# Patient Record
Sex: FEMALE | Race: WHITE | Employment: PART TIME | URBAN - NONMETROPOLITAN AREA
[De-identification: names, ages, dates, MRNs, and addresses within clinical notes are randomized per-mention and may not be internally consistent; named-entity substitution may affect disease eponyms.]

---

## 2017-02-10 RX ORDER — NADOLOL 40 MG/1
TABLET ORAL
Qty: 45 TABLET | Refills: 5 | Status: SHIPPED | OUTPATIENT
Start: 2017-02-10 | End: 2017-08-03 | Stop reason: SDUPTHER

## 2017-02-10 RX ORDER — DICLOFENAC SODIUM 75 MG/1
TABLET, DELAYED RELEASE ORAL
Qty: 60 TABLET | Refills: 5 | Status: SHIPPED | OUTPATIENT
Start: 2017-02-10 | End: 2017-08-03 | Stop reason: SDUPTHER

## 2017-02-10 RX ORDER — ROSUVASTATIN CALCIUM 5 MG
TABLET ORAL
Qty: 30 TABLET | Refills: 2 | Status: SHIPPED | OUTPATIENT
Start: 2017-02-10 | End: 2017-05-15 | Stop reason: SDUPTHER

## 2017-03-13 RX ORDER — ARIPIPRAZOLE 20 MG/1
TABLET ORAL
Qty: 30 TABLET | Refills: 5 | Status: SHIPPED | OUTPATIENT
Start: 2017-03-13 | End: 2017-08-03 | Stop reason: SDUPTHER

## 2017-03-23 ENCOUNTER — OFFICE VISIT (OUTPATIENT)
Dept: FAMILY MEDICINE CLINIC | Age: 57
End: 2017-03-23

## 2017-03-23 VITALS
HEART RATE: 74 BPM | BODY MASS INDEX: 53.92 KG/M2 | OXYGEN SATURATION: 98 % | DIASTOLIC BLOOD PRESSURE: 84 MMHG | SYSTOLIC BLOOD PRESSURE: 120 MMHG | WEIGHT: 293 LBS | HEIGHT: 62 IN

## 2017-03-23 DIAGNOSIS — M54.50 ACUTE MIDLINE LOW BACK PAIN WITHOUT SCIATICA: ICD-10-CM

## 2017-03-23 DIAGNOSIS — M53.3 SACROILIAC PAIN: Primary | ICD-10-CM

## 2017-03-23 PROCEDURE — 96372 THER/PROPH/DIAG INJ SC/IM: CPT | Performed by: NURSE PRACTITIONER

## 2017-03-23 PROCEDURE — 99213 OFFICE O/P EST LOW 20 MIN: CPT | Performed by: NURSE PRACTITIONER

## 2017-03-23 RX ORDER — LANOLIN ALCOHOL/MO/W.PET/CERES
3 CREAM (GRAM) TOPICAL NIGHTLY
COMMUNITY
End: 2017-12-15

## 2017-03-23 RX ORDER — METHYLPREDNISOLONE SODIUM SUCCINATE 125 MG/2ML
125 INJECTION, POWDER, LYOPHILIZED, FOR SOLUTION INTRAMUSCULAR; INTRAVENOUS ONCE
Status: COMPLETED | OUTPATIENT
Start: 2017-03-23 | End: 2017-03-23

## 2017-03-23 RX ORDER — CYCLOBENZAPRINE HCL 10 MG
10 TABLET ORAL EVERY 8 HOURS PRN
Qty: 45 TABLET | Refills: 0 | Status: SHIPPED | OUTPATIENT
Start: 2017-03-23 | End: 2017-12-15 | Stop reason: ALTCHOICE

## 2017-03-23 RX ORDER — PREDNISONE 10 MG/1
10 TABLET ORAL DAILY
Qty: 30 TABLET | Refills: 0 | Status: SHIPPED | OUTPATIENT
Start: 2017-03-23 | End: 2017-12-15 | Stop reason: ALTCHOICE

## 2017-03-23 RX ADMIN — METHYLPREDNISOLONE SODIUM SUCCINATE 125 MG: 125 INJECTION, POWDER, LYOPHILIZED, FOR SOLUTION INTRAMUSCULAR; INTRAVENOUS at 16:28

## 2017-03-30 ASSESSMENT — ENCOUNTER SYMPTOMS
RESPIRATORY NEGATIVE: 1
BACK PAIN: 1
EYES NEGATIVE: 1
GASTROINTESTINAL NEGATIVE: 1

## 2017-05-15 DIAGNOSIS — F33.9 RECURRENT MAJOR DEPRESSIVE DISORDER, REMISSION STATUS UNSPECIFIED (HCC): ICD-10-CM

## 2017-05-15 RX ORDER — ROSUVASTATIN CALCIUM 5 MG
TABLET ORAL
Qty: 30 TABLET | Refills: 2 | Status: SHIPPED | OUTPATIENT
Start: 2017-05-15 | End: 2017-08-03 | Stop reason: SDUPTHER

## 2017-05-15 RX ORDER — VORTIOXETINE 20 MG/1
TABLET, FILM COATED ORAL
Qty: 30 TABLET | Refills: 5 | Status: SHIPPED | OUTPATIENT
Start: 2017-05-15 | End: 2017-10-13 | Stop reason: SDUPTHER

## 2017-06-12 ENCOUNTER — TELEPHONE (OUTPATIENT)
Dept: FAMILY MEDICINE CLINIC | Age: 57
End: 2017-06-12

## 2017-06-16 ENCOUNTER — OFFICE VISIT (OUTPATIENT)
Dept: FAMILY MEDICINE CLINIC | Age: 57
End: 2017-06-16

## 2017-06-16 VITALS
SYSTOLIC BLOOD PRESSURE: 126 MMHG | OXYGEN SATURATION: 94 % | HEIGHT: 62 IN | DIASTOLIC BLOOD PRESSURE: 84 MMHG | HEART RATE: 68 BPM | WEIGHT: 293 LBS | BODY MASS INDEX: 53.92 KG/M2

## 2017-06-16 DIAGNOSIS — Z12.31 ENCOUNTER FOR SCREENING MAMMOGRAM FOR BREAST CANCER: ICD-10-CM

## 2017-06-16 DIAGNOSIS — Z23 NEED FOR PROPHYLACTIC VACCINATION AGAINST DIPHTHERIA-TETANUS-PERTUSSIS (DTP): ICD-10-CM

## 2017-06-16 DIAGNOSIS — Z11.4 SCREENING FOR HIV WITHOUT PRESENCE OF RISK FACTORS: ICD-10-CM

## 2017-06-16 DIAGNOSIS — M54.50 ACUTE MIDLINE LOW BACK PAIN WITHOUT SCIATICA: Primary | ICD-10-CM

## 2017-06-16 DIAGNOSIS — Z11.59 NEED FOR HEPATITIS C SCREENING TEST: ICD-10-CM

## 2017-06-16 DIAGNOSIS — F32.A DEPRESSIVE DISORDER: ICD-10-CM

## 2017-06-16 PROCEDURE — 96372 THER/PROPH/DIAG INJ SC/IM: CPT | Performed by: NURSE PRACTITIONER

## 2017-06-16 PROCEDURE — 99213 OFFICE O/P EST LOW 20 MIN: CPT | Performed by: NURSE PRACTITIONER

## 2017-06-16 RX ORDER — METHYLPREDNISOLONE SODIUM SUCCINATE 125 MG/2ML
125 INJECTION, POWDER, LYOPHILIZED, FOR SOLUTION INTRAMUSCULAR; INTRAVENOUS ONCE
Status: COMPLETED | OUTPATIENT
Start: 2017-06-16 | End: 2017-06-16

## 2017-06-16 RX ORDER — METHYLPREDNISOLONE 4 MG/1
TABLET ORAL
Qty: 1 KIT | Refills: 0 | Status: SHIPPED | OUTPATIENT
Start: 2017-06-16 | End: 2017-06-22

## 2017-06-16 RX ORDER — LAMOTRIGINE 150 MG/1
150 TABLET ORAL 2 TIMES DAILY
Qty: 60 TABLET | Refills: 1 | Status: CANCELLED | OUTPATIENT
Start: 2017-06-16

## 2017-06-16 RX ADMIN — METHYLPREDNISOLONE SODIUM SUCCINATE 125 MG: 125 INJECTION, POWDER, LYOPHILIZED, FOR SOLUTION INTRAMUSCULAR; INTRAVENOUS at 09:04

## 2017-06-16 ASSESSMENT — PATIENT HEALTH QUESTIONNAIRE - PHQ9
1. LITTLE INTEREST OR PLEASURE IN DOING THINGS: 1
SUM OF ALL RESPONSES TO PHQ9 QUESTIONS 1 & 2: 2
2. FEELING DOWN, DEPRESSED OR HOPELESS: 1
SUM OF ALL RESPONSES TO PHQ QUESTIONS 1-9: 2

## 2017-06-16 ASSESSMENT — ENCOUNTER SYMPTOMS
RESPIRATORY NEGATIVE: 1
BACK PAIN: 1
GASTROINTESTINAL NEGATIVE: 1
EYES NEGATIVE: 1

## 2017-07-10 DIAGNOSIS — F32.A DEPRESSIVE DISORDER: ICD-10-CM

## 2017-07-10 RX ORDER — LAMOTRIGINE 100 MG/1
TABLET ORAL
Qty: 60 TABLET | Refills: 5 | Status: SHIPPED | OUTPATIENT
Start: 2017-07-10 | End: 2018-01-10 | Stop reason: SDUPTHER

## 2017-08-03 RX ORDER — ROSUVASTATIN CALCIUM 5 MG
TABLET ORAL
Qty: 30 TABLET | Refills: 2 | Status: SHIPPED | OUTPATIENT
Start: 2017-08-03 | End: 2017-10-13 | Stop reason: SDUPTHER

## 2017-08-03 RX ORDER — NADOLOL 40 MG/1
TABLET ORAL
Qty: 45 TABLET | Refills: 5 | Status: SHIPPED | OUTPATIENT
Start: 2017-08-03 | End: 2018-01-10 | Stop reason: SDUPTHER

## 2017-08-03 RX ORDER — ARIPIPRAZOLE 20 MG/1
TABLET ORAL
Qty: 30 TABLET | Refills: 5 | Status: SHIPPED | OUTPATIENT
Start: 2017-08-03 | End: 2018-02-16 | Stop reason: SDUPTHER

## 2017-08-03 RX ORDER — DICLOFENAC SODIUM 75 MG/1
TABLET, DELAYED RELEASE ORAL
Qty: 60 TABLET | Refills: 5 | Status: SHIPPED | OUTPATIENT
Start: 2017-08-03 | End: 2018-01-10 | Stop reason: SDUPTHER

## 2017-12-05 ENCOUNTER — TELEPHONE (OUTPATIENT)
Dept: ADMINISTRATIVE | Age: 57
End: 2017-12-05

## 2017-12-12 DIAGNOSIS — F33.9 RECURRENT MAJOR DEPRESSIVE DISORDER, REMISSION STATUS UNSPECIFIED (HCC): ICD-10-CM

## 2017-12-12 RX ORDER — ROSUVASTATIN CALCIUM 5 MG
TABLET ORAL
Qty: 30 TABLET | Refills: 0 | Status: SHIPPED | OUTPATIENT
Start: 2017-12-12 | End: 2018-01-10 | Stop reason: SDUPTHER

## 2017-12-12 RX ORDER — VORTIOXETINE 20 MG/1
TABLET, FILM COATED ORAL
Qty: 30 TABLET | Refills: 0 | Status: SHIPPED | OUTPATIENT
Start: 2017-12-12 | End: 2018-01-10 | Stop reason: SDUPTHER

## 2017-12-15 ENCOUNTER — OFFICE VISIT (OUTPATIENT)
Dept: FAMILY MEDICINE CLINIC | Age: 57
End: 2017-12-15

## 2017-12-15 VITALS
RESPIRATION RATE: 16 BRPM | BODY MASS INDEX: 53.92 KG/M2 | OXYGEN SATURATION: 95 % | DIASTOLIC BLOOD PRESSURE: 86 MMHG | TEMPERATURE: 98 F | HEART RATE: 67 BPM | SYSTOLIC BLOOD PRESSURE: 132 MMHG | HEIGHT: 62 IN | WEIGHT: 293 LBS

## 2017-12-15 DIAGNOSIS — J01.90 ACUTE BACTERIAL SINUSITIS: Primary | ICD-10-CM

## 2017-12-15 DIAGNOSIS — B96.89 ACUTE BACTERIAL SINUSITIS: Primary | ICD-10-CM

## 2017-12-15 PROCEDURE — 99213 OFFICE O/P EST LOW 20 MIN: CPT | Performed by: NURSE PRACTITIONER

## 2017-12-15 RX ORDER — AZITHROMYCIN 250 MG/1
TABLET, FILM COATED ORAL
Qty: 1 PACKET | Refills: 0 | Status: SHIPPED | OUTPATIENT
Start: 2017-12-15 | End: 2017-12-25

## 2017-12-15 ASSESSMENT — ENCOUNTER SYMPTOMS
SWOLLEN GLANDS: 0
EYES NEGATIVE: 1
DIARRHEA: 1
VOMITING: 0
NAUSEA: 0
SINUS PAIN: 1
RHINORRHEA: 1
SORE THROAT: 1
COUGH: 1
ABDOMINAL PAIN: 0
WHEEZING: 1

## 2017-12-15 NOTE — PROGRESS NOTES
UNITS CAPS capsule Take 1,000 Units by mouth daily.  multivitamin (THERAGRAN) per tablet Take 1 tablet by mouth daily.  Omega-3 Fatty Acids (FISH OIL) 1200 MG CAPS Take  by mouth daily. No current facility-administered medications for this visit. Objective:       Physical Exam   Constitutional: She is oriented to person, place, and time. Vital signs are normal. She appears well-developed and well-nourished. She is cooperative. Non-toxic appearance. She does not have a sickly appearance. No distress. HENT:   Head: Normocephalic and atraumatic. Right Ear: Hearing, tympanic membrane and ear canal normal.   Left Ear: Hearing, tympanic membrane and ear canal normal.   Nose: Right sinus exhibits maxillary sinus tenderness. Left sinus exhibits maxillary sinus tenderness. Mouth/Throat: Uvula is midline, oropharynx is clear and moist and mucous membranes are normal.   Eyes: Conjunctivae and lids are normal.   Neck: Neck supple. Cardiovascular: Normal rate, regular rhythm, normal heart sounds and normal pulses. Pulmonary/Chest: Effort normal and breath sounds normal. No accessory muscle usage. No respiratory distress. Abdominal: Soft. Normal appearance. There is no tenderness. Lymphadenopathy:        Head (right side): No submental and no submandibular adenopathy present. Head (left side): No submental and no submandibular adenopathy present. She has no cervical adenopathy. Neurological: She is alert and oriented to person, place, and time. Skin: Skin is warm and dry. No lesion and no rash noted. Psychiatric: She has a normal mood and affect.  Her speech is normal and behavior is normal. Cognition and memory are normal.       /86 (Site: Left Arm, Position: Sitting, Cuff Size: Large Adult)   Pulse 67   Temp 98 °F (36.7 °C) (Temporal)   Resp 16   Ht 5' 2.01\" (1.575 m)   Wt (!) 309 lb 9.6 oz (140.4 kg)   LMP  (LMP Unknown)   SpO2 95%   BMI 56.61 kg/m²   Body mass index is 56.61 kg/m². BP Readings from Last 2 Encounters:   12/15/17 132/86   06/16/17 126/84       Wt Readings from Last 3 Encounters:   12/15/17 (!) 309 lb 9.6 oz (140.4 kg)   06/16/17 (!) 314 lb (142.4 kg)   03/23/17 (!) 313 lb 9.6 oz (142.2 kg)       Lab Review   No visits with results within 2 Month(s) from this visit. Latest known visit with results is:   Admission on 09/02/2016, Discharged on 09/02/2016   Component Date Value    Color, UA 09/02/2016 Yellow     Clarity, UA 09/02/2016 Clear     Glucose, Ur 09/02/2016 Negative     Bilirubin Urine 09/02/2016 Negative     Ketones, Urine 09/02/2016 Negative     Specific Gravity, UA 09/02/2016 1.020     Blood, Urine 09/02/2016 Negative     pH, UA 09/02/2016 7.0     Protein, UA 09/02/2016 Negative     Urobilinogen, Urine 09/02/2016 1.0     Nitrite, Urine 09/02/2016 Negative     Leukocyte Esterase, Urine 09/02/2016 TRACE*    Microscopic Examination 09/02/2016 YES     Urine Type 09/02/2016 Clean catch     Mucus, UA 09/02/2016 1+*    WBC, UA 09/02/2016 6-10*    RBC, UA 09/02/2016 0-2     Epi Cells 09/02/2016 3-5     Bacteria, UA 09/02/2016 1+*       No results found for this visit on 12/15/17. Assessment:       1. Acute bacterial sinusitis               Plan:       Dianna Lau was seen today for sinus problem, congestion and cough. Diagnoses and all orders for this visit:    Acute bacterial sinusitis  -     azithromycin (ZITHROMAX Z-ASHLEE) 250 MG tablet; Take 2 tablets on day 1, then 1 tablet daily for the next 4 days. Symptomatic therapy suggested: push fluids, rest, gargle warm salt water, use vaporizer or mist prn, use acetaminophen, ibuprofen prn, apply heat to sinuses prn and return office visit prn if symptoms persist or worsen. Call or return to clinic prn if these symptoms worsen or fail to improve as anticipated.         Patient has been instructed call the office immediately with new symptoms, change in symptoms or worsening of symptoms. If this is not feasible, patient is instructed to report to the emergency room. Medication profile reviewed. Medication side effects and possible impairments from medications were discussed as applicable. Allergies were reviewed. Health maintenance was reviewed and updated as appropriate.

## 2017-12-26 ENCOUNTER — OFFICE VISIT (OUTPATIENT)
Dept: FAMILY MEDICINE CLINIC | Age: 57
End: 2017-12-26

## 2017-12-26 VITALS
HEART RATE: 55 BPM | OXYGEN SATURATION: 97 % | WEIGHT: 293 LBS | SYSTOLIC BLOOD PRESSURE: 130 MMHG | BODY MASS INDEX: 56.41 KG/M2 | DIASTOLIC BLOOD PRESSURE: 80 MMHG

## 2017-12-26 DIAGNOSIS — Z23 NEED FOR INFLUENZA VACCINATION: ICD-10-CM

## 2017-12-26 DIAGNOSIS — R53.81 PHYSICAL DECONDITIONING: ICD-10-CM

## 2017-12-26 DIAGNOSIS — M54.50 ACUTE MIDLINE LOW BACK PAIN WITHOUT SCIATICA: Primary | ICD-10-CM

## 2017-12-26 DIAGNOSIS — M53.3 SACROILIAC PAIN: ICD-10-CM

## 2017-12-26 PROCEDURE — 99214 OFFICE O/P EST MOD 30 MIN: CPT | Performed by: NURSE PRACTITIONER

## 2017-12-26 PROCEDURE — G0008 ADMIN INFLUENZA VIRUS VAC: HCPCS | Performed by: NURSE PRACTITIONER

## 2017-12-26 PROCEDURE — 96372 THER/PROPH/DIAG INJ SC/IM: CPT | Performed by: NURSE PRACTITIONER

## 2017-12-26 PROCEDURE — 90688 IIV4 VACCINE SPLT 0.5 ML IM: CPT | Performed by: NURSE PRACTITIONER

## 2017-12-26 RX ORDER — PREDNISONE 10 MG/1
TABLET ORAL
Qty: 30 TABLET | Refills: 0 | Status: SHIPPED | OUTPATIENT
Start: 2017-12-26 | End: 2018-05-16

## 2017-12-26 RX ORDER — CYCLOBENZAPRINE HCL 10 MG
10 TABLET ORAL EVERY 8 HOURS PRN
Qty: 45 TABLET | Refills: 0 | Status: SHIPPED | OUTPATIENT
Start: 2017-12-26 | End: 2019-10-17

## 2017-12-26 RX ORDER — METHYLPREDNISOLONE SODIUM SUCCINATE 125 MG/2ML
125 INJECTION, POWDER, LYOPHILIZED, FOR SOLUTION INTRAMUSCULAR; INTRAVENOUS ONCE
Status: COMPLETED | OUTPATIENT
Start: 2017-12-26 | End: 2017-12-26

## 2017-12-26 RX ADMIN — METHYLPREDNISOLONE SODIUM SUCCINATE 125 MG: 125 INJECTION, POWDER, LYOPHILIZED, FOR SOLUTION INTRAMUSCULAR; INTRAVENOUS at 14:51

## 2017-12-26 ASSESSMENT — ENCOUNTER SYMPTOMS
RESPIRATORY NEGATIVE: 1
BACK PAIN: 1
GASTROINTESTINAL NEGATIVE: 1

## 2017-12-26 NOTE — PROGRESS NOTES
offered pain medication or muscle relaxers; however, she declined because she wanted to follow up with the office to make sure she was given the same medication regimen that was given to her previously. She is interested in physical therapy. Past Medical History:   Diagnosis Date    ADHD (attention deficit hyperactivity disorder)     Allergic rhinitis     Bipolar disorder     Borderline osteopenia     Depression     Dysmetabolic syndrome     GERD (gastroesophageal reflux disease)     Headache(784.0)     Hyperlipidemia 2014    Hypertension     Intention tremor     due to lithium    Obesity     Osteoarthritis     PCOS (polycystic ovarian syndrome)     RAD (reactive airway disease)     Restless legs syndrome (RLS) 2014      Past Surgical History:   Procedure Laterality Date    ABDOMINAL EXPLORATION SURGERY       SECTION      GASTRIC BYPASS SURGERY      HAND SURGERY      bilat CTS    KNEE SURGERY      KNEE SURGERY Right 10/28/2015    Right knee video arthroscopy with partial medial and lateral menisectomy with loose body removal    TONSILLECTOMY         Family History   Problem Relation Age of Onset    Depression Sister     Mental Illness Sister     Diabetes Mother     Heart Disease Mother     Diabetes Father     Heart Disease Father        Social History   Substance Use Topics    Smoking status: Former Smoker    Smokeless tobacco: Never Used    Alcohol use No      Comment: had jessica last week, before that was probably 10 years ago per patient.       Current Outpatient Prescriptions   Medication Sig Dispense Refill    cyclobenzaprine (FLEXERIL) 10 MG tablet Take 1 tablet by mouth every 8 hours as needed for Muscle spasms 45 tablet 0    predniSONE (DELTASONE) 10 MG tablet Take 40 mg for 3 days then 30 mg for 3 days then 20 mg for 3 days then 10 mg for 3 days 30 tablet 0    TRINTELLIX 20 MG TABS tablet TAKE 1 TABLET BY MOUTH DAILY 30 tablet 0    CRESTOR 5 MG tablet TAKE ONE TABLET BY MOUTH ONCE DAILY 30 tablet 0    nadolol (CORGARD) 40 MG tablet TAKE 1& 1/2 TABLETS BY MOUTH DAILY. 45 tablet 5    diclofenac (VOLTAREN) 75 MG EC tablet TAKE 1 TABLET BY MOUTH 2 TIMES DAILY 60 tablet 5    ARIPiprazole (ABILIFY) 20 MG tablet TAKE 1 TABLET BY MOUTH DAILY. 30 tablet 5    lamoTRIgine (LAMICTAL) 100 MG tablet TAKE 1 TABLET BY MOUTH 2 TIMES DAILY 60 tablet 5    fluticasone (FLONASE) 50 MCG/ACT nasal spray 2 sprays by Nasal route daily 1 Bottle 11    busPIRone (BUSPAR) 10 MG tablet Take 1 tablet by mouth 3 times daily as needed 30 tablet 1    Vitamin D (CHOLECALCIFEROL) 1000 UNITS CAPS capsule Take 1,000 Units by mouth daily.  multivitamin (THERAGRAN) per tablet Take 1 tablet by mouth daily.  Omega-3 Fatty Acids (FISH OIL) 1200 MG CAPS Take  by mouth daily. No current facility-administered medications for this visit. Allergies   Allergen Reactions    Amoxicillin-Pot Clavulanate Hives    Daypro [Oxaprozin] Hives    Duravent-Da [Chlorphen-Phenyleph-Methscop] Hives    Lithium      Caused shakes    Norvasc [Amlodipine]      LE edema      Nsaids      Avoid d/t gastric bypass    Seldane [Terfenadine] Hives    Suprax [Cefixime] Hives    Levofloxacin Rash       Subjective:      Review of Systems   Constitutional: Positive for activity change (R/t pain) and fatigue. Respiratory: Negative. Cardiovascular: Negative. Gastrointestinal: Negative. Genitourinary: Negative. Musculoskeletal: Positive for arthralgias (Bilateral knees), back pain, gait problem (Uses cane) and myalgias (Bilateral shoulder blades). Negative for joint swelling, neck pain and neck stiffness. Skin: Negative. Neurological: Positive for weakness (BLE) and numbness (Right hand at times). Psychiatric/Behavioral: Positive for sleep disturbance (R/t pain).          Objective:     Vitals:    12/26/17 1417   BP: 130/80   Site: Left Arm   Position: Sitting   Cuff Size: Large Adult   Pulse: 55   SpO2: 97%   Weight: (!) 308 lb 6.4 oz (139.9 kg)     Wt Readings from Last 3 Encounters:   12/26/17 (!) 308 lb 6.4 oz (139.9 kg)   12/25/17 (!) 309 lb (140.2 kg)   12/15/17 (!) 309 lb 9.6 oz (140.4 kg)     Temp Readings from Last 3 Encounters:   12/25/17 97.9 °F (36.6 °C) (Oral)   12/15/17 98 °F (36.7 °C) (Temporal)   12/22/16 97.9 °F (36.6 °C) (Oral)     BP Readings from Last 3 Encounters:   12/26/17 130/80   12/25/17 135/86   12/15/17 132/86     Pulse Readings from Last 3 Encounters:   12/26/17 55   12/25/17 57   12/15/17 67     Physical Exam   Constitutional: She is oriented to person, place, and time. She appears well-developed and well-nourished. No distress. HENT:   Head: Normocephalic and atraumatic. Right Ear: External ear normal.   Left Ear: External ear normal.   Nose: Nose normal.   Mouth/Throat: Oropharynx is clear and moist.   Eyes: Conjunctivae and EOM are normal. Pupils are equal, round, and reactive to light. Right eye exhibits no discharge. Left eye exhibits no discharge. Neck: Normal range of motion. Neck supple. No tracheal deviation present. Cardiovascular: Normal rate, regular rhythm, normal heart sounds and intact distal pulses. Exam reveals no gallop and no friction rub. No murmur heard. Pulmonary/Chest: Effort normal and breath sounds normal. No respiratory distress. She has no wheezes. She has no rales. She exhibits no tenderness. Abdominal: Soft. Bowel sounds are normal. She exhibits no distension and no mass. There is no tenderness. There is no rebound and no guarding. Musculoskeletal: She exhibits no edema or deformity. Thoracic back: She exhibits decreased range of motion, tenderness, pain and spasm. Lumbar back: She exhibits decreased range of motion, tenderness, pain and spasm. Lymphadenopathy:     She has no cervical adenopathy. Neurological: She is alert and oriented to person, place, and time. Skin: Skin is warm and dry.  No rash noted. She is not diaphoretic. Psychiatric: She has a normal mood and affect. Her behavior is normal. Judgment and thought content normal.   Nursing note and vitals reviewed. Admission on 09/02/2016, Discharged on 09/02/2016   Component Date Value Ref Range Status    Color, UA 09/02/2016 Yellow  Straw/Yellow Final    Clarity, UA 09/02/2016 Clear  Clear Final    Glucose, Ur 09/02/2016 Negative  Negative mg/dL Final    Bilirubin Urine 09/02/2016 Negative  Negative Final    Ketones, Urine 09/02/2016 Negative  Negative mg/dL Final    Specific Gravity, UA 09/02/2016 1.020  1.005 - 1.030 Final    Blood, Urine 09/02/2016 Negative  Negative Final    pH, UA 09/02/2016 7.0  5.0 - 8.0 Final    Protein, UA 09/02/2016 Negative  Negative mg/dL Final    Urobilinogen, Urine 09/02/2016 1.0  <2.0 E.U./dL Final    Nitrite, Urine 09/02/2016 Negative  Negative Final    Leukocyte Esterase, Urine 09/02/2016 TRACE* Negative Final    Microscopic Examination 09/02/2016 YES   Final    Urine Type 09/02/2016 Clean catch   Final    Mucus, UA 09/02/2016 1+* /LPF Final    WBC, UA 09/02/2016 6-10* 0 - 5 /HPF Final    RBC, UA 09/02/2016 0-2  0 - 2 /HPF Final    Epi Cells 09/02/2016 3-5  /HPF Final    Bacteria, UA 09/02/2016 1+* /HPF Final           Assessment & Plan: The following diagnoses and conditions are stable with no further orders unless indicated:  1. Acute midline low back pain without sciatica    2. Sacroiliac pain    3. Physical deconditioning    4. Need for influenza vaccination        Neal Corcoran was seen today for back pain. Steroid injection and steroid taper dose given. Flexeril may be used for back spasms. Informed Ms. Zamora of the side effects of Flexeril and steroids and she verbalizes understanding and would like to proceed with these medications. Recommend weight loss. Physical therapy order placed.     Diagnoses and all orders for this visit:    Acute midline low back pain without sciatica  -     cyclobenzaprine (FLEXERIL) 10 MG tablet; Take 1 tablet by mouth every 8 hours as needed for Muscle spasms  -     methylPREDNISolone sodium (SOLU-MEDROL) injection 125 mg; Inject 2 mLs into the muscle once  -     predniSONE (DELTASONE) 10 MG tablet; Take 40 mg for 3 days then 30 mg for 3 days then 20 mg for 3 days then 10 mg for 3 days  -     External Referral To Physical Therapy    Sacroiliac pain  -     cyclobenzaprine (FLEXERIL) 10 MG tablet; Take 1 tablet by mouth every 8 hours as needed for Muscle spasms  -     methylPREDNISolone sodium (SOLU-MEDROL) injection 125 mg; Inject 2 mLs into the muscle once  -     predniSONE (DELTASONE) 10 MG tablet; Take 40 mg for 3 days then 30 mg for 3 days then 20 mg for 3 days then 10 mg for 3 days    Physical deconditioning  -     External Referral To Physical Therapy    Need for influenza vaccination  -     INFLUENZA, QUADV, 3 YRS AND OLDER, IM, MDV, 0.5ML (Giovanni Heydi)        Return if symptoms worsen or fail to improve. Patient should call the office immediately with new or ongoing signs or symptoms or worsening, or proceed to the emergency room. If you are on medications which could impair your senses, you are at risk of weakness, falls, dizziness, or drowsiness. You should be careful during activities which could place you at risk of harm, such as climbing, using stairs, operating machinery, or driving vehicles. If you feel you cannot safely do these activities, you should request others to help you, or avoid the activities altogether. If you are drowsy for any other reason, you should use the same precautions as listed above. Call if pattern of symptoms change or persists for an extended time. Information given to patient- What are the possible side effects of muscle relaxers? Get emergency medical help if you have signs of an allergic reaction: hives; difficult breathing; swelling of your face, lips, tongue, or throat.   Call your doctor at once if you have:  · weak or shallow breathing;  · confusion, hallucinations; or  · a seizure (convulsions). Common side effects may include:  · drowsiness, dizziness, weakness, tired feeling;  · headache;  · sleep problems (insomnia);  · nausea, constipation; or  · urinating more often than usual.  This is not a complete list of side effects and others may occur. What other drugs will affect Muscle Relaxers? Taking muscle relaxers with other drugs that make you sleepy or slow your breathing can cause dangerous side effects or death. DO NOT DRIVE OR OPERATE MACHINERY WHILE TAKING MUSCLE RELAXERS.

## 2018-01-08 ENCOUNTER — HOSPITAL ENCOUNTER (OUTPATIENT)
Dept: OTHER | Age: 58
Discharge: HOME | End: 2018-01-08
Attending: NURSE PRACTITIONER | Admitting: NURSE PRACTITIONER

## 2018-01-08 ENCOUNTER — OFFICE VISIT (OUTPATIENT)
Dept: FAMILY MEDICINE CLINIC | Age: 58
End: 2018-01-08

## 2018-01-08 VITALS
WEIGHT: 293 LBS | DIASTOLIC BLOOD PRESSURE: 78 MMHG | OXYGEN SATURATION: 97 % | BODY MASS INDEX: 53.92 KG/M2 | HEART RATE: 60 BPM | SYSTOLIC BLOOD PRESSURE: 122 MMHG | HEIGHT: 62 IN

## 2018-01-08 DIAGNOSIS — G25.81 RESTLESS LEGS SYNDROME (RLS): ICD-10-CM

## 2018-01-08 DIAGNOSIS — K91.2 POSTOPERATIVE MALABSORPTION: ICD-10-CM

## 2018-01-08 DIAGNOSIS — Z11.4 SCREENING FOR HIV (HUMAN IMMUNODEFICIENCY VIRUS): ICD-10-CM

## 2018-01-08 DIAGNOSIS — E78.5 HYPERLIPIDEMIA, UNSPECIFIED HYPERLIPIDEMIA TYPE: ICD-10-CM

## 2018-01-08 DIAGNOSIS — M54.50 ACUTE LEFT-SIDED LOW BACK PAIN WITHOUT SCIATICA: ICD-10-CM

## 2018-01-08 DIAGNOSIS — R82.998 URINE LEUKOCYTES: ICD-10-CM

## 2018-01-08 DIAGNOSIS — Z12.39 BREAST CANCER SCREENING: ICD-10-CM

## 2018-01-08 DIAGNOSIS — Z98.84 HISTORY OF BARIATRIC SURGERY: ICD-10-CM

## 2018-01-08 DIAGNOSIS — Z78.0 MENOPAUSE: ICD-10-CM

## 2018-01-08 DIAGNOSIS — E66.01 MORBID OBESITY WITH BMI OF 50.0-59.9, ADULT (HCC): Primary | ICD-10-CM

## 2018-01-08 DIAGNOSIS — E55.9 VITAMIN D DEFICIENCY: ICD-10-CM

## 2018-01-08 DIAGNOSIS — R25.2 MUSCLE CRAMPS: ICD-10-CM

## 2018-01-08 DIAGNOSIS — Z11.59 NEED FOR HEPATITIS C SCREENING TEST: ICD-10-CM

## 2018-01-08 LAB
A/G RATIO: 1.4 (ref 1.1–2.2)
ALBUMIN SERPL-MCNC: 4.2 G/DL (ref 3.4–5)
ALP BLD-CCNC: 91 U/L (ref 40–129)
ALT SERPL-CCNC: 23 U/L (ref 10–40)
ANION GAP SERPL CALCULATED.3IONS-SCNC: 11 MMOL/L (ref 3–16)
AST SERPL-CCNC: 19 U/L (ref 15–37)
BASOPHILS ABSOLUTE: 0.1 K/UL (ref 0–0.2)
BASOPHILS RELATIVE PERCENT: 0.8 %
BILIRUB SERPL-MCNC: 0.3 MG/DL (ref 0–1)
BILIRUBIN, POC: NEGATIVE
BLOOD URINE, POC: NORMAL
BUN BLDV-MCNC: 25 MG/DL (ref 7–20)
CALCIUM SERPL-MCNC: 9.6 MG/DL (ref 8.3–10.6)
CHLORIDE BLD-SCNC: 102 MMOL/L (ref 99–110)
CHOLESTEROL, TOTAL: 163 MG/DL (ref 0–199)
CLARITY, POC: NORMAL
CO2: 30 MMOL/L (ref 21–32)
COLOR, POC: YELLOW
CREAT SERPL-MCNC: 0.7 MG/DL (ref 0.6–1.1)
EOSINOPHILS ABSOLUTE: 0.2 K/UL (ref 0–0.6)
EOSINOPHILS RELATIVE PERCENT: 2.4 %
FERRITIN: 52.2 NG/ML (ref 15–150)
FOLATE: >20 NG/ML (ref 4.78–24.2)
GFR AFRICAN AMERICAN: >60
GFR NON-AFRICAN AMERICAN: >60
GLOBULIN: 2.9 G/DL
GLUCOSE BLD-MCNC: 104 MG/DL (ref 70–99)
GLUCOSE URINE, POC: NEGATIVE
HCT VFR BLD CALC: 43 % (ref 36–48)
HDLC SERPL-MCNC: 59 MG/DL (ref 40–60)
HEMOGLOBIN: 13.8 G/DL (ref 12–16)
HEPATITIS C ANTIBODY INTERPRETATION: NORMAL
IRON: 68 UG/DL (ref 37–145)
KETONES, POC: NEGATIVE
LDL CHOLESTEROL CALCULATED: 82 MG/DL
LEUKOCYTE EST, POC: NORMAL
LYMPHOCYTES ABSOLUTE: 2.2 K/UL (ref 1–5.1)
LYMPHOCYTES RELATIVE PERCENT: 25.5 %
MAGNESIUM: 2.2 MG/DL (ref 1.8–2.4)
MCH RBC QN AUTO: 27.4 PG (ref 26–34)
MCHC RBC AUTO-ENTMCNC: 32 G/DL (ref 31–36)
MCV RBC AUTO: 85.5 FL (ref 80–100)
MONOCYTES ABSOLUTE: 0.7 K/UL (ref 0–1.3)
MONOCYTES RELATIVE PERCENT: 8.3 %
NEUTROPHILS ABSOLUTE: 5.3 K/UL (ref 1.7–7.7)
NEUTROPHILS RELATIVE PERCENT: 63 %
NITRITE, POC: NEGATIVE
PARATHYROID HORMONE INTACT: 63.6 PG/ML (ref 14–72)
PDW BLD-RTO: 16 % (ref 12.4–15.4)
PH, POC: 6
PLATELET # BLD: 302 K/UL (ref 135–450)
PMV BLD AUTO: 7.3 FL (ref 5–10.5)
POTASSIUM SERPL-SCNC: 4.3 MMOL/L (ref 3.5–5.1)
PROTEIN, POC: NEGATIVE
RBC # BLD: 5.02 M/UL (ref 4–5.2)
SODIUM BLD-SCNC: 143 MMOL/L (ref 136–145)
SPECIFIC GRAVITY, POC: 1.02
TOTAL IRON BINDING CAPACITY: 387 UG/DL (ref 260–445)
TOTAL PROTEIN: 7.1 G/DL (ref 6.4–8.2)
TRIGL SERPL-MCNC: 109 MG/DL (ref 0–150)
TSH SERPL DL<=0.05 MIU/L-ACNC: 1.93 UIU/ML (ref 0.27–4.2)
UROBILINOGEN, POC: 0.2
VITAMIN B-12: 872 PG/ML (ref 211–911)
VITAMIN D 25-HYDROXY: 44 NG/ML
VLDLC SERPL CALC-MCNC: 22 MG/DL
WBC # BLD: 8.4 K/UL (ref 4–11)

## 2018-01-08 PROCEDURE — 81002 URINALYSIS NONAUTO W/O SCOPE: CPT | Performed by: NURSE PRACTITIONER

## 2018-01-08 PROCEDURE — 99214 OFFICE O/P EST MOD 30 MIN: CPT | Performed by: NURSE PRACTITIONER

## 2018-01-08 RX ORDER — NITROFURANTOIN 25; 75 MG/1; MG/1
100 CAPSULE ORAL 2 TIMES DAILY
Qty: 14 CAPSULE | Refills: 0 | Status: SHIPPED | OUTPATIENT
Start: 2018-01-08 | End: 2018-01-15

## 2018-01-08 ASSESSMENT — ENCOUNTER SYMPTOMS
EYES NEGATIVE: 1
BOWEL INCONTINENCE: 0
BACK PAIN: 1
ABDOMINAL PAIN: 0
GASTROINTESTINAL NEGATIVE: 1
RESPIRATORY NEGATIVE: 1

## 2018-01-08 NOTE — PROGRESS NOTES
Social History     Social History    Marital status: Legally      Spouse name: N/A    Number of children: N/A    Years of education: N/A     Occupational History    Not on file. Social History Main Topics    Smoking status: Former Smoker    Smokeless tobacco: Never Used    Alcohol use No      Comment: had jessica last week, before that was probably 10 years ago per patient.  Drug use: No    Sexual activity: Not Currently     Other Topics Concern    Not on file     Social History Narrative    No narrative on file     Current Outpatient Prescriptions   Medication Sig Dispense Refill    cyclobenzaprine (FLEXERIL) 10 MG tablet Take 1 tablet by mouth every 8 hours as needed for Muscle spasms 45 tablet 0    predniSONE (DELTASONE) 10 MG tablet Take 40 mg for 3 days then 30 mg for 3 days then 20 mg for 3 days then 10 mg for 3 days 30 tablet 0    TRINTELLIX 20 MG TABS tablet TAKE 1 TABLET BY MOUTH DAILY 30 tablet 0    CRESTOR 5 MG tablet TAKE ONE TABLET BY MOUTH ONCE DAILY 30 tablet 0    nadolol (CORGARD) 40 MG tablet TAKE 1& 1/2 TABLETS BY MOUTH DAILY. 45 tablet 5    diclofenac (VOLTAREN) 75 MG EC tablet TAKE 1 TABLET BY MOUTH 2 TIMES DAILY 60 tablet 5    ARIPiprazole (ABILIFY) 20 MG tablet TAKE 1 TABLET BY MOUTH DAILY. 30 tablet 5    lamoTRIgine (LAMICTAL) 100 MG tablet TAKE 1 TABLET BY MOUTH 2 TIMES DAILY 60 tablet 5    fluticasone (FLONASE) 50 MCG/ACT nasal spray 2 sprays by Nasal route daily 1 Bottle 11    busPIRone (BUSPAR) 10 MG tablet Take 1 tablet by mouth 3 times daily as needed 30 tablet 1    Vitamin D (CHOLECALCIFEROL) 1000 UNITS CAPS capsule Take 1,000 Units by mouth daily.  multivitamin (THERAGRAN) per tablet Take 1 tablet by mouth daily.  Omega-3 Fatty Acids (FISH OIL) 1200 MG CAPS Take  by mouth daily. No current facility-administered medications for this visit.            Objective:       Physical Exam   Constitutional: She is oriented to person, and behavior is normal. Judgment and thought content normal. Cognition and memory are normal.   Nursing note and vitals reviewed. /78 (Site: Left Arm, Position: Sitting, Cuff Size: Large Adult)   Pulse 60   Ht 5' 2\" (1.575 m)   Wt (!) 302 lb (137 kg)   LMP  (LMP Unknown)   SpO2 97%   BMI 55.24 kg/m²   Body mass index is 55.24 kg/m². BP Readings from Last 2 Encounters:   01/08/18 122/78   12/26/17 130/80       Wt Readings from Last 3 Encounters:   01/08/18 (!) 302 lb (137 kg)   12/26/17 (!) 308 lb 6.4 oz (139.9 kg)   12/25/17 (!) 309 lb (140.2 kg)       Lab Review   No visits with results within 2 Month(s) from this visit. Latest known visit with results is:   Admission on 09/02/2016, Discharged on 09/02/2016   Component Date Value    Color, UA 09/02/2016 Yellow     Clarity, UA 09/02/2016 Clear     Glucose, Ur 09/02/2016 Negative     Bilirubin Urine 09/02/2016 Negative     Ketones, Urine 09/02/2016 Negative     Specific Gravity, UA 09/02/2016 1.020     Blood, Urine 09/02/2016 Negative     pH, UA 09/02/2016 7.0     Protein, UA 09/02/2016 Negative     Urobilinogen, Urine 09/02/2016 1.0     Nitrite, Urine 09/02/2016 Negative     Leukocyte Esterase, Urine 09/02/2016 TRACE*    Microscopic Examination 09/02/2016 YES     Urine Type 09/02/2016 Clean catch     Mucus, UA 09/02/2016 1+*    WBC, UA 09/02/2016 6-10*    RBC, UA 09/02/2016 0-2     Epi Cells 09/02/2016 3-5     Bacteria, UA 09/02/2016 1+*       No results found for this visit on 01/08/18. Assessment:       1. Morbid obesity with BMI of 50.0-59.9, adult ([address]    2. Urine leukocytes    3. Postoperative malabsorption    4. Acute left-sided low back pain without sciatica    5. Hyperlipidemia, unspecified hyperlipidemia type    6. Vitamin D deficiency    7. Restless legs syndrome (RLS)    8. History of bariatric surgery    9. Muscle cramps    10. Menopause    11. Need for hepatitis C screening test    12.  Screening for HIV (human immunodeficiency virus)    13. Breast cancer screening      Results for POC orders placed in visit on 01/08/18   POCT Urinalysis no Micro   Result Value Ref Range    Color, UA YELLOW     Clarity, UA CLOUDY     Glucose, UA POC NEGATIVE     Bilirubin, UA NEGATIVE     Ketones, UA NEGATIVE     Spec Grav, UA 1.020     Blood, UA POC TRACE     pH, UA 6.0     Protein, UA POC NEGATIVE     Urobilinogen, UA 0.2     Leukocytes, UA MODERATE     Nitrite, UA NEGATIVE               Plan:       Shelby Carrizales was seen today for discuss medications, depression and back pain. Diagnoses and all orders for this visit:    Morbid obesity with BMI of 50.0-59.9, adult ([facility] [address]  -     VITAMIN A  -     VITAMIN B12 & FOLATE  -     VITAMIN D 25 HYDROXY  -     FERRITIN  -     IRON AND TIBC  -     Comprehensive Metabolic Panel  -     CBC Auto Differential  -     Lipid Panel  -     TSH without Reflex  -     VITAMIN B1  -     PTH, INTACT  -     COPPER, SERUM  -     ZINC  -     MAGNESIUM    Urine leukocytes  Maybe cause of left sided back pain. -     URINE CULTURE  -     nitrofurantoin, macrocrystal-monohydrate, (MACROBID) 100 MG capsule;  Take 1 capsule by mouth 2 times daily for 7 days  Consider CT abd/pelvis r/o renal calculi if no improvement and urine culture negative    Postoperative malabsorption  -     VITAMIN A  -     VITAMIN B12 & FOLATE  -     VITAMIN D 25 HYDROXY  -     FERRITIN  -     IRON AND TIBC  -     Comprehensive Metabolic Panel  -     CBC Auto Differential  -     Lipid Panel  -     TSH without Reflex  -     VITAMIN B1  -     PTH, INTACT  -     COPPER, SERUM  -     ZINC  -     MAGNESIUM    Acute left-sided low back pain without sciatica  -     Handicap Placard MISC; by Does not apply route  -     POCT Urinalysis no Micro    Hyperlipidemia, unspecified hyperlipidemia type  -     Comprehensive Metabolic Panel  -     TSH without Reflex  -     Lipid Panel    Vitamin D deficiency  -     VITAMIN D 25 HYDROXY    Restless legs syndrome (RLS)  -     VITAMIN A  -     VITAMIN B12 & FOLATE  -     VITAMIN D 25 HYDROXY  -     FERRITIN  -     IRON AND TIBC  -     Comprehensive Metabolic Panel  -     CBC Auto Differential  -     Lipid Panel  -     TSH without Reflex  -     VITAMIN B1  -     PTH, INTACT  -     COPPER, SERUM  -     ZINC  -     MAGNESIUM    History of bariatric surgery  -     VITAMIN A  -     VITAMIN B12 & FOLATE  -     VITAMIN D 25 HYDROXY  -     FERRITIN  -     IRON AND TIBC  -     HEPATITIS C ANTIBODY  -     HIV-1 AND HIV-2 ANTIBODIES  -     Comprehensive Metabolic Panel  -     CBC Auto Differential  -     Lipid Panel  -     TSH without Reflex  -     VITAMIN B1  -     PTH, INTACT  -     COPPER, SERUM  -     ZINC  -     MAGNESIUM    Muscle cramps  -     MAGNESIUM    Menopause  -     DEXA Bone Density Axial Skeleton; Future    Need for hepatitis C screening test  -     HEPATITIS C ANTIBODY    Screening for HIV (human immunodeficiency virus)  -     HIV-1 AND HIV-2 ANTIBODIES    Breast cancer screening  -     JULIAN Screening Bilateral; Future    Patient has been instructed call the office immediately with new symptoms, change in symptoms or worsening of symptoms. If this is not feasible, patient is instructed to report to the emergency room. Medication profile reviewed. Medication side effects and possible impairments from medications were discussed as applicable. Allergies were reviewed. Health maintenance was reviewed and updated as appropriate.

## 2018-01-09 LAB — HIV-1 AND HIV-2 ANTIBODIES: NORMAL

## 2018-01-10 DIAGNOSIS — F32.A DEPRESSIVE DISORDER: ICD-10-CM

## 2018-01-10 LAB
COPPER: 156 UG/DL (ref 80–155)
RETINYL PALMITATE: <0.02 MG/L (ref 0–0.1)
URINE CULTURE, ROUTINE: NORMAL
VITAMIN A LEVEL: 0.64 MG/L (ref 0.3–1.2)
VITAMIN A, INTERP: NORMAL
ZINC: 87 UG/DL (ref 60–120)

## 2018-01-10 RX ORDER — DICLOFENAC SODIUM 75 MG/1
TABLET, DELAYED RELEASE ORAL
Qty: 60 TABLET | Refills: 5 | Status: SHIPPED | OUTPATIENT
Start: 2018-01-10 | End: 2018-07-12 | Stop reason: SDUPTHER

## 2018-01-10 RX ORDER — LAMOTRIGINE 100 MG/1
TABLET ORAL
Qty: 60 TABLET | Refills: 5 | Status: SHIPPED | OUTPATIENT
Start: 2018-01-10 | End: 2018-05-16 | Stop reason: DRUGHIGH

## 2018-01-10 RX ORDER — NADOLOL 40 MG/1
TABLET ORAL
Qty: 45 TABLET | Refills: 5 | Status: SHIPPED | OUTPATIENT
Start: 2018-01-10 | End: 2018-07-12 | Stop reason: SDUPTHER

## 2018-01-10 NOTE — TELEPHONE ENCOUNTER
Nadya Tinoco is requesting refill(s)   Last OV 1-8-18 (pertaining to medication)  LR 8-3-17 (per medication requested)  Next office visit scheduled or attempted No   If no, reason:

## 2018-01-11 LAB — VITAMIN B1, PLASMA: 9 NMOL/L (ref 4–15)

## 2018-01-22 ENCOUNTER — TELEPHONE (OUTPATIENT)
Dept: FAMILY MEDICINE CLINIC | Age: 58
End: 2018-01-22

## 2018-01-22 RX ORDER — CLINDAMYCIN HYDROCHLORIDE 300 MG/1
300 CAPSULE ORAL 3 TIMES DAILY
Qty: 30 CAPSULE | Refills: 0 | Status: SHIPPED | OUTPATIENT
Start: 2018-01-22 | End: 2018-02-01

## 2018-03-15 RX ORDER — ARIPIPRAZOLE 20 MG/1
TABLET ORAL
Qty: 30 TABLET | Refills: 5 | Status: SHIPPED | OUTPATIENT
Start: 2018-03-15 | End: 2018-09-26

## 2018-05-16 ENCOUNTER — HOSPITAL ENCOUNTER (OUTPATIENT)
Dept: OTHER | Age: 58
Discharge: HOME | End: 2018-05-16
Attending: NURSE PRACTITIONER | Admitting: NURSE PRACTITIONER

## 2018-05-16 ENCOUNTER — OFFICE VISIT (OUTPATIENT)
Dept: FAMILY MEDICINE CLINIC | Age: 58
End: 2018-05-16

## 2018-05-16 VITALS
SYSTOLIC BLOOD PRESSURE: 120 MMHG | HEART RATE: 61 BPM | HEIGHT: 62 IN | WEIGHT: 293 LBS | BODY MASS INDEX: 53.92 KG/M2 | DIASTOLIC BLOOD PRESSURE: 83 MMHG

## 2018-05-16 DIAGNOSIS — Z79.899 HIGH RISK MEDICATION USE: ICD-10-CM

## 2018-05-16 DIAGNOSIS — Z13.31 POSITIVE DEPRESSION SCREENING: ICD-10-CM

## 2018-05-16 DIAGNOSIS — F32.A DEPRESSIVE DISORDER: Primary | ICD-10-CM

## 2018-05-16 PROCEDURE — G8431 POS CLIN DEPRES SCRN F/U DOC: HCPCS | Performed by: NURSE PRACTITIONER

## 2018-05-16 PROCEDURE — G0444 DEPRESSION SCREEN ANNUAL: HCPCS | Performed by: NURSE PRACTITIONER

## 2018-05-16 PROCEDURE — 99213 OFFICE O/P EST LOW 20 MIN: CPT | Performed by: NURSE PRACTITIONER

## 2018-05-16 RX ORDER — LAMOTRIGINE 150 MG/1
150 TABLET ORAL 2 TIMES DAILY
Qty: 60 TABLET | Refills: 0 | Status: SHIPPED | OUTPATIENT
Start: 2018-05-16 | End: 2018-06-13 | Stop reason: SDUPTHER

## 2018-05-16 ASSESSMENT — PATIENT HEALTH QUESTIONNAIRE - PHQ9
5. POOR APPETITE OR OVEREATING: 2
10. IF YOU CHECKED OFF ANY PROBLEMS, HOW DIFFICULT HAVE THESE PROBLEMS MADE IT FOR YOU TO DO YOUR WORK, TAKE CARE OF THINGS AT HOME, OR GET ALONG WITH OTHER PEOPLE: 2
6. FEELING BAD ABOUT YOURSELF - OR THAT YOU ARE A FAILURE OR HAVE LET YOURSELF OR YOUR FAMILY DOWN: 3
SUM OF ALL RESPONSES TO PHQ QUESTIONS 1-9: 20
9. THOUGHTS THAT YOU WOULD BE BETTER OFF DEAD, OR OF HURTING YOURSELF: 1
1. LITTLE INTEREST OR PLEASURE IN DOING THINGS: 3
4. FEELING TIRED OR HAVING LITTLE ENERGY: 3
8. MOVING OR SPEAKING SO SLOWLY THAT OTHER PEOPLE COULD HAVE NOTICED. OR THE OPPOSITE, BEING SO FIGETY OR RESTLESS THAT YOU HAVE BEEN MOVING AROUND A LOT MORE THAN USUAL: 0
SUM OF ALL RESPONSES TO PHQ9 QUESTIONS 1 & 2: 6
3. TROUBLE FALLING OR STAYING ASLEEP: 3
7. TROUBLE CONCENTRATING ON THINGS, SUCH AS READING THE NEWSPAPER OR WATCHING TELEVISION: 2
2. FEELING DOWN, DEPRESSED OR HOPELESS: 3

## 2018-05-19 LAB — LAMOTRIGINE LEVEL: 1.2 UG/ML (ref 2.5–15)

## 2018-05-30 ENCOUNTER — HOSPITAL ENCOUNTER (OUTPATIENT)
Dept: OTHER | Age: 58
Discharge: HOME | End: 2018-05-30
Attending: NURSE PRACTITIONER | Admitting: NURSE PRACTITIONER

## 2018-06-01 LAB — LAMOTRIGINE LEVEL: 2.4 UG/ML (ref 2.5–15)

## 2018-06-13 ENCOUNTER — OFFICE VISIT (OUTPATIENT)
Dept: FAMILY MEDICINE CLINIC | Age: 58
End: 2018-06-13

## 2018-06-13 VITALS
SYSTOLIC BLOOD PRESSURE: 103 MMHG | DIASTOLIC BLOOD PRESSURE: 59 MMHG | HEART RATE: 65 BPM | OXYGEN SATURATION: 95 % | WEIGHT: 293 LBS | HEIGHT: 62 IN | BODY MASS INDEX: 53.92 KG/M2

## 2018-06-13 DIAGNOSIS — F32.A DEPRESSIVE DISORDER: Primary | ICD-10-CM

## 2018-06-13 PROCEDURE — G0444 DEPRESSION SCREEN ANNUAL: HCPCS | Performed by: NURSE PRACTITIONER

## 2018-06-13 PROCEDURE — 99213 OFFICE O/P EST LOW 20 MIN: CPT | Performed by: NURSE PRACTITIONER

## 2018-06-13 RX ORDER — MIRTAZAPINE 15 MG/1
15 TABLET, FILM COATED ORAL NIGHTLY
Qty: 30 TABLET | Refills: 2 | Status: SHIPPED | OUTPATIENT
Start: 2018-06-13 | End: 2018-08-14 | Stop reason: SDUPTHER

## 2018-06-13 RX ORDER — LAMOTRIGINE 150 MG/1
150 TABLET ORAL 2 TIMES DAILY
Qty: 60 TABLET | Refills: 1 | Status: SHIPPED | OUTPATIENT
Start: 2018-06-13 | End: 2018-07-12 | Stop reason: SDUPTHER

## 2018-06-13 ASSESSMENT — PATIENT HEALTH QUESTIONNAIRE - PHQ9
8. MOVING OR SPEAKING SO SLOWLY THAT OTHER PEOPLE COULD HAVE NOTICED. OR THE OPPOSITE, BEING SO FIGETY OR RESTLESS THAT YOU HAVE BEEN MOVING AROUND A LOT MORE THAN USUAL: 0
1. LITTLE INTEREST OR PLEASURE IN DOING THINGS: 3
SUM OF ALL RESPONSES TO PHQ QUESTIONS 1-9: 16
7. TROUBLE CONCENTRATING ON THINGS, SUCH AS READING THE NEWSPAPER OR WATCHING TELEVISION: 1
2. FEELING DOWN, DEPRESSED OR HOPELESS: 2
SUM OF ALL RESPONSES TO PHQ9 QUESTIONS 1 & 2: 5
3. TROUBLE FALLING OR STAYING ASLEEP: 1
6. FEELING BAD ABOUT YOURSELF - OR THAT YOU ARE A FAILURE OR HAVE LET YOURSELF OR YOUR FAMILY DOWN: 2
4. FEELING TIRED OR HAVING LITTLE ENERGY: 3
10. IF YOU CHECKED OFF ANY PROBLEMS, HOW DIFFICULT HAVE THESE PROBLEMS MADE IT FOR YOU TO DO YOUR WORK, TAKE CARE OF THINGS AT HOME, OR GET ALONG WITH OTHER PEOPLE: 2
9. THOUGHTS THAT YOU WOULD BE BETTER OFF DEAD, OR OF HURTING YOURSELF: 1
5. POOR APPETITE OR OVEREATING: 3

## 2018-06-14 ASSESSMENT — ENCOUNTER SYMPTOMS
RESPIRATORY NEGATIVE: 1
EYES NEGATIVE: 1
GASTROINTESTINAL NEGATIVE: 1

## 2018-07-12 DIAGNOSIS — F33.9 RECURRENT MAJOR DEPRESSIVE DISORDER, REMISSION STATUS UNSPECIFIED (HCC): ICD-10-CM

## 2018-07-12 DIAGNOSIS — F32.A DEPRESSIVE DISORDER: ICD-10-CM

## 2018-07-12 RX ORDER — DICLOFENAC SODIUM 75 MG/1
TABLET, DELAYED RELEASE ORAL
Qty: 60 TABLET | Refills: 5 | Status: SHIPPED | OUTPATIENT
Start: 2018-07-12 | End: 2019-02-11 | Stop reason: SDUPTHER

## 2018-07-12 RX ORDER — NADOLOL 40 MG/1
TABLET ORAL
Qty: 45 TABLET | Refills: 5 | Status: SHIPPED | OUTPATIENT
Start: 2018-07-12 | End: 2018-09-26 | Stop reason: SDUPTHER

## 2018-07-12 RX ORDER — VORTIOXETINE 20 MG/1
TABLET, FILM COATED ORAL
Qty: 30 TABLET | Refills: 5 | Status: SHIPPED | OUTPATIENT
Start: 2018-07-12 | End: 2019-10-17 | Stop reason: SDUPTHER

## 2018-07-12 RX ORDER — ROSUVASTATIN CALCIUM 5 MG
TABLET ORAL
Qty: 30 TABLET | Refills: 5 | Status: SHIPPED | OUTPATIENT
Start: 2018-07-12 | End: 2019-01-08 | Stop reason: SDUPTHER

## 2018-07-12 RX ORDER — LAMOTRIGINE 150 MG/1
150 TABLET ORAL 2 TIMES DAILY
Qty: 60 TABLET | Refills: 1 | Status: SHIPPED | OUTPATIENT
Start: 2018-07-12 | End: 2018-09-26

## 2018-07-30 ENCOUNTER — HOSPITAL ENCOUNTER (OUTPATIENT)
Dept: MAMMOGRAPHY | Age: 58
Discharge: HOME | End: 2018-08-04
Payer: MEDICARE

## 2018-07-30 DIAGNOSIS — Z12.31 VISIT FOR SCREENING MAMMOGRAM: ICD-10-CM

## 2018-07-30 PROCEDURE — 77067 SCR MAMMO BI INCL CAD: CPT

## 2018-08-02 DIAGNOSIS — Z12.39 SCREENING FOR BREAST CANCER: Primary | ICD-10-CM

## 2018-08-14 RX ORDER — MIRTAZAPINE 15 MG/1
15 TABLET, FILM COATED ORAL NIGHTLY
Qty: 30 TABLET | Refills: 1 | Status: SHIPPED | OUTPATIENT
Start: 2018-08-14 | End: 2018-09-26

## 2018-09-05 RX ORDER — MIRTAZAPINE 15 MG/1
15 TABLET, FILM COATED ORAL NIGHTLY
Qty: 30 TABLET | Refills: 2 | OUTPATIENT
Start: 2018-09-05

## 2018-09-26 ENCOUNTER — OFFICE VISIT (OUTPATIENT)
Dept: FAMILY MEDICINE CLINIC | Age: 58
End: 2018-09-26
Payer: MEDICARE

## 2018-09-26 VITALS
HEIGHT: 62 IN | SYSTOLIC BLOOD PRESSURE: 135 MMHG | DIASTOLIC BLOOD PRESSURE: 79 MMHG | BODY MASS INDEX: 53.92 KG/M2 | OXYGEN SATURATION: 95 % | WEIGHT: 293 LBS | HEART RATE: 56 BPM

## 2018-09-26 DIAGNOSIS — R73.9 HYPERGLYCEMIA: ICD-10-CM

## 2018-09-26 DIAGNOSIS — G25.81 RLS (RESTLESS LEGS SYNDROME): Primary | ICD-10-CM

## 2018-09-26 DIAGNOSIS — Z23 FLU VACCINE NEED: ICD-10-CM

## 2018-09-26 DIAGNOSIS — Z12.11 COLON CANCER SCREENING: ICD-10-CM

## 2018-09-26 DIAGNOSIS — F31.9 BIPOLAR 1 DISORDER (HCC): ICD-10-CM

## 2018-09-26 LAB — HBA1C MFR BLD: 5.6 %

## 2018-09-26 PROCEDURE — 99213 OFFICE O/P EST LOW 20 MIN: CPT | Performed by: NURSE PRACTITIONER

## 2018-09-26 PROCEDURE — G0008 ADMIN INFLUENZA VIRUS VAC: HCPCS | Performed by: NURSE PRACTITIONER

## 2018-09-26 PROCEDURE — 83036 HEMOGLOBIN GLYCOSYLATED A1C: CPT | Performed by: NURSE PRACTITIONER

## 2018-09-26 PROCEDURE — 90688 IIV4 VACCINE SPLT 0.5 ML IM: CPT | Performed by: NURSE PRACTITIONER

## 2018-09-26 RX ORDER — NADOLOL 40 MG/1
TABLET ORAL
Qty: 45 TABLET | Refills: 5 | Status: SHIPPED | OUTPATIENT
Start: 2018-09-26 | End: 2018-10-25 | Stop reason: ALTCHOICE

## 2018-09-26 RX ORDER — CALCIUM CARBONATE 500(1250)
500 TABLET ORAL DAILY
COMMUNITY
End: 2019-06-06

## 2018-09-26 RX ORDER — BENZTROPINE MESYLATE 1 MG/1
1 TABLET ORAL 2 TIMES DAILY
Status: ON HOLD | COMMUNITY
End: 2019-04-10 | Stop reason: HOSPADM

## 2018-09-26 RX ORDER — LAMOTRIGINE 200 MG/1
100 TABLET ORAL 2 TIMES DAILY
COMMUNITY
End: 2019-10-17 | Stop reason: SDUPTHER

## 2018-09-26 RX ORDER — CETIRIZINE HYDROCHLORIDE 10 MG/1
10 TABLET ORAL DAILY
COMMUNITY
End: 2020-02-05 | Stop reason: ALTCHOICE

## 2018-09-26 RX ORDER — PRAZOSIN HYDROCHLORIDE 1 MG/1
1 CAPSULE ORAL NIGHTLY
Status: ON HOLD | COMMUNITY
End: 2019-04-10 | Stop reason: HOSPADM

## 2018-09-26 ASSESSMENT — ENCOUNTER SYMPTOMS
GASTROINTESTINAL NEGATIVE: 1
EYES NEGATIVE: 1
RESPIRATORY NEGATIVE: 1

## 2018-09-26 NOTE — PROGRESS NOTES
Objective:       Physical Exam   Constitutional: She is oriented to person, place, and time. Vital signs are normal. She appears well-developed and well-nourished. She is cooperative. Non-toxic appearance. She does not have a sickly appearance. No distress. HENT:   Head: Normocephalic and atraumatic. Right Ear: Hearing, tympanic membrane and ear canal normal.   Left Ear: Hearing, tympanic membrane and ear canal normal.   Nose: Nose normal.   Mouth/Throat: Uvula is midline, oropharynx is clear and moist and mucous membranes are normal.   Eyes: Conjunctivae and lids are normal.   Neck: Neck supple. Cardiovascular: Normal rate, regular rhythm, normal heart sounds and normal pulses. Pulmonary/Chest: Effort normal and breath sounds normal. No accessory muscle usage. No respiratory distress. Abdominal: Soft. Normal appearance. There is no tenderness. Lymphadenopathy:        Head (right side): No submental and no submandibular adenopathy present. Head (left side): No submental and no submandibular adenopathy present. She has no cervical adenopathy. Neurological: She is alert and oriented to person, place, and time. Skin: Skin is warm and dry. No lesion and no rash noted. Psychiatric: She has a normal mood and affect. Her speech is normal and behavior is normal. Cognition and memory are normal.       /79 (Site: Left Lower Arm, Position: Sitting, Cuff Size: Medium Adult)   Pulse 56   Ht 5' 2\" (1.575 m)   Wt (!) 305 lb (138.3 kg)   LMP  (LMP Unknown)   SpO2 95%   BMI 55.79 kg/m²   Body mass index is 55.79 kg/m². BP Readings from Last 2 Encounters:   09/26/18 135/79   06/13/18 (!) 103/59       Wt Readings from Last 3 Encounters:   09/26/18 (!) 305 lb (138.3 kg)   06/13/18 (!) 307 lb (139.3 kg)   05/16/18 (!) 310 lb (140.6 kg)       Lab Review   No visits with results within 2 Month(s) from this visit.    Latest known visit with results is:   Hospital Outpatient Visit on 05/30/2018 Component Date Value    Lamotrigine Lvl 05/30/2018 2.4*       No results found for this visit on 09/26/18. Assessment:       1. RLS (restless legs syndrome)    2. Bipolar 1 disorder ([facility]    3. Hyperglycemia    4. Flu vaccine need    5. Colon cancer screening        Results for POC orders placed in visit on 09/26/18   POCT glycosylated hemoglobin (Hb A1C)   Result Value Ref Range    Hemoglobin A1C 5.6 %            Plan:       Fidelia Mccurdy was seen today for depression and other. Diagnoses and all orders for this visit:    RLS (restless legs syndrome)  -     nadolol (CORGARD) 40 MG tablet; TAKE 1 & 1/2 TABLETS BY MOUTH DAILY. May use an extra half a tablet when needed for her restless leg. However I did recommend that she monitor her blood pressure and her pulse and if her pulse continued to be below 60 or she had any issues with her blood pressure she was to call the office immediately    Bipolar 1 disorder ([facility]  Continue with psych    Hyperglycemia  -     POCT glycosylated hemoglobin (Hb A1C)--- normal.    Flu vaccine need  -     INFLUENZA, QUADV, 3 YRS AND OLDER, IM, MDV, 0.5ML (FLUZONE QUADV)    Colon cancer screening  -     POCT Fecal Immunochemical Test (FIT); Future          Patient has been instructed call the office immediately with new symptoms, change in symptoms or worsening of symptoms. If this is not feasible, patient is instructed to report to the emergency room. Medication profile reviewed. Medication side effects and possible impairments from medications were discussed as applicable. Allergies were reviewed. Health maintenance was reviewed and updated as appropriate.

## 2018-10-09 ENCOUNTER — OFFICE VISIT (OUTPATIENT)
Dept: ORTHOPEDIC SURGERY | Age: 58
End: 2018-10-09
Payer: MEDICARE

## 2018-10-09 VITALS
WEIGHT: 293 LBS | BODY MASS INDEX: 53.92 KG/M2 | SYSTOLIC BLOOD PRESSURE: 134 MMHG | HEIGHT: 62 IN | HEART RATE: 53 BPM | DIASTOLIC BLOOD PRESSURE: 60 MMHG

## 2018-10-09 DIAGNOSIS — M17.11 PRIMARY OSTEOARTHRITIS OF RIGHT KNEE: ICD-10-CM

## 2018-10-09 DIAGNOSIS — R52 PAIN: Primary | ICD-10-CM

## 2018-10-09 DIAGNOSIS — M17.0 PRIMARY OSTEOARTHRITIS OF BOTH KNEES: ICD-10-CM

## 2018-10-09 DIAGNOSIS — M25.562 LEFT KNEE PAIN, UNSPECIFIED CHRONICITY: ICD-10-CM

## 2018-10-09 PROCEDURE — 99213 OFFICE O/P EST LOW 20 MIN: CPT | Performed by: ORTHOPAEDIC SURGERY

## 2018-10-09 PROCEDURE — 20610 DRAIN/INJ JOINT/BURSA W/O US: CPT | Performed by: ORTHOPAEDIC SURGERY

## 2018-10-19 ENCOUNTER — TELEPHONE (OUTPATIENT)
Dept: ORTHOPEDIC SURGERY | Age: 58
End: 2018-10-19

## 2018-10-19 NOTE — TELEPHONE ENCOUNTER
Left a message letting the patient know she has been approved for euflexxa bilateral knee inj and to call and schedule the ashley

## 2018-10-24 ENCOUNTER — TELEPHONE (OUTPATIENT)
Dept: FAMILY MEDICINE CLINIC | Age: 58
End: 2018-10-24

## 2018-10-25 RX ORDER — NADOLOL 80 MG/1
80 TABLET ORAL DAILY
Qty: 30 TABLET | Refills: 3 | Status: SHIPPED | OUTPATIENT
Start: 2018-10-25 | End: 2019-02-27 | Stop reason: SDUPTHER

## 2018-12-21 ENCOUNTER — TELEPHONE (OUTPATIENT)
Dept: FAMILY MEDICINE CLINIC | Age: 58
End: 2018-12-21

## 2018-12-21 NOTE — TELEPHONE ENCOUNTER
Pt states that she has pink eye in her right eye and was wanting to see if she could get something called in to [address] [place] [address]

## 2019-01-09 ENCOUNTER — OFFICE VISIT (OUTPATIENT)
Dept: FAMILY MEDICINE CLINIC | Age: 59
End: 2019-01-09
Payer: COMMERCIAL

## 2019-01-09 ENCOUNTER — HOSPITAL ENCOUNTER (OUTPATIENT)
Dept: GENERAL RADIOLOGY | Age: 59
Discharge: HOME | End: 2019-01-09
Payer: COMMERCIAL

## 2019-01-09 ENCOUNTER — HOSPITAL ENCOUNTER (OUTPATIENT)
Age: 59
Discharge: HOME | End: 2019-01-09
Payer: COMMERCIAL

## 2019-01-09 VITALS
HEART RATE: 55 BPM | OXYGEN SATURATION: 98 % | HEIGHT: 61 IN | BODY MASS INDEX: 55.32 KG/M2 | DIASTOLIC BLOOD PRESSURE: 82 MMHG | WEIGHT: 293 LBS | SYSTOLIC BLOOD PRESSURE: 142 MMHG

## 2019-01-09 DIAGNOSIS — R53.83 FATIGUE, UNSPECIFIED TYPE: ICD-10-CM

## 2019-01-09 DIAGNOSIS — R00.1 BRADYCARDIA: ICD-10-CM

## 2019-01-09 DIAGNOSIS — R06.02 SHORTNESS OF BREATH: ICD-10-CM

## 2019-01-09 DIAGNOSIS — R82.998 LEUKOCYTES IN URINE: ICD-10-CM

## 2019-01-09 DIAGNOSIS — R53.83 FATIGUE, UNSPECIFIED TYPE: Primary | ICD-10-CM

## 2019-01-09 DIAGNOSIS — Z12.11 COLON CANCER SCREENING: ICD-10-CM

## 2019-01-09 LAB
A/G RATIO: 2 (ref 1.1–2.2)
ALBUMIN SERPL-MCNC: 4.5 G/DL (ref 3.4–5)
ALP BLD-CCNC: 120 U/L (ref 40–129)
ALT SERPL-CCNC: 24 U/L (ref 10–40)
ANION GAP SERPL CALCULATED.3IONS-SCNC: 14 MMOL/L (ref 3–16)
AST SERPL-CCNC: 17 U/L (ref 15–37)
BASOPHILS ABSOLUTE: 0.1 K/UL (ref 0–0.2)
BASOPHILS RELATIVE PERCENT: 1.2 %
BILIRUB SERPL-MCNC: <0.2 MG/DL (ref 0–1)
BILIRUBIN, POC: NORMAL
BLOOD URINE, POC: NORMAL
BUN BLDV-MCNC: 20 MG/DL (ref 7–20)
C-REACTIVE PROTEIN: 9.9 MG/L (ref 0–5.1)
CALCIUM SERPL-MCNC: 9.3 MG/DL (ref 8.3–10.6)
CHLORIDE BLD-SCNC: 105 MMOL/L (ref 99–110)
CLARITY, POC: NORMAL
CO2: 25 MMOL/L (ref 21–32)
COLOR, POC: NORMAL
CREAT SERPL-MCNC: 0.7 MG/DL (ref 0.6–1.1)
EOSINOPHILS ABSOLUTE: 0.3 K/UL (ref 0–0.6)
EOSINOPHILS RELATIVE PERCENT: 3.2 %
GFR AFRICAN AMERICAN: >60
GFR NON-AFRICAN AMERICAN: >60
GLOBULIN: 2.2 G/DL
GLUCOSE BLD-MCNC: 101 MG/DL (ref 70–99)
GLUCOSE URINE, POC: NORMAL
HCT VFR BLD CALC: 42.2 % (ref 36–48)
HEMOGLOBIN: 13.5 G/DL (ref 12–16)
KETONES, POC: NORMAL
LEUKOCYTE EST, POC: NORMAL
LYMPHOCYTES ABSOLUTE: 2.2 K/UL (ref 1–5.1)
LYMPHOCYTES RELATIVE PERCENT: 25.5 %
MCH RBC QN AUTO: 28 PG (ref 26–34)
MCHC RBC AUTO-ENTMCNC: 32.1 G/DL (ref 31–36)
MCV RBC AUTO: 87.2 FL (ref 80–100)
MONOCYTES ABSOLUTE: 0.7 K/UL (ref 0–1.3)
MONOCYTES RELATIVE PERCENT: 8 %
NEUTROPHILS ABSOLUTE: 5.3 K/UL (ref 1.7–7.7)
NEUTROPHILS RELATIVE PERCENT: 62.1 %
NITRITE, POC: NORMAL
PDW BLD-RTO: 15.3 % (ref 12.4–15.4)
PH, POC: 7
PHOSPHORUS: 2.9 MG/DL (ref 2.5–4.9)
PLATELET # BLD: 323 K/UL (ref 135–450)
PMV BLD AUTO: 7.6 FL (ref 5–10.5)
POTASSIUM SERPL-SCNC: 4.4 MMOL/L (ref 3.5–5.1)
PROTEIN, POC: NORMAL
RBC # BLD: 4.84 M/UL (ref 4–5.2)
SEDIMENTATION RATE, ERYTHROCYTE: 31 MM/HR (ref 0–30)
SODIUM BLD-SCNC: 144 MMOL/L (ref 136–145)
SPECIFIC GRAVITY, POC: 1.02
TOTAL PROTEIN: 6.7 G/DL (ref 6.4–8.2)
TSH SERPL DL<=0.05 MIU/L-ACNC: 2.94 UIU/ML (ref 0.27–4.2)
UROBILINOGEN, POC: 0.2
WBC # BLD: 8.5 K/UL (ref 4–11)

## 2019-01-09 PROCEDURE — 36415 COLL VENOUS BLD VENIPUNCTURE: CPT | Performed by: NURSE PRACTITIONER

## 2019-01-09 PROCEDURE — 81002 URINALYSIS NONAUTO W/O SCOPE: CPT | Performed by: NURSE PRACTITIONER

## 2019-01-09 PROCEDURE — 99213 OFFICE O/P EST LOW 20 MIN: CPT | Performed by: NURSE PRACTITIONER

## 2019-01-09 PROCEDURE — 71046 X-RAY EXAM CHEST 2 VIEWS: CPT

## 2019-01-09 ASSESSMENT — ENCOUNTER SYMPTOMS
SWOLLEN GLANDS: 0
NAUSEA: 1
VOMITING: 0
ABDOMINAL PAIN: 0
CHANGE IN BOWEL HABIT: 0
SHORTNESS OF BREATH: 1
COUGH: 1
VISUAL CHANGE: 0
SORE THROAT: 0

## 2019-01-11 ENCOUNTER — OFFICE VISIT (OUTPATIENT)
Dept: FAMILY MEDICINE CLINIC | Age: 59
End: 2019-01-11
Payer: COMMERCIAL

## 2019-01-11 VITALS
WEIGHT: 293 LBS | HEIGHT: 61 IN | DIASTOLIC BLOOD PRESSURE: 100 MMHG | OXYGEN SATURATION: 99 % | BODY MASS INDEX: 55.32 KG/M2 | HEART RATE: 60 BPM | SYSTOLIC BLOOD PRESSURE: 157 MMHG

## 2019-01-11 DIAGNOSIS — M54.50 ACUTE BILATERAL LOW BACK PAIN WITHOUT SCIATICA: Primary | ICD-10-CM

## 2019-01-11 DIAGNOSIS — R79.82 ELEVATED C-REACTIVE PROTEIN (CRP): Primary | ICD-10-CM

## 2019-01-11 DIAGNOSIS — R79.82 ELEVATED C-REACTIVE PROTEIN (CRP): ICD-10-CM

## 2019-01-11 LAB — URINE CULTURE, ROUTINE: NORMAL

## 2019-01-11 PROCEDURE — 96372 THER/PROPH/DIAG INJ SC/IM: CPT | Performed by: NURSE PRACTITIONER

## 2019-01-11 PROCEDURE — 99213 OFFICE O/P EST LOW 20 MIN: CPT | Performed by: NURSE PRACTITIONER

## 2019-01-11 RX ORDER — METHYLPREDNISOLONE SODIUM SUCCINATE 125 MG/2ML
125 INJECTION, POWDER, LYOPHILIZED, FOR SOLUTION INTRAMUSCULAR; INTRAVENOUS ONCE
Status: COMPLETED | OUTPATIENT
Start: 2019-01-11 | End: 2019-01-11

## 2019-01-11 RX ORDER — PREDNISONE 10 MG/1
TABLET ORAL
Qty: 30 TABLET | Refills: 0 | Status: SHIPPED | OUTPATIENT
Start: 2019-01-11 | End: 2019-04-30 | Stop reason: SDUPTHER

## 2019-01-11 RX ADMIN — METHYLPREDNISOLONE SODIUM SUCCINATE 125 MG: 125 INJECTION, POWDER, LYOPHILIZED, FOR SOLUTION INTRAMUSCULAR; INTRAVENOUS at 13:41

## 2019-01-11 ASSESSMENT — ENCOUNTER SYMPTOMS
ABDOMINAL PAIN: 0
BACK PAIN: 1
GASTROINTESTINAL NEGATIVE: 1
RESPIRATORY NEGATIVE: 1
EYES NEGATIVE: 1
BOWEL INCONTINENCE: 0

## 2019-01-14 LAB
ANA INTERPRETATION: NORMAL
ANTI-NUCLEAR ANTIBODY (ANA): NEGATIVE

## 2019-01-17 RX ORDER — ROSUVASTATIN CALCIUM 5 MG/1
5 TABLET, COATED ORAL DAILY
Qty: 30 TABLET | Refills: 5 | Status: SHIPPED | OUTPATIENT
Start: 2019-01-17 | End: 2019-07-25 | Stop reason: SDUPTHER

## 2019-02-11 RX ORDER — DICLOFENAC SODIUM 75 MG/1
TABLET, DELAYED RELEASE ORAL
Qty: 60 TABLET | Refills: 5 | Status: SHIPPED | OUTPATIENT
Start: 2019-02-11 | End: 2019-06-05

## 2019-02-27 ENCOUNTER — HOSPITAL ENCOUNTER (OUTPATIENT)
Dept: NON INVASIVE DIAGNOSTICS | Age: 59
Discharge: HOME | End: 2019-02-27
Payer: MEDICARE

## 2019-02-27 DIAGNOSIS — R00.1 BRADYCARDIA: ICD-10-CM

## 2019-02-27 DIAGNOSIS — R06.02 SHORTNESS OF BREATH: ICD-10-CM

## 2019-02-27 DIAGNOSIS — R53.83 FATIGUE, UNSPECIFIED TYPE: ICD-10-CM

## 2019-02-27 LAB
LV EF: 53 %
LVEF MODALITY: NORMAL

## 2019-02-27 PROCEDURE — 93306 TTE W/DOPPLER COMPLETE: CPT

## 2019-02-27 RX ORDER — NADOLOL 80 MG/1
80 TABLET ORAL DAILY
Qty: 30 TABLET | Refills: 3 | Status: SHIPPED | OUTPATIENT
Start: 2019-02-27 | End: 2019-03-06 | Stop reason: SDUPTHER

## 2019-03-06 RX ORDER — NADOLOL 80 MG/1
80 TABLET ORAL DAILY
Qty: 30 TABLET | Refills: 3 | Status: SHIPPED | OUTPATIENT
Start: 2019-03-06 | End: 2019-06-06

## 2019-03-26 ENCOUNTER — TELEPHONE (OUTPATIENT)
Dept: FAMILY MEDICINE CLINIC | Age: 59
End: 2019-03-26

## 2019-04-04 ENCOUNTER — HOSPITAL ENCOUNTER (INPATIENT)
Age: 59
LOS: 6 days | Discharge: HOME | DRG: 885 | End: 2019-04-10
Attending: EMERGENCY MEDICINE | Admitting: PSYCHIATRY & NEUROLOGY
Payer: MEDICARE

## 2019-04-04 DIAGNOSIS — F39 MOOD DISORDER (HCC): Primary | ICD-10-CM

## 2019-04-04 LAB
A/G RATIO: 1.4 (ref 1.1–2.2)
ALBUMIN SERPL-MCNC: 4.3 G/DL (ref 3.4–5)
ALP BLD-CCNC: 115 U/L (ref 40–129)
ALT SERPL-CCNC: 27 U/L (ref 10–40)
AMPHETAMINE SCREEN, URINE: ABNORMAL
ANION GAP SERPL CALCULATED.3IONS-SCNC: 13 MMOL/L (ref 3–16)
AST SERPL-CCNC: 24 U/L (ref 15–37)
BACTERIA: ABNORMAL /HPF
BARBITURATE SCREEN URINE: ABNORMAL
BASOPHILS ABSOLUTE: 0.1 K/UL (ref 0–0.2)
BASOPHILS RELATIVE PERCENT: 1.3 %
BENZODIAZEPINE SCREEN, URINE: ABNORMAL
BILIRUB SERPL-MCNC: 0.3 MG/DL (ref 0–1)
BILIRUBIN URINE: NEGATIVE
BLOOD, URINE: ABNORMAL
BUN BLDV-MCNC: 20 MG/DL (ref 7–20)
CALCIUM SERPL-MCNC: 9.3 MG/DL (ref 8.3–10.6)
CANNABINOID SCREEN URINE: ABNORMAL
CHLORIDE BLD-SCNC: 104 MMOL/L (ref 99–110)
CLARITY: ABNORMAL
CO2: 27 MMOL/L (ref 21–32)
COCAINE METABOLITE SCREEN URINE: ABNORMAL
COLOR: YELLOW
CREAT SERPL-MCNC: 0.9 MG/DL (ref 0.6–1.1)
EOSINOPHILS ABSOLUTE: 0.2 K/UL (ref 0–0.6)
EOSINOPHILS RELATIVE PERCENT: 2.4 %
EPITHELIAL CELLS, UA: ABNORMAL /HPF
GFR AFRICAN AMERICAN: >60
GFR NON-AFRICAN AMERICAN: >60
GLOBULIN: 3.1 G/DL
GLUCOSE BLD-MCNC: 106 MG/DL (ref 70–99)
GLUCOSE URINE: NEGATIVE MG/DL
HCT VFR BLD CALC: 43.5 % (ref 36–48)
HEMOGLOBIN: 14.1 G/DL (ref 12–16)
KETONES, URINE: NEGATIVE MG/DL
LEUKOCYTE ESTERASE, URINE: ABNORMAL
LYMPHOCYTES ABSOLUTE: 2.3 K/UL (ref 1–5.1)
LYMPHOCYTES RELATIVE PERCENT: 24.8 %
Lab: ABNORMAL
MCH RBC QN AUTO: 28.5 PG (ref 26–34)
MCHC RBC AUTO-ENTMCNC: 32.4 G/DL (ref 31–36)
MCV RBC AUTO: 87.9 FL (ref 80–100)
METHADONE SCREEN, URINE: POSITIVE
MICROSCOPIC EXAMINATION: YES
MONOCYTES ABSOLUTE: 0.7 K/UL (ref 0–1.3)
MONOCYTES RELATIVE PERCENT: 7.7 %
NEUTROPHILS ABSOLUTE: 5.9 K/UL (ref 1.7–7.7)
NEUTROPHILS RELATIVE PERCENT: 63.8 %
NITRITE, URINE: NEGATIVE
OPIATE SCREEN URINE: ABNORMAL
OXYCODONE URINE: ABNORMAL
PDW BLD-RTO: 15.6 % (ref 12.4–15.4)
PH UA: 7.5
PH UA: 7.5 (ref 5–8)
PHENCYCLIDINE SCREEN URINE: ABNORMAL
PLATELET # BLD: 303 K/UL (ref 135–450)
PMV BLD AUTO: 7.2 FL (ref 5–10.5)
POTASSIUM SERPL-SCNC: 4.3 MMOL/L (ref 3.5–5.1)
PROPOXYPHENE SCREEN: ABNORMAL
PROTEIN UA: NEGATIVE MG/DL
RBC # BLD: 4.95 M/UL (ref 4–5.2)
RBC UA: ABNORMAL /HPF (ref 0–2)
SODIUM BLD-SCNC: 144 MMOL/L (ref 136–145)
SPECIFIC GRAVITY UA: 1.01 (ref 1–1.03)
TOTAL PROTEIN: 7.4 G/DL (ref 6.4–8.2)
URINE REFLEX TO CULTURE: YES
URINE TYPE: ABNORMAL
UROBILINOGEN, URINE: 0.2 E.U./DL
WBC # BLD: 9.2 K/UL (ref 4–11)
WBC UA: ABNORMAL /HPF (ref 0–5)

## 2019-04-04 PROCEDURE — 6370000000 HC RX 637 (ALT 250 FOR IP): Performed by: PSYCHIATRY & NEUROLOGY

## 2019-04-04 PROCEDURE — 80053 COMPREHEN METABOLIC PANEL: CPT

## 2019-04-04 PROCEDURE — 87086 URINE CULTURE/COLONY COUNT: CPT

## 2019-04-04 PROCEDURE — 81001 URINALYSIS AUTO W/SCOPE: CPT

## 2019-04-04 PROCEDURE — 84443 ASSAY THYROID STIM HORMONE: CPT

## 2019-04-04 PROCEDURE — 36415 COLL VENOUS BLD VENIPUNCTURE: CPT

## 2019-04-04 PROCEDURE — 99285 EMERGENCY DEPT VISIT HI MDM: CPT

## 2019-04-04 PROCEDURE — 85025 COMPLETE CBC W/AUTO DIFF WBC: CPT

## 2019-04-04 PROCEDURE — 1240000000 HC EMOTIONAL WELLNESS R&B

## 2019-04-04 PROCEDURE — 80307 DRUG TEST PRSMV CHEM ANLYZR: CPT

## 2019-04-04 RX ORDER — LAMOTRIGINE 100 MG/1
100 TABLET ORAL 2 TIMES DAILY
Status: DISCONTINUED | OUTPATIENT
Start: 2019-04-04 | End: 2019-04-10 | Stop reason: HOSPADM

## 2019-04-04 RX ORDER — BENZTROPINE MESYLATE 1 MG/1
1 TABLET ORAL NIGHTLY
Status: DISCONTINUED | OUTPATIENT
Start: 2019-04-04 | End: 2019-04-05

## 2019-04-04 RX ORDER — TRAZODONE HYDROCHLORIDE 50 MG/1
50 TABLET ORAL NIGHTLY PRN
Status: DISCONTINUED | OUTPATIENT
Start: 2019-04-04 | End: 2019-04-10 | Stop reason: HOSPADM

## 2019-04-04 RX ORDER — PRAVASTATIN SODIUM 10 MG
10 TABLET ORAL NIGHTLY
COMMUNITY
End: 2019-06-06

## 2019-04-04 RX ORDER — HALOPERIDOL 5 MG/ML
5 INJECTION INTRAMUSCULAR EVERY 6 HOURS PRN
Status: DISCONTINUED | OUTPATIENT
Start: 2019-04-04 | End: 2019-04-10 | Stop reason: HOSPADM

## 2019-04-04 RX ORDER — DIMETHICONE, OXYBENZONE, AND PADIMATE O 2; 2.5; 6.6 G/100G; G/100G; G/100G
STICK TOPICAL PRN
Status: DISCONTINUED | OUTPATIENT
Start: 2019-04-04 | End: 2019-04-10 | Stop reason: HOSPADM

## 2019-04-04 RX ORDER — ACETAMINOPHEN 325 MG/1
650 TABLET ORAL EVERY 4 HOURS PRN
Status: DISCONTINUED | OUTPATIENT
Start: 2019-04-04 | End: 2019-04-10 | Stop reason: HOSPADM

## 2019-04-04 RX ORDER — CALCIUM CARBONATE 200(500)MG
500 TABLET,CHEWABLE ORAL DAILY
Status: DISCONTINUED | OUTPATIENT
Start: 2019-04-05 | End: 2019-04-10 | Stop reason: HOSPADM

## 2019-04-04 RX ORDER — M-VIT,TX,IRON,MINS/CALC/FOLIC 27MG-0.4MG
1 TABLET ORAL DAILY
Status: DISCONTINUED | OUTPATIENT
Start: 2019-04-05 | End: 2019-04-10 | Stop reason: HOSPADM

## 2019-04-04 RX ORDER — DICLOFENAC SODIUM 75 MG/1
75 TABLET, DELAYED RELEASE ORAL 2 TIMES DAILY
Status: DISCONTINUED | OUTPATIENT
Start: 2019-04-04 | End: 2019-04-10 | Stop reason: HOSPADM

## 2019-04-04 RX ORDER — HYDROXYZINE PAMOATE 50 MG/1
50 CAPSULE ORAL 3 TIMES DAILY PRN
Status: DISCONTINUED | OUTPATIENT
Start: 2019-04-04 | End: 2019-04-10 | Stop reason: HOSPADM

## 2019-04-04 RX ORDER — PRAVASTATIN SODIUM 10 MG
10 TABLET ORAL NIGHTLY
Status: DISCONTINUED | OUTPATIENT
Start: 2019-04-04 | End: 2019-04-10 | Stop reason: HOSPADM

## 2019-04-04 RX ORDER — MAGNESIUM HYDROXIDE/ALUMINUM HYDROXICE/SIMETHICONE 120; 1200; 1200 MG/30ML; MG/30ML; MG/30ML
30 SUSPENSION ORAL EVERY 6 HOURS PRN
Status: DISCONTINUED | OUTPATIENT
Start: 2019-04-04 | End: 2019-04-10 | Stop reason: HOSPADM

## 2019-04-04 RX ORDER — BENZTROPINE MESYLATE 1 MG/1
1 TABLET ORAL NIGHTLY
Status: ON HOLD | COMMUNITY
End: 2019-04-10 | Stop reason: HOSPADM

## 2019-04-04 RX ADMIN — LURASIDONE HYDROCHLORIDE 40 MG: 40 TABLET, FILM COATED ORAL at 22:02

## 2019-04-04 RX ADMIN — BENZTROPINE MESYLATE 1 MG: 1 TABLET ORAL at 22:02

## 2019-04-04 RX ADMIN — PRAVASTATIN SODIUM 10 MG: 10 TABLET ORAL at 22:02

## 2019-04-04 RX ADMIN — LAMOTRIGINE 100 MG: 100 TABLET ORAL at 22:02

## 2019-04-04 ASSESSMENT — PAIN SCALES - GENERAL: PAINLEVEL_OUTOF10: 0

## 2019-04-04 ASSESSMENT — SLEEP AND FATIGUE QUESTIONNAIRES
DO YOU HAVE DIFFICULTY SLEEPING: NO
AVERAGE NUMBER OF SLEEP HOURS: 5
DO YOU USE A SLEEP AID: NO

## 2019-04-04 ASSESSMENT — PATIENT HEALTH QUESTIONNAIRE - PHQ9: SUM OF ALL RESPONSES TO PHQ QUESTIONS 1-9: 27

## 2019-04-04 ASSESSMENT — LIFESTYLE VARIABLES: HISTORY_ALCOHOL_USE: NO

## 2019-04-04 NOTE — ED NOTES
Unable to verify medications through [address] due to being after hours. Pt verbally told writer medications.       Nellie Morales RN  04/04/19 7138

## 2019-04-04 NOTE — ED NOTES
Presenting Problem: Suicidal Ideation. Patient does not \"want to live any longer. I'm ready to give up. I just need the cycle to stop. \"       Appearance/Hygiene:  hospital attire, fair grooming and poor hygiene   Motor Behavior: Decreased  Attitude: cooperative  Affect: anxiety   Speech: soft  Mood: anxious, dysthymic and sad   Thought Processes: [place]  Perceptions: Absent   Thought content: Clear and linear. Suicidal ideation:  Current SI, with plans to take all her meds. Homicidal ideation:  none  Orientation: A&Ox4   Memory: intact  Concentration: Poor    Insight/ judgement: impaired judgment and insight. Psychosocial and contextual factors: Patient and her 25year old twin daughter live in the house. Daughters do not drive. C-SSRS Summary (including current and past suicidal ideation, plan, intent, and attempts) : Current SI, one prior SA. Psychiatric History: Yes    Patient reported diagnosis: Bipolar depression. Outpatient services/ Provider: Patient sees a telemed doctor monthly. [facility]. Therapist Lucrecia bi-weekly. Both providers are with [facility]. Previous Inpatient Admissions( including location and dates if known): [facility] 11/10.      Self-injurious/ Self-harm behavior: None    History of violence: None    Current Substance use: None    Trauma identified: None    Access to Firearms: None    ASSESSMENT FOR IMMINENT FUTURE DANGER:      RISK FACTORS:    [x]  Age <25 or >49   []  Male gender   [x]  Depressed mood   [x]  Active suicidal ideation   [x]  Suicide plan   []  Suicide attempt   [x]  Access to lethal means   [x]  Prior suicide attempt   []  Active substance abuse   [x]  Highly impulsive behaviors   [x]  Not attending to self-care/ADLs    []  Recent significant loss   [x]  Chronic pain or medical illness   [x]  Social isolation   []  History of violence   []  Active psychosis   []  Cognitive impairment    []  No outpatient services in place   []  Medication noncompliance   []  No collateral information to support safety       PROTECTIVE FACTORS:  [] Age >25 and <55  [x] Female gender   [] Denies depression  [] Denies suicidal ideation  [] Does not have lethal plan   [] Does not have access to guns or weapons  [] Patient is verbally theresa for safety  [] No prior suicide attempts  [] No active substance abuse  [] Patient has social or family support  [] No active psychosis or cognitive dysfunction  [] Physically healthy  [x] Has outpatient services in place  [x] Compliant with recommended medications      Clinical Summary:    Patient presents to the ED on a SOB after having SI. Patient was clinically sober at the time of the evaluation. Patient was evaluated and offered supportive counseling. Patient presented to her therapist at [facility] and disclosed that she was planning to commit suicide by taking an overdose of her meds. Pt was put on a hold and sent to the ER. Patient states she \"needs to end the cycle. I don't want to do it any longer. \"    Pt states recently she was taken off [address] and put on Latuda, and this isn't working for her. 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 Patient states she has not showered in over a year. She will stand at the sink and use a baby wipe. She states she has not cleaned her home in 2-3 years. She states she is too depressed and \"just does not have the energy. \"  Patient states she felt the same way in 2010, prior to being admitted here. Patient reports broken sleep, from having to get up at 3:30 am to take her daughter to work daily. No change in appetite. Pt very tearful during interview with poor eye contact.             Clifford Farmer RN  04/04/19 1954

## 2019-04-04 NOTE — ED PROVIDER NOTES
Triage Chief Complaint:    Suicidal (pt states she is ready to give up . pt states she doesnt want to live anymore and does not know why. Pt was seen at [facility] today and sent with an application for emergency admission. )    Sun'aq:  Damaso Rebolledo is a 62 y.o. female that presents to emergency Department with complaints of feeling suicidal.  The patient does not have any specific plan. The patient states that she is trying not to kill herself, which is why she feels though she is \"kind of\" suicidal.  Patient has made various vague complaints of 1 and herself all over at [facility], and was sent here for further evaluation. Patient denies fevers or chills. Patient denies nausea or vomiting. Patient denies any abdominal pain. ROS:  At least 10 systems reviewed and otherwise acutely negative except as in the [address]     Past Medical History:   Diagnosis Date    ADHD (attention deficit hyperactivity disorder)     Allergic rhinitis     Bipolar disorder     Borderline osteopenia     Depression     Dysmetabolic syndrome     GERD (gastroesophageal reflux disease)     Headache(784.0)     Hyperlipidemia 2014    Hypertension     Intention tremor     due to lithium    Obesity     Osteoarthritis     PCOS (polycystic ovarian syndrome)     RAD (reactive airway disease)     Restless legs syndrome (RLS) 2014     Past Surgical History:   Procedure Laterality Date    ABDOMINAL EXPLORATION SURGERY       SECTION      GASTRIC BYPASS SURGERY      HAND SURGERY      bilat CTS    KNEE SURGERY      KNEE SURGERY Right 10/28/2015    Right knee video arthroscopy with partial medial and lateral menisectomy with loose body removal    TONSILLECTOMY       Family History   Problem Relation Age of Onset    Depression Sister     Mental Illness Sister     Diabetes Mother     Heart Disease Mother     Diabetes Father     Heart Disease Father      Social History     Socioeconomic History  Marital status: Legally      Spouse name: Not on file    Number of children: Not on file    Years of education: Not on file    Highest education level: Not on file   Occupational History    Not on file   Social Needs    Financial resource strain: Not on file    Food insecurity:     Worry: Not on file     Inability: Not on file    Transportation needs:     Medical: Not on file     Non-medical: Not on file   Tobacco Use    Smoking status: Former Smoker     Packs/day: 1.50     Years: 21.00     Pack years: 31.50     Types: Cigarettes     Last attempt to quit: 1997     Years since quittin.1    Smokeless tobacco: Never Used   Substance and Sexual Activity    Alcohol use: No    Drug use: No    Sexual activity: Not Currently   Lifestyle    Physical activity:     Days per week: Not on file     Minutes per session: Not on file    Stress: Not on file   Relationships    Social connections:     Talks on phone: Not on file     Gets together: Not on file     Attends Nondenominational service: Not on file     Active member of club or organization: Not on file     Attends meetings of clubs or organizations: Not on file     Relationship status: Not on file    Intimate partner violence:     Fear of current or ex partner: Not on file     Emotionally abused: Not on file     Physically abused: Not on file     Forced sexual activity: Not on file   Other Topics Concern    Not on file   Social History Narrative    Not on file     Current Facility-Administered Medications   Medication Dose Route Frequency Provider Last Rate Last Dose    benztropine (COGENTIN) tablet 1 mg  1 mg Oral Nightly Benita Hart MD   1 mg at 19    calcium carbonate (TUMS) chewable tablet 500 mg  500 mg Oral Daily Benita Hart MD        diclofenac (VOLTAREN) EC tablet 75 mg  75 mg Oral BID Benita Hart MD        lamoTRIgine (LAMICTAL) tablet 100 mg  100 mg Oral BID Benita Hart MD   100 mg at membranes are moist. Tolerates saliva. No trismus. NECK: Supple. No meningismus. Trachea midline. HEART: RRR. LUNGS: Respirations unlabored. CTAB  ABDOMEN: Soft. Non-tender. No guarding or rebound. EXTREMITIES: No acute deformities. SKIN: Warm and dry. NEUROLOGICAL: No gross facial drooping. Moves all 4 extremities spontaneously.     Physical Exam    I have reviewed and interpreted all of the currently availablelab results from this visit (if applicable):  Results for orders placed or performed during the hospital encounter of 04/04/19   CBC Auto Differential   Result Value Ref Range    WBC 9.2 4.0 - 11.0 K/uL    RBC 4.95 4.00 - 5.20 M/uL    Hemoglobin 14.1 12.0 - 16.0 g/dL    Hematocrit 43.5 36.0 - 48.0 %    MCV 87.9 80.0 - 100.0 fL    MCH 28.5 26.0 - 34.0 pg    MCHC 32.4 31.0 - 36.0 g/dL    RDW 15.6 (H) 12.4 - 15.4 %    Platelets 754 353 - 108 K/uL    MPV 7.2 5.0 - 10.5 fL    Neutrophils % 63.8 %    Lymphocytes % 24.8 %    Monocytes % 7.7 %    Eosinophils % 2.4 %    Basophils % 1.3 %    Neutrophils # 5.9 1.7 - 7.7 K/uL    Lymphocytes # 2.3 1.0 - 5.1 K/uL    Monocytes # 0.7 0.0 - 1.3 K/uL    Eosinophils # 0.2 0.0 - 0.6 K/uL    Basophils # 0.1 0.0 - 0.2 K/uL   Comprehensive Metabolic Panel   Result Value Ref Range    Sodium 144 136 - 145 mmol/L    Potassium 4.3 3.5 - 5.1 mmol/L    Chloride 104 99 - 110 mmol/L    CO2 27 21 - 32 mmol/L    Anion Gap 13 3 - 16    Glucose 106 (H) 70 - 99 mg/dL    BUN 20 7 - 20 mg/dL    CREATININE 0.9 0.6 - 1.1 mg/dL    GFR Non-African American >60 >60    GFR African American >60 >60    Calcium 9.3 8.3 - 10.6 mg/dL    Total Protein 7.4 6.4 - 8.2 g/dL    Alb 4.3 3.4 - 5.0 g/dL    Albumin/Globulin Ratio 1.4 1.1 - 2.2    Total Bilirubin 0.3 0.0 - 1.0 mg/dL    Alkaline Phosphatase 115 40 - 129 U/L    ALT 27 10 - 40 U/L    AST 24 15 - 37 U/L    Globulin 3.1 g/dL   Urinalysis Reflex to Culture   Result Value Ref Range    Color, UA Yellow Straw/Yellow    Clarity, UA SL CLOUDY (A) Clear Glucose, Ur Negative Negative mg/dL    Bilirubin Urine Negative Negative    Ketones, Urine Negative Negative mg/dL    Specific Gravity, UA 1.010 1.005 - 1.030    Blood, Urine SMALL (A) Negative    pH, UA 7.5 5.0 - 8.0    Protein, UA Negative Negative mg/dL    Urobilinogen, Urine 0.2 <2.0 E.U./dL    Nitrite, Urine Negative Negative    Leukocyte Esterase, Urine LARGE (A) Negative    Microscopic Examination YES     Urine Reflex to Culture Yes     Urine Type Not Specified    Drug screen multi urine   Result Value Ref Range    Amphetamine Screen, Urine Neg Negative <1000ng/mL    Barbiturate Screen, Ur Neg Negative <200 ng/mL    Benzodiazepine Screen, Urine Neg Negative <200 ng/mL    Cannabinoid Scrn, Ur Neg Negative <50 ng/mL    Cocaine Metabolite Screen, Urine Neg Negative <300 ng/mL    Opiate Scrn, Ur Neg Negative <300 ng/mL    PCP Screen, Urine Neg Negative <25 ng/mL    Methadone Screen, Urine POSITIVE (A) Negative <300 ng/mL    Propoxyphene Scrn, Ur Neg Negative <300 ng/mL    pH, UA 7.5     Drug Screen Comment: see below     Oxycodone Urine Neg Negative <100 ng/ml   Microscopic Urinalysis   Result Value Ref Range    WBC, UA 3-5 0 - 5 /HPF    RBC, UA 3-5 (A) 0 - 2 /HPF    Epi Cells 3-5 /HPF    Bacteria, UA 2+ (A) /HPF        Radiographs (if obtained):  [] The following radiograph was interpreted by myself in the absence of a radiologist:  [x] Radiologist's Report Reviewed:  No orders to display       Procedures    MDM:  After my initial evaluation, the patient will have blood work drawn and sent. The patient, in my opinion, is medically clear for psychiatric evaluation. Patient has specific plan, but has multiple vague complaints, which can be worrisome in certain context. I have performed a medical clearance examination on this patient. It is my opinion that no medical conditions were discovered that would preclude admission to a behavioral health unit or discharge home.   I feel that the patient is medically stable for disposition by the behavioral health team at this time. Clinical Impression:  1.  Mood disorder ([facility]      (Please note that portions of this note Joanne Manciaans been completed with a voice recognition program. Efforts were made to edit the dictations but occasionally words are mis-transcribed.)    MD Ibrahima Perez MD  04/05/19 9853

## 2019-04-04 NOTE — ED NOTES
Clinical reviewed with DR Van Clark who will admit to the I as least restrictive means to maintain safety and manage symptoms.       Amee Loya, RN  04/04/19 1950

## 2019-04-05 PROBLEM — F33.2 SEVERE EPISODE OF RECURRENT MAJOR DEPRESSIVE DISORDER, WITHOUT PSYCHOTIC FEATURES (HCC): Status: ACTIVE | Noted: 2019-04-05

## 2019-04-05 LAB
TSH SERPL DL<=0.05 MIU/L-ACNC: 1.88 UIU/ML (ref 0.27–4.2)
URINE CULTURE, ROUTINE: NORMAL

## 2019-04-05 PROCEDURE — 99223 1ST HOSP IP/OBS HIGH 75: CPT | Performed by: PSYCHIATRY & NEUROLOGY

## 2019-04-05 PROCEDURE — 6370000000 HC RX 637 (ALT 250 FOR IP): Performed by: PSYCHIATRY & NEUROLOGY

## 2019-04-05 PROCEDURE — 99222 1ST HOSP IP/OBS MODERATE 55: CPT | Performed by: PHYSICIAN ASSISTANT

## 2019-04-05 PROCEDURE — 1240000000 HC EMOTIONAL WELLNESS R&B

## 2019-04-05 RX ORDER — BUPROPION HYDROCHLORIDE 150 MG/1
150 TABLET ORAL DAILY
Status: DISCONTINUED | OUTPATIENT
Start: 2019-04-05 | End: 2019-04-10 | Stop reason: HOSPADM

## 2019-04-05 RX ADMIN — ACETAMINOPHEN 650 MG: 325 TABLET ORAL at 10:06

## 2019-04-05 RX ADMIN — LAMOTRIGINE 100 MG: 100 TABLET ORAL at 20:33

## 2019-04-05 RX ADMIN — MULTIPLE VITAMINS W/ MINERALS TAB 1 TABLET: TAB at 09:13

## 2019-04-05 RX ADMIN — LAMOTRIGINE 100 MG: 100 TABLET ORAL at 09:13

## 2019-04-05 RX ADMIN — VORTIOXETINE 20 MG: 10 TABLET, FILM COATED ORAL at 09:12

## 2019-04-05 RX ADMIN — PRAVASTATIN SODIUM 10 MG: 10 TABLET ORAL at 20:33

## 2019-04-05 RX ADMIN — ACETAMINOPHEN 650 MG: 325 TABLET ORAL at 15:43

## 2019-04-05 RX ADMIN — BUPROPION HYDROCHLORIDE 150 MG: 150 TABLET, FILM COATED, EXTENDED RELEASE ORAL at 15:43

## 2019-04-05 RX ADMIN — CALCIUM CARBONATE 500 MG: 500 TABLET, CHEWABLE ORAL at 09:13

## 2019-04-05 RX ADMIN — VITAMIN D, TAB 1000IU (100/BT) 1000 UNITS: 25 TAB at 09:12

## 2019-04-05 ASSESSMENT — SLEEP AND FATIGUE QUESTIONNAIRES
DIFFICULTY STAYING ASLEEP: YES
DO YOU USE A SLEEP AID: NO
DIFFICULTY FALLING ASLEEP: YES
RESTFUL SLEEP: NO
AVERAGE NUMBER OF SLEEP HOURS: 4
DIFFICULTY ARISING: YES
DO YOU HAVE DIFFICULTY SLEEPING: YES

## 2019-04-05 ASSESSMENT — PAIN SCALES - GENERAL
PAINLEVEL_OUTOF10: 5
PAINLEVEL_OUTOF10: 5
PAINLEVEL_OUTOF10: 2
PAINLEVEL_OUTOF10: 5
PAINLEVEL_OUTOF10: 5

## 2019-04-05 NOTE — PLAN OF CARE
Problem: Depressive Behavior With or Without Suicide Precautions:  Goal: Absence of self-harm  Description  Absence of self-harm  Outcome: Ongoing  Patient displaying safe behaviors on the unit. Denies SI/HI. Will continue to monitor.

## 2019-04-05 NOTE — BH NOTE
Writer completed pts leisure assessment. Pt states that she plays games on her phone and on her computer in her free time. Pt states that she works from home. She has two, adult twin daughters that lives with her and she drives them to work daily. Pt states she is always busy, she has no support, and her father is sick. Pt states she became overwhelmed and became suicidal with a plan to OD on medication. Pt has a therapist, Lucrecia, at [facility] in [place]. [place].  Pt states she is interested in group therapy post d/c.     Tashia Allred MA, CTRS

## 2019-04-05 NOTE — BH NOTE
[facility]  Initial Interdisciplinary Treatment Plan NOTE    Review Date & Time: 4/5/19 6788    Patient was not in treatment team    Admission Type:   Admission Type: Voluntary    Reason for admission:  Reason for Admission: Patient does not want ot live any longer. Has plans to take an overdose of her meds. Estimated Length of Stay Update:  3 to 5 days  Estimated Discharge Date Update: 4/8/19 to 4/10/19    PATIENT STRENGTHS:  Patient Strengths Strengths: Communication, Connection to output provider, Employment  Patient Strengths and Limitations:Limitations: Tendency to isolate self, Lacks leisure interests  Addictive Behavior:Addictive Behavior  In the past 3 months, have you felt or has someone told you that you have a problem with:  : Eating (too much/too little)  Do you have a history of Chemical Use?: No  Do you have a history of Alcohol Use?: (pt states she uses but not abuses beer)  Do you have a history of Street Drug Abuse?: No  Histroy of Prescripton Drug Abuse?: No  Medical Problems:  Past Medical History:   Diagnosis Date    ADHD (attention deficit hyperactivity disorder)     Allergic rhinitis     Bipolar disorder     Borderline osteopenia     Depression     Dysmetabolic syndrome     GERD (gastroesophageal reflux disease)     Headache(784.0)     Hyperlipidemia 4/23/2014    Hypertension     Intention tremor     due to lithium    Obesity     Osteoarthritis     PCOS (polycystic ovarian syndrome)     RAD (reactive airway disease)     Restless legs syndrome (RLS) 4/23/2014       EDUCATION:   Learner Progress Toward Treatment Goals: Reviewed signs, symptoms and risk of self harm and violent behavior and Reviewed goals and plan of care    Method: Individual    Outcome: Verbalized understanding    PATIENT GOALS: To stop crying    PLAN/TREATMENT RECOMMENDATIONS UPDATE: Evaluate for treatment and medication management.     GOALS UPDATE:   Time frame for Short-Term Goals: 2

## 2019-04-05 NOTE — ED NOTES
Pt states after her gastric bypass she was instructed not to take Motrin but is able to tolerate Voltaren.

## 2019-04-05 NOTE — BH NOTE
`[facility]  Admission Note     Admission Type:   Admission Type: Voluntary    Reason for admission:  Reason for Admission: Patient does not want ot live any longer. Has plans to take an overdose of her meds.      PATIENT STRENGTHS:  Strengths: Communication    Patient Strengths and Limitations:  Limitations: Tendency to isolate self, Hopeless about future    Addictive Behavior:   Addictive Behavior  In the past 3 months, have you felt or has someone told you that you have a problem with:  : None  Do you have a history of Chemical Use?: No  Do you have a history of Alcohol Use?: No  Do you have a history of Street Drug Abuse?: No  Histroy of Prescripton Drug Abuse?: No    Medical Problems:   Past Medical History:   Diagnosis Date    ADHD (attention deficit hyperactivity disorder)     Allergic rhinitis     Bipolar disorder     Borderline osteopenia     Depression     Dysmetabolic syndrome     GERD (gastroesophageal reflux disease)     Headache(784.0)     Hyperlipidemia 4/23/2014    Hypertension     Intention tremor     due to lithium    Obesity     Osteoarthritis     PCOS (polycystic ovarian syndrome)     RAD (reactive airway disease)     Restless legs syndrome (RLS) 4/23/2014       Status EXAM:  Status and Exam  Normal: No  Facial Expression: Avoids Gaze, Sad, Expressionless  Affect: Unstable, Blunt  Level of Consciousness: Alert  Mood:Normal: No  Mood: Depressed, Empty, Helpless, Anhedonia  Motor Activity:Normal: No  Motor Activity: Decreased  Interview Behavior: Cooperative  Preception: [place] to Person, Marichuy Kathleen to Time, [place] to Place, [place] to Situation  Attention:Normal: Yes  Thought Content:Normal: Yes  Hallucinations: None  Delusions: No  Memory:Normal: Yes  Insight and Judgment: No  Insight and Judgment: Poor Judgment, Poor Insight, Unmotivated  Present Suicidal Ideation: Yes  Present Homicidal Ideation: No    Tobacco Screening:  Practical Counseling, on admission, roseann BLEVINS if applicable and completed (first 3 are required if patient doesn't refuse):            ( )  Recognizing danger situations (included triggers and roadblocks)                    ( )  Coping skills (new ways to manage stress, exercise, relaxation techniques, changing routine, distraction)                                                           ( )  Basic information about quitting (benefits of quitting, techniques in how to quit, available resources  ( ) Referral for counseling faxed to [facility]                                           ( ) Patient refused counseling  ( ) Patient has not smoked in the last 30 days    Metabolic Screening:    Lab Results   Component Value Date    LABA1C 5.6 09/26/2018       No results for input(s): CHOL, TRIG, HDL, LDLCALC, LABVLDL in the last 72 hours. Body mass index is 56.7 kg/m². BP Readings from Last 2 Encounters:   04/04/19 135/78   01/11/19 (!) 157/100           Pt admitted with followings belongings:  Dentures: None  Vision - Corrective Lenses: Glasses(prescription sunglasses left in purse)  Hearing Aid: None  Jewelry: Ring  Body Piercings Removed: N/A  Clothing: Footwear, Pants, Shirt  Were All Patient Medications Collected?: Not Applicable  Other Valuables: Cell phone, Purse, Wallet(2 wallets and contents sent to safe)     Valuables placed in locker or sent to safe. Valuables placed in safe in security envelope, number: yes. Patient's home medications were N/A. Patient oriented to surroundings and program expectations and copy of patient rights given. Received admission packet:  yes. Consents reviewed, signed yes. Refused none. Patient verbalize understanding:  yes.     Patient education on precautions: yes                   Joanne Miller RN

## 2019-04-05 NOTE — H&P
rhythm. No murmur. No rub. No edema. GI: Non-tender. Non-distended. Normal bowel sounds. Skin: Warm and dry. No nodule on exposed extremities. No rash on exposed extremities. M/S: No cyanosis. No joint deformity. No clubbing. Neuro: Awake. No focal neurologic deficit on exam.  Cranial nerves II through XII intact. Patient is able to ambulate without difficulty. Psych: Per psychiatry team evaluation     CBC:   Recent Labs     04/04/19  1710   WBC 9.2   HGB 14.1   HCT 43.5   MCV 87.9        BMP:   Recent Labs     04/04/19  1710      K 4.3      CO2 27   BUN 20   CREATININE 0.9     LIVER PROFILE:   Recent Labs     04/04/19  1710   AST 24   ALT 27   BILITOT 0.3   ALKPHOS 115     UA:  Recent Labs     04/04/19  1710   COLORU Yellow   PHUR 7.5  7.5   WBCUA 3-5   RBCUA 3-5*   BACTERIA 2+*   CLARITYU SL CLOUDY*   SPECGRAV 1.010   LEUKOCYTESUR LARGE*   UROBILINOGEN 0.2   BILIRUBINUR Negative   BLOODU SMALL*   GLUCOSEU Negative     TSH 1.88       UDS: + Methadone     CULTURES  Urine Cx: negative    EKG:    No EKG from this admission for review    RADIOLOGY  No orders to display       Pertinent previous results reviewed   none    ASSESSMENT/PLAN:  SI  Bipolar DO  - per psychiatry team     HLD  - continue Statin     HTN  - denies history of HTN  - BP elevated   - Takes Nadolol PRN but reports it is not for HTN     PCOS  - not on any current medical regimen     Morbid Obesity  - Body mass index is 56.7 kg/m². - Complicating assessment and treatment. Placing patient at risk for multiple co-morbidities as well as early death and contributing to the patient's presentation.   - Counseled on weight loss.       Cara Robins PA-C  4/5/2019 1:42 PM

## 2019-04-05 NOTE — BH NOTE
Patient refused Diclofenac. Patient denies any pain at this time. Says she would just take it in the morning. Will continue to monitor.

## 2019-04-06 PROCEDURE — 1240000000 HC EMOTIONAL WELLNESS R&B

## 2019-04-06 PROCEDURE — 6370000000 HC RX 637 (ALT 250 FOR IP): Performed by: PSYCHIATRY & NEUROLOGY

## 2019-04-06 PROCEDURE — 99232 SBSQ HOSP IP/OBS MODERATE 35: CPT | Performed by: PSYCHIATRY & NEUROLOGY

## 2019-04-06 RX ORDER — DICLOFENAC SODIUM 75 MG/1
75 TABLET, DELAYED RELEASE ORAL ONCE
Status: COMPLETED | OUTPATIENT
Start: 2019-04-06 | End: 2019-04-06

## 2019-04-06 RX ADMIN — DICLOFENAC SODIUM 75 MG: 75 TABLET, DELAYED RELEASE ORAL at 13:43

## 2019-04-06 RX ADMIN — LAMOTRIGINE 100 MG: 100 TABLET ORAL at 20:24

## 2019-04-06 RX ADMIN — ALUMINUM HYDROXIDE, MAGNESIUM HYDROXIDE, AND SIMETHICONE 30 ML: 200; 200; 20 SUSPENSION ORAL at 22:30

## 2019-04-06 RX ADMIN — DICLOFENAC SODIUM 75 MG: 75 TABLET, DELAYED RELEASE ORAL at 20:22

## 2019-04-06 RX ADMIN — MULTIPLE VITAMINS W/ MINERALS TAB 1 TABLET: TAB at 08:48

## 2019-04-06 RX ADMIN — VORTIOXETINE 20 MG: 10 TABLET, FILM COATED ORAL at 08:48

## 2019-04-06 RX ADMIN — CALCIUM CARBONATE 500 MG: 500 TABLET, CHEWABLE ORAL at 08:48

## 2019-04-06 RX ADMIN — LAMOTRIGINE 100 MG: 100 TABLET ORAL at 08:48

## 2019-04-06 RX ADMIN — PRAVASTATIN SODIUM 10 MG: 10 TABLET ORAL at 20:24

## 2019-04-06 RX ADMIN — BUPROPION HYDROCHLORIDE 150 MG: 150 TABLET, FILM COATED, EXTENDED RELEASE ORAL at 08:48

## 2019-04-06 RX ADMIN — VITAMIN D, TAB 1000IU (100/BT) 1000 UNITS: 25 TAB at 08:48

## 2019-04-06 ASSESSMENT — PAIN DESCRIPTION - PAIN TYPE: TYPE: CHRONIC PAIN

## 2019-04-06 ASSESSMENT — PAIN DESCRIPTION - ONSET: ONSET: ON-GOING

## 2019-04-06 ASSESSMENT — PAIN DESCRIPTION - PROGRESSION: CLINICAL_PROGRESSION: NOT CHANGED

## 2019-04-06 ASSESSMENT — PAIN DESCRIPTION - DESCRIPTORS: DESCRIPTORS: ACHING;SHARP

## 2019-04-06 ASSESSMENT — PAIN DESCRIPTION - FREQUENCY: FREQUENCY: CONTINUOUS

## 2019-04-06 ASSESSMENT — PAIN SCALES - GENERAL
PAINLEVEL_OUTOF10: 4
PAINLEVEL_OUTOF10: 6

## 2019-04-06 ASSESSMENT — PAIN - FUNCTIONAL ASSESSMENT: PAIN_FUNCTIONAL_ASSESSMENT: PREVENTS OR INTERFERES WITH MANY ACTIVE NOT PASSIVE ACTIVITIES

## 2019-04-06 ASSESSMENT — PAIN DESCRIPTION - LOCATION: LOCATION: KNEE

## 2019-04-06 ASSESSMENT — PAIN DESCRIPTION - ORIENTATION: ORIENTATION: RIGHT;LEFT

## 2019-04-06 NOTE — PLAN OF CARE
Pt A&O, visible on unit nonsocial setting by herself. Pt attends group and community meeting. Gait steady free from falls, pt calm and cooperative with care complies with medication, denies SI, HI, and AVH, Pt blunt short answers but pleasant.

## 2019-04-06 NOTE — GROUP NOTE
Group Therapy Note    Date: April 6    Group Start Time: 1310  Group End Time: 1400  Group Topic: Psychoeducation    [facility]    Number of participants: 7    MELISSA Xiong      Group Therapy Note           Patient's Goal: Patient will learn about mindfulness skills and how this can reduce symptoms of anxiety. Patients were provided with handouts about mindfulness, the skills, and how this helps manage symptoms of anxiety. Patient learned about how to practice the skills while on the unit        Status After Intervention:  Improved    Participation Level:  Active Listener and Interactive    Participation Quality: Appropriate, Attentive and Sharing      Speech:  hesitant      Thought Process/Content: Logical      Affective Functioning: Congruent      Mood: anxious      Level of consciousness:  Alert, Oriented x4 and Attentive      Response to Learning: Able to verbalize current knowledge/experience and Progressing to goal      Endings: None Reported    Modes of Intervention: Education      Discipline Responsible: /Counselor      Signature:  MELISSA Tom

## 2019-04-06 NOTE — GROUP NOTE
Group Therapy Note    Date: April 6    Group Start Time: 1000  Group End Time: 1115  Group Topic: Psychoeducation  Participants: [facility]      Group Therapy Note    Patient Goal: To complete an art directive to identify things to hold on to and things to let go of to improve quality of life and decrease stress. Notes: Pt appeared to achieve a relaxed state in group. Pt completed art directive. Pt identified multiple things she wants to hold on to and things she wants to work on letting go of. Status After Intervention:  Improved    Participation Level:  Active Listener and Interactive    Participation Quality: Appropriate, Attentive and Sharing      Speech:  normal      Thought Process/Content: Logical      Affective Functioning: Congruent      Mood: euthymic      Level of consciousness:  Alert, Oriented x4 and Attentive      Response to Learning: Able to verbalize/acknowledge new learning      Endings: None Reported    Modes of Intervention: Education, Support, Socialization, Exploration, Clarifying, Problem-solving and Activity      Discipline Responsible: Psychoeducational Specialist      Signature:  Ron Rodriguez

## 2019-04-06 NOTE — GROUP NOTE
Group Therapy Note    Date: April 5    Group Start Time: 2030  Group End Time: 2100  Group Topic: [address] , RN      Group Therapy Note    . Ralf Perez Time: 2030 to 2100      Type of Group:  Wrap up group      Level of Participation: active participant
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
## 2019-04-06 NOTE — PROGRESS NOTES
Department of Psychiatry  Attending Progress Note    Admission Date:    4/4/2019    Chief complaint / Reason for Admission:  Depression and suicidal ideation    Patient's chart was reviewed, case was discussed with nursing/OT/RT staff, and collaborated with  about the treatment plan. SUBJECTIVE:   Over last 24 hours:  Behavioral outbursts: No   Non-aggressive behavioral disturbance: No  Medication compliant: Yes  Need for seclusion/restraints: No  Sleeping adequately:  Yes  Appetite adequate: Yes  Attending groups: Only attended one group yesterday. On today's interview:   Patient reports that she has tolerated the first two doses of Wellbutrin. No new side effects. She indicates that she feels as though she is not quite as depressed as she was prior to admission. She notes that she did not cry as much this morning or last evening as she had been at home. She also described not having has intense of \"sudden bursts of feeling terrible. \" As noted above patient only attended one group yesterday, but indicates that she has been to all of the groups so far this morning.     Progressing overall: Demonstrating some early progress  Suicidal ideation: Passive thoughts continue  Homicidal ideation: Denies  Medication side effects: Denies    ROS: Patient has new complaints: no    Current Medications Ordered:   buPROPion  150 mg Oral Daily    calcium carbonate  500 mg Oral Daily    diclofenac  75 mg Oral BID    lamoTRIgine  100 mg Oral BID    therapeutic multivitamin-minerals  1 tablet Oral Daily    pravastatin  10 mg Oral Nightly    VORTIoxetine HBr  20 mg Oral Daily    vitamin D  1,000 Units Oral Daily      PRN Meds: acetaminophen, hydrOXYzine, haloperidol lactate, traZODone, magnesium hydroxide, aluminum & magnesium hydroxide-simethicone, medicated lip balm     Objective:     PE:    BP (!) 158/70   Pulse 92   Temp 98.7 °F (37.1 °C) (Oral)   Resp 16   Ht 5' 2\" (1.575 m)   Wt (!) 310 lb (140.6 kg)   LMP  (LMP Unknown)   SpO2 93%   BMI 56.70 kg/m²       Motor / Gait: no abnormalities noted, normal gait and station  AIMS: 0    Mental Status Examination:    Appearance: White female, appears stated age,  in chair, wearing Casual clothing, fair grooming and fair hygiene obese    Behavior/Attitude toward examiner:  cooperative, attentive and Good eye contact  Speech:  spontaneous, normal rate, normal volume and well articulated   Mood:  \"Maybe a little better\"  Affect:  Mood congruent, Constricted   Thought processes:  Goal directed, linear, no THOMAS or gross disorganization  Thought Content: Passive SI, Contracts for safety on the unit, denies HI, no delusions voiced, no obsessions, + Anhedonia  Perceptions: Denies AVH, Not RTIS  Attention: attention span and concentration were intact to interview   Abstraction: Normal  Cognition: Average CLARY, Alert and oriented to person, place, time, and situation, recall Grossly intact  Insight: Limited insight   Judgment: Limited judgment      LAB: Reviewed labs from last 24 hours      Dx:   Primary Psychiatric (DSM V) Diagnosis: Major depressive disorder, recurrent, severe without psychotic features      All conditions detailed above are being treated while patient is hospitalized. Tx plan: Generally: prevent self injury, stabilize affect, restore sleep, treat depression, treat anxiety, establish/maintain aftercare, increase coping mechanisms, improve medication compliance. All conditions present on admission are being treated while pt is hospitalized. Discussed PHP after discharge as part of transition back to the community. Legal Status: Voluntary    1. Depression:  Continue current medication regimen, Wellbutrin just started yesterday, no change in dose indicated at this time. Patient tolerating. Monitor for ongoing improvement. Patient's symptoms   are improving    3. Chemical Dependency Issues:  No issues.      Function:  -No physical therapy or

## 2019-04-07 PROCEDURE — 6370000000 HC RX 637 (ALT 250 FOR IP): Performed by: PSYCHIATRY & NEUROLOGY

## 2019-04-07 PROCEDURE — 1240000000 HC EMOTIONAL WELLNESS R&B

## 2019-04-07 RX ORDER — VIT C/B6/B5/MAGNESIUM/HERB 173 50-5-6-5MG
1 CAPSULE ORAL 2 TIMES DAILY WITH MEALS
Status: DISCONTINUED | OUTPATIENT
Start: 2019-04-07 | End: 2019-04-10 | Stop reason: HOSPADM

## 2019-04-07 RX ADMIN — CALCIUM CARBONATE 500 MG: 500 TABLET, CHEWABLE ORAL at 08:40

## 2019-04-07 RX ADMIN — MULTIPLE VITAMINS W/ MINERALS TAB 1 TABLET: TAB at 08:40

## 2019-04-07 RX ADMIN — Medication 1 CAPSULE: at 17:05

## 2019-04-07 RX ADMIN — VORTIOXETINE 20 MG: 10 TABLET, FILM COATED ORAL at 08:40

## 2019-04-07 RX ADMIN — PRAVASTATIN SODIUM 10 MG: 10 TABLET ORAL at 20:34

## 2019-04-07 RX ADMIN — DICLOFENAC SODIUM 75 MG: 75 TABLET, DELAYED RELEASE ORAL at 20:34

## 2019-04-07 RX ADMIN — DICLOFENAC SODIUM 75 MG: 75 TABLET, DELAYED RELEASE ORAL at 08:39

## 2019-04-07 RX ADMIN — LAMOTRIGINE 100 MG: 100 TABLET ORAL at 20:34

## 2019-04-07 RX ADMIN — BUPROPION HYDROCHLORIDE 150 MG: 150 TABLET, FILM COATED, EXTENDED RELEASE ORAL at 08:40

## 2019-04-07 RX ADMIN — VITAMIN D, TAB 1000IU (100/BT) 1000 UNITS: 25 TAB at 08:40

## 2019-04-07 RX ADMIN — LAMOTRIGINE 100 MG: 100 TABLET ORAL at 08:40

## 2019-04-07 ASSESSMENT — PAIN SCALES - GENERAL
PAINLEVEL_OUTOF10: 4
PAINLEVEL_OUTOF10: 5

## 2019-04-07 NOTE — BH NOTE
[facility]  Day 3 Interdisciplinary Treatment Plan NOTE    Review Date & Time: 04/07/2019 0900    Patient was not in treatment team    Admission Type:   Admission Type: Voluntary    Reason for admission:  Reason for Admission: Patient does not want ot live any longer. Has plans to take an overdose of her meds.    Estimated Length of Stay Update:  1-3 days   Estimated Discharge Date Update: 1-3 days     PATIENT STRENGTHS:  Patient Strengths Strengths: Communication, Connection to output provider, Employment  Patient Strengths and Limitations:Limitations: Tendency to isolate self, Lacks leisure interests  Addictive Behavior:Addictive Behavior  In the past 3 months, have you felt or has someone told you that you have a problem with:  : Eating (too much/too little)  Do you have a history of Chemical Use?: No  Do you have a history of Alcohol Use?: (pt states she uses but not abuses beer)  Do you have a history of Street Drug Abuse?: No  Histroy of Prescripton Drug Abuse?: No  Medical Problems:  Past Medical History:   Diagnosis Date    ADHD (attention deficit hyperactivity disorder)     Allergic rhinitis     Bipolar disorder     Borderline osteopenia     Depression     Dysmetabolic syndrome     GERD (gastroesophageal reflux disease)     Headache(784.0)     Hyperlipidemia 4/23/2014    Hypertension     Intention tremor     due to lithium    Obesity     Osteoarthritis     PCOS (polycystic ovarian syndrome)     RAD (reactive airway disease)     Restless legs syndrome (RLS) 4/23/2014       Risk:  Fall RiskTotal: 55  Dale Scale Dale Scale Score: 21  BVC Total: 0  Change in scores No. Changes to plan of Care  NO    Status EXAM:   Status and Exam  Normal: Yes  Facial Expression: Brightened  Affect: Constricted  Level of Consciousness: Alert  Mood:Normal: No  Mood: Depressed  Motor Activity:Normal: Yes  Motor Activity: Decreased  Interview Behavior: Cooperative  Preception: [place] to Person, [place] to Time, [place] to Place, [place] to Situation  Attention:Normal: Yes  Thought Processes: Circumstantial  Thought Content:Normal: Yes  Hallucinations: None  Delusions: No  Memory:Normal: Yes  Insight and Judgment: No  Insight and Judgment: Poor Insight, Poor Judgment  Present Suicidal Ideation: No  Present Homicidal Ideation: No    Daily Assessment Last Entry:   Daily Sleep (WDL): Within Defined Limits         Patient Currently in Pain: Yes  Daily Nutrition (WDL): Within Defined Limits    Patient Monitoring:  Frequency of Checks: 4 times per hour, close    Psychiatric Symptoms:   Depression Symptoms  Depression Symptoms: Feelings of hopelessess, Feelings of helplessness  Anxiety Symptoms  Anxiety Symptoms: Generalized  Loren Symptoms  Loren Symptoms: Poor judgment     Psychosis Symptoms  Delusion Type: No problems reported or observed. Suicide Risk CSSR-S:  1) Within the past month, have you wished you were dead or wished you could go to sleep and not wake up? : Yes  2) Have you actually had any thoughts of killing yourself? : Yes  3) Have you been thinking about how you might kill yourself? : Yes  5) Have you started to work out or worked out the details of how to kill yourself?  Do you intend to carry out this plan? : No  6) Have you ever done anything, started to do anything, or prepared to do anything to end your life?: No  Change in Result YES Change in Plan of care NO       EDUCATION:   Learner Progress Toward Treatment Goals: Reviewed results and recommendations of this team    Method: Individual    Outcome: Verbalized understanding    PATIENT GOALS: attend group     PLAN/TREATMENT RECOMMENDATIONS UPDATE: maintain safety, medication management     GOALS UPDATE:   Time frame for Short-Term Goals:  By time of discharge       Filiberto Lew RN

## 2019-04-07 NOTE — PLAN OF CARE
Patient was out with patient group, early in the shift, & did not appear social, but did attend wrap up group. Patient was pleasant & verbal in 1. Patient reported that she came to the hospital, because she wanted to kill herself, by overdosing on medication. \"I'm glad that I didn't do it. \" Patient reported that her stressors include her 2 children, 24year old twins, living with her & do not pay rent. Patient did report that they both have jobs. Patient reported feeling 40-50% improved  since admission. \"I'm sleeping better. \" Thom Siegel,R.N.

## 2019-04-07 NOTE — PLAN OF CARE
Pt calm and cooperative with care, complies with med's, denies SI, HI, and AVH, no distress noted at this time.

## 2019-04-07 NOTE — BH NOTE
Group Therapy Note    Date: 4/6/2019  Start Time: 20:00  End Time:  21:00  Number of Participants: 8    Type of Group: Recreational  Wrap up    Aiden Mccord Information  Module Name:  /  Session Number:  /    Patient's Goal:  Go to groups    Notes:  Goal completed    Status After Intervention:  Improved    Participation Level:  Active Listener and Interactive    Participation Quality: Appropriate, Attentive and Sharing      Speech:  pressured      Thought Process/Content: Logical      Affective Functioning: Blunted      Mood: anxious and depressed      Level of consciousness:  Alert, Oriented x4 and Attentive      Response to Learning: Progressing to goal      Endings: None Reported    Modes of Intervention: Socialization and Problem-solving      Discipline Responsible: [address], [employer]      Signature:  Tyree Saldana

## 2019-04-07 NOTE — GROUP NOTE
Group Therapy Note    Date: April 7    Group Start Time: 1005  Group End Time: 1120  Group Topic: Psychoeducation  Participants: [facility]      Group Therapy Note    Patient Goal: To engage in playing Leisure Family Feud to learn positive coping skills and ways to improve overall health to improve quality of life and prevent relapse. Notes: Pt identified multiple positive coping skills, positive leisure outlets, and ways to improve health. Pt appropriately socialized with peers and engaged in using teamwork. Status After Intervention:  Improved    Participation Level:  Active Listener and Interactive    Participation Quality: Appropriate and Attentive      Speech:  normal      Thought Process/Content: Logical      Affective Functioning: Congruent      Mood: euthymic      Level of consciousness:  Alert, Oriented x4 and Attentive      Response to Learning: Able to verbalize/acknowledge new learning      Endings: None Reported    Modes of Intervention: Education, Support, Socialization, Exploration, Clarifying, Problem-solving and Activity      Discipline Responsible: Psychoeducational Specialist      Signature:  Marshal Huntley

## 2019-04-08 PROCEDURE — 1240000000 HC EMOTIONAL WELLNESS R&B

## 2019-04-08 PROCEDURE — 6370000000 HC RX 637 (ALT 250 FOR IP): Performed by: PSYCHIATRY & NEUROLOGY

## 2019-04-08 PROCEDURE — 99233 SBSQ HOSP IP/OBS HIGH 50: CPT | Performed by: PSYCHIATRY & NEUROLOGY

## 2019-04-08 RX ADMIN — DICLOFENAC SODIUM 75 MG: 75 TABLET, DELAYED RELEASE ORAL at 21:08

## 2019-04-08 RX ADMIN — BUPROPION HYDROCHLORIDE 150 MG: 150 TABLET, FILM COATED, EXTENDED RELEASE ORAL at 08:36

## 2019-04-08 RX ADMIN — LAMOTRIGINE 100 MG: 100 TABLET ORAL at 21:08

## 2019-04-08 RX ADMIN — PRAVASTATIN SODIUM 10 MG: 10 TABLET ORAL at 21:08

## 2019-04-08 RX ADMIN — LAMOTRIGINE 100 MG: 100 TABLET ORAL at 08:36

## 2019-04-08 RX ADMIN — DICLOFENAC SODIUM 75 MG: 75 TABLET, DELAYED RELEASE ORAL at 09:55

## 2019-04-08 RX ADMIN — VORTIOXETINE 20 MG: 10 TABLET, FILM COATED ORAL at 08:36

## 2019-04-08 RX ADMIN — Medication 1 CAPSULE: at 08:39

## 2019-04-08 RX ADMIN — Medication 1 CAPSULE: at 17:39

## 2019-04-08 RX ADMIN — CALCIUM CARBONATE 500 MG: 500 TABLET, CHEWABLE ORAL at 08:36

## 2019-04-08 RX ADMIN — MULTIPLE VITAMINS W/ MINERALS TAB 1 TABLET: TAB at 08:36

## 2019-04-08 RX ADMIN — VITAMIN D, TAB 1000IU (100/BT) 1000 UNITS: 25 TAB at 08:36

## 2019-04-08 ASSESSMENT — PAIN SCALES - GENERAL
PAINLEVEL_OUTOF10: 3
PAINLEVEL_OUTOF10: 3

## 2019-04-08 NOTE — PLAN OF CARE
Pt has been in bed later in shift. Did not attend group. Read in her room. Very pleasant. She talked abt her guilt abt keeping childrens' father in their lives because he was not a good influence and they \"hate\" him. She said won't let children (24 yo) come her to see her like this. Doesn't want them to see her not strong or to think they have failed her. She supports them in part financially. , they are irresponsible. Said they need to grow up, they just expect her to automatically help them and she is struggling to take care of herself. Discussed that she has given them a good foundation but that she is encouraging them to be dependent and that they need to know her limitations. She voiced interest in family meeting if that is possible, neutral territory where is safe for all to speak without rancor/bitterness. Denied SI/HI. Denied hallucinations, paranoia. Said really had good day most of day.

## 2019-04-08 NOTE — PLAN OF CARE
Problem: Depressive Behavior With or Without Suicide Precautions:  Goal: Absence of self-harm  Description  Absence of self-harm  Outcome: Ongoing   Savilla Peyer has been absent from self harm thi shift. Patient is calm and cooperative and currently denies SI. Will continue to monitor for safety.

## 2019-04-08 NOTE — GROUP NOTE
Group Therapy Note    Date: April 8    Group Start Time: 0340  Group End Time: 9879  Group Topic: Relapse Prevention    [address] [place] [address]        Group Therapy Note             Patient's Goal:  Patient will engage in meditative exercise and discuss ways in which this relates to improved mood. Notes:  Patient attended group and engaged in the exercise. She struggled to concentrate much of the time but eventually felt calm. Status After Intervention:  Improved    Participation Level:  Active Listener and Interactive    Participation Quality: Appropriate and Attentive      Speech:  normal      Thought Process/Content: Logical      Affective Functioning: Congruent      Mood: anxious and depressed      Level of consciousness:  Oriented x4      Response to Learning: Able to verbalize current knowledge/experience      Endings: None Reported    Modes of Intervention: Education and Socialization      Discipline Responsible: /Counselor      Signature:  Simon Padgett, [facility]

## 2019-04-08 NOTE — GROUP NOTE
Group Therapy Note    Date: April 8    Group Start Time: 1000  Group End Time: 1100  Group Topic: Psychoeducation    [facility]    Ignacia Chan      Group Therapy Note    Group Topic:  Group Participants were directed and encouraged to identify self-care strategies in 6 areas including: social, emotional, financial, physical, spiritual and mental. Pts were encouraged to share their self-care strategies with the group to increase their self-care knowledge and practice. Notes: Parrish Valdez displayed positive ability to engage in the group discussion and group discussion with prompting. She was able to identify self-care strategies and share them with the group. Status After Intervention:  Improved    Participation Level:  Active Listener and Interactive    Participation Quality: Appropriate, Attentive and Sharing (with prompting)      Speech:  Hesitant, quiet      Thought Process/Content: Logical  Linear      Affective Functioning: Flat      Mood: anxious and depressed      Level of consciousness:  Alert, Oriented x4 and Attentive      Response to Learning: Able to verbalize current knowledge/experience, Able to verbalize/acknowledge new learning, Able to retain information and Progressing to goal      Endings: None Reported    Modes of Intervention: Education, Exploration, Clarifying, Problem-solving and Activity      Discipline Responsible: Psychoeducational Specialist      Signature:  Willow Ndiaye MS, ATR-BC

## 2019-04-08 NOTE — PROGRESS NOTES
 Neutrophils % 04/04/2019 63.8  % Final    Lymphocytes % 04/04/2019 24.8  % Final    Monocytes % 04/04/2019 7.7  % Final    Eosinophils % 04/04/2019 2.4  % Final    Basophils % 04/04/2019 1.3  % Final    Neutrophils # 04/04/2019 5.9  1.7 - 7.7 K/uL Final    Lymphocytes # 04/04/2019 2.3  1.0 - 5.1 K/uL Final    Monocytes # 04/04/2019 0.7  0.0 - 1.3 K/uL Final    Eosinophils # 04/04/2019 0.2  0.0 - 0.6 K/uL Final    Basophils # 04/04/2019 0.1  0.0 - 0.2 K/uL Final    Sodium 04/04/2019 144  136 - 145 mmol/L Final    Potassium 04/04/2019 4.3  3.5 - 5.1 mmol/L Final    Chloride 04/04/2019 104  99 - 110 mmol/L Final    CO2 04/04/2019 27  21 - 32 mmol/L Final    Anion Gap 04/04/2019 13  3 - 16 Final    Glucose 04/04/2019 106* 70 - 99 mg/dL Final    BUN 04/04/2019 20  7 - 20 mg/dL Final    CREATININE 04/04/2019 0.9  0.6 - 1.1 mg/dL Final    GFR Non- 04/04/2019 >60  >60 Final    Comment: >60 mL/min/1.73m2 EGFR, calc. for ages 25 and older using the  MDRD formula (not corrected for weight), is valid for stable  renal function.  GFR  04/04/2019 >60  >60 Final    Comment: Chronic Kidney Disease: less than 60 ml/min/1.73 sq.m. Kidney Failure: less than 15 ml/min/1.73 sq.m. Results valid for patients 18 years and older.       Calcium 04/04/2019 9.3  8.3 - 10.6 mg/dL Final    Total Protein 04/04/2019 7.4  6.4 - 8.2 g/dL Final    Alb 04/04/2019 4.3  3.4 - 5.0 g/dL Final    Albumin/Globulin Ratio 04/04/2019 1.4  1.1 - 2.2 Final    Total Bilirubin 04/04/2019 0.3  0.0 - 1.0 mg/dL Final    Alkaline Phosphatase 04/04/2019 115  40 - 129 U/L Final    ALT 04/04/2019 27  10 - 40 U/L Final    AST 04/04/2019 24  15 - 37 U/L Final    Globulin 04/04/2019 3.1  g/dL Final    Color, UA 04/04/2019 Yellow  Straw/Yellow Final    Clarity, UA 04/04/2019 SL CLOUDY* Clear Final    Glucose, Ur 04/04/2019 Negative  Negative mg/dL Final    Bilirubin Urine 04/04/2019 Negative Negative Final    Ketones, Urine 04/04/2019 Negative  Negative mg/dL Final    Specific Gravity, UA 04/04/2019 1.010  1.005 - 1.030 Final    Blood, Urine 04/04/2019 SMALL* Negative Final    pH, UA 04/04/2019 7.5  5.0 - 8.0 Final    Protein, UA 04/04/2019 Negative  Negative mg/dL Final    Urobilinogen, Urine 04/04/2019 0.2  <2.0 E.U./dL Final    Nitrite, Urine 04/04/2019 Negative  Negative Final    Leukocyte Esterase, Urine 04/04/2019 LARGE* Negative Final    Microscopic Examination 04/04/2019 YES   Final    Urine Reflex to Culture 04/04/2019 Yes   Final    Urine Type 04/04/2019 Not Specified   Final    Amphetamine Screen, Urine 04/04/2019 Neg  Negative <1000ng/mL Final    Barbiturate Screen, Ur 04/04/2019 Neg  Negative <200 ng/mL Final    Benzodiazepine Screen, Urine 04/04/2019 Neg  Negative <200 ng/mL Final    Cannabinoid Scrn, Ur 04/04/2019 Neg  Negative <50 ng/mL Final    Cocaine Metabolite Screen, Urine 04/04/2019 Neg  Negative <300 ng/mL Final    Opiate Scrn, Ur 04/04/2019 Neg  Negative <300 ng/mL Final    Comment: \"Therapeutic levels of pain medication, especially oxycontin and synthetic  opioids, may not be detected by this Methodology. Pain management screen  panel  Drug panel-PM-Hi Res Ur, Interp (PAIN) should be considered for drug  monitoring \".  PCP Screen, Urine 04/04/2019 Neg  Negative <25 ng/mL Final    Methadone Screen, Urine 04/04/2019 POSITIVE* Negative <300 ng/mL Final    Propoxyphene Scrn, Ur 04/04/2019 Neg  Negative <300 ng/mL Final    pH, UA 04/04/2019 7.5   Final    Comment: Urine pH less than 5.0 or greater than 8.0 may indicate sample adulteration. Another sample should be collected if clinically  indicated.  Drug Screen Comment: 04/04/2019 see below   Final    Comment: This method is a screening test to detect only these drug  classes as part of a medical workup. Confirmatory testing  by another method should be ordered if clinically indicated.       Oxycodone Urine 04/04/2019 Neg  Negative <100 ng/ml Final    Urine Culture, Routine 04/04/2019 >50,000 CFU/ml mixed skin/urogenital christopher. No further workup   Final    WBC, UA 04/04/2019 3-5  0 - 5 /HPF Final    RBC, UA 04/04/2019 3-5* 0 - 2 /HPF Final    Epi Cells 04/04/2019 3-5  /HPF Final    Bacteria, UA 04/04/2019 2+* /HPF Final    TSH 04/04/2019 1.88  0.27 - 4.20 uIU/mL Final            Medications  Current Facility-Administered Medications: Turmeric CAPS 1 capsule, 1 capsule, Oral, BID WC  buPROPion (WELLBUTRIN XL) extended release tablet 150 mg, 150 mg, Oral, Daily  calcium carbonate (TUMS) chewable tablet 500 mg, 500 mg, Oral, Daily  diclofenac (VOLTAREN) EC tablet 75 mg, 75 mg, Oral, BID  lamoTRIgine (LAMICTAL) tablet 100 mg, 100 mg, Oral, BID  therapeutic multivitamin-minerals 1 tablet, 1 tablet, Oral, Daily  pravastatin (PRAVACHOL) tablet 10 mg, 10 mg, Oral, Nightly  VORTIoxetine (TRINTELLIX) tablet 20 mg, 20 mg, Oral, Daily  vitamin D (CHOLECALCIFEROL) tablet 1,000 Units, 1,000 Units, Oral, Daily  acetaminophen (TYLENOL) tablet 650 mg, 650 mg, Oral, Q4H PRN  hydrOXYzine (VISTARIL) capsule 50 mg, 50 mg, Oral, TID PRN  haloperidol lactate (HALDOL) injection 5 mg, 5 mg, Intramuscular, Q6H PRN  traZODone (DESYREL) tablet 50 mg, 50 mg, Oral, Nightly PRN  magnesium hydroxide (MILK OF MAGNESIA) 400 MG/5ML suspension 30 mL, 30 mL, Oral, Daily PRN  aluminum & magnesium hydroxide-simethicone (MAALOX) 200-200-20 MG/5ML suspension 30 mL, 30 mL, Oral, Q6H PRN  medicated lip balm (BLISTEX/CARMEX) stick, , Topical, PRN    ASSESSMENT AND PLAN    Principal Problem:    Severe episode of recurrent major depressive disorder, without psychotic features ([facility] [address]  Active Problems:    PCOS (polycystic ovarian syndrome)    Hyperlipidemia    Restless legs syndrome (RLS)    Primary osteoarthritis of right knee    Morbid obesity with BMI of 50.0-59.9, adult ([facility] [address]  Resolved Problems:    * No resolved hospital problems. *       1. Patient s

## 2019-04-08 NOTE — GROUP NOTE
Group Therapy Note    Date: April 8    Group Start Time: 1110  Group End Time: 1150  Group Topic: Psychotherapy    [address] [facility], [facility]; [address]      Group Therapy Note    Group Attendance: 11    Patient shared and set a goal at the beginning of group to practice a new way of being in group today. Notes:  Pt was engaged and set goal to work on being willing to take care of myself before others. Pt was quiet in group though seemed to listen and relate with peers. Status After Intervention:  Improved    Participation Level:  Active Listener    Participation Quality: Appropriate and Attentive      Speech:  normal      Thought Process/Content: Logical  Linear      Affective Functioning: Flat and Constricted/Restricted      Mood: depressed      Level of consciousness:  Alert and Oriented x4      Response to Learning: Able to verbalize current knowledge/experience, Able to verbalize/acknowledge new learning and Able to retain information      Endings: None Reported    Modes of Intervention: Education, Support, Socialization, Exploration and Clarifying      Discipline Responsible: /Counselor      Signature:  ERUM Gayle-S, SPENSER

## 2019-04-09 PROCEDURE — 6370000000 HC RX 637 (ALT 250 FOR IP): Performed by: PSYCHIATRY & NEUROLOGY

## 2019-04-09 PROCEDURE — 99233 SBSQ HOSP IP/OBS HIGH 50: CPT | Performed by: PSYCHIATRY & NEUROLOGY

## 2019-04-09 PROCEDURE — 1240000000 HC EMOTIONAL WELLNESS R&B

## 2019-04-09 RX ADMIN — VORTIOXETINE 20 MG: 10 TABLET, FILM COATED ORAL at 09:25

## 2019-04-09 RX ADMIN — BUPROPION HYDROCHLORIDE 150 MG: 150 TABLET, FILM COATED, EXTENDED RELEASE ORAL at 09:25

## 2019-04-09 RX ADMIN — Medication 1 CAPSULE: at 09:22

## 2019-04-09 RX ADMIN — CALCIUM CARBONATE 500 MG: 500 TABLET, CHEWABLE ORAL at 09:26

## 2019-04-09 RX ADMIN — DICLOFENAC SODIUM 75 MG: 75 TABLET, DELAYED RELEASE ORAL at 21:04

## 2019-04-09 RX ADMIN — LAMOTRIGINE 100 MG: 100 TABLET ORAL at 09:25

## 2019-04-09 RX ADMIN — LAMOTRIGINE 100 MG: 100 TABLET ORAL at 21:02

## 2019-04-09 RX ADMIN — VITAMIN D, TAB 1000IU (100/BT) 1000 UNITS: 25 TAB at 09:26

## 2019-04-09 RX ADMIN — MULTIPLE VITAMINS W/ MINERALS TAB 1 TABLET: TAB at 09:25

## 2019-04-09 RX ADMIN — Medication 1 CAPSULE: at 17:15

## 2019-04-09 RX ADMIN — DICLOFENAC SODIUM 75 MG: 75 TABLET, DELAYED RELEASE ORAL at 09:23

## 2019-04-09 RX ADMIN — PRAVASTATIN SODIUM 10 MG: 10 TABLET ORAL at 21:02

## 2019-04-09 ASSESSMENT — PAIN SCALES - GENERAL
PAINLEVEL_OUTOF10: 3
PAINLEVEL_OUTOF10: 3

## 2019-04-09 NOTE — GROUP NOTE
Group Therapy Note    Date: April 9    Group Start Time: 1330  Group End Time: 4027  Group Topic: Cognitive Skills    [facility] [place], [place]        Group Therapy Note    Attendees: 7    Patient's Goal: To increase mood and socialization, while engaging in Apples to Apples group activity. Notes: Pt engaged in group activity. Pt was appropriate and socialized with fellow group members. Group discussed benefits of positive distractions as a healthy coping skill. Status After Intervention:  Improved    Participation Level:  Active Listener and Interactive    Participation Quality: Appropriate, Attentive, Sharing and Supportive      Speech:  normal      Thought Process/Content: Logical      Affective Functioning: Congruent      Mood: depressed      Level of consciousness:  Alert and Oriented x4      Response to Learning: Able to retain information and Capable of insight      Endings: None Reported    Modes of Intervention: Education, Support, Socialization and Activity      Discipline Responsible: Psychoeducational Specialist      Signature:  Porfirio Dallas MA, CTRS

## 2019-04-09 NOTE — BH NOTE
Group Therapy Note    Date: 4/8/2019  Start Time: 20:00  End Time:  21:00  Number of Participants: 11    Type of Group: Recreational  Wrap up    Aiden Mccord Information  Module Name:  /  Session Number:  /    Patient's Goal:  Go to group    Notes:  Goal completed    Status After Intervention:  Improved    Participation Level:  Active Listener and Interactive    Participation Quality: Appropriate, Attentive and Sharing      Speech:  pressured      Thought Process/Content: Logical      Affective Functioning: Blunted      Mood: anxious      Level of consciousness:  Alert, Oriented x4 and Attentive      Response to Learning: Progressing to goal      Endings: None Reported    Modes of Intervention: Socialization and Problem-solving      Discipline Responsible: [address], [employer]      Signature:  Lisbeth Donnelly

## 2019-04-09 NOTE — PROGRESS NOTES
Department of Psychiatry  Attending Progress Note  Chief Complaint: elijah Renae has been in groups and cooperative. She has noticed that her mood is improved. Her biggest challenge is setting appropriate ground rules with daughters. She is getting up at 3:30 am and then at times, returning home and driving the other to work at 4:30. The daughters both work at [employer]. Patient's chart was reviewed and collaborated with  about the treatment plan. SUBJECTIVE:    Patient is feeling better. Suicidal ideation:  denies suicidal ideation. Patient does not have medication side effects. ROS: Patient has new complaints: no  Sleeping adequately:  Yes   Appetite adequate: Yes  Attending groups: Yes  Visitors:Yes    OBJECTIVE    Physical  VITALS:  /80   Pulse 101   Temp 98.9 °F (37.2 °C) (Oral) Comment: Just drank coffee  Resp 12   Ht 5' 2\" (1.575 m)   Wt (!) 310 lb (140.6 kg)   LMP  (LMP Unknown)   SpO2 93%   BMI 56.70 kg/m²     Mental Status Examination:  Patients appearance was ill-appearing. Thoughts are Goal directed. Homicidal ideations none. No abnormal movements, tics or mannerisms. Memory intact Aims 0. Concentration Good. Alert and oriented X 4. Insight and Judgement impaired insight. Patient was cooperative.  Patient gait normal. Mood constricted, decreased range and depressed, affect depressed affect Hallucinations Absent, suicidal ideations no specific plan to harm self Speech normal volume  Data  Labs:   Admission on 04/04/2019   Component Date Value Ref Range Status    WBC 04/04/2019 9.2  4.0 - 11.0 K/uL Final    RBC 04/04/2019 4.95  4.00 - 5.20 M/uL Final    Hemoglobin 04/04/2019 14.1  12.0 - 16.0 g/dL Final    Hematocrit 04/04/2019 43.5  36.0 - 48.0 % Final    MCV 04/04/2019 87.9  80.0 - 100.0 fL Final    MCH 04/04/2019 28.5  26.0 - 34.0 pg Final    MCHC 04/04/2019 32.4  31.0 - 36.0 g/dL Final    RDW 04/04/2019 15.6* 12.4 - 15.4 % Final    Platelets 45/71/3624 Negative  Negative mg/dL Final    Bilirubin Urine 04/04/2019 Negative  Negative Final    Ketones, Urine 04/04/2019 Negative  Negative mg/dL Final    Specific Gravity, UA 04/04/2019 1.010  1.005 - 1.030 Final    Blood, Urine 04/04/2019 SMALL* Negative Final    pH, UA 04/04/2019 7.5  5.0 - 8.0 Final    Protein, UA 04/04/2019 Negative  Negative mg/dL Final    Urobilinogen, Urine 04/04/2019 0.2  <2.0 E.U./dL Final    Nitrite, Urine 04/04/2019 Negative  Negative Final    Leukocyte Esterase, Urine 04/04/2019 LARGE* Negative Final    Microscopic Examination 04/04/2019 YES   Final    Urine Reflex to Culture 04/04/2019 Yes   Final    Urine Type 04/04/2019 Not Specified   Final    Amphetamine Screen, Urine 04/04/2019 Neg  Negative <1000ng/mL Final    Barbiturate Screen, Ur 04/04/2019 Neg  Negative <200 ng/mL Final    Benzodiazepine Screen, Urine 04/04/2019 Neg  Negative <200 ng/mL Final    Cannabinoid Scrn, Ur 04/04/2019 Neg  Negative <50 ng/mL Final    Cocaine Metabolite Screen, Urine 04/04/2019 Neg  Negative <300 ng/mL Final    Opiate Scrn, Ur 04/04/2019 Neg  Negative <300 ng/mL Final    Comment: \"Therapeutic levels of pain medication, especially oxycontin and synthetic  opioids, may not be detected by this Methodology. Pain management screen  panel  Drug panel-PM-Hi Res Ur, Interp (PAIN) should be considered for drug  monitoring \".  PCP Screen, Urine 04/04/2019 Neg  Negative <25 ng/mL Final    Methadone Screen, Urine 04/04/2019 POSITIVE* Negative <300 ng/mL Final    Propoxyphene Scrn, Ur 04/04/2019 Neg  Negative <300 ng/mL Final    pH, UA 04/04/2019 7.5   Final    Comment: Urine pH less than 5.0 or greater than 8.0 may indicate sample adulteration. Another sample should be collected if clinically  indicated.  Drug Screen Comment: 04/04/2019 see below   Final    Comment: This method is a screening test to detect only these drug  classes as part of a medical workup.   Confirmatory testing  by another method should be ordered if clinically indicated.  Oxycodone Urine 04/04/2019 Neg  Negative <100 ng/ml Final    Urine Culture, Routine 04/04/2019 >50,000 CFU/ml mixed skin/urogenital christopher.  No further workup   Final    WBC, UA 04/04/2019 3-5  0 - 5 /HPF Final    RBC, UA 04/04/2019 3-5* 0 - 2 /HPF Final    Epi Cells 04/04/2019 3-5  /HPF Final    Bacteria, UA 04/04/2019 2+* /HPF Final    TSH 04/04/2019 1.88  0.27 - 4.20 uIU/mL Final            Medications  Current Facility-Administered Medications: Turmeric CAPS 1 capsule, 1 capsule, Oral, BID WC  buPROPion (WELLBUTRIN XL) extended release tablet 150 mg, 150 mg, Oral, Daily  calcium carbonate (TUMS) chewable tablet 500 mg, 500 mg, Oral, Daily  diclofenac (VOLTAREN) EC tablet 75 mg, 75 mg, Oral, BID  lamoTRIgine (LAMICTAL) tablet 100 mg, 100 mg, Oral, BID  therapeutic multivitamin-minerals 1 tablet, 1 tablet, Oral, Daily  pravastatin (PRAVACHOL) tablet 10 mg, 10 mg, Oral, Nightly  VORTIoxetine (TRINTELLIX) tablet 20 mg, 20 mg, Oral, Daily  vitamin D (CHOLECALCIFEROL) tablet 1,000 Units, 1,000 Units, Oral, Daily  acetaminophen (TYLENOL) tablet 650 mg, 650 mg, Oral, Q4H PRN  hydrOXYzine (VISTARIL) capsule 50 mg, 50 mg, Oral, TID PRN  haloperidol lactate (HALDOL) injection 5 mg, 5 mg, Intramuscular, Q6H PRN  traZODone (DESYREL) tablet 50 mg, 50 mg, Oral, Nightly PRN  magnesium hydroxide (MILK OF MAGNESIA) 400 MG/5ML suspension 30 mL, 30 mL, Oral, Daily PRN  aluminum & magnesium hydroxide-simethicone (MAALOX) 200-200-20 MG/5ML suspension 30 mL, 30 mL, Oral, Q6H PRN  medicated lip balm (BLISTEX/CARMEX) stick, , Topical, PRN    ASSESSMENT AND PLAN    Principal Problem:    Severe episode of recurrent major depressive disorder, without psychotic features ([facility] [address]  Active Problems:    PCOS (polycystic ovarian syndrome)    Hyperlipidemia    Restless legs syndrome (RLS)    Primary osteoarthritis of right knee    Morbid obesity with BMI of 50.0-59.9, adult [facility])  Resolved Problems:    * No resolved hospital problems. *       1. Patient s symptoms   are improving  2. Probable discharge is tomorrow  3. Discharge planning is complete. [address]  4. Suicidal ideation is better  5. Total time with patient was 40 minutes and more than 50 % of that time was spent counseling the patient on their symptoms, treatment and expected goals.

## 2019-04-09 NOTE — PLAN OF CARE
Edwige Pace was visible and social in the milieu this evening. She attended group and was compliant with her medications. Edwige Pace denied suicidal and homicidal ideations.

## 2019-04-09 NOTE — PLAN OF CARE
Problem: Depressive Behavior With or Without Suicide Precautions:  Goal: Absence of self-harm  Description  Absence of self-harm  Outcome: Ongoing   Sam Huber reports feeling an improvement in her mood feeling more brighter and positive this morning. Patient has been medication compliant. And currently denies SI and HI. Will continue to monitor for safety.

## 2019-04-10 VITALS
TEMPERATURE: 99.1 F | HEART RATE: 99 BPM | DIASTOLIC BLOOD PRESSURE: 87 MMHG | SYSTOLIC BLOOD PRESSURE: 143 MMHG | BODY MASS INDEX: 53.92 KG/M2 | RESPIRATION RATE: 16 BRPM | WEIGHT: 293 LBS | HEIGHT: 62 IN | OXYGEN SATURATION: 94 %

## 2019-04-10 PROCEDURE — 99239 HOSP IP/OBS DSCHRG MGMT >30: CPT | Performed by: PSYCHIATRY & NEUROLOGY

## 2019-04-10 PROCEDURE — 99999 PR OFFICE/OUTPT VISIT,PROCEDURE ONLY: CPT | Performed by: PSYCHIATRY & NEUROLOGY

## 2019-04-10 PROCEDURE — 6370000000 HC RX 637 (ALT 250 FOR IP): Performed by: PSYCHIATRY & NEUROLOGY

## 2019-04-10 PROCEDURE — 5130000000 HC BRIDGE APPOINTMENT

## 2019-04-10 RX ORDER — BUPROPION HYDROCHLORIDE 150 MG/1
150 TABLET ORAL DAILY
Qty: 30 TABLET | Refills: 0 | Status: SHIPPED | OUTPATIENT
Start: 2019-04-11 | End: 2019-08-21 | Stop reason: SDUPTHER

## 2019-04-10 RX ORDER — TRAZODONE HYDROCHLORIDE 50 MG/1
50 TABLET ORAL NIGHTLY PRN
Qty: 30 TABLET | Refills: 0 | Status: SHIPPED | OUTPATIENT
Start: 2019-04-10 | End: 2020-01-15 | Stop reason: DRUGHIGH

## 2019-04-10 RX ADMIN — DICLOFENAC SODIUM 75 MG: 75 TABLET, DELAYED RELEASE ORAL at 08:29

## 2019-04-10 RX ADMIN — BUPROPION HYDROCHLORIDE 150 MG: 150 TABLET, FILM COATED, EXTENDED RELEASE ORAL at 08:30

## 2019-04-10 RX ADMIN — MULTIPLE VITAMINS W/ MINERALS TAB 1 TABLET: TAB at 08:30

## 2019-04-10 RX ADMIN — Medication 1 CAPSULE: at 08:29

## 2019-04-10 RX ADMIN — VITAMIN D, TAB 1000IU (100/BT) 1000 UNITS: 25 TAB at 08:30

## 2019-04-10 RX ADMIN — VORTIOXETINE 20 MG: 10 TABLET, FILM COATED ORAL at 08:30

## 2019-04-10 RX ADMIN — LAMOTRIGINE 100 MG: 100 TABLET ORAL at 08:30

## 2019-04-10 RX ADMIN — CALCIUM CARBONATE 500 MG: 500 TABLET, CHEWABLE ORAL at 08:30

## 2019-04-10 ASSESSMENT — PAIN SCALES - GENERAL: PAINLEVEL_OUTOF10: 3

## 2019-04-10 NOTE — PROGRESS NOTES
Chief Complaint:depression    Patients chart was reviewed and collaborated with staff as well around discharge planning. Reviewed with the patient. Dictated discharge summary. At this time patient is not probateable or holdable and is not suicidal/homicidal. Spent 35 minutes on discharge, and more then 50 % of that time was spent with counseling patient on discharge goals.

## 2019-04-10 NOTE — BH NOTE
[address] [facility]  Discharge Note    Pt discharged with followings belongings:   Dentures: None  Vision - Corrective Lenses: Glasses(prescription sunglasses left in purse)  Hearing Aid: None  Jewelry: Ring  Body Piercings Removed: N/A  Clothing: Footwear, Pants, Shirt  Were All Patient Medications Collected?: Not Applicable  Other Valuables: Cell phone, Purse, Wallet(2 wallets and contents sent to [employer])   Valuables sent home with patient. Valuables retrieved from safe, Security envelope number:  yes and returned to patient. Patient left department with Departure Mode: By self, Family member via Mobility at Departure: Ambulatory, discharged to Discharged to: Private Residence. Patient education on aftercare instructions: yes  Patient verbalize understanding of AVS:  yes. Status EXAM upon discharge:  Status and Exam  Normal: Yes  Facial Expression: Brightened  Affect: Appropriate  Level of Consciousness: Alert  Mood:Normal: Yes  Mood: Other (Comment)(\"fine\")  Motor Activity:Normal: Yes  Motor Activity: Other(See Comments)(steady gait)  Interview Behavior: Cooperative  Preception: [place] to Person, Michelene Dadds to Time, [place] to Place, [place] to Situation  Attention:Normal: Yes  Thought Processes: Circumstantial  Thought Content:Normal: Yes  Thought Content: Other(See Comment)(future oriented about discharge today)  Hallucinations: None  Delusions: No  Memory:Normal: Yes  Insight and Judgment: Yes  Insight and Judgment: Poor Judgment, Poor Insight (improving)  Present Suicidal Ideation: No  Present Homicidal Ideation: No     Bridge Appointment completed: Reviewed Discharge Instructions with patient. Patient verbalizes understanding and agreement with the discharge plan using the teachback method.      Referral for Outpatient Tobacco Cessation Counseling, upon discharge (roseann X if applicable and completed):    ( )  Hospital staff assisted patient to call Quit Line or faxed referral

## 2019-04-10 NOTE — PLAN OF CARE
Patient has been up since the start of shift and social with select peers. She is excited about being discharged today. She is going home and reports that her sister will be coming to pick her up. She went to some groups and the remaining of the time she sat in her room reading. She has her stuff packed and waiting patiently. She denies thoughts of harm to self or others. She is future oriented about going home.

## 2019-04-10 NOTE — PLAN OF CARE
Pt has been on milieu brighter this evening. She denies SI/HI/AVH. She is medication compliant and able to express her needs to staff. Will continue to monitor.   Christian JACOBSONN, RN-BC

## 2019-04-10 NOTE — BH NOTE
Therapist invited pt to attended the 1:30PM Cognitive Skills/ Dance/Movement Therapy group; pt declined invitation and did not attend.     Brii Peralta MA, R-DMT, LPCC-S

## 2019-04-10 NOTE — GROUP NOTE
Group Therapy Note    Date: April 10    Group Start Time: 1000  Group End Time: 1045  Group Topic: Psychoeducation    [facility]    KAI Mackey        Group Therapy Note    Attendees: 13         Discussed distress tolerance skills and how to use them in times of high stress. Notes:  Pt was engaged in group through observing and showing understanding of topic through non-verbal communication. Status After Intervention:  Improved    Participation Level: Active Listener    Participation Quality: Appropriate and Attentive      Speech:  Pt did not speak during group       Thought Process/Content: Logical      Affective Functioning: Flat      Mood: depressed      Level of consciousness:  Alert      Response to Learning: Able to retain information      Endings: None Reported    Modes of Intervention: Education, Support, Socialization, Exploration, Clarifying and Problem-solving      Discipline Responsible: /Counselor      Signature:   KAI Mackey

## 2019-04-10 NOTE — GROUP NOTE
Group Therapy Note    Date: April 9    Group Start Time: 2030  Group End Time: 2100  Group Topic: Wrap-Up    [facility]      Goals and the importance of goal setting discussed. Night time milieu activities discussed.        Patient's Goal:  Be more social    Notes:  Minimally successful    Status After Intervention:  Improved    Participation Level: Interactive    Participation Quality: Appropriate      Speech:  normal      Thought Process/Content: Logical      Affective Functioning: Congruent      Mood: good      Level of consciousness:  Alert      Response to Learning: Progressing to goal      Endings: None Reported    Modes of Intervention: Support      Discipline Responsible: [employer]      Signature:  Rodrigo Millan

## 2019-04-30 ENCOUNTER — OFFICE VISIT (OUTPATIENT)
Dept: FAMILY MEDICINE CLINIC | Age: 59
End: 2019-04-30
Payer: MEDICARE

## 2019-04-30 ENCOUNTER — HOSPITAL ENCOUNTER (OUTPATIENT)
Age: 59
Discharge: HOME | End: 2019-04-30
Payer: MEDICARE

## 2019-04-30 ENCOUNTER — HOSPITAL ENCOUNTER (OUTPATIENT)
Dept: GENERAL RADIOLOGY | Age: 59
Discharge: HOME | End: 2019-04-30
Payer: MEDICARE

## 2019-04-30 VITALS
DIASTOLIC BLOOD PRESSURE: 86 MMHG | BODY MASS INDEX: 53.92 KG/M2 | HEIGHT: 62 IN | SYSTOLIC BLOOD PRESSURE: 133 MMHG | WEIGHT: 293 LBS | OXYGEN SATURATION: 95 % | HEART RATE: 87 BPM

## 2019-04-30 DIAGNOSIS — G89.29 ACUTE EXACERBATION OF CHRONIC LOW BACK PAIN: Primary | ICD-10-CM

## 2019-04-30 DIAGNOSIS — M25.552 LEFT HIP PAIN: ICD-10-CM

## 2019-04-30 DIAGNOSIS — G89.29 CHRONIC MIDLINE LOW BACK PAIN WITH LEFT-SIDED SCIATICA: ICD-10-CM

## 2019-04-30 DIAGNOSIS — M54.42 CHRONIC MIDLINE LOW BACK PAIN WITH LEFT-SIDED SCIATICA: ICD-10-CM

## 2019-04-30 DIAGNOSIS — M54.50 ACUTE EXACERBATION OF CHRONIC LOW BACK PAIN: Primary | ICD-10-CM

## 2019-04-30 PROCEDURE — 72100 X-RAY EXAM L-S SPINE 2/3 VWS: CPT

## 2019-04-30 PROCEDURE — 3017F COLORECTAL CA SCREEN DOC REV: CPT | Performed by: NURSE PRACTITIONER

## 2019-04-30 PROCEDURE — 1111F DSCHRG MED/CURRENT MED MERGE: CPT | Performed by: NURSE PRACTITIONER

## 2019-04-30 PROCEDURE — G8417 CALC BMI ABV UP PARAM F/U: HCPCS | Performed by: NURSE PRACTITIONER

## 2019-04-30 PROCEDURE — 1036F TOBACCO NON-USER: CPT | Performed by: NURSE PRACTITIONER

## 2019-04-30 PROCEDURE — G8427 DOCREV CUR MEDS BY ELIG CLIN: HCPCS | Performed by: NURSE PRACTITIONER

## 2019-04-30 PROCEDURE — 99214 OFFICE O/P EST MOD 30 MIN: CPT | Performed by: NURSE PRACTITIONER

## 2019-04-30 PROCEDURE — 73502 X-RAY EXAM HIP UNI 2-3 VIEWS: CPT

## 2019-04-30 RX ORDER — PREDNISONE 10 MG/1
TABLET ORAL
Qty: 30 TABLET | Refills: 0 | Status: SHIPPED | OUTPATIENT
Start: 2019-04-30 | End: 2019-06-05

## 2019-04-30 ASSESSMENT — ENCOUNTER SYMPTOMS
RESPIRATORY NEGATIVE: 1
BACK PAIN: 1

## 2019-04-30 NOTE — PROGRESS NOTES
[address]  FAMILY MEDICINE  [address] [place] [address]  Dept: [phone]  Dept Fax: [phone]  Loc: [phone]    Abel Zamora is a 62 y.o. female who presents today for her medical conditions/complaints as noted below. Abel Zamora is c/o of Hip Pain (pt has been having left hip pain for a few days. pt states it gives out and is painful. pt states it doesnt hurt as much when she doesnt use it, when walking and standing is when it is at its worse. )        HPI:     Chief Complaint   Patient presents with    Hip Pain     pt has been having left hip pain for a few days. pt states it gives out and is painful. pt states it doesnt hurt as much when she doesnt use it, when walking and standing is when it is at its worse. Hip Pain    The incident occurred more than 1 week ago. There was no injury mechanism. The pain is present in the left thigh, left hip and left leg. The quality of the pain is described as aching and shooting. The pain is at a severity of 8/10. The pain is moderate. The pain has been fluctuating since onset. Associated symptoms include an inability to bear weight (At times), a loss of motion and muscle weakness. Pertinent negatives include no loss of sensation, numbness or tingling. She reports no foreign bodies present. The symptoms are aggravated by movement and weight bearing. She has tried NSAIDs for the symptoms. The treatment provided no relief.      Past Medical History:   Diagnosis Date    ADHD (attention deficit hyperactivity disorder)     Allergic rhinitis     Bipolar disorder     Borderline osteopenia     Depression     Dysmetabolic syndrome     GERD (gastroesophageal reflux disease)     Headache(784.0)     Hyperlipidemia 4/23/2014    Hypertension     Intention tremor     due to lithium    Obesity     Osteoarthritis     PCOS (polycystic ovarian syndrome)     RAD (reactive airway disease)     Restless legs syndrome (RLS) 2014      Past Surgical History:   Procedure Laterality Date    ABDOMINAL EXPLORATION SURGERY       SECTION      GASTRIC BYPASS SURGERY      HAND SURGERY      bilat CTS    KNEE SURGERY      KNEE SURGERY Right 10/28/2015    Right knee video arthroscopy with partial medial and lateral menisectomy with loose body removal    TONSILLECTOMY         Family History   Problem Relation Age of Onset    Depression Sister     Mental Illness Sister     Diabetes Mother     Heart Disease Mother     Diabetes Father     Heart Disease Father        Social History     Tobacco Use    Smoking status: Former Smoker     Packs/day: 1.50     Years: 21.00     Pack years: 31.50     Types: Cigarettes     Last attempt to quit: 1997     Years since quittin.2    Smokeless tobacco: Never Used   Substance Use Topics    Alcohol use: No         Current Outpatient Medications   Medication Sig Dispense Refill    predniSONE (DELTASONE) 10 MG tablet Take 40 mg for 3 days then 30 mg for 3 days then 20 mg for 3 days then 10 mg for 3 days 30 tablet 0    buPROPion (WELLBUTRIN XL) 150 MG extended release tablet Take 1 tablet by mouth daily 30 tablet 0    traZODone (DESYREL) 50 MG tablet Take 1 tablet by mouth nightly as needed for Sleep 30 tablet 0    pravastatin (PRAVACHOL) 10 MG tablet Take 10 mg by mouth nightly      nadolol (CORGARD) 80 MG tablet TAKE 1 TABLET BY MOUTH DAILY (Patient taking differently: Take 40 mg by mouth daily ) 30 tablet 3    diclofenac (VOLTAREN) 75 MG EC tablet TAKE 1 TABLET BY MOUTH 2 TIMES DAILY 60 tablet 5    rosuvastatin (CRESTOR) 5 MG tablet Take 1 tablet by mouth daily 30 tablet 5    lamoTRIgine (LAMICTAL) 200 MG tablet Take 100 mg by mouth 2 times daily       calcium carbonate (OSCAL) 500 MG TABS tablet Take 500 mg by mouth daily      cetirizine (ZYRTEC) 10 MG tablet Take 10 mg by mouth daily      Turmeric Curcumin 500 MG CAPS Take by mouth      TRINTELLIX 20 MG TABS tablet TAKE 1 TABLET BY MOUTH DAILY 30 tablet 5    cyclobenzaprine (FLEXERIL) 10 MG tablet Take 1 tablet by mouth every 8 hours as needed for Muscle spasms 45 tablet 0    Vitamin D (CHOLECALCIFEROL) 1000 UNITS CAPS capsule Take 1,000 Units by mouth daily.  multivitamin (THERAGRAN) per tablet Take 1 tablet by mouth daily.  Omega-3 Fatty Acids (FISH OIL) 1200 MG CAPS Take  by mouth daily. No current facility-administered medications for this visit. Allergies   Allergen Reactions    Amoxicillin-Pot Clavulanate Hives    Daypro [Oxaprozin] Hives    Duravent-Da [Chlorphen-Phenyleph-Methscop] Hives    Lithium      Caused shakes    Norvasc [Amlodipine]      LE edema      Nsaids      Avoid d/t gastric bypass    Seldane [Terfenadine] Hives    Suprax [Cefixime] Hives    Levofloxacin Rash       Subjective:      Review of Systems   Constitutional: Negative. Respiratory: Negative. Cardiovascular: Negative. Musculoskeletal: Positive for arthralgias, back pain, gait problem and myalgias. Negative for joint swelling, neck pain and neck stiffness. Skin: Negative. Neurological: Negative for tingling and numbness. Psychiatric/Behavioral: Negative.           Objective:     Vitals:    04/30/19 1459   BP: 133/86   Site: Left Lower Arm   Position: Sitting   Cuff Size: Medium Adult   Pulse: 87   SpO2: 95%   Weight: (!) 316 lb (143.3 kg)   Height: 5' 2\" (1.575 m)     Wt Readings from Last 3 Encounters:   04/30/19 (!) 316 lb (143.3 kg)   01/11/19 (!) 320 lb (145.2 kg)   01/09/19 (!) 320 lb (145.2 kg)     Temp Readings from Last 3 Encounters:   12/25/17 97.9 °F (36.6 °C) (Oral)   12/15/17 98 °F (36.7 °C) (Temporal)   12/22/16 97.9 °F (36.6 °C) (Oral)     BP Readings from Last 3 Encounters:   04/30/19 133/86   01/11/19 (!) 157/100   01/09/19 (!) 142/82     Pulse Readings from Last 3 Encounters:   04/30/19 87   01/11/19 60   01/09/19 55     Physical Exam   Constitutional: She is oriented to person, place, and time. She appears well-developed and well-nourished. No distress. HENT:   Head: Normocephalic and atraumatic. Right Ear: External ear normal.   Left Ear: External ear normal.   Nose: Nose normal.   Eyes: Conjunctivae are normal. Right eye exhibits no discharge. Left eye exhibits no discharge. Neck: Normal range of motion. Neck supple. Cardiovascular: Normal rate. Pulmonary/Chest: Effort normal.   Musculoskeletal: She exhibits no deformity. Left hip: She exhibits decreased range of motion and decreased strength. Lumbar back: She exhibits decreased range of motion and tenderness. Neurological: She is alert and oriented to person, place, and time. Skin: Skin is warm and dry. No rash noted. She is not diaphoretic. No erythema. No pallor. Psychiatric: She has a normal mood and affect. Judgment and thought content normal.   Nursing note and vitals reviewed. Orders Only on 01/11/2019   Component Date Value Ref Range Status    SOCRATES 01/09/2019 Negative  Negative Final    SOCRATES Interpretation 01/09/2019 see below   Final    Comment: SOCRATES specimens are screened using enzyme-linked immunosorbent  assay (GRANT) methodology. All GRANT results reported as  positive are further tested by indirect fluorescent assay  (IFA) using Hep-2 substrate with an IgG-specific conjugate. The SOCRATES GRANT screen is designed to detect antibodies against  dsDNA, histone, SS-A (Ro), SS-B (La), Epstein (Sm), SmRNP,  Scl-70, Ally-1, and centromere, along with sera positive for  IFA-Hep-2 ANAs. Assessment & Plan: The following diagnoses and conditions are stable with no further orders unless indicated:  1. Acute exacerbation of chronic low back pain    2. Chronic midline low back pain with left-sided sciatica    3. Left hip pain        Chintan Rosen was seen today for hip pain. Left hip pain seems to be more back then hip. She has no groin pain. She has a history of chronic back pain.   Will tablet by mouth every 8 hours as needed for Muscle spasms Yes MADINA Ribeiro CNP   Vitamin D (CHOLECALCIFEROL) 1000 UNITS CAPS capsule Take 1,000 Units by mouth daily. Yes Historical Provider, MD   multivitamin [facility]) per tablet Take 1 tablet by mouth daily. Yes Historical Provider, MD   Omega-3 Fatty Acids (FISH OIL) 1200 MG CAPS Take  by mouth daily. Yes Historical Provider, MD        Orders Placed This Encounter   Medications    predniSONE (DELTASONE) 10 MG tablet     Sig: Take 40 mg for 3 days then 30 mg for 3 days then 20 mg for 3 days then 10 mg for 3 days     Dispense:  30 tablet     Refill:  0         Return if symptoms worsen or fail to improve. Patient should call the office immediately with new or ongoing signs or symptoms or worsening, or proceedto the emergency room. No changes in past medical history, past surgical history, social history, or family history were noted during the patient encounter unless specifically listed above. All updates of past medicalhistory, past surgical history, social history, or family history were reviewed personally by me during the office visit. All problems listed in the assessment are stable unless noted otherwise. Medication profilereviewed personally by me during the office visit. Medication side effects and possible impairments from medications were discussed as applicable.     Call if pattern of symptoms change or persists for an extended time. This document was prepared by a combination of typing and transcription through a voice recognition software.

## 2019-04-30 NOTE — PATIENT INSTRUCTIONS
Patient Education        The Hip Joint: Anatomy Sketch    Current as of: September 20, 2018  Content Version: 11.9  © 5247-2971 [employer]. Care instructions adapted under license by [facility] ([facility]). If you have questions about a medical condition or this instruction, always ask your healthcare professional. [place] [address] any warranty or liability for your use of this information.

## 2019-05-10 NOTE — DISCHARGE SUMMARY
[address]                 [address] [facility]                               DISCHARGE SUMMARY    PATIENT NAME: Michelle Sherwood              :        1960  MED REC NO:   [id number]                          ROOM:       2317  ACCOUNT NO:   [de-identified]                           ADMIT DATE: 2019  PROVIDER:     Germán Alcocer. Aby Kidd MD                  DISCHARGE DATE:  04/10/2019    DISPOSITION:  The patient will be discharged home. Followup in  outpatient through [facility]. DISCHARGE MEDICATION:  1.  Bupropion  mg daily. 2.  Trazodone 50 mg at night. 3.  Corgard 80 mg daily. 4.  Zyrtec 10 mg daily. 5.  Flexeril 10 mg every 8 hours as needed. 6.  Voltaren 75 mg twice a day. 7.  Lamictal 200 mg daily. 8.  Multivitamin daily. 9.  Pravachol 10 mg nightly. 10.  Crestor 5 mg daily. 11.  Trintellix 20 mg daily. 12.  Vitamin D 1000 mg daily. CONDITION ON DISCHARGE:  Stable. MENTAL STATUS:  The patient is alert, oriented to person, place, and  time. No threats to harm self or others. No psychotic symptoms. Insight and judgment improved. Mood was good. Affect was relatively  bright. DISCHARGE DIAGNOSES:  AXIS I:  Major depressive episode, recurrent, nonpsychotic, severe. AXIS II:  Deferred. AXIS III:  1. Polycystic ovarian syndrome. 2.  Hyperlipidemia. 3.  Restless legs. 4.  Morbid obesity. AXIS IV:  Moderate. AXIS V:  55. CHIEF COMPLAINT:  Suicidal ideation. HISTORY OF PRESENT ILLNESS:  A 59-year-old female presenting to [facility] on 2019 with depression and suicidal ideation. She had  been feeling suicidal and did not want to live any longer.   She was seen  at [facility] by her therapist the day prior and was sent to the  hospital.  She states she has been feeling overwhelmed and stresses  included her twin 19-year-old daughters that apparently are not

## 2019-05-16 ENCOUNTER — OFFICE VISIT (OUTPATIENT)
Dept: ORTHOPEDIC SURGERY | Age: 59
End: 2019-05-16
Payer: MEDICARE

## 2019-05-16 VITALS
BODY MASS INDEX: 53.92 KG/M2 | HEIGHT: 62 IN | HEART RATE: 120 BPM | DIASTOLIC BLOOD PRESSURE: 95 MMHG | WEIGHT: 293 LBS | SYSTOLIC BLOOD PRESSURE: 168 MMHG

## 2019-05-16 DIAGNOSIS — M54.16 LUMBAR RADICULITIS: ICD-10-CM

## 2019-05-16 DIAGNOSIS — M51.36 DDD (DEGENERATIVE DISC DISEASE), LUMBAR: Primary | ICD-10-CM

## 2019-05-16 PROCEDURE — G8427 DOCREV CUR MEDS BY ELIG CLIN: HCPCS | Performed by: PHYSICIAN ASSISTANT

## 2019-05-16 PROCEDURE — 99203 OFFICE O/P NEW LOW 30 MIN: CPT | Performed by: PHYSICIAN ASSISTANT

## 2019-05-16 PROCEDURE — 1036F TOBACCO NON-USER: CPT | Performed by: PHYSICIAN ASSISTANT

## 2019-05-16 PROCEDURE — G8417 CALC BMI ABV UP PARAM F/U: HCPCS | Performed by: PHYSICIAN ASSISTANT

## 2019-05-16 PROCEDURE — 3017F COLORECTAL CA SCREEN DOC REV: CPT | Performed by: PHYSICIAN ASSISTANT

## 2019-05-16 RX ORDER — METHYLPREDNISOLONE 4 MG/1
TABLET ORAL
Qty: 1 KIT | Refills: 0 | Status: SHIPPED | OUTPATIENT
Start: 2019-05-16 | End: 2019-06-05

## 2019-05-16 NOTE — PROGRESS NOTES
New Patient: SPINE    CHIEF COMPLAINT:    Chief Complaint   Patient presents with    Back Pain     Pain goes down left hip and leg. HISTORY OF PRESENT ILLNESS:                The patient is a 62 y.o. female history of ADD, bipolar disorder, depression, prior gastric bypass surgery, referred by MADINA Meade CNP for chronic aching low back pain and a 2 week history of fairly constant/severe left buttock pain radiating to the left lateral hip/thigh to the calf. She reports tingling in the same distribution. Her back, left buttock and posterior lateral left hip pain are most bothersome. Symptoms are increased with walking, activity, transition and bending. Some relief with sitting and resting. Conservative care includes NSAIDs, prednisone, Flexeril. She denies any significant improvement. She also reports a 3 week history of intermittent urinary incontinence which she believes may be related to starting a new bipolar medication. She denies any bowel dysfunction or saddle anesthesia. Denies any progressive extremity weakness. Denies any groin pain. Pain Assessment  Location of Pain: Back  Severity of Pain: 7  Quality of Pain: Dull, Aching, Sharp  Duration of Pain: Persistent  Frequency of Pain: Constant  Aggravating Factors: Stairs, Walking, Standing, Squatting, Kneeling, Exercise, Straightening, Stretching, Bending  Limiting Behavior: Yes  Relieving Factors: Rest  Result of Injury: No  Work-Related Injury: No  Are there other pain locations you wish to document?: No    The pain assessment was noted & reviewed in the medical record today.      Current/Past Treatment:   · Physical Therapy: no  · Chiropractic:   no  · Injection:   no  Medications:            NSAIDS: Diclofenac, although has history of gastric bypass            Muscle relaxer:   Flexeril            Steriods:   prednisone            Neuropathic medications:              Opioids:            Other:   · Surgery/Consult: carbonate (OSCAL) 500 MG TABS tablet, Take 500 mg by mouth daily, Disp: , Rfl:     cetirizine (ZYRTEC) 10 MG tablet, Take 10 mg by mouth daily, Disp: , Rfl:     Turmeric Curcumin 500 MG CAPS, Take by mouth, Disp: , Rfl:     TRINTELLIX 20 MG TABS tablet, TAKE 1 TABLET BY MOUTH DAILY, Disp: 30 tablet, Rfl: 5    cyclobenzaprine (FLEXERIL) 10 MG tablet, Take 1 tablet by mouth every 8 hours as needed for Muscle spasms, Disp: 45 tablet, Rfl: 0    Vitamin D (CHOLECALCIFEROL) 1000 UNITS CAPS capsule, Take 1,000 Units by mouth daily. , Disp: , Rfl:     multivitamin (THERAGRAN) per tablet, Take 1 tablet by mouth daily. , Disp: , Rfl:     Omega-3 Fatty Acids (FISH OIL) 1200 MG CAPS, Take  by mouth daily. , Disp: , Rfl:     predniSONE (DELTASONE) 10 MG tablet, Take 40 mg for 3 days then 30 mg for 3 days then 20 mg for 3 days then 10 mg for 3 days, Disp: 30 tablet, Rfl: 0  Allergies:  Amoxicillin-pot clavulanate; Daypro [oxaprozin]; Duravent-da [chlorphen-phenyleph-methscop]; Lithium; Norvasc [amlodipine]; Nsaids; Seldane [terfenadine]; Suprax [cefixime]; and Levofloxacin  Social History:    reports that she quit smoking about 22 years ago. Her smoking use included cigarettes. She has a 31.50 pack-year smoking history. She has never used smokeless tobacco. She reports that she does not drink alcohol or use drugs.   Family History:   Family History   Problem Relation Age of Onset    Depression Sister     Mental Illness Sister     Diabetes Mother     Heart Disease Mother     Diabetes Father     Heart Disease Father        REVIEW OF SYSTEMS: Full ROS noted & scanned   CONSTITUTIONAL: Denies unexplained weight loss, fevers, chills or fatigue  NEUROLOGICAL: Denies unsteady gait or progressive weakness  MUSCULOSKELETAL: Denies joint swelling or redness  PSYCHOLOGICAL: Denies anxiety, admits depression   SKIN: Denies skin changes, delayed healing, rash, itching   HEMATOLOGIC: Denies easy bleeding or bruising  ENDOCRINE: Strength and tone are normal.  · LEFT LOWER EXTREMITY:  Inspection/examination of the left lower extremity does not show any tenderness, deformity or injury. Range of motion is full. There is no gross instability. There are no rashes, ulcerations or lesions. Strength and tone are normal.    Diagnostic Testing:    Lumbar and left hip x-rays films and report independently reviewed from 4/30/2019 showing mild to moderate DDD L2 to L5 with anterior spurring and facet arthropathy. No significant hip OA or definitive acute findings. Impression:  1) Chronic LBP, acute left sciatica, 3wks vague urinary changes   2) Multilevel lumbar DDD/spondylosis  3) H/o bipolar, dep, gastric bypass      Plan:   1) Lumbar MRI WO in open scanner  2) MDP  3) PT for above  4) F/u to review. We did discuss going to the ER if any change in bowel or bladder or progressive weakness.         [facility]

## 2019-05-24 ENCOUNTER — HOSPITAL ENCOUNTER (OUTPATIENT)
Dept: MRI IMAGING | Age: 59
Discharge: HOME | End: 2019-05-24
Payer: MEDICARE

## 2019-05-24 DIAGNOSIS — M54.16 LUMBAR RADICULITIS: ICD-10-CM

## 2019-05-24 DIAGNOSIS — M51.36 DDD (DEGENERATIVE DISC DISEASE), LUMBAR: ICD-10-CM

## 2019-05-24 PROCEDURE — 72148 MRI LUMBAR SPINE W/O DYE: CPT

## 2019-05-28 ENCOUNTER — TELEPHONE (OUTPATIENT)
Dept: ORTHOPEDIC SURGERY | Age: 59
End: 2019-05-28

## 2019-05-28 DIAGNOSIS — M54.16 LUMBAR RADICULITIS: Primary | ICD-10-CM

## 2019-05-28 DIAGNOSIS — M51.26 HNP (HERNIATED NUCLEUS PULPOSUS), LUMBAR: ICD-10-CM

## 2019-05-28 NOTE — TELEPHONE ENCOUNTER
Attempted to reach pt re: lumbar MRI showing a large left central disc extrusion L4 5 with moderate to severe central stenosis.          Wilber Rocha, [facility]

## 2019-05-28 NOTE — TELEPHONE ENCOUNTER
Discussed lumbar MRI results with the patient showing large left central disc extrusion L4 5 with severe left lateral recess stenosis and moderate to severe central stenosis. Given clinical symptoms including vague urinary changes (over the last several weeks) would recommend surgical consult with Dr. Chetan Sarabia. I discussed this case with his group and she will be seen Thursday at 1:00. She does tell me that she feels her symptoms are slightly improved from when last seen. We did discuss going to the ED if any worsening symptoms. She voiced understanding.        Marcial Pisano PA-C

## 2019-05-30 ENCOUNTER — OFFICE VISIT (OUTPATIENT)
Dept: ORTHOPEDIC SURGERY | Age: 59
End: 2019-05-30
Payer: MEDICARE

## 2019-05-30 VITALS — HEIGHT: 61 IN | WEIGHT: 293 LBS | BODY MASS INDEX: 55.32 KG/M2

## 2019-05-30 DIAGNOSIS — M51.26 HNP (HERNIATED NUCLEUS PULPOSUS), LUMBAR: Primary | ICD-10-CM

## 2019-05-30 DIAGNOSIS — M48.062 LUMBAR STENOSIS WITH NEUROGENIC CLAUDICATION: ICD-10-CM

## 2019-05-30 PROCEDURE — G8427 DOCREV CUR MEDS BY ELIG CLIN: HCPCS | Performed by: PHYSICIAN ASSISTANT

## 2019-05-30 PROCEDURE — 99213 OFFICE O/P EST LOW 20 MIN: CPT | Performed by: PHYSICIAN ASSISTANT

## 2019-05-30 PROCEDURE — 3017F COLORECTAL CA SCREEN DOC REV: CPT | Performed by: ORTHOPAEDIC SURGERY

## 2019-05-30 PROCEDURE — G8417 CALC BMI ABV UP PARAM F/U: HCPCS | Performed by: PHYSICIAN ASSISTANT

## 2019-05-30 PROCEDURE — 1036F TOBACCO NON-USER: CPT | Performed by: ORTHOPAEDIC SURGERY

## 2019-05-30 NOTE — PROGRESS NOTES
History of present illness:   Ms. Campbell Abreu  is a pleasant 62 y.o. female with a PMH of bilateral knee osteoarthritis, bipolar disorder, GERD, PCOS, restless leg syndrome, urinary incontinence, and depression kindly referred by Jaimee Roblero PA-C for consultation regarding her LBP and left leg pain. She states her pain began insidiously about 6 weeks ago. Her pain has steadily increased since then. She rates her back pain 2/10 and leg pain 7/10. She describes the pain as sharp and stabbing that is worse with standing, walking, and rising from sitting and better with sitting and lying down. The leg pain radiates down the latera aspect of her leg to her knee. She admits numbness and tingling in her left leg in the same distribution. She denies weakness of her leg and denies bowel dysfunction or saddle numbness. Admits stress related urinary urgency when she stands but can still feel when she needs to go. States this started about a week before her back pain. The pain moderately disrupts her sleep. She has tried oral steroids. She takes muscle relaxant and NSAIDs for pain. Past medical history:  Her past medical history has been reviewed and is significant for bilateral knee osteoarthritis, bipolar disorder, GERD, PCOS, restless leg syndrome, urinary incontinence, and depression. Past surgical history:  Her past surgical history has been reviewed and is non-contributory to her present illness. Her medications and allergies were reviewed. Social history:  Her social history has been reviewed and she is a former smoker quitting in 1997. Current Medication:  Current Outpatient Medications   Medication Sig Dispense Refill    methylPREDNISolone (MEDROL, ASHLEE,) 4 MG tablet Take by mouth.  1 kit 0    predniSONE (DELTASONE) 10 MG tablet Take 40 mg for 3 days then 30 mg for 3 days then 20 mg for 3 days then 10 mg for 3 days 30 tablet 0    buPROPion (WELLBUTRIN XL) 150 MG extended release tablet Take 1 tablet by mouth daily 30 tablet 0    traZODone (DESYREL) 50 MG tablet Take 1 tablet by mouth nightly as needed for Sleep 30 tablet 0    pravastatin (PRAVACHOL) 10 MG tablet Take 10 mg by mouth nightly      nadolol (CORGARD) 80 MG tablet TAKE 1 TABLET BY MOUTH DAILY (Patient taking differently: Take 40 mg by mouth daily ) 30 tablet 3    diclofenac (VOLTAREN) 75 MG EC tablet TAKE 1 TABLET BY MOUTH 2 TIMES DAILY 60 tablet 5    rosuvastatin (CRESTOR) 5 MG tablet Take 1 tablet by mouth daily 30 tablet 5    lamoTRIgine (LAMICTAL) 200 MG tablet Take 100 mg by mouth 2 times daily       calcium carbonate (OSCAL) 500 MG TABS tablet Take 500 mg by mouth daily      cetirizine (ZYRTEC) 10 MG tablet Take 10 mg by mouth daily      Turmeric Curcumin 500 MG CAPS Take by mouth      TRINTELLIX 20 MG TABS tablet TAKE 1 TABLET BY MOUTH DAILY 30 tablet 5    cyclobenzaprine (FLEXERIL) 10 MG tablet Take 1 tablet by mouth every 8 hours as needed for Muscle spasms 45 tablet 0    Vitamin D (CHOLECALCIFEROL) 1000 UNITS CAPS capsule Take 1,000 Units by mouth daily.  multivitamin (THERAGRAN) per tablet Take 1 tablet by mouth daily.  Omega-3 Fatty Acids (FISH OIL) 1200 MG CAPS Take  by mouth daily. No current facility-administered medications for this visit. Review of symptoms:  Patient's review of symptoms was reviewed and is significant for back pain and negative for recent weight loss, fatigue, chills, visual disturbances, blood in stool or urine, recent infection, chest pain, or shortness of breath. All other ROS were negative. Physical examination:  Ms. Carlton Goldstein most recent vitals:  Vitals  Height: 5' 1\" (154.9 cm)  Weight: (!) 316 lb (143.3 kg)  Body mass index is 59.71 kg/m². General exam:  She is well-developed and well-nourished, is in obvious pain and alert and oriented to person, place, and time. She demonstrates appropriate mood and affect. Her skin is warm and dry.  Her gait is normal and she walks heel to toe without limp or instability. Back:  She stands with slight lumbar flexion. Her lumbar flexion, extension and lateral bending are moderately reduced with pain. She has mild tenderness over her lumbar spine without obvious muscle spasm. The skin over her lumbar spine is normal without a surgical scar. Lower extremities:  She has 5/5 motor strength of bilateral lower extremities. She has a negative straight leg raise, bilaterally. Deep tendon reflexes at knees and achilles are 2+. Sensation is intact to light touch L3 to S1 bilaterally. She has no clonus. Hip range of motion painless. Imaging:  I reviewed MRI images of her lumbar spine from 5/24/19. They note large left central disc herniation L4-5 with complete effacement of left lateral recess and extension into left neural foramen. Severe central canal stenosis. Assessment:  L4-5 HNP with radiculopathy  Lumbar stenosis    Plan:  We discussed treatment options including observation, additional oral steroids, physical therapy, epidural injection and microlumbar discectomy. She wishes to proceed with epidural injection. If epidural injection does not improve pain, she would like to proceed with microdiscectomy L4-5. We discussed the risks, benefits, and alternatives to surgery including the risks of nerve or vessel injury, paralysis, spinal blood clot, spinal fluid leak, death, infection, need for additional surgery for recurrent herniation or low back pain, failure of surgery to alleviate her symptoms and worse symptoms following surgery. She understands these risks and wishes to proceed with surgery. Her questions were answered. She was instructed to call us emergently if she begins to experience bowel or bladder dysfunction, saddle anesthesia, increasing muscle weakness, or worsening leg symptoms. Dictated by Echo Webster PA-C. Patient also seen and examined by Dr. Justine Gutierrez.

## 2019-06-04 ENCOUNTER — TELEPHONE (OUTPATIENT)
Dept: ORTHOPEDIC SURGERY | Age: 59
End: 2019-06-04

## 2019-06-05 ENCOUNTER — TELEPHONE (OUTPATIENT)
Dept: ORTHOPEDIC SURGERY | Age: 59
End: 2019-06-05

## 2019-06-05 ENCOUNTER — HOSPITAL ENCOUNTER (EMERGENCY)
Age: 59
Discharge: HOME | End: 2019-06-05
Attending: EMERGENCY MEDICINE
Payer: MEDICARE

## 2019-06-05 VITALS
SYSTOLIC BLOOD PRESSURE: 162 MMHG | BODY MASS INDEX: 55.32 KG/M2 | RESPIRATION RATE: 22 BRPM | TEMPERATURE: 98.1 F | HEIGHT: 61 IN | DIASTOLIC BLOOD PRESSURE: 87 MMHG | OXYGEN SATURATION: 97 % | WEIGHT: 293 LBS | HEART RATE: 103 BPM

## 2019-06-05 DIAGNOSIS — M54.10 RADICULAR LEG PAIN: Primary | ICD-10-CM

## 2019-06-05 PROCEDURE — 96372 THER/PROPH/DIAG INJ SC/IM: CPT

## 2019-06-05 PROCEDURE — 6360000002 HC RX W HCPCS: Performed by: EMERGENCY MEDICINE

## 2019-06-05 PROCEDURE — 99282 EMERGENCY DEPT VISIT SF MDM: CPT

## 2019-06-05 RX ORDER — MELOXICAM 15 MG/1
15 TABLET ORAL DAILY PRN
Qty: 30 TABLET | Refills: 0 | Status: SHIPPED | OUTPATIENT
Start: 2019-06-05 | End: 2019-06-06

## 2019-06-05 RX ORDER — KETOROLAC TROMETHAMINE 30 MG/ML
30 INJECTION, SOLUTION INTRAMUSCULAR; INTRAVENOUS ONCE
Status: COMPLETED | OUTPATIENT
Start: 2019-06-05 | End: 2019-06-05

## 2019-06-05 RX ADMIN — KETOROLAC TROMETHAMINE 30 MG: 30 INJECTION, SOLUTION INTRAMUSCULAR; INTRAVENOUS at 06:53

## 2019-06-05 ASSESSMENT — PAIN DESCRIPTION - PROGRESSION: CLINICAL_PROGRESSION: NOT CHANGED

## 2019-06-05 ASSESSMENT — PAIN DESCRIPTION - ORIENTATION: ORIENTATION: LEFT

## 2019-06-05 ASSESSMENT — PAIN DESCRIPTION - LOCATION: LOCATION: BACK;LEG

## 2019-06-05 ASSESSMENT — PAIN SCALES - GENERAL
PAINLEVEL_OUTOF10: 10
PAINLEVEL_OUTOF10: 10

## 2019-06-05 ASSESSMENT — PAIN DESCRIPTION - PAIN TYPE: TYPE: CHRONIC PAIN

## 2019-06-05 NOTE — ED PROVIDER NOTES
Emergency Department Attending Note    Rima Mary MD    Date of ED VIsit: 2019    CHIEF COMPLAINT  Back Pain (Pt c/o back pain, chronic. Sts \"I'm trying to get surgery on my herniated disk, pain is getting to bad, can't sit, can't sleep\")      HISTORY OF PRESENT ILLNESS  Lucy Cartagena is a 62 y.o. female  With Vital signs of BP (!) 162/87   Pulse 103   Temp 98.1 °F (36.7 °C) (Oral)   Resp 22   Ht 5' 1\" (1.549 m)   Wt (!) 316 lb (143.3 kg)   LMP  (LMP Unknown)   SpO2 97%   BMI 59.71 kg/m²  who presents to the ED with a complaint of left hip and thigh pain. Patient seen and evaluated in room 6. Patient ambulated from her car into the emergency department complaining of left hip and thigh pain which is chronic in nature she's had it for 5 weeks and she is currently in the process of going to see a surgeon for a disc problem. She doesn't know what to do about it has not taken anything over-the-counter to assist her with her pain. She is only taking Voltaren. She was seen in the past and given a prednisone taper. She is able to ambulate albeit with pain. Primary care doctor either and I advised her that she should try to do that as well. No other complaints, modifying factors or associated symptoms. I have reviewed the following from the nursing documentation.     Past Medical History:   Diagnosis Date    ADHD (attention deficit hyperactivity disorder)     Allergic rhinitis     Bipolar disorder     Borderline osteopenia     Depression     Dysmetabolic syndrome     GERD (gastroesophageal reflux disease)     Headache(784.0)     Hyperlipidemia 2014    Hypertension     Intention tremor     due to lithium    Obesity     Osteoarthritis     PCOS (polycystic ovarian syndrome)     RAD (reactive airway disease)     Restless legs syndrome (RLS) 2014     Past Surgical History:   Procedure Laterality Date    ABDOMINAL EXPLORATION SURGERY       SECTION      GASTRIC BYPASS SURGERY      HAND SURGERY      bilat CTS    KNEE SURGERY      KNEE SURGERY Right 10/28/2015    Right knee video arthroscopy with partial medial and lateral menisectomy with loose body removal    TONSILLECTOMY       Family History   Problem Relation Age of Onset    Depression Sister     Mental Illness Sister     Diabetes Mother     Heart Disease Mother     Diabetes Father     Heart Disease Father      Social History     Socioeconomic History    Marital status: Legally      Spouse name: Not on file    Number of children: Not on file    Years of education: Not on file    Highest education level: Not on file   Occupational History    Not on file   Social Needs    Financial resource strain: Not on file    Food insecurity:     Worry: Not on file     Inability: Not on file    Transportation needs:     Medical: Not on file     Non-medical: Not on file   Tobacco Use    Smoking status: Former Smoker     Packs/day: 1.50     Years: 21.00     Pack years: 31.50     Types: Cigarettes     Last attempt to quit: 1997     Years since quittin.3    Smokeless tobacco: Never Used   Substance and Sexual Activity    Alcohol use: No    Drug use: No    Sexual activity: Not Currently   Lifestyle    Physical activity:     Days per week: Not on file     Minutes per session: Not on file    Stress: Not on file   Relationships    Social connections:     Talks on phone: Not on file     Gets together: Not on file     Attends Jain service: Not on file     Active member of club or organization: Not on file     Attends meetings of clubs or organizations: Not on file     Relationship status: Not on file    Intimate partner violence:     Fear of current or ex partner: Not on file     Emotionally abused: Not on file     Physically abused: Not on file     Forced sexual activity: Not on file   Other Topics Concern    Not on file   Social History Narrative    Not on file     Current Facility-Administered Medications   Medication Dose Route Frequency Provider Last Rate Last Dose    ketorolac (TORADOL) injection 30 mg  30 mg Intravenous Once Veronica Dominguez MD         Current Outpatient Medications   Medication Sig Dispense Refill    buPROPion (WELLBUTRIN XL) 150 MG extended release tablet Take 1 tablet by mouth daily 30 tablet 0    traZODone (DESYREL) 50 MG tablet Take 1 tablet by mouth nightly as needed for Sleep 30 tablet 0    pravastatin (PRAVACHOL) 10 MG tablet Take 10 mg by mouth nightly      nadolol (CORGARD) 80 MG tablet TAKE 1 TABLET BY MOUTH DAILY (Patient taking differently: Take 40 mg by mouth daily ) 30 tablet 3    diclofenac (VOLTAREN) 75 MG EC tablet TAKE 1 TABLET BY MOUTH 2 TIMES DAILY 60 tablet 5    rosuvastatin (CRESTOR) 5 MG tablet Take 1 tablet by mouth daily 30 tablet 5    lamoTRIgine (LAMICTAL) 200 MG tablet Take 100 mg by mouth 2 times daily       calcium carbonate (OSCAL) 500 MG TABS tablet Take 500 mg by mouth daily      cetirizine (ZYRTEC) 10 MG tablet Take 10 mg by mouth daily      Turmeric Curcumin 500 MG CAPS Take by mouth      TRINTELLIX 20 MG TABS tablet TAKE 1 TABLET BY MOUTH DAILY 30 tablet 5    cyclobenzaprine (FLEXERIL) 10 MG tablet Take 1 tablet by mouth every 8 hours as needed for Muscle spasms 45 tablet 0    Vitamin D (CHOLECALCIFEROL) 1000 UNITS CAPS capsule Take 1,000 Units by mouth daily.  multivitamin (THERAGRAN) per tablet Take 1 tablet by mouth daily.  Omega-3 Fatty Acids (FISH OIL) 1200 MG CAPS Take  by mouth daily.        Allergies   Allergen Reactions    Amoxicillin-Pot Clavulanate Hives    Daypro [Oxaprozin] Hives    Duravent-Da [Chlorphen-Phenyleph-Methscop] Hives    Lithium      Caused shakes    Norvasc [Amlodipine]      LE edema      Nsaids      Avoid d/t gastric bypass    Seldane [Terfenadine] Hives    Suprax [Cefixime] Hives    Levofloxacin Rash       REVIEW OF SYSTEMS  10 systems reviewed, pertinent positives per HPI otherwise noted to be negative     PHYSICAL EXAM  BP (!) 162/87   Pulse 103   Temp 98.1 °F (36.7 °C) (Oral)   Resp 22   Ht 5' 1\" (1.549 m)   Wt (!) 316 lb (143.3 kg)   LMP  (LMP Unknown)   SpO2 97%   BMI 59.71 kg/m²   GENERAL APPEARANCE: Awake and alert. Cooperative. In moderate distress. HEAD: Normocephalic. Atraumatic. EYES: PERRL. EOM's grossly intact. ENT: Mucous membranes are pink and moist.   NECK: Supple. HEART: RRR. No murmurs. LUNGS: Respirations unlabored. CTAB. Good air exchange. ABDOMEN: Soft. Non-distended. Non-tender. No masses. No organomegaly. No guarding or rebound. EXTREMITIES: No peripheral edema. Moves all extremities equally. All extremities neurovascularly intact. SKIN: Warm and dry. No acute rashes. NEUROLOGICAL: Alert and oriented. No focal weaknesses noted. Strength 5/5, sensation intact. Gait normal.  Patient is able to get on the stretcher and lift her legs onto the stretcher without assistance patient is a positive straight leg raise on the left. PSYCHIATRIC: Normal mood and affect. No HI or SI expressed to me. ED COURSE/MDM    Patient will be treated with a an IM shot of Toradol. For a radicular type pain from the left hip. She will then be switched off her Voltaren and placed on an Mobitz which is a once a day nonsteroidal anti-inflammatory and advised to follow-up with her primary care doctor for additional methods to take care of her pain and also to follow up with her orthopedic surgeon for repair       Old records were reviewed when applicable.  The ED course and plan were reviewed and results discussed with the patient    CLINICAL IMPRESSION and DISPOSITION  Campbell Abreu was stable and diagnosed with radicular leg pain    Patient was treated with  Toradol        Zackary Carrel, MD  06/05/19 0985       Zackary Carrel, MD  06/05/19 7302

## 2019-06-06 ENCOUNTER — OFFICE VISIT (OUTPATIENT)
Dept: FAMILY MEDICINE CLINIC | Age: 59
End: 2019-06-06
Payer: MEDICARE

## 2019-06-06 VITALS
WEIGHT: 293 LBS | OXYGEN SATURATION: 96 % | SYSTOLIC BLOOD PRESSURE: 128 MMHG | DIASTOLIC BLOOD PRESSURE: 64 MMHG | HEART RATE: 94 BPM | BODY MASS INDEX: 55.32 KG/M2 | HEIGHT: 61 IN

## 2019-06-06 DIAGNOSIS — F51.01 PRIMARY INSOMNIA: ICD-10-CM

## 2019-06-06 DIAGNOSIS — F33.2 SEVERE EPISODE OF RECURRENT MAJOR DEPRESSIVE DISORDER, WITHOUT PSYCHOTIC FEATURES (HCC): ICD-10-CM

## 2019-06-06 DIAGNOSIS — G25.2 INTENTION TREMOR: ICD-10-CM

## 2019-06-06 DIAGNOSIS — I10 ESSENTIAL HYPERTENSION: ICD-10-CM

## 2019-06-06 DIAGNOSIS — M48.062 LUMBAR STENOSIS WITH NEUROGENIC CLAUDICATION: ICD-10-CM

## 2019-06-06 DIAGNOSIS — M51.26 HNP (HERNIATED NUCLEUS PULPOSUS), LUMBAR: ICD-10-CM

## 2019-06-06 DIAGNOSIS — G25.81 RESTLESS LEGS SYNDROME (RLS): ICD-10-CM

## 2019-06-06 DIAGNOSIS — E28.2 PCOS (POLYCYSTIC OVARIAN SYNDROME): ICD-10-CM

## 2019-06-06 DIAGNOSIS — K21.9 GASTROESOPHAGEAL REFLUX DISEASE WITHOUT ESOPHAGITIS: ICD-10-CM

## 2019-06-06 DIAGNOSIS — E55.9 VITAMIN D DEFICIENCY: ICD-10-CM

## 2019-06-06 DIAGNOSIS — Z01.818 PRE-OP EXAMINATION: Primary | ICD-10-CM

## 2019-06-06 DIAGNOSIS — Z99.89 OSA ON CPAP: ICD-10-CM

## 2019-06-06 DIAGNOSIS — E78.49 OTHER HYPERLIPIDEMIA: ICD-10-CM

## 2019-06-06 DIAGNOSIS — E66.01 MORBID OBESITY WITH BMI OF 50.0-59.9, ADULT (HCC): ICD-10-CM

## 2019-06-06 DIAGNOSIS — M17.0 PRIMARY OSTEOARTHRITIS OF BOTH KNEES: ICD-10-CM

## 2019-06-06 DIAGNOSIS — G47.33 OSA ON CPAP: ICD-10-CM

## 2019-06-06 DIAGNOSIS — F39 MOOD DISORDER (HCC): ICD-10-CM

## 2019-06-06 PROCEDURE — 93000 ELECTROCARDIOGRAM COMPLETE: CPT | Performed by: NURSE PRACTITIONER

## 2019-06-06 PROCEDURE — 36415 COLL VENOUS BLD VENIPUNCTURE: CPT | Performed by: NURSE PRACTITIONER

## 2019-06-06 PROCEDURE — 1036F TOBACCO NON-USER: CPT | Performed by: NURSE PRACTITIONER

## 2019-06-06 PROCEDURE — 3017F COLORECTAL CA SCREEN DOC REV: CPT | Performed by: NURSE PRACTITIONER

## 2019-06-06 PROCEDURE — G8427 DOCREV CUR MEDS BY ELIG CLIN: HCPCS | Performed by: NURSE PRACTITIONER

## 2019-06-06 PROCEDURE — G8417 CALC BMI ABV UP PARAM F/U: HCPCS | Performed by: NURSE PRACTITIONER

## 2019-06-06 PROCEDURE — 99214 OFFICE O/P EST MOD 30 MIN: CPT | Performed by: NURSE PRACTITIONER

## 2019-06-06 NOTE — PROGRESS NOTES
Obstructive Sleep Apnea (ANTOINETTE) Screening     Patient:  Malcolm Walsh    YOB: 1960      Medical Record #:  [id number]                     Date:  6/6/2019     1. Are you a loud and/or regular snorer? [x]  Yes       [] No    2. Have you been observed to gasp or stop breathing during sleep? [x]  Yes       [] No    3. Do you feel tired or groggy upon awakening or do you awaken with a headache?           []  Yes       [x] No    4. Are you often tired or fatigued during the wake time hours? [x]  Yes       [] No    5. Do you fall asleep sitting, reading, watching TV or driving? []  Yes       [x] No    6. Do you often have problems with memory or concentration? [x]  Yes       [] No    **If patient's score is ? 3 they are considered high risk for ANTOINETTE. Notify the anesthesiologist of the high risk and document in focus note. Note:  If the patient's BMI is more than 35 kg m¯² , has neck circumference > 40 cm, and/or high blood pressure the risk is greater (© American Sleep Apnea Association, 2006).

## 2019-06-06 NOTE — PROGRESS NOTES
[address] Toshia Pinzon CNP                                                                     Preoperative Evaluation        Ami Evan  YOB: 1960    Date of Service:  6/6/2019    Vitals:    06/06/19 1013   BP: 128/64   Site: Right Upper Arm   Position: Sitting   Cuff Size: Large Adult   Pulse: 94   SpO2: 96%   Weight: (!) 314 lb (142.4 kg)   Height: 5' 1\" (1.549 m)      Wt Readings from Last 2 Encounters:   06/05/19 (!) 316 lb (143.3 kg)   05/30/19 (!) 316 lb (143.3 kg)     BP Readings from Last 3 Encounters:   06/05/19 (!) 162/87   05/16/19 (!) 168/95   04/30/19 133/86        Chief Complaint   Patient presents with    Pre-op Exam     6/12/19 [facility] Dr Azael Salter hernia Microlumbar Disectomy L4-L5     Allergies   Allergen Reactions    Amoxicillin-Pot Clavulanate Hives    Daypro [Oxaprozin] Hives    Duravent-Da [Chlorphen-Phenyleph-Methscop] Hives    Lithium      Caused shakes    Norvasc [Amlodipine]      LE edema      Nsaids      Avoid d/t gastric bypass    Seldane [Terfenadine] Hives    Suprax [Cefixime] Hives    Levofloxacin Rash     No outpatient medications have been marked as taking for the 6/6/19 encounter (Appointment) with MADINA Duncan CNP. This patient presents to the office today for a preoperative consultation at the request of surgeon, Dr. Azael Salter, who plans on performing microlumbar disectomy L4-L5 on June 12 at [facility].  The current problem began 5 weeks ago, and symptoms have been worsening with time. Conservative therapy: Yes: steroids, NSAIDS, toradol, which has been ineffective. .    Planned anesthesia: General   Known anesthesia problems: None   Bleeding risk: No recent or remote history of abnormal bleeding  Personal or FH of DVT/PE: Yes - brother but no personal history DVT    Patient objection to receiving blood products: No    Patient Active Problem List   Diagnosis    Depressive disorder    PCOS (polycystic ovarian syndrome)    Osteoarthritis    Intention tremor    Hyperlipidemia    Restless legs syndrome (RLS)    Edema of both legs    Contusion of left lower leg    Erythema of lower limb    Insomnia    Primary osteoarthritis of right knee    Vitamin D deficiency    Morbid obesity with BMI of 50.0-59.9, adult (HCC)    Urinary incontinence    Lateral meniscal tear    Medial meniscus tear    Loose body in knee    Localized osteoarthrosis, lower leg    Primary osteoarthritis of both knees    Left knee pain    Mood disorder ([facility])    Severe episode of recurrent major depressive disorder, without psychotic features ([facility] [address]       Past Medical History:   Diagnosis Date    ADHD (attention deficit hyperactivity disorder)     Allergic rhinitis     Bipolar disorder     Borderline osteopenia     Depression     Dysmetabolic syndrome     GERD (gastroesophageal reflux disease)     Headache(784.0)     Hyperlipidemia 2014    Hypertension     Intention tremor     due to lithium    Obesity     Osteoarthritis     PCOS (polycystic ovarian syndrome)     RAD (reactive airway disease)     Restless legs syndrome (RLS) 2014     Past Surgical History:   Procedure Laterality Date    ABDOMINAL EXPLORATION SURGERY       SECTION      GASTRIC BYPASS SURGERY      HAND SURGERY      bilat CTS    KNEE SURGERY      KNEE SURGERY Right 10/28/2015    Right knee video arthroscopy with partial medial and lateral menisectomy with loose body removal    TONSILLECTOMY       Family History   Problem Relation Age of Onset    Depression Sister     Mental Illness Sister     Diabetes Mother     Heart Disease Mother     Diabetes Father     Heart Disease Father      Social History     Socioeconomic History    Marital status: Legally  Spouse name: Not on file    Number of children: Not on file    Years of education: Not on file    Highest education level: Not on file   Occupational History    Not on file   Social Needs    Financial resource strain: Not on file    Food insecurity:     Worry: Not on file     Inability: Not on file    Transportation needs:     Medical: Not on file     Non-medical: Not on file   Tobacco Use    Smoking status: Former Smoker     Packs/day: 1.50     Years: 21.00     Pack years: 31.50     Types: Cigarettes     Last attempt to quit: 1997     Years since quittin.3    Smokeless tobacco: Never Used   Substance and Sexual Activity    Alcohol use: No    Drug use: No    Sexual activity: Not Currently   Lifestyle    Physical activity:     Days per week: Not on file     Minutes per session: Not on file    Stress: Not on file   Relationships    Social connections:     Talks on phone: Not on file     Gets together: Not on file     Attends [employer] service: Not on file     Active member of club or organization: Not on file     Attends meetings of clubs or organizations: Not on file     Relationship status: Not on file    Intimate partner violence:     Fear of current or ex partner: Not on file     Emotionally abused: Not on file     Physically abused: Not on file     Forced sexual activity: Not on file   Other Topics Concern    Not on file   Social History Narrative    Not on file       Review of Systems  A comprehensive review of systems was negative except for what was noted in the HPI. Physical Exam   Constitutional: She is oriented to person, place, and time. She appears well-developed and well-nourished. No distress. HENT:   Head: Normocephalic and atraumatic. Mouth/Throat: Uvula is midline, oropharynx is clear and moist and mucous membranes are normal.   Eyes: Conjunctivae and EOM are normal. Pupils are equal, round, and reactive to light. Neck: Trachea normal and normal range of motion. pulposus), lumbar         Plan:     1. Preoperative workup as follows: ECG, CBC and CMP  2. Change in medication regimen before surgery: Take lamictal on morning of surgery with sip of water, and hold all other medications until after surgery, Discontinue NSAIDs (mobic) 7 days before surgery, Discontinue fish oil, multivitamin, turmeric and vitamin d 5 days before surgery  3. Prophylaxis for cardiac events with perioperative beta-blockers: Currently taking  nadolol  ACC/AHA indications for pre-operative beta-blocker use:    · Vascular surgery with history of postitive stress test  · Intermediate or high risk surgery with history of CAD   · Intermediate or high risk surgery with multiple clinical predictors of CAD- 2 of the following: history of compensated or prior heart failure, history of cerebrovascular disease, DM, or renal insufficiency    Routine administration of higher-dose, long-acting metoprolol in beta-blocker-naïve patients on the day of surgery, and in the absence of dose titration is associated with an overall increase in mortality. Beta-blockers should be started days to weeks prior to surgery and titrated to pulse < 70.  4. Deep vein thrombosis prophylaxis: regimen to be chosen by surgical team  5. No contraindications to planned surgery      If you have questions, please do not hesitate to call me ([phone]). Sincerely,    John Corral.  Toshia Pinzon, CNP

## 2019-06-07 LAB
A/G RATIO: 1.8 (ref 1.1–2.2)
ALBUMIN SERPL-MCNC: 4.4 G/DL (ref 3.4–5)
ALP BLD-CCNC: 104 U/L (ref 40–129)
ALT SERPL-CCNC: 23 U/L (ref 10–40)
ANION GAP SERPL CALCULATED.3IONS-SCNC: 15 MMOL/L (ref 3–16)
AST SERPL-CCNC: 17 U/L (ref 15–37)
BASOPHILS ABSOLUTE: 0 K/UL (ref 0–0.2)
BASOPHILS RELATIVE PERCENT: 0.6 %
BILIRUB SERPL-MCNC: 0.4 MG/DL (ref 0–1)
BUN BLDV-MCNC: 19 MG/DL (ref 7–20)
CALCIUM SERPL-MCNC: 9.3 MG/DL (ref 8.3–10.6)
CHLORIDE BLD-SCNC: 105 MMOL/L (ref 99–110)
CO2: 25 MMOL/L (ref 21–32)
CREAT SERPL-MCNC: 0.7 MG/DL (ref 0.6–1.1)
EOSINOPHILS ABSOLUTE: 0.3 K/UL (ref 0–0.6)
EOSINOPHILS RELATIVE PERCENT: 3.4 %
GFR AFRICAN AMERICAN: >60
GFR NON-AFRICAN AMERICAN: >60
GLOBULIN: 2.5 G/DL
GLUCOSE BLD-MCNC: 114 MG/DL (ref 70–99)
HCT VFR BLD CALC: 44.6 % (ref 36–48)
HEMOGLOBIN: 13.9 G/DL (ref 12–16)
LYMPHOCYTES ABSOLUTE: 2 K/UL (ref 1–5.1)
LYMPHOCYTES RELATIVE PERCENT: 26.9 %
MCH RBC QN AUTO: 28.1 PG (ref 26–34)
MCHC RBC AUTO-ENTMCNC: 31.3 G/DL (ref 31–36)
MCV RBC AUTO: 89.8 FL (ref 80–100)
MONOCYTES ABSOLUTE: 0.7 K/UL (ref 0–1.3)
MONOCYTES RELATIVE PERCENT: 9.1 %
NEUTROPHILS ABSOLUTE: 4.5 K/UL (ref 1.7–7.7)
NEUTROPHILS RELATIVE PERCENT: 60 %
PDW BLD-RTO: 16.3 % (ref 12.4–15.4)
PLATELET # BLD: 312 K/UL (ref 135–450)
PMV BLD AUTO: 7.6 FL (ref 5–10.5)
POTASSIUM SERPL-SCNC: 4.5 MMOL/L (ref 3.5–5.1)
RBC # BLD: 4.97 M/UL (ref 4–5.2)
SODIUM BLD-SCNC: 145 MMOL/L (ref 136–145)
TOTAL PROTEIN: 6.9 G/DL (ref 6.4–8.2)
WBC # BLD: 7.5 K/UL (ref 4–11)

## 2019-06-11 ENCOUNTER — ANESTHESIA EVENT (OUTPATIENT)
Dept: OPERATING ROOM | Age: 59
DRG: 519 | End: 2019-06-11
Payer: MEDICARE

## 2019-06-12 ENCOUNTER — HOSPITAL ENCOUNTER (OUTPATIENT)
Dept: GENERAL RADIOLOGY | Age: 59
Setting detail: OUTPATIENT SURGERY
Discharge: HOME | DRG: 519 | End: 2019-06-12
Attending: ORTHOPAEDIC SURGERY
Payer: MEDICARE

## 2019-06-12 ENCOUNTER — HOSPITAL ENCOUNTER (INPATIENT)
Age: 59
LOS: 1 days | Discharge: HOME | DRG: 519 | End: 2019-06-13
Attending: ORTHOPAEDIC SURGERY | Admitting: ORTHOPAEDIC SURGERY
Payer: MEDICARE

## 2019-06-12 ENCOUNTER — ANESTHESIA (OUTPATIENT)
Dept: OPERATING ROOM | Age: 59
DRG: 519 | End: 2019-06-12
Payer: MEDICARE

## 2019-06-12 VITALS — OXYGEN SATURATION: 98 % | SYSTOLIC BLOOD PRESSURE: 156 MMHG | DIASTOLIC BLOOD PRESSURE: 99 MMHG | TEMPERATURE: 98.6 F

## 2019-06-12 DIAGNOSIS — R52 PAIN: ICD-10-CM

## 2019-06-12 DIAGNOSIS — M51.26 HNP (HERNIATED NUCLEUS PULPOSUS), LUMBAR: Primary | ICD-10-CM

## 2019-06-12 PROBLEM — T88.59XA PROLONGED EMERGENCE FROM GENERAL ANESTHESIA, INITIAL ENCOUNTER: Status: ACTIVE | Noted: 2019-06-12

## 2019-06-12 PROCEDURE — 2580000003 HC RX 258: Performed by: ORTHOPAEDIC SURGERY

## 2019-06-12 PROCEDURE — 3209999900 FLUORO FOR SURGICAL PROCEDURES

## 2019-06-12 PROCEDURE — 6370000000 HC RX 637 (ALT 250 FOR IP): Performed by: ORTHOPAEDIC SURGERY

## 2019-06-12 PROCEDURE — 2500000003 HC RX 250 WO HCPCS: Performed by: NURSE ANESTHETIST, CERTIFIED REGISTERED

## 2019-06-12 PROCEDURE — 2709999900 HC NON-CHARGEABLE SUPPLY: Performed by: ORTHOPAEDIC SURGERY

## 2019-06-12 PROCEDURE — 0SB20ZZ EXCISION OF LUMBAR VERTEBRAL DISC, OPEN APPROACH: ICD-10-PCS | Performed by: ORTHOPAEDIC SURGERY

## 2019-06-12 PROCEDURE — 7100000001 HC PACU RECOVERY - ADDTL 15 MIN: Performed by: ORTHOPAEDIC SURGERY

## 2019-06-12 PROCEDURE — 7100000000 HC PACU RECOVERY - FIRST 15 MIN: Performed by: ORTHOPAEDIC SURGERY

## 2019-06-12 PROCEDURE — 2720000010 HC SURG SUPPLY STERILE: Performed by: ORTHOPAEDIC SURGERY

## 2019-06-12 PROCEDURE — 6360000002 HC RX W HCPCS: Performed by: ANESTHESIOLOGY

## 2019-06-12 PROCEDURE — 6360000002 HC RX W HCPCS: Performed by: ORTHOPAEDIC SURGERY

## 2019-06-12 PROCEDURE — 3700000000 HC ANESTHESIA ATTENDED CARE: Performed by: ORTHOPAEDIC SURGERY

## 2019-06-12 PROCEDURE — 6360000002 HC RX W HCPCS: Performed by: NURSE ANESTHETIST, CERTIFIED REGISTERED

## 2019-06-12 PROCEDURE — 2580000003 HC RX 258: Performed by: NURSE ANESTHETIST, CERTIFIED REGISTERED

## 2019-06-12 PROCEDURE — 3600000004 HC SURGERY LEVEL 4 BASE: Performed by: ORTHOPAEDIC SURGERY

## 2019-06-12 PROCEDURE — 2500000003 HC RX 250 WO HCPCS: Performed by: ORTHOPAEDIC SURGERY

## 2019-06-12 PROCEDURE — 72100 X-RAY EXAM L-S SPINE 2/3 VWS: CPT

## 2019-06-12 PROCEDURE — 1200000000 HC SEMI PRIVATE

## 2019-06-12 PROCEDURE — 3600000014 HC SURGERY LEVEL 4 ADDTL 15MIN: Performed by: ORTHOPAEDIC SURGERY

## 2019-06-12 PROCEDURE — 3700000001 HC ADD 15 MINUTES (ANESTHESIA): Performed by: ORTHOPAEDIC SURGERY

## 2019-06-12 RX ORDER — ACETAMINOPHEN 10 MG/ML
1000 INJECTION, SOLUTION INTRAVENOUS
Status: COMPLETED | OUTPATIENT
Start: 2019-06-12 | End: 2019-06-12

## 2019-06-12 RX ORDER — OXYCODONE HYDROCHLORIDE AND ACETAMINOPHEN 5; 325 MG/1; MG/1
1 TABLET ORAL PRN
Status: DISCONTINUED | OUTPATIENT
Start: 2019-06-12 | End: 2019-06-12 | Stop reason: HOSPADM

## 2019-06-12 RX ORDER — OXYCODONE HYDROCHLORIDE AND ACETAMINOPHEN 5; 325 MG/1; MG/1
2 TABLET ORAL PRN
Status: DISCONTINUED | OUTPATIENT
Start: 2019-06-12 | End: 2019-06-12 | Stop reason: HOSPADM

## 2019-06-12 RX ORDER — SODIUM CHLORIDE, SODIUM LACTATE, POTASSIUM CHLORIDE, CALCIUM CHLORIDE 600; 310; 30; 20 MG/100ML; MG/100ML; MG/100ML; MG/100ML
INJECTION, SOLUTION INTRAVENOUS CONTINUOUS
Status: DISCONTINUED | OUTPATIENT
Start: 2019-06-12 | End: 2019-06-12

## 2019-06-12 RX ORDER — LIDOCAINE HYDROCHLORIDE 10 MG/ML
1 INJECTION, SOLUTION EPIDURAL; INFILTRATION; INTRACAUDAL; PERINEURAL
Status: DISCONTINUED | OUTPATIENT
Start: 2019-06-12 | End: 2019-06-12 | Stop reason: HOSPADM

## 2019-06-12 RX ORDER — PROPOFOL 10 MG/ML
INJECTION, EMULSION INTRAVENOUS PRN
Status: DISCONTINUED | OUTPATIENT
Start: 2019-06-12 | End: 2019-06-12 | Stop reason: SDUPTHER

## 2019-06-12 RX ORDER — ROSUVASTATIN CALCIUM 10 MG/1
5 TABLET, COATED ORAL DAILY
Status: DISCONTINUED | OUTPATIENT
Start: 2019-06-13 | End: 2019-06-13 | Stop reason: HOSPADM

## 2019-06-12 RX ORDER — SODIUM CHLORIDE, SODIUM LACTATE, POTASSIUM CHLORIDE, CALCIUM CHLORIDE 600; 310; 30; 20 MG/100ML; MG/100ML; MG/100ML; MG/100ML
INJECTION, SOLUTION INTRAVENOUS CONTINUOUS PRN
Status: DISCONTINUED | OUTPATIENT
Start: 2019-06-12 | End: 2019-06-12 | Stop reason: SDUPTHER

## 2019-06-12 RX ORDER — ROCURONIUM BROMIDE 10 MG/ML
INJECTION, SOLUTION INTRAVENOUS PRN
Status: DISCONTINUED | OUTPATIENT
Start: 2019-06-12 | End: 2019-06-12 | Stop reason: SDUPTHER

## 2019-06-12 RX ORDER — BUPIVACAINE HYDROCHLORIDE AND EPINEPHRINE 2.5; 5 MG/ML; UG/ML
INJECTION, SOLUTION EPIDURAL; INFILTRATION; INTRACAUDAL; PERINEURAL PRN
Status: DISCONTINUED | OUTPATIENT
Start: 2019-06-12 | End: 2019-06-12 | Stop reason: HOSPADM

## 2019-06-12 RX ORDER — BUPROPION HYDROCHLORIDE 150 MG/1
150 TABLET ORAL DAILY
Status: DISCONTINUED | OUTPATIENT
Start: 2019-06-13 | End: 2019-06-13 | Stop reason: HOSPADM

## 2019-06-12 RX ORDER — OXYCODONE HYDROCHLORIDE AND ACETAMINOPHEN 5; 325 MG/1; MG/1
1 TABLET ORAL EVERY 4 HOURS PRN
Status: DISCONTINUED | OUTPATIENT
Start: 2019-06-12 | End: 2019-06-13 | Stop reason: HOSPADM

## 2019-06-12 RX ORDER — DEXAMETHASONE SODIUM PHOSPHATE 10 MG/ML
INJECTION INTRAMUSCULAR; INTRAVENOUS PRN
Status: DISCONTINUED | OUTPATIENT
Start: 2019-06-12 | End: 2019-06-12 | Stop reason: SDUPTHER

## 2019-06-12 RX ORDER — CYCLOBENZAPRINE HCL 10 MG
10 TABLET ORAL EVERY 8 HOURS PRN
Status: DISCONTINUED | OUTPATIENT
Start: 2019-06-12 | End: 2019-06-13 | Stop reason: HOSPADM

## 2019-06-12 RX ORDER — MEPERIDINE HYDROCHLORIDE 50 MG/ML
12.5 INJECTION INTRAMUSCULAR; INTRAVENOUS; SUBCUTANEOUS EVERY 5 MIN PRN
Status: DISCONTINUED | OUTPATIENT
Start: 2019-06-12 | End: 2019-06-12 | Stop reason: HOSPADM

## 2019-06-12 RX ORDER — OXYCODONE HYDROCHLORIDE AND ACETAMINOPHEN 5; 325 MG/1; MG/1
2 TABLET ORAL EVERY 4 HOURS PRN
Status: DISCONTINUED | OUTPATIENT
Start: 2019-06-12 | End: 2019-06-13 | Stop reason: HOSPADM

## 2019-06-12 RX ORDER — LIDOCAINE HYDROCHLORIDE 20 MG/ML
INJECTION, SOLUTION INFILTRATION; PERINEURAL PRN
Status: DISCONTINUED | OUTPATIENT
Start: 2019-06-12 | End: 2019-06-12 | Stop reason: SDUPTHER

## 2019-06-12 RX ORDER — TRAZODONE HYDROCHLORIDE 50 MG/1
50 TABLET ORAL NIGHTLY PRN
Status: DISCONTINUED | OUTPATIENT
Start: 2019-06-12 | End: 2019-06-13 | Stop reason: HOSPADM

## 2019-06-12 RX ORDER — CETIRIZINE HYDROCHLORIDE 10 MG/1
10 TABLET ORAL DAILY
Status: DISCONTINUED | OUTPATIENT
Start: 2019-06-13 | End: 2019-06-13 | Stop reason: HOSPADM

## 2019-06-12 RX ORDER — DOCUSATE SODIUM 100 MG/1
100 CAPSULE, LIQUID FILLED ORAL 2 TIMES DAILY
Status: DISCONTINUED | OUTPATIENT
Start: 2019-06-12 | End: 2019-06-13 | Stop reason: HOSPADM

## 2019-06-12 RX ORDER — ONDANSETRON 2 MG/ML
4 INJECTION INTRAMUSCULAR; INTRAVENOUS
Status: DISCONTINUED | OUTPATIENT
Start: 2019-06-12 | End: 2019-06-12 | Stop reason: HOSPADM

## 2019-06-12 RX ORDER — ONDANSETRON 2 MG/ML
INJECTION INTRAMUSCULAR; INTRAVENOUS PRN
Status: DISCONTINUED | OUTPATIENT
Start: 2019-06-12 | End: 2019-06-12 | Stop reason: SDUPTHER

## 2019-06-12 RX ORDER — ONDANSETRON 2 MG/ML
4 INJECTION INTRAMUSCULAR; INTRAVENOUS EVERY 6 HOURS PRN
Status: DISCONTINUED | OUTPATIENT
Start: 2019-06-12 | End: 2019-06-13 | Stop reason: HOSPADM

## 2019-06-12 RX ORDER — OXYCODONE HYDROCHLORIDE AND ACETAMINOPHEN 5; 325 MG/1; MG/1
2 TABLET ORAL EVERY 4 HOURS PRN
Qty: 40 TABLET | Refills: 0 | Status: SHIPPED | OUTPATIENT
Start: 2019-06-12 | End: 2019-07-01

## 2019-06-12 RX ORDER — SODIUM CHLORIDE 0.9 % (FLUSH) 0.9 %
10 SYRINGE (ML) INJECTION EVERY 12 HOURS SCHEDULED
Status: DISCONTINUED | OUTPATIENT
Start: 2019-06-12 | End: 2019-06-12 | Stop reason: HOSPADM

## 2019-06-12 RX ORDER — SODIUM CHLORIDE 0.9 % (FLUSH) 0.9 %
10 SYRINGE (ML) INJECTION PRN
Status: DISCONTINUED | OUTPATIENT
Start: 2019-06-12 | End: 2019-06-12 | Stop reason: HOSPADM

## 2019-06-12 RX ORDER — HYDRALAZINE HYDROCHLORIDE 20 MG/ML
5 INJECTION INTRAMUSCULAR; INTRAVENOUS EVERY 10 MIN PRN
Status: DISCONTINUED | OUTPATIENT
Start: 2019-06-12 | End: 2019-06-12 | Stop reason: HOSPADM

## 2019-06-12 RX ORDER — PROPOFOL 10 MG/ML
INJECTION, EMULSION INTRAVENOUS CONTINUOUS PRN
Status: DISCONTINUED | OUTPATIENT
Start: 2019-06-12 | End: 2019-06-12

## 2019-06-12 RX ORDER — FENTANYL CITRATE 50 UG/ML
25 INJECTION, SOLUTION INTRAMUSCULAR; INTRAVENOUS EVERY 5 MIN PRN
Status: DISCONTINUED | OUTPATIENT
Start: 2019-06-12 | End: 2019-06-12 | Stop reason: HOSPADM

## 2019-06-12 RX ORDER — DOCUSATE SODIUM 100 MG/1
100 CAPSULE, LIQUID FILLED ORAL 2 TIMES DAILY PRN
Qty: 30 CAPSULE | Refills: 1 | Status: SHIPPED | OUTPATIENT
Start: 2019-06-12 | End: 2019-07-12

## 2019-06-12 RX ORDER — FENTANYL CITRATE 50 UG/ML
INJECTION, SOLUTION INTRAMUSCULAR; INTRAVENOUS PRN
Status: DISCONTINUED | OUTPATIENT
Start: 2019-06-12 | End: 2019-06-12 | Stop reason: SDUPTHER

## 2019-06-12 RX ORDER — VANCOMYCIN HYDROCHLORIDE 1 G/20ML
INJECTION, POWDER, LYOPHILIZED, FOR SOLUTION INTRAVENOUS PRN
Status: DISCONTINUED | OUTPATIENT
Start: 2019-06-12 | End: 2019-06-12 | Stop reason: SDUPTHER

## 2019-06-12 RX ORDER — MIDAZOLAM HYDROCHLORIDE 1 MG/ML
INJECTION INTRAMUSCULAR; INTRAVENOUS PRN
Status: DISCONTINUED | OUTPATIENT
Start: 2019-06-12 | End: 2019-06-12 | Stop reason: SDUPTHER

## 2019-06-12 RX ORDER — PROMETHAZINE HYDROCHLORIDE 25 MG/ML
6.25 INJECTION, SOLUTION INTRAMUSCULAR; INTRAVENOUS
Status: DISCONTINUED | OUTPATIENT
Start: 2019-06-12 | End: 2019-06-12 | Stop reason: HOSPADM

## 2019-06-12 RX ORDER — LAMOTRIGINE 100 MG/1
100 TABLET ORAL 2 TIMES DAILY
Status: DISCONTINUED | OUTPATIENT
Start: 2019-06-12 | End: 2019-06-13 | Stop reason: HOSPADM

## 2019-06-12 RX ADMIN — ACETAMINOPHEN 1000 MG: 10 INJECTION, SOLUTION INTRAVENOUS at 14:03

## 2019-06-12 RX ADMIN — SUGAMMADEX 283 MG: 100 INJECTION, SOLUTION INTRAVENOUS at 16:14

## 2019-06-12 RX ADMIN — VANCOMYCIN HYDROCHLORIDE 2000 MG: 1 INJECTION, POWDER, LYOPHILIZED, FOR SOLUTION INTRAVENOUS at 14:52

## 2019-06-12 RX ADMIN — LIDOCAINE HYDROCHLORIDE 20 MG: 20 INJECTION, SOLUTION INFILTRATION; PERINEURAL at 14:52

## 2019-06-12 RX ADMIN — LAMOTRIGINE 100 MG: 100 TABLET ORAL at 22:50

## 2019-06-12 RX ADMIN — MIDAZOLAM HYDROCHLORIDE 2 MG: 2 INJECTION, SOLUTION INTRAMUSCULAR; INTRAVENOUS at 14:50

## 2019-06-12 RX ADMIN — FENTANYL CITRATE 150 MCG: 50 INJECTION, SOLUTION INTRAMUSCULAR; INTRAVENOUS at 14:52

## 2019-06-12 RX ADMIN — ROCURONIUM BROMIDE 50 MG: 10 SOLUTION INTRAVENOUS at 14:52

## 2019-06-12 RX ADMIN — DOCUSATE SODIUM 100 MG: 100 CAPSULE, LIQUID FILLED ORAL at 22:51

## 2019-06-12 RX ADMIN — ONDANSETRON 4 MG: 2 INJECTION INTRAMUSCULAR; INTRAVENOUS at 14:52

## 2019-06-12 RX ADMIN — PROPOFOL 200 MG: 10 INJECTION, EMULSION INTRAVENOUS at 14:51

## 2019-06-12 RX ADMIN — FENTANYL CITRATE 50 MCG: 50 INJECTION, SOLUTION INTRAMUSCULAR; INTRAVENOUS at 15:36

## 2019-06-12 RX ADMIN — SODIUM CHLORIDE, POTASSIUM CHLORIDE, SODIUM LACTATE AND CALCIUM CHLORIDE: 600; 310; 30; 20 INJECTION, SOLUTION INTRAVENOUS at 14:50

## 2019-06-12 RX ADMIN — HYDROMORPHONE HYDROCHLORIDE 0.25 MG: 1 INJECTION, SOLUTION INTRAMUSCULAR; INTRAVENOUS; SUBCUTANEOUS at 16:50

## 2019-06-12 RX ADMIN — DEXAMETHASONE SODIUM PHOSPHATE 10 MG: 10 INJECTION INTRAMUSCULAR; INTRAVENOUS at 14:48

## 2019-06-12 ASSESSMENT — PULMONARY FUNCTION TESTS
PIF_VALUE: 37
PIF_VALUE: 24
PIF_VALUE: 38
PIF_VALUE: 38
PIF_VALUE: 37
PIF_VALUE: 33
PIF_VALUE: 32
PIF_VALUE: 37
PIF_VALUE: 5
PIF_VALUE: 32
PIF_VALUE: 38
PIF_VALUE: 37
PIF_VALUE: 25
PIF_VALUE: 37
PIF_VALUE: 33
PIF_VALUE: 43
PIF_VALUE: 38
PIF_VALUE: 38
PIF_VALUE: 28
PIF_VALUE: 38
PIF_VALUE: 37
PIF_VALUE: 34
PIF_VALUE: 37
PIF_VALUE: 5
PIF_VALUE: 37
PIF_VALUE: 40
PIF_VALUE: 37
PIF_VALUE: 32
PIF_VALUE: 37
PIF_VALUE: 37
PIF_VALUE: 38
PIF_VALUE: 37
PIF_VALUE: 38
PIF_VALUE: 38
PIF_VALUE: 37
PIF_VALUE: 7
PIF_VALUE: 42
PIF_VALUE: 34
PIF_VALUE: 32
PIF_VALUE: 37
PIF_VALUE: 14
PIF_VALUE: 37
PIF_VALUE: 23
PIF_VALUE: 25
PIF_VALUE: 14
PIF_VALUE: 37
PIF_VALUE: 38
PIF_VALUE: 32
PIF_VALUE: 25
PIF_VALUE: 38
PIF_VALUE: 23
PIF_VALUE: 25
PIF_VALUE: 15
PIF_VALUE: 37
PIF_VALUE: 33
PIF_VALUE: 38
PIF_VALUE: 17
PIF_VALUE: 38
PIF_VALUE: 37
PIF_VALUE: 27
PIF_VALUE: 37
PIF_VALUE: 38
PIF_VALUE: 32
PIF_VALUE: 4
PIF_VALUE: 32
PIF_VALUE: 2
PIF_VALUE: 38
PIF_VALUE: 37
PIF_VALUE: 15
PIF_VALUE: 37
PIF_VALUE: 37
PIF_VALUE: 38
PIF_VALUE: 38
PIF_VALUE: 32
PIF_VALUE: 37
PIF_VALUE: 37
PIF_VALUE: 25
PIF_VALUE: 38
PIF_VALUE: 38
PIF_VALUE: 33
PIF_VALUE: 27
PIF_VALUE: 14
PIF_VALUE: 38
PIF_VALUE: 37
PIF_VALUE: 37
PIF_VALUE: 33
PIF_VALUE: 38
PIF_VALUE: 18
PIF_VALUE: 9
PIF_VALUE: 10
PIF_VALUE: 37
PIF_VALUE: 17

## 2019-06-12 ASSESSMENT — PAIN SCALES - GENERAL
PAINLEVEL_OUTOF10: 2
PAINLEVEL_OUTOF10: 2
PAINLEVEL_OUTOF10: 6
PAINLEVEL_OUTOF10: 2

## 2019-06-12 ASSESSMENT — PAIN DESCRIPTION - ORIENTATION
ORIENTATION: MID;LOWER
ORIENTATION: LOWER
ORIENTATION: LOWER

## 2019-06-12 ASSESSMENT — PAIN DESCRIPTION - PAIN TYPE
TYPE: SURGICAL PAIN

## 2019-06-12 ASSESSMENT — PAIN DESCRIPTION - LOCATION
LOCATION: BACK

## 2019-06-12 ASSESSMENT — PAIN - FUNCTIONAL ASSESSMENT: PAIN_FUNCTIONAL_ASSESSMENT: 0-10

## 2019-06-12 ASSESSMENT — PAIN DESCRIPTION - DESCRIPTORS: DESCRIPTORS: ACHING

## 2019-06-12 ASSESSMENT — LIFESTYLE VARIABLES: SMOKING_STATUS: 0

## 2019-06-12 NOTE — OP NOTE
Damon  6/12/2019 3:50 PM    PATIENT NAME:          Primo Harper  YOB: 1960   MEDICAL RECORD#         [id number]  SURGERY DATE:         6/12/2019  SURGEON:                 JOYCE JACOB                                                      OPERATIVE REPORT     PREOPERATIVE DIAGNOSIS: herniated lumbar disc L4-5 on the left, morbid obesity BMI 58.95         POSTOPERATIVE DIAGNOSIS:    same    PROCEDURES PERFORMED:  1. Left L4-5 hemilaminotomy and diskectomy  2. Microdissection using the operating room microscope. ANESTHESIA:  General    ESTIMATED BLOOD LOSS:  150 cc    INDICATIONS FOR SURGERY:   Primo Harper is a 62 y.o. female with back pain, left leg pain and morbid obesity BMI 58.95. The symptoms failed to respond to conservative intervention. An MRI scan was performed and this showed evidence of a disk herniation at the L4-5 level on the left. Having failed conservative management and experiencing persistent symptoms, the patient elected to proceed ahead with the surgical option of microdiskectomy at L4-5. DETAILS OF PROCEDURE:  The patient was brought to the operating room and placed under general anesthesia. The patient was then placed prone on a Wayne  table. All bony prominences were inspected and padded prior to sterile draping. Positioning and padding were difficult and time consuming given her morbid obesity BMI 58.95. Using a #10 blade knife, the skin was incised in the midline and monopolar cautery was used to dissect through the subcutaneous tissue to open the fascia and reflect the paraspinal muscles laterally exposing the L4-5 interspace on the left side. Exposure was difficult and time consuming given her morbid obesity BMI 58.95. We had to use the longest instruments in the hospital .The microscope was then brought into the field and used to assist with performing a microsurgical diskectomy at this level.  Using the [employer] drill, I burred down the hemilamina of  L4 and a more significant portion of the inferior L5 lamina. I exposed to the pedicle of L4 where the disk herniation was located. The underlying ligamentum flavum was freed with the micronerve hook from the underlying dura and removed with the 3 mm punch laterally, exposing the lateral dural tube and takeoff of the L5 nerve root. The L5 nerve root was noted to be dorsally displaced. I gently retracted the nerve root medially and found a subligamentous disk herniation directly on the takeoff of the nerve roots. An incision was made in the ligamentous tissue and the fragment immediately presented itself for removal.  The pituitary forceps were used to further explore the interspace and additional loose fragments were removed. The wound was copiously irrigated with antibiotic solution. 0.5% Marcaine in subcutaneous tissues for analgesia. The fascia was then reapproximated using interrupted 0 Vicryl sutures and interrupted 3-0 Vicryl sutures followed by a 4-0 Vicryl subcuticular suture were used to reapproximate the subcuticular layer. A sterile dressing was then applied. The patient was extubated in the operating room and transferred to the recovery room in stable condition. There were no complications. Colten Milan M.D.

## 2019-06-12 NOTE — H&P
I have reviewed the history and physical and examined the patient and find no relevant changes. I have reviewed with the patient and/or family the risks, benefits, and alternatives to the procedure. Nilsa Whalen MD  6/12/2019 No intake/output data recorded.

## 2019-06-12 NOTE — PROGRESS NOTES
Pt pulse ox between 85-95 on 2 L per NC. Pt Geovany Lubin here to see pt. Pt using IS up to 1000. Continue to use is and monitor.  Thom Garcia

## 2019-06-12 NOTE — ANESTHESIA PRE PROCEDURE
Department of Anesthesiology  Preprocedure Note       Name:  Aline Sevilla   Age:  62 y.o.  :  1960                                          MRN:  [id number]         Date:  2019      Surgeon:  Finn Cano MD    Procedure: MICROLUMBAR DISCECTOMY L4-5 (N/A )    HPI: The patient is a 62 y.o. female history of ADD, bipolar disorder, depression, prior gastric bypass surgery with chronic aching low back pain and a 2 week history of fairly constant/severe left buttock pain radiating to the left lateral hip/thigh to the calf. She reports tingling in the same distribution. Her back, left buttock and posterior lateral left hip pain are most bothersome. Symptoms are increased with walking, activity, transition and bending. Some relief with sitting and resting. Conservative care includes NSAIDs, prednisone, Flexeril. She denies any significant improvement. She also reports a 3 week history of intermittent urinary incontinence which she believes may be related to starting a new bipolar medication. She denies any bowel dysfunction or saddle anesthesia. Denies any progressive extremity weakness. Denies any groin pain. Lumbar x-rays from 2019 showed: mild to moderate DDD L2 to L5 with anterior spurring and facet arthropathy.     Medications prior to admission:    CALCIUM-MAGNESIUM-VITAMIN D PO Take 2 capsules by mouth nightly   mupirocin (BACTROBAN) 2 % ointment Apply 2cm to each nostril BID for 5 days prior to surgery   buPROPion (WELLBUTRIN XL) 150 MG extended release tablet Take 1 tablet by mouth daily   traZODone (DESYREL) 50 MG tablet Take 1 tablet by mouth nightly as needed for Sleep   rosuvastatin (CRESTOR) 5 MG tablet Take 1 tablet by mouth daily   lamoTRIgine (LAMICTAL) 200 MG tablet Take 100 mg by mouth 2 times daily    cetirizine (ZYRTEC) 10 MG tablet Take 10 mg by mouth daily   Turmeric Curcumin 500 MG CAPS Take 2 capsules by mouth 2 times daily    TRINTELLIX 20 MG TABS tablet TAKE 1 TABLET BY MOUTH DAILY   cyclobenzaprine (FLEXERIL) 10 MG tablet Take 1 tablet by mouth every 8 hours as needed for Muscle spasms   multivitamin (THERAGRAN) per tablet Take 1 tablet by mouth daily. Omega-3 Fatty Acids (FISH OIL) 1200 MG CAPS Take  by mouth daily.      Allergies:     Amoxicillin-Pot Clavulanate Hives    Daypro [Oxaprozin] Hives    Duravent-Da [Chlorphen-Phenyleph-Methscop] Hives    Lithium      Caused shakes    Norvasc [Amlodipine]      LE edema    Nsaids      Avoid d/t gastric bypass    Seldane [Terfenadine] Hives    Suprax [Cefixime] Hives    Levofloxacin Rash     Problem List:     Depressive disorder    PCOS (polycystic ovarian syndrome)    Osteoarthritis    Intention tremor    Hyperlipidemia    Restless legs syndrome (RLS)    Edema of both legs    Contusion of left lower leg    Erythema of lower limb    Insomnia    Primary osteoarthritis of right knee    Vitamin D deficiency    Morbid obesity with BMI of 50.0-59.9, adult (HCC)    Urinary incontinence    Lateral meniscal tear    Medial meniscus tear    Loose body in knee    Localized osteoarthrosis, lower leg    Primary osteoarthritis of both knees    Left knee pain    Mood disorder (HCC)    Severe episode of recurrent major depressive disorder, without psychotic features     GERD (gastroesophageal reflux disease)    RAD (reactive airway disease)    Hypertension    ANTOINETTE on CPAP    Lumbar stenosis with neurogenic claudication    HNP (herniated nucleus pulposus), lumbar     Past Medical History:     ADHD (attention deficit hyperactivity disorder)     Allergic rhinitis     Bipolar disorder     Borderline osteopenia     Depression     Dysmetabolic syndrome     GERD (gastroesophageal reflux disease)     Headache(784.0)     HNP (herniated nucleus pulposus), lumbar 6/6/2019    Hyperlipidemia 4/23/2014    Hypertension     Intention tremor     due to lithium    Lumbar stenosis with neurogenic claudication 2019    Obesity     ANTOINETTE on CPAP     Osteoarthritis     PCOS (polycystic ovarian syndrome)     RAD (reactive airway disease)     Restless legs syndrome (RLS) 2014     Past Surgical History:     ABDOMINAL EXPLORATION SURGERY       SECTION      GASTRIC BYPASS SURGERY      HAND SURGERY      bilat CTS    KNEE SURGERY      KNEE SURGERY Right 10/28/2015    Right knee video arthroscopy with partial medial and lateral menisectomy with loose body removal    TONSILLECTOMY       Social History:     Smoking status: Former Smoker     Packs/day: 1.50     Years: 21.00     Pack years: 31.50     Types: Cigarettes     Last attempt to quit: 1997     Years since quittin.3    Smokeless tobacco: Never Used   Substance Use Topics    Alcohol use: No                                Counseling given: Not Answered    Vital Signs (Current):    BP: 147/97 Pulse: 95   Resp: 16 SpO2:    Temp: 97.9 °F (36.6 °C)   Height: 5' 1\" (1.549 m)  (19) Weight: 312 lb (141.5 kg)  (19)   BMI: 59.1           BP Readings from Last 3 Encounters:   19 128/64   19 (!) 162/87   19 (!) 168/95     NPO Status: > 8 hrs    CBC:    WBC 7.5 2019    HGB 13.9 2019    HCT 44.6 2019     2019     CMP:     2019    K 4.5 2019     2019    CO2 25 2019    BUN 19 2019    CREATININE 0.7 2019    GLUCOSE 114 2019    PROT 6.9 2019    CALCIUM 9.3 2019    BILITOT 0.4 2019    ALKPHOS 104 2019    AST 17 2019    ALT 23 2019     Anesthesia Evaluation  Patient summary reviewed and Nursing notes reviewed  Airway: Mallampati: II  TM distance: >3 FB   Neck ROM: full  Comment:  Thick neck girth  Mouth opening: > = 3 FB Dental:          Pulmonary:   (+) sleep apnea: on CPAP,  asthma:     (-) recent URI and not a current smoker                           Cardiovascular:  Exercise tolerance: good (>4 METS), (+) hypertension:,     (-)  angina and  CARBALLO    ECG reviewed                        Neuro/Psych:   (+) headaches:, depression/anxiety    (-) seizures, TIA and CVA            ROS comment: See HPI GI/Hepatic/Renal:   (+) GERD: well controlled, morbid obesity     (-) no renal disease       Endo/Other:        (-) diabetes mellitus, hypothyroidism               Abdominal:           Vascular:     - DVT and PE. Anesthesia Plan      general     ASA 3       Induction: intravenous and rapid sequence. MIPS: Prophylactic antiemetics administered. Anesthetic plan and risks discussed with patient and sibling. Plan discussed with CRNA.           Kevin Lynn MD

## 2019-06-12 NOTE — LETTER
Billing and Coding Fee Ticket    Name:JACKSON Mendez  : 1960  Diagnosis: HNP L4-5 with radiculopathy, morbid obesity BMI 58.95  Surgeon: Rosario Amato    DOS: 19    Procedure:  1. Microlumbar laminotomy partial discectomy L4-5 [id number]  2.  Microscope and microsurgical technique [id number]

## 2019-06-13 ENCOUNTER — APPOINTMENT (OUTPATIENT)
Dept: GENERAL RADIOLOGY | Age: 59
DRG: 519 | End: 2019-06-13
Attending: ORTHOPAEDIC SURGERY
Payer: MEDICARE

## 2019-06-13 VITALS
HEART RATE: 101 BPM | BODY MASS INDEX: 53.92 KG/M2 | WEIGHT: 293 LBS | OXYGEN SATURATION: 92 % | TEMPERATURE: 98.3 F | HEIGHT: 62 IN | SYSTOLIC BLOOD PRESSURE: 106 MMHG | DIASTOLIC BLOOD PRESSURE: 74 MMHG | RESPIRATION RATE: 18 BRPM

## 2019-06-13 PROCEDURE — APPNB15 APP NON BILLABLE TIME 0-15 MINS: Performed by: PHYSICIAN ASSISTANT

## 2019-06-13 PROCEDURE — 2700000000 HC OXYGEN THERAPY PER DAY

## 2019-06-13 PROCEDURE — 6370000000 HC RX 637 (ALT 250 FOR IP): Performed by: ORTHOPAEDIC SURGERY

## 2019-06-13 PROCEDURE — 94761 N-INVAS EAR/PLS OXIMETRY MLT: CPT

## 2019-06-13 PROCEDURE — 71045 X-RAY EXAM CHEST 1 VIEW: CPT

## 2019-06-13 RX ADMIN — CETIRIZINE HYDROCHLORIDE 10 MG: 10 TABLET, FILM COATED ORAL at 09:11

## 2019-06-13 RX ADMIN — ROSUVASTATIN CALCIUM 5 MG: 10 TABLET, FILM COATED ORAL at 09:11

## 2019-06-13 RX ADMIN — LAMOTRIGINE 100 MG: 100 TABLET ORAL at 09:11

## 2019-06-13 RX ADMIN — DOCUSATE SODIUM 100 MG: 100 CAPSULE, LIQUID FILLED ORAL at 09:12

## 2019-06-13 RX ADMIN — BUPROPION HYDROCHLORIDE 150 MG: 150 TABLET, FILM COATED, EXTENDED RELEASE ORAL at 09:11

## 2019-06-13 ASSESSMENT — PAIN DESCRIPTION - ORIENTATION
ORIENTATION: LEFT
ORIENTATION: LOWER

## 2019-06-13 ASSESSMENT — PAIN DESCRIPTION - PAIN TYPE
TYPE: SURGICAL PAIN
TYPE: SURGICAL PAIN

## 2019-06-13 ASSESSMENT — PAIN SCALES - GENERAL
PAINLEVEL_OUTOF10: 2
PAINLEVEL_OUTOF10: 3

## 2019-06-13 ASSESSMENT — PAIN DESCRIPTION - LOCATION
LOCATION: BACK
LOCATION: BACK;HIP

## 2019-06-13 NOTE — PROGRESS NOTES
Hospitalist consult perfect served to Dr. Evita Upton at this time.  Added to treatment team as well.     -Shawn Calderon, NETTIE

## 2019-06-13 NOTE — PROGRESS NOTES
I agree with Lety Caldera's 4 eye skin assessment. She has a red rash between her breasts and some abrasions in her ankles. No pressure areas on elbows, heels, sacrum or coccyx.

## 2019-06-13 NOTE — PROGRESS NOTES
POD#1    Ms. Sherie Romero reports substantial improvement in her leg pain. She denies saddle anesthesia and new lower extremity, bowel or bladder symptoms. She denies SOB and chest pain. AF, VSS    her dressing is dry and her incision shows no signs of infection. She has 5/5 strength of her  EHLs, ankle DFs/PFs bilaterally. Her sensation is intact L3 to S1. Will ask hospitalist to see her if her O2 sats remain low. Hope to D/C to home today.

## 2019-06-13 NOTE — PROGRESS NOTES
[facility] MELONY BURDICK

## 2019-06-13 NOTE — PLAN OF CARE
Patient 's belongings will be kept in her reach, bed in low position, non skid slippers on and frequent visuals every 2 hours to prevent falls. Continuous pulse ox will be maintained and 02 per nc adjusted, IS and deep breathing encouraged. Lung sounds assessed every 4 hours. Pain will be assessed every 4 hours and pain med and repositioning encouraged. Voiding will be assessed and abdomin assessed for bladder distention.

## 2019-06-13 NOTE — PROGRESS NOTES
4 Eyes Skin Assessment     The patient is being assess for   Post-Op Surgical    I agree that 2 RN's have performed a thorough Head to Toe Skin Assessment on the patient. ALL assessment sites listed below have been assessed. Areas assessed by both nurses:   [x]   Head, Face, and Ears   [x]   Shoulders, Back, and Chest, Abdomen  [x]   Arms, Elbows, and Hands   [x]   Coccyx, Sacrum, and Ischium  [x]   Legs, Feet, and Heels        Redness under breast   Rash to left foot/ankle      **SHARE this note so that the co-signing nurse is able to place an eSignature**    Co-signer eSignature: Electronically signed by Eduardo Steinberg RN on 6/12/19 at 8:47 PM    Does the Patient have Skin Breakdown?   {Blank single:22296::\"No\",\"Yes LDA WOUND CARE was Initiated documentation include the [place]-wound, Wound Assessment, Measurements, Dressing Treatment, Drainage, and Color\",\"}          Dale Prevention initiated:  {YES/NO:19732}   Wound Care Orders initiated:  {YES/NO:19732}      [facility] nurse consulted for Pressure Injury (Stage 3,4, Unstageable, DTI, NWPT, Complex wounds)and New or Established Ostomies:  {YES/NO:19732}      Primary Nurse eSignature: {Esignature:[id number]}

## 2019-06-13 NOTE — CARE COORDINATION
CM attempted to meet with pt at bedside however pt soundly sleeping. CM notes discharge order and spoke to Amanda. There are no DCP needs identified at this time.

## 2019-06-13 NOTE — CONSULTS
2 tabs every 4 to 6 hours prn moderate pain 6/12/19 7/1/19 Yes Rowan Brothers MD   docusate sodium (COLACE) 100 MG capsule Take 1 capsule by mouth 2 times daily as needed for Constipation 6/12/19  Yes Rowan Brothers MD   CALCIUM-MAGNESIUM-VITAMIN D PO Take 2 capsules by mouth nightly   Yes Historical Provider, MD   buPROPion (WELLBUTRIN XL) 150 MG extended release tablet Take 1 tablet by mouth daily 4/11/19  Yes Shanda Velázquez MD   traZODone (DESYREL) 50 MG tablet Take 1 tablet by mouth nightly as needed for Sleep 4/10/19  Yes Shanda Velázquez MD   rosuvastatin (CRESTOR) 5 MG tablet Take 1 tablet by mouth daily 1/17/19  Yes MADINA Goss CNP   lamoTRIgine (LAMICTAL) 200 MG tablet Take 100 mg by mouth 2 times daily    Yes Historical Provider, MD   cetirizine (ZYRTEC) 10 MG tablet Take 10 mg by mouth daily   Yes Historical Provider, MD   Turmeric Curcumin 500 MG CAPS Take 2 capsules by mouth 2 times daily    Yes Historical Provider, MD   cyclobenzaprine (FLEXERIL) 10 MG tablet Take 1 tablet by mouth every 8 hours as needed for Muscle spasms 12/26/17  Yes MADINA Joyner CNP   multivitamin [facility]) per tablet Take 1 tablet by mouth daily. Yes Historical Provider, MD   Omega-3 Fatty Acids (FISH OIL) 1200 MG CAPS Take  by mouth daily. Yes Historical Provider, MD   TRINTELLIX 20 MG TABS tablet TAKE 1 TABLET BY MOUTH DAILY 7/12/18   MADINA Goss CNP       Allergies:  Amoxicillin-pot clavulanate; Daypro [oxaprozin]; Duravent-da [chlorphen-phenyleph-methscop]; Lithium; Norvasc [amlodipine]; Nsaids; Seldane [terfenadine]; Suprax [cefixime]; and Levofloxacin    Social History:      The patient currently lives at home. TOBACCO:   reports that she quit smoking about 22 years ago. Her smoking use included cigarettes. She has a 31.50 pack-year smoking history. She has never used smokeless tobacco.  ETOH:   reports that she does not drink alcohol.       Family History: Reviewed in detail. Positive as follows:        Problem Relation Age of Onset    Depression Sister     Mental Illness Sister     Diabetes Mother     Heart Disease Mother     Diabetes Father     Heart Disease Father        REVIEW OF SYSTEMS:   Pertinent positives as noted in the HPI. All other systems reviewed and negative. PHYSICAL EXAM PERFORMED:  /74   Pulse 101   Temp 98.3 °F (36.8 °C) (Oral)   Resp 18   Ht 5' 2\" (1.575 m)   Wt (!) 309 lb 1.6 oz (140.2 kg)   LMP  (LMP Unknown)   SpO2 92%   BMI 56.54 kg/m²      General appearance: Obese female in no apparent distress, appears stated age and cooperative. HEENT: Normal cephalic, atraumatic without obvious deformity. Pupils equal, round, and reactive to light. Extra ocular muscles intact. Conjunctivae/corneas clear. Neck: Supple, with full range of motion. No jugular venous distention. Trachea midline. Respiratory:  Normal respiratory effort. Clear to auscultation, bilaterally without Rales/Wheezes/Rhonchi. Cardiovascular: Regular rate and rhythm with normal S1/S2 without murmurs, rubs or gallops. Abdomen: Soft, non-tender, non-distended with normal bowel sounds. Musculoskeletal: No clubbing, cyanosis or edema bilaterally. Skin: Skin color, texture, turgor normal.  No rashes or lesions. Neurologic:  Neurovascularly intact without any focal sensory/motor deficits. Cranial nerves: II-XII intact, grossly non-focal.  Psychiatric: Alert and oriented, thought content appropriate, normal insight  Capillary Refill: Brisk,< 3 seconds   Peripheral Pulses: +2 palpable, equal bilaterally     Labs:   None for review.      Urinalysis:    Lab Results   Component Value Date    NITRU Negative 04/04/2019    WBCUA 3-5 04/04/2019    BACTERIA 2+ 04/04/2019    RBCUA 3-5 04/04/2019    BLOODU SMALL 04/04/2019    SPECGRAV 1.010 04/04/2019    GLUCOSEU Negative 04/04/2019    GLUCOSEU NEGATIVE 11/23/2010       Radiology: I have reviewed the radiology reports with the following interpretation:     XR CHEST PORTABLE   Final Result   No acute findings         FLUORO FOR SURGICAL PROCEDURES    (Results Pending)        ASSESSMENT:    Active Hospital Problems    Diagnosis Date Noted    Prolonged emergence from general anesthesia, initial encounter Brian Sridharyodit 06/12/2019       PLAN:    Herniated lumbar disc L4-5 status post hemilaminotomy and diskectomy on 6/12/16:  - Postoperative management per Orthopedic Surgery. Hypoxemia:  - Pt requiring 1.5 LPM. Now weaned to room air. CXR showed not acute abnormality. Likely secondary to atelectasis and obesity hypoventilation syndrome. Encourage IS, OOB to chair, ambulation. Morbid Obesity:   - With Body mass index is 56.54 kg/m². Complicating assessment and treatment. Placing patient at risk for multiple co-morbidities as well as early death and contributing to the patient's presentation. Counseled on weight loss. Hyperlipidemia:  - Continue statin. DVT Prophylaxis: Per primary team   Diet: DIET GENERAL;  Code Status: Prior    PT/OT Eval Status: Active and ongoing    Dispo - Per primary team.     Thank you for the consultation, will follow up as needed.     Jacquline Meckel CNP

## 2019-06-14 NOTE — ANESTHESIA POSTPROCEDURE EVALUATION
Department of Anesthesiology  Postprocedure Note    Patient: Ainsley Shelley  MRN: [id number]  YOB: 1960  Date of evaluation: 6/14/2019  Time:  5:45 PM     Procedure Summary     Date:  06/12/19 Room / Location:  [facility] / [facility]    Anesthesia Start:  1429 Anesthesia Stop:  1636    Procedure:  MICROLUMBAR DISCECTOMY L4-5 (N/A Spine Lumbar) Diagnosis:       Herniated nucleus pulposus, lumbar      (HERNIATED NUCLEUS PULPOSIS, LUMBAR)    Surgeon:  Carlota Pabon MD Responsible Provider:  Wyatt Shook MD    Anesthesia Type:  general ASA Status:  3          Anesthesia Type: general    Adamaris Phase I: Adamaris Score: 9    Adamaris Phase II:      Last vitals: Reviewed and per EMR flowsheets.        Anesthesia Post Evaluation    Patient location during evaluation: PACU  Level of consciousness: awake  Airway patency: patent  Nausea & Vomiting: no nausea  Complications: no  Cardiovascular status: blood pressure returned to baseline  Respiratory status: acceptable  Hydration status: euvolemic

## 2019-06-17 ENCOUNTER — TELEPHONE (OUTPATIENT)
Dept: FAMILY MEDICINE CLINIC | Age: 59
End: 2019-06-17

## 2019-06-18 NOTE — DISCHARGE SUMMARY
Physician Discharge Summary     Patient ID:  Lebron Herrera  [id number]  23 y.o.  1960    Admit date: 6/12/2019    Discharge date and time: 6/13/2019  3:50 PM   Patient was admitted over night for pain control and O2 monitoring. Admitting Physician: Mathew Black MD     Discharge Physician: Dr. Estefani Wright    Admission Diagnoses: Prolonged emergence from general anesthesia, initial encounter Jose Maldonado; Lumbar HNP    Discharge Diagnoses: Lumbar HNP    Admission Condition: good    Discharged Condition: good    Indication for Admission: Failed conservative treatment as outpatient for back and lower extremity pain including physical therapy, epidural injections, and pain medications. This patient was then electively scheduled microlumbar discectomy. She had decreased O2 stats after surgery and was admitted over night for observation. Surgical procedure: Left L4-5 hemilaminotomy and diskectomy    Consults: Hospitalist    This patient had no postoperative complications other than decreased O2 stats initially after surgery. She had assistance for ADL's, IV and PO pain medication for pain control and was eventually DC in stable condition. She had 5/5 motor strength of bilateral lower extremities at the time of discharge. Treatments: analgesia and surgery    Disposition: home    Patient Instructions:   [unfilled]  Activity: activity as tolerated  Diet: regular diet  Wound Care: keep wound clean and dry; No lifting over 10 lbs    Follow-up with Dr. Estefani Wright in 2 weeks.     Signed:  Carole Gutierrez  6/18/2019  9:54 AM

## 2019-06-20 ENCOUNTER — TELEPHONE (OUTPATIENT)
Dept: ORTHOPEDIC SURGERY | Age: 59
End: 2019-06-20

## 2019-06-27 ENCOUNTER — OFFICE VISIT (OUTPATIENT)
Dept: ORTHOPEDIC SURGERY | Age: 59
End: 2019-06-27

## 2019-06-27 VITALS — HEIGHT: 62 IN | BODY MASS INDEX: 53.92 KG/M2 | WEIGHT: 293 LBS

## 2019-06-27 DIAGNOSIS — Z98.890 S/P LUMBAR DISCECTOMY: Primary | ICD-10-CM

## 2019-06-27 PROCEDURE — 99024 POSTOP FOLLOW-UP VISIT: CPT | Performed by: PHYSICIAN ASSISTANT

## 2019-06-27 NOTE — PROGRESS NOTES
Ms. Park Paige returns today 2 weeks s/p MLD L4-5. She reports marked improvement of her left leg symptoms. She says her symptoms are more intermittent and less severe as before the surgery. She denies new lower extremity symptoms or bowel/bladder dysfunction. Her incisions show no signs of infection. She has a normal gait and 5/5 strength of her ankle DFs/PFs, bilaterally. Her sensation is intact from L3 to S1 bilaterally. I cautioned her to avoid lifting more than 10 pounds, bending and impact type aerobic exercise for 8 week after surgery, then slowly increase her activity over the next month. She will follow up PRN.     Will Ramirez PA-C

## 2019-07-12 ENCOUNTER — OFFICE VISIT (OUTPATIENT)
Dept: FAMILY MEDICINE CLINIC | Age: 59
End: 2019-07-12
Payer: MEDICARE

## 2019-07-12 VITALS
HEART RATE: 96 BPM | OXYGEN SATURATION: 97 % | HEIGHT: 62 IN | DIASTOLIC BLOOD PRESSURE: 90 MMHG | BODY MASS INDEX: 53.92 KG/M2 | WEIGHT: 293 LBS | SYSTOLIC BLOOD PRESSURE: 160 MMHG

## 2019-07-12 DIAGNOSIS — R25.3 MUSCLE TWITCHING: ICD-10-CM

## 2019-07-12 DIAGNOSIS — R60.0 LEG EDEMA, LEFT: ICD-10-CM

## 2019-07-12 DIAGNOSIS — D64.9 ANEMIA, UNSPECIFIED TYPE: ICD-10-CM

## 2019-07-12 DIAGNOSIS — F39 MOOD DISORDER (HCC): Primary | ICD-10-CM

## 2019-07-12 PROCEDURE — G8427 DOCREV CUR MEDS BY ELIG CLIN: HCPCS | Performed by: NURSE PRACTITIONER

## 2019-07-12 PROCEDURE — 1111F DSCHRG MED/CURRENT MED MERGE: CPT | Performed by: NURSE PRACTITIONER

## 2019-07-12 PROCEDURE — 99214 OFFICE O/P EST MOD 30 MIN: CPT | Performed by: NURSE PRACTITIONER

## 2019-07-12 PROCEDURE — G8417 CALC BMI ABV UP PARAM F/U: HCPCS | Performed by: NURSE PRACTITIONER

## 2019-07-12 PROCEDURE — 1036F TOBACCO NON-USER: CPT | Performed by: NURSE PRACTITIONER

## 2019-07-12 PROCEDURE — 3017F COLORECTAL CA SCREEN DOC REV: CPT | Performed by: NURSE PRACTITIONER

## 2019-07-12 RX ORDER — FLUTICASONE PROPIONATE 50 MCG
2 SPRAY, SUSPENSION (ML) NASAL DAILY
Qty: 1 BOTTLE | Refills: 5 | Status: SHIPPED | OUTPATIENT
Start: 2019-07-12 | End: 2019-12-31 | Stop reason: SDUPTHER

## 2019-07-12 RX ORDER — FEXOFENADINE HCL 180 MG/1
180 TABLET ORAL DAILY
Qty: 30 TABLET | Refills: 5 | Status: SHIPPED | OUTPATIENT
Start: 2019-07-12 | End: 2020-02-05 | Stop reason: ALTCHOICE

## 2019-07-12 NOTE — PROGRESS NOTES
Osteoarthritis     PCOS (polycystic ovarian syndrome)     RAD (reactive airway disease)     Restless legs syndrome (RLS) 2014     Family History   Problem Relation Age of Onset    Depression Sister     Mental Illness Sister     Diabetes Mother     Heart Disease Mother     Diabetes Father     Heart Disease Father      Past Surgical History:   Procedure Laterality Date    ABDOMINAL EXPLORATION SURGERY       SECTION      GASTRIC BYPASS SURGERY      HAND SURGERY      bilat CTS    KNEE SURGERY Left     atrhroscopic unispacer    KNEE SURGERY Right 10/28/2015    Right knee video arthroscopy with partial medial and lateral menisectomy with loose body removal    LUMBAR SPINE SURGERY N/A 2019    MICROLUMBAR DISCECTOMY L4-5 performed by Linda Reeves MD at [address] History     Socioeconomic History    Marital status: Legally      Spouse name: Not on file    Number of children: Not on file    Years of education: Not on file    Highest education level: Not on file   Occupational History    Not on file   Social Needs    Financial resource strain: Not on file    Food insecurity:     Worry: Not on file     Inability: Not on file    Transportation needs:     Medical: Not on file     Non-medical: Not on file   Tobacco Use    Smoking status: Former Smoker     Packs/day: 1.50     Years: 21.00     Pack years: 31.50     Types: Cigarettes     Last attempt to quit: 1997     Years since quittin.4    Smokeless tobacco: Never Used   Substance and Sexual Activity    Alcohol use: No    Drug use: No    Sexual activity: Not Currently   Lifestyle    Physical activity:     Days per week: Not on file     Minutes per session: Not on file    Stress: Not on file   Relationships    Social connections:     Talks on phone: Not on file     Gets together: Not on file     Attends Sabian[employer] service: Not on file     Active member of club or organization: Not on on 07/12/19. Plan:       Jessica Mayberry was seen today for discuss medications and leg pain. Diagnoses and all orders for this visit:    Mood disorder ([facility] [address]  -     Vitamin B12 & Folate; Future  -     TSH with Reflex; Future  -     LAMOTRIGINE LEVEL; Future  Educated if patient develops SI/HI/chapito to call 911 or go to ER. Discussed use, benefit, risks and side effects of prescribed medications. Barriers to compliance discussed. All patient questions answered. Pt voiced understanding. Agreeable to take over medication management with the understanding if she becomes unstable with current regimen she will need to return to psychiatry    Muscle twitching  -     Ferritin; Future  -     Iron and TIBC; Future  -     TSH with Reflex; Future  -     MAGNESIUM; Future    Leg edema, left  -     Comprehensive Metabolic Panel; Future    Anemia, unspecified type  -     Ferritin; Future  -     Iron and TIBC; Future    Other orders  -     fexofenadine (ALLEGRA ALLERGY) 180 MG tablet; Take 1 tablet by mouth daily  -     fluticasone (FLONASE) 50 MCG/ACT nasal spray; 2 sprays by Each Nostril route daily      Patient has been instructed call the office immediately with new symptoms, change in symptoms or worseningof symptoms. If this is not feasible, patient is instructed to report to the emergency room. Medication profile reviewed. Medication side effects and possible impairments from medications were discussed as applicable. Allergies were reviewed. Health maintenance was reviewed and updated as appropriate.

## 2019-07-23 ENCOUNTER — OFFICE VISIT (OUTPATIENT)
Dept: ORTHOPEDIC SURGERY | Age: 59
End: 2019-07-23
Payer: MEDICARE

## 2019-07-23 ENCOUNTER — HOSPITAL ENCOUNTER (OUTPATIENT)
Age: 59
Discharge: HOME | End: 2019-07-23
Payer: MEDICARE

## 2019-07-23 VITALS — BODY MASS INDEX: 53.92 KG/M2 | WEIGHT: 293 LBS | HEIGHT: 62 IN

## 2019-07-23 DIAGNOSIS — R25.3 MUSCLE TWITCHING: ICD-10-CM

## 2019-07-23 DIAGNOSIS — D64.9 ANEMIA, UNSPECIFIED TYPE: ICD-10-CM

## 2019-07-23 DIAGNOSIS — M17.11 PRIMARY OSTEOARTHRITIS OF RIGHT KNEE: ICD-10-CM

## 2019-07-23 DIAGNOSIS — R52 PAIN: Primary | ICD-10-CM

## 2019-07-23 DIAGNOSIS — R60.0 LEG EDEMA, LEFT: ICD-10-CM

## 2019-07-23 DIAGNOSIS — F39 MOOD DISORDER (HCC): ICD-10-CM

## 2019-07-23 LAB
A/G RATIO: 1.4 (ref 1.1–2.2)
ALBUMIN SERPL-MCNC: 4.3 G/DL (ref 3.4–5)
ALP BLD-CCNC: 129 U/L (ref 40–129)
ALT SERPL-CCNC: 21 U/L (ref 10–40)
ANION GAP SERPL CALCULATED.3IONS-SCNC: 12 MMOL/L (ref 3–16)
AST SERPL-CCNC: 19 U/L (ref 15–37)
BILIRUB SERPL-MCNC: <0.2 MG/DL (ref 0–1)
BUN BLDV-MCNC: 18 MG/DL (ref 7–20)
CALCIUM SERPL-MCNC: 9.6 MG/DL (ref 8.3–10.6)
CHLORIDE BLD-SCNC: 107 MMOL/L (ref 99–110)
CO2: 24 MMOL/L (ref 21–32)
CREAT SERPL-MCNC: 0.8 MG/DL (ref 0.6–1.1)
GFR AFRICAN AMERICAN: >60
GFR NON-AFRICAN AMERICAN: >60
GLOBULIN: 3.1 G/DL
GLUCOSE BLD-MCNC: 164 MG/DL (ref 70–99)
MAGNESIUM: 2.1 MG/DL (ref 1.8–2.4)
POTASSIUM SERPL-SCNC: 4.2 MMOL/L (ref 3.5–5.1)
SODIUM BLD-SCNC: 143 MMOL/L (ref 136–145)
TOTAL PROTEIN: 7.4 G/DL (ref 6.4–8.2)

## 2019-07-23 PROCEDURE — 82746 ASSAY OF FOLIC ACID SERUM: CPT

## 2019-07-23 PROCEDURE — 82607 VITAMIN B-12: CPT

## 2019-07-23 PROCEDURE — 1036F TOBACCO NON-USER: CPT | Performed by: ORTHOPAEDIC SURGERY

## 2019-07-23 PROCEDURE — 80053 COMPREHEN METABOLIC PANEL: CPT

## 2019-07-23 PROCEDURE — G8427 DOCREV CUR MEDS BY ELIG CLIN: HCPCS | Performed by: ORTHOPAEDIC SURGERY

## 2019-07-23 PROCEDURE — 83735 ASSAY OF MAGNESIUM: CPT

## 2019-07-23 PROCEDURE — G8417 CALC BMI ABV UP PARAM F/U: HCPCS | Performed by: ORTHOPAEDIC SURGERY

## 2019-07-23 PROCEDURE — 84443 ASSAY THYROID STIM HORMONE: CPT

## 2019-07-23 PROCEDURE — 36415 COLL VENOUS BLD VENIPUNCTURE: CPT

## 2019-07-23 PROCEDURE — 83550 IRON BINDING TEST: CPT

## 2019-07-23 PROCEDURE — 83540 ASSAY OF IRON: CPT

## 2019-07-23 PROCEDURE — 99213 OFFICE O/P EST LOW 20 MIN: CPT | Performed by: ORTHOPAEDIC SURGERY

## 2019-07-23 PROCEDURE — 3017F COLORECTAL CA SCREEN DOC REV: CPT | Performed by: ORTHOPAEDIC SURGERY

## 2019-07-23 PROCEDURE — 82728 ASSAY OF FERRITIN: CPT

## 2019-07-23 PROCEDURE — 80175 DRUG SCREEN QUAN LAMOTRIGINE: CPT

## 2019-07-23 NOTE — PROGRESS NOTES
Ipsilateral and contralateral straight leg raising tests are negative. The distal neurovascular exam is grossly intact and symmetric. X-RAYS: 4 views weightbearing AP, lateral. PA and sunrise of the right knee were reviewed, they show no periosteal reaction, medullary lesions. Osteopenia is noted throughout, she has end-stage osteoarthritis of the right knee. She has tricompartmental joint space narrowing, marginal osteophytes, subchondral cysts and sclerosis. Incidentally noted is a left medial compartment spacer with possible loosening. No evidence of fracture or dislocation. Assessment :  Right knee osteoarthritis, left knee status post medial compartment spacer with possible loosening    Impression:  Encounter Diagnoses   Name Primary?  Pain Yes    Primary osteoarthritis of right knee        Office Procedures:  No orders of the defined types were placed in this encounter. No orders of the defined types were placed in this encounter. Treatment Plan:  A lengthy discussion about her end-stage osteoarthritis of the right treatment options ranging from nonoperative, conservative to surgical.  Conservative treatment options include cortisone and Visco supplementation injections, medial  brace and physical therapy to maintain motion and strength. Surgical treatment options include total knee arthroplasty, unfortunately at this point her BMI is 57 which increases her risk of complication from total knee arthroplasty significantly. Ideally her BMI should be less than 40, which is a 97 pound weight loss. While she is undergoing weight loss we can continue to treat her symptomatically with cortisone and Visco supplementation injections. Patient states she has not had success with Visco supplementation injections in the past, she would like to proceed with a second cortisone injection today. She will continue with weight loss .   Patient agrees with this plan, all of their questions

## 2019-07-24 LAB
FERRITIN: 33.8 NG/ML (ref 15–150)
FOLATE: 19.79 NG/ML (ref 4.78–24.2)
IRON SATURATION: 8 % (ref 15–50)
IRON: 32 UG/DL (ref 37–145)
TOTAL IRON BINDING CAPACITY: 397 UG/DL (ref 260–445)
TSH REFLEX: 1.14 UIU/ML (ref 0.27–4.2)
VITAMIN B-12: 513 PG/ML (ref 211–911)

## 2019-07-25 LAB — LAMOTRIGINE LEVEL: 1.2 UG/ML (ref 2.5–15)

## 2019-07-29 ASSESSMENT — ENCOUNTER SYMPTOMS
RESPIRATORY NEGATIVE: 1
EYES NEGATIVE: 1
GASTROINTESTINAL NEGATIVE: 1

## 2019-08-01 ENCOUNTER — TELEPHONE (OUTPATIENT)
Dept: ORTHOPEDIC SURGERY | Age: 59
End: 2019-08-01

## 2019-08-05 RX ORDER — METHYLPREDNISOLONE 4 MG/1
TABLET ORAL
Qty: 1 KIT | Refills: 0 | Status: SHIPPED | OUTPATIENT
Start: 2019-08-05 | End: 2019-08-11

## 2019-08-19 ENCOUNTER — TELEPHONE (OUTPATIENT)
Dept: FAMILY MEDICINE CLINIC | Age: 59
End: 2019-08-19

## 2019-08-21 ENCOUNTER — OFFICE VISIT (OUTPATIENT)
Dept: ORTHOPEDIC SURGERY | Age: 59
End: 2019-08-21

## 2019-08-21 VITALS — BODY MASS INDEX: 53.92 KG/M2 | HEIGHT: 62 IN | WEIGHT: 293 LBS

## 2019-08-21 DIAGNOSIS — M51.16 LUMBAR DISC HERNIATION WITH RADICULOPATHY: Primary | ICD-10-CM

## 2019-08-21 PROCEDURE — 99024 POSTOP FOLLOW-UP VISIT: CPT | Performed by: ORTHOPAEDIC SURGERY

## 2019-08-21 RX ORDER — BUPROPION HYDROCHLORIDE 150 MG/1
150 TABLET ORAL DAILY
Qty: 30 TABLET | Refills: 0 | Status: SHIPPED | OUTPATIENT
Start: 2019-08-21 | End: 2019-09-28 | Stop reason: SDUPTHER

## 2019-08-21 RX ORDER — METHYLPREDNISOLONE 16 MG/1
TABLET ORAL
Qty: 12 TABLET | Refills: 0 | Status: SHIPPED | OUTPATIENT
Start: 2019-08-21 | End: 2019-10-17

## 2019-08-21 RX ORDER — FERROUS SULFATE 325(65) MG
325 TABLET ORAL
Qty: 30 TABLET | Refills: 5 | Status: SHIPPED | OUTPATIENT
Start: 2019-08-21 | End: 2019-10-29 | Stop reason: SDUPTHER

## 2019-08-26 DIAGNOSIS — M51.16 LUMBAR DISC HERNIATION WITH RADICULOPATHY: Primary | ICD-10-CM

## 2019-08-28 ENCOUNTER — HOSPITAL ENCOUNTER (OUTPATIENT)
Dept: MRI IMAGING | Age: 59
Discharge: HOME | End: 2019-08-28
Payer: MEDICARE

## 2019-08-28 DIAGNOSIS — M51.16 LUMBAR DISC HERNIATION WITH RADICULOPATHY: ICD-10-CM

## 2019-08-28 PROCEDURE — 6360000004 HC RX CONTRAST MEDICATION: Performed by: ORTHOPAEDIC SURGERY

## 2019-08-28 PROCEDURE — 72158 MRI LUMBAR SPINE W/O & W/DYE: CPT

## 2019-08-28 PROCEDURE — A9579 GAD-BASE MR CONTRAST NOS,1ML: HCPCS | Performed by: ORTHOPAEDIC SURGERY

## 2019-08-28 RX ADMIN — GADOTERIDOL 30 ML: 279.3 INJECTION, SOLUTION INTRAVENOUS at 16:44

## 2019-08-30 ENCOUNTER — TELEPHONE (OUTPATIENT)
Dept: ORTHOPEDIC SURGERY | Age: 59
End: 2019-08-30

## 2019-09-03 RX ORDER — DICLOFENAC SODIUM 75 MG/1
TABLET, DELAYED RELEASE ORAL
Qty: 60 TABLET | Refills: 5 | Status: SHIPPED | OUTPATIENT
Start: 2019-09-03 | End: 2019-10-17 | Stop reason: SDUPTHER

## 2019-09-05 ENCOUNTER — TELEPHONE (OUTPATIENT)
Dept: ORTHOPEDIC SURGERY | Age: 59
End: 2019-09-05

## 2019-09-05 RX ORDER — GABAPENTIN 300 MG/1
300 CAPSULE ORAL 3 TIMES DAILY
Qty: 90 CAPSULE | Refills: 0 | Status: SHIPPED | OUTPATIENT
Start: 2019-09-05 | End: 2019-09-28 | Stop reason: SDUPTHER

## 2019-09-10 ENCOUNTER — PRE-EVALUATION (OUTPATIENT)
Dept: ORTHOPEDIC SURGERY | Age: 59
End: 2019-09-10

## 2019-09-10 ENCOUNTER — OFFICE VISIT (OUTPATIENT)
Dept: ORTHOPEDIC SURGERY | Age: 59
End: 2019-09-10

## 2019-09-10 VITALS — BODY MASS INDEX: 53.92 KG/M2 | HEIGHT: 62 IN | WEIGHT: 293 LBS

## 2019-09-10 DIAGNOSIS — M51.26 HNP (HERNIATED NUCLEUS PULPOSUS), LUMBAR: Primary | ICD-10-CM

## 2019-09-10 DIAGNOSIS — M51.26 HNP (HERNIATED NUCLEUS PULPOSUS), LUMBAR: ICD-10-CM

## 2019-09-10 PROCEDURE — 99024 POSTOP FOLLOW-UP VISIT: CPT | Performed by: ORTHOPAEDIC SURGERY

## 2019-09-10 PROCEDURE — APPSS15 APP SPLIT SHARED TIME 0-15 MINUTES: Performed by: PHYSICIAN ASSISTANT

## 2019-09-10 RX ORDER — OXYCODONE HYDROCHLORIDE AND ACETAMINOPHEN 5; 325 MG/1; MG/1
1 TABLET ORAL EVERY 6 HOURS PRN
Qty: 28 TABLET | Refills: 0 | Status: SHIPPED | OUTPATIENT
Start: 2019-09-10 | End: 2019-09-17

## 2019-09-10 NOTE — PROGRESS NOTES
Ms. Jr Angel returns today 3 months s/p left L4-5 hemilaminotomy and diskectomy. She did very well initially after surgery, but states her pain returned approximately 3 weeks ago. She says her symptoms are in the same distribution but more severe than the pain she had prior to surgery. The pain radiates into her left anterior thigh and lateral shin to her foot. Pain is worst with standing, relieved with lying down. She denies right lower extremity symptoms. She notes bladder urgency and occasional incontinence when going from sitting to standing. She has tried two oral prednisone tapers without significant relief. She notes some relief with Gabapentin. Her incisions show no signs of infection. She presents today in a wheelchair due to severe left leg pain. She has 5/5 strength of her ankle DFs/PFs, bilaterally and 5/5 strength of her quads bilaterally. Her sensation is intact from L3 to S1 bilaterally. She has a positive straight leg raise on the right    A/P  Recurrent Lumbar HNP L4-5    We discussed treatment options including observation, additional oral steroids, physical therapy, epidural injection and microlumbar discectomy L4-5. She wishes to proceed with microdiscectomy. We discussed the risks, benefits, and alternatives to surgery including the risks of nerve or vessel injury, paralysis, spinal blood clot, spinal fluid leak, death, infection, need for additional surgery for recurrent herniation or low back pain, failure of surgery to alleviate her symptoms and worse symptoms following surgery. She understands these risks and wishes to proceed with surgery. Her questions were answered. She was instructed to call us emergently if she begins to experience bowel or bladder dysfunction, saddle anesthesia, increasing muscle weakness, or worsening leg symptoms.

## 2019-09-24 ENCOUNTER — TELEPHONE (OUTPATIENT)
Dept: FAMILY MEDICINE CLINIC | Age: 59
End: 2019-09-24

## 2019-09-24 NOTE — TELEPHONE ENCOUNTER
Pt called stating that she was to call PCP is she was experiencing side effects from her medication. Pt states she having terrible tremors in her hands and she's constantly licking her lips. Pt stated PCP would call in something to counter the side effects. Pt uses [id number].  Call back [phone]

## 2019-09-25 RX ORDER — BENZTROPINE MESYLATE 1 MG/1
1 TABLET ORAL 2 TIMES DAILY
Qty: 60 TABLET | Refills: 0 | Status: SHIPPED | OUTPATIENT
Start: 2019-09-25 | End: 2019-10-17

## 2019-09-25 RX ORDER — BENZTROPINE MESYLATE 1 MG/1
1 TABLET ORAL DAILY
Qty: 60 TABLET | Refills: 3 | Status: SHIPPED | OUTPATIENT
Start: 2019-09-25 | End: 2019-10-17 | Stop reason: SDUPTHER

## 2019-09-30 RX ORDER — BUPROPION HYDROCHLORIDE 150 MG/1
150 TABLET ORAL DAILY
Qty: 30 TABLET | Refills: 2 | Status: SHIPPED | OUTPATIENT
Start: 2019-09-30 | End: 2019-10-17 | Stop reason: SDUPTHER

## 2019-10-10 ENCOUNTER — TELEPHONE (OUTPATIENT)
Dept: ORTHOPEDIC SURGERY | Age: 59
End: 2019-10-10

## 2019-10-11 ENCOUNTER — TELEPHONE (OUTPATIENT)
Dept: FAMILY MEDICINE CLINIC | Age: 59
End: 2019-10-11

## 2019-10-17 ENCOUNTER — OFFICE VISIT (OUTPATIENT)
Dept: FAMILY MEDICINE CLINIC | Age: 59
End: 2019-10-17
Payer: MEDICARE

## 2019-10-17 VITALS
OXYGEN SATURATION: 98 % | WEIGHT: 293 LBS | SYSTOLIC BLOOD PRESSURE: 155 MMHG | DIASTOLIC BLOOD PRESSURE: 104 MMHG | HEART RATE: 83 BPM | HEIGHT: 62 IN | BODY MASS INDEX: 53.92 KG/M2

## 2019-10-17 DIAGNOSIS — M51.26 HNP (HERNIATED NUCLEUS PULPOSUS), LUMBAR: ICD-10-CM

## 2019-10-17 DIAGNOSIS — Z99.89 OSA ON CPAP: ICD-10-CM

## 2019-10-17 DIAGNOSIS — E78.49 OTHER HYPERLIPIDEMIA: ICD-10-CM

## 2019-10-17 DIAGNOSIS — E66.01 MORBID OBESITY WITH BMI OF 50.0-59.9, ADULT (HCC): ICD-10-CM

## 2019-10-17 DIAGNOSIS — G25.2 INTENTION TREMOR: ICD-10-CM

## 2019-10-17 DIAGNOSIS — F39 MOOD DISORDER (HCC): ICD-10-CM

## 2019-10-17 DIAGNOSIS — Z01.818 PRE-OP EXAMINATION: Primary | ICD-10-CM

## 2019-10-17 DIAGNOSIS — E55.9 VITAMIN D DEFICIENCY: ICD-10-CM

## 2019-10-17 DIAGNOSIS — G47.33 OSA ON CPAP: ICD-10-CM

## 2019-10-17 DIAGNOSIS — G25.81 RESTLESS LEGS SYNDROME (RLS): ICD-10-CM

## 2019-10-17 DIAGNOSIS — F33.9 RECURRENT MAJOR DEPRESSIVE DISORDER, REMISSION STATUS UNSPECIFIED (HCC): ICD-10-CM

## 2019-10-17 DIAGNOSIS — E28.2 PCOS (POLYCYSTIC OVARIAN SYNDROME): ICD-10-CM

## 2019-10-17 DIAGNOSIS — I10 ESSENTIAL HYPERTENSION: ICD-10-CM

## 2019-10-17 LAB
A/G RATIO: 1.9 (ref 1.1–2.2)
ALBUMIN SERPL-MCNC: 4.4 G/DL (ref 3.4–5)
ALP BLD-CCNC: 127 U/L (ref 40–129)
ALT SERPL-CCNC: 20 U/L (ref 10–40)
ANION GAP SERPL CALCULATED.3IONS-SCNC: 12 MMOL/L (ref 3–16)
AST SERPL-CCNC: 16 U/L (ref 15–37)
BASOPHILS ABSOLUTE: 0.1 K/UL (ref 0–0.2)
BASOPHILS RELATIVE PERCENT: 0.9 %
BILIRUB SERPL-MCNC: 0.3 MG/DL (ref 0–1)
BUN BLDV-MCNC: 18 MG/DL (ref 7–20)
CALCIUM SERPL-MCNC: 9.3 MG/DL (ref 8.3–10.6)
CHLORIDE BLD-SCNC: 102 MMOL/L (ref 99–110)
CO2: 30 MMOL/L (ref 21–32)
CREAT SERPL-MCNC: 0.7 MG/DL (ref 0.6–1.1)
EOSINOPHILS ABSOLUTE: 0.2 K/UL (ref 0–0.6)
EOSINOPHILS RELATIVE PERCENT: 2.7 %
GFR AFRICAN AMERICAN: >60
GFR NON-AFRICAN AMERICAN: >60
GLOBULIN: 2.3 G/DL
GLUCOSE BLD-MCNC: 102 MG/DL (ref 70–99)
HCT VFR BLD CALC: 41.5 % (ref 36–48)
HEMOGLOBIN: 13.5 G/DL (ref 12–16)
LYMPHOCYTES ABSOLUTE: 1.5 K/UL (ref 1–5.1)
LYMPHOCYTES RELATIVE PERCENT: 20.1 %
MCH RBC QN AUTO: 28.2 PG (ref 26–34)
MCHC RBC AUTO-ENTMCNC: 32.5 G/DL (ref 31–36)
MCV RBC AUTO: 86.6 FL (ref 80–100)
MONOCYTES ABSOLUTE: 0.6 K/UL (ref 0–1.3)
MONOCYTES RELATIVE PERCENT: 8.5 %
NEUTROPHILS ABSOLUTE: 5 K/UL (ref 1.7–7.7)
NEUTROPHILS RELATIVE PERCENT: 67.8 %
PDW BLD-RTO: 16.5 % (ref 12.4–15.4)
PLATELET # BLD: 298 K/UL (ref 135–450)
PMV BLD AUTO: 7.5 FL (ref 5–10.5)
POTASSIUM SERPL-SCNC: 4.7 MMOL/L (ref 3.5–5.1)
RBC # BLD: 4.79 M/UL (ref 4–5.2)
SODIUM BLD-SCNC: 144 MMOL/L (ref 136–145)
TOTAL PROTEIN: 6.7 G/DL (ref 6.4–8.2)
WBC # BLD: 7.3 K/UL (ref 4–11)

## 2019-10-17 PROCEDURE — 1036F TOBACCO NON-USER: CPT | Performed by: NURSE PRACTITIONER

## 2019-10-17 PROCEDURE — 99213 OFFICE O/P EST LOW 20 MIN: CPT | Performed by: NURSE PRACTITIONER

## 2019-10-17 PROCEDURE — G8484 FLU IMMUNIZE NO ADMIN: HCPCS | Performed by: NURSE PRACTITIONER

## 2019-10-17 PROCEDURE — 3017F COLORECTAL CA SCREEN DOC REV: CPT | Performed by: NURSE PRACTITIONER

## 2019-10-17 PROCEDURE — G8417 CALC BMI ABV UP PARAM F/U: HCPCS | Performed by: NURSE PRACTITIONER

## 2019-10-17 PROCEDURE — 36415 COLL VENOUS BLD VENIPUNCTURE: CPT | Performed by: NURSE PRACTITIONER

## 2019-10-17 PROCEDURE — G8427 DOCREV CUR MEDS BY ELIG CLIN: HCPCS | Performed by: NURSE PRACTITIONER

## 2019-10-17 RX ORDER — BENZTROPINE MESYLATE 1 MG/1
1 TABLET ORAL DAILY
Qty: 60 TABLET | Refills: 3 | Status: SHIPPED | OUTPATIENT
Start: 2019-10-17 | End: 2019-12-09 | Stop reason: SDUPTHER

## 2019-10-17 RX ORDER — LAMOTRIGINE 200 MG/1
100 TABLET ORAL 2 TIMES DAILY
Qty: 60 TABLET | Refills: 2 | Status: SHIPPED | OUTPATIENT
Start: 2019-10-17 | End: 2020-03-19

## 2019-10-17 RX ORDER — ROSUVASTATIN CALCIUM 5 MG/1
5 TABLET, COATED ORAL DAILY
Qty: 30 TABLET | Refills: 5 | Status: SHIPPED | OUTPATIENT
Start: 2019-10-17 | End: 2020-03-19

## 2019-10-17 RX ORDER — BUPROPION HYDROCHLORIDE 150 MG/1
150 TABLET ORAL DAILY
Qty: 30 TABLET | Refills: 2 | Status: SHIPPED | OUTPATIENT
Start: 2019-10-17 | End: 2019-12-30

## 2019-10-17 RX ORDER — DICLOFENAC SODIUM 75 MG/1
TABLET, DELAYED RELEASE ORAL
Qty: 60 TABLET | Refills: 5 | Status: SHIPPED | OUTPATIENT
Start: 2019-10-17 | End: 2020-03-19

## 2019-10-29 ENCOUNTER — TELEPHONE (OUTPATIENT)
Dept: ORTHOPEDIC SURGERY | Age: 59
End: 2019-10-29

## 2019-10-30 RX ORDER — FERROUS SULFATE 325(65) MG
325 TABLET ORAL
Qty: 90 TABLET | Refills: 3 | Status: SHIPPED | OUTPATIENT
Start: 2019-10-30 | End: 2020-02-05 | Stop reason: SDUPTHER

## 2019-10-30 RX ORDER — GABAPENTIN 300 MG/1
300 CAPSULE ORAL 3 TIMES DAILY
Qty: 90 CAPSULE | Refills: 0 | Status: SHIPPED | OUTPATIENT
Start: 2019-10-30 | End: 2020-01-15

## 2019-10-31 RX ORDER — NADOLOL 40 MG/1
40 TABLET ORAL DAILY
Qty: 90 TABLET | Refills: 1 | Status: SHIPPED | OUTPATIENT
Start: 2019-10-31 | End: 2019-11-06 | Stop reason: ALTCHOICE

## 2019-10-31 RX ORDER — NADOLOL 80 MG/1
80 TABLET ORAL DAILY
Qty: 30 TABLET | Refills: 3 | Status: CANCELLED | OUTPATIENT
Start: 2019-10-31

## 2019-11-04 ENCOUNTER — OFFICE VISIT (OUTPATIENT)
Dept: FAMILY MEDICINE CLINIC | Age: 59
End: 2019-11-04
Payer: MEDICARE

## 2019-11-04 VITALS
HEART RATE: 67 BPM | WEIGHT: 293 LBS | DIASTOLIC BLOOD PRESSURE: 84 MMHG | SYSTOLIC BLOOD PRESSURE: 138 MMHG | HEIGHT: 68 IN | BODY MASS INDEX: 44.41 KG/M2

## 2019-11-04 DIAGNOSIS — E78.49 OTHER HYPERLIPIDEMIA: ICD-10-CM

## 2019-11-04 DIAGNOSIS — F51.01 PRIMARY INSOMNIA: ICD-10-CM

## 2019-11-04 DIAGNOSIS — I10 ESSENTIAL HYPERTENSION: ICD-10-CM

## 2019-11-04 DIAGNOSIS — R25.1 TREMOR OF BOTH HANDS: ICD-10-CM

## 2019-11-04 DIAGNOSIS — D50.9 IRON DEFICIENCY ANEMIA, UNSPECIFIED IRON DEFICIENCY ANEMIA TYPE: ICD-10-CM

## 2019-11-04 DIAGNOSIS — E55.9 VITAMIN D DEFICIENCY: ICD-10-CM

## 2019-11-04 DIAGNOSIS — Z12.11 SCREENING FOR COLON CANCER: ICD-10-CM

## 2019-11-04 DIAGNOSIS — Z23 FLU VACCINE NEED: ICD-10-CM

## 2019-11-04 DIAGNOSIS — F39 MOOD DISORDER (HCC): Primary | ICD-10-CM

## 2019-11-04 DIAGNOSIS — Z23 NEED FOR SHINGLES VACCINE: ICD-10-CM

## 2019-11-04 DIAGNOSIS — G25.81 RESTLESS LEGS SYNDROME (RLS): ICD-10-CM

## 2019-11-04 DIAGNOSIS — R73.9 HYPERGLYCEMIA: ICD-10-CM

## 2019-11-04 LAB
FERRITIN: 49 NG/ML (ref 15–150)
IRON SATURATION: 24 % (ref 15–50)
IRON: 90 UG/DL (ref 37–145)
TOTAL IRON BINDING CAPACITY: 374 UG/DL (ref 260–445)

## 2019-11-04 PROCEDURE — 36415 COLL VENOUS BLD VENIPUNCTURE: CPT | Performed by: NURSE PRACTITIONER

## 2019-11-04 PROCEDURE — 1036F TOBACCO NON-USER: CPT | Performed by: NURSE PRACTITIONER

## 2019-11-04 PROCEDURE — G8417 CALC BMI ABV UP PARAM F/U: HCPCS | Performed by: NURSE PRACTITIONER

## 2019-11-04 PROCEDURE — G8427 DOCREV CUR MEDS BY ELIG CLIN: HCPCS | Performed by: NURSE PRACTITIONER

## 2019-11-04 PROCEDURE — 3017F COLORECTAL CA SCREEN DOC REV: CPT | Performed by: NURSE PRACTITIONER

## 2019-11-04 PROCEDURE — G0444 DEPRESSION SCREEN ANNUAL: HCPCS | Performed by: NURSE PRACTITIONER

## 2019-11-04 PROCEDURE — 99215 OFFICE O/P EST HI 40 MIN: CPT | Performed by: NURSE PRACTITIONER

## 2019-11-04 PROCEDURE — G8484 FLU IMMUNIZE NO ADMIN: HCPCS | Performed by: NURSE PRACTITIONER

## 2019-11-04 RX ORDER — TRAZODONE HYDROCHLORIDE 150 MG/1
150 TABLET ORAL NIGHTLY
COMMUNITY
Start: 2019-11-04 | End: 2020-01-15

## 2019-11-04 ASSESSMENT — PATIENT HEALTH QUESTIONNAIRE - PHQ9
9. THOUGHTS THAT YOU WOULD BE BETTER OFF DEAD, OR OF HURTING YOURSELF: 0
1. LITTLE INTEREST OR PLEASURE IN DOING THINGS: 1
4. FEELING TIRED OR HAVING LITTLE ENERGY: 1
5. POOR APPETITE OR OVEREATING: 0
2. FEELING DOWN, DEPRESSED OR HOPELESS: 0
8. MOVING OR SPEAKING SO SLOWLY THAT OTHER PEOPLE COULD HAVE NOTICED. OR THE OPPOSITE, BEING SO FIGETY OR RESTLESS THAT YOU HAVE BEEN MOVING AROUND A LOT MORE THAN USUAL: 1
10. IF YOU CHECKED OFF ANY PROBLEMS, HOW DIFFICULT HAVE THESE PROBLEMS MADE IT FOR YOU TO DO YOUR WORK, TAKE CARE OF THINGS AT HOME, OR GET ALONG WITH OTHER PEOPLE: 1
3. TROUBLE FALLING OR STAYING ASLEEP: 2
6. FEELING BAD ABOUT YOURSELF - OR THAT YOU ARE A FAILURE OR HAVE LET YOURSELF OR YOUR FAMILY DOWN: 0
SUM OF ALL RESPONSES TO PHQ9 QUESTIONS 1 & 2: 1
7. TROUBLE CONCENTRATING ON THINGS, SUCH AS READING THE NEWSPAPER OR WATCHING TELEVISION: 1
SUM OF ALL RESPONSES TO PHQ QUESTIONS 1-9: 6
SUM OF ALL RESPONSES TO PHQ QUESTIONS 1-9: 6

## 2019-11-04 ASSESSMENT — ENCOUNTER SYMPTOMS
GASTROINTESTINAL NEGATIVE: 1
RESPIRATORY NEGATIVE: 1
EYES NEGATIVE: 1

## 2019-11-05 LAB
ESTIMATED AVERAGE GLUCOSE: 116.9 MG/DL
HBA1C MFR BLD: 5.7 %

## 2019-11-06 RX ORDER — METOPROLOL SUCCINATE 50 MG/1
50 TABLET, EXTENDED RELEASE ORAL DAILY
Qty: 90 TABLET | Refills: 1 | Status: SHIPPED | OUTPATIENT
Start: 2019-11-06 | End: 2020-03-19

## 2019-11-08 ENCOUNTER — TELEPHONE (OUTPATIENT)
Dept: ORTHOPEDIC SURGERY | Age: 59
End: 2019-11-08

## 2019-12-06 ENCOUNTER — TELEPHONE (OUTPATIENT)
Dept: FAMILY MEDICINE CLINIC | Age: 59
End: 2019-12-06

## 2019-12-09 ENCOUNTER — TELEPHONE (OUTPATIENT)
Dept: ORTHOPEDIC SURGERY | Age: 59
End: 2019-12-09

## 2019-12-09 RX ORDER — BENZTROPINE MESYLATE 1 MG/1
1 TABLET ORAL 2 TIMES DAILY
Qty: 180 TABLET | Refills: 1 | Status: SHIPPED | OUTPATIENT
Start: 2019-12-09 | End: 2020-01-15 | Stop reason: ALTCHOICE

## 2019-12-10 RX ORDER — GABAPENTIN 300 MG/1
300 CAPSULE ORAL 3 TIMES DAILY
Qty: 90 CAPSULE | Refills: 0 | Status: SHIPPED | OUTPATIENT
Start: 2019-12-10 | End: 2020-01-08 | Stop reason: ALTCHOICE

## 2019-12-17 ENCOUNTER — OFFICE VISIT (OUTPATIENT)
Dept: ORTHOPEDIC SURGERY | Age: 59
End: 2019-12-17
Payer: MEDICARE

## 2019-12-17 VITALS — HEIGHT: 68 IN | WEIGHT: 293 LBS | BODY MASS INDEX: 44.41 KG/M2

## 2019-12-17 DIAGNOSIS — M51.26 RECURRENT DISPLACEMENT OF LUMBAR DISC: Primary | ICD-10-CM

## 2019-12-17 PROCEDURE — G8417 CALC BMI ABV UP PARAM F/U: HCPCS | Performed by: ORTHOPAEDIC SURGERY

## 2019-12-17 PROCEDURE — 1036F TOBACCO NON-USER: CPT | Performed by: ORTHOPAEDIC SURGERY

## 2019-12-17 PROCEDURE — G8427 DOCREV CUR MEDS BY ELIG CLIN: HCPCS | Performed by: ORTHOPAEDIC SURGERY

## 2019-12-17 PROCEDURE — G8484 FLU IMMUNIZE NO ADMIN: HCPCS | Performed by: ORTHOPAEDIC SURGERY

## 2019-12-17 PROCEDURE — 3017F COLORECTAL CA SCREEN DOC REV: CPT | Performed by: ORTHOPAEDIC SURGERY

## 2019-12-17 PROCEDURE — 99213 OFFICE O/P EST LOW 20 MIN: CPT | Performed by: ORTHOPAEDIC SURGERY

## 2019-12-30 RX ORDER — BUPROPION HYDROCHLORIDE 150 MG/1
TABLET ORAL
Qty: 90 TABLET | Refills: 0 | Status: SHIPPED | OUTPATIENT
Start: 2019-12-30 | End: 2020-03-19

## 2019-12-31 RX ORDER — FLUTICASONE PROPIONATE 50 MCG
2 SPRAY, SUSPENSION (ML) NASAL DAILY
Qty: 1 BOTTLE | Refills: 5 | Status: SHIPPED | OUTPATIENT
Start: 2019-12-31 | End: 2020-07-10

## 2020-01-06 ENCOUNTER — TELEPHONE (OUTPATIENT)
Dept: ORTHOPEDIC SURGERY | Age: 60
End: 2020-01-06

## 2020-01-08 ENCOUNTER — TELEPHONE (OUTPATIENT)
Dept: ORTHOPEDIC SURGERY | Age: 60
End: 2020-01-08

## 2020-01-08 RX ORDER — GABAPENTIN 300 MG/1
300 CAPSULE ORAL 3 TIMES DAILY
Qty: 90 CAPSULE | Refills: 0 | Status: SHIPPED | OUTPATIENT
Start: 2020-01-08 | End: 2020-02-05 | Stop reason: ALTCHOICE

## 2020-01-09 ENCOUNTER — TELEPHONE (OUTPATIENT)
Dept: FAMILY MEDICINE CLINIC | Age: 60
End: 2020-01-09

## 2020-01-13 NOTE — TELEPHONE ENCOUNTER
Ok to put in at 1:40 pm on this Wednesday if appointment has not been taken by time patient is contacted

## 2020-01-14 NOTE — PROGRESS NOTES
Subjective:     Patient Name: Sima Mix is a 61 y.o. female. Chief Complaint   Patient presents with    Medication Problem     pt thinks that some of her problems may be related to some of the medication that she is taking , pt said it is hard for her focus and also having issue with her short term memory        HPI  Mood Disorder:  Patient presents for follow-up of depression and anxiety disorder. Current complaints include: See  below . She denies any other symptoms, increased use of drugs or alcohol and suicidal thoughts or behavior. Symptoms/signs of chapito: none. External stressors: nothing new. States that overall her depression is doing very well however she is concerned with some side effects from medications that she is having. The patient has recently started metoprolol, Wellbutrin and gabapentin within the past few months. Patient also was started on Cogentin for bilateral arm twitching. The patient states that now she is not only having twitching in her bilateral arms but now the legs as well. She also complains of fatigue that is generalized and throughout the day. She also notices that she is having a hard time focusing and staying on point and concentrating. Review of Systems   Constitutional: Positive for fatigue. HENT: Negative. Eyes: Negative. Respiratory: Negative. Cardiovascular: Negative. Gastrointestinal: Negative. Genitourinary: Negative. Musculoskeletal: Negative. Skin: Negative. Neurological: Positive for tremors (twitching). Psychiatric/Behavioral: Positive for decreased concentration. Negative for agitation, behavioral problems, dysphoric mood, hallucinations, self-injury and suicidal ideas. The patient is not nervous/anxious and is not hyperactive. All other systems reviewed and are negative.        Past Medical History:   Diagnosis Date    ADHD (attention deficit hyperactivity disorder)     Allergic rhinitis     Bipolar disorder     Borderline osteopenia     Depression     Dysmetabolic syndrome     GERD (gastroesophageal reflux disease)     Headache(784.0)     HNP (herniated nucleus pulposus), lumbar 2019    Hyperlipidemia 2014    Hypertension     Intention tremor     due to lithium    Lumbar stenosis with neurogenic claudication 2019    Obesity     ANTOINETTE on CPAP     Osteoarthritis     PCOS (polycystic ovarian syndrome)     RAD (reactive airway disease)     Restless legs syndrome (RLS) 2014     Family History   Problem Relation Age of Onset    Depression Sister     Mental Illness Sister     Diabetes Mother     Heart Disease Mother     Diabetes Father     Heart Disease Father      Past Surgical History:   Procedure Laterality Date    ABDOMINAL EXPLORATION SURGERY       SECTION      GASTRIC BYPASS SURGERY      HAND SURGERY      bilat CTS    KNEE SURGERY Left     atrhroscopic unispacer    KNEE SURGERY Right 10/28/2015    Right knee video arthroscopy with partial medial and lateral menisectomy with loose body removal    LUMBAR SPINE SURGERY N/A 2019    MICROLUMBAR DISCECTOMY L4-5 performed by John Pacheco MD at [address] [place] [address] History     Socioeconomic History    Marital status: Legally      Spouse name: Not on file    Number of children: Not on file    Years of education: Not on file    Highest education level: Not on file   Occupational History    Not on file   Social Needs    Financial resource strain: Not on file    Food insecurity:     Worry: Not on file     Inability: Not on file    Transportation needs:     Medical: Not on file     Non-medical: Not on file   Tobacco Use    Smoking status: Former Smoker     Packs/day: 1.50     Years: 21.00     Pack years: 31.50     Types: Cigarettes     Last attempt to quit: 1997     Years since quittin.9    Smokeless tobacco: Never Used   Substance and Sexual Activity    Alcohol accessory muscle usage or respiratory distress. Breath sounds: Normal breath sounds. Abdominal:      Palpations: Abdomen is soft. Tenderness: There is no tenderness. Lymphadenopathy:      Head:      Right side of head: No submental or submandibular adenopathy. Left side of head: No submental or submandibular adenopathy. Cervical: No cervical adenopathy. Skin:     General: Skin is warm and dry. Findings: No lesion or rash. Neurological:      Mental Status: She is alert and oriented to person, place, and time. Psychiatric:         Speech: Speech normal.         Behavior: Behavior normal. Behavior is cooperative. LMP  (LMP Unknown)   There is no height or weight on file to calculate BMI. BP Readings from Last 2 Encounters:   11/04/19 138/84   10/17/19 (!) 155/104       Wt Readings from Last 3 Encounters:   12/17/19 (!) 320 lb 15.8 oz (145.6 kg)   11/04/19 (!) 321 lb (145.6 kg)   10/17/19 (!) 317 lb (143.8 kg)       Lab Review   No visits with results within 2 Month(s) from this visit. Latest known visit with results is:   Office Visit on 11/04/2019   Component Date Value    Iron 11/04/2019 90     TIBC 11/04/2019 374     Iron Saturation 11/04/2019 24     Ferritin 11/04/2019 49.0     Hemoglobin A1C 11/04/2019 5.7     eAG 11/04/2019 116.9        No results found for this visit on 01/15/20. Assessment:       1. Mood disorder ([address]    2. Medication side effect    3. Fatigue, unspecified type    4. Muscle twitching        No results found for this visit on 01/15/20. Plan:       Mya Manjarrez was seen today for medication problem. Diagnoses and all orders for this visit:    Mood disorder ([address]    Medication side effect    Fatigue, unspecified type    Muscle twitching    Patient states that the gabapentin is significantly helping with her Neurontin but she is wondering if a lower dose may help with some of her symptoms.    Decreased the gabapentin to 300 mg twice a day from 3 times a day and if that still is not effective then consider doing 100 mg 3 times a day   The patient states that she has never felt better in the past  decade then when she is on the Wellbutrin so she does not want to discontinue or even decrease the dosing on the Wellbutrin. Patient states that she rarely uses the trazodone at bedtime  The patient was started also on Cogentin for the twitching in her arms however her twitching has worsened so she will discontinue the Cogentin  Educated the patient that both the gabapentin and the atenolol can be significantly contributing to her fatigue. Patient states she does not want to go off of the atenolol and will try taking it at bedtime to see if that makes a difference  We will follow-up in a couple of weeks to see how she is doing on the above medication changes    Patient has been instructed call the office immediately with new symptoms, change in symptoms or worseningof symptoms. If this is not feasible, patient is instructed to report to the emergency room. Medication profile reviewed. Medication side effects and possible impairments from medications were discussed as applicable. Allergies were reviewed. Health maintenance was reviewed and updated as appropriate.

## 2020-01-15 ENCOUNTER — OFFICE VISIT (OUTPATIENT)
Dept: FAMILY MEDICINE CLINIC | Age: 60
End: 2020-01-15
Payer: MEDICARE

## 2020-01-15 VITALS
BODY MASS INDEX: 45.99 KG/M2 | HEART RATE: 75 BPM | DIASTOLIC BLOOD PRESSURE: 83 MMHG | OXYGEN SATURATION: 95 % | SYSTOLIC BLOOD PRESSURE: 130 MMHG | HEIGHT: 67 IN | WEIGHT: 293 LBS

## 2020-01-15 PROCEDURE — G8427 DOCREV CUR MEDS BY ELIG CLIN: HCPCS | Performed by: NURSE PRACTITIONER

## 2020-01-15 PROCEDURE — G8484 FLU IMMUNIZE NO ADMIN: HCPCS | Performed by: NURSE PRACTITIONER

## 2020-01-15 PROCEDURE — 1036F TOBACCO NON-USER: CPT | Performed by: NURSE PRACTITIONER

## 2020-01-15 PROCEDURE — G8417 CALC BMI ABV UP PARAM F/U: HCPCS | Performed by: NURSE PRACTITIONER

## 2020-01-15 PROCEDURE — 3017F COLORECTAL CA SCREEN DOC REV: CPT | Performed by: NURSE PRACTITIONER

## 2020-01-15 PROCEDURE — 99213 OFFICE O/P EST LOW 20 MIN: CPT | Performed by: NURSE PRACTITIONER

## 2020-01-15 RX ORDER — TRAZODONE HYDROCHLORIDE 100 MG/1
100 TABLET ORAL NIGHTLY PRN
Status: SHIPPED | COMMUNITY
Start: 2020-01-15 | End: 2020-11-20 | Stop reason: SINTOL

## 2020-01-15 ASSESSMENT — ENCOUNTER SYMPTOMS
EYES NEGATIVE: 1
GASTROINTESTINAL NEGATIVE: 1
RESPIRATORY NEGATIVE: 1

## 2020-02-05 ENCOUNTER — OFFICE VISIT (OUTPATIENT)
Dept: FAMILY MEDICINE CLINIC | Age: 60
End: 2020-02-05
Payer: MEDICARE

## 2020-02-05 VITALS
HEIGHT: 67 IN | RESPIRATION RATE: 16 BRPM | OXYGEN SATURATION: 97 % | BODY MASS INDEX: 45.99 KG/M2 | HEART RATE: 83 BPM | WEIGHT: 293 LBS | DIASTOLIC BLOOD PRESSURE: 86 MMHG | SYSTOLIC BLOOD PRESSURE: 132 MMHG

## 2020-02-05 PROCEDURE — 99214 OFFICE O/P EST MOD 30 MIN: CPT | Performed by: NURSE PRACTITIONER

## 2020-02-05 PROCEDURE — G8484 FLU IMMUNIZE NO ADMIN: HCPCS | Performed by: NURSE PRACTITIONER

## 2020-02-05 PROCEDURE — G8417 CALC BMI ABV UP PARAM F/U: HCPCS | Performed by: NURSE PRACTITIONER

## 2020-02-05 PROCEDURE — G8427 DOCREV CUR MEDS BY ELIG CLIN: HCPCS | Performed by: NURSE PRACTITIONER

## 2020-02-05 PROCEDURE — 36415 COLL VENOUS BLD VENIPUNCTURE: CPT | Performed by: NURSE PRACTITIONER

## 2020-02-05 PROCEDURE — 3017F COLORECTAL CA SCREEN DOC REV: CPT | Performed by: NURSE PRACTITIONER

## 2020-02-05 PROCEDURE — 1036F TOBACCO NON-USER: CPT | Performed by: NURSE PRACTITIONER

## 2020-02-05 RX ORDER — FERROUS SULFATE 325(65) MG
325 TABLET ORAL
Qty: 90 TABLET | Refills: 3 | Status: SHIPPED | OUTPATIENT
Start: 2020-02-05 | End: 2021-04-28 | Stop reason: SDUPTHER

## 2020-02-06 LAB — MAGNESIUM: 2.4 MG/DL (ref 1.8–2.4)

## 2020-02-14 ASSESSMENT — ENCOUNTER SYMPTOMS
RESPIRATORY NEGATIVE: 1
EYES NEGATIVE: 1
GASTROINTESTINAL NEGATIVE: 1

## 2020-03-19 RX ORDER — METOPROLOL SUCCINATE 50 MG/1
TABLET, EXTENDED RELEASE ORAL
Qty: 90 TABLET | Refills: 1 | Status: SHIPPED | OUTPATIENT
Start: 2020-03-19 | End: 2020-07-10

## 2020-03-19 RX ORDER — DICLOFENAC SODIUM 75 MG/1
TABLET, DELAYED RELEASE ORAL
Qty: 180 TABLET | Refills: 0 | Status: SHIPPED | OUTPATIENT
Start: 2020-03-19 | End: 2020-06-01

## 2020-03-19 RX ORDER — LAMOTRIGINE 200 MG/1
TABLET ORAL
Qty: 90 TABLET | Refills: 0 | Status: SHIPPED | OUTPATIENT
Start: 2020-03-19 | End: 2020-06-01

## 2020-03-19 RX ORDER — ROSUVASTATIN CALCIUM 5 MG/1
TABLET, COATED ORAL
Qty: 90 TABLET | Refills: 0 | Status: SHIPPED | OUTPATIENT
Start: 2020-03-19 | End: 2020-06-01

## 2020-03-19 RX ORDER — BUPROPION HYDROCHLORIDE 150 MG/1
TABLET ORAL
Qty: 90 TABLET | Refills: 0 | Status: SHIPPED | OUTPATIENT
Start: 2020-03-19 | End: 2020-06-01

## 2020-04-15 ENCOUNTER — TELEPHONE (OUTPATIENT)
Dept: FAMILY MEDICINE CLINIC | Age: 60
End: 2020-04-15

## 2020-04-15 NOTE — TELEPHONE ENCOUNTER
Pt has a sore on her left leg. She is requesting a V V appt. Can you please contact pt with information?

## 2020-04-16 ENCOUNTER — TELEMEDICINE (OUTPATIENT)
Dept: FAMILY MEDICINE CLINIC | Age: 60
End: 2020-04-16
Payer: MEDICARE

## 2020-04-16 PROCEDURE — 99213 OFFICE O/P EST LOW 20 MIN: CPT | Performed by: NURSE PRACTITIONER

## 2020-04-16 PROCEDURE — 3017F COLORECTAL CA SCREEN DOC REV: CPT | Performed by: NURSE PRACTITIONER

## 2020-04-16 PROCEDURE — G8427 DOCREV CUR MEDS BY ELIG CLIN: HCPCS | Performed by: NURSE PRACTITIONER

## 2020-04-16 RX ORDER — CEPHALEXIN 500 MG/1
500 CAPSULE ORAL 3 TIMES DAILY
Qty: 30 CAPSULE | Refills: 0 | Status: SHIPPED | OUTPATIENT
Start: 2020-04-16 | End: 2020-04-26

## 2020-04-16 RX ORDER — FUROSEMIDE 20 MG/1
20 TABLET ORAL EVERY MORNING
Qty: 3 TABLET | Refills: 0 | Status: SHIPPED | OUTPATIENT
Start: 2020-04-16 | End: 2020-04-23 | Stop reason: ALTCHOICE

## 2020-04-16 ASSESSMENT — ENCOUNTER SYMPTOMS
SHORTNESS OF BREATH: 0
GASTROINTESTINAL NEGATIVE: 1
ALLERGIC/IMMUNOLOGIC NEGATIVE: 1
RESPIRATORY NEGATIVE: 1
EYES NEGATIVE: 1

## 2020-04-16 NOTE — PROGRESS NOTES
present when appropriate. Due to this being a TeleHealth encounter (During [facility] public health emergency), evaluation of the following organ systems was limited: Vitals/Constitutional/EENT/Resp/CV/GI//MS/Neuro/Skin/Heme-Lymph-Imm. Pursuant to the emergency declaration under the [address] [place] [address], [address] [place] [address] and the [employer] Act, this Virtual Visit was conducted with patient's (and/or legal guardian's) consent, to reduce the patient's risk of exposure to COVID-19 and provide necessary medical care. The patient (and/or legal guardian) has also been advised to contact this office for worsening conditions or problems, and seek emergency medical treatment and/or call 911 if deemed necessary. Services were provided through a video synchronous discussion virtually to substitute for in-person clinic visit. Patient and provider were located at their individual homes. --MADINA Andrea CNP on 4/16/2020 at 8:00 AM    An electronic signature was used to authenticate this note.

## 2020-04-22 NOTE — PROGRESS NOTES
2020    TELEHEALTH EVALUATION -- Audio/Visual (During [facility]-60 public health emergency)    Chief Complaint   Patient presents with    Wound Check     pt said it is better and healing but now a little tender around it     Leg Swelling     left leg swelling pt said it is better        HPI:  Patricia Lawson (:  1960) has requested an audio/video evaluation for the following concern(s):    The patient is being seen today for a one-week follow-up of left lower extremity wound with edema. At the last visit the patient was started on Keflex 500 mg 3 times daily x10 days and Lasix 20 mg x 3 days. She feels the swelling has improved but is still present. The patient states the wound is improving slowly. She states there is no longer any erythema surrounding the wound. The patient states that she has had no drainage or discharge for approximately 4 days. She denies any fever, chills, nausea, vomiting or diarrhea. Review of Systems   Constitutional: Negative. HENT: Negative. Eyes: Negative. Respiratory: Negative. Negative for shortness of breath. Cardiovascular: Positive for leg swelling. Negative for chest pain and palpitations. Gastrointestinal: Negative. Genitourinary: Negative. Musculoskeletal: Negative. Skin: Positive for wound. Neurological: Negative. Psychiatric/Behavioral: Negative. All other systems reviewed and are negative. Prior to Visit Medications    Medication Sig Taking?  Authorizing Provider   cephALEXin (KEFLEX) 500 MG capsule Take 1 capsule by mouth 3 times daily for 10 days  Lety Dinning, APRN - CNP   furosemide (LASIX) 20 MG tablet Take 1 tablet by mouth every morning  Lety Dinning, APRN - CNP   buPROPion (WELLBUTRIN XL) 150 MG extended release tablet TAKE 1 TABLET EVERY DAY  Lety Dinning, APRN - CNP   lamoTRIgine (LAMICTAL) 200 MG tablet TAKE 1/2 TABLET TWICE DAILY  Lety Dinning, APRN - CNP   rosuvastatin discoloration noted on facial skin         [x] Abnormal- LLE with scabbed area to distal aspect. approx a little less than quarter size. No surrounding erythema, no drainage noted. Patient with 2+ pitting edema to LLE when patient pushes on leg           Psychiatric:       [x] Normal Affect [x] No Hallucinations        [] Abnormal-     Other pertinent observable physical exam findings-     ASSESSMENT/PLAN:  1. Open wound of left lower leg, subsequent encounter  The area is healing slowly. The patient does have history of a wound in the same area. I will extend the Keflex for 10 more days. Patient is to keep the area clean and dry. Will reevaluate in 2 weeks. - cephALEXin (KEFLEX) 500 MG capsule; Take 1 capsule by mouth 3 times daily for 10 days  Dispense: 30 capsule; Refill: 0    2. Leg edema, left  The patient felt like the Lasix once a day for 3 days did not make any difference at all therefore I will start the following  - furosemide (LASIX) 20 MG tablet; Take one tablet by mouth BID x 5 days then take one tablet by mouth daily  Dispense: 35 tablet; Refill: 1  - potassium chloride (KLOR-CON) 10 MEQ extended release tablet; Take 1 tablet by mouth daily  Dispense: 30 tablet; Refill: 1      Return in about 2 weeks (around 5/7/2020) for wound. Watson Sim is a 61 y.o. female being evaluated by a Virtual Visit (video visit) encounter to address concerns as mentioned above. A caregiver was present when appropriate. Due to this being a TeleHealth encounter (During [facility]- public health emergency), evaluation of the following organ systems was limited: Vitals/Constitutional/EENT/Resp/CV/GI//MS/Neuro/Skin/Heme-Lymph-Imm.   Pursuant to the emergency declaration under the [facility] waiver authority and the [employer] and [employer] Act, this Virtual Visit was conducted with patient's (and/or legal guardian's) consent,

## 2020-04-23 ENCOUNTER — TELEMEDICINE (OUTPATIENT)
Dept: FAMILY MEDICINE CLINIC | Age: 60
End: 2020-04-23
Payer: MEDICARE

## 2020-04-23 PROCEDURE — G8427 DOCREV CUR MEDS BY ELIG CLIN: HCPCS | Performed by: NURSE PRACTITIONER

## 2020-04-23 PROCEDURE — 3017F COLORECTAL CA SCREEN DOC REV: CPT | Performed by: NURSE PRACTITIONER

## 2020-04-23 PROCEDURE — 99213 OFFICE O/P EST LOW 20 MIN: CPT | Performed by: NURSE PRACTITIONER

## 2020-04-23 RX ORDER — FUROSEMIDE 20 MG/1
TABLET ORAL
Qty: 35 TABLET | Refills: 1 | Status: SHIPPED | OUTPATIENT
Start: 2020-04-23 | End: 2020-05-08 | Stop reason: SDUPTHER

## 2020-04-23 RX ORDER — CEPHALEXIN 500 MG/1
500 CAPSULE ORAL 3 TIMES DAILY
Qty: 30 CAPSULE | Refills: 0 | Status: SHIPPED | OUTPATIENT
Start: 2020-04-23 | End: 2020-05-03

## 2020-04-23 RX ORDER — POTASSIUM CHLORIDE 750 MG/1
10 TABLET, FILM COATED, EXTENDED RELEASE ORAL DAILY
Qty: 30 TABLET | Refills: 1 | Status: SHIPPED | OUTPATIENT
Start: 2020-04-23 | End: 2020-05-14 | Stop reason: SDUPTHER

## 2020-04-23 ASSESSMENT — ENCOUNTER SYMPTOMS
RESPIRATORY NEGATIVE: 1
GASTROINTESTINAL NEGATIVE: 1
SHORTNESS OF BREATH: 0
EYES NEGATIVE: 1

## 2020-05-07 ENCOUNTER — TELEMEDICINE (OUTPATIENT)
Dept: FAMILY MEDICINE CLINIC | Age: 60
End: 2020-05-07
Payer: MEDICARE

## 2020-05-07 ENCOUNTER — OFFICE VISIT (OUTPATIENT)
Dept: ORTHOPEDIC SURGERY | Age: 60
End: 2020-05-07
Payer: MEDICARE

## 2020-05-07 PROCEDURE — 1036F TOBACCO NON-USER: CPT | Performed by: ORTHOPAEDIC SURGERY

## 2020-05-07 PROCEDURE — G8427 DOCREV CUR MEDS BY ELIG CLIN: HCPCS | Performed by: NURSE PRACTITIONER

## 2020-05-07 PROCEDURE — 3017F COLORECTAL CA SCREEN DOC REV: CPT | Performed by: ORTHOPAEDIC SURGERY

## 2020-05-07 PROCEDURE — 99213 OFFICE O/P EST LOW 20 MIN: CPT | Performed by: NURSE PRACTITIONER

## 2020-05-07 PROCEDURE — G8417 CALC BMI ABV UP PARAM F/U: HCPCS | Performed by: ORTHOPAEDIC SURGERY

## 2020-05-07 PROCEDURE — G8417 CALC BMI ABV UP PARAM F/U: HCPCS | Performed by: NURSE PRACTITIONER

## 2020-05-07 PROCEDURE — 3017F COLORECTAL CA SCREEN DOC REV: CPT | Performed by: NURSE PRACTITIONER

## 2020-05-07 PROCEDURE — G8428 CUR MEDS NOT DOCUMENT: HCPCS | Performed by: ORTHOPAEDIC SURGERY

## 2020-05-07 PROCEDURE — 20610 DRAIN/INJ JOINT/BURSA W/O US: CPT | Performed by: ORTHOPAEDIC SURGERY

## 2020-05-07 PROCEDURE — 1036F TOBACCO NON-USER: CPT | Performed by: NURSE PRACTITIONER

## 2020-05-07 PROCEDURE — 99213 OFFICE O/P EST LOW 20 MIN: CPT | Performed by: ORTHOPAEDIC SURGERY

## 2020-05-07 RX ORDER — METHYLPREDNISOLONE ACETATE 40 MG/ML
80 INJECTION, SUSPENSION INTRA-ARTICULAR; INTRALESIONAL; INTRAMUSCULAR; SOFT TISSUE ONCE
Status: COMPLETED | OUTPATIENT
Start: 2020-05-07 | End: 2020-05-07

## 2020-05-07 RX ORDER — LIDOCAINE HYDROCHLORIDE 10 MG/ML
20 INJECTION, SOLUTION INFILTRATION; PERINEURAL ONCE
Status: COMPLETED | OUTPATIENT
Start: 2020-05-07 | End: 2020-05-07

## 2020-05-07 RX ORDER — BUPIVACAINE HYDROCHLORIDE 2.5 MG/ML
60 INJECTION, SOLUTION INFILTRATION; PERINEURAL ONCE
Status: COMPLETED | OUTPATIENT
Start: 2020-05-07 | End: 2020-05-07

## 2020-05-07 RX ADMIN — BUPIVACAINE HYDROCHLORIDE 150 MG: 2.5 INJECTION, SOLUTION INFILTRATION; PERINEURAL at 16:54

## 2020-05-07 RX ADMIN — LIDOCAINE HYDROCHLORIDE 20 ML: 10 INJECTION, SOLUTION INFILTRATION; PERINEURAL at 16:53

## 2020-05-07 RX ADMIN — METHYLPREDNISOLONE ACETATE 80 MG: 40 INJECTION, SUSPENSION INTRA-ARTICULAR; INTRALESIONAL; INTRAMUSCULAR; SOFT TISSUE at 16:53

## 2020-05-07 ASSESSMENT — ENCOUNTER SYMPTOMS
GASTROINTESTINAL NEGATIVE: 1
RESPIRATORY NEGATIVE: 1
ALLERGIC/IMMUNOLOGIC NEGATIVE: 1
SHORTNESS OF BREATH: 0
EYES NEGATIVE: 1

## 2020-05-07 NOTE — PROGRESS NOTES
Kay Sifuentes is a 61 y.o. female evaluated via telephone on 5/7/2020.       Consent:  She and/or health care decision maker is aware that that she may receive a bill for this telephone service, depending on her insurance coverage, and has provided verbal consent to proceed: Yes    Keenan Roberson

## 2020-05-07 NOTE — PROGRESS NOTES
Kristal Gutierrez MD              [facility], [address] [facility]         Orthopaedic Surgery and Sports Medicine    Patient Name: Danial Rose  YOB: 1960  Patient's PCP is MADINA Britton CNP    SUBJECTIVE  Chief Complaint:  Knee Pain (right knee)      History of Present Illness: Danial Rose is a 61 y.o. female here regarding right knee pain. The pain began few years ago. There was a history of injury. The patient is currently ambulating independently      Location: global  Quality: aching and sharp  Pain Scale: 5/10  Context: overall course is worsening   Alleviating Factors: rest, avoiding painful activities, cortisone injection  Exacerbating Factors: weight bearing  Associated Symptoms: none    Sleep pattern is affected by the chief complaint: Yes  The patient has not had PT. The patient has had an injection, a cortisone injection seemed to help more so than Visco supplementation. The patient has taken NSAIDs. The patient is working. Patient has been recently seen Dr. Fadumo Yu and receiving a cortisone injection in her right knee approximately every 8 months. They usually last a long time. Outside reports reviewed: historical medical records. Pain Assessment:       Review of Systems:  Matt Townsend's review of systems has been performed by intake and observation. All past and current ROS forms have been scanned into the medical record. She has been instructed to contact her primary care provider regarding ROS issues if not already being addressed at this time. There are no recent changes.  The most recent ROS was scanned into media on 07/23/2019    Past Medical History:  (see most recent intake form scanned into media on above date)  Past Medical History:   Diagnosis Date    ADHD (attention deficit hyperactivity disorder)     Allergic rhinitis     Bipolar disorder     Borderline MD        This dictation was performed with a verbal recognition program [facility]) and it was checked for errors. It is possible that there are still dictated errors within this office note. If so, please bring any errors to my attention for an addendum. All efforts were made to ensure that this office note is accurate.

## 2020-05-08 RX ORDER — FUROSEMIDE 20 MG/1
TABLET ORAL
Qty: 90 TABLET | Refills: 1 | Status: SHIPPED | OUTPATIENT
Start: 2020-05-08 | End: 2020-10-13

## 2020-05-14 ENCOUNTER — NURSE ONLY (OUTPATIENT)
Dept: FAMILY MEDICINE CLINIC | Age: 60
End: 2020-05-14
Payer: MEDICARE

## 2020-05-14 LAB
ANION GAP SERPL CALCULATED.3IONS-SCNC: 11 MMOL/L (ref 3–16)
BASOPHILS ABSOLUTE: 0.1 K/UL (ref 0–0.2)
BASOPHILS RELATIVE PERCENT: 1.2 %
BUN BLDV-MCNC: 22 MG/DL (ref 7–20)
CALCIUM SERPL-MCNC: 9.2 MG/DL (ref 8.3–10.6)
CHLORIDE BLD-SCNC: 104 MMOL/L (ref 99–110)
CO2: 28 MMOL/L (ref 21–32)
CREAT SERPL-MCNC: 0.7 MG/DL (ref 0.6–1.1)
EOSINOPHILS ABSOLUTE: 0.2 K/UL (ref 0–0.6)
EOSINOPHILS RELATIVE PERCENT: 2.5 %
GFR AFRICAN AMERICAN: >60
GFR NON-AFRICAN AMERICAN: >60
GLUCOSE BLD-MCNC: 105 MG/DL (ref 70–99)
HCT VFR BLD CALC: 43.4 % (ref 36–48)
HEMOGLOBIN: 14 G/DL (ref 12–16)
LYMPHOCYTES ABSOLUTE: 2.4 K/UL (ref 1–5.1)
LYMPHOCYTES RELATIVE PERCENT: 27.6 %
MCH RBC QN AUTO: 28.9 PG (ref 26–34)
MCHC RBC AUTO-ENTMCNC: 32.4 G/DL (ref 31–36)
MCV RBC AUTO: 89.2 FL (ref 80–100)
MONOCYTES ABSOLUTE: 0.8 K/UL (ref 0–1.3)
MONOCYTES RELATIVE PERCENT: 9.5 %
NEUTROPHILS ABSOLUTE: 5.2 K/UL (ref 1.7–7.7)
NEUTROPHILS RELATIVE PERCENT: 59.2 %
PDW BLD-RTO: 15.5 % (ref 12.4–15.4)
PLATELET # BLD: 342 K/UL (ref 135–450)
PMV BLD AUTO: 7.4 FL (ref 5–10.5)
POTASSIUM SERPL-SCNC: 4.9 MMOL/L (ref 3.5–5.1)
RBC # BLD: 4.86 M/UL (ref 4–5.2)
SODIUM BLD-SCNC: 143 MMOL/L (ref 136–145)
WBC # BLD: 8.8 K/UL (ref 4–11)

## 2020-05-14 PROCEDURE — 36415 COLL VENOUS BLD VENIPUNCTURE: CPT | Performed by: NURSE PRACTITIONER

## 2020-05-14 RX ORDER — POTASSIUM CHLORIDE 750 MG/1
10 TABLET, FILM COATED, EXTENDED RELEASE ORAL DAILY
Qty: 90 TABLET | Refills: 3 | Status: SHIPPED | OUTPATIENT
Start: 2020-05-14 | End: 2021-03-10

## 2020-07-10 RX ORDER — FLUTICASONE PROPIONATE 50 MCG
SPRAY, SUSPENSION (ML) NASAL
Qty: 48 G | Refills: 1 | Status: SHIPPED | OUTPATIENT
Start: 2020-07-10 | End: 2021-03-26

## 2020-07-10 RX ORDER — METOPROLOL SUCCINATE 50 MG/1
TABLET, EXTENDED RELEASE ORAL
Qty: 90 TABLET | Refills: 1 | Status: SHIPPED | OUTPATIENT
Start: 2020-07-10 | End: 2020-12-09

## 2020-07-13 ENCOUNTER — NURSE TRIAGE (OUTPATIENT)
Dept: OTHER | Facility: CLINIC | Age: 60
End: 2020-07-13

## 2020-07-13 ENCOUNTER — HOSPITAL ENCOUNTER (EMERGENCY)
Age: 60
Discharge: HOME | End: 2020-07-13
Attending: EMERGENCY MEDICINE
Payer: MEDICARE

## 2020-07-13 ENCOUNTER — APPOINTMENT (OUTPATIENT)
Dept: GENERAL RADIOLOGY | Age: 60
End: 2020-07-13
Payer: MEDICARE

## 2020-07-13 VITALS
TEMPERATURE: 98.5 F | SYSTOLIC BLOOD PRESSURE: 127 MMHG | RESPIRATION RATE: 20 BRPM | HEART RATE: 83 BPM | DIASTOLIC BLOOD PRESSURE: 95 MMHG | OXYGEN SATURATION: 97 % | WEIGHT: 293 LBS | BODY MASS INDEX: 55.32 KG/M2 | HEIGHT: 61 IN

## 2020-07-13 PROCEDURE — 6370000000 HC RX 637 (ALT 250 FOR IP): Performed by: EMERGENCY MEDICINE

## 2020-07-13 PROCEDURE — 99283 EMERGENCY DEPT VISIT LOW MDM: CPT

## 2020-07-13 PROCEDURE — 73590 X-RAY EXAM OF LOWER LEG: CPT

## 2020-07-13 RX ORDER — SULFAMETHOXAZOLE AND TRIMETHOPRIM 800; 160 MG/1; MG/1
1 TABLET ORAL ONCE
Status: COMPLETED | OUTPATIENT
Start: 2020-07-13 | End: 2020-07-13

## 2020-07-13 RX ORDER — SULFAMETHOXAZOLE AND TRIMETHOPRIM 800; 160 MG/1; MG/1
1 TABLET ORAL 2 TIMES DAILY
Qty: 20 TABLET | Refills: 0 | Status: SHIPPED | OUTPATIENT
Start: 2020-07-13 | End: 2020-07-23

## 2020-07-13 RX ADMIN — SULFAMETHOXAZOLE AND TRIMETHOPRIM 1 TABLET: 800; 160 TABLET ORAL at 20:54

## 2020-07-13 ASSESSMENT — PAIN DESCRIPTION - LOCATION: LOCATION: LEG

## 2020-07-13 ASSESSMENT — ENCOUNTER SYMPTOMS
BACK PAIN: 0
SHORTNESS OF BREATH: 0
ABDOMINAL PAIN: 0

## 2020-07-13 ASSESSMENT — PAIN SCALES - GENERAL: PAINLEVEL_OUTOF10: 4

## 2020-07-13 ASSESSMENT — PAIN DESCRIPTION - ORIENTATION: ORIENTATION: LEFT

## 2020-07-13 ASSESSMENT — PAIN DESCRIPTION - PAIN TYPE: TYPE: ACUTE PAIN

## 2020-07-13 NOTE — TELEPHONE ENCOUNTER
Please do not respond to the triage nurse through this encounter. Any subsequent communication should be directly with the patient. Reason for Disposition   Red streak from area of infection    Answer Assessment - Initial Assessment Questions  1. APPEARANCE of BOIL: \"What does the boil look like? \"       Red and white   2. LOCATION: \"Where is the boil located? \"       On her lle  3. NUMBER: \"How many boils are there?\"       1  4. SIZE: \"How big is the boil? \" (e.g., inches, cm; compare to size of a coin or other object)      1/4 inch  5. ONSET: \"When did the boil start? \"      Months ago is very deep and seems to be getting worse  6. PAIN: \"Is there any pain? \" If so, ask: \"How bad is the pain? \"   (Scale 1-10; or mild, moderate, severe)      4 pain comes and goes   7. FEVER: \"Do you have a fever? \" If so, ask: \"What is it, how was it measured, and when did it start? \"       no  8. SOURCE: \"Have you been around anyone with boils or other Staph infections? \" \"Have you ever had boils before? \"      no  9. OTHER SYMPTOMS: \"Do you have any other symptoms? \" (e.g., shaking chills, weakness, rash elsewhere on body)      No  10. PREGNANCY: \"Is there any chance you are pregnant? \" \"When was your last menstrual period? \"        N/A    Protocols used: BOIL (SKIN ABSCESS)-ADULT-

## 2020-07-13 NOTE — ED PROVIDER NOTES
[facility]        Pt Name: Lara Stark  MRN: [id number]  [place] 1960  Date of evaluation: 7/13/2020  Provider: Deniz Jenkins MD  PCP: MADINA Mcfarland - CNP  ED Attending: Deniz Jenkins MD    [address] [place] [address]       Chief Complaint   Patient presents with    Wound Check     wound to left lower leg since march. treated for swelling by pcp. states wound started having some redness and weeping x 1 day       HISTORY OF PRESENT ILLNESS   (Location/Symptom, Timing/Onset, Context/Setting, Quality, Duration, Modifying Factors, Severity)  Note limiting factors. Lara Stark is a 61 y.o. female who presents with 24 hours of increasing pain to a wound on her left leg. Patient has apparently had this site for several months. It's slowly been getting smaller. Patient's PCP has been monitoring it. She has been intermittently using neosporin on the wound. Patient states that within the last 24 hours it has become red, painful, and warm. No significant drainage. Pain is sharp and aching, mild, worse with touching and movement, better with elevation. Patient denies fevers, chills or other signs of systemic illness. History is obtained from the patient. REVIEW OF SYSTEMS    (2-9 systems for level 4, 10 or more for level 5)     Review of Systems   Constitutional: Negative for chills and fever. Respiratory: Negative for shortness of breath. Cardiovascular: Positive for leg swelling. Gastrointestinal: Negative for abdominal pain. Musculoskeletal: Negative for back pain. Skin: Positive for wound. Negative for rash. Neurological: Negative for weakness. Positives and Pertinent negatives as per HPI. Except as noted above in the ROS, all other systems were reviewed and negative.        PAST MEDICAL HISTORY     Past Medical History:   Diagnosis Date    ADHD (attention deficit hyperactivity disorder)     tenderness. There is no guarding or rebound. Musculoskeletal: Normal range of motion. General: No tenderness. Lymphadenopathy:      Cervical: No cervical adenopathy. Skin:     General: Skin is warm and dry. Findings: No rash. Neurological:      Mental Status: She is alert and oriented to person, place, and time. Cranial Nerves: No cranial nerve deficit. Psychiatric:         Behavior: Behavior is cooperative. DIAGNOSTIC RESULTS   LABS:    No results found for this visit on 07/13/20. All other labs were within normal range ornot returned as of this dictation. EKG: All EKG's are interpreted by the Emergency Department Physician who either signs or Co-signs this chart in the absence of a cardiologist.  Please see their note for interpretation of EKG. RADIOLOGY:   Non-plain film images such as CT, Ultrasound and MRI are read by the radiologist.  Plain radiographic images are visualized and preliminarily interpreted by the ED Provider with the belowfindings:    Interpretation per the Radiologist below, if available at the time of this note:    XR TIBIA FIBULA LEFT (2 VIEWS)   Final Result   No acute or focal bony abnormality. PROCEDURES   Unless otherwise noted below, none     Procedures    CRITICAL CARE TIME   N/A    CONSULTS:  None      EMERGENCY DEPARTMENT COURSE and DIFFERENTIAL DIAGNOSIS/MDM:   Vitals:    Vitals:    07/13/20 1931 07/13/20 2037   BP: (!) 162/108 (!) 127/95   Pulse: 99 83   Resp: 22 20   Temp: 98.5 °F (36.9 °C)    TempSrc: Oral    SpO2: 95% 97%   Weight: (!) 310 lb (140.6 kg)    Height: 5' 1\" (1.549 m)        Patient was given the following medications:  Medications   sulfamethoxazole-trimethoprim (BACTRIM DS;SEPTRA DS) 800-160 MG per tablet 1 tablet (1 tablet Oral Given 7/13/20 2054)     Patient has cellulitis to a previous wound. Imaging does not show osteomyelitis or gas formation.   Patient started on antibiotics and encouraged to use silvadene for her wound. I want her to see her PCP in the next few days for re-evaluation. Patient agreeable with this plan. I estimate there is LOW risk for CELLULITIS, COMPARTMENT SYNDROME, NECROTIZING FASCIITIS, TENDON OR NEUROVASCULAR INJURY, or FOREIGN BODY, thus I consider the discharge disposition reasonable. Also, there is no evidence or peritonitis, sepsis, or toxicity. Theo Ryan and I have discussed the diagnosis and risks, and we agree with discharging home to follow-up with their primary doctor. We also discussed returning to the Emergency Department immediately if new or worsening symptoms occur. We have discussed the symptoms which are most concerning (e.g., changing or worsening pain, fever, numbness, weakness, cool or painful digits) that necessitate immediate return. The patient understands the importance of follow up and reasons to return. FINAL IMPRESSION      1. Cellulitis of left lower extremity    2. Ulcer of left lower leg, limited to breakdown of skin [facility])          DISPOSITION/PLAN   DISPOSITION Decision To Discharge 07/13/2020 08:47:21 PM      PATIENT REFERRED TO:  MADINA Gaines - [facility]  [address] [facility] [place],[address]  [phone]    Schedule an appointment as soon as possible for a visit in 1 week      [facility]  [address] [address]  [phone]  Go to   If symptoms worsen      DISCHARGE MEDICATIONS:  Discharge Medication List as of 7/13/2020  8:50 PM      START taking these medications    Details   sulfamethoxazole-trimethoprim (BACTRIM DS) 800-160 MG per tablet Take 1 tablet by mouth 2 times daily for 10 days, Disp-20 tablet,R-0Print      silver sulfADIAZINE (SILVADENE) 1 % cream Apply topically daily. , Disp-25 g,R-0, Print             DISCONTINUED MEDICATIONS:  Discharge Medication List as of 7/13/2020  8:50 PM                 (Please note that portions of this note were completed with a voice recognition program.  Efforts were made to edit the dictations but occasionally words are mis-transcribed.)    Henrey Pallas, MD(electronically signed)              Henrey Pallas, MD  07/14/20 0592

## 2020-07-14 ENCOUNTER — TELEPHONE (OUTPATIENT)
Dept: FAMILY MEDICINE CLINIC | Age: 60
End: 2020-07-14

## 2020-07-14 NOTE — TELEPHONE ENCOUNTER
[facility] received a call from:    Name of Caller: Deborah Steele    Relationship to patient:self    Organization name:     Best contact number: [phone]    Reason for call: need a ED Followup visit

## 2020-07-23 ENCOUNTER — NURSE TRIAGE (OUTPATIENT)
Dept: OTHER | Facility: CLINIC | Age: 60
End: 2020-07-23

## 2020-07-23 ENCOUNTER — TELEMEDICINE (OUTPATIENT)
Dept: FAMILY MEDICINE CLINIC | Age: 60
End: 2020-07-23
Payer: MEDICARE

## 2020-07-23 PROCEDURE — G8427 DOCREV CUR MEDS BY ELIG CLIN: HCPCS | Performed by: NURSE PRACTITIONER

## 2020-07-23 PROCEDURE — 99213 OFFICE O/P EST LOW 20 MIN: CPT | Performed by: NURSE PRACTITIONER

## 2020-07-23 PROCEDURE — 3017F COLORECTAL CA SCREEN DOC REV: CPT | Performed by: NURSE PRACTITIONER

## 2020-07-23 ASSESSMENT — ENCOUNTER SYMPTOMS
RESPIRATORY NEGATIVE: 1
GASTROINTESTINAL NEGATIVE: 1
BLOOD IN STOOL: 0
ANAL BLEEDING: 0
EYES NEGATIVE: 1
ABDOMINAL PAIN: 0
NAUSEA: 0
ALLERGIC/IMMUNOLOGIC NEGATIVE: 1
SHORTNESS OF BREATH: 0

## 2020-07-23 NOTE — TELEPHONE ENCOUNTER
Reason for Disposition   Caller has already spoken with another triager and has no further questions    Protocols used: NO CONTACT OR DUPLICATE CONTACT CALL-ADULT-[place]    Pt was seen at [facility] for her wound on her leg. Per pt, it looks better since being on antibiotics, but is calling to schedule her f/u apt as recommended . Per pt, it is less red and no scab. Soft trx to Tiana to schedule apt. Please do not respond to the triage nurse through this encounter. Any subsequent communication should be directly with the patient.

## 2020-07-23 NOTE — PROGRESS NOTES
and numbness. Hematological: Negative. Does not bruise/bleed easily. Psychiatric/Behavioral: Negative. All other systems reviewed and are negative. Prior to Visit Medications    Medication Sig Taking? Authorizing Provider   sulfamethoxazole-trimethoprim (BACTRIM DS) 800-160 MG per tablet Take 1 tablet by mouth 2 times daily for 10 days Yes Princess Brewer MD   silver sulfADIAZINE (SILVADENE) 1 % cream Apply topically daily.  Yes Princess Brewer MD   metoprolol succinate (TOPROL XL) 50 MG extended release tablet TAKE 1 TABLET EVERY DAY Yes MADINA Hutchins CNP   fluticasone (FLONASE) 50 MCG/ACT nasal spray USE 2 SPRAYS IN EACH NOSTRIL EVERY DAY Yes MADINA Hutchins CNP   buPROPion (WELLBUTRIN XL) 150 MG extended release tablet TAKE 1 TABLET EVERY DAY Yes MADINA Hutchins CNP   diclofenac (VOLTAREN) 75 MG EC tablet TAKE 1 TABLET TWICE DAILY Yes MADINA Hutchins CNP   lamoTRIgine (LAMICTAL) 200 MG tablet TAKE 1/2 TABLET TWICE DAILY Yes MADINA Hutchins CNP   rosuvastatin (CRESTOR) 5 MG tablet TAKE 1 TABLET EVERY DAY Yes MADINA Thompson CNP   potassium chloride (KLOR-CON) 10 MEQ extended release tablet Take 1 tablet by mouth daily Yes MADINA Hutchins CNP   furosemide (LASIX) 20 MG tablet Take one tablet by mouth daily Yes MADINA Hutchins CNP   ferrous sulfate 325 (65 Fe) MG tablet Take 1 tablet by mouth daily (with breakfast) Yes MADINA Hutchins CNP   traZODone (DESYREL) 100 MG tablet Take 1 tablet by mouth nightly as needed for Sleep Yes MADINA Hutchins CNP   VORTIoxetine HBr (TRINTELLIX) 20 MG TABS tablet TAKE 1 TABLET BY MOUTH DAILY Yes MADINA Hutchins CNP   CALCIUM-MAGNESIUM-VITAMIN D PO Take 2 capsules by mouth nightly Yes Historical Provider, MD   Turmeric Curcumin 500 MG CAPS Take 2 capsules by mouth 2 times daily  Yes Historical Provider, MD   multivitamin [facility]) per tablet Take 1 tablet by mouth daily. Yes Historical Provider, MD   Omega-3 Fatty Acids (FISH OIL) 1200 MG CAPS Take  by mouth daily.  Yes Historical Provider, MD       Social History     Tobacco Use    Smoking status: Former Smoker     Packs/day: 1.50     Years: 21.00     Pack years: 31.50     Types: Cigarettes     Last attempt to quit: 1997     Years since quittin.4    Smokeless tobacco: Never Used   Substance Use Topics    Alcohol use: No    Drug use: No        Allergies   Allergen Reactions    Amoxicillin-Pot Clavulanate Hives    Daypro [Oxaprozin] Hives    Duravent-Da [Chlorphen-Phenyleph-Methscop] Hives    Lithium      Caused shakes    Norvasc [Amlodipine]      LE edema      Nsaids      Avoid d/t gastric bypass    Seldane [Terfenadine] Hives    Suprax [Cefixime] Hives    Levofloxacin Rash   ,   Past Medical History:   Diagnosis Date    ADHD (attention deficit hyperactivity disorder)     Allergic rhinitis     Bipolar disorder     Borderline osteopenia     Depression     Dysmetabolic syndrome     GERD (gastroesophageal reflux disease)     Headache(784.0)     HNP (herniated nucleus pulposus), lumbar 2019    Hyperlipidemia 2014    Hypertension     Intention tremor     due to lithium    Lumbar stenosis with neurogenic claudication 2019    Obesity     ANTOINETTE on CPAP     Osteoarthritis     PCOS (polycystic ovarian syndrome)     RAD (reactive airway disease)     Restless legs syndrome (RLS) 2014   ,   Past Surgical History:   Procedure Laterality Date    ABDOMINAL EXPLORATION SURGERY       SECTION      GASTRIC BYPASS SURGERY      HAND SURGERY      bilat CTS    KNEE SURGERY Left     atrhroscopic unispacer    KNEE SURGERY Right 10/28/2015    Right knee video arthroscopy with partial medial and lateral menisectomy with loose body removal    LUMBAR SPINE SURGERY N/A 2019    MICROLUMBAR DISCECTOMY L4-5 performed by Radha Malin MD at [facility] OR    TONSILLECTOMY     ,   Social History     Tobacco Use    Smoking status: Former Smoker     Packs/day: 1.50     Years: 21.00     Pack years: 31.50     Types: Cigarettes     Last attempt to quit: 1997     Years since quittin.4    Smokeless tobacco: Never Used   Substance Use Topics    Alcohol use: No    Drug use: No   ,   Family History   Problem Relation Age of Onset    Depression Sister     Mental Illness Sister     Diabetes Mother     Heart Disease Mother     Diabetes Father     Heart Disease Father    ,   Immunization History   Administered Date(s) Administered    Influenza 10/30/2013    Influenza Virus Vaccine 2014, 2015    Influenza, Phillip Alexis, IM, (6 mo and older Fluzone, Flulaval, Fluarix and 3 yrs and older Afluria) 2017, 2018    Tdap (Boostrix, Adacel) 2017   ,   Health Maintenance   Topic Date Due    Cervical cancer screen  1981    Shingles Vaccine (1 of 2) 2010    Colon cancer screen colonoscopy  2010    Lipid screen  2019    Breast cancer screen  2020    Annual Wellness Visit (AWV)  2021 (Originally 2019)    Flu vaccine (1) 2020    A1C test (Diabetic or Prediabetic)  2020    Potassium monitoring  2021    Creatinine monitoring  2021    DTaP/Tdap/Td vaccine (2 - Td) 2027    Hepatitis C screen  Completed    HIV screen  Completed    Hepatitis A vaccine  Aged Out    Hepatitis B vaccine  Aged Out    Hib vaccine  Aged Out    Meningococcal (ACWY) vaccine  Aged Out    Pneumococcal 0-64 years Vaccine  Aged Out       PHYSICAL EXAMINATION:  [ INSTRUCTIONS:  \"[x]\" Indicates a positive item  \"[]\" Indicates a negative item  -- DELETE ALL ITEMS NOT EXAMINED]  Vital Signs: (As obtained by patient/caregiver or practitioner observation)    Blood pressure-  Heart rate-    Respiratory rate-    Temperature-  Pulse oximetry-     Constitutional: [x] Appears well-developed and well-nourished [x] No apparent distress      [] Abnormal-   Mental status  [x] Alert and awake  [x] Oriented to person/place/time [x]Able to follow commands      Eyes:  EOM    [x]  Normal  [] Abnormal-  Sclera  [x]  Normal  [] Abnormal -         Discharge [x]  None visible  [] Abnormal -    HENT:   [x] Normocephalic, atraumatic. [] Abnormal   [x] Mouth/Throat: Mucous membranes are moist.     External Ears [x] Normal  [] Abnormal-     Neck: [x] No visualized mass     Pulmonary/Chest: [x] Respiratory effort normal.  [x] No visualized signs of difficulty breathing or respiratory distress        [] Abnormal-      Musculoskeletal:   [x] Normal gait with no signs of ataxia         [x] Normal range of motion of neck        [] Abnormal-       Neurological:        [x] No Facial Asymmetry (Cranial nerve 7 motor function) (limited exam to video visit)          [x] No gaze palsy        [] Abnormal-         Skin:        [x] No significant exanthematous lesions or discoloration noted on facial skin         [x] Abnormal-  Anterior LLE wound with surrounding erythema and slight yellow tissue noted at distal aspect. Psychiatric:       [x] Normal Affect [x] No Hallucinations        [] Abnormal-     Other pertinent observable physical exam findings-     ASSESSMENT/PLAN:  1. Non-healing non-surgical wound  Refer to   - [facility]    2. Cellulitis of left lower extremity  Complete atb as prescribed  - [facility]      No follow-ups on file. Cynthia Duenas is a 61 y.o. female being evaluated by a Virtual Visit (video visit) encounter to address concerns as mentioned above. A caregiver was present when appropriate. Due to this being a TeleHealth encounter (During [place] [address] public health emergency), evaluation of the following organ systems was limited: Vitals/Constitutional/EENT/Resp/CV/GI//MS/Neuro/Skin/Heme-Lymph-Imm.   Pursuant to the emergency declaration under the [facility] waiver authority and the [employer] Act, this Virtual Visit was conducted with patient's (and/or legal guardian's) consent, to reduce the patient's risk of exposure to COVID-19 and provide necessary medical care. The patient (and/or legal guardian) has also been advised to contact this office for worsening conditions or problems, and seek emergency medical treatment and/or call 911 if deemed necessary. Patient identification was verified at the start of the visit: Yes    Total time spent on this encounter: 15 minutes    Services were provided through a video synchronous discussion virtually to substitute for in-person clinic visit. Patient and provider were located at their individual homes. --MADINA Cantu CNP on 7/23/2020 at 1:56 PM    An electronic signature was used to authenticate this note.

## 2020-07-31 ENCOUNTER — HOSPITAL ENCOUNTER (OUTPATIENT)
Dept: WOUND CARE | Age: 60
Discharge: HOME | End: 2020-07-31
Payer: MEDICARE

## 2020-07-31 VITALS
BODY MASS INDEX: 53.92 KG/M2 | WEIGHT: 293 LBS | TEMPERATURE: 97.9 F | SYSTOLIC BLOOD PRESSURE: 163 MMHG | DIASTOLIC BLOOD PRESSURE: 66 MMHG | HEIGHT: 62 IN | HEART RATE: 95 BPM | RESPIRATION RATE: 24 BRPM

## 2020-07-31 PROCEDURE — 99203 OFFICE O/P NEW LOW 30 MIN: CPT | Performed by: INTERNAL MEDICINE

## 2020-07-31 PROCEDURE — 11042 DBRDMT SUBQ TIS 1ST 20SQCM/<: CPT

## 2020-07-31 PROCEDURE — 11042 DBRDMT SUBQ TIS 1ST 20SQCM/<: CPT | Performed by: INTERNAL MEDICINE

## 2020-07-31 PROCEDURE — 29581 APPL MULTLAYER CMPRN SYS LEG: CPT

## 2020-07-31 PROCEDURE — 99213 OFFICE O/P EST LOW 20 MIN: CPT

## 2020-07-31 RX ORDER — LIDOCAINE 40 MG/G
CREAM TOPICAL ONCE
Status: DISCONTINUED | OUTPATIENT
Start: 2020-07-31 | End: 2020-08-01 | Stop reason: HOSPADM

## 2020-07-31 ASSESSMENT — PAIN SCALES - GENERAL: PAINLEVEL_OUTOF10: 0

## 2020-07-31 NOTE — PLAN OF CARE
Patient to [facility] for initial visit. Patient presents wit wound on left leg, she is unsure how it happened. MD discussed plan of care and patient wound wrapped. Patient instructed where to buy cast guard. Follow scheduled for 1 week follow up.

## 2020-08-03 ENCOUNTER — TELEPHONE (OUTPATIENT)
Dept: FAMILY MEDICINE CLINIC | Age: 60
End: 2020-08-03

## 2020-08-03 NOTE — TELEPHONE ENCOUNTER
----- Message from Guillermo Audi sent at 8/3/2020 12:54 PM EDT -----  Subject: Appointment Request    Reason for Call: Urgent Back Neck Pain    QUESTIONS  Type of Appointment? Established Patient  Reason for appointment request? No appointments available during search  Additional Information for Provider? Patient is experiencing back pain   that has started within 14 days and she would like to be seen by Dr. Huan Muir   within the next day or two.  ---------------------------------------------------------------------------  --------------  [id number]  What is the best way for the office to contact you? OK to leave message on   voicemail  Preferred Call Back Phone Number? [phone]  ---------------------------------------------------------------------------  --------------  SCRIPT ANSWERS  Relationship to Patient? Self  Appointment reason? Symptomatic  Select script based on patient symptoms? Adult Back or Neck Pain [Slipped   disc   Herniated disc   sciatica]  Did you have an injury or trauma within the past 3 days? No  Are you having numbness   tingling   or weakness in your arms and/or legs with this pain? No  Are you having new problems with your bowel or bladder control? No  Are you having fevers (100.4)   chills   or sweats? No  Did your pain begin within the past 14 days? Yes  Have you been diagnosed with   tested for   or told that you are suspected of having COVID-19 (Coronavirus)? No  Have you had a fever or taken medication to treat a fever within the past   3 days? No  Have you had a cough   shortness of breath or flu-like symptoms within the past 3 days? No  Do you currently have flu-like symptoms including fever or chills   cough   shortness of breath   or difficulty breathing   or new loss of taste or smell? No  (Service Expert  click yes below to proceed with [employer] As Usual   Scheduling)?  Yes

## 2020-08-04 ENCOUNTER — HOSPITAL ENCOUNTER (OUTPATIENT)
Dept: WOUND CARE | Age: 60
Discharge: HOME | End: 2020-08-04
Payer: MEDICARE

## 2020-08-04 VITALS
WEIGHT: 293 LBS | BODY MASS INDEX: 53.92 KG/M2 | SYSTOLIC BLOOD PRESSURE: 155 MMHG | TEMPERATURE: 97.7 F | HEIGHT: 62 IN | RESPIRATION RATE: 22 BRPM | HEART RATE: 78 BPM | DIASTOLIC BLOOD PRESSURE: 91 MMHG

## 2020-08-04 PROCEDURE — 29581 APPL MULTLAYER CMPRN SYS LEG: CPT

## 2020-08-04 RX ORDER — LIDOCAINE HYDROCHLORIDE 40 MG/ML
SOLUTION TOPICAL ONCE
Status: DISCONTINUED | OUTPATIENT
Start: 2020-08-04 | End: 2020-08-05 | Stop reason: HOSPADM

## 2020-08-04 ASSESSMENT — PAIN SCALES - GENERAL: PAINLEVEL_OUTOF10: 0

## 2020-08-04 NOTE — PLAN OF CARE
Pt to the [facility] for dressing change. Pt called yesterday with problems with dressing. Pt denies any complaints of pain today. Wound dressed and compri 2 lite applied to left lower leg. Pt to follow up in the [facility] on Friday. Discharge instructions reviewed with patient, all questions answered, copy given to patient. Dressings were applied to all wounds per M.D. Instructions at this visit.

## 2020-08-04 NOTE — PROGRESS NOTES
Brief wound-care progress note --    Ms. Jeferson Austin called yesterday to note some significant discomfort that she had beneath her compression wrap over the weekend, and we were able to schedule her for a wound reassessment visit today. She believed she was having a reaction to the calamine in the compression wrap, but did not really feel pruritus, and did not see a rash when she removed the wrap yesterday. Described aching, increased sensitivity to the pain, but it did feel very superficial to her. After wrap removal, pain went away after a few hours. I'm not sure if this was really a reaction to calamine, or if it might have been an episode of neuropathic pain related to the leg edema + effects of compression. Either way, we re-wrapped her today with a calamine-free wrap, and also went down one level in compression strength. Ulcer did look a bit healthier already, compared to Friday. Very mild periwound redness, no signs of infection.     Electronically signed by Shobha Cespedes MD on 8/4/2020 at 1:11 PM

## 2020-08-05 ENCOUNTER — OFFICE VISIT (OUTPATIENT)
Dept: FAMILY MEDICINE CLINIC | Age: 60
End: 2020-08-05
Payer: MEDICARE

## 2020-08-05 VITALS
SYSTOLIC BLOOD PRESSURE: 126 MMHG | BODY MASS INDEX: 53.92 KG/M2 | TEMPERATURE: 96.8 F | OXYGEN SATURATION: 98 % | DIASTOLIC BLOOD PRESSURE: 84 MMHG | HEART RATE: 91 BPM | WEIGHT: 293 LBS | HEIGHT: 62 IN

## 2020-08-05 PROBLEM — L03.116 CELLULITIS OF LEFT LEG: Status: RESOLVED | Noted: 2020-08-05 | Resolved: 2020-08-05

## 2020-08-05 PROBLEM — L97.822 ULCER OF LEFT SHIN WITH FAT LAYER EXPOSED (HCC): Status: ACTIVE | Noted: 2020-08-05

## 2020-08-05 PROBLEM — D50.9 IRON DEFICIENCY ANEMIA: Status: RESOLVED | Noted: 2019-11-04 | Resolved: 2020-08-05

## 2020-08-05 PROBLEM — I87.2 VENOUS DERMATITIS: Status: ACTIVE | Noted: 2020-08-05

## 2020-08-05 PROBLEM — T88.59XA PROLONGED EMERGENCE FROM GENERAL ANESTHESIA, INITIAL ENCOUNTER: Status: RESOLVED | Noted: 2019-06-12 | Resolved: 2020-08-05

## 2020-08-05 PROBLEM — F39 MOOD DISORDER (HCC): Status: RESOLVED | Noted: 2019-04-04 | Resolved: 2020-08-05

## 2020-08-05 PROBLEM — L03.116 CELLULITIS OF LEFT LEG: Status: ACTIVE | Noted: 2020-08-05

## 2020-08-05 PROBLEM — J30.9 ALLERGIC RHINITIS: Status: ACTIVE | Noted: 2020-08-05

## 2020-08-05 PROBLEM — M25.562 LEFT KNEE PAIN: Status: RESOLVED | Noted: 2018-10-09 | Resolved: 2020-08-05

## 2020-08-05 PROCEDURE — G8427 DOCREV CUR MEDS BY ELIG CLIN: HCPCS | Performed by: NURSE PRACTITIONER

## 2020-08-05 PROCEDURE — 36415 COLL VENOUS BLD VENIPUNCTURE: CPT | Performed by: NURSE PRACTITIONER

## 2020-08-05 PROCEDURE — 99213 OFFICE O/P EST LOW 20 MIN: CPT | Performed by: NURSE PRACTITIONER

## 2020-08-05 PROCEDURE — G8417 CALC BMI ABV UP PARAM F/U: HCPCS | Performed by: NURSE PRACTITIONER

## 2020-08-05 PROCEDURE — 3017F COLORECTAL CA SCREEN DOC REV: CPT | Performed by: NURSE PRACTITIONER

## 2020-08-05 PROCEDURE — 1036F TOBACCO NON-USER: CPT | Performed by: NURSE PRACTITIONER

## 2020-08-05 RX ORDER — PREDNISONE 10 MG/1
TABLET ORAL
Qty: 30 TABLET | Refills: 0 | Status: SHIPPED | OUTPATIENT
Start: 2020-08-05 | End: 2020-08-28 | Stop reason: ALTCHOICE

## 2020-08-05 RX ORDER — CELECOXIB 100 MG/1
100 CAPSULE ORAL 2 TIMES DAILY
Qty: 180 CAPSULE | Refills: 1 | Status: SHIPPED | OUTPATIENT
Start: 2020-08-05 | End: 2021-01-04

## 2020-08-05 RX ORDER — LORATADINE 10 MG/1
10 TABLET ORAL DAILY
COMMUNITY

## 2020-08-05 ASSESSMENT — ENCOUNTER SYMPTOMS: BACK PAIN: 1

## 2020-08-05 NOTE — CONSULTS
88 [facility] Consult Note    Severa Drone     : 1960    DATE OF VISIT:  2020    Subjective: Severa Drone is a 61 y.o. female who has a venous,  traumatic and infection-associated ulcer located on the left , anterior lower leg. Mere Lezama NP, requested a consultation to help with ongoing wound mgmt. Current complaint of pain in this ulcer? yes. Quality of pain: aching and sharp  Timing: intermittent and decreasing in frequency  Severity: mild-moderate  Associated Signs/Symptoms:  swelling, drainage (light, clear) and fading redness  Other significant symptoms or pertinent ulcer history: Mrs. Chantelle Austin is not sure how the wound started, but it's been there for a few months (could have been minor trauma, could have been a small focus of infection, etc). It was stable for some time, occasionally drying out and showing signs of healing, but never fully healed. She had been using a simple OTC antibiotic ointment and dry dressings. Recently it became complicated by an apparent cellulitis; she was seen in the ER in mid-July when that happened, was prescribed a course of Bactrim and had her topical therapy changed to Silvadene. When she followed up with her PCP the next week, the wound did not appear to be healing yet, so she was referred here. No current F/C/D, definitely improved redness on oral Abx; no sore throat or mouth from the bactrim, no drug rash, no N/V/D. No purulence or malodor. The small degree of leg swelling that she has is chronic.         Additional ulcer(s) noted? no.      Ms. Shlomo Landry has a past medical history of ADHD (attention deficit hyperactivity disorder), Bipolar disorder, Borderline osteopenia, GERD (gastroesophageal reflux disease), Headache(784.0), HNP (herniated nucleus pulposus), lumbar, Intention tremor, Iron deficiency anemia, Lateral meniscal tear, Lumbar stenosis with neurogenic claudication, Medial meniscus tear, Prolonged emergence from general anesthesia, initial encounter, and Tobacco use. She has a past surgical history that includes knee surgery (Left);  section; Abdominal exploration surgery; Gastric bypass surgery; Tonsillectomy; knee surgery (Right, 10/28/2015); Lumbar spine surgery (N/A, 2019); and Carpal tunnel release (Bilateral). Her family history includes Depression in her sister; Diabetes in her father and mother; Heart Disease in her father and mother; Mental Illness in her sister. Ms. Juancho Edwards reports that she quit smoking about 23 years ago. Her smoking use included cigarettes. She has a 31.50 pack-year smoking history. She has never used smokeless tobacco. She reports that she does not drink alcohol or use drugs. Her current medication list consists of Calcium-Magnesium-Vitamin D, Fish Oil, Turmeric Curcumin, VORTIoxetine HBr, buPROPion, diclofenac, ferrous sulfate, fluticasone, furosemide, lamoTRIgine, loratadine, metoprolol succinate, multivitamin, potassium chloride, rosuvastatin, silver sulfADIAZINE, and traZODone. Recent Bactrim as mentioned. Allergies: Amoxicillin-pot clavulanate; Daypro [oxaprozin]; Duravent-da [chlorphen-phenyleph-methscop]; Lithium; Norvasc [amlodipine]; Nsaids; Seldane [terfenadine]; Suprax [cefixime]; and Levofloxacin    Pertinent items from the review of systems are discussed in the HPI; the remainder of the ROS was reviewed and is negative. Objective:     Vitals:    20 1427 20 1435   BP:  (!) 163/66   Pulse:  95   Resp: 24    Temp: 97.9 °F (36.6 °C)    TempSrc: Oral    Weight: (!) 334 lb 6.4 oz (151.7 kg)    Height: 5' 2\" (1.575 m)      ROSALBA today -- right 0.90, left 3.41 (arm systolics 233 & 956, right ankle 170 & 172, left ankle 172 & 179).     Constitutional:  well-developed, well-nourished, overweight, NAD  Psychiatric:  oriented to person, place and time; mood and affect appropriate for the situation   Eyes:  pupils equal, disorder, without psychotic features ([facility] [address] F33.2    GERD (gastroesophageal reflux disease) K21.9    RAD (reactive airway disease) J45.909    Hypertension I10    ANTOINETTE on CPAP G47.33, Z99.89    Lumbar stenosis with neurogenic claudication M48.062    HNP (herniated nucleus pulposus), lumbar M51.26    Ulcer of left shin with fat layer exposed ([facility] [address] L97.822    Venous dermatitis I87.2    Cellulitis of left leg L03. 116    Allergic rhinitis I50.0    Dysmetabolic syndrome L71.97       Assessment of today's active condition(s): unclear on initial wounding event to the leg, but now a small but nonhealing chronic ulcer, I think related to her chronic leg swelling, effects of recent soft tissue infection, and probably some central necrosis and fibrosis that is preventing granulation tissue formation. No signs of ischemia, and I think the infection is resolved. Factors contributing to occurrence and/or persistence of the chronic ulcer include edema and venous stasis. Medical necessity of today's visit is shown by the above documentation. Sharp debridement is indicated today, based upon the exam findings in the ulcer(s) above. Procedure note:     Consent obtained. Time out performed per [facility] policy. Anesthetic  Anesthetic: 4% Lidocaine Cream     Using a curette, I sharply debrided the left  lower leg ulcer(s) down through and including the removal of subcutaneous tissue. The type(s) of tissue debrided included fibrin, biofilm and necrotic/eschar. Total Surface Area Debrided: 1 sq cm. After debridement, central area was a small focus of exposed fascia, which typically does not support granulation tissue growth well until gently debrided. The ulcers were then irrigated with normal saline solution. The procedure was completed with a small amount of bleeding, and hemostasis was with pressure. The patient tolerated the procedure well, with no significant complications.  The patient's level of pain during and after

## 2020-08-05 NOTE — PROGRESS NOTES
[facility]  [address] [place] [id number]  Dept: [phone]  Dept Fax: [phone]  Loc: [phone]    Rajiv Damon is a 61 y.o. female who presents today for her medical conditions/complaints as noted below. Rajiv Damon is c/o of Back Pain (pt has been having upper back pain between her shoulder blades for the past 5 days. pt states twisting and turning makes the pain worse. pt states she had couple muscle relaxers from last year and took them and it was helpful. pt states it is getting a little better as the days go by but still there. )       Subjective:     Chief Complaint   Patient presents with    Back Pain     pt has been having upper back pain between her shoulder blades for the past 5 days. pt states twisting and turning makes the pain worse. pt states she had couple muscle relaxers from last year and took them and it was helpful. pt states it is getting a little better as the days go by but still there. Back Pain   This is a new problem. The current episode started in the past 7 days (4-5 days ago). The problem occurs daily. The problem has been gradually improving since onset. The pain is present in the thoracic spine. The quality of the pain is described as aching and cramping (sharp). The pain does not radiate. The pain is moderate. The pain is the same all the time. The symptoms are aggravated by position. Pertinent negatives include no abdominal pain, chest pain, headaches, numbness, paresis, tingling or weakness. She has tried muscle relaxant for the symptoms. The treatment provided no relief.        Past Medical History:   Diagnosis Date    ADHD (attention deficit hyperactivity disorder)     Allergic rhinitis     Bipolar disorder     Borderline osteopenia     Depression     Dysmetabolic syndrome     GERD (gastroesophageal reflux disease)     Headache(784.0)     HNP (herniated nucleus pulposus), lumbar 6/6/2019    Hyperlipidemia 4/23/2014    Hypertension     Intention tremor     due to lithium    Lumbar stenosis with neurogenic claudication 6/6/2019    Obesity     ANTOINETTE on CPAP 1995    Osteoarthritis     PCOS (polycystic ovarian syndrome)     RAD (reactive airway disease)     Restless legs syndrome (RLS) 4/23/2014         Review of Systems   Constitutional: Negative. Negative for appetite change, fatigue and unexpected weight change. HENT: Negative. Eyes: Negative. Respiratory: Negative. Negative for shortness of breath. Cardiovascular: Negative. Negative for chest pain, palpitations and leg swelling. Gastrointestinal: Negative. Negative for abdominal pain, anal bleeding, blood in stool and nausea. Endocrine: Negative. Genitourinary: Negative. Negative for hematuria. Musculoskeletal: Positive for back pain. Negative for neck pain. Skin: Negative. Negative for rash. Allergic/Immunologic: Negative. Neurological: Negative. Negative for dizziness, tingling, seizures, syncope, speech difficulty, weakness, light-headedness, numbness and headaches. Hematological: Negative. Does not bruise/bleed easily. Psychiatric/Behavioral: Negative. All other systems reviewed and are negative.        Past Medical History:   Diagnosis Date    ADHD (attention deficit hyperactivity disorder)     Allergic rhinitis     Bipolar disorder     Borderline osteopenia     Depression     Dysmetabolic syndrome     GERD (gastroesophageal reflux disease)     Headache(784.0)     HNP (herniated nucleus pulposus), lumbar 6/6/2019    Hyperlipidemia 4/23/2014    Hypertension     Intention tremor     due to lithium    Lumbar stenosis with neurogenic claudication 6/6/2019    Obesity     ANTOINETTE on CPAP 1995    Osteoarthritis     PCOS (polycystic ovarian syndrome)     RAD (reactive airway disease)     Restless legs syndrome (RLS) 4/23/2014     Family History   Problem Relation Age of Onset    Not on file     Physically abused: Not on file     Forced sexual activity: Not on file   Other Topics Concern    Not on file   Social History Narrative    Not on file     Current Outpatient Medications   Medication Sig Dispense Refill    loratadine (CLARITIN) 10 MG tablet Take 10 mg by mouth daily      silver sulfADIAZINE (SILVADENE) 1 % cream Apply topically daily. 25 g 0    metoprolol succinate (TOPROL XL) 50 MG extended release tablet TAKE 1 TABLET EVERY DAY 90 tablet 1    fluticasone (FLONASE) 50 MCG/ACT nasal spray USE 2 SPRAYS IN EACH NOSTRIL EVERY DAY 48 g 1    buPROPion (WELLBUTRIN XL) 150 MG extended release tablet TAKE 1 TABLET EVERY DAY 90 tablet 1    diclofenac (VOLTAREN) 75 MG EC tablet TAKE 1 TABLET TWICE DAILY 180 tablet 1    lamoTRIgine (LAMICTAL) 200 MG tablet TAKE 1/2 TABLET TWICE DAILY 90 tablet 1    rosuvastatin (CRESTOR) 5 MG tablet TAKE 1 TABLET EVERY DAY 90 tablet 1    potassium chloride (KLOR-CON) 10 MEQ extended release tablet Take 1 tablet by mouth daily 90 tablet 3    furosemide (LASIX) 20 MG tablet Take one tablet by mouth daily 90 tablet 1    ferrous sulfate 325 (65 Fe) MG tablet Take 1 tablet by mouth daily (with breakfast) 90 tablet 3    traZODone (DESYREL) 100 MG tablet Take 1 tablet by mouth nightly as needed for Sleep      VORTIoxetine HBr (TRINTELLIX) 20 MG TABS tablet TAKE 1 TABLET BY MOUTH DAILY 30 tablet 5    CALCIUM-MAGNESIUM-VITAMIN D PO Take 2 capsules by mouth nightly      Turmeric Curcumin 500 MG CAPS Take 2 capsules by mouth 2 times daily       multivitamin (THERAGRAN) per tablet Take 1 tablet by mouth daily.  Omega-3 Fatty Acids (FISH OIL) 1200 MG CAPS Take  by mouth daily. No current facility-administered medications for this visit. No changes in past medical history, past surgical history, social history, orfamily history were noted during the patient encounter unless specifically listed above.   All updates of past medical history, Cranial Nerves: Cranial nerves are intact. No cranial nerve deficit. Sensory: Sensation is intact. Motor: No weakness or abnormal muscle tone. Coordination: Coordination is intact. Gait: Gait is intact. Deep Tendon Reflexes: Reflexes are normal and symmetric. Psychiatric:         Speech: Speech normal.         Behavior: Behavior normal. Behavior is cooperative. /84 (Site: Right Upper Arm, Position: Sitting, Cuff Size: Large Adult)   Pulse 91   Temp 96.8 °F (36 °C)   Ht 5' 2\" (1.575 m)   Wt (!) 333 lb (151 kg)   LMP  (LMP Unknown)   SpO2 98%   BMI 60.91 kg/m²   Body mass index is 60.91 kg/m². BP Readings from Last 2 Encounters:   08/05/20 126/84   08/04/20 (!) 155/91       Wt Readings from Last 3 Encounters:   08/05/20 (!) 333 lb (151 kg)   08/04/20 (!) 334 lb 8 oz (151.7 kg)   07/31/20 (!) 334 lb 6.4 oz (151.7 kg)       Lab Review   No visits with results within 2 Month(s) from this visit.    Latest known visit with results is:   Nurse Only on 05/14/2020   Component Date Value    WBC 05/14/2020 8.8     RBC 05/14/2020 4.86     Hemoglobin 05/14/2020 14.0     Hematocrit 05/14/2020 43.4     MCV 05/14/2020 89.2     MCH 05/14/2020 28.9     MCHC 05/14/2020 32.4     RDW 05/14/2020 15.5*    Platelets 82/94/9385 342     MPV 05/14/2020 7.4     Neutrophils % 05/14/2020 59.2     Lymphocytes % 05/14/2020 27.6     Monocytes % 05/14/2020 9.5     Eosinophils % 05/14/2020 2.5     Basophils % 05/14/2020 1.2     Neutrophils Absolute 05/14/2020 5.2     Lymphocytes Absolute 05/14/2020 2.4     Monocytes Absolute 05/14/2020 0.8     Eosinophils Absolute 05/14/2020 0.2     Basophils Absolute 05/14/2020 0.1     Sodium 05/14/2020 143     Potassium 05/14/2020 4.9     Chloride 05/14/2020 104     CO2 05/14/2020 28     Anion Gap 05/14/2020 11     Glucose 05/14/2020 105*    BUN 05/14/2020 22*    CREATININE 05/14/2020 0.7     GFR Non- 05/14/2020 >60     GFR  05/14/2020 >60     Calcium 05/14/2020 9.2        No results found for this visit on 08/05/20. Assessment:       1. Strain of thoracic spine, sequela    2. Hyperglycemia        No results found for this visit on 08/05/20. Plan:       Emily Palomares was seen today for back pain. Diagnoses and all orders for this visit:    Strain of thoracic spine, sequela  The patient is already using a muscle relaxer and has had improvement. Start the following  Rest, ice/heat prn  Home exercises  Start   -     predniSONE (DELTASONE) 10 MG tablet; Take 40 mg for 3 days then 30 mg for 3 days then 20 mg for 3 days then 10 mg for 3 days  The patient states diclofenac is no longer working for her arthritis in her knees. After she quit the prednisone she can start the following medication  -     celecoxib (CELEBREX) 100 MG capsule; Take 1 capsule by mouth 2 times daily  Patient counselled on proper usage of medication, and was advised of potential side effects and risks associated with this medication. The patient expressed understanding of above and agreed to proceed with treatment. Hyperglycemia history   -     Hemoglobin A1C        Patient has been instructed call the office immediately with new symptoms, change in symptoms or worseningof symptoms. If this is not feasible, patient is instructed to report to the emergency room. Medication profile reviewed. Medication side effects and possible impairments from medications were discussed as applicable. Allergies were reviewed. Health maintenance was reviewed and updated as appropriate. Return if symptoms worsen or fail to improve. (Comment: Please note this report has been produced using a combination of typing and speech recognition software and may contain errors related to that system including errors in grammar, punctuation, and spelling, as well as words and phrases that may be inappropriate.  If there are any questions or concerns please feel free to contact the dictating provider for clarification.)

## 2020-08-06 LAB
ESTIMATED AVERAGE GLUCOSE: 122.6 MG/DL
HBA1C MFR BLD: 5.9 %

## 2020-08-06 ASSESSMENT — ENCOUNTER SYMPTOMS
EYES NEGATIVE: 1
SHORTNESS OF BREATH: 0
NAUSEA: 0
GASTROINTESTINAL NEGATIVE: 1
BLOOD IN STOOL: 0
ABDOMINAL PAIN: 0
ANAL BLEEDING: 0
RESPIRATORY NEGATIVE: 1
ALLERGIC/IMMUNOLOGIC NEGATIVE: 1

## 2020-08-07 ENCOUNTER — HOSPITAL ENCOUNTER (OUTPATIENT)
Dept: WOUND CARE | Age: 60
Discharge: HOME | End: 2020-08-07
Payer: MEDICARE

## 2020-08-07 VITALS
HEART RATE: 97 BPM | DIASTOLIC BLOOD PRESSURE: 76 MMHG | HEIGHT: 62 IN | SYSTOLIC BLOOD PRESSURE: 154 MMHG | TEMPERATURE: 98.4 F | WEIGHT: 293 LBS | BODY MASS INDEX: 53.92 KG/M2 | RESPIRATION RATE: 20 BRPM

## 2020-08-07 PROCEDURE — 97597 DBRDMT OPN WND 1ST 20 CM/<: CPT

## 2020-08-07 PROCEDURE — 29581 APPL MULTLAYER CMPRN SYS LEG: CPT

## 2020-08-07 PROCEDURE — 97597 DBRDMT OPN WND 1ST 20 CM/<: CPT | Performed by: INTERNAL MEDICINE

## 2020-08-07 RX ORDER — LIDOCAINE 40 MG/G
CREAM TOPICAL PRN
Status: DISCONTINUED | OUTPATIENT
Start: 2020-08-07 | End: 2020-08-08 | Stop reason: HOSPADM

## 2020-08-12 NOTE — PROGRESS NOTES
88 [facility] Progress Note    Audrey Hoang     : 1960    DATE OF VISIT:  2020    Subjective: Audrey Hoang is a 61 y.o. female who has a venous ulcer located on the left  lower leg. Significant symptoms or pertinent wound history since last visit: felt better this week in her non-calamine compression wrap, mild pruritus, mild discomfort. No F/C/D, modest drainage. Additional ulcer(s) noted? no.      Her current medication list consists of Calcium-Magnesium-Vitamin D, Fish Oil, Turmeric Curcumin, VORTIoxetine HBr, buPROPion, celecoxib, diclofenac, ferrous sulfate, fluticasone, furosemide, lamoTRIgine, loratadine, metoprolol succinate, multivitamin, potassium chloride, predniSONE, rosuvastatin, and traZODone. Allergies: Amoxicillin-pot clavulanate; Daypro [oxaprozin]; Duravent-da [chlorphen-phenyleph-methscop]; Lithium; Norvasc [amlodipine]; Nsaids; Seldane [terfenadine]; Suprax [cefixime]; and Levofloxacin    Objective:     Vitals:    20 1318   BP: (!) 154/76   Pulse: 97   Resp: 20   Temp: 98.4 °F (36.9 °C)   TempSrc: Oral   Weight: (!) 335 lb 12.8 oz (152.3 kg)   Height: 5' 2\" (1.575 m)     AAOx3, overweight, NAD  Intact distal pulses, foot warm, good cap refill  Mild LLE edema  Mild allodynia around the ulcer, contact dermatitis from prior dressings resolved  Katy-ulcer skin: indurated, pink and warm. Ulcer(s): smaller, with previous peripheral granulation getting some epidermal coverage, central area still with a bit of depth and fibronecrosis, fibrin, debris. Photos also saved in electronic chart.     Today's wound measurements, per RN documentation:  Wound 20 #1, Left Pretib, Venous, Full Thickness, Onset 2020-Wound Length (cm): 0.6 cm    Wound 20 #1, Left Pretib, Venous, Full Thickness, Onset 2020-Wound Width (cm): 0.4 cm    Wound 20 #1, Left Pretib, Venous, Full Thickness, Onset 2020-Wound Depth (cm): 0.4 cm    Assessment:     Patient Active Problem List   Diagnosis Code    PCOS (polycystic ovarian syndrome) E28.2    Hyperlipidemia E78.5    Restless legs syndrome (RLS) G25.81    Edema of both legs R60.0    Insomnia G47.00    Vitamin D deficiency E55.9    Morbid obesity with BMI of 50.0-59.9, adult ([facility]) E66.01, Z68.43    Urinary incontinence R32    Primary osteoarthritis of both knees M17.0    Severe episode of recurrent major depressive disorder, without psychotic features ([facility] [address] F33.2    GERD (gastroesophageal reflux disease) K21.9    RAD (reactive airway disease) J45.909    Hypertension I10    ANTOINETTE on CPAP G47.33, Z99.89    Lumbar stenosis with neurogenic claudication M48.062    HNP (herniated nucleus pulposus), lumbar M51.26    Ulcer of left shin with fat layer exposed ([facility]) R08.012    Venous dermatitis I87.2    Allergic rhinitis P35.3    Dysmetabolic syndrome X28.85       Assessment of today's active condition(s): venous stasis, chronic leg edema, small nonhealing left leg ulcer with a central focus of depth, fibrosis and probably minor fascial exposure; no signs of infection or ischemia, I think main priority is to push some central granulation tissue and then she ought to be able to heal well. Factors contributing to occurrence and/or persistence of the chronic ulcer include edema, venous stasis and obesity. Medical necessity of today's visit is shown by the above documentation. Sharp debridement is indicated today, based upon the exam findings in the wound(s) above. Procedure note:     Consent obtained. Time out performed per [facility] policy. Anesthetic  Anesthetic: 4% Lidocaine Cream     Using a curette and #15 blade scalpel, I sharply debrided the left  lower leg ulcer(s) down through and including the removal of dermis. The type(s) of tissue debrided included fibrin and biofilm. Total Surface Area Debrided: 1 sq cm.  Also used the #15 blade just to make some minor nicks into that deep

## 2020-08-13 ENCOUNTER — HOSPITAL ENCOUNTER (OUTPATIENT)
Dept: WOUND CARE | Age: 60
Discharge: HOME | End: 2020-08-13
Payer: MEDICARE

## 2020-08-13 VITALS
BODY MASS INDEX: 53.92 KG/M2 | HEIGHT: 62 IN | WEIGHT: 293 LBS | TEMPERATURE: 99.1 F | HEART RATE: 82 BPM | DIASTOLIC BLOOD PRESSURE: 86 MMHG | SYSTOLIC BLOOD PRESSURE: 147 MMHG | RESPIRATION RATE: 20 BRPM

## 2020-08-13 PROCEDURE — 11043 DBRDMT MUSC&/FSCA 1ST 20/<: CPT | Performed by: INTERNAL MEDICINE

## 2020-08-13 PROCEDURE — 29581 APPL MULTLAYER CMPRN SYS LEG: CPT

## 2020-08-13 PROCEDURE — 11043 DBRDMT MUSC&/FSCA 1ST 20/<: CPT

## 2020-08-13 RX ORDER — LIDOCAINE 40 MG/G
CREAM TOPICAL ONCE
Status: DISCONTINUED | OUTPATIENT
Start: 2020-08-13 | End: 2020-08-14 | Stop reason: HOSPADM

## 2020-08-13 ASSESSMENT — PAIN SCALES - GENERAL
PAINLEVEL_OUTOF10: 0
PAINLEVEL_OUTOF10: 0

## 2020-08-13 NOTE — PLAN OF CARE
Wound debridement of right lower leg, continue same treatment with compri 2 Lite, elevate BLE frequently t/o the day.   F/u x 1 week, MD orders/D/C instructions reviewed with patient, all questions answered; copy of instructions given to patient

## 2020-08-17 PROBLEM — L97.823: Status: ACTIVE | Noted: 2020-08-05

## 2020-08-17 NOTE — PROGRESS NOTES
88 [facility] Progress Note    Shira Mojica     : 1960    DATE OF VISIT:  2020    Subjective: Shira Mojica is a 61 y.o. female who has a venous ulcer located on the left , anterior lower leg. Significant symptoms or pertinent wound history since last visit: feeling ok overall, mild pain at times, less swelling, less pruritus, modest drainage, no fever. Additional ulcer(s) noted? no.      Her current medication list consists of Calcium-Magnesium-Vitamin D, Fish Oil, VORTIoxetine HBr, buPROPion, celecoxib, ferrous sulfate, fluticasone, furosemide, lamoTRIgine, loratadine, metoprolol succinate, multivitamin, potassium chloride, predniSONE, rosuvastatin, and traZODone. Allergies: Calamine; Amoxicillin-pot clavulanate; Daypro [oxaprozin]; Duravent-da [chlorphen-phenyleph-methscop]; Lithium; Norvasc [amlodipine]; Nsaids; Seldane [terfenadine]; Suprax [cefixime]; and Levofloxacin    Objective:     Vitals:    20 1238 20 1249   BP:  (!) 147/86   Pulse:  82   Resp: 20    Temp: 99.1 °F (37.3 °C)    TempSrc: Oral    Weight: (!) 331 lb 3.2 oz (150.2 kg)    Height: 5' 2\" (1.575 m)      AAOx3, overweight, a bit anxious, NAD  Intact distal pulses, foot warm, good cap refill  Mild LLE edema  No cellulitis, angitis, fluctuance, a bit of allodynia in the ulcer  Katy-ulcer skin: indurated, pink and warm. Ulcer(s): perpiheral granulation getting more epidermal coverage, a bit of fibrin, but central area of fascial exposure looking a bit more fibronecrotic, with granulation tissue not covering that last area, no signs of infection. Photos also saved in electronic chart.     Today's wound measurements, per RN documentation:  Wound 20 #1, Left Pretib, Venous, Full Thickness, Onset 2020-Wound Length (cm): 0.9 cm    Wound 20 #1, Left Pretib, Venous, Full Thickness, Onset 2020-Wound Width (cm): 0.4 cm    Wound 20 #1, Left Pretib, Venous, Full Thickness, Onset 4/2020-Wound Depth (cm): 0.2 cm    Assessment:     Patient Active Problem List   Diagnosis Code    PCOS (polycystic ovarian syndrome) E28.2    Hyperlipidemia E78.5    Restless legs syndrome (RLS) G25.81    Edema of both legs R60.0    Insomnia G47.00    Vitamin D deficiency E55.9    Morbid obesity with BMI of 50.0-59.9, adult ([facility]) E66.01, Z68.43    Urinary incontinence R32    Primary osteoarthritis of both knees M17.0    Severe episode of recurrent major depressive disorder, without psychotic features ([facility]) F33.2    GERD (gastroesophageal reflux disease) K21.9    RAD (reactive airway disease) J45.909    Hypertension I10    ANTOINETTE on CPAP G47.33, Z99.89    Lumbar stenosis with neurogenic claudication M48.062    HNP (herniated nucleus pulposus), lumbar M51.26    Ulcer of left shin with necrosis of muscle ([facility]) L97.823    Peripheral venous insufficiency I87.2    Allergic rhinitis D31.4    Dysmetabolic syndrome V57.80       Assessment of today's active condition(s): nonhealing venous leg ulcer, less edema, good compression, no infection or ischemia, but a bit of deeper central necrosis that needs a bit more attention. Factors contributing to occurrence and/or persistence of the chronic ulcer include edema, venous stasis and obesity. Medical necessity of today's visit is shown by the above documentation. Sharp debridement is indicated today, based upon the exam findings in the wound(s) above. Procedure note:     Consent obtained. Time out performed per [facility] policy. Anesthetic  Anesthetic: 4% Lidocaine Cream     Using a curette and #15 blade scalpel, I sharply debrided the left , anterior lower leg ulcer(s) down through and including the removal of muscle/fascia. The type(s) of tissue debrided included fibrin and necrotic/eschar. Total Surface Area Debrided: 1 sq cm. The ulcers were then irrigated with normal saline solution.  The procedure was completed with a small amount of

## 2020-08-19 ENCOUNTER — OFFICE VISIT (OUTPATIENT)
Dept: ORTHOPEDIC SURGERY | Age: 60
End: 2020-08-19
Payer: MEDICARE

## 2020-08-19 ENCOUNTER — HOSPITAL ENCOUNTER (OUTPATIENT)
Dept: WOUND CARE | Age: 60
Discharge: HOME | End: 2020-08-19
Payer: MEDICARE

## 2020-08-19 VITALS
TEMPERATURE: 98.6 F | HEIGHT: 62 IN | WEIGHT: 293 LBS | BODY MASS INDEX: 53.92 KG/M2 | HEART RATE: 89 BPM | SYSTOLIC BLOOD PRESSURE: 128 MMHG | DIASTOLIC BLOOD PRESSURE: 76 MMHG | RESPIRATION RATE: 22 BRPM

## 2020-08-19 VITALS — HEIGHT: 62 IN | WEIGHT: 293 LBS | BODY MASS INDEX: 53.92 KG/M2

## 2020-08-19 PROCEDURE — G8427 DOCREV CUR MEDS BY ELIG CLIN: HCPCS | Performed by: PHYSICIAN ASSISTANT

## 2020-08-19 PROCEDURE — 1036F TOBACCO NON-USER: CPT | Performed by: PHYSICIAN ASSISTANT

## 2020-08-19 PROCEDURE — 29581 APPL MULTLAYER CMPRN SYS LEG: CPT

## 2020-08-19 PROCEDURE — 99213 OFFICE O/P EST LOW 20 MIN: CPT | Performed by: PHYSICIAN ASSISTANT

## 2020-08-19 PROCEDURE — 97597 DBRDMT OPN WND 1ST 20 CM/<: CPT

## 2020-08-19 PROCEDURE — 97597 DBRDMT OPN WND 1ST 20 CM/<: CPT | Performed by: INTERNAL MEDICINE

## 2020-08-19 PROCEDURE — 3017F COLORECTAL CA SCREEN DOC REV: CPT | Performed by: PHYSICIAN ASSISTANT

## 2020-08-19 PROCEDURE — G8417 CALC BMI ABV UP PARAM F/U: HCPCS | Performed by: PHYSICIAN ASSISTANT

## 2020-08-19 RX ORDER — LIDOCAINE 40 MG/G
CREAM TOPICAL ONCE
Status: DISCONTINUED | OUTPATIENT
Start: 2020-08-19 | End: 2020-08-20 | Stop reason: HOSPADM

## 2020-08-19 ASSESSMENT — PAIN SCALES - GENERAL
PAINLEVEL_OUTOF10: 0
PAINLEVEL_OUTOF10: 0

## 2020-08-19 NOTE — PROGRESS NOTES
History of present illness:   Ms. Kisha Gaitan is a pleasant 61 y.o. female with a PMH of lumbar stenosis, iron deficiency anemia, GERD, bipolar disorder and borderline osteopenia who returns today 1 year s/p left L4-5 hemilaminotomy and diskectomy. She did very well after surgery but reports increased low back pain over the past 2 weeks. She denies a recent fall or injury to her spine. The pain is most localized to her lower lumbar spine. She also initially had mild interscapular pain between her shoulder blades. This pain has since resolved. She denies new upper or lower extremity pain, numbness or weakness. She denies gait instability, balance disruption, saddle numbness or bowel or bladder dysfunction. She has tried an oral steroid taper with significant relief. She rates her back pain today 1/10. Physical examination:  Ms. Kisha Gaitan most recent vitals: Body mass index is 60.54 kg/m². General exam:  She is well-developed and well-nourished, is in obvious pain and alert and oriented to person, place, and time. She demonstrates appropriate mood and affect. Her skin is warm and dry. Her gait is normal and she walks heel to toe without limp or instability. Back:  She stands with slight lumbar flexion. Her lumbar flexion, extension and lateral bending are moderately reduced with pain. She has mild tenderness over her lumbar spine without obvious muscle spasm. The skin over her lumbar spine is normal without a surgical scar. Lower extremities:  She has 5/5 motor strength of bilateral lower extremities. She has a negative straight leg raise, bilaterally. Deep tendon reflexes at knees and achilles are 1+. Sensation is intact to light touch L3 to S1 bilaterally. She has no clonus. Hip range of motion painless. She does have an ace bandage over left shin and foot for a lower extremity wound, being followed by wound care.      Imaging:  I reviewed MRI images of her lumbar spine from August 2019. They note a recurrent HNP left L4-5. Assessment:  Recurrent Lumbar HNP L4-5, 1 year S/P L4-5 MLD    Plan:  We discussed treatment options including observation, additional oral steroids, physical therapy, epidural injection and microlumbar discectomy. She will continue with observation and home exercises, as her symptoms are improved at this time. She was given information for Yury exercises to try at home. She will call for a lumbar epidural injection if her symptoms persist after that. We also discussed possibly obtaining flexion and extension xrays at a future office visit if her back pain persists or worsens.      Zoila Moss PA-C

## 2020-08-19 NOTE — PLAN OF CARE
Jannet came in 2 days early due to compression wrap sliding down. Wound debrided today per MD and patient tolerated well. Will add Aquaphor to below the knee to help keep dressing in place. Patient will now have Compri 2 applied and plan to follow up next Friday 8/28/20. Discharge instructions reviewed with patient, all questions answered, copy given to patient. Dressings were applied to all wounds per M.D. Instructions at this visit.
Statement Selected

## 2020-08-21 ENCOUNTER — HOSPITAL ENCOUNTER (OUTPATIENT)
Dept: WOUND CARE | Age: 60
Discharge: HOME | End: 2020-08-21
Payer: MEDICARE

## 2020-08-23 NOTE — PROGRESS NOTES
[facility] [place]    Chayo Rand     : 1960    DATE OF VISIT:  2020    Subjective: Chayo Rand is a 61 y.o. female who has a venous ulcer located on the left , anterior lower leg. Significant symptoms or pertinent wound history since last visit: she asked to come in a couple of days early this week, because her wrap started to slide down, and then began to bunch up, cause discomfort, increase proximal lower leg edema. No fever, no pruritus, modest drainage. Additional ulcer(s) noted? no.      Her current medication list consists of Calcium-Magnesium-Vitamin D, Fish Oil, VORTIoxetine HBr, buPROPion, celecoxib, ferrous sulfate, fluticasone, furosemide, lamoTRIgine, loratadine, metoprolol succinate, multivitamin, potassium chloride, predniSONE, rosuvastatin, and traZODone. Allergies: Calamine; Amoxicillin-pot clavulanate; Daypro [oxaprozin]; Duravent-da [chlorphen-phenyleph-methscop]; Lithium; Norvasc [amlodipine]; Nsaids; Seldane [terfenadine]; Suprax [cefixime]; and Levofloxacin    Objective:     Vitals:    20 1525 20 1532   BP:  128/76   Pulse:  89   Resp: 22    Temp: 98.6 °F (37 °C)    TempSrc: Oral    Weight: (!) 333 lb 6.4 oz (151.2 kg)    Height: 5' 2\" (1.575 m)      AAOx3, overweight, NAD  Intact distal pulses, foot warm, good cap refill  Mild-mod LLE edema  No cellulitis, angitis, fluctuance, still a bit of allodynia at the ulcer  Katy-ulcer skin: indurated, pink, warm and very mild reactive erythema. Ulcer(s): a bit smaller, peripherally granular, central fibronecrotic fascial tissue, but starting to granulate there too, after a bit more scalpel work last week, a bit of overlying fibrin and biofilm. Photos also saved in electronic chart.     Today's wound measurements, per RN documentation:  Wound 20 #1, Left Pretib, Venous, Full Thickness, Onset 2020-Wound Length (cm): 1.1 cm    Wound 20 #1, Left Pretib, Venous, Full Thickness, Onset 4/2020-Wound Width (cm): 0.3 cm    Wound 07/31/20 #1, Left Pretib, Venous, Full Thickness, Onset 4/2020-Wound Depth (cm): 0.1 cm    Assessment:     Patient Active Problem List   Diagnosis Code    PCOS (polycystic ovarian syndrome) E28.2    Hyperlipidemia E78.5    Restless legs syndrome (RLS) G25.81    Edema of both legs R60.0    Insomnia G47.00    Vitamin D deficiency E55.9    Morbid obesity with BMI of 50.0-59.9, adult ([facility]) E66.01, Z68.43    Urinary incontinence R32    Primary osteoarthritis of both knees M17.0    Severe episode of recurrent major depressive disorder, without psychotic features ([facility] [address] F33.2    GERD (gastroesophageal reflux disease) K21.9    RAD (reactive airway disease) J45.909    Hypertension I10    ANTOINETTE on CPAP G47.33, Z99.89    Lumbar stenosis with neurogenic claudication M48.062    HNP (herniated nucleus pulposus), lumbar M51.26    Ulcer of left shin with necrosis of muscle ([facility]) L97.823    Peripheral venous insufficiency I87.2    Allergic rhinitis C34.8    Dysmetabolic syndrome X46.11       Assessment of today's active condition(s): venous stasis, leg edema, small left leg ulcer but healing a bit slowly due to a bit of central depth and fascial involvement; no signs of infection or ischemia, and starting to improve -- just need to get that deeper fascial tissue to granulate over completely, and need to make a bit of an adjustment to the compression wrap to prevent it from sliding down. Factors contributing to occurrence and/or persistence of the chronic ulcer include edema, venous stasis, decreased mobility and obesity. Medical necessity of today's visit is shown by the above documentation. Sharp debridement is indicated today, based upon the exam findings in the wound(s) above. Procedure note:     Consent obtained. Time out performed per [facility] policy.     Anesthetic  Anesthetic: 4% Lidocaine Cream     Using a curette and #15 blade scalpel, I sharply debrided the left  lower leg ulcer(s) down through and including the removal of dermis. The type(s) of tissue debrided included fibrin and biofilm. Total Surface Area Debrided: 1 sq cm. I used the #15 blade just to make a few tiny vertical stab incisions through that fascial base, to cause a bit of bleeding and encourage more granulation. The ulcers were then irrigated with normal saline solution. The procedure was completed with a small amount of bleeding, and hemostasis was with pressure. The patient tolerated the procedure well, with no significant complications. The patient's level of pain during and after the procedure was monitored. Post-debridement measurements, if different from pre-debridement, are in the flowsheet as well. Discharge plan:     Treatment in the wound care center today, per RN documentation: Wound 07/31/20 #1, Left Pretib, Venous, Full Thickness, Onset 4/2020-Dressing/Treatment: Other (comment)(Aquaphor- top of leg, Triad and PSO, 4x4's, Compri 2). Per physician order, left application of multilayer compression wrap was performed in the wound care center today, to help manage edema, stasis dermatitis, and/or venous ulcers. Leave primary dressing and multi-layer wrap(s) in place until the next appointment. Also discussed ways to keep the wrap dry for a shower, including a plastic cast-guard, available in retail pharmacies. She should call before her next visit if there is any significant pain, significant strike-through of drainage to the surface of the wrap, or any significant sense of the wrap sliding down more than an inch or so, bunching-up and abrading her skin.      I reminded the patient of the importance of weight management and smoking cessation, if applicable; also encouraged ambulation as tolerated, additional lower extremity exercises as instructed in our education sheet, leg elevation when at rest, and compliance with any recommended dietary, diuretic and

## 2020-08-28 ENCOUNTER — HOSPITAL ENCOUNTER (OUTPATIENT)
Dept: WOUND CARE | Age: 60
Discharge: HOME | End: 2020-08-28
Payer: MEDICARE

## 2020-08-28 VITALS
RESPIRATION RATE: 20 BRPM | BODY MASS INDEX: 53.92 KG/M2 | TEMPERATURE: 98 F | DIASTOLIC BLOOD PRESSURE: 72 MMHG | SYSTOLIC BLOOD PRESSURE: 112 MMHG | HEART RATE: 88 BPM | WEIGHT: 293 LBS | HEIGHT: 62 IN

## 2020-08-28 PROCEDURE — 97597 DBRDMT OPN WND 1ST 20 CM/<: CPT | Performed by: INTERNAL MEDICINE

## 2020-08-28 PROCEDURE — 29581 APPL MULTLAYER CMPRN SYS LEG: CPT

## 2020-08-28 PROCEDURE — 97597 DBRDMT OPN WND 1ST 20 CM/<: CPT

## 2020-08-28 RX ORDER — LIDOCAINE 40 MG/G
CREAM TOPICAL ONCE
Status: DISCONTINUED | OUTPATIENT
Start: 2020-08-28 | End: 2020-08-29 | Stop reason: HOSPADM

## 2020-08-28 ASSESSMENT — PAIN SCALES - GENERAL: PAINLEVEL_OUTOF10: 0

## 2020-08-28 NOTE — PLAN OF CARE
Marked improvement noted in wound today. Will begin using purachol ag for primary dressing this week. Patient has an area on her Left great toe from an ingrown toenail. MD instructed patient to use PSO and banadaid and to use Epson salt with warm water and apply a couple times a day with gauze. Discharge instructions reviewed with patient, all questions answered, copy given to patient. Dressings were applied to all wounds per M.D. Instructions at this visit.

## 2020-09-01 NOTE — PROGRESS NOTES
[facility] Progress Note    Severa Drone     : 1960    DATE OF VISIT:  2020    Subjective: Severa Drone is a 61 y.o. female who has a venous ulcer located on the left  lower leg. Significant symptoms or pertinent wound history since last visit: feeling ok overall, less sensitivity in the ulcer; wrap a bit uncomfortable but not too bad, slid down just a bit from the top. No F/C/D, minimal drainage. Additional ulcer(s) noted? no.      Her current medication list consists of Calcium-Magnesium-Vitamin D, Fish Oil, VORTIoxetine HBr, buPROPion, celecoxib, ferrous sulfate, fluticasone, furosemide, lamoTRIgine, loratadine, metoprolol succinate, multivitamin, potassium chloride, rosuvastatin, and traZODone. Allergies: Calamine; Amoxicillin-pot clavulanate; Daypro [oxaprozin]; Duravent-da [chlorphen-phenyleph-methscop]; Lithium; Norvasc [amlodipine]; Nsaids; Seldane [terfenadine]; Suprax [cefixime]; and Levofloxacin    Objective:     Vitals:    20 0945   BP: 112/72   Pulse: 88   Resp: 20   Temp: 98 °F (36.7 °C)   TempSrc: Oral   Weight: (!) 334 lb 6.4 oz (151.7 kg)   Height: 5' 2\" (1.575 m)     AAOx3, overweight, NAD  Intact distal pulses, foot warm, good cap refill  Mild LLE edema, no cellulitis, angitis, fluctuance, allodynia  Katy-ulcer skin: indurated, pink and warm. Ulcer(s): a bit smaller, more granular, fibrin and biofilm, deeper fascial tissue starting to cover over with better granulation. Photos also saved in electronic chart.     Today's wound measurements, per RN documentation:  Wound 20 #1, Left Pretib, Venous, Full Thickness, Onset 2020-Wound Length (cm): 0.5 cm    Wound 20 #1, Left Pretib, Venous, Full Thickness, Onset 2020-Wound Width (cm): 0.3 cm    Wound 20 #1, Left Pretib, Venous, Full Thickness, Onset 2020-Wound Depth (cm): 0.1 cm    Assessment:     Patient Active Problem List   Diagnosis Code    PCOS (polycystic ovarian syndrome) E28.2    Hyperlipidemia E78.5    Restless legs syndrome (RLS) G25.81    Edema of both legs R60.0    Insomnia G47.00    Vitamin D deficiency E55.9    Morbid obesity with BMI of 50.0-59.9, adult ([facility]) E66.01, Z68.43    Urinary incontinence R32    Primary osteoarthritis of both knees M17.0    Severe episode of recurrent major depressive disorder, without psychotic features ([facility]) F33.2    GERD (gastroesophageal reflux disease) K21.9    RAD (reactive airway disease) J45.909    Hypertension I10    ANTOINETTE on CPAP G47.33, Z99.89    Lumbar stenosis with neurogenic claudication M48.062    HNP (herniated nucleus pulposus), lumbar M51.26    Ulcer of left shin with necrosis of muscle ([facility]) L97.823    Peripheral venous insufficiency I87.2    Allergic rhinitis C20.7    Dysmetabolic syndrome C02.73       Assessment of today's active condition(s): venous stasis, chronic leg edema, small but deep pretibial ulcer, making some slow progress toward healing; no signs of infection or ischemia. Factors contributing to occurrence and/or persistence of the chronic ulcer include edema, venous stasis, decreased mobility and obesity. Medical necessity of today's visit is shown by the above documentation. Sharp debridement is indicated today, based upon the exam findings in the wound(s) above. Procedure note:     Consent obtained. Time out performed per [facility]  policy. Anesthetic  Anesthetic: 4% Lidocaine Cream     Using a curette and #15 blade scalpel, I sharply debrided the left  lower leg ulcer(s) down through and including the removal of dermis. The type(s) of tissue debrided included fibrin, biofilm and necrotic/eschar. Total Surface Area Debrided: 1 sq cm. The ulcers were then irrigated with normal saline solution. The procedure was completed with a small amount of bleeding, and hemostasis was with pressure. The patient tolerated the procedure well, with no significant complications.  The patient's level of pain during and after the procedure was monitored. Post-debridement measurements, if different from pre-debridement, are in the flowsheet as well. Discharge plan:     Treatment in the wound care center today, per RN documentation: Wound 07/31/20 #1, Left Pretib, Venous, Full Thickness, Onset 4/2020-Dressing/Treatment: (Collagen w/ ag, gauze, compri 2 , spandigrip). Per physician order, left application of multilayer compression wrap was performed in the wound care center today, to help manage edema, stasis dermatitis, and/or venous ulcers. Leave primary dressing and multi-layer wrap(s) in place until the next appointment. Also discussed ways to keep the wrap dry for a shower, including a plastic cast-guard, available in retail pharmacies. She should call before her next visit if there is any significant pain, significant strike-through of drainage to the surface of the wrap, or any significant sense of the wrap sliding down more than an inch or so, bunching-up and abrading her skin. I reminded the patient of the importance of weight management and smoking cessation, if applicable; also encouraged ambulation as tolerated, additional lower extremity exercises as instructed in our education sheet, leg elevation when at rest, and compliance with any recommended dietary, diuretic and compression therapies. Since she doesn't have a lot of signs of severe venous HTN, and this is her first leg ulcer, might just elect to compress her with something modest and simple in the short-term once healed (like Spandagrip), and then if she has a recurrent ulcer, we can plan something more substantial for the long-term. Written discharge instructions given to patient. Follow up in 1 week.     Electronically signed by Yossi Camp MD on 9/1/2020 at 1:50 PM.

## 2020-09-02 ENCOUNTER — OFFICE VISIT (OUTPATIENT)
Dept: FAMILY MEDICINE CLINIC | Age: 60
End: 2020-09-02
Payer: MEDICARE

## 2020-09-02 VITALS
OXYGEN SATURATION: 98 % | WEIGHT: 293 LBS | TEMPERATURE: 97.2 F | BODY MASS INDEX: 60.72 KG/M2 | SYSTOLIC BLOOD PRESSURE: 135 MMHG | DIASTOLIC BLOOD PRESSURE: 82 MMHG | HEART RATE: 91 BPM

## 2020-09-02 PROCEDURE — G8427 DOCREV CUR MEDS BY ELIG CLIN: HCPCS | Performed by: NURSE PRACTITIONER

## 2020-09-02 PROCEDURE — G8417 CALC BMI ABV UP PARAM F/U: HCPCS | Performed by: NURSE PRACTITIONER

## 2020-09-02 PROCEDURE — 99213 OFFICE O/P EST LOW 20 MIN: CPT | Performed by: NURSE PRACTITIONER

## 2020-09-02 PROCEDURE — 1036F TOBACCO NON-USER: CPT | Performed by: NURSE PRACTITIONER

## 2020-09-02 PROCEDURE — 3017F COLORECTAL CA SCREEN DOC REV: CPT | Performed by: NURSE PRACTITIONER

## 2020-09-02 RX ORDER — CLINDAMYCIN HYDROCHLORIDE 300 MG/1
300 CAPSULE ORAL 3 TIMES DAILY
Qty: 30 CAPSULE | Refills: 0 | Status: SHIPPED | OUTPATIENT
Start: 2020-09-02 | End: 2020-09-12

## 2020-09-02 ASSESSMENT — ENCOUNTER SYMPTOMS
EYES NEGATIVE: 1
RESPIRATORY NEGATIVE: 1
ANAL BLEEDING: 0
ALLERGIC/IMMUNOLOGIC NEGATIVE: 1
NAUSEA: 0
GASTROINTESTINAL NEGATIVE: 1
ABDOMINAL PAIN: 0
BLOOD IN STOOL: 0
SHORTNESS OF BREATH: 0

## 2020-09-02 NOTE — PROGRESS NOTES
[address] [facility]  [address] [place] [id number]  Dept: [phone]  Dept Fax: [phone]  Loc: [phone]    Prince Iverson is a 61 y.o. female who presents today for her medical conditions/complaints as noted below. Prince Iverson is c/o of Toe Pain (big toe ingrown toe nail infeciton left foot)       Subjective:     Chief Complaint   Patient presents with    Toe Pain     big toe ingrown toe nail infeciton left foot       HPI  The patient states that about 4 days ago she noticed she was started to have an ingrown toenail. She states that she typically does not cut her own nails or poke however she was just trying to relieve some of the pressure at the edge of the nail on inner aspect of the left foot. She states when she did that she damaged the skin. She states since that time she is had a lot of discomfort. She has been using over-the-counter Neosporin ointment to the area. She has been unable to soak it because she has an ulceration on her left lower extremity that is currently being managed by wound care. The patient now presents with increasing discomfort, erythema and occasional discharge from the area  She denies any fever, chills, nausea or vomiting. Past Medical History:   Diagnosis Date    ADHD (attention deficit hyperactivity disorder)     Bipolar disorder     Borderline osteopenia     Cellulitis of left leg 8/5/2020    GERD (gastroesophageal reflux disease)     Headache(784.0)     HNP (herniated nucleus pulposus), lumbar 6/6/2019    Intention tremor     due to lithium    Iron deficiency anemia 11/4/2019    Lateral meniscal tear 10/14/2015    Lumbar stenosis with neurogenic claudication 6/6/2019    Medial meniscus tear 10/14/2015    Prolonged emergence from general anesthesia, initial encounter 6/12/2019    Tobacco use          Review of Systems   Constitutional: Negative.   Negative for appetite change, fatigue nasal spray USE 2 SPRAYS IN EACH NOSTRIL EVERY DAY 48 g 1    buPROPion (WELLBUTRIN XL) 150 MG extended release tablet TAKE 1 TABLET EVERY DAY 90 tablet 1    lamoTRIgine (LAMICTAL) 200 MG tablet TAKE 1/2 TABLET TWICE DAILY 90 tablet 1    rosuvastatin (CRESTOR) 5 MG tablet TAKE 1 TABLET EVERY DAY 90 tablet 1    potassium chloride (KLOR-CON) 10 MEQ extended release tablet Take 1 tablet by mouth daily 90 tablet 3    furosemide (LASIX) 20 MG tablet Take one tablet by mouth daily 90 tablet 1    ferrous sulfate 325 (65 Fe) MG tablet Take 1 tablet by mouth daily (with breakfast) 90 tablet 3    traZODone (DESYREL) 100 MG tablet Take 1 tablet by mouth nightly as needed for Sleep      VORTIoxetine HBr (TRINTELLIX) 20 MG TABS tablet TAKE 1 TABLET BY MOUTH DAILY 30 tablet 5    CALCIUM-MAGNESIUM-VITAMIN D PO Take 2 capsules by mouth nightly      multivitamin (THERAGRAN) per tablet Take 1 tablet by mouth daily.  Omega-3 Fatty Acids (FISH OIL) 1200 MG CAPS Take  by mouth daily. No current facility-administered medications for this visit. No changes in past medical history, past surgical history, social history, orfamily history were noted during the patient encounter unless specifically listed above. All updates of past medical history, past surgical history, social history, or family history were reviewed personally by me duringthe office visit. All problems listed in the assessment are stable unless noted otherwise. Medication profile reviewed personally by me during the office visit. Medication side effects and possible impairments frommedications were discussed as applicable. Objective:     Physical Exam  Constitutional:       General: She is not in acute distress. Appearance: Normal appearance. She is well-developed. She is not toxic-appearing. HENT:      Head: Normocephalic and atraumatic.       Right Ear: Hearing, tympanic membrane and ear canal normal.      Left Ear: Hearing, toe pain. Diagnoses and all orders for this visit:    Paronychia of great toe, left  -     clindamycin (CLEOCIN) 300 MG capsule; Take 1 capsule by mouth 3 times daily for 10 days  -     mupirocin (BACTROBAN) 2 % ointment; Apply 3 times daily. Saline soaks on a 2 x 2 3-4 times per day  Try to keep the area open to air when possible  The patient will be seeing wound care specialist in 2 days and she also sees them on a weekly basis every Friday. Wound care should be able to monitor for any new symptoms or need for further treatment    Patient has been instructed call the office immediately with new symptoms, change in symptoms or worseningof symptoms. If this is not feasible, patient is instructed to report to the emergency room. Medication profile reviewed. Medication side effects and possible impairments from medications were discussed as applicable. Allergies were reviewed. Health maintenance was reviewed and updated as appropriate. No follow-ups on file. (Comment: Please note this report has been produced using a combination of typing and speech recognition software and may contain errors related to that system including errors in grammar, punctuation, and spelling, as well as words and phrases that may be inappropriate.  If there are any questions or concerns please feel free to contact the dictating provider for clarification.)

## 2020-09-04 ENCOUNTER — HOSPITAL ENCOUNTER (OUTPATIENT)
Dept: WOUND CARE | Age: 60
Discharge: HOME | End: 2020-09-04
Payer: MEDICARE

## 2020-09-04 VITALS
RESPIRATION RATE: 30 BRPM | WEIGHT: 293 LBS | BODY MASS INDEX: 53.92 KG/M2 | SYSTOLIC BLOOD PRESSURE: 127 MMHG | HEIGHT: 62 IN | HEART RATE: 80 BPM | DIASTOLIC BLOOD PRESSURE: 78 MMHG | TEMPERATURE: 97.9 F

## 2020-09-04 LAB
GRAM STAIN RESULT: ABNORMAL
ORGANISM: ABNORMAL
WOUND/ABSCESS: ABNORMAL

## 2020-09-04 PROCEDURE — 97597 DBRDMT OPN WND 1ST 20 CM/<: CPT | Performed by: INTERNAL MEDICINE

## 2020-09-04 PROCEDURE — 97597 DBRDMT OPN WND 1ST 20 CM/<: CPT

## 2020-09-04 PROCEDURE — 29581 APPL MULTLAYER CMPRN SYS LEG: CPT

## 2020-09-04 PROCEDURE — 29581 APPL MULTLAYER CMPRN SYS LEG: CPT | Performed by: INTERNAL MEDICINE

## 2020-09-04 RX ORDER — LIDOCAINE 40 MG/G
CREAM TOPICAL ONCE
Status: DISCONTINUED | OUTPATIENT
Start: 2020-09-04 | End: 2020-09-05 | Stop reason: HOSPADM

## 2020-09-04 ASSESSMENT — PAIN SCALES - GENERAL: PAINLEVEL_OUTOF10: 0

## 2020-09-08 NOTE — PROGRESS NOTES
88 [facility] Progress Note    Chavo Bellamy     : 1960    DATE OF VISIT:  2020    Subjective: Chavo Bellamy is a 61 y.o. female who has a venous ulcer located on the left , anterior lower leg. Significant symptoms or pertinent wound history since last visit: feeling ok overall, mild pain from wrap, no pruritus, no fever, modest drainage. Additional ulcer(s) noted? no. But she saw her PCP earlier this week when the ingrown nail on her great toe became more inflamed and painful, was prescribed clindamycin and it seems to be improving; also, sounds like it was getting a little too moist and macerated when kept covered with antibiotic ointment. Her current medication list consists of Calcium-Magnesium-Vitamin D, Fish Oil, VORTIoxetine HBr, buPROPion, celecoxib, clindamycin, ferrous sulfate, fluticasone, furosemide, lamoTRIgine, loratadine, metoprolol succinate, multivitamin, mupirocin, potassium chloride, rosuvastatin, and traZODone. Allergies: Calamine; Amoxicillin-pot clavulanate; Daypro [oxaprozin]; Duravent-da [chlorphen-phenyleph-methscop]; Lithium; Norvasc [amlodipine]; Nsaids; Seldane [terfenadine]; Suprax [cefixime]; and Levofloxacin    Objective:     Vitals:    20 0814   BP: 127/78   Pulse: 80   Resp: 30   Temp: 97.9 °F (36.6 °C)   TempSrc: Oral   Weight: (!) 337 lb (152.9 kg)   Height: 5' 2\" (1.575 m)     AAOx3, overweight, NAD  Intact distal pulses, feet warm, good cap refill  Mild LLE edema  Less inflamed paronychia on left great toe compared to a few days ago, per her report   No spreading cellulitis, no fluctuance, no angitis  Katy-ulcer skin: indurated, pink, warm and hyperkeratotic. Ulcer(s): a bit smaller, more granular, fibrin, serous exudate, minor skin necrosis. Photos also saved in electronic chart.     Today's wound measurements, per RN documentation:  Wound 20 #1, Left Pretib, Venous, Full Thickness, Onset 2020-Wound Length (cm): 0.7 cm    Wound 07/31/20 #1, Left Pretib, Venous, Full Thickness, Onset 4/2020-Wound Width (cm): 0.5 cm    Wound 07/31/20 #1, Left Pretib, Venous, Full Thickness, Onset 4/2020-Wound Depth (cm): 0.2 cm    Assessment:     Patient Active Problem List   Diagnosis Code    PCOS (polycystic ovarian syndrome) E28.2    Hyperlipidemia E78.5    Restless legs syndrome (RLS) G25.81    Edema of both legs R60.0    Insomnia G47.00    Vitamin D deficiency E55.9    Morbid obesity with BMI of 50.0-59.9, adult ([facility]) E66.01, Z68.43    Urinary incontinence R32    Primary osteoarthritis of both knees M17.0    Severe episode of recurrent major depressive disorder, without psychotic features ([facility] [address] F33.2    GERD (gastroesophageal reflux disease) K21.9    RAD (reactive airway disease) J45.909    Hypertension I10    ANTOINETTE on CPAP G47.33, Z99.89    Lumbar stenosis with neurogenic claudication M48.062    HNP (herniated nucleus pulposus), lumbar M51.26    Ulcer of left shin with necrosis of muscle ([facility]) L97.823    Peripheral venous insufficiency I87.2    Allergic rhinitis T69.5    Dysmetabolic syndrome C72.42       Assessment of today's active condition(s): venous stasis, chronic leg swelling, small but slowly healing left shin ulcer, no signs of infection or ischemia there; paronychia related to ingrown nail looks to be improving with oral Abx. Factors contributing to occurrence and/or persistence of the chronic ulcer include edema, venous stasis, decreased mobility and obesity. Medical necessity of today's visit is shown by the above documentation. Sharp debridement is indicated today, based upon the exam findings in the wound(s) above. Procedure note:     Consent obtained. Time out performed per [facility] policy. Anesthetic  Anesthetic: 4% Lidocaine Cream     Using a curette, I sharply debrided the left  lower leg ulcer(s) down through and including the removal of dermis.  The type(s) of tissue debrided included fibrin, biofilm and necrotic/eschar. Total Surface Area Debrided: 1 sq cm. The ulcers were then irrigated with normal saline solution. The procedure was completed with a small amount of bleeding, and hemostasis was with pressure. The patient tolerated the procedure well, with no significant complications. The patient's level of pain during and after the procedure was monitored. Post-debridement measurements, if different from pre-debridement, are in the flowsheet as well. Discharge plan:     Treatment in the wound care center today, per RN documentation: Wound 07/31/20 #1, Left Pretib, Venous, Full Thickness, Onset 4/2020-Dressing/Treatment: (aquaphor, collagen w/ag,hydrogel,mepilex transfer,compri 2). Mepilex Transfer being used to prevent wrap friction against skin, more than to handle drainage; Aquaphor to proximal calf, to try to prevent wrap from sliding down. Per physician order, left application of multilayer compression wrap was performed in the wound care center today, to help manage edema, stasis dermatitis, and/or venous ulcers. Leave primary dressing and multi-layer wrap(s) in place until the next appointment. Also discussed ways to keep the wrap dry for a shower, including a plastic cast-guard, available in retail pharmacies. She should call before her next visit if there is any significant pain, significant strike-through of drainage to the surface of the wrap, or any significant sense of the wrap sliding down more than an inch or so, bunching-up and abrading her skin. I reminded the patient of the importance of weight management and smoking cessation, if applicable; also encouraged ambulation as tolerated, additional lower extremity exercises as instructed in our education sheet, leg elevation when at rest, and compliance with any recommended dietary, diuretic and compression therapies. Oral Abx per PCP. Written discharge instructions given to patient.  Follow up in 1 week.    Electronically signed by Alis Horn MD on 9/8/2020 at 7:27 PM.

## 2020-09-09 ENCOUNTER — HOSPITAL ENCOUNTER (OUTPATIENT)
Dept: WOUND CARE | Age: 60
Discharge: HOME | End: 2020-09-09
Payer: MEDICARE

## 2020-09-09 VITALS
HEART RATE: 99 BPM | DIASTOLIC BLOOD PRESSURE: 88 MMHG | RESPIRATION RATE: 24 BRPM | SYSTOLIC BLOOD PRESSURE: 139 MMHG | HEIGHT: 62 IN | WEIGHT: 293 LBS | BODY MASS INDEX: 53.92 KG/M2 | TEMPERATURE: 98 F

## 2020-09-09 PROCEDURE — 97597 DBRDMT OPN WND 1ST 20 CM/<: CPT | Performed by: INTERNAL MEDICINE

## 2020-09-09 PROCEDURE — 97597 DBRDMT OPN WND 1ST 20 CM/<: CPT

## 2020-09-09 PROCEDURE — 29581 APPL MULTLAYER CMPRN SYS LEG: CPT

## 2020-09-09 RX ORDER — LIDOCAINE 40 MG/G
CREAM TOPICAL ONCE
Status: DISCONTINUED | OUTPATIENT
Start: 2020-09-09 | End: 2020-09-10 | Stop reason: HOSPADM

## 2020-09-09 ASSESSMENT — PAIN SCALES - GENERAL
PAINLEVEL_OUTOF10: 0
PAINLEVEL_OUTOF10: 0

## 2020-09-09 NOTE — PLAN OF CARE
Wound stable, debridement per MD & pt. Tolerated well. Will cont. With current wound care regime. Reinforced importance of exercise, elevation & compression to help with circulation, edema control & wound healing. F/u in [facility] in 1 week as ordered. Discharge instructions reviewed with patient, all questions answered, copy given to patient. Dressings were applied to all wounds per M.D. Instructions at this visit. Surgery Progress Note    S: Patient resting in bed. Denies flatus/Bowel movement. Pain well controlled. No n/v.    T(C): 36.6 (11-26-17 @ 00:48), Max: 36.6 (11-26-17 @ 00:48)  HR: 72 (11-26-17 @ 00:48) (72 - 90)  BP: 143/59 (11-26-17 @ 00:48) (129/58 - 157/73)  RR: 19 (11-26-17 @ 00:48) (16 - 19)  SpO2: 100% (11-26-17 @ 00:48) (97% - 100%)  Wt(kg): --    11-25 @ 07:01  -  11-26 @ 07:00  --------------------------------------------------------  IN:    sodium chloride 0.9%.: 1200 mL  Total IN: 1200 mL    OUT:  Total OUT: 0 mL    Total NET: 1200 mL                              9.9    10.83 )-----------( 256      ( 26 Nov 2017 05:45 )             30.7     CAPILLARY BLOOD GLUCOSE      POCT Blood Glucose.: 97 mg/dL (26 Nov 2017 05:54)  POCT Blood Glucose.: 104 mg/dL (26 Nov 2017 01:43)  POCT Blood Glucose.: 101 mg/dL (25 Nov 2017 15:25)      PE:   Gen: AAOX 3, NAD  Resp: non labored breathing  Abd: Soft, ND, tender in LLQ.      A/P: 63yFemale with acute sigmoid diverticulitis no abscess  - Monitor GI function  - NPO/IVF  - c/w zosyn  - no acute surgical intervention at this time    Keely Guzman, PGY1  [id number]

## 2020-09-10 ENCOUNTER — NURSE TRIAGE (OUTPATIENT)
Dept: OTHER | Facility: CLINIC | Age: 60
End: 2020-09-10

## 2020-09-10 ENCOUNTER — VIRTUAL VISIT (OUTPATIENT)
Dept: FAMILY MEDICINE CLINIC | Age: 60
End: 2020-09-10
Payer: MEDICARE

## 2020-09-10 LAB
BILIRUBIN, POC: NEGATIVE
BLOOD URINE, POC: NEGATIVE
CLARITY, POC: NORMAL
COLOR, POC: NORMAL
GLUCOSE URINE, POC: NEGATIVE
KETONES, POC: NEGATIVE
LEUKOCYTE EST, POC: NEGATIVE
NITRITE, POC: NEGATIVE
PH, POC: 7
PROTEIN, POC: NEGATIVE
SPECIFIC GRAVITY, POC: 1.02
UROBILINOGEN, POC: 0.2

## 2020-09-10 PROCEDURE — 99213 OFFICE O/P EST LOW 20 MIN: CPT | Performed by: NURSE PRACTITIONER

## 2020-09-10 PROCEDURE — 81002 URINALYSIS NONAUTO W/O SCOPE: CPT | Performed by: NURSE PRACTITIONER

## 2020-09-10 ASSESSMENT — ENCOUNTER SYMPTOMS
EYES NEGATIVE: 1
ABDOMINAL PAIN: 0
SHORTNESS OF BREATH: 0
BLOOD IN STOOL: 0
NAUSEA: 0
ALLERGIC/IMMUNOLOGIC NEGATIVE: 1
GASTROINTESTINAL NEGATIVE: 1
ANAL BLEEDING: 0
VOMITING: 0
RESPIRATORY NEGATIVE: 1

## 2020-09-10 NOTE — TELEPHONE ENCOUNTER
Patient called [facility]) to schedule appointment with red flag complaint; transferred to Nurse Access for triage by Jeferson Garcia (agent's name). Reason for Disposition   [1] Can't control passage of urine (i.e., urinary incontinence) AND [2] new onset (< 2 weeks) or worsening    Answer Assessment - Initial Assessment Questions  1. SYMPTOM: \"What's the main symptom you're concerned about? \" (e.g., frequency, incontinence)      incontinence  2. ONSET: \"When did the  incontinence  start? \"      Was seen previously by PCP and given meds but has worsened since  3. PAIN: \"Is there any pain? \" If so, ask: \"How bad is it? \" (Scale: 1-10; mild, moderate, severe)      No  4. CAUSE: \"What do you think is causing the symptoms? \"      No  5. OTHER SYMPTOMS: \"Do you have any other symptoms? \" (e.g., fever, flank pain, blood in urine, pain with urination)      No  6. PREGNANCY: \"Is there any chance you are pregnant? \" \"When was your last menstrual period? \"      No    Protocols used: URINARY SYMPTOMS-ADULT-AH    Informed of disposition. Care advice as documented. Instructed to call back with worsening symptoms. Soft transfer to [facility] [place]) to schedule appointment as recommended. Please do not respond to the triage nurse through this encounter. Any subsequent communication should be directly with the patient.

## 2020-09-10 NOTE — PROGRESS NOTES
9/10/2020    TELEHEALTH EVALUATION -- Audio/Visual (During DTQRN-94 public health emergency)    Chief Complaint   Patient presents with    Incontinence     pt said since she was put on the celebrex she said now she can not control or hold pt said it has gotten really bad in the last month       HPI:  Justice Rehman (:  1960) has requested an audio/video evaluation for the following concern(s):    Urinary Tract Infection    This is a new problem. The current episode started more than 1 month ago. The problem occurs every urination. The problem has been gradually worsening. The patient is experiencing no pain. There has been no fever. She is not sexually active. There is no history of pyelonephritis. Associated symptoms include frequency and urgency. Pertinent negatives include no chills, discharge, flank pain, hematuria, hesitancy, nausea, possible pregnancy, sweats or vomiting. Associated symptoms comments: New onset incontinence with standing up since starting on celebrex. She has tried nothing for the symptoms. The treatment provided no relief. There is no history of catheterization, urinary stasis or a urological procedure. Review of Systems   Constitutional: Negative. Negative for appetite change, chills, fatigue and unexpected weight change. HENT: Negative. Eyes: Negative. Respiratory: Negative. Negative for shortness of breath. Cardiovascular: Negative. Negative for chest pain, palpitations and leg swelling. Gastrointestinal: Negative. Negative for abdominal pain, anal bleeding, blood in stool, nausea and vomiting. Endocrine: Negative. Genitourinary: Positive for frequency and urgency. Negative for decreased urine volume, difficulty urinating, dysuria, enuresis, flank pain, hematuria, hesitancy, pelvic pain and vaginal discharge. Musculoskeletal: Negative. Skin: Negative. Negative for rash. Allergic/Immunologic: Negative. Neurological: Negative. Negative for dizziness, syncope, light-headedness and numbness. Hematological: Negative. Does not bruise/bleed easily. Psychiatric/Behavioral: Negative. All other systems reviewed and are negative. Prior to Visit Medications    Medication Sig Taking? Authorizing Provider   clindamycin (CLEOCIN) 300 MG capsule Take 1 capsule by mouth 3 times daily for 10 days Yes MADINA Sanchez CNP   loratadine (CLARITIN) 10 MG tablet Take 10 mg by mouth daily Yes Historical Provider, MD   celecoxib (CELEBREX) 100 MG capsule Take 1 capsule by mouth 2 times daily Yes MADINA Sanchez CNP   metoprolol succinate (TOPROL XL) 50 MG extended release tablet TAKE 1 TABLET EVERY DAY Yes MADINA Sanchez CNP   fluticasone (FLONASE) 50 MCG/ACT nasal spray USE 2 SPRAYS IN EACH NOSTRIL EVERY DAY Yes MADINA Sanchez CNP   buPROPion (WELLBUTRIN XL) 150 MG extended release tablet TAKE 1 TABLET EVERY DAY Yes MADINA Sanchez CNP   lamoTRIgine (LAMICTAL) 200 MG tablet TAKE 1/2 TABLET TWICE DAILY Yes MADINA Sanchez CNP   rosuvastatin (CRESTOR) 5 MG tablet TAKE 1 TABLET EVERY DAY Yes MADINA Cristina CNP   potassium chloride (KLOR-CON) 10 MEQ extended release tablet Take 1 tablet by mouth daily Yes MADINA Sanchez CNP   furosemide (LASIX) 20 MG tablet Take one tablet by mouth daily Yes MADINA Sanchez CNP   ferrous sulfate 325 (65 Fe) MG tablet Take 1 tablet by mouth daily (with breakfast) Yes MADINA Sanchez CNP   traZODone (DESYREL) 100 MG tablet Take 1 tablet by mouth nightly as needed for Sleep Yes MADINA Sanchez CNP   VORTIoxetine HBr (TRINTELLIX) 20 MG TABS tablet TAKE 1 TABLET BY MOUTH DAILY Yes MADINA Sanchez CNP   CALCIUM-MAGNESIUM-VITAMIN D PO Take 2 capsules by mouth nightly Yes Historical Provider, MD   multivitamin (THERAGRAN) per tablet Take 1 tablet by mouth daily.  Yes Historical Provider, MD   Omega-3 Fatty Acids (FISH OIL) 1200 MG CAPS Take  by mouth daily.  Yes Historical Provider, MD       Social History     Tobacco Use    Smoking status: Former Smoker     Packs/day: 1.50     Years: 21.00     Pack years: 31.50     Types: Cigarettes     Last attempt to quit: 1997     Years since quittin.5    Smokeless tobacco: Never Used   Substance Use Topics    Alcohol use: No    Drug use: No        Allergies   Allergen Reactions    Calamine Other (See Comments)     Leaves skin tender and burning     Amoxicillin-Pot Clavulanate Hives    Daypro [Oxaprozin] Hives    Duravent-Da [Chlorphen-Phenyleph-Methscop] Hives    Lithium      Caused shakes    Norvasc [Amlodipine]      LE edema      Nsaids      Avoid d/t gastric bypass    Seldane [Terfenadine] Hives    Suprax [Cefixime] Hives    Levofloxacin Rash   ,   Past Medical History:   Diagnosis Date    ADHD (attention deficit hyperactivity disorder)     Bipolar disorder     Borderline osteopenia     Cellulitis of left leg 2020    GERD (gastroesophageal reflux disease)     Headache(784.0)     HNP (herniated nucleus pulposus), lumbar 2019    Intention tremor     due to lithium    Iron deficiency anemia 2019    Lateral meniscal tear 10/14/2015    Lumbar stenosis with neurogenic claudication 2019    Medial meniscus tear 10/14/2015    Prolonged emergence from general anesthesia, initial encounter 2019    Tobacco use    ,   Past Surgical History:   Procedure Laterality Date    ABDOMINAL EXPLORATION SURGERY      CARPAL TUNNEL RELEASE Bilateral      SECTION      GASTRIC BYPASS SURGERY      KNEE SURGERY Left     atrhroscopic unispacer    KNEE SURGERY Right 10/28/2015    Right knee video arthroscopy with partial medial and lateral menisectomy with loose body removal    LUMBAR SPINE SURGERY N/A 2019    MICROLUMBAR DISCECTOMY L4-5 performed by Elio Hernandez MD at [address] [place] [address]     , fail to improve. Iggy Etienne is a 61 y.o. female being evaluated by a Virtual Visit (video visit) encounter to address concerns as mentioned above. A caregiver was present when appropriate. Due to this being a TeleHealth encounter (During AEAUI-06 public health emergency), evaluation of the following organ systems was limited: Vitals/Constitutional/EENT/Resp/CV/GI//MS/Neuro/Skin/Heme-Lymph-Imm. Pursuant to the emergency declaration under the [address] [place] [address], [address] [place] [address] and the [employer] Act, this Virtual Visit was conducted with patient's (and/or legal guardian's) consent, to reduce the patient's risk of exposure to COVID-19 and provide necessary medical care. The patient (and/or legal guardian) has also been advised to contact this office for worsening conditions or problems, and seek emergency medical treatment and/or call 911 if deemed necessary. Patient identification was verified at the start of the visit: Yes    Total time spent on this encounter: 15 minutes    Services were provided through a video synchronous discussion virtually to substitute for in-person clinic visit. Patient and provider were located at their individual homes. --MADINA Montanez CNP on 9/10/2020 at 1:45 PM    An electronic signature was used to authenticate this note.

## 2020-09-11 ENCOUNTER — NURSE ONLY (OUTPATIENT)
Dept: FAMILY MEDICINE CLINIC | Age: 60
End: 2020-09-11
Payer: MEDICARE

## 2020-09-11 ENCOUNTER — HOSPITAL ENCOUNTER (OUTPATIENT)
Dept: WOUND CARE | Age: 60
Discharge: HOME | End: 2020-09-11

## 2020-09-11 LAB
ANION GAP SERPL CALCULATED.3IONS-SCNC: 11 MMOL/L (ref 3–16)
BUN BLDV-MCNC: 19 MG/DL (ref 7–20)
CALCIUM SERPL-MCNC: 9.3 MG/DL (ref 8.3–10.6)
CHLORIDE BLD-SCNC: 103 MMOL/L (ref 99–110)
CO2: 29 MMOL/L (ref 21–32)
CREAT SERPL-MCNC: 0.7 MG/DL (ref 0.6–1.2)
GFR AFRICAN AMERICAN: >60
GFR NON-AFRICAN AMERICAN: >60
GLUCOSE BLD-MCNC: 113 MG/DL (ref 70–99)
POTASSIUM SERPL-SCNC: 4.6 MMOL/L (ref 3.5–5.1)
SODIUM BLD-SCNC: 143 MMOL/L (ref 136–145)

## 2020-09-11 PROCEDURE — 36415 COLL VENOUS BLD VENIPUNCTURE: CPT | Performed by: NURSE PRACTITIONER

## 2020-09-12 LAB — URINE CULTURE, ROUTINE: NORMAL

## 2020-09-18 ENCOUNTER — HOSPITAL ENCOUNTER (OUTPATIENT)
Dept: WOUND CARE | Age: 60
Discharge: HOME | End: 2020-09-18
Payer: MEDICARE

## 2020-09-18 VITALS
RESPIRATION RATE: 24 BRPM | SYSTOLIC BLOOD PRESSURE: 117 MMHG | DIASTOLIC BLOOD PRESSURE: 72 MMHG | WEIGHT: 293 LBS | HEIGHT: 62 IN | HEART RATE: 82 BPM | BODY MASS INDEX: 53.92 KG/M2 | TEMPERATURE: 97.8 F

## 2020-09-18 PROCEDURE — 99212 OFFICE O/P EST SF 10 MIN: CPT | Performed by: INTERNAL MEDICINE

## 2020-09-18 PROCEDURE — 99212 OFFICE O/P EST SF 10 MIN: CPT

## 2020-09-18 ASSESSMENT — PAIN SCALES - GENERAL: PAINLEVEL_OUTOF10: 0

## 2020-09-18 NOTE — PLAN OF CARE
Wound newly healed today per MD. Patient educated importance of keeping wound covered times 1 week and single medium spandagrip to be placed in AM and removed in PM. Patient instructed to follow up if needed in the future. Discharge instructions given.

## 2020-09-21 PROBLEM — L97.822 ULCER OF LEFT SHIN WITH FAT LAYER EXPOSED (HCC): Status: RESOLVED | Noted: 2020-08-05 | Resolved: 2020-09-21

## 2020-09-21 NOTE — PROGRESS NOTES
88 [facility] Progress Note    Kate Lawson     : 1960    DATE OF VISIT:  2020    Subjective: Kate Lawson is a 61 y.o. female who has a venous ulcer located on the left , anterior lower leg. Current complaint of pain in this ulcer? yes. Quality of pain: burning and sharp  Timing: intermittent and decreasing in frequency  Severity: mild  Associated Signs/Symptoms: swelling  Other significant symptoms or pertinent ulcer history: no drainage this week, wrap stayed in place well, no fever, no pruritus. Additional ulcer(s) noted? no.      Ms. Jme Hernandez has a past medical history of ADHD (attention deficit hyperactivity disorder), Bipolar disorder, Borderline osteopenia, Cellulitis of left leg, GERD (gastroesophageal reflux disease), Headache(784.0), HNP (herniated nucleus pulposus), lumbar, Intention tremor, Iron deficiency anemia, Lateral meniscal tear, Lumbar stenosis with neurogenic claudication, Medial meniscus tear, Prolonged emergence from general anesthesia, initial encounter, Tobacco use, and Venous ulcer of left leg ([address] She has a past surgical history that includes knee surgery (Left);  section; Abdominal exploration surgery; Gastric bypass surgery; Tonsillectomy; knee surgery (Right, 10/28/2015); Lumbar spine surgery (N/A, 2019); and Carpal tunnel release (Bilateral). Her family history includes Depression in her sister; Diabetes in her father and mother; Heart Disease in her father and mother; Mental Illness in her sister. Ms. Jem Hernandez reports that she quit smoking about 23 years ago. Her smoking use included cigarettes. She has a 31.50 pack-year smoking history. She has never used smokeless tobacco. She reports that she does not drink alcohol or use drugs.     Her current medication list consists of Calcium-Magnesium-Vitamin D, Fish Oil, VORTIoxetine HBr, buPROPion, celecoxib, ferrous sulfate, fluticasone, furosemide, lamoTRIgine, loratadine, metoprolol succinate, multivitamin, potassium chloride, rosuvastatin, and traZODone. Allergies: Calamine; Amoxicillin-pot clavulanate; Daypro [oxaprozin]; Duravent-da [chlorphen-phenyleph-methscop]; Lithium; Norvasc [amlodipine]; Nsaids; Seldane [terfenadine]; Suprax [cefixime]; and Levofloxacin    Pertinent items from the review of systems are discussed in the HPI; the remainder of the ROS was reviewed and is negative. Objective:     Vitals:    09/18/20 0830   BP: 117/72   Pulse: 82   Resp: 24   Temp: 97.8 °F (36.6 °C)   TempSrc: Oral   Weight: (!) 334 lb 3.2 oz (151.6 kg)   Height: 5' 2\" (1.575 m)       Constitutional:  well-developed, well-nourished, overweight, NAD  Psychiatric:  oriented to person, place and time; mood and affect appropriate for the situation   Eyes:  pupils equal, round and reactive to light; sclerae anicteric, conjunctivae not pale  Cardiovascular:  bilateral pedal pulses palpable; mild lower extremity edema  Lymphatic:  no inguinal or popliteal adenopathy, no angitis or cellulitis  Musculoskeletal:  no clubbing, cyanosis or petechiae; RLE and LLE with no gross effusions, joint misalignment or acute arthritis  Katy-ulcer skin: healthy, warm and pink. Ulcer(s): healed and dry. Photos also saved in electronic chart.     Today's Wound Measurements, per RN documentation:  Wound 07/31/20 #1, Left Pretib, Venous, Full Thickness, Onset 4/2020-Wound Length (cm): 0 cm    Wound 07/31/20 #1, Left Pretib, Venous, Full Thickness, Onset 4/2020-Wound Width (cm): 0 cm    Wound 07/31/20 #1, Left Pretib, Venous, Full Thickness, Onset 4/2020-Wound Depth (cm): 0 cm  ______________________________    Lab Results   Component Value Date    LABALBU 4.4 10/17/2019     Lab Results   Component Value Date    CREATININE 0.7 09/11/2020     Lab Results   Component Value Date    HGB 14.0 05/14/2020     Lab Results   Component Value Date    LABA1C 5.9 08/05/2020     Assessment: Patient Active Problem List   Diagnosis Code    PCOS (polycystic ovarian syndrome) E28.2    Hyperlipidemia E78.5    Restless legs syndrome (RLS) G25.81    Edema of both legs R60.0    Insomnia G47.00    Vitamin D deficiency E55.9    Morbid obesity with BMI of 50.0-59.9, adult ([facility]) E66.01, Z68.43    Urinary incontinence R32    Primary osteoarthritis of both knees M17.0    Severe episode of recurrent major depressive disorder, without psychotic features ([facility]) F33.2    GERD (gastroesophageal reflux disease) K21.9    RAD (reactive airway disease) J45.909    Hypertension I10    ANTOINETTE on CPAP G47.33, Z99.89    Lumbar stenosis with neurogenic claudication M48.062    HNP (herniated nucleus pulposus), lumbar M51.26    Peripheral venous insufficiency I87.2    Allergic rhinitis I84.0    Dysmetabolic syndrome N32.79       Assessment of today's active condition(s): presumed small left pretib venous ulcer, perhaps started from trauma or a small focus of folliculitis for example. Had a central area of depth and fibronecrosis that slowed down healing, but now healed. At some modest increased risk of reulceration in the near future, hopefully not in the long term. Factors contributing to occurrence and/or persistence of the chronic ulcer include edema, venous stasis, decreased mobility and obesity. Medical necessity of today's visit is shown by the above documentation. Sharp debridement is not indicated today, based upon the exam findings in the wound(s) above. Discharge plan:     Treatment in the wound care center today, per RN documentation: Wound 07/31/20 #1, Left Pretib, Venous, Full Thickness, Onset 4/2020-Dressing/Treatment: (Benzoin,Mepilex Border,Spandagrip). Keep the protective dressing in place for at least several days.     I reminded the patient of the importance of weight management and smoking cessation, if applicable; also encouraged ambulation as tolerated, additional lower extremity

## 2020-09-22 ENCOUNTER — TELEPHONE (OUTPATIENT)
Dept: FAMILY MEDICINE CLINIC | Age: 60
End: 2020-09-22

## 2020-09-22 NOTE — TELEPHONE ENCOUNTER
Spoke w/ patient - seasonal allergies - states her ears popped she feels like she has a bubble in her left ear - otalgia for about 6 weeks. No fevers.  Constant post nasal drip, itchy eyes and general seasonal allergy symptoms      Takes Claritin and uses flonase as Rx'd already

## 2020-09-22 NOTE — TELEPHONE ENCOUNTER
----- Message from Alysa Portillo sent at 9/22/2020  2:27 PM EDT -----  Subject: Message to Provider    QUESTIONS  Information for Provider? Patient is having issues with allergies - she   has a bubble in her ear that wont pop and it is affecting her hearing -   please advise if it is something that the PCP can help with.   ---------------------------------------------------------------------------  --------------  CALL BACK INFO  What is the best way for the office to contact you? OK to leave message on   voicemail  Preferred Call Back Phone Number? [phone]  ---------------------------------------------------------------------------  --------------  SCRIPT ANSWERS  Relationship to Patient?  Self

## 2020-09-23 RX ORDER — METHYLPREDNISOLONE 4 MG/1
TABLET ORAL
Qty: 1 KIT | Refills: 0 | Status: SHIPPED | OUTPATIENT
Start: 2020-09-23 | End: 2020-09-29

## 2020-11-13 ENCOUNTER — OFFICE VISIT (OUTPATIENT)
Dept: ORTHOPEDIC SURGERY | Age: 60
End: 2020-11-13
Payer: MEDICARE

## 2020-11-13 VITALS — BODY MASS INDEX: 53.92 KG/M2 | WEIGHT: 293 LBS | HEIGHT: 62 IN

## 2020-11-13 PROCEDURE — 3017F COLORECTAL CA SCREEN DOC REV: CPT | Performed by: ORTHOPAEDIC SURGERY

## 2020-11-13 PROCEDURE — G8428 CUR MEDS NOT DOCUMENT: HCPCS | Performed by: ORTHOPAEDIC SURGERY

## 2020-11-13 PROCEDURE — 20610 DRAIN/INJ JOINT/BURSA W/O US: CPT | Performed by: ORTHOPAEDIC SURGERY

## 2020-11-13 PROCEDURE — G8484 FLU IMMUNIZE NO ADMIN: HCPCS | Performed by: ORTHOPAEDIC SURGERY

## 2020-11-13 PROCEDURE — G8417 CALC BMI ABV UP PARAM F/U: HCPCS | Performed by: ORTHOPAEDIC SURGERY

## 2020-11-13 PROCEDURE — 99213 OFFICE O/P EST LOW 20 MIN: CPT | Performed by: ORTHOPAEDIC SURGERY

## 2020-11-13 PROCEDURE — 1036F TOBACCO NON-USER: CPT | Performed by: ORTHOPAEDIC SURGERY

## 2020-11-13 RX ORDER — LIDOCAINE HYDROCHLORIDE 10 MG/ML
20 INJECTION, SOLUTION INFILTRATION; PERINEURAL ONCE
Status: COMPLETED | OUTPATIENT
Start: 2020-11-13 | End: 2020-11-13

## 2020-11-13 RX ORDER — METHYLPREDNISOLONE ACETATE 40 MG/ML
80 INJECTION, SUSPENSION INTRA-ARTICULAR; INTRALESIONAL; INTRAMUSCULAR; SOFT TISSUE ONCE
Status: COMPLETED | OUTPATIENT
Start: 2020-11-13 | End: 2020-11-13

## 2020-11-13 RX ORDER — BUPIVACAINE HYDROCHLORIDE 2.5 MG/ML
60 INJECTION, SOLUTION INFILTRATION; PERINEURAL ONCE
Status: COMPLETED | OUTPATIENT
Start: 2020-11-13 | End: 2020-11-13

## 2020-11-13 RX ADMIN — METHYLPREDNISOLONE ACETATE 80 MG: 40 INJECTION, SUSPENSION INTRA-ARTICULAR; INTRALESIONAL; INTRAMUSCULAR; SOFT TISSUE at 10:45

## 2020-11-13 RX ADMIN — LIDOCAINE HYDROCHLORIDE 20 ML: 10 INJECTION, SOLUTION INFILTRATION; PERINEURAL at 10:44

## 2020-11-13 RX ADMIN — BUPIVACAINE HYDROCHLORIDE 150 MG: 2.5 INJECTION, SOLUTION INFILTRATION; PERINEURAL at 10:44

## 2020-11-13 NOTE — PROGRESS NOTES
Rudolfo Sicard, MD [place], [place]         Orthopaedic Surgery and Sports Medicine    Patient Name: Michael Stanton  YOB: 1960  Patient's PCP is MADINA Bailey CNP    SUBJECTIVE  Chief Complaint:  Knee Pain (RIGHT     )      History of Present Illness: Michael Stanton is a 61 y.o. female here regarding right knee pain. She has a documented history of degenerative osteoarthritis involving both of her knees but the right knee has been the most symptomatic. He is here today again for evaluation of the right knee pain. She is significantly overweight with a BMI of 60.7  Location: global  Quality: aching and sharp  Pain Scale: 7/10  Context: overall course is worsening   Alleviating Factors: rest, avoiding painful activities, cortisone injection  Exacerbating Factors: weight bearing  Associated Symptoms: none    Sleep pattern is affected by the chief complaint: Yes  The patient has not had PT. The patient has had injections in the past of both corticosteroid and viscosupplementation. Corticosteroid has been the most effective. The patient has taken NSAIDs. Outside reports reviewed: historical medical records. Pain Assessment:  Pain Assessment  Location of Pain: Knee  Location Modifiers: Right  Severity of Pain: 5  Quality of Pain: Aching, Dull  Frequency of Pain: Intermittent  Aggravating Factors: Standing, Walking, Stairs, Squatting, Kneeling  Relieving Factors: Rest, Nsaids  Result of Injury: No  Work-Related Injury: No  Are there other pain locations you wish to document?: No    Review of Systems:  Sean Townsend's review of systems has been performed by intake and observation. All past and current ROS forms have been scanned into the medical record. She has been instructed to contact her primary care provider regarding ROS issues if not already being addressed at this time. There are no recent changes.  The most recent ROS was scanned into media on 2019    Past Medical History:  (see most recent intake form scanned into media on above date)  Past Medical History:   Diagnosis Date    ADHD (attention deficit hyperactivity disorder)     Bipolar disorder     Borderline osteopenia     Cellulitis of left leg 2020    GERD (gastroesophageal reflux disease)     Headache(784.0)     HNP (herniated nucleus pulposus), lumbar 2019    Intention tremor     due to lithium    Iron deficiency anemia 2019    Lateral meniscal tear 10/14/2015    Lumbar stenosis with neurogenic claudication 2019    Medial meniscus tear 10/14/2015    Prolonged emergence from general anesthesia, initial encounter 2019    Tobacco use     Venous ulcer of left leg ([facility] [address] 2020       Past Surgical History:  (see most recent intake form scanned into media on above date)  Past Surgical History:   Procedure Laterality Date    ABDOMINAL EXPLORATION SURGERY      CARPAL TUNNEL RELEASE Bilateral      SECTION      GASTRIC BYPASS SURGERY      KNEE SURGERY Left     atrhroscopic unispacer    KNEE SURGERY Right 10/28/2015    Right knee video arthroscopy with partial medial and lateral menisectomy with loose body removal    LUMBAR SPINE SURGERY N/A 2019    MICROLUMBAR DISCECTOMY L4-5 performed by Marimar Ulloa MD at [address] [place] [address]:  (see most recent intake form scanned into media on above date)  Allergies   Allergen Reactions    Calamine Other (See Comments)     Leaves skin tender and burning     Amoxicillin-Pot Clavulanate Hives    Daypro [Oxaprozin] Hives    Duravent-Da [Chlorphen-Phenyleph-Methscop] Hives    Lithium      Caused shakes    Norvasc [Amlodipine]      LE edema      Nsaids      Avoid d/t gastric bypass    Seldane [Terfenadine] Hives    Suprax [Cefixime] Hives    Levofloxacin Rash       Medications:  (see most recent intake

## 2020-11-16 ENCOUNTER — TELEPHONE (OUTPATIENT)
Dept: FAMILY MEDICINE CLINIC | Age: 60
End: 2020-11-16

## 2020-11-16 NOTE — TELEPHONE ENCOUNTER
----- Message from [facility] [place] - INPATIENT sent at 11/16/2020 11:16 AM EST -----  Subject: Appointment Request    Reason for Call: Urgent Mental Health    QUESTIONS  Type of Appointment? Established Patient  Reason for appointment request? Available appointments did not meet   patient need  Additional Information for Provider? Offered appointments available with   other providers Requesting to only see Robin Chavira. Extreme fatigue    effecting depression .   ---------------------------------------------------------------------------  --------------  CALL BACK INFO  What is the best way for the office to contact you? OK to leave message on   voicemail  Preferred Call Back Phone Number? [phone]  ---------------------------------------------------------------------------  --------------  SCRIPT ANSWERS  Relationship to Patient? Self  Appointment reason? Symptomatic  Select script based on patient symptoms? Adult Mental Health [Depression   Anxiety   Panic Attacks   Substance Abuse]  Are you having thoughts of hurting yourself or others? No  Are you seeing or hearing things that may not be real (present)? No  Do you think other people are trying to hurt or target you? No  Are you feeling sick because you have not used drugs or alcohol recently? No  Are you feeling sick because of using drugs or alcohol recently? No  Are you having depressed feelings? No  Are you suffering from anxiety or panic attacks? No  Are your symptoms getting worse? Yes  Have you been diagnosed with   tested for   or told that you are suspected of having COVID-19 (Coronavirus)? No  Have you had a fever or taken medication to treat a fever within the past   3 days? No  Have you had a cough   shortness of breath or flu-like symptoms within the past 3 days? No  Do you currently have flu-like symptoms including fever or chills   cough   shortness of breath   or difficulty breathing   or new loss of taste or smell?  No  (Service Expert  click yes below to proceed with [employer] As Usual   Scheduling)?  Yes

## 2020-11-20 ENCOUNTER — VIRTUAL VISIT (OUTPATIENT)
Dept: FAMILY MEDICINE CLINIC | Age: 60
End: 2020-11-20
Payer: MEDICARE

## 2020-11-20 PROCEDURE — 99213 OFFICE O/P EST LOW 20 MIN: CPT | Performed by: NURSE PRACTITIONER

## 2020-11-20 PROCEDURE — G8431 POS CLIN DEPRES SCRN F/U DOC: HCPCS | Performed by: NURSE PRACTITIONER

## 2020-11-20 RX ORDER — DOXEPIN HYDROCHLORIDE 10 MG/1
10 CAPSULE ORAL NIGHTLY
Qty: 30 CAPSULE | Refills: 0 | Status: SHIPPED | OUTPATIENT
Start: 2020-11-20 | End: 2021-03-10

## 2020-11-20 ASSESSMENT — PATIENT HEALTH QUESTIONNAIRE - PHQ9
SUM OF ALL RESPONSES TO PHQ QUESTIONS 1-9: 16
9. THOUGHTS THAT YOU WOULD BE BETTER OFF DEAD, OR OF HURTING YOURSELF: 0
SUM OF ALL RESPONSES TO PHQ9 QUESTIONS 1 & 2: 5
8. MOVING OR SPEAKING SO SLOWLY THAT OTHER PEOPLE COULD HAVE NOTICED. OR THE OPPOSITE, BEING SO FIGETY OR RESTLESS THAT YOU HAVE BEEN MOVING AROUND A LOT MORE THAN USUAL: 0
SUM OF ALL RESPONSES TO PHQ QUESTIONS 1-9: 16
3. TROUBLE FALLING OR STAYING ASLEEP: 3
7. TROUBLE CONCENTRATING ON THINGS, SUCH AS READING THE NEWSPAPER OR WATCHING TELEVISION: 2
2. FEELING DOWN, DEPRESSED OR HOPELESS: 2
1. LITTLE INTEREST OR PLEASURE IN DOING THINGS: 3
SUM OF ALL RESPONSES TO PHQ QUESTIONS 1-9: 16
4. FEELING TIRED OR HAVING LITTLE ENERGY: 3
10. IF YOU CHECKED OFF ANY PROBLEMS, HOW DIFFICULT HAVE THESE PROBLEMS MADE IT FOR YOU TO DO YOUR WORK, TAKE CARE OF THINGS AT HOME, OR GET ALONG WITH OTHER PEOPLE: 1
6. FEELING BAD ABOUT YOURSELF - OR THAT YOU ARE A FAILURE OR HAVE LET YOURSELF OR YOUR FAMILY DOWN: 0
5. POOR APPETITE OR OVEREATING: 3

## 2020-11-20 ASSESSMENT — ENCOUNTER SYMPTOMS
ALLERGIC/IMMUNOLOGIC NEGATIVE: 1
GASTROINTESTINAL NEGATIVE: 1
SHORTNESS OF BREATH: 0
ANAL BLEEDING: 0
NAUSEA: 0
BLOOD IN STOOL: 0
ABDOMINAL PAIN: 0
EYES NEGATIVE: 1
RESPIRATORY NEGATIVE: 1

## 2020-11-20 NOTE — PROGRESS NOTES
Solo Bustos is a 61 y.o. female evaluated via telephone on 11/20/2020.       Consent:  She and/or health care decision maker is aware that that she may receive a bill for this telephone service, depending on her insurance coverage, and has provided verbal consent to proceed: Yes      Elisha Liu

## 2020-11-20 NOTE — PROGRESS NOTES
2020    TELEHEALTH EVALUATION -- Audio/Visual (During [facility]- public health emergency)    Chief Complaint   Patient presents with    Depression     pt said she is very angry / pt thinks most of it is starting with the fact that she can't sleep        HPI:  Jeannie Harvey (:  1960) has requested an audio/video evaluation for the following concern(s):    Mood Disorder:     Current complaints include: See PHQ9 below . She denies tearfulness, decreased libido, excessive worry, panic attacks, obsessive thoughts, compulsive behaviors, increased use of drugs or alcohol, suicidal thoughts or behavior, and impaired memory. Symptoms/signs of chapito: none. The patient feels that her depression and agitation are due to lack of sleep. The patient currently has trazodone prn but will not use it because it is putting her into too deep of a sleep and she doesn't wake up to even urinate and then will urinate in her sleep. She also states it causes a lot of hang over effect. The patient has tried restoril in the past and also caused too much sedation  When she does fall asleep she states she can not stay asleep  She has not tried [place] or doxepin  Insurance does not cover lunesta or ambien cr    PHQ-9  2020   Little interest or pleasure in doing things 3 1 3 3 1 0 0   Feeling down, depressed, or hopeless 2 0 2 3 1 0 0   Trouble falling or staying asleep, or sleeping too much 3 2 1 3 - - -   Feeling tired or having little energy 3 1 3 3 - - -   Poor appetite or overeating 3 0 3 2 - - -   Feeling bad about yourself - or that you are a failure or have let yourself or your family down 0 0 2 3 - - -   Trouble concentrating on things, such as reading the newspaper or watching television 2 1 1 2 - - -   Moving or speaking so slowly that other people could have noticed.  Or the opposite - being so fidgety or restless that you have been moving around a lot more than usual 0 1 0 0 - - -   Thoughts that you would be better off dead, or of hurting yourself in some way 0 0 1 1 - - -   PHQ-2 Score 5 1 5 6 2 0 0   PHQ-9 Total Score 16 6 16 20 2 0 0   If you checked off any problems, how difficult have these problems made it for you to do your work, take care of things at home, or get along with other people? 1 1 2 2 - - -     Interpretation of Total Score Total Score Depression Severity: 1-4 = Minimal depression, 5-9 = Mild depression, 10-14 = Moderate depression, 15-19 = Moderately severe depression, 20-27 = Severe depression        Review of Systems   Constitutional: Positive for appetite change and fatigue. Negative for unexpected weight change. HENT: Negative. Eyes: Negative. Respiratory: Negative. Negative for shortness of breath. Cardiovascular: Negative. Negative for chest pain, palpitations and leg swelling. Gastrointestinal: Negative. Negative for abdominal pain, anal bleeding, blood in stool and nausea. Endocrine: Negative. Genitourinary: Negative. Negative for hematuria. Musculoskeletal: Negative. Skin: Negative. Negative for rash. Allergic/Immunologic: Negative. Neurological: Negative. Negative for dizziness, syncope, light-headedness and numbness. Hematological: Negative. Does not bruise/bleed easily. Psychiatric/Behavioral: Positive for agitation, dysphoric mood and sleep disturbance. Negative for self-injury and suicidal ideas. All other systems reviewed and are negative. Prior to Visit Medications    Medication Sig Taking?  Authorizing Provider   VORTIoxetine HBr (TRINTELLIX) 20 MG TABS tablet TAKE 1 TABLET EVERY DAY Yes MADINA Garcia CNP   furosemide (LASIX) 20 MG tablet TAKE 1 TABLET EVERY DAY Yes MADINA Muir - CNP   loratadine (CLARITIN) 10 MG tablet Take 10 mg by mouth daily Yes Historical Provider, MD   celecoxib (CELEBREX) 100 MG capsule Take 1 capsule by mouth 2 times daily Yes MADINA Jean CNP   metoprolol succinate (TOPROL XL) 50 MG extended release tablet TAKE 1 TABLET EVERY DAY Yes MADINA Jean CNP   fluticasone (FLONASE) 50 MCG/ACT nasal spray USE 2 SPRAYS IN EACH NOSTRIL EVERY DAY Yes MADINA Jean CNP   buPROPion (WELLBUTRIN XL) 150 MG extended release tablet TAKE 1 TABLET EVERY DAY Yes MADINA Jean CNP   lamoTRIgine (LAMICTAL) 200 MG tablet TAKE 1/2 TABLET TWICE DAILY Yes MADINA Jean CNP   rosuvastatin (CRESTOR) 5 MG tablet TAKE 1 TABLET EVERY DAY Yes MADINA Fatima CNP   potassium chloride (KLOR-CON) 10 MEQ extended release tablet Take 1 tablet by mouth daily Yes MADINA Jean CNP   ferrous sulfate 325 (65 Fe) MG tablet Take 1 tablet by mouth daily (with breakfast) Yes MADINA Jean CNP   CALCIUM-MAGNESIUM-VITAMIN D PO Take 2 capsules by mouth nightly Yes Historical Provider, MD   multivitamin (THERAGRAN) per tablet Take 1 tablet by mouth daily. Yes Historical Provider, MD   Omega-3 Fatty Acids (FISH OIL) 1200 MG CAPS Take  by mouth daily.  Yes Historical Provider, MD   traZODone (DESYREL) 100 MG tablet Take 1 tablet by mouth nightly as needed for Sleep  MADINA Jean CNP       Social History     Tobacco Use    Smoking status: Former Smoker     Packs/day: 1.50     Years: 21.00     Pack years: 31.50     Types: Cigarettes     Last attempt to quit: 1997     Years since quittin.7    Smokeless tobacco: Never Used   Substance Use Topics    Alcohol use: No    Drug use: No        Allergies   Allergen Reactions    Calamine Other (See Comments)     Leaves skin tender and burning     Amoxicillin-Pot Clavulanate Hives    Daypro [Oxaprozin] Hives    Duravent-Da [Chlorphen-Phenyleph-Methscop] Hives    Lithium      Caused shakes    Norvasc [Amlodipine]      LE edema      Nsaids      Avoid d/t gastric bypass    Seldane [Terfenadine] Hives    Suprax [Cefixime] Hives    Levofloxacin Rash   ,   Past Medical History:   Diagnosis Date    ADHD (attention deficit hyperactivity disorder)     Bipolar disorder     Borderline osteopenia     Cellulitis of left leg 2020    GERD (gastroesophageal reflux disease)     Headache(784.0)     HNP (herniated nucleus pulposus), lumbar 2019    Intention tremor     due to lithium    Iron deficiency anemia 2019    Lateral meniscal tear 10/14/2015    Lumbar stenosis with neurogenic claudication 2019    Medial meniscus tear 10/14/2015    Prolonged emergence from general anesthesia, initial encounter 2019    Tobacco use     Venous ulcer of left leg ([address] 2020   ,   Past Surgical History:   Procedure Laterality Date    ABDOMINAL EXPLORATION SURGERY      CARPAL TUNNEL RELEASE Bilateral      SECTION      GASTRIC BYPASS SURGERY      KNEE SURGERY Left     atrhroscopic unispacer    KNEE SURGERY Right 10/28/2015    Right knee video arthroscopy with partial medial and lateral menisectomy with loose body removal    LUMBAR SPINE SURGERY N/A 2019    MICROLUMBAR DISCECTOMY L4-5 performed by Janice Hoskins MD at [address]     ,   Social History     Tobacco Use    Smoking status: Former Smoker     Packs/day: 1.50     Years: 21.00     Pack years: 31.50     Types: Cigarettes     Last attempt to quit: 1997     Years since quittin.7    Smokeless tobacco: Never Used   Substance Use Topics    Alcohol use: No    Drug use: No   ,   Family History   Problem Relation Age of Onset    Depression Sister     Mental Illness Sister     Diabetes Mother     Heart Disease Mother     Diabetes Father     Heart Disease Father    ,   Immunization History   Administered Date(s) Administered    Influenza 10/30/2013    Influenza Virus Vaccine 2014, 2015    Influenza, Quadv, IM, (6 mo and older Fluzone, Flulaval, Fluarix and 3 yrs and older Afluria) 12/26/2017, 09/26/2018    Tdap (Boostrix, Adacel) 08/04/2017   ,   Health Maintenance   Topic Date Due    Cervical cancer screen  06/21/1981    Shingles Vaccine (1 of 2) 06/21/2010    Colon cancer screen colonoscopy  06/21/2010    Lipid screen  01/08/2019    Breast cancer screen  07/30/2020    Flu vaccine (1) 09/01/2020    Annual Wellness Visit (AWV)  11/04/2021 (Originally 5/29/2019)    A1C test (Diabetic or Prediabetic)  08/05/2021    Potassium monitoring  09/11/2021    Creatinine monitoring  09/11/2021    DTaP/Tdap/Td vaccine (2 - Td) 08/04/2027    Hepatitis C screen  Completed    HIV screen  Completed    Hepatitis A vaccine  Aged Out    Hepatitis B vaccine  Aged Out    Hib vaccine  Aged Out    Meningococcal (ACWY) vaccine  Aged Out    Pneumococcal 0-64 years Vaccine  Aged Out       PHYSICAL EXAMINATION:  [ INSTRUCTIONS:  \"[x]\" Indicates a positive item  \"[]\" Indicates a negative item  -- DELETE ALL ITEMS NOT EXAMINED]  Vital Signs: (As obtained by patient/caregiver or practitioner observation)    Blood pressure-  Heart rate-    Respiratory rate-    Temperature-  Pulse oximetry-     Constitutional: [x] Appears well-developed and well-nourished [x] No apparent distress      [] Abnormal-   Mental status  [x] Alert and awake  [x] Oriented to person/place/time [x]Able to follow commands      Eyes:  EOM    [x]  Normal  [] Abnormal-  Sclera  [x]  Normal  [] Abnormal -         Discharge [x]  None visible  [] Abnormal -    HENT:   [x] Normocephalic, atraumatic.   [] Abnormal   [x] Mouth/Throat: Mucous membranes are moist.     External Ears [x] Normal  [] Abnormal-     Neck: [x] No visualized mass     Pulmonary/Chest: [x] Respiratory effort normal.  [x] No visualized signs of difficulty breathing or respiratory distress        [] Abnormal-      Musculoskeletal:   [x] Normal gait with no signs of ataxia         [x] Normal range of motion of neck        [] Abnormal-       Neurological:        [x] No Facial Asymmetry (Cranial nerve 7 motor function) (limited exam to video visit)          [x] No gaze palsy        [] Abnormal-         Skin:        [x] No significant exanthematous lesions or discoloration noted on facial skin         [] Abnormal-            Psychiatric:       [x] Normal Affect [x] No Hallucinations        [] Abnormal-     Other pertinent observable physical exam findings-     ASSESSMENT/PLAN:  1. Recurrent major depressive disorder, remission status unspecified ([facility]  Patient feels depression is worse right now because of insomnia  Denies chapito  Does  Not want to change psych medications    2. Morbid obesity with BMI of 50.0-59.9, adult ([facility]   Discussed healthy lifestyle choices to attempt to incorporate into daily routine to attempt to obtain and maintain a healthy weight. General weight loss/lifestyle modification strategies discussed (elicit support from others; identify saboteurs; non-food rewards, etc). Discussed healthy nutritional options as well. Diet interventions: qualitative changes (increase low-fat,  high-fiber foods). Disscussed trying to incorporate routine physical activity into daily regimen. Informal exercise measures discussed, e.g. taking stairs instead of elevator. Regular aerobic exercise program discussed. Surgical Procedure: was not discussed  Behavioral treatment: discussed commercial programs (Weight Watchers, Nutrisystem,etc), Overeaters Anonymous, Slim Fast, stress management and TOPS. Discussed consequences of obesity in terms of medical conditions that may develop in the future. Patient verbalized understanding. 3. Primary insomnia  Trial of  - doxepin (SINEQUAN) 10 MG capsule; Take 1 capsule by mouth nightly  Dispense: 30 capsule; Refill: 0  Patient counselled on proper usage of medication, and was advised of potential side effects and risks associated with this medication.  The patient expressed understanding of above and agreed to proceed with

## 2020-12-04 ENCOUNTER — VIRTUAL VISIT (OUTPATIENT)
Dept: FAMILY MEDICINE CLINIC | Age: 60
End: 2020-12-04
Payer: MEDICARE

## 2020-12-04 ENCOUNTER — TELEPHONE (OUTPATIENT)
Dept: FAMILY MEDICINE CLINIC | Age: 60
End: 2020-12-04

## 2020-12-04 PROCEDURE — G8484 FLU IMMUNIZE NO ADMIN: HCPCS | Performed by: FAMILY MEDICINE

## 2020-12-04 PROCEDURE — 99213 OFFICE O/P EST LOW 20 MIN: CPT | Performed by: FAMILY MEDICINE

## 2020-12-04 PROCEDURE — G8427 DOCREV CUR MEDS BY ELIG CLIN: HCPCS | Performed by: FAMILY MEDICINE

## 2020-12-04 PROCEDURE — 1036F TOBACCO NON-USER: CPT | Performed by: FAMILY MEDICINE

## 2020-12-04 PROCEDURE — 3017F COLORECTAL CA SCREEN DOC REV: CPT | Performed by: FAMILY MEDICINE

## 2020-12-04 PROCEDURE — G8417 CALC BMI ABV UP PARAM F/U: HCPCS | Performed by: FAMILY MEDICINE

## 2020-12-04 NOTE — PROGRESS NOTES
2020    TELEHEALTH EVALUATION -- Audio/Visual (During VALQS-25 public health emergency)    HPI:    Diamante Wiley (:  1960) has requested an audio/video evaluation for the following concern(s):    Patient is being seen today due to PCP being out of office. Patient has a wound on her Left leg that has been there since march of this year. Patient was seeing wound care and had ended her treatment with dr. Kyaw Houston 2 months ago but now the wound is back. The area is red and swollen and slightly oozing. Patient is treating it as she did when she went to wound care. She called to try and get appointment with wound care and has appointment scheduled for  but she stated the area has grown 2-3x larger in one week and she is getting very concerned if she needs to go to ER but doesn't want to if she doesn't have to. All other ROS negative    Prior to Visit Medications    Medication Sig Taking?  Authorizing Provider   doxepin (SINEQUAN) 10 MG capsule Take 1 capsule by mouth nightly Yes MADINA Forrest CNP   VORTIoxetine HBr (TRINTELLIX) 20 MG TABS tablet TAKE 1 TABLET EVERY DAY Yes MADINA Forrest CNP   furosemide (LASIX) 20 MG tablet TAKE 1 TABLET EVERY DAY Yes MADINA Blankenship CNP   loratadine (CLARITIN) 10 MG tablet Take 10 mg by mouth daily Yes Historical Provider, MD   celecoxib (CELEBREX) 100 MG capsule Take 1 capsule by mouth 2 times daily Yes MADINA Forrest CNP   metoprolol succinate (TOPROL XL) 50 MG extended release tablet TAKE 1 TABLET EVERY DAY Yes MADINA Forrest CNP   fluticasone (FLONASE) 50 MCG/ACT nasal spray USE 2 SPRAYS IN EACH NOSTRIL EVERY DAY Yes MADINA Forrest CNP   buPROPion (WELLBUTRIN XL) 150 MG extended release tablet TAKE 1 TABLET EVERY DAY Yes MADINA Forrest CNP   lamoTRIgine (LAMICTAL) 200 MG tablet TAKE 1/2 TABLET TWICE DAILY Yes MADINA Forrest CNP   rosuvastatin ([address]) 5 MG tablet TAKE 1 TABLET EVERY DAY Yes MADINA Richards - CNP   potassium chloride (KLOR-CON) 10 MEQ extended release tablet Take 1 tablet by mouth daily Yes MADINA Morris CNP   ferrous sulfate 325 (65 Fe) MG tablet Take 1 tablet by mouth daily (with breakfast) Yes MADINA Morris CNP   CALCIUM-MAGNESIUM-VITAMIN D PO Take 2 capsules by mouth nightly Yes Historical Provider, MD   multivitamin [facility]) per tablet Take 1 tablet by mouth daily. Yes Historical Provider, MD   Omega-3 Fatty Acids (FISH OIL) 1200 MG CAPS Take  by mouth daily.  Yes Historical Provider, MD       Allergies   Allergen Reactions    Calamine Other (See Comments)     Leaves skin tender and burning     Amoxicillin-Pot Clavulanate Hives    Daypro [Oxaprozin] Hives    Duravent-Da [Chlorphen-Phenyleph-Methscop] Hives    Lithium      Caused shakes    Norvasc [Amlodipine]      LE edema      Nsaids      Avoid d/t gastric bypass    Seldane [Terfenadine] Hives    Suprax [Cefixime] Hives    Levofloxacin Rash       Past Medical History:   Diagnosis Date    ADHD (attention deficit hyperactivity disorder)     Bipolar disorder     Borderline osteopenia     Cellulitis of left leg 2020    GERD (gastroesophageal reflux disease)     Headache(784.0)     HNP (herniated nucleus pulposus), lumbar 2019    Intention tremor     due to lithium    Iron deficiency anemia 2019    Lateral meniscal tear 10/14/2015    Lumbar stenosis with neurogenic claudication 2019    Medial meniscus tear 10/14/2015    Prolonged emergence from general anesthesia, initial encounter 2019    Tobacco use     Venous ulcer of left leg ([facility] [address] 2020       Past Surgical History:   Procedure Laterality Date    ABDOMINAL EXPLORATION SURGERY      CARPAL TUNNEL RELEASE Bilateral      SECTION      GASTRIC BYPASS SURGERY      KNEE SURGERY Left     atrhroscopic unispacer    KNEE SURGERY Right 10/28/2015    Right knee video arthroscopy with partial medial and lateral menisectomy with loose body removal    LUMBAR SPINE SURGERY N/A 2019    MICROLUMBAR DISCECTOMY L4-5 performed by Edilma Wiley MD at [address] History     Tobacco Use    Smoking status: Former Smoker     Packs/day: 1.50     Years: 21.00     Pack years: 31.50     Types: Cigarettes     Last attempt to quit: 1997     Years since quittin.8    Smokeless tobacco: Never Used   Substance Use Topics    Alcohol use: No    Drug use: No       PHYSICAL EXAMINATION:  [ INSTRUCTIONS:  \"[x]\" Indicates a positive item  \"[]\" Indicates a negative item  -- DELETE ALL ITEMS NOT EXAMINED]  Vital Signs: (As obtained by patient/caregiver or practitioner observation)  See flowsheet  Blood pressure- Heart rate-    Respiratory rate-    Temperature-  Pulse oximetry-     Constitutional: [x] Appears well-developed and well-nourished [x] No apparent distress      [] Abnormal-   Mental status  [x] Alert and awake  [x] Oriented to person/place/time [x]Able to follow commands      Eyes:  EOM    [x]  Normal  [] Abnormal-  Sclera  [x]  Normal  [] Abnormal -         Discharge [x]  None visible  [] Abnormal -    HENT:   [x] Normocephalic, atraumatic.   [] Abnormal   [] Mouth/Throat: Mucous membranes are moist.     External Ears [x] Normal  [] Abnormal-     Neck: [x] No visualized mass     Pulmonary/Chest: [x] Respiratory effort normal.  [x] No visualized signs of difficulty breathing or respiratory distress        [] Abnormal-      Musculoskeletal:   [] Normal gait with no signs of ataxia         [x] Normal range of motion of neck        [] Abnormal-       Neurological:        [x] No Facial Asymmetry (Cranial nerve 7 motor function) (limited exam to video visit)          [x] No gaze palsy        [] Abnormal-         Skin:        [x] No significant exanthematous lesions or discoloration noted on facial skin         [x] Abnormal- And also of undetermined size on the left leg. Central ulceration with some desquamation around it. Some mild erythema around. No streaking, no drainage noted. Psychiatric:       [x] Normal Affect [x] No Hallucinations        [] Abnormal-     Other pertinent observable physical exam findings-      ASSESSMENT PLAN      Diagnosis Orders   1. Venous stasis ulcer of left calf, unspecified ulcer stage, unspecified whether varicose veins present ([address]     it is somewhat difficult to be further diagnostic about this ulcer, but she has appointment with wound care in a week, and I think topical treatment will be sufficient until seen. I have asked her to combine Silvadene with mupirocin. I have warned her to call for streaking or fever. Otherwise follow-up when necessary. Patient has both creams on hand. Will continue to clean with saline daily. Patient should call the office immediately with new or ongoing signs or symptoms or worsening, or proceed to the emergency room. All entries in chief complaint and history of present illness are reviewed and validated by me. No changes in past medical history, past surgical history, social history, or family history were noted during the patient encounter unless specifically listed above. All updates of past medical history, past surgical history, social history, or family history were reviewed personally by me during the office visit. All problems listed in the assessment are stable unless noted otherwise. Medication profile reviewed personally by me during the office visit. Medication side effects and possible impairments from medications were discussed as applicable. Every effort has been made to assure accurate transcription by this voice recognition software. However, mistakes in transcription may still occur            Jeannie Harvey is a 61 y.o. female being evaluated by a Virtual Visit (video visit) encounter to address concerns as mentioned above.   A caregiver was present when appropriate. Due to this being a TeleHealth encounter ([facility]), evaluation of the following organ systems was limited: Vitals/Constitutional/EENT/Resp/CV/GI//MS/Neuro/Skin/Heme-Lymph-Imm. Pursuant to the emergency declaration under the [address] [place] [address], [address] [place] [address] and the [employer] and [employer] Act, this Virtual Visit was conducted with patient's (and/or legal guardian's) consent, to reduce the patient's risk of exposure to COVID-19 and provide necessary medical care. The patient (and/or legal guardian) has also been advised to contact this office for worsening conditions or problems, and seek emergency medical treatment and/or call 911 if deemed necessary. Services were provided through a video synchronous discussion virtually to substitute for in-person clinic visit. Patient and provider were located at their individual homes. --Raysa Carvalho RN on 12/4/2020 at 3:05 PM    An electronic signature was used to authenticate this noteJuan LEE    12/4/2020  3:21 PM

## 2020-12-04 NOTE — TELEPHONE ENCOUNTER
Pt called stating that she had a sore on her leg a couple of months ago that PCP treated, but it's now back and getting bigger. Pt states she has an appt next Friday with wound care, but they can't get her in any sooner. Pt waiting to know if she should go to ER or if it would be okay to wait until they see her. Informed that PCP if out of the office today and next week and will route to Dr. Manuel Santos.  Call back [phone]

## 2020-12-04 NOTE — TELEPHONE ENCOUNTER
Can she do a video visit at 3 PM today?   It would have to be a video visit for me to assess the wound

## 2020-12-09 ENCOUNTER — INITIAL CONSULT (OUTPATIENT)
Dept: GASTROENTEROLOGY | Age: 60
End: 2020-12-09
Payer: MEDICARE

## 2020-12-09 VITALS
WEIGHT: 293 LBS | BODY MASS INDEX: 55.32 KG/M2 | DIASTOLIC BLOOD PRESSURE: 69 MMHG | TEMPERATURE: 96.8 F | SYSTOLIC BLOOD PRESSURE: 147 MMHG | HEIGHT: 61 IN | HEART RATE: 98 BPM

## 2020-12-09 PROBLEM — Z12.11 SCREEN FOR COLON CANCER: Status: ACTIVE | Noted: 2020-12-09

## 2020-12-09 PROBLEM — R14.0 ABDOMINAL BLOATING: Status: ACTIVE | Noted: 2020-12-09

## 2020-12-09 PROCEDURE — G8417 CALC BMI ABV UP PARAM F/U: HCPCS | Performed by: INTERNAL MEDICINE

## 2020-12-09 PROCEDURE — G8427 DOCREV CUR MEDS BY ELIG CLIN: HCPCS | Performed by: INTERNAL MEDICINE

## 2020-12-09 PROCEDURE — G8484 FLU IMMUNIZE NO ADMIN: HCPCS | Performed by: INTERNAL MEDICINE

## 2020-12-09 PROCEDURE — 3017F COLORECTAL CA SCREEN DOC REV: CPT | Performed by: INTERNAL MEDICINE

## 2020-12-09 PROCEDURE — 99202 OFFICE O/P NEW SF 15 MIN: CPT | Performed by: INTERNAL MEDICINE

## 2020-12-09 PROCEDURE — 1036F TOBACCO NON-USER: CPT | Performed by: INTERNAL MEDICINE

## 2020-12-09 RX ORDER — BISACODYL 5 MG
TABLET, DELAYED RELEASE (ENTERIC COATED) ORAL
Qty: 4 TABLET | Refills: 0 | Status: SHIPPED | OUTPATIENT
Start: 2020-12-09 | End: 2020-12-22

## 2020-12-09 RX ORDER — METOPROLOL SUCCINATE 50 MG/1
TABLET, EXTENDED RELEASE ORAL
Qty: 90 TABLET | Refills: 1 | Status: SHIPPED | OUTPATIENT
Start: 2020-12-09 | End: 2021-04-28 | Stop reason: SDUPTHER

## 2020-12-09 RX ORDER — POLYETHYLENE GLYCOL 3350 17 G/17G
238 POWDER ORAL ONCE
Qty: 238 G | Refills: 0 | Status: SHIPPED | OUTPATIENT
Start: 2020-12-09 | End: 2020-12-09

## 2020-12-09 NOTE — PROGRESS NOTES
[address] ,  [address] [facility]  Phone: [phone]    CHIEF COMPLAINT     Chief Complaint   Patient presents with    New Patient     abdominal bloating, happens more often after lying down. Gas, belching. cant identify any foods that are causing it. Started about 3-4 months ago        SABAS Meyers is a 61 y.o. female who presents for abdominal bloating. Patient has multiple medical problems as below and has morbid obesity with a BMI of 63. She says she has had increased abdominal bloating for a few months now, has a lot of gas, cannot identify any particular food triggers, happens more when she is lying down, started about 3 to 4 months ago. She states that she has 1 bowel movement daily and denies any constipation. Does not have any rectal bleeding. States there is a family history of colon polyps in her father but no family history of colon cancer in first-degree family members as far as she knows. She states she has had a  and was told there was a lot of scar tissue related to this, and an exploratory laparotomy in the  when she was told a lot of scar tissue was found and removed. She states she has had 1 prior colonoscopy in the late  but it was a traumatic experience for her and she had a lot of pain during the procedure. She requests anesthesia for any future procedures.   She had a normal CBC 2020    PAST MEDICAL HISTORY     Past Medical History:   Diagnosis Date    ADHD (attention deficit hyperactivity disorder)     Bipolar disorder     Borderline osteopenia     Cellulitis of left leg 2020    GERD (gastroesophageal reflux disease)     Headache(784.0)     HNP (herniated nucleus pulposus), lumbar 2019    Intention tremor     due to lithium    Iron deficiency anemia 2019    Lateral meniscal tear 10/14/2015    Lumbar stenosis with neurogenic claudication 2019    Medial meniscus tear 10/14/2015    Prolonged emergence from general anesthesia, initial encounter 2019    Tobacco use     Venous ulcer of left leg ([facility] [address] 2020     FAMILY HISTORY     Family History   Problem Relation Age of Onset    Depression Sister     Mental Illness Sister     Diabetes Mother     Heart Disease Mother     Diabetes Father     Heart Disease Father      SOCIAL HISTORY     Social History     Socioeconomic History    Marital status:       Spouse name: Not on file    Number of children: Not on file    Years of education: Not on file    Highest education level: Not on file   Occupational History    Not on file   Social Needs    Financial resource strain: Not on file    Food insecurity     Worry: Not on file     Inability: Not on file    Transportation needs     Medical: Not on file     Non-medical: Not on file   Tobacco Use    Smoking status: Former Smoker     Packs/day: 1.50     Years: 21.00     Pack years: 31.50     Types: Cigarettes     Last attempt to quit: 1997     Years since quittin.8    Smokeless tobacco: Never Used   Substance and Sexual Activity    Alcohol use: No    Drug use: No    Sexual activity: Not Currently   Lifestyle    Physical activity     Days per week: Not on file     Minutes per session: Not on file    Stress: Not on file   Relationships    Social connections     Talks on phone: Not on file     Gets together: Not on file     Attends L[employer]-day service: Not on file     Active member of club or organization: Not on file     Attends meetings of clubs or organizations: Not on file     Relationship status: Not on file    Intimate partner violence     Fear of current or ex partner: Not on file     Emotionally abused: Not on file     Physically abused: Not on file     Forced sexual activity: Not on file   Other Topics Concern    Not on file   Social History Narrative    Not on file     SURGICAL HISTORY     Past Surgical History:   Procedure Laterality Date    ABDOMINAL EXPLORATION SURGERY      CARPAL TUNNEL RELEASE Bilateral      SECTION      GASTRIC BYPASS SURGERY      KNEE SURGERY Left     atrhroscopic unispacer    KNEE SURGERY Right 10/28/2015    Right knee video arthroscopy with partial medial and lateral menisectomy with loose body removal    LUMBAR SPINE SURGERY N/A 2019    MICROLUMBAR DISCECTOMY L4-5 performed by Edilma Wiley MD at [place] [address]   (This list may include medications prescribed during this encounter as epic can not insert only the list prior to this encounter.)  Current Outpatient Rx   Medication Sig Dispense Refill    doxepin (SINEQUAN) 10 MG capsule Take 1 capsule by mouth nightly 30 capsule 0    VORTIoxetine HBr (TRINTELLIX) 20 MG TABS tablet TAKE 1 TABLET EVERY DAY 90 tablet 1    furosemide (LASIX) 20 MG tablet TAKE 1 TABLET EVERY DAY 90 tablet 1    loratadine (CLARITIN) 10 MG tablet Take 10 mg by mouth daily      celecoxib (CELEBREX) 100 MG capsule Take 1 capsule by mouth 2 times daily 180 capsule 1    metoprolol succinate (TOPROL XL) 50 MG extended release tablet TAKE 1 TABLET EVERY DAY 90 tablet 1    fluticasone (FLONASE) 50 MCG/ACT nasal spray USE 2 SPRAYS IN EACH NOSTRIL EVERY DAY 48 g 1    buPROPion (WELLBUTRIN XL) 150 MG extended release tablet TAKE 1 TABLET EVERY DAY 90 tablet 1    lamoTRIgine (LAMICTAL) 200 MG tablet TAKE 1/2 TABLET TWICE DAILY 90 tablet 1    rosuvastatin (CRESTOR) 5 MG tablet TAKE 1 TABLET EVERY DAY 90 tablet 1    potassium chloride (KLOR-CON) 10 MEQ extended release tablet Take 1 tablet by mouth daily 90 tablet 3    ferrous sulfate 325 (65 Fe) MG tablet Take 1 tablet by mouth daily (with breakfast) 90 tablet 3    CALCIUM-MAGNESIUM-VITAMIN D PO Take 2 capsules by mouth nightly      multivitamin (THERAGRAN) per tablet Take 1 tablet by mouth daily.  Omega-3 Fatty Acids (FISH OIL) 1200 MG CAPS Take  by mouth daily. ALLERGIES     Allergies   Allergen Reactions    Calamine Other (See Comments)     Leaves skin tender and burning     Amoxicillin-Pot Clavulanate Hives    Daypro [Oxaprozin] Hives    Duravent-Da [Chlorphen-Phenyleph-Methscop] Hives    Lithium      Caused shakes    Norvasc [Amlodipine]      LE edema      Nsaids      Avoid d/t gastric bypass    Seldane [Terfenadine] Hives    Suprax [Cefixime] Hives    Levofloxacin Rash       IMMUNIZATIONS     Immunization History   Administered Date(s) Administered    Influenza 10/30/2013    Influenza Virus Vaccine 11/20/2014, 09/02/2015    Influenza, Quadv, IM, (6 mo and older Fluzone, Flulaval, Fluarix and 3 yrs and older Afluria) 12/26/2017, 09/26/2018    Tdap (Boostrix, Adacel) 08/04/2017       REVIEW OF SYSTEMS     Constitutional: denies fever and unexpected weight change. HENT: Negative for ear pain, hearing loss and nosebleeds. Eyes: Negative for pain and visual disturbance. Respiratory: Negative for cough, shortness of breath and wheezing. Cardiovascular: Negative for chest pain, palpitations and leg swelling. Gastrointestinal: see HPI for details. Endocrine: Negative for polydipsia, polyphagia and polyuria. Genitourinary: Negative for difficulty urinating, dysuria, hematuria and urgency. Musculoskeletal: Positive for arthralgias and back pain. Skin: Negative for pallor and rash. Allergic/Immunologic: Negative for environmental allergies and immunocompromised state. Neurological: Negative for seizures, syncope. Hematological: Negative for adenopathy. Does not bruise/bleed easily. Psychiatric/Behavioral: Negative for agitation, confusion, hallucinations.     PHYSICAL EXAM   VITAL SIGNS: BP (!) 147/69 (Site: Right Wrist, Position: Sitting, Cuff Size: Medium Adult)   Pulse 98   Temp 96.8 °F (36 °C) (Temporal)   Ht 5' 1\" (1.549 m)   Wt (!) 336 lb (152.4 kg)   LMP  (LMP Unknown)   BMI 63.49 kg/m²   Wt Readings from Last 3 Encounters:

## 2020-12-11 ENCOUNTER — HOSPITAL ENCOUNTER (OUTPATIENT)
Dept: WOUND CARE | Age: 60
Discharge: HOME | End: 2020-12-11
Payer: MEDICARE

## 2020-12-11 VITALS
HEIGHT: 62 IN | BODY MASS INDEX: 53.92 KG/M2 | SYSTOLIC BLOOD PRESSURE: 140 MMHG | DIASTOLIC BLOOD PRESSURE: 76 MMHG | RESPIRATION RATE: 22 BRPM | TEMPERATURE: 98 F | HEART RATE: 100 BPM | WEIGHT: 293 LBS

## 2020-12-11 PROCEDURE — 97597 DBRDMT OPN WND 1ST 20 CM/<: CPT | Performed by: INTERNAL MEDICINE

## 2020-12-11 PROCEDURE — 29581 APPL MULTLAYER CMPRN SYS LEG: CPT

## 2020-12-11 PROCEDURE — 99214 OFFICE O/P EST MOD 30 MIN: CPT

## 2020-12-11 PROCEDURE — 97597 DBRDMT OPN WND 1ST 20 CM/<: CPT

## 2020-12-11 RX ORDER — LIDOCAINE 40 MG/G
CREAM TOPICAL ONCE
Status: DISCONTINUED | OUTPATIENT
Start: 2020-12-11 | End: 2020-12-12 | Stop reason: HOSPADM

## 2020-12-11 ASSESSMENT — PAIN SCALES - GENERAL: PAINLEVEL_OUTOF10: 0

## 2020-12-14 PROBLEM — L97.821 ULCER OF LEFT SHIN LIMITED TO BREAKDOWN OF SKIN (HCC): Status: ACTIVE | Noted: 2020-08-05

## 2020-12-14 NOTE — PROGRESS NOTES
Onset 12/1/20-Wound Width (cm): 0.5 cm    Wound 12/11/20 #2, Left Pretib, Venous, Partial Thickness, Onset 12/1/20-Wound Depth (cm): 0.1 cm    Assessment:     Patient Active Problem List   Diagnosis Code    PCOS (polycystic ovarian syndrome) E28.2    Hyperlipidemia E78.5    Restless legs syndrome (RLS) G25.81    Edema of both legs R60.0    Insomnia G47.00    Vitamin D deficiency E55.9    Morbid obesity with BMI of 50.0-59.9, adult (HCC) E66.01, Z68.43    Urinary incontinence R32    Primary osteoarthritis of both knees M17.0    Severe episode of recurrent major depressive disorder, without psychotic features ([address] F33.2    GERD (gastroesophageal reflux disease) K21.9    RAD (reactive airway disease) J45.909    Hypertension I10    ANTOINETTE on CPAP G47.33, Z99.89    Lumbar stenosis with neurogenic claudication M48.062    HNP (herniated nucleus pulposus), lumbar M51.26    Ulcer of left shin limited to breakdown of skin ([facility] [address] L97.821    Peripheral venous insufficiency I87.2    Allergic rhinitis D17.8    Dysmetabolic syndrome N30.58    Screen for colon cancer Z12.11    Abdominal bloating R14.0       Assessment of today's active condition(s): venous stasis, chronic leg edema, initial left pretib ulcer might have been from minor trauma or minor, localized infection, but now it has reopened spontaneously, more consistent with a typical primary venous leg ulcer; no signs of infection or ischemia; to get her healed, will just focus again on debridement, moisture balance, compression, but in order to keep her healed, she'll need a more consistent approach to long-term compression, and could consider a venous reflux- study and the possibility of a venous ablation procedure. Factors contributing to occurrence and/or persistence of the chronic ulcer include edema, venous stasis, decreased mobility and obesity. Medical necessity of today's visit is shown by the above documentation.  Sharp debridement is indicated today, based upon the exam findings in the wound(s) above. Procedure note:     Consent obtained. Time out performed per [facility] policy. Anesthetic  Anesthetic: 4% Lidocaine Cream     Using a #15 blade scalpel, I sharply debrided the left  lower leg ulcer(s) down through and including the removal of dermis. The type(s) of tissue debrided included fibrin, necrotic/eschar and exudate. Total Surface Area Debrided: 1 sq cm. The ulcers were then irrigated with normal saline solution. The procedure was completed with a small amount of bleeding, and hemostasis was with pressure. The patient tolerated the procedure well, with no significant complications. The patient's level of pain during and after the procedure was monitored. Post-debridement measurements, if different from pre-debridement, are in the flowsheet as well. Discharge plan:     Treatment in the wound care center today, per RN documentation: Wound 12/11/20 #2, Left Pretib, Venous, Partial Thickness, Onset 12/1/20-Dressing/Treatment: Other (comment)(aquaphor purachol ag 4x4's hydrogel accu wrap tensoplast). Per physician order, left application of multilayer compression wrap was performed in the wound care center today, to help manage edema, stasis dermatitis, and/or venous ulcers. Leave primary dressing and multi-layer wrap(s) in place until the next appointment. Also discussed ways to keep the wrap dry for a shower, including a plastic cast-guard, available in retail pharmacies. She should call before her next visit if there is any significant pain, significant strike-through of drainage to the surface of the wrap, or any significant sense of the wrap sliding down more than an inch or so, bunching-up and abrading her skin.      I reminded the patient of the importance of weight management and smoking cessation, if applicable; also encouraged ambulation as tolerated, additional lower extremity exercises as instructed in our education sheet,

## 2020-12-16 ENCOUNTER — HOSPITAL ENCOUNTER (OUTPATIENT)
Dept: WOUND CARE | Age: 60
Discharge: HOME | End: 2020-12-16
Payer: MEDICARE

## 2020-12-16 VITALS
WEIGHT: 293 LBS | RESPIRATION RATE: 22 BRPM | DIASTOLIC BLOOD PRESSURE: 65 MMHG | SYSTOLIC BLOOD PRESSURE: 141 MMHG | BODY MASS INDEX: 53.92 KG/M2 | TEMPERATURE: 98.3 F | HEART RATE: 75 BPM | HEIGHT: 62 IN

## 2020-12-16 PROCEDURE — 29581 APPL MULTLAYER CMPRN SYS LEG: CPT

## 2020-12-16 PROCEDURE — 97597 DBRDMT OPN WND 1ST 20 CM/<: CPT

## 2020-12-16 PROCEDURE — 97597 DBRDMT OPN WND 1ST 20 CM/<: CPT | Performed by: INTERNAL MEDICINE

## 2020-12-16 RX ORDER — LIDOCAINE HYDROCHLORIDE 40 MG/ML
SOLUTION TOPICAL ONCE
Status: DISCONTINUED | OUTPATIENT
Start: 2020-12-16 | End: 2020-12-17 | Stop reason: HOSPADM

## 2020-12-16 ASSESSMENT — PAIN SCALES - GENERAL
PAINLEVEL_OUTOF10: 0
PAINLEVEL_OUTOF10: 0

## 2020-12-16 NOTE — PLAN OF CARE
Wound stable, debridement per MD & pt. Tolerated well. Will stop Aquaphor this week as patient states her skin became super sensitive, tender to touch & had to remove her compression wrap. Otherwise cont. With dressings & compression wrap as ordered. Dr Torsten Rivas discussed need for long term compression & pt. Agreeable to have Dr Noe De Los Santos through [facility]. Reinforced importance of exercise, elevation & compression to help with circulation, edema control & wound healing. F/u in [facility] in 1 week as ordered. Discharge instructions reviewed with patient, all questions answered, copy given to patient. Dressings were applied to all wounds per M.D. Instructions at this visit.

## 2020-12-22 ENCOUNTER — HOSPITAL ENCOUNTER (OUTPATIENT)
Dept: WOUND CARE | Age: 60
Discharge: HOME | End: 2020-12-22
Payer: MEDICARE

## 2020-12-22 VITALS
HEIGHT: 61 IN | HEART RATE: 81 BPM | SYSTOLIC BLOOD PRESSURE: 135 MMHG | BODY MASS INDEX: 55.32 KG/M2 | WEIGHT: 293 LBS | TEMPERATURE: 97.9 F | RESPIRATION RATE: 18 BRPM | DIASTOLIC BLOOD PRESSURE: 76 MMHG

## 2020-12-22 PROCEDURE — 29581 APPL MULTLAYER CMPRN SYS LEG: CPT

## 2020-12-22 PROCEDURE — 97597 DBRDMT OPN WND 1ST 20 CM/<: CPT

## 2020-12-22 PROCEDURE — 97597 DBRDMT OPN WND 1ST 20 CM/<: CPT | Performed by: INTERNAL MEDICINE

## 2020-12-22 RX ORDER — LIDOCAINE 40 MG/G
CREAM TOPICAL ONCE
Status: DISCONTINUED | OUTPATIENT
Start: 2020-12-22 | End: 2020-12-23 | Stop reason: HOSPADM

## 2020-12-22 ASSESSMENT — PAIN SCALES - GENERAL: PAINLEVEL_OUTOF10: 0

## 2020-12-22 NOTE — PLAN OF CARE
Pt seen in [address] [place] [address],[address] - wound closed to healed - debride per Dr. Teresa Yuan - will cont current treatment with vashe, purachol/ hydrogel  and compri 2 for compression - reviewed AVS - f/u in1 week

## 2020-12-22 NOTE — PROGRESS NOTES
[facility] Progress Note    Phuong Dillon     : 1960    DATE OF VISIT:  2020    Subjective: Phuong Dillon is a 61 y.o. female who has a venous ulcer located on the left , anterior lower leg. Significant symptoms or pertinent wound history since last visit: had another problem with her compression wrap this week, felt the need to remove it over the weekend, it sounds like from skin sensitivity to either Aquaphor or the calamine within the wrap, not necessarily the compression of the wrap itself, though she really only felt it over the proximal lower leg, not even right at the ulcer. No F/C/D, no redness, not even really a sensation of pruritus. Modest wound drainage; she did replace the Spandagrip after removing the wrap beneath it. Additional ulcer(s) noted? no.      Her current medication list consists of Calcium-Magnesium-Vitamin D, Fish Oil, VORTIoxetine HBr, bisacodyl, buPROPion, celecoxib, doxepin, ferrous sulfate, fluticasone, furosemide, lamoTRIgine, loratadine, metoprolol succinate, multivitamin, potassium chloride, and rosuvastatin. Allergies: Calamine, Amoxicillin-pot clavulanate, Daypro [oxaprozin], Duravent-da [chlorphen-phenyleph-methscop], Lithium, Norvasc [amlodipine], Nsaids, Seldane [terfenadine], Suprax [cefixime], and Levofloxacin    Objective:     Vitals:    20 1604   BP: (!) 141/65   Pulse: 75   Resp: 22   Temp: 98.3 °F (36.8 °C)   TempSrc: Oral   Weight: (!) 335 lb (152 kg)   Height: 5' 2\" (1.575 m)     AAOx3, overweight, NAD  Intact distal pulses, foot warm, good cap refill  Mild-mod LLE stage 2 lymphedema  No cellulitis, angitis, fluctuance, no acute stasis or contact dermatitis, no allodynia  Katy-ulcer skin: indurated, pink, warm and dry. Ulcer(s): superficial, starting to granulate more, some crusted exudate and fibrinous necrosis, no signs of infection. Photos also saved in electronic chart.     Today's wound in the wound(s) above. Procedure note:     Consent obtained. Time out performed per [facility]. Anesthetic  Anesthetic: 4% Lidocaine Cream     Using a #15 blade scalpel, I sharply debrided the left , anterior lower leg ulcer(s) down through and including the removal of dermis. The type(s) of tissue debrided included fibrin and necrotic/eschar. Total Surface Area Debrided: 1 sq cm. The ulcers were then irrigated with normal saline solution. The procedure was completed with a small amount of bleeding, and hemostasis was with pressure. The patient tolerated the procedure well, with no significant complications. The patient's level of pain during and after the procedure was monitored. Post-debridement measurements, if different from pre-debridement, are in the flowsheet as well. Discharge plan:     Treatment in the wound care center today, per RN documentation: Wound 12/11/20 #2, Left Pretib, Venous, Partial Thickness, Onset 12/1/20-Dressing/Treatment: Other (comment)(Purachol AG, Hydrgoel, 4x4's, Accu Wrap). Per physician order, left application of multilayer compression wrap was performed in the wound care center today, to help manage edema, stasis dermatitis, and/or venous ulcers. Leave primary dressing and multi-layer wrap(s) in place until the next appointment. Also discussed ways to keep the wrap dry for a shower, including a plastic cast-guard, available in retail pharmacies. She should call before her next visit if there is any significant pain, significant strike-through of drainage to the surface of the wrap, or any significant sense of the wrap sliding down more than an inch or so, bunching-up and abrading her skin.      I reminded the patient of the importance of weight management and smoking cessation, if applicable; also encouraged ambulation as tolerated, additional lower extremity exercises as instructed in our education sheet, leg elevation when at rest, and compliance with any recommended dietary, diuretic and compression therapies. Discussed long-term compression options today, and she tells me that her feet really never swell, even when her legs might, so she thought the [place] [address] could be a good option for her; will order one from a local DME company. She's aware that she'll have to cover the 20% that Medicare doesn't pay for. Written discharge instructions given to patient. Follow up in 1 week.     Electronically signed by Ady Au MD on 12/22/2020 at 9:40 AM.

## 2020-12-28 DIAGNOSIS — S29.019S: ICD-10-CM

## 2020-12-28 NOTE — TELEPHONE ENCOUNTER
Last OV 12/4/20  Future Appointments   Date Time Provider Jeremiah Arzate   12/29/2020  1:00 PM Judy Cottrell MD [id number]

## 2020-12-28 NOTE — PROGRESS NOTES
[facility] Progress Note    Lucia Marquez     : 1960    DATE OF VISIT:  2020    Subjective: Lucia Marquez is a 61 y.o. female who has a venous ulcer located on the left , anterior lower leg. Significant symptoms or pertinent wound history since last visit: feeling ok, tolerated the compression wrap better this week than the week prior. No F/C/D, modest drainage. Has not yet heard from [employer] about her compression wrap, but I only ordered it a couple of days ago. Additional ulcer(s) noted? no.      Her current medication list consists of Calcium-Magnesium-Vitamin D, Fish Oil, VORTIoxetine HBr, buPROPion, celecoxib, doxepin, ferrous sulfate, fluticasone, furosemide, lamoTRIgine, loratadine, metoprolol succinate, multivitamin, potassium chloride, and rosuvastatin. Allergies: Calamine, Amoxicillin-pot clavulanate, Daypro [oxaprozin], Duravent-da [chlorphen-phenyleph-methscop], Lithium, Norvasc [amlodipine], Nsaids, Seldane [terfenadine], Suprax [cefixime], and Levofloxacin    Objective:     Vitals:    20 1414   BP: 135/76   Pulse: 81   Resp: 18   Temp: 97.9 °F (36.6 °C)   TempSrc: Oral   Weight: (!) 336 lb (152.4 kg)   Height: 5' 1\" (1.549 m)     AAOx3, overweight, NAD  Contrary to what the suicide screening questions from today indicate, she has NOT had recent thoughts of self-harm, denies a significantly depressed mood today, and does not have a depressed affect  Intact distal pulses, foot warm, good cap refill  Mild LLE edema  No cellulitis, angitis, fluctuance  Katy-ulcer skin: indurated, pink, warm and dry. Ulcer(s): small (not quite as small as the measurements below indicate), superficial, some necrotic skin debris and custed fibrinous exudate, no signs of infection. Photos also saved in electronic chart.     Today's wound measurements, per RN documentation:  Wound 20 #2, Left Pretib, Venous, Partial Thickness, Onset 20-Wound Length (cm): 0.1 cm    Wound 12/11/20 #2, Left Pretib, Venous, Partial Thickness, Onset 12/1/20-Wound Width (cm): 0.1 cm    Wound 12/11/20 #2, Left Pretib, Venous, Partial Thickness, Onset 12/1/20-Wound Depth (cm): 0.1 cm    Assessment:     Patient Active Problem List   Diagnosis Code    PCOS (polycystic ovarian syndrome) E28.2    Hyperlipidemia E78.5    Restless legs syndrome (RLS) G25.81    Edema of both legs R60.0    Insomnia G47.00    Vitamin D deficiency E55.9    Morbid obesity with BMI of 50.0-59.9, adult (HCC) E66.01, Z68.43    Urinary incontinence R32    Primary osteoarthritis of both knees M17.0    Severe episode of recurrent major depressive disorder, without psychotic features ([address] F33.2    GERD (gastroesophageal reflux disease) K21.9    RAD (reactive airway disease) J45.909    Hypertension I10    ANTOINETTE on CPAP G47.33, Z99.89    Lumbar stenosis with neurogenic claudication M48.062    HNP (herniated nucleus pulposus), lumbar M51.26    Ulcer of left shin limited to breakdown of skin ([address] L97.821    Peripheral venous insufficiency I87.2    Allergic rhinitis Y43.4    Dysmetabolic syndrome Q24.03    Screen for colon cancer Z12.11    Abdominal bloating R14.0       Assessment of today's active condition(s): venous stasis, chronic skin changes, edema, recurrent left pretibial venous ulcer, starting to make some progress toward healing again; will benefit from long-term compression this time around; no signs of infection or ischemia. Factors contributing to occurrence and/or persistence of the chronic ulcer include edema, venous stasis, decreased mobility and obesity. Medical necessity of today's visit is shown by the above documentation. Sharp debridement is indicated today, based upon the exam findings in the wound(s) above. Procedure note:     Consent obtained. Time out performed per [facility] policy.     Anesthetic  Anesthetic: 4% Lidocaine Cream     Using a #15 blade scalpel, I sharply debrided the left , anterior lower leg ulcer(s) down through and including the removal of dermis. The type(s) of tissue debrided included fibrin and necrotic/eschar. Total Surface Area Debrided: 1 sq cm. The ulcers were then irrigated with normal saline solution. The procedure was completed with a small amount of bleeding, and hemostasis was with pressure. The patient tolerated the procedure well, with no significant complications. The patient's level of pain during and after the procedure was monitored. Post-debridement measurements, if different from pre-debridement, are in the flowsheet as well. Discharge plan:     Treatment in the wound care center today, per RN documentation: Wound 12/11/20 #2, Left Pretib, Venous, Partial Thickness, Onset 12/1/20-Dressing/Treatment: Other (comment)(Vashe,PuracolAg,Hydrogel,4x4's,AccuWrap,Tensoplast,nylon). Per physician order, left application of multilayer compression wrap was performed in the wound care center today, to help manage edema, stasis dermatitis, and/or venous ulcers. Leave primary dressing and multi-layer wrap(s) in place until the next appointment. Also discussed ways to keep the wrap dry for a shower, including a plastic cast-guard, available in retail pharmacies. She should call before her next visit if there is any significant pain, significant strike-through of drainage to the surface of the wrap, or any significant sense of the wrap sliding down more than an inch or so, bunching-up and abrading her skin. I reminded the patient of the importance of weight management and smoking cessation, if applicable; also encouraged ambulation as tolerated, additional lower extremity exercises as instructed in our education sheet, leg elevation when at rest, and compliance with any recommended dietary, diuretic and compression therapies. Written discharge instructions given to patient. Follow up in 1 week.     Electronically signed by Ricardo Reyna MD on 12/27/2020 at 9:02 PM.

## 2020-12-29 ENCOUNTER — HOSPITAL ENCOUNTER (OUTPATIENT)
Dept: WOUND CARE | Age: 60
Discharge: HOME | End: 2020-12-29
Payer: MEDICARE

## 2020-12-29 VITALS
TEMPERATURE: 97.5 F | DIASTOLIC BLOOD PRESSURE: 78 MMHG | SYSTOLIC BLOOD PRESSURE: 136 MMHG | BODY MASS INDEX: 55.32 KG/M2 | HEIGHT: 61 IN | HEART RATE: 72 BPM | RESPIRATION RATE: 22 BRPM | WEIGHT: 293 LBS

## 2020-12-29 PROCEDURE — 29581 APPL MULTLAYER CMPRN SYS LEG: CPT

## 2020-12-29 PROCEDURE — 29581 APPL MULTLAYER CMPRN SYS LEG: CPT | Performed by: INTERNAL MEDICINE

## 2020-12-29 RX ORDER — LIDOCAINE 40 MG/G
CREAM TOPICAL ONCE
Status: DISCONTINUED | OUTPATIENT
Start: 2020-12-29 | End: 2020-12-30 | Stop reason: HOSPADM

## 2020-12-29 ASSESSMENT — PAIN SCALES - GENERAL: PAINLEVEL_OUTOF10: 0

## 2020-12-29 NOTE — PROGRESS NOTES
[facility] Progress Note    Nba Khan     : 1960    DATE OF VISIT:  2020    Subjective: Nba Khan is a 61 y.o. female who has a venous ulcer located on the left , anterior lower leg. Significant symptoms or pertinent wound history since last visit: she noticed that her wrap slid down a bit, the last couple of days -- mild discomfort from that, but nothing major. No F/C/D, no skin irritation like she had with the calamine wrap. No F/C/D. Barely any drainage now (wound has dried out). I ordered her longer-term Velcro wrap back on the , but she's not received it yet, and I've not heard back from [employer] about the order. Additional ulcer(s) noted? no.      Her current medication list consists of Calcium-Magnesium-Vitamin D, Fish Oil, VORTIoxetine HBr, buPROPion, celecoxib, doxepin, ferrous sulfate, fluticasone, furosemide, lamoTRIgine, loratadine, metoprolol succinate, multivitamin, potassium chloride, and rosuvastatin. Allergies: Calamine, Amoxicillin-pot clavulanate, Daypro [oxaprozin], Duravent-da [chlorphen-phenyleph-methscop], Lithium, Norvasc [amlodipine], Nsaids, Seldane [terfenadine], Suprax [cefixime], and Levofloxacin    Objective:     Vitals:    20 1259   BP: 136/78   Pulse: 72   Resp: 22   Temp: 97.5 °F (36.4 °C)   TempSrc: Oral   Weight: (!) 331 lb 3.2 oz (150.2 kg)   Height: 5' 1\" (1.549 m)     AAOx3, overweight, NAD  Affect is appropriate, not depressed; denies active depressive symptoms or suicidal ideation - please disregard answers that are charted in the suicide-risk screening by our nurses today  Intact left distal pulses, foot warm, good cap refill  Mild LLE edema  No cellulitis, angitis, fluctuance; no contact dermatitis from dressing or wrap  Katy-ulcer skin: dry, mild reactive erythema and mild induration.   Ulcer(s): a bit smaller, dry, crusted exudate and some fibrin -- loosened up after some time with topical lidocaine, easily removed with gauze, underlying wound bed a bit smaller (please disregard the 0.1 x 0.1 x 0.1 cm wound size last week; that was incorrect), pink-red, granular, clean, no signs of infection. Photos also saved in electronic chart. Today's Wound Measurements, per RN documentation:  Wound 12/11/20 #2, Left Pretib, Venous, Partial Thickness, Onset 12/1/20-Wound Length (cm): 0.6 cm(MD to measure, scab present)    Wound 12/11/20 #2, Left Pretib, Venous, Partial Thickness, Onset 12/1/20-Wound Width (cm): 0.3 cm    Wound 12/11/20 #2, Left Pretib, Venous, Partial Thickness, Onset 12/1/20-Wound Depth (cm): 0.1 cm    Assessment:     Patient Active Problem List   Diagnosis Code    PCOS (polycystic ovarian syndrome) E28.2    Hyperlipidemia E78.5    Restless legs syndrome (RLS) G25.81    Edema of both legs R60.0    Insomnia G47.00    Vitamin D deficiency E55.9    Morbid obesity with BMI of 50.0-59.9, adult ([facility]) E66.01, Z68.43    Urinary incontinence R32    Primary osteoarthritis of both knees M17.0    Severe episode of recurrent major depressive disorder, without psychotic features ([address] F33.2    GERD (gastroesophageal reflux disease) K21.9    RAD (reactive airway disease) J45.909    Hypertension I10    ANTOINETTE on CPAP G47.33, Z99.89    Lumbar stenosis with neurogenic claudication M48.062    HNP (herniated nucleus pulposus), lumbar M51.26    Ulcer of left shin limited to breakdown of skin [address] [address] L97.821    Peripheral venous insufficiency I87.2    Allergic rhinitis Y93.1    Dysmetabolic syndrome L84.66    Screen for colon cancer Z12.11    Abdominal bloating R14.0       Assessment of today's active condition(s): venous stasis, chronic leg edema, recurrent left pretibial venous ulcer, making some progress toward healing with simple debridement, dressings and compression, but need to get the wound bed a bit more moist; no signs of infection or ischemia.  Factors contributing to occurrence and/or persistence of the chronic ulcer include edema, venous stasis, decreased mobility and obesity. Medical necessity of today's visit is shown by the above documentation. Sharp debridement is not indicated today, based upon the exam findings in the wound(s) above. She does have an indication for a weekly compression wrap. Procedure note:     Consent obtained. Time out performed per [facility]. Anesthetic  Anesthetic: 4% Lidocaine Cream     After cleansing of the wounds with saline and applying skin-care and/or dressing materials as listed below, Left unilateral application of a multi-layer compression wrap was performed per physician order, to help manage edema, stasis dermatitis, and/or venous ulcers. The patient's level of pain during and after the procedure was monitored, and is noted in the wound documentation flowsheet. Discharge plan:     Treatment in the wound care center today, per RN documentation: Vashe, collagen, hydrogel, Adaptic. Leave primary dressing and multi-layer wrap(s) in place until the next appointment. She should call before her next visit if there is any significant pain, significant strike-through of drainage to the surface of the wrap, or any significant sense of the wrap sliding down more than an inch or so, bunching-up and abrading her skin. I reminded the patient of the importance of weight management and smoking cessation, if applicable; also encouraged ambulation as tolerated, additional lower extremity exercises as instructed in our education sheet, leg elevation when at rest, and compliance with any recommended dietary, diuretic and compression therapies. I'll reach out to [employer] and see what the situation is with the [facility] Alexandra that I ordered. Written discharge instructions given to patient. Follow up in 1 week.     Electronically signed by Lucia Han MD on 12/29/2020 at 1:52 PM.

## 2021-01-04 ENCOUNTER — HOSPITAL ENCOUNTER (OUTPATIENT)
Dept: WOUND CARE | Age: 61
Discharge: HOME | End: 2021-01-04
Payer: MEDICARE

## 2021-01-04 VITALS
RESPIRATION RATE: 18 BRPM | TEMPERATURE: 98.1 F | HEART RATE: 78 BPM | SYSTOLIC BLOOD PRESSURE: 135 MMHG | DIASTOLIC BLOOD PRESSURE: 84 MMHG

## 2021-01-04 PROCEDURE — 29581 APPL MULTLAYER CMPRN SYS LEG: CPT

## 2021-01-04 RX ORDER — BUPROPION HYDROCHLORIDE 150 MG/1
TABLET ORAL
Qty: 90 TABLET | Refills: 1 | Status: SHIPPED | OUTPATIENT
Start: 2021-01-04 | End: 2021-04-28 | Stop reason: SDUPTHER

## 2021-01-04 RX ORDER — CELECOXIB 100 MG/1
CAPSULE ORAL
Qty: 180 CAPSULE | Refills: 1 | Status: SHIPPED | OUTPATIENT
Start: 2021-01-04 | End: 2021-03-10

## 2021-01-04 RX ORDER — LAMOTRIGINE 200 MG/1
TABLET ORAL
Qty: 90 TABLET | Refills: 1 | Status: SHIPPED | OUTPATIENT
Start: 2021-01-04 | End: 2021-04-28

## 2021-01-04 RX ORDER — ROSUVASTATIN CALCIUM 5 MG/1
TABLET, COATED ORAL
Qty: 90 TABLET | Refills: 1 | Status: SHIPPED | OUTPATIENT
Start: 2021-01-04 | End: 2021-04-28 | Stop reason: SDUPTHER

## 2021-01-04 ASSESSMENT — PAIN SCALES - GENERAL: PAINLEVEL_OUTOF10: 0

## 2021-01-04 NOTE — PLAN OF CARE
Pt seen in [facility] - c/o wrap fell - here for Wound reassessment - wound healed - cont current treatment per AVS - used ABD pads to build up ankles - cont compri 2 - reviewed AVS - f/u Friday

## 2021-01-08 ENCOUNTER — HOSPITAL ENCOUNTER (OUTPATIENT)
Dept: WOUND CARE | Age: 61
Discharge: HOME | End: 2021-01-08
Payer: MEDICARE

## 2021-01-08 VITALS
HEART RATE: 92 BPM | WEIGHT: 293 LBS | HEIGHT: 61 IN | SYSTOLIC BLOOD PRESSURE: 148 MMHG | DIASTOLIC BLOOD PRESSURE: 93 MMHG | RESPIRATION RATE: 24 BRPM | BODY MASS INDEX: 55.32 KG/M2 | TEMPERATURE: 98 F

## 2021-01-08 PROBLEM — Z12.11 SCREEN FOR COLON CANCER: Status: RESOLVED | Noted: 2020-12-09 | Resolved: 2021-01-08

## 2021-01-08 PROCEDURE — 99212 OFFICE O/P EST SF 10 MIN: CPT

## 2021-01-08 PROCEDURE — 99212 OFFICE O/P EST SF 10 MIN: CPT | Performed by: INTERNAL MEDICINE

## 2021-01-08 ASSESSMENT — PAIN SCALES - GENERAL: PAINLEVEL_OUTOF10: 0

## 2021-01-08 NOTE — PLAN OF CARE
Patient presents as newly healed. Will apply protective dressing and given demonstration/instructions on applying [address] today which patient brought with her. Also gave patient handout for [employer] so she can order replacements when needed. Discharge instructions reviewed with patient, all questions answered, copy given to patient. Dressings were applied to all wounds per M.D. Instructions at this visit.

## 2021-01-11 NOTE — PROGRESS NOTES
[address] [facility] Progress Note    Abdullahi Recinosr     : 1960    DATE OF VISIT:  2021    Subjective: Abdullahi Recinosr is a 61 y.o. female who has a venous ulcer located on the left , anterior lower leg. Current complaint of pain in this ulcer? yes. Quality of pain: aching and sharp  Timing: intermittent and stable  Severity: mild  Associated Signs/Symptoms: swelling  Other significant symptoms or pertinent ulcer history: feeling well overall, mild irritation from compression wrap, stable swelling, no fever, no drainage. Received her Velcro compression wrap in the mail this week. Additional ulcer(s) noted? no.      Ms. Tammy Chew has a past medical history of ADHD (attention deficit hyperactivity disorder), Bipolar disorder, Borderline osteopenia, Cellulitis of left leg, GERD (gastroesophageal reflux disease), Headache(784.0), HNP (herniated nucleus pulposus), lumbar, Intention tremor, Iron deficiency anemia, Lateral meniscal tear, Lumbar stenosis with neurogenic claudication, Medial meniscus tear, Prolonged emergence from general anesthesia, initial encounter, Tobacco use, and Venous ulcer of left leg ([address] She has a past surgical history that includes knee surgery (Left);  section; Abdominal exploration surgery; Gastric bypass surgery; Tonsillectomy; knee surgery (Right, 10/28/2015); Lumbar spine surgery (N/A, 2019); and Carpal tunnel release (Bilateral). Her family history includes Depression in her sister; Diabetes in her father and mother; Heart Disease in her father and mother; Mental Illness in her sister. Ms. Tammy Chew reports that she quit smoking about 23 years ago. Her smoking use included cigarettes. She has a 31.50 pack-year smoking history. She has never used smokeless tobacco. She reports that she does not drink alcohol or use drugs.     Her current medication list consists of Calcium-Magnesium-Vitamin D, Fish Oil, VORTIoxetine HBr, buPROPion, celecoxib, doxepin, ferrous sulfate, fluticasone, furosemide, lamoTRIgine, loratadine, metoprolol succinate, multivitamin, potassium chloride, and rosuvastatin. Allergies: Calamine, Amoxicillin-pot clavulanate, Daypro [oxaprozin], Duravent-da [chlorphen-phenyleph-methscop], Lithium, Norvasc [amlodipine], Nsaids, Seldane [terfenadine], Suprax [cefixime], and Levofloxacin    Pertinent items from the review of systems are discussed in the HPI; the remainder of the ROS was reviewed and is negative. Objective:     Vitals:    01/08/21 1121 01/08/21 1129   BP:  (!) 148/93   Pulse:  92   Resp: 24    Temp: 98 °F (36.7 °C)    TempSrc: Oral    Weight: (!) 333 lb 3.2 oz (151.1 kg)    Height: 5' 1\" (1.549 m)        Constitutional:  well-developed, well-nourished, overweight, NAD  Psychiatric:  oriented to person, place and time; mood and affect appropriate for the situation   Eyes:  pupils equal, round and reactive to light; sclerae anicteric, conjunctivae not pale  Cardiovascular:  bilateral pedal pulses palpable; milder left lower extremity edema  Lymphatic:  no inguinal or popliteal adenopathy, no angitis or cellulitis  Musculoskeletal:  no clubbing, cyanosis or petechiae; RLE and LLE with no gross effusions, joint misalignment or acute arthritis  Katy-ulcer skin: healthy, warm and pink. Ulcer(s): delicately healed. Photos also saved in electronic chart.     Today's Wound Measurements, per RN documentation:  [REMOVED] Wound 12/11/20 #2, Left Pretib, Venous, Partial Thickness, Onset 12/1/20-Wound Length (cm): 0 cm    [REMOVED] Wound 12/11/20 #2, Left Pretib, Venous, Partial Thickness, Onset 12/1/20-Wound Width (cm): 0 cm    [REMOVED] Wound 12/11/20 #2, Left Pretib, Venous, Partial Thickness, Onset 12/1/20-Wound Depth (cm): 0 cm  ______________________________    Lab Results   Component Value Date    LABALBU 4.4 10/17/2019     Lab Results   Component Value Date    CREATININE 0.7 09/11/2020 Lab Results   Component Value Date    HGB 14.0 05/14/2020     Lab Results   Component Value Date    LABA1C 5.9 08/05/2020     Assessment:     Patient Active Problem List   Diagnosis Code    PCOS (polycystic ovarian syndrome) E28.2    Hyperlipidemia E78.5    Restless legs syndrome (RLS) G25.81    Edema of both legs R60.0    Insomnia G47.00    Vitamin D deficiency E55.9    Morbid obesity with BMI of 50.0-59.9, adult (HCC) E66.01, Z68.43    Urinary incontinence R32    Primary osteoarthritis of both knees M17.0    Severe episode of recurrent major depressive disorder, without psychotic features ([address] F33.2    GERD (gastroesophageal reflux disease) K21.9    RAD (reactive airway disease) J45.909    Hypertension I10    ANTOINETTE on CPAP G47.33, Z99.89    Lumbar stenosis with neurogenic claudication M48.062    HNP (herniated nucleus pulposus), lumbar M51.26    Ulcer of left shin limited to breakdown of skin ([facility] L97.821    Peripheral venous insufficiency I87.2    Allergic rhinitis N22.0    Dysmetabolic syndrome W68.25    Abdominal bloating R14.0       Assessment of today's active condition(s): venous stasis, chronic leg edema, recurrent left pretibial ulcer -- not sure how the first one occurred (minor trauma, minor infection, or spontaneous?), but the recurrence definitely seemed spontaneous and related to her chronic swelling, so will plan a longer course of compression therapy now. Factors contributing to occurrence and/or persistence of the chronic ulcer include venous stasis, lymphedema and obesity. Medical necessity of today's visit is shown by the above documentation. Sharp debridement is not indicated today, based upon the exam findings in the wound(s) above. Discharge plan:     Treatment in the wound care center today, per RN documentation: just a plain Mepilex border as a bit of secondary prevention against wound recurrence.     Leave that dressing in place and keep dry for about a week, then remove. I reminded the patient of the importance of weight management and smoking cessation, if applicable; also encouraged ambulation as tolerated, additional lower extremity exercises as instructed in our education sheet, leg elevation when at rest, and compliance with any recommended dietary, diuretic and compression therapies. She has her [address] now. Reviewed instructions for daily use. Since she doesn't really get much pedal edema at all, she can either use a Solaris liner beneath the Velcro component, or could purchase a pair of soccer socks, or something similar, just to have a comfortable layer of fabric between the garment and her skin. Would wear faithfully every day for now. After a few months, she can either choose to continue to wear this long-term, especially if it feels supportive to her, or she can start to try to \"wean herself off\", and see how she does. If she does end up wearing the [address] for the long-term, I gave her some papers to help her to order some replacements from an . Written discharge instructions given to patient. Follow up here PRN.     Electronically signed by John Ponce MD on 1/11/2021 at 2:31 PM.

## 2021-01-12 ENCOUNTER — OFFICE VISIT (OUTPATIENT)
Dept: PRIMARY CARE CLINIC | Age: 61
End: 2021-01-12
Payer: MEDICARE

## 2021-01-12 DIAGNOSIS — Z01.818 PREOP TESTING: Primary | ICD-10-CM

## 2021-01-12 PROCEDURE — G8428 CUR MEDS NOT DOCUMENT: HCPCS | Performed by: NURSE PRACTITIONER

## 2021-01-12 PROCEDURE — 99211 OFF/OP EST MAY X REQ PHY/QHP: CPT | Performed by: NURSE PRACTITIONER

## 2021-01-12 PROCEDURE — G8417 CALC BMI ABV UP PARAM F/U: HCPCS | Performed by: NURSE PRACTITIONER

## 2021-01-12 NOTE — PROGRESS NOTES
Clifford Mojica received a viral test for COVID-19. They were educated on isolation and quarantine as appropriate. For any symptoms, they were directed to seek care from their PCP, given contact information to establish with a doctor, directed to an urgent care or the emergency room.

## 2021-01-12 NOTE — PATIENT INSTRUCTIONS

## 2021-01-13 LAB — SARS-COV-2: NOT DETECTED

## 2021-01-18 ENCOUNTER — ANESTHESIA (OUTPATIENT)
Dept: ENDOSCOPY | Age: 61
End: 2021-01-18
Payer: MEDICARE

## 2021-01-18 ENCOUNTER — ANESTHESIA EVENT (OUTPATIENT)
Dept: ENDOSCOPY | Age: 61
End: 2021-01-18
Payer: MEDICARE

## 2021-01-18 DIAGNOSIS — Z01.818 PREOP TESTING: Primary | ICD-10-CM

## 2021-01-18 RX ORDER — BISACODYL 5 MG
TABLET, DELAYED RELEASE (ENTERIC COATED) ORAL
Qty: 4 TABLET | Refills: 0 | Status: ON HOLD | OUTPATIENT
Start: 2021-01-18 | End: 2021-02-02 | Stop reason: ALTCHOICE

## 2021-01-18 RX ORDER — POLYETHYLENE GLYCOL 1450
POWDER (GRAM) MISCELLANEOUS
Qty: 238 G | Refills: 0 | Status: ON HOLD | OUTPATIENT
Start: 2021-01-18 | End: 2021-02-02 | Stop reason: ALTCHOICE

## 2021-01-18 NOTE — TELEPHONE ENCOUNTER
Patient states she messed her prep up. She called this AM to reschedule. Didn't want to have to redo the colonoscopy so she R/s to 2/2/21 @ 130. Endo aware.  Patient aware to get another covid test.

## 2021-01-27 ENCOUNTER — OFFICE VISIT (OUTPATIENT)
Dept: PRIMARY CARE CLINIC | Age: 61
End: 2021-01-27
Payer: MEDICARE

## 2021-01-27 DIAGNOSIS — Z01.818 PREOP TESTING: Primary | ICD-10-CM

## 2021-01-27 PROCEDURE — 99211 OFF/OP EST MAY X REQ PHY/QHP: CPT | Performed by: NURSE PRACTITIONER

## 2021-01-27 PROCEDURE — G8417 CALC BMI ABV UP PARAM F/U: HCPCS | Performed by: NURSE PRACTITIONER

## 2021-01-27 PROCEDURE — G8428 CUR MEDS NOT DOCUMENT: HCPCS | Performed by: NURSE PRACTITIONER

## 2021-01-27 NOTE — PATIENT INSTRUCTIONS

## 2021-01-28 LAB — SARS-COV-2: NOT DETECTED

## 2021-01-31 NOTE — ANESTHESIA PRE PROCEDURE
Department of Anesthesiology  Preprocedure Note       Name:  Von Officer   Age:  61 y.o.  :  1960                                          MRN:  [id number]         Date:  2021      Surgeon: Elder Quick):  Shad Todd MD    Procedure: Procedure(s):  COLON W/ANES. (13:30)    Medications prior to admission:   Prior to Admission medications    Medication Sig Start Date End Date Taking? Authorizing Provider   bisacodyl (BISACODYL) 5 MG EC tablet Take all 4 tablets day before colonoscopy 21   Shad Todd MD   Polyethylene Glycol POWD Use as directed for Colonoscopy prep.  21   Shad Todd MD   rosuvastatin (CRESTOR) 5 MG tablet TAKE 1 TABLET EVERY DAY 21   MADINA Thorpe CNP   buPROPion (WELLBUTRIN XL) 150 MG extended release tablet TAKE 1 TABLET EVERY DAY 21   MADINA Thorpe CNP   celecoxib (CELEBREX) 100 MG capsule TAKE 1 CAPSULE TWICE DAILY 21   MADINA Thorpe CNP   lamoTRIgine (LAMICTAL) 200 MG tablet TAKE 1/2 TABLET TWICE DAILY 21   MADINA Thorpe CNP   metoprolol succinate (TOPROL XL) 50 MG extended release tablet TAKE 1 TABLET EVERY DAY 20   MADINA Thorpe CNP   doxepin (SINEQUAN) 10 MG capsule Take 1 capsule by mouth nightly 20   MADINA Thorpe CNP   VORTIoxetine HBr (TRINTELLIX) 20 MG TABS tablet TAKE 1 TABLET EVERY DAY 10/13/20   MADINA Thorpe CNP   furosemide (LASIX) 20 MG tablet TAKE 1 TABLET EVERY DAY 10/13/20   MADINA Thorpe CNP   loratadine (CLARITIN) 10 MG tablet Take 10 mg by mouth daily    Historical Provider, MD   fluticasone (FLONASE) 50 MCG/ACT nasal spray USE 2 SPRAYS IN EACH NOSTRIL EVERY DAY 7/10/20   MADINA Thorpe CNP   potassium chloride (KLOR-CON) 10 MEQ extended release tablet Take 1 tablet by mouth daily 20   MADINA Thorpe CNP ferrous sulfate 325 (65 Fe) MG tablet Take 1 tablet by mouth daily (with breakfast) 2/5/20   Jake Andersen APRN - RADHA   CALCIUM-MAGNESIUM-VITAMIN D PO Take 2 capsules by mouth nightly    Historical Provider, MD   multivitamin [facility]) per tablet Take 1 tablet by mouth daily. Historical Provider, MD   Omega-3 Fatty Acids (FISH OIL) 1200 MG CAPS Take  by mouth daily. Historical Provider, MD       Current medications:    No current facility-administered medications for this encounter. Current Outpatient Medications   Medication Sig Dispense Refill    bisacodyl (BISACODYL) 5 MG EC tablet Take all 4 tablets day before colonoscopy 4 tablet 0    Polyethylene Glycol POWD Use as directed for Colonoscopy prep. 238 g 0    rosuvastatin (CRESTOR) 5 MG tablet TAKE 1 TABLET EVERY DAY 90 tablet 1    buPROPion (WELLBUTRIN XL) 150 MG extended release tablet TAKE 1 TABLET EVERY DAY 90 tablet 1    celecoxib (CELEBREX) 100 MG capsule TAKE 1 CAPSULE TWICE DAILY 180 capsule 1    lamoTRIgine (LAMICTAL) 200 MG tablet TAKE 1/2 TABLET TWICE DAILY 90 tablet 1    metoprolol succinate (TOPROL XL) 50 MG extended release tablet TAKE 1 TABLET EVERY DAY 90 tablet 1    doxepin (SINEQUAN) 10 MG capsule Take 1 capsule by mouth nightly 30 capsule 0    VORTIoxetine HBr (TRINTELLIX) 20 MG TABS tablet TAKE 1 TABLET EVERY DAY 90 tablet 1    furosemide (LASIX) 20 MG tablet TAKE 1 TABLET EVERY DAY 90 tablet 1    loratadine (CLARITIN) 10 MG tablet Take 10 mg by mouth daily      fluticasone (FLONASE) 50 MCG/ACT nasal spray USE 2 SPRAYS IN EACH NOSTRIL EVERY DAY 48 g 1    potassium chloride (KLOR-CON) 10 MEQ extended release tablet Take 1 tablet by mouth daily 90 tablet 3    ferrous sulfate 325 (65 Fe) MG tablet Take 1 tablet by mouth daily (with breakfast) 90 tablet 3    CALCIUM-MAGNESIUM-VITAMIN D PO Take 2 capsules by mouth nightly      multivitamin (THERAGRAN) per tablet Take 1 tablet by mouth daily.  Omega-3 Fatty Acids (FISH OIL) 1200 MG CAPS Take  by mouth daily. Allergies:     Allergies   Allergen Reactions    Calamine Other (See Comments)     Leaves skin tender and burning     Amoxicillin-Pot Clavulanate Hives    Daypro [Oxaprozin] Hives    Duravent-Da [Chlorphen-Phenyleph-Methscop] Hives    Lithium      Caused shakes    Norvasc [Amlodipine]      LE edema      Nsaids      Avoid d/t gastric bypass    Seldane [Terfenadine] Hives    Suprax [Cefixime] Hives    Levofloxacin Rash       Problem List:    Patient Active Problem List   Diagnosis Code    PCOS (polycystic ovarian syndrome) E28.2    Hyperlipidemia E78.5    Restless legs syndrome (RLS) G25.81    Edema of both legs R60.0    Insomnia G47.00    Vitamin D deficiency E55.9    Morbid obesity with BMI of 50.0-59.9, adult ([facility]) E66.01, Z68.43    Urinary incontinence R32    Primary osteoarthritis of both knees M17.0    Severe episode of recurrent major depressive disorder, without psychotic features ([facility]) F33.2    GERD (gastroesophageal reflux disease) K21.9    RAD (reactive airway disease) J45.909    Hypertension I10    ANTOINETTE on CPAP G47.33, Z99.89    Lumbar stenosis with neurogenic claudication M48.062    HNP (herniated nucleus pulposus), lumbar M51.26    Ulcer of left shin limited to breakdown of skin ([facility]) L97.821    Peripheral venous insufficiency I87.2    Allergic rhinitis D41.6    Dysmetabolic syndrome V92.17    Abdominal bloating R14.0       Past Medical History:        Diagnosis Date    ADHD (attention deficit hyperactivity disorder)     Bipolar disorder     Borderline osteopenia     Cellulitis of left leg 8/5/2020    GERD (gastroesophageal reflux disease)     Headache(784.0)     HNP (herniated nucleus pulposus), lumbar 6/6/2019    Intention tremor     due to lithium    Iron deficiency anemia 11/4/2019    Lateral meniscal tear 10/14/2015    Lumbar stenosis with neurogenic claudication 6/6/2019 GFRAA >60 09/11/2020    GFRAA >60 03/04/2013    AGRATIO 1.9 10/17/2019    LABGLOM >60 09/11/2020    GLUCOSE 113 09/11/2020    PROT 6.7 10/17/2019    PROT 6.5 03/04/2013    CALCIUM 9.3 09/11/2020    BILITOT 0.3 10/17/2019    ALKPHOS 127 10/17/2019    AST 16 10/17/2019    ALT 20 10/17/2019       POC Tests: No results for input(s): POCGLU, POCNA, POCK, POCCL, POCBUN, POCHEMO, POCHCT in the last 72 hours. Coags: No results found for: PROTIME, INR, APTT    HCG (If Applicable): No results found for: PREGTESTUR, PREGSERUM, HCG, HCGQUANT     ABGs: No results found for: PHART, PO2ART, LBJ4FCN, HGB4PWT, BEART, O3TDYFVB     Type & Screen (If Applicable):  No results found for: LABABO, LABRH    Drug/Infectious Status (If Applicable):  No results found for: HIV, HEPCAB    COVID-19 Screening (If Applicable):   Lab Results   Component Value Date    COVID19 Not Detected 01/27/2021         Anesthesia Evaluation  Patient summary reviewed and Nursing notes reviewed no history of anesthetic complications:   Airway: Mallampati: III     Neck ROM: full   Dental:          Pulmonary:Negative Pulmonary ROS and normal exam    (+) sleep apnea:                             Cardiovascular:Negative CV ROS    (+) hypertension:, hyperlipidemia                  Neuro/Psych:   Negative Neuro/Psych ROS  (+) neuromuscular disease (RLS):, headaches:, psychiatric history:            GI/Hepatic/Renal: Neg GI/Hepatic/Renal ROS  (+) GERD: well controlled,      (-) hiatal hernia       Endo/Other: Negative Endo/Other ROS   (+) : arthritis:., .                 Abdominal:           Vascular:                                      Anesthesia Plan      general     ASA 4     (I discussed with the patient the risks and benefits of PIV, general anesthesia, IV Narcotics, PACU. All questions were answered the patient agrees with the plan and wishes to proceed.  )  Induction: intravenous. Pre-Operative Diagnosis: Special screening for malignant neoplasms, colon [Z12.11]    61 y.o.   BMI:  Body mass index is 61.09 kg/m².      Vitals:    21 1548 21 1251   BP:  (!) 157/77   Pulse:  97   Resp:  20   Temp:  97.8 °F (36.6 °C)   TempSrc:  Temporal   SpO2:  93%   Weight: (!) 334 lb (151.5 kg)    Height: 5' 2\" (1.575 m)        Allergies   Allergen Reactions    Calamine Other (See Comments)     Leaves skin tender and burning     Amoxicillin-Pot Clavulanate Hives    Daypro [Oxaprozin] Hives    Duravent-Da [Chlorphen-Phenyleph-Methscop] Hives    Lithium      Caused shakes    Norvasc [Amlodipine]      LE edema      Nsaids      Avoid d/t gastric bypass    Seldane [Terfenadine] Hives    Suprax [Cefixime] Hives    Levofloxacin Rash       Social History     Tobacco Use    Smoking status: Former Smoker     Packs/day: 1.50     Years: 21.00     Pack years: 31.50     Types: Cigarettes     Quit date: 1997     Years since quittin.9    Smokeless tobacco: Never Used   Substance Use Topics    Alcohol use: No       LABS:    CBC  Lab Results   Component Value Date/Time    WBC 8.8 2020 09:24 PM    HGB 14.0 2020 09:24 PM    HCT 43.4 2020 09:24 PM     2020 09:24 PM     RENAL  Lab Results   Component Value Date/Time     2020 09:23 AM    K 4.6 2020 09:23 AM     2020 09:23 AM    CO2 29 2020 09:23 AM    BUN 19 2020 09:23 AM    CREATININE 0.7 2020 09:23 AM    GLUCOSE 113 (H) 2020 09:23 AM     COAGS  No results found for: PROTIME, INR, APTT      Brittany Huitron MD   2021

## 2021-02-01 NOTE — PROGRESS NOTES
PAT call for Endoscopy procedure scheduled for 2/2/2021. Spoke with pt regarding arrival time of 1230, NPO after am dose prep completed and must have a  for procedure. Pt instructed to call physician if any questions or concerns prior to procedure, phone number provided. Pt verbalized understanding, no further questions at present time.

## 2021-02-02 ENCOUNTER — HOSPITAL ENCOUNTER (OUTPATIENT)
Age: 61
Setting detail: OUTPATIENT SURGERY
Discharge: HOME | End: 2021-02-02
Attending: INTERNAL MEDICINE | Admitting: INTERNAL MEDICINE
Payer: MEDICARE

## 2021-02-02 VITALS
SYSTOLIC BLOOD PRESSURE: 124 MMHG | RESPIRATION RATE: 20 BRPM | HEART RATE: 91 BPM | TEMPERATURE: 97.8 F | DIASTOLIC BLOOD PRESSURE: 50 MMHG | HEIGHT: 62 IN | OXYGEN SATURATION: 95 % | WEIGHT: 293 LBS | BODY MASS INDEX: 53.92 KG/M2

## 2021-02-02 VITALS
OXYGEN SATURATION: 99 % | DIASTOLIC BLOOD PRESSURE: 69 MMHG | RESPIRATION RATE: 16 BRPM | SYSTOLIC BLOOD PRESSURE: 146 MMHG

## 2021-02-02 DIAGNOSIS — Z12.11 SPECIAL SCREENING FOR MALIGNANT NEOPLASMS, COLON: ICD-10-CM

## 2021-02-02 PROCEDURE — 3700000001 HC ADD 15 MINUTES (ANESTHESIA): Performed by: INTERNAL MEDICINE

## 2021-02-02 PROCEDURE — 45385 COLONOSCOPY W/LESION REMOVAL: CPT | Performed by: INTERNAL MEDICINE

## 2021-02-02 PROCEDURE — 2580000003 HC RX 258: Performed by: ANESTHESIOLOGY

## 2021-02-02 PROCEDURE — 3609010600 HC COLONOSCOPY POLYPECTOMY SNARE/COLD BIOPSY: Performed by: INTERNAL MEDICINE

## 2021-02-02 PROCEDURE — 88305 TISSUE EXAM BY PATHOLOGIST: CPT

## 2021-02-02 PROCEDURE — 7100000011 HC PHASE II RECOVERY - ADDTL 15 MIN: Performed by: INTERNAL MEDICINE

## 2021-02-02 PROCEDURE — 7100000010 HC PHASE II RECOVERY - FIRST 15 MIN: Performed by: INTERNAL MEDICINE

## 2021-02-02 PROCEDURE — 2709999900 HC NON-CHARGEABLE SUPPLY: Performed by: INTERNAL MEDICINE

## 2021-02-02 PROCEDURE — 3700000000 HC ANESTHESIA ATTENDED CARE: Performed by: INTERNAL MEDICINE

## 2021-02-02 PROCEDURE — 6360000002 HC RX W HCPCS: Performed by: NURSE ANESTHETIST, CERTIFIED REGISTERED

## 2021-02-02 RX ORDER — SODIUM CHLORIDE, SODIUM LACTATE, POTASSIUM CHLORIDE, CALCIUM CHLORIDE 600; 310; 30; 20 MG/100ML; MG/100ML; MG/100ML; MG/100ML
INJECTION, SOLUTION INTRAVENOUS CONTINUOUS
Status: DISCONTINUED | OUTPATIENT
Start: 2021-02-02 | End: 2021-02-02 | Stop reason: HOSPADM

## 2021-02-02 RX ORDER — SODIUM CHLORIDE, SODIUM LACTATE, POTASSIUM CHLORIDE, CALCIUM CHLORIDE 600; 310; 30; 20 MG/100ML; MG/100ML; MG/100ML; MG/100ML
INJECTION, SOLUTION INTRAVENOUS CONTINUOUS
Status: DISCONTINUED | OUTPATIENT
Start: 2021-02-02 | End: 2021-02-02 | Stop reason: SDUPTHER

## 2021-02-02 RX ORDER — SODIUM CHLORIDE 0.9 % (FLUSH) 0.9 %
10 SYRINGE (ML) INJECTION PRN
Status: DISCONTINUED | OUTPATIENT
Start: 2021-02-02 | End: 2021-02-02 | Stop reason: HOSPADM

## 2021-02-02 RX ORDER — PROPOFOL 10 MG/ML
INJECTION, EMULSION INTRAVENOUS PRN
Status: DISCONTINUED | OUTPATIENT
Start: 2021-02-02 | End: 2021-02-02 | Stop reason: SDUPTHER

## 2021-02-02 RX ORDER — SODIUM CHLORIDE 0.9 % (FLUSH) 0.9 %
10 SYRINGE (ML) INJECTION EVERY 12 HOURS SCHEDULED
Status: DISCONTINUED | OUTPATIENT
Start: 2021-02-02 | End: 2021-02-02 | Stop reason: HOSPADM

## 2021-02-02 RX ADMIN — PROPOFOL 50 MG: 10 INJECTION, EMULSION INTRAVENOUS at 13:51

## 2021-02-02 RX ADMIN — PROPOFOL 50 MG: 10 INJECTION, EMULSION INTRAVENOUS at 13:55

## 2021-02-02 RX ADMIN — SODIUM CHLORIDE, POTASSIUM CHLORIDE, SODIUM LACTATE AND CALCIUM CHLORIDE: 600; 310; 30; 20 INJECTION, SOLUTION INTRAVENOUS at 13:45

## 2021-02-02 RX ADMIN — PROPOFOL 50 MG: 10 INJECTION, EMULSION INTRAVENOUS at 13:59

## 2021-02-02 RX ADMIN — PROPOFOL 50 MG: 10 INJECTION, EMULSION INTRAVENOUS at 14:03

## 2021-02-02 RX ADMIN — PROPOFOL 30 MG: 10 INJECTION, EMULSION INTRAVENOUS at 14:12

## 2021-02-02 RX ADMIN — PROPOFOL 70 MG: 10 INJECTION, EMULSION INTRAVENOUS at 13:48

## 2021-02-02 RX ADMIN — PROPOFOL 50 MG: 10 INJECTION, EMULSION INTRAVENOUS at 14:08

## 2021-02-02 ASSESSMENT — PAIN - FUNCTIONAL ASSESSMENT: PAIN_FUNCTIONAL_ASSESSMENT: 0-10

## 2021-02-02 NOTE — OP NOTE
[address]  [address] [facility]  Phone: [phone]   K:[phone]    Colonoscopy Procedure Note    Patient: Jared Watson  : 1960    Procedure: Colonoscopy with --screening    Date:  2021     Endoscopist:  Miguelina Angel MD    Referring Physician:  Elidia Trevizo, APRN - CNP    Preoperative Diagnosis:  Special screening for malignant neoplasms, colon [Z12.11]    Postoperative Diagnosis:  See impression    Anesthesia: Anesthesia: MAC  Sedation: see anesthesia notes for details. Start Time: 13:48  Stop Time: 14:12  Withdrawal time: 12 minutes  ASA Class: 3  Mallampati: III (soft palate, base of uvula visible)    Indications: This is a 61y.o. year old female who presents today with screening for colon cancer. Procedure Details  Informed consent was obtained for the procedure, including sedation. Risks of perforation, hemorrhage, adverse drug reaction and aspiration were discussed. The patient was placed in the left lateral decubitus position. Based on the pre-procedure assessment, including review of the patient's medical history, medications, allergies, and review of systems, she had been deemed to be an appropriate candidate for conscious sedation; she was therefore sedated with the medications listed below. The patient was monitored continuously with ECG tracing, pulse oximetry, blood pressure monitoring, and direct observations. rectal examination was performed. The colonoscope was inserted into the rectum and advanced under direct vision to the cecum, which was identified by the ileocecal valve and appendiceal orifice. The quality of the colonic preparation was fair. A careful inspection was made as the colonoscope was withdrawn, including a retroflexed view of the rectum; findings and interventions are described below. Appropriate photodocumentation was obtained.   Patient tolerated the procedure well.    Findings: -Mild sigmoid diverticulosis. One 7 mm sessile descending colon polyp removed with cold snare. Prep is fair in the ascending colon and cecum with a redundant colon. No large lesions seen. - Anesthesia issues: no    Specimens: Was Obtained:  bottle A, sessile polyp, descending colon, cold snare    Complications:   None    Estimated blood loss: minimal    Disposition:   PACU - hemodynamically stable. Impression:   -Mild sigmoid diverticulosis. One 7 mm sessile descending colon polyp removed with cold snare. Prep is fair in the ascending colon and cecum with a redundant colon. No large lesions seen. Recommendations:  -Await pathology. Repeat colonoscopy 3 years due to prep. -High fiber diet recommended for diverticulosis. Take fiber supplement such as Metamucil or Citrucel daily. - Avoid NSAIDs for 1 week.         Bobo Calderón 2/2/21 1:32 PM EST

## 2021-02-02 NOTE — H&P
Chief Complaint   Patient presents with    New Patient       abdominal bloating, happens more often after lying down. Gas, belching. cant identify any foods that are causing it. Started about 3-4 months ago         SABAS Rand is a 61 y.o. female who presents for abdominal bloating. Patient has multiple medical problems as below and has morbid obesity with a BMI of 63. She says she has had increased abdominal bloating for a few months now, has a lot of gas, cannot identify any particular food triggers, happens more when she is lying down, started about 3 to 4 months ago. She states that she has 1 bowel movement daily and denies any constipation. Does not have any rectal bleeding. States there is a family history of colon polyps in her father but no family history of colon cancer in first-degree family members as far as she knows. She states she has had a  and was told there was a lot of scar tissue related to this, and an exploratory laparotomy in the  when she was told a lot of scar tissue was found and removed. She states she has had 1 prior colonoscopy in the late  but it was a traumatic experience for her and she had a lot of pain during the procedure. She requests anesthesia for any future procedures.   She had a normal CBC 2020     PAST MEDICAL HISTORY      Past Medical History        Past Medical History:   Diagnosis Date    ADHD (attention deficit hyperactivity disorder)      Bipolar disorder      Borderline osteopenia      Cellulitis of left leg 2020    GERD (gastroesophageal reflux disease)      Headache(784.0)      HNP (herniated nucleus pulposus), lumbar 2019    Intention tremor       due to lithium    Iron deficiency anemia 2019    Lateral meniscal tear 10/14/2015    Lumbar stenosis with neurogenic claudication 2019    Medial meniscus tear 10/14/2015    Prolonged emergence from general anesthesia, initial encounter 2019  Tobacco use      Venous ulcer of left leg ([facility] [address] 2020         FAMILY HISTORY      Family History         Family History   Problem Relation Age of Onset    Depression Sister      Mental Illness Sister      Diabetes Mother      Heart Disease Mother      Diabetes Father      Heart Disease Father           SOCIAL HISTORY      Social History               Socioeconomic History    Marital status:         Spouse name: Not on file    Number of children: Not on file    Years of education: Not on file    Highest education level: Not on file   Occupational History    Not on file   Social Needs    Financial resource strain: Not on file    Food insecurity       Worry: Not on file       Inability: Not on file    Transportation needs       Medical: Not on file       Non-medical: Not on file   Tobacco Use    Smoking status: Former Smoker       Packs/day: 1.50       Years: 21.00       Pack years: 31.50       Types: Cigarettes       Last attempt to quit: 1997       Years since quittin.8    Smokeless tobacco: Never Used   Substance and Sexual Activity    Alcohol use: No    Drug use: No    Sexual activity: Not Currently   Lifestyle    Physical activity       Days per week: Not on file       Minutes per session: Not on file    Stress: Not on file   Relationships    Social connections       Talks on phone: Not on file       Gets together: Not on file       Attends Che[employer] service: Not on file       Active member of club or organization: Not on file       Attends meetings of clubs or organizations: Not on file       Relationship status: Not on file    Intimate partner violence       Fear of current or ex partner: Not on file       Emotionally abused: Not on file       Physically abused: Not on file       Forced sexual activity: Not on file   Other Topics Concern    Not on file   Social History Narrative    Not on file         SURGICAL HISTORY      Past Surgical History         Past Surgical History:   Procedure Laterality Date    ABDOMINAL EXPLORATION SURGERY        CARPAL TUNNEL RELEASE Bilateral       SECTION        GASTRIC BYPASS SURGERY        KNEE SURGERY Left       atrhroscopic unispacer    KNEE SURGERY Right 10/28/2015     Right knee video arthroscopy with partial medial and lateral menisectomy with loose body removal    LUMBAR SPINE SURGERY N/A 2019     MICROLUMBAR DISCECTOMY L4-5 performed by Bucky Gross MD at [address] [place] [address]   (This list may include medications prescribed during this encounter as epic can not insert only the list prior to this encounter.)  Current Outpatient Rx          Current Outpatient Rx   Medication Sig Dispense Refill    doxepin (SINEQUAN) 10 MG capsule Take 1 capsule by mouth nightly 30 capsule 0    VORTIoxetine HBr (TRINTELLIX) 20 MG TABS tablet TAKE 1 TABLET EVERY DAY 90 tablet 1    furosemide (LASIX) 20 MG tablet TAKE 1 TABLET EVERY DAY 90 tablet 1    loratadine (CLARITIN) 10 MG tablet Take 10 mg by mouth daily        celecoxib (CELEBREX) 100 MG capsule Take 1 capsule by mouth 2 times daily 180 capsule 1    metoprolol succinate (TOPROL XL) 50 MG extended release tablet TAKE 1 TABLET EVERY DAY 90 tablet 1    fluticasone (FLONASE) 50 MCG/ACT nasal spray USE 2 SPRAYS IN EACH NOSTRIL EVERY DAY 48 g 1    buPROPion (WELLBUTRIN XL) 150 MG extended release tablet TAKE 1 TABLET EVERY DAY 90 tablet 1    lamoTRIgine (LAMICTAL) 200 MG tablet TAKE 1/2 TABLET TWICE DAILY 90 tablet 1    rosuvastatin (CRESTOR) 5 MG tablet TAKE 1 TABLET EVERY DAY 90 tablet 1    potassium chloride (KLOR-CON) 10 MEQ extended release tablet Take 1 tablet by mouth daily 90 tablet 3    ferrous sulfate 325 (65 Fe) MG tablet Take 1 tablet by mouth daily (with breakfast) 90 tablet 3    CALCIUM-MAGNESIUM-VITAMIN D PO Take 2 capsules by mouth nightly        multivitamin (THERAGRAN) per tablet Take 1 tablet by mouth daily.        Omega-3 Fatty Acids (FISH OIL) 1200 MG CAPS Take  by mouth daily.                ALLERGIES            Allergies   Allergen Reactions    Calamine Other (See Comments)       Leaves skin tender and burning     Amoxicillin-Pot Clavulanate Hives    Daypro [Oxaprozin] Hives    Duravent-Da [Chlorphen-Phenyleph-Methscop] Hives    Lithium         Caused shakes    Norvasc [Amlodipine]         LE edema       Nsaids         Avoid d/t gastric bypass    Seldane [Terfenadine] Hives    Suprax [Cefixime] Hives    Levofloxacin Rash         IMMUNIZATIONS           Immunization History   Administered Date(s) Administered    Influenza 10/30/2013    Influenza Virus Vaccine 11/20/2014, 09/02/2015    Influenza, Quadv, IM, (6 mo and older Fluzone, Flulaval, Fluarix and 3 yrs and older Afluria) 12/26/2017, 09/26/2018    Tdap (Boostrix, Adacel) 08/04/2017         REVIEW OF SYSTEMS      Constitutional: denies fever and unexpected weight change. HENT: Negative for ear pain, hearing loss and nosebleeds. Eyes: Negative for pain and visual disturbance. Respiratory: Negative for cough, shortness of breath and wheezing. Cardiovascular: Negative for chest pain, palpitations and leg swelling. Gastrointestinal: see HPI for details. Endocrine: Negative for polydipsia, polyphagia and polyuria. Genitourinary: Negative for difficulty urinating, dysuria, hematuria and urgency. Musculoskeletal: Positive for arthralgias and back pain. Skin: Negative for pallor and rash. Allergic/Immunologic: Negative for environmental allergies and immunocompromised state. Neurological: Negative for seizures, syncope. Hematological: Negative for adenopathy. Does not bruise/bleed easily.    Psychiatric/Behavioral: Negative for agitation, confusion, hallucinations.     PHYSICAL EXAM   VITAL SIGNS: BP (!) 147/69 (Site: Right Wrist, Position: Sitting, Cuff Size: Medium Adult)   Pulse 98   Temp 96.8 °F (36 °C) (Temporal)   Ht 5' 1\" (1.549 m)   Wt (!) 336 lb (152.4 kg)   LMP  (LMP Unknown)   BMI 63.49 kg/m²       Wt Readings from Last 3 Encounters:   12/09/20 (!) 336 lb (152.4 kg)   11/13/20 (!) 332 lb (150.6 kg)   09/18/20 (!) 334 lb 3.2 oz (151.6 kg)      Gen: AAO3, NAD  HEENT: no pallor or icterus  Neck: supple, no adenopathy  RS: clear to auscultation bilaterally  CVS: S1S2 RRR, no murmurs  GI: soft, nontender, obese, organomegaly cannot be assessed due to body habitus.        FINAL IMPRESSION      Gas and bloating, morbid obesity with BMI 63  Need for screening colonoscopy   Discussed a trial of a low FODMAP diet for the gas and bloating    She needs a screening colonoscopy, risks benefits and alternatives of colon cancer screening tests discussed. Procedure discussed with patient in detail including risks, benefits and alternatives. Anesthesia risks, risk of perforation, bleeding discussed with the patient. She would like to proceed with a colonoscopy.   She needs MAC for the procedure

## 2021-02-02 NOTE — PROGRESS NOTES
Patient ambulating to bathroom, gait steady. Discharge instructions given to patient family, verbalized understanding.

## 2021-02-02 NOTE — ANESTHESIA POSTPROCEDURE EVALUATION
Department of Anesthesiology  Postprocedure Note    Patient: Estrellita Francois  MRN: [id number]  YOB: 1960  Date of evaluation: 2/2/2021  Time:  3:11 PM     Procedure Summary     Date: 02/02/21 Room / Location: [place] [address] / [facility]    Anesthesia Start: 1345 Anesthesia Stop: 7613    Procedure: COLON W/ANES. (13:30) (N/A ) Diagnosis:       Special screening for malignant neoplasms, colon      (SCREENING)    Surgeons: Beatriz Ribeiro MD Responsible Provider: Kortney Draper MD    Anesthesia Type: general ASA Status: 4          Anesthesia Type: general    Adamaris Phase I: Adamaris Score: 10    Adamaris Phase II: Adamaris Score: 10    Last vitals: Reviewed and per EMR flowsheets.        Anesthesia Post Evaluation    Comments: Postoperative Anesthesia Note    Name:    Estrellita Francois  MRN:      [id number]    Patient Vitals in the past 12 hrs:  02/02/21 1429, BP:(!) 124/50, Temp:97.8 °F (36.6 °C), Temp src:Infrared, Pulse:91, Resp:20, SpO2:95 %  02/02/21 1416, BP:(!) 124/58, Temp:97.2 °F (36.2 °C), Temp src:Infrared, Pulse:84, Resp:18, SpO2:94 %  02/02/21 1251, BP:(!) 157/77, Temp:97.8 °F (36.6 °C), Temp src:Temporal, Pulse:97, Resp:20, SpO2:93 %     LABS:    CBC  Lab Results       Component                Value               Date/Time                  WBC                      8.8                 05/14/2020 09:24 PM        HGB                      14.0                05/14/2020 09:24 PM        HCT                      43.4                05/14/2020 09:24 PM        PLT                      342                 05/14/2020 09:24 PM   RENAL  Lab Results       Component                Value               Date/Time                  NA                       143                 09/11/2020 09:23 AM        K                        4.6                 09/11/2020 09:23 AM        CL                       103                 09/11/2020 09:23 AM CO2                      29                  09/11/2020 09:23 AM        BUN                      19                  09/11/2020 09:23 AM        CREATININE               0.7                 09/11/2020 09:23 AM        GLUCOSE                  113 (H)             09/11/2020 09:23 AM   COAGS  No results found for: PROTIME, INR, APTT    Intake & Output:  @94GKUI@    Nausea & Vomiting:  No    Level of Consciousness:  Awake    Pain Assessment:  Adequate analgesia    Anesthesia Complications:  No apparent anesthetic complications    SUMMARY      Vital signs stable  OK to discharge from Stage I post anesthesia care.   Care transferred from Anesthesiology department on discharge from perioperative area

## 2021-02-03 ENCOUNTER — NURSE ONLY (OUTPATIENT)
Dept: FAMILY MEDICINE CLINIC | Age: 61
End: 2021-02-03
Payer: MEDICARE

## 2021-02-03 DIAGNOSIS — Z23 NEED FOR INFLUENZA VACCINATION: Primary | ICD-10-CM

## 2021-02-03 PROCEDURE — 90686 IIV4 VACC NO PRSV 0.5 ML IM: CPT | Performed by: NURSE PRACTITIONER

## 2021-02-03 PROCEDURE — G0008 ADMIN INFLUENZA VIRUS VAC: HCPCS | Performed by: NURSE PRACTITIONER

## 2021-02-05 ENCOUNTER — TELEPHONE (OUTPATIENT)
Dept: FAMILY MEDICINE CLINIC | Age: 61
End: 2021-02-05

## 2021-02-05 DIAGNOSIS — Z20.822 EXPOSURE TO COVID-19 VIRUS: Primary | ICD-10-CM

## 2021-02-05 NOTE — TELEPHONE ENCOUNTER
Pt said that she was wanting to see if she could get an order to go to [facility] to get tested for the covid. Said that she can not taste anything and she was in here on 2/3 and got her flu shot.

## 2021-02-12 ENCOUNTER — VIRTUAL VISIT (OUTPATIENT)
Dept: FAMILY MEDICINE CLINIC | Age: 61
End: 2021-02-12
Payer: MEDICARE

## 2021-02-12 DIAGNOSIS — M25.512 PAIN OF BOTH SHOULDER JOINTS: Primary | ICD-10-CM

## 2021-02-12 DIAGNOSIS — M79.10 MYALGIA: ICD-10-CM

## 2021-02-12 DIAGNOSIS — Z82.61 FAMILY HISTORY OF RHEUMATOID ARTHRITIS: ICD-10-CM

## 2021-02-12 DIAGNOSIS — M25.541 ARTHRALGIA OF BOTH HANDS: ICD-10-CM

## 2021-02-12 DIAGNOSIS — M25.542 ARTHRALGIA OF BOTH HANDS: ICD-10-CM

## 2021-02-12 DIAGNOSIS — Z12.31 ENCOUNTER FOR SCREENING MAMMOGRAM FOR MALIGNANT NEOPLASM OF BREAST: ICD-10-CM

## 2021-02-12 DIAGNOSIS — M25.511 PAIN OF BOTH SHOULDER JOINTS: Primary | ICD-10-CM

## 2021-02-12 DIAGNOSIS — Z82.0 FAMILY HISTORY OF MS (MULTIPLE SCLEROSIS): ICD-10-CM

## 2021-02-12 PROCEDURE — 99213 OFFICE O/P EST LOW 20 MIN: CPT | Performed by: NURSE PRACTITIONER

## 2021-02-12 RX ORDER — CELECOXIB 200 MG/1
200 CAPSULE ORAL 2 TIMES DAILY
Qty: 180 CAPSULE | Refills: 1 | Status: SHIPPED | OUTPATIENT
Start: 2021-02-12 | End: 2021-09-21

## 2021-02-12 RX ORDER — CELECOXIB 200 MG/1
200 CAPSULE ORAL 2 TIMES DAILY
Qty: 180 CAPSULE | Refills: 1 | Status: SHIPPED | OUTPATIENT
Start: 2021-02-12 | End: 2021-02-17

## 2021-02-12 NOTE — PROGRESS NOTES
2021    TELEHEALTH EVALUATION -- Audio/Visual (During ISKOD-17 public health emergency)    Chief Complaint   Patient presents with    Joint Pain     pt said she is having pain in her joints mostly her arms but has had it in her legs ,  pt first noticed the pain back in august she said over time it has gotten more noticeable        HPI:  Charanjit Calix (:  1960) has requested an audio/video evaluation for the following concern(s):    Joint/Muscle Pain  Patient complains of arthralgias and myalgias, which have/has been present for 3 months. Pain is located in multiple joints. The pain is described as intermittent, moderate, aching, sharp, shooting and tingling. Associated symptoms include: decreased range of motion and tenderness. The patient has tried movement, rest and celebrex 100 mg bid for pain, with no relief. Related to injury: no.    Hands stiff all day, intense aching along with sharp pains  Pain in shoulders, has to keep from getting \"cold\" or the shoulder joints will hurt more  Muscle pain/aches in legs at times  Pain with lying on shoulders  No numbness in BUE  +tingling at times in fingers and toes  + weakness in hands with opening jars  + Weakness in BUE with lifting  Denies any neck pain  Had CTS bilaterally in past    Review of Systems    Prior to Visit Medications    Medication Sig Taking?  Authorizing Provider   rosuvastatin (CRESTOR) 5 MG tablet TAKE 1 TABLET EVERY DAY Yes Lavena Fothergill, APRN - CNP   buPROPion (WELLBUTRIN XL) 150 MG extended release tablet TAKE 1 TABLET EVERY DAY Yes Lavena Fothergill, APRN - CNP   celecoxib (CELEBREX) 100 MG capsule TAKE 1 CAPSULE TWICE DAILY Yes Lavena Fothergill, APRN - CNP   lamoTRIgine (LAMICTAL) 200 MG tablet TAKE 1/2 TABLET TWICE DAILY Yes Lavena Fothergill, APRN - CNP   metoprolol succinate (TOPROL XL) 50 MG extended release tablet TAKE 1 TABLET EVERY DAY Yes Lavena Fothergill, APRN - CNP doxepin (SINEQUAN) 10 MG capsule Take 1 capsule by mouth nightly Yes MADINA Gonzalez CNP   VORTIoxetine HBr (TRINTELLIX) 20 MG TABS tablet TAKE 1 TABLET EVERY DAY Yes MADINA Gonzalez CNP   furosemide (LASIX) 20 MG tablet TAKE 1 TABLET EVERY DAY Yes MADINA Gonzalez CNP   loratadine (CLARITIN) 10 MG tablet Take 10 mg by mouth daily Yes Historical Provider, MD   fluticasone (FLONASE) 50 MCG/ACT nasal spray USE 2 SPRAYS IN EACH NOSTRIL EVERY DAY Yes MADINA Gonzalez CNP   potassium chloride (KLOR-CON) 10 MEQ extended release tablet Take 1 tablet by mouth daily Yes MADINA Gonzalez CNP   ferrous sulfate 325 (65 Fe) MG tablet Take 1 tablet by mouth daily (with breakfast) Yes MADINA Gonzalez CNP   CALCIUM-MAGNESIUM-VITAMIN D PO Take 2 capsules by mouth nightly Yes Historical Provider, MD   multivitamin [facility]) per tablet Take 1 tablet by mouth daily. Yes Historical Provider, MD   Omega-3 Fatty Acids (FISH OIL) 1200 MG CAPS Take  by mouth daily.  Yes Historical Provider, MD       Social History     Tobacco Use    Smoking status: Former Smoker     Packs/day: 1.50     Years: 21.00     Pack years: 31.50     Types: Cigarettes     Quit date: 1997     Years since quittin.0    Smokeless tobacco: Never Used   Substance Use Topics    Alcohol use: No    Drug use: No        Allergies   Allergen Reactions    Calamine Other (See Comments)     Leaves skin tender and burning     Amoxicillin-Pot Clavulanate Hives    Daypro [Oxaprozin] Hives    Duravent-Da [Chlorphen-Phenyleph-Methscop] Hives    Lithium      Caused shakes    Norvasc [Amlodipine]      LE edema      Nsaids      Avoid d/t gastric bypass    Seldane [Terfenadine] Hives    Suprax [Cefixime] Hives    Levofloxacin Rash   ,   Past Medical History:   Diagnosis Date    Bipolar disorder     Borderline osteopenia     Cellulitis of left leg 2020  Influenza, Quadv, IM, PF (6 mo and older Fluzone, Flulaval, Fluarix, and 3 yrs and older Afluria) 02/03/2021    Tdap (Boostrix, Adacel) 08/04/2017   ,   Health Maintenance   Topic Date Due    Cervical cancer screen  06/21/1981    Shingles Vaccine (1 of 2) 06/21/2010    Lipid screen  01/08/2019    Breast cancer screen  07/30/2020    Annual Wellness Visit (AWV)  11/04/2021 (Originally 5/29/2019)    A1C test (Diabetic or Prediabetic)  08/05/2021    Potassium monitoring  09/11/2021    Creatinine monitoring  09/11/2021    Colon cancer screen colonoscopy  02/02/2024    DTaP/Tdap/Td vaccine (2 - Td) 08/04/2027    Flu vaccine  Completed    Hepatitis C screen  Completed    HIV screen  Completed    Hepatitis A vaccine  Aged Out    Hepatitis B vaccine  Aged Out    Hib vaccine  Aged Out    Meningococcal (ACWY) vaccine  Aged Out    Pneumococcal 0-64 years Vaccine  Aged Out       PHYSICAL EXAMINATION:  [ INSTRUCTIONS:  \"[x]\" Indicates a positive item  \"[]\" Indicates a negative item  -- DELETE ALL ITEMS NOT EXAMINED]  Vital Signs: (As obtained by patient/caregiver or practitioner observation)    Blood pressure-  Heart rate-    Respiratory rate-    Temperature-  Pulse oximetry-     Constitutional: [x] Appears well-developed and well-nourished [x] No apparent distress      [] Abnormal-   Mental status  [x] Alert and awake  [x] Oriented to person/place/time [x]Able to follow commands      Eyes:  EOM    [x]  Normal  [] Abnormal-  Sclera  [x]  Normal  [] Abnormal -         Discharge [x]  None visible  [] Abnormal -    HENT:   [x] Normocephalic, atraumatic.   [] Abnormal   [x] Mouth/Throat: Mucous membranes are moist.     External Ears [x] Normal  [] Abnormal-     Neck: [x] No visualized mass     Pulmonary/Chest: [x] Respiratory effort normal.  [x] No visualized signs of difficulty breathing or respiratory distress        [] Abnormal-      Musculoskeletal:   [x] Normal gait with no signs of ataxia [x] Normal range of motion of neck        [] Abnormal-       Neurological:        [x] No Facial Asymmetry (Cranial nerve 7 motor function) (limited exam to video visit)          [x] No gaze palsy        [] Abnormal-         Skin:        [x] No significant exanthematous lesions or discoloration noted on facial skin         [] Abnormal-            Psychiatric:       [x] Normal Affect [x] No Hallucinations        [] Abnormal-     Other pertinent observable physical exam findings-     ASSESSMENT/PLAN:  1. Pain of both shoulder joints  Increase celebrex from 100 mg bid to:  - celecoxib (CELEBREX) 200 MG capsule; Take 1 capsule by mouth 2 times daily  Dispense: 180 capsule; Refill: 1  - SOCRATES; Future  - Sedimentation Rate; Future  - Rheumatoid Factor; Future  - Cyclic Citrul Peptide Antibody, IgG; Future  - CK; Future  - C-Reactive Protein; Future  Consider xray of shoulders and hands if labs do not show cause    2. Arthralgia of both hands  - celecoxib (CELEBREX) 200 MG capsule; Take 1 capsule by mouth 2 times daily  Dispense: 180 capsule; Refill: 1  - SOCRATES; Future  - Sedimentation Rate; Future  - Rheumatoid Factor; Future  - Cyclic Citrul Peptide Antibody, IgG; Future  - CK; Future  - C-Reactive Protein; Future    3. Myalgia  Mostly intermittent in legs  - SOCRATES; Future  - Sedimentation Rate; Future  - Rheumatoid Factor; Future  - Cyclic Citrul Peptide Antibody, IgG; Future  - CK; Future  - C-Reactive Protein; Future    4. Family history of rheumatoid arthritis  In mother  Patient tested negative for RA about 30 years ago but was seeing a rheumatologist for several years and was being treated for ???    5. Family history of MS (multiple sclerosis)  In [facility]    6. Encounter for screening mammogram for malignant neoplasm of breast  - JULIAN DIGITAL SCREEN W OR WO CAD BILATERAL; Future      Return if symptoms worsen or fail to improve, for after blood work results. Stacy Parker is a 61 y.o. female being evaluated by a Virtual Visit (video visit) encounter to address concerns as mentioned above. A caregiver was present when appropriate. Due to this being a TeleHealth encounter (During BKSJX-21 public health emergency), evaluation of the following organ systems was limited: Vitals/Constitutional/EENT/Resp/CV/GI//MS/Neuro/Skin/Heme-Lymph-Imm. Pursuant to the emergency declaration under the [address] [place] [address] and the [employer] Act, this Virtual Visit was conducted with patient's (and/or legal guardian's) consent, to reduce the patient's risk of exposure to COVID-19 and provide necessary medical care. The patient (and/or legal guardian) has also been advised to contact this office for worsening conditions or problems, and seek emergency medical treatment and/or call 911 if deemed necessary. Patient identification was verified at the start of the visit: Yes    Total time spent on this encounter: 25 minutes    Services were provided through a video synchronous discussion virtually to substitute for in-person clinic visit. Patient and provider were located at their individual homes. --[employer] Dorita, APRN - CNP on 2/12/2021 at 11:06 AM    An electronic signature was used to authenticate this note.

## 2021-02-17 ENCOUNTER — TELEPHONE (OUTPATIENT)
Dept: FAMILY MEDICINE CLINIC | Age: 61
End: 2021-02-17

## 2021-02-17 NOTE — TELEPHONE ENCOUNTER
Pt called stating that she'd gotten an email from [facility] that they couldn't fill her Rx yet. Looks like an Rx was sent to [facility] the same day. Pt wants Rx that was sent to [facility] canceled and [employer] notified. Pt also said that she's been having some issues with her eyes getting dry and cloudy. Pt states that a friend of hers recommended Systane eye drops, which have helped some. Pt wanting to know if PCP would having any other recommendations for her eyes. Pt uses [employer].  Call back  [phone]

## 2021-02-17 NOTE — TELEPHONE ENCOUNTER
Any type of over artificial tears would be fine to try  If this continues I do recommend that she follow-up for evaluation for \"dry eye syndrome\" with an ophthalmologist  You can prescribe prescription medication if she indeed has this diagnosis  As for the medication sent to [facility] mail order--- please assist patient in her request

## 2021-02-17 NOTE — TELEPHONE ENCOUNTER
Cancelled celebrex script that was sent to [facility], another script was sent to [facility]. Patient informed of recommendations for dry eyes.

## 2021-02-23 ENCOUNTER — NURSE ONLY (OUTPATIENT)
Dept: FAMILY MEDICINE CLINIC | Age: 61
End: 2021-02-23
Payer: MEDICARE

## 2021-02-23 DIAGNOSIS — M25.541 ARTHRALGIA OF BOTH HANDS: ICD-10-CM

## 2021-02-23 DIAGNOSIS — M25.542 ARTHRALGIA OF BOTH HANDS: ICD-10-CM

## 2021-02-23 DIAGNOSIS — M25.512 PAIN OF BOTH SHOULDER JOINTS: ICD-10-CM

## 2021-02-23 DIAGNOSIS — M25.511 PAIN OF BOTH SHOULDER JOINTS: ICD-10-CM

## 2021-02-23 DIAGNOSIS — M79.10 MYALGIA: ICD-10-CM

## 2021-02-23 PROCEDURE — 36415 COLL VENOUS BLD VENIPUNCTURE: CPT | Performed by: NURSE PRACTITIONER

## 2021-02-24 LAB
ANTI-NUCLEAR ANTIBODY (ANA): NEGATIVE
C-REACTIVE PROTEIN: 14.2 MG/L (ref 0–5.1)
CYCLIC CITRULLINATED PEPTIDE ANTIBODY IGG: <0.5 U/ML (ref 0–2.9)
RHEUMATOID FACTOR: <10 IU/ML
SEDIMENTATION RATE, ERYTHROCYTE: 33 MM/HR (ref 0–30)
TOTAL CK: 62 U/L (ref 26–192)

## 2021-03-02 ENCOUNTER — TELEPHONE (OUTPATIENT)
Dept: FAMILY MEDICINE CLINIC | Age: 61
End: 2021-03-02

## 2021-03-02 DIAGNOSIS — R70.0 ELEVATED SED RATE: Primary | ICD-10-CM

## 2021-03-02 NOTE — TELEPHONE ENCOUNTER
Pt called regarding pt's recent blood work. Informed of results. Pt would like referral to rheumatology. Pt also wanting to know if it would be okay to wear a wrist brace(that she had when she had carpel tunnel) on her left wrist. States certain things she does(pushing up on it, if she bumps it, etc) it really hurts.  Call back pt [phone]

## 2021-03-04 PROBLEM — Z12.11 SPECIAL SCREENING FOR MALIGNANT NEOPLASMS, COLON: Status: RESOLVED | Noted: 2021-02-02 | Resolved: 2021-03-04

## 2021-03-10 ENCOUNTER — OFFICE VISIT (OUTPATIENT)
Dept: FAMILY MEDICINE CLINIC | Age: 61
End: 2021-03-10
Payer: MEDICARE

## 2021-03-10 ENCOUNTER — HOSPITAL ENCOUNTER (OUTPATIENT)
Dept: GENERAL RADIOLOGY | Age: 61
Discharge: HOME | End: 2021-03-10
Payer: MEDICARE

## 2021-03-10 ENCOUNTER — HOSPITAL ENCOUNTER (OUTPATIENT)
Age: 61
Discharge: HOME | End: 2021-03-10
Payer: MEDICARE

## 2021-03-10 VITALS
TEMPERATURE: 96.4 F | OXYGEN SATURATION: 94 % | HEART RATE: 70 BPM | SYSTOLIC BLOOD PRESSURE: 190 MMHG | WEIGHT: 293 LBS | BODY MASS INDEX: 60.54 KG/M2 | DIASTOLIC BLOOD PRESSURE: 84 MMHG

## 2021-03-10 DIAGNOSIS — B96.89 ACUTE BACTERIAL SINUSITIS: ICD-10-CM

## 2021-03-10 DIAGNOSIS — J01.90 ACUTE BACTERIAL SINUSITIS: ICD-10-CM

## 2021-03-10 DIAGNOSIS — M25.572 ACUTE LEFT ANKLE PAIN: ICD-10-CM

## 2021-03-10 DIAGNOSIS — M25.572 ACUTE LEFT ANKLE PAIN: Primary | ICD-10-CM

## 2021-03-10 DIAGNOSIS — Z82.69 FAMILY HISTORY OF GOUT: ICD-10-CM

## 2021-03-10 PROCEDURE — 36415 COLL VENOUS BLD VENIPUNCTURE: CPT | Performed by: NURSE PRACTITIONER

## 2021-03-10 PROCEDURE — 1036F TOBACCO NON-USER: CPT | Performed by: NURSE PRACTITIONER

## 2021-03-10 PROCEDURE — G8427 DOCREV CUR MEDS BY ELIG CLIN: HCPCS | Performed by: NURSE PRACTITIONER

## 2021-03-10 PROCEDURE — G8417 CALC BMI ABV UP PARAM F/U: HCPCS | Performed by: NURSE PRACTITIONER

## 2021-03-10 PROCEDURE — G8482 FLU IMMUNIZE ORDER/ADMIN: HCPCS | Performed by: NURSE PRACTITIONER

## 2021-03-10 PROCEDURE — 3017F COLORECTAL CA SCREEN DOC REV: CPT | Performed by: NURSE PRACTITIONER

## 2021-03-10 PROCEDURE — 99213 OFFICE O/P EST LOW 20 MIN: CPT | Performed by: NURSE PRACTITIONER

## 2021-03-10 PROCEDURE — 73610 X-RAY EXAM OF ANKLE: CPT

## 2021-03-10 RX ORDER — AZITHROMYCIN 250 MG/1
250 TABLET, FILM COATED ORAL SEE ADMIN INSTRUCTIONS
Qty: 6 TABLET | Refills: 0 | Status: SHIPPED | OUTPATIENT
Start: 2021-03-10 | End: 2021-03-15

## 2021-03-10 NOTE — PATIENT INSTRUCTIONS
Patient Education        Sinusitis: Care Instructions  Your Care Instructions     Sinusitis is an infection of the lining of the sinus cavities in your head. Sinusitis often follows a cold. It causes pain and pressure in your head and face. In most cases, sinusitis gets better on its own in 1 to 2 weeks. But some mild symptoms may last for several weeks. Sometimes antibiotics are needed. Follow-up care is a key part of your treatment and safety. Be sure to make and go to all appointments, and call your doctor if you are having problems. It's also a good idea to know your test results and keep a list of the medicines you take. How can you care for yourself at home? · Take an over-the-counter pain medicine, such as acetaminophen (Tylenol), ibuprofen (Advil, Motrin), or naproxen (Aleve). Read and follow all instructions on the label. · If the doctor prescribed antibiotics, take them as directed. Do not stop taking them just because you feel better. You need to take the full course of antibiotics. · Be careful when taking over-the-counter cold or flu medicines and Tylenol at the same time. Many of these medicines have acetaminophen, which is Tylenol. Read the labels to make sure that you are not taking more than the recommended dose. Too much acetaminophen (Tylenol) can be harmful. · Breathe warm, moist air from a steamy shower, a hot bath, or a sink filled with hot water. Avoid cold, dry air. Using a humidifier in your home may help. Follow the directions for cleaning the machine. · Use saline (saltwater) nasal washes to help keep your nasal passages open and wash out mucus and bacteria. You can buy saline nose drops at a grocery store or drugstore. Or you can make your own at home by adding 1 teaspoon of salt and 1 teaspoon of baking soda to 2 cups of distilled water. If you make your own, fill a bulb syringe with the solution, insert the tip into your nostril, and squeeze gently. Oley Rj your nose.   · Put a hot, wet towel or a warm gel pack on your face 3 or 4 times a day for 5 to 10 minutes each time. · Try a decongestant nasal spray like oxymetazoline (Afrin). Do not use it for more than 3 days in a row. Using it for more than 3 days can make your congestion worse. When should you call for help? Call your doctor now or seek immediate medical care if:    · You have new or worse swelling or redness in your face or around your eyes.     · You have a new or higher fever. Watch closely for changes in your health, and be sure to contact your doctor if:    · You have new or worse facial pain.     · The mucus from your nose becomes thicker (like pus) or has new blood in it.     · You are not getting better as expected. Where can you learn more? Go to [email or web address] and sign in to your fflick account. Enter G299 in the SemiNex box to learn more about \"Sinusitis: Care Instructions. \"     If you do not have an account, please click on the \"Sign Up Now\" link. Current as of: April 15, 2020               Content Version: 12.6  © 1840-0044 [employer]. Care instructions adapted under license by [facility] ([facility]). If you have questions about a medical condition or this instruction, always ask your healthcare professional. [address] any warranty or liability for your use of this information.

## 2021-03-10 NOTE — PROGRESS NOTES
 Intention tremor     due to lithium    Iron deficiency anemia 11/4/2019    Lateral meniscal tear 10/14/2015    Lumbar stenosis with neurogenic claudication 6/6/2019    Medial meniscus tear 10/14/2015    Prolonged emergence from general anesthesia, initial encounter 6/12/2019    Tobacco use     Venous ulcer of left leg ([facility] [address] 07/2020         Review of Systems   Constitutional: Negative. Negative for appetite change, chills, diaphoresis, fatigue and unexpected weight change. HENT: Positive for congestion, sinus pressure and sneezing. Negative for ear pain, hoarse voice and sore throat. Eyes: Negative. Respiratory: Negative. Negative for cough and shortness of breath. Cardiovascular: Negative. Negative for chest pain, palpitations and leg swelling. Gastrointestinal: Negative. Negative for abdominal pain, anal bleeding, blood in stool and nausea. Endocrine: Negative. Genitourinary: Negative. Negative for hematuria. Musculoskeletal: Positive for arthralgias and gait problem. Negative for neck pain. Skin: Negative. Negative for rash. Allergic/Immunologic: Negative. Neurological: Positive for headaches. Negative for dizziness, tingling, syncope, light-headedness and numbness. Hematological: Negative. Does not bruise/bleed easily. Psychiatric/Behavioral: Negative. All other systems reviewed and are negative.        Past Medical History:   Diagnosis Date    Bipolar disorder     Borderline osteopenia     Cellulitis of left leg 8/5/2020    GERD (gastroesophageal reflux disease)     Headache(784.0)     HNP (herniated nucleus pulposus), lumbar 6/6/2019    Hyperlipidemia     Hypertension     Intention tremor     due to lithium    Iron deficiency anemia 11/4/2019    Lateral meniscal tear 10/14/2015    Lumbar stenosis with neurogenic claudication 6/6/2019    Medial meniscus tear 10/14/2015    Prolonged emergence from general anesthesia, initial encounter 6/12/2019    Tobacco use     Venous ulcer of left leg ([facility] [address] 2020     Family History   Problem Relation Age of Onset    Depression Sister     Mental Illness Sister     Diabetes Mother     Heart Disease Mother     Diabetes Father     Heart Disease Father      Past Surgical History:   Procedure Laterality Date    ABDOMINAL EXPLORATION SURGERY      CARPAL TUNNEL RELEASE Bilateral 2005     SECTION      COLONOSCOPY      normal    COLONOSCOPY N/A 2021    COLON W/ANES. (13:30) performed by Katerina Garcia MD at [address] Left     atrhroscopic unispacer    KNEE SURGERY Right 10/28/2015    Right knee video arthroscopy with partial medial and lateral menisectomy with loose body removal    LUMBAR SPINE SURGERY N/A 2019    MICROLUMBAR DISCECTOMY L4-5 performed by Yara Castellon MD at [place] [address] Marital status:       Spouse name: Not on file    Number of children: Not on file    Years of education: Not on file    Highest education level: Not on file   Occupational History    Not on file   Social Needs    Financial resource strain: Not on file    Food insecurity     Worry: Not on file     Inability: Not on file    Transportation needs     Medical: Not on file     Non-medical: Not on file   Tobacco Use    Smoking status: Former Smoker     Packs/day: 1.50     Years: 21.00     Pack years: 31.50     Types: Cigarettes     Quit date: 1997     Years since quittin.0    Smokeless tobacco: Never Used   Substance and Sexual Activity    Alcohol use: No    Drug use: No    Sexual activity: Not Currently   Lifestyle    Physical activity     Days per week: Not on file     Minutes per session: Not on file    Stress: Not on file   Relationships    Social connections     Talks on phone: Not on file     Gets together: Not on file     Attends Confucianist service: Not on file     Active member of club or organization: Not on file     Attends meetings of clubs or organizations: Not on file     Relationship status: Not on file    Intimate partner violence     Fear of current or ex partner: Not on file     Emotionally abused: Not on file     Physically abused: Not on file     Forced sexual activity: Not on file   Other Topics Concern    Not on file   Social History Narrative    Not on file     Current Outpatient Medications   Medication Sig Dispense Refill    celecoxib (CELEBREX) 200 MG capsule Take 1 capsule by mouth 2 times daily 180 capsule 1    rosuvastatin (CRESTOR) 5 MG tablet TAKE 1 TABLET EVERY DAY 90 tablet 1    buPROPion (WELLBUTRIN XL) 150 MG extended release tablet TAKE 1 TABLET EVERY DAY 90 tablet 1    celecoxib (CELEBREX) 100 MG capsule TAKE 1 CAPSULE TWICE DAILY 180 capsule 1    lamoTRIgine (LAMICTAL) 200 MG tablet TAKE 1/2 TABLET TWICE DAILY 90 tablet 1    metoprolol succinate (TOPROL XL) 50 MG extended release tablet TAKE 1 TABLET EVERY DAY 90 tablet 1    VORTIoxetine HBr (TRINTELLIX) 20 MG TABS tablet TAKE 1 TABLET EVERY DAY 90 tablet 1    loratadine (CLARITIN) 10 MG tablet Take 10 mg by mouth daily      fluticasone (FLONASE) 50 MCG/ACT nasal spray USE 2 SPRAYS IN EACH NOSTRIL EVERY DAY 48 g 1    ferrous sulfate 325 (65 Fe) MG tablet Take 1 tablet by mouth daily (with breakfast) 90 tablet 3    CALCIUM-MAGNESIUM-VITAMIN D PO Take 2 capsules by mouth nightly      multivitamin (THERAGRAN) per tablet Take 1 tablet by mouth daily.  Omega-3 Fatty Acids (FISH OIL) 1200 MG CAPS Take  by mouth daily. No current facility-administered medications for this visit. No changes in past medical history, past surgical history, social history, orfamily history were noted during the patient encounter unless specifically listed above.   All updates of past medical history, past surgical history, social history, or family history were reviewed personally by me duringthe office visit. All problems listed in the assessment are stable unless noted otherwise. Medication profile reviewed personally by me during the office visit. Medication side effects and possible impairments frommedications were discussed as applicable. Objective:     Physical Exam  Constitutional:       General: She is not in acute distress. Appearance: Normal appearance. She is well-developed. She is not toxic-appearing. HENT:      Head: Normocephalic and atraumatic. Salivary Glands: Right salivary gland is not diffusely enlarged. Left salivary gland is not diffusely enlarged. Right Ear: Hearing, tympanic membrane and ear canal normal.      Left Ear: Hearing, tympanic membrane and ear canal normal.      Nose:      Right Sinus: Maxillary sinus tenderness and frontal sinus tenderness present. Left Sinus: Maxillary sinus tenderness and frontal sinus tenderness present. Mouth/Throat:      Pharynx: Uvula midline. Eyes:      General: Lids are normal.      Conjunctiva/sclera: Conjunctivae normal.   Neck:      Musculoskeletal: Neck supple. Cardiovascular:      Rate and Rhythm: Normal rate and regular rhythm. Pulses: Normal pulses. Heart sounds: Normal heart sounds. Pulmonary:      Effort: Pulmonary effort is normal. No accessory muscle usage or respiratory distress. Breath sounds: Normal breath sounds. Abdominal:      Palpations: Abdomen is soft. Tenderness: There is no abdominal tenderness. Musculoskeletal:        Feet:    Lymphadenopathy:      Head:      Right side of head: No submental or submandibular adenopathy. Left side of head: No submental or submandibular adenopathy. Cervical: No cervical adenopathy. Skin:     General: Skin is warm and dry. Findings: No lesion or rash. Neurological:      Mental Status: She is alert and oriented to person, place, and time.    Psychiatric:         Speech: Speech normal.         Behavior: Behavior normal. Behavior is cooperative. BP (!) 190/84 (Site: Right Lower Arm, Position: Sitting, Cuff Size: Medium Adult)   Pulse 70   Temp 96.4 °F (35.8 °C) (Temporal)   Wt (!) 331 lb (150.1 kg)   LMP  (LMP Unknown)   SpO2 94%   BMI 60.54 kg/m²   Body mass index is 60.54 kg/m². BP Readings from Last 2 Encounters:   03/10/21 (!) 190/84   02/02/21 (!) 124/50       Wt Readings from Last 3 Encounters:   03/10/21 (!) 331 lb (150.1 kg)   02/01/21 (!) 334 lb (151.5 kg)   01/08/21 (!) 333 lb 3.2 oz (151.1 kg)       Lab Review   Nurse Only on 02/23/2021   Component Date Value    CRP 02/23/2021 14.2*    Total CK 02/23/2021 62     CC Peptide,IgG Ab 02/23/2021 <0.5     Rheumatoid Factor 02/23/2021 <10.0     Sed Rate 02/23/2021 33*    SOCRATES 02/23/2021 Negative    Office Visit on 01/27/2021   Component Date Value    SARS-CoV-2 01/27/2021 Not Detected    Office Visit on 01/12/2021   Component Date Value    SARS-CoV-2 01/12/2021 Not Detected        No results found for this visit on 03/10/21. Assessment:       1. Acute left ankle pain    2. Acute bacterial sinusitis    3. Family history of gout        No results found for this visit on 03/10/21. Plan:       Claudeen Gutting was seen today for ankle pain. Diagnoses and all orders for this visit:    Acute left ankle pain  -     XR ANKLE LEFT (MIN 3 VIEWS); Future  -     Uric Acid  Further management based on results  Recommend RICE  Consider ortho consult if no improvement    Acute bacterial sinusitis  -     azithromycin (ZITHROMAX) 250 MG tablet; Take 1 tablet by mouth See Admin Instructions for 5 days 500mg on day 1 followed by 250mg on days 2 - 5    Family history of gout  -     Uric Acid          Patient has been instructed call the office immediately with new symptoms, change in symptoms or worseningof symptoms. If this is not feasible, patient is instructed to report to the emergency room. Medication profile reviewed.  Medication side effects and possible impairments from medications were discussed as applicable. Allergies were reviewed. Health maintenance was reviewed and updated as appropriate. Return if symptoms worsen or fail to improve. (Comment: Please note this report has been produced using a combination of typing and speech recognition software and may contain errors related to that system including errors in grammar, punctuation, and spelling, as well as words and phrases that may be inappropriate.  If there are any questions or concerns please feel free to contact the dictating provider for clarification.)

## 2021-03-11 LAB — URIC ACID, SERUM: 3.7 MG/DL (ref 2.6–6)

## 2021-03-12 ASSESSMENT — ENCOUNTER SYMPTOMS
NAUSEA: 0
GASTROINTESTINAL NEGATIVE: 1
SINUS PRESSURE: 1
HOARSE VOICE: 0
BLOOD IN STOOL: 0
SORE THROAT: 0
ABDOMINAL PAIN: 0
RESPIRATORY NEGATIVE: 1
EYES NEGATIVE: 1
COUGH: 0
SWOLLEN GLANDS: 0
ANAL BLEEDING: 0
ALLERGIC/IMMUNOLOGIC NEGATIVE: 1
SHORTNESS OF BREATH: 0

## 2021-03-13 DIAGNOSIS — M53.3 SACROILIAC PAIN: ICD-10-CM

## 2021-03-13 DIAGNOSIS — M54.50 ACUTE MIDLINE LOW BACK PAIN WITHOUT SCIATICA: ICD-10-CM

## 2021-03-13 RX ORDER — METHOCARBAMOL 500 MG/1
500 TABLET, FILM COATED ORAL 3 TIMES DAILY PRN
Qty: 30 TABLET | Refills: 0 | Status: SHIPPED | OUTPATIENT
Start: 2021-03-13 | End: 2022-04-14 | Stop reason: SDUPTHER

## 2021-03-13 NOTE — PROGRESS NOTES
Patient called this on-call provider requesting Robaxin to be sent in for her. She states she had it in the past and it helped her back. She states she is going to call on Monday to request to be seen by Pamela. Robaxin refilled. She was given information below verbally:     Information given to patient- What are the possible side effects of muscle relaxers? Get emergency medical help if you have signs of an allergic reaction: hives; difficult breathing; swelling of your face, lips, tongue, or throat. Call your doctor at once if you have:  · weak or shallow breathing;  · confusion, hallucinations; or  · a seizure (convulsions). Common side effects may include:  · drowsiness, dizziness, weakness, tired feeling;  · headache;  · sleep problems (insomnia);  · nausea, constipation; or  · urinating more often than usual.  This is not a complete list of side effects and others may occur. What other drugs will affect Muscle Relaxers? Taking muscle relaxers with other drugs that make you sleepy or slow your breathing can cause dangerous side effects or death. DO NOT DRIVE OR OPERATE MACHINERY WHILE TAKING MUSCLE RELAXERS.

## 2021-03-26 DIAGNOSIS — F33.9 RECURRENT MAJOR DEPRESSIVE DISORDER, REMISSION STATUS UNSPECIFIED (HCC): ICD-10-CM

## 2021-03-26 RX ORDER — FLUTICASONE PROPIONATE 50 MCG
SPRAY, SUSPENSION (ML) NASAL
Qty: 48 G | Refills: 1 | Status: SHIPPED | OUTPATIENT
Start: 2021-03-26 | End: 2021-05-05

## 2021-04-01 ENCOUNTER — OFFICE VISIT (OUTPATIENT)
Dept: RHEUMATOLOGY | Age: 61
End: 2021-04-01
Payer: MEDICARE

## 2021-04-01 VITALS — TEMPERATURE: 97.4 F | BODY MASS INDEX: 53.92 KG/M2 | HEIGHT: 62 IN | WEIGHT: 293 LBS

## 2021-04-01 DIAGNOSIS — M25.50 ARTHRALGIA, UNSPECIFIED JOINT: Primary | ICD-10-CM

## 2021-04-01 DIAGNOSIS — Z13.89 SCREENING FOR RHEUMATIC DISORDER: ICD-10-CM

## 2021-04-01 DIAGNOSIS — M15.9 GENERALIZED OSTEOARTHRITIS: ICD-10-CM

## 2021-04-01 PROCEDURE — 1036F TOBACCO NON-USER: CPT | Performed by: INTERNAL MEDICINE

## 2021-04-01 PROCEDURE — G8427 DOCREV CUR MEDS BY ELIG CLIN: HCPCS | Performed by: INTERNAL MEDICINE

## 2021-04-01 PROCEDURE — G8417 CALC BMI ABV UP PARAM F/U: HCPCS | Performed by: INTERNAL MEDICINE

## 2021-04-01 PROCEDURE — 99204 OFFICE O/P NEW MOD 45 MIN: CPT | Performed by: INTERNAL MEDICINE

## 2021-04-01 PROCEDURE — 3017F COLORECTAL CA SCREEN DOC REV: CPT | Performed by: INTERNAL MEDICINE

## 2021-04-01 NOTE — PROGRESS NOTES
[address], [place]                                                           [address] [place] [address] [phone] (P) [phone] (F)      Note is transcribed using voice recognition software. Inadvertent computerized transcription errors may be present. Patient identification: mayra Gant : 1960,60 y.o. REASON FOR CONSULTATION:  Patient is being seen at the request of  MADINA Lui CNP / ALISON Flores*evaluation of joint pain. HISTORY OF PRESENT ILLNESS:  61 y.o. female with history of advanced osteoarthritis of knees on Celebrex, hypertension, hyperlipidemia has been experiencing discomfort in her joints for last 4 to 6 years, has been getting worse over time. She has advanced osteoarthritis in her knees, takes Celebrex for pain control. She made this appointment because of discomfort in her hands-states that she is not able to describe well, tries to describe  it as aching, throbbing, tingling sensation that comes suddenly, last for 10 to 15 minutes, different fingers are affected at different times, sometimes shoots up in her wrist, arms up to the neck, does not know what brings it up, gets better without any intervention. Is random-may happen daily or once a week, has no pattern. No focal weakness. No swellings in the hand, no specific a.m. stiffness. She is asymptomatic today in her hands. Normal ADLs. She mentioned the symptoms to her primary care, rheumatological work-up was ordered-negative SOCRATES, CCP, rheumatoid factor. Borderline elevation in sed rate and CRP. No history of psoriasis, inflammatory bowel diseases.   All other review of systems are negative. PMH, PSH,Social history , Meds reviewed. FH-no family history of autoimmune conditions. PHYSICAL EXAM:    Vitals:    Temp 97.4 °F (36.3 °C) (Skin)   Ht 5' 2\" (1.575 m)   Wt (!) 331 lb (150.1 kg)   LMP  (LMP Unknown)   BMI 60.54 kg/m²   AA)x3 well nourished, and well groomed, normal judgement. MKS: I do not appreciate any focally tender, swollen or inflamed joints in the hands in any joints, wrist, elbows, shoulders, ankle and feet joints. Full range of motion all peripheral joints. OA changes noted in her knees, scattered DIPs and CMC's. Physical examination is unrevealing other than asymptomatic OA changes. Normal gait and muscle strength in upper and lower extremities bilaterally. Negative Tinel's and phalanx at wrist.  Spine-   Skin: No rashes, no induration or skin thickening or nodules. HEENT: Normal lids, lacrimal glands and pupils. External inspection of the ears and nose within normal limits. Neck:  Chest: Normal effort  Heart:    Abdomen:   Lymph nodes:   Neurologic:     DATA:       ASSESSMENT AND PLAN:   Diagnosis Orders   1. Arthralgia, unspecified joint     2. Screening for rheumatic disorder     3. Generalized osteoarthritis       Musculoskeletal discomfort are likely from generalized osteoarthritis. No evidence of systemic inflammatory arthritis at this time. I agree on continuing Celebrex. May use diclofenac gel if the need be. We did go over common symptoms of inflammatory arthritis, and in that case she is advised to call me with any worsening symptoms. No need for further work-up at this time. She showed understanding. Patient indicates understanding and agrees with the management plan.   Total time 50 minutes that includes the following-  Preparing to see the patient such as reviewing patients records, pre-charting, preparing the visit on the same day, performing a medically appropriate history and physical examination, counseling and educating patient about diagnosis, management plan, ordering appropriate testings, prescriptions, communicating findings to other care providers, and documenting clinical information and electronic medical record. I thank you for giving me the opportunity to be involved in [address] care and I look forward following Elisa White along with you. If you have any questions or concerns please feel free to contact me at any time.   Carine Harrell MD 04/01/21

## 2021-04-28 ENCOUNTER — OFFICE VISIT (OUTPATIENT)
Dept: FAMILY MEDICINE CLINIC | Age: 61
End: 2021-04-28
Payer: MEDICARE

## 2021-04-28 VITALS
OXYGEN SATURATION: 95 % | HEIGHT: 62 IN | SYSTOLIC BLOOD PRESSURE: 118 MMHG | WEIGHT: 293 LBS | DIASTOLIC BLOOD PRESSURE: 64 MMHG | BODY MASS INDEX: 53.92 KG/M2 | HEART RATE: 85 BPM

## 2021-04-28 DIAGNOSIS — E66.01 MORBID OBESITY WITH BMI OF 50.0-59.9, ADULT (HCC): ICD-10-CM

## 2021-04-28 DIAGNOSIS — R06.00 DYSPNEA, UNSPECIFIED TYPE: ICD-10-CM

## 2021-04-28 DIAGNOSIS — R73.03 PRE-DIABETES: ICD-10-CM

## 2021-04-28 DIAGNOSIS — I83.022 VENOUS STASIS ULCER OF LEFT CALF, UNSPECIFIED ULCER STAGE, UNSPECIFIED WHETHER VARICOSE VEINS PRESENT (HCC): ICD-10-CM

## 2021-04-28 DIAGNOSIS — I10 ESSENTIAL HYPERTENSION: ICD-10-CM

## 2021-04-28 DIAGNOSIS — E78.49 OTHER HYPERLIPIDEMIA: ICD-10-CM

## 2021-04-28 DIAGNOSIS — K21.9 GASTROESOPHAGEAL REFLUX DISEASE WITHOUT ESOPHAGITIS: ICD-10-CM

## 2021-04-28 DIAGNOSIS — L97.229 VENOUS STASIS ULCER OF LEFT CALF, UNSPECIFIED ULCER STAGE, UNSPECIFIED WHETHER VARICOSE VEINS PRESENT (HCC): ICD-10-CM

## 2021-04-28 DIAGNOSIS — D64.9 ANEMIA, UNSPECIFIED TYPE: ICD-10-CM

## 2021-04-28 DIAGNOSIS — E55.9 VITAMIN D DEFICIENCY: ICD-10-CM

## 2021-04-28 DIAGNOSIS — R06.2 WHEEZING: ICD-10-CM

## 2021-04-28 DIAGNOSIS — F51.01 PRIMARY INSOMNIA: ICD-10-CM

## 2021-04-28 DIAGNOSIS — J30.2 SEASONAL ALLERGIES: ICD-10-CM

## 2021-04-28 DIAGNOSIS — G25.81 RESTLESS LEGS SYNDROME (RLS): ICD-10-CM

## 2021-04-28 DIAGNOSIS — H92.02 OTALGIA OF LEFT EAR: ICD-10-CM

## 2021-04-28 DIAGNOSIS — F33.9 RECURRENT MAJOR DEPRESSIVE DISORDER, REMISSION STATUS UNSPECIFIED (HCC): Primary | ICD-10-CM

## 2021-04-28 PROCEDURE — 36415 COLL VENOUS BLD VENIPUNCTURE: CPT | Performed by: NURSE PRACTITIONER

## 2021-04-28 PROCEDURE — 99214 OFFICE O/P EST MOD 30 MIN: CPT | Performed by: NURSE PRACTITIONER

## 2021-04-28 PROCEDURE — G8427 DOCREV CUR MEDS BY ELIG CLIN: HCPCS | Performed by: NURSE PRACTITIONER

## 2021-04-28 PROCEDURE — 3017F COLORECTAL CA SCREEN DOC REV: CPT | Performed by: NURSE PRACTITIONER

## 2021-04-28 PROCEDURE — G8417 CALC BMI ABV UP PARAM F/U: HCPCS | Performed by: NURSE PRACTITIONER

## 2021-04-28 PROCEDURE — 1036F TOBACCO NON-USER: CPT | Performed by: NURSE PRACTITIONER

## 2021-04-28 RX ORDER — ROSUVASTATIN CALCIUM 5 MG/1
TABLET, COATED ORAL
Qty: 90 TABLET | Refills: 1 | Status: SHIPPED | OUTPATIENT
Start: 2021-04-28 | End: 2022-01-19

## 2021-04-28 RX ORDER — LAMOTRIGINE 100 MG/1
100 TABLET ORAL 2 TIMES DAILY
Qty: 180 TABLET | Refills: 1 | Status: SHIPPED | OUTPATIENT
Start: 2021-04-28 | End: 2022-01-19

## 2021-04-28 RX ORDER — FERROUS SULFATE 325(65) MG
325 TABLET ORAL
Qty: 90 TABLET | Refills: 3 | Status: SHIPPED | OUTPATIENT
Start: 2021-04-28

## 2021-04-28 RX ORDER — METOPROLOL SUCCINATE 50 MG/1
TABLET, EXTENDED RELEASE ORAL
Qty: 90 TABLET | Refills: 1 | Status: SHIPPED | OUTPATIENT
Start: 2021-04-28 | End: 2022-02-18 | Stop reason: SDUPTHER

## 2021-04-28 RX ORDER — LAMOTRIGINE 100 MG/1
100 TABLET ORAL DAILY
Qty: 90 TABLET | Refills: 2 | Status: SHIPPED | OUTPATIENT
Start: 2021-04-28 | End: 2021-04-28 | Stop reason: CLARIF

## 2021-04-28 RX ORDER — BUPROPION HYDROCHLORIDE 150 MG/1
TABLET ORAL
Qty: 90 TABLET | Refills: 1 | Status: SHIPPED | OUTPATIENT
Start: 2021-04-28 | End: 2022-01-19

## 2021-04-28 ASSESSMENT — ENCOUNTER SYMPTOMS
SORE THROAT: 0
ORTHOPNEA: 0
RHINORRHEA: 0
ABDOMINAL PAIN: 0
SPUTUM PRODUCTION: 0
VOMITING: 0
WHEEZING: 1
HEMOPTYSIS: 0
SHORTNESS OF BREATH: 1
SWOLLEN GLANDS: 0

## 2021-04-28 ASSESSMENT — PATIENT HEALTH QUESTIONNAIRE - PHQ9
4. FEELING TIRED OR HAVING LITTLE ENERGY: 3
SUM OF ALL RESPONSES TO PHQ QUESTIONS 1-9: 5
SUM OF ALL RESPONSES TO PHQ9 QUESTIONS 1 & 2: 1
SUM OF ALL RESPONSES TO PHQ QUESTIONS 1-9: 5
3. TROUBLE FALLING OR STAYING ASLEEP: 1

## 2021-04-28 NOTE — PROGRESS NOTES
time. Patient is  following a low fat, low cholesterol diet. She is not exercising regularly. Lab Results   Component Value Date    CHOL 163 01/08/2018    TRIG 109 01/08/2018    HDL 59 01/08/2018    LDLCALC 82 01/08/2018     Lab Results   Component Value Date    ALT 20 10/17/2019    AST 16 10/17/2019      The ASCVD Risk score (Christel Ocampo, et al., 2013) failed to calculate for the following reasons:    Cannot find a previous HDL lab    Cannot find a previous total cholesterol lab    Vitamin D Deficiency  Patient with vitamin d deficiency and currently on supplement without adverse reactions. Lab Results   Component Value Date    VITD25 44.0 01/08/2018       Mood Disorder:  Patient presents for follow-up of depression. Current complaints include: See PHQ9 below . She denies tearfulness, decreased libido, irritability, excessive worry, panic attacks, obsessive thoughts, compulsive behaviors, increased use of drugs or alcohol, suicidal thoughts or behavior, and impaired memory. Symptoms/signs of chapito: none. External stressors: nothing new. Current treatment includes: Wellbutrin- xl 150 mg, lamictal 100 mg bid and trintellix 20 mg daily. Medication side effects: none. PHQ-9  4/28/2021 11/20/2020 11/4/2019 6/13/2018 5/16/2018 6/16/2017 8/19/2016   Little interest or pleasure in doing things 0 3 1 3 3 1 0   Feeling down, depressed, or hopeless 1 2 0 2 3 1 0   Trouble falling or staying asleep, or sleeping too much 1 3 2 1 3 - -   Feeling tired or having little energy 3 3 1 3 3 - -   Poor appetite or overeating 0 3 0 3 2 - -   Feeling bad about yourself - or that you are a failure or have let yourself or your family down 0 0 0 2 3 - -   Trouble concentrating on things, such as reading the newspaper or watching television 0 2 1 1 2 - -   Moving or speaking so slowly that other people could have noticed.  Or the opposite - being so fidgety or restless that you have been moving around a lot more than usual 0 0 1 0 0 - -   Thoughts that you would be better off dead, or of hurting yourself in some way 0 0 0 1 1 - -   PHQ-2 Score 1 5 1 5 6 2 0   PHQ-9 Total Score 5 16 6 16 20 2 0   If you checked off any problems, how difficult have these problems made it for you to do your work, take care of things at home, or get along with other people? 1 1 1 2 2 - -     Interpretation of Total Score Total Score Depression Severity: 1-4 = Minimal depression, 5-9 = Mild depression, 10-14 = Moderate depression, 15-19 = Moderately severe depression, 20-27 = Severe depression      Review of Systems   Constitutional: Positive for fatigue. Negative for appetite change, fever and unexpected weight change. HENT: Positive for ear pain (left ear) and postnasal drip. Negative for rhinorrhea and sore throat. Eyes: Negative. Respiratory: Positive for shortness of breath and wheezing. Negative for hemoptysis and sputum production. Cardiovascular: Negative. Negative for chest pain, palpitations, orthopnea, claudication, leg swelling, syncope and PND. Gastrointestinal: Negative. Negative for abdominal pain, anal bleeding, blood in stool, nausea and vomiting. Endocrine: Negative. Genitourinary: Negative. Negative for hematuria. Musculoskeletal: Negative. Negative for neck pain. Skin: Negative. Negative for rash. Allergic/Immunologic: Negative. Neurological: Negative. Negative for dizziness, syncope, light-headedness, numbness and headaches. Hematological: Negative. Does not bruise/bleed easily. Psychiatric/Behavioral: Negative. All other systems reviewed and are negative.        Past Medical History:   Diagnosis Date    Bipolar disorder     Borderline osteopenia     Cellulitis of left leg 8/5/2020    GERD (gastroesophageal reflux disease)     Headache(784.0)     HNP (herniated nucleus pulposus), lumbar 6/6/2019    Hyperlipidemia     Hypertension     Intention tremor     due to lithium    Iron deficiency anemia 2019    Lateral meniscal tear 10/14/2015    Lumbar stenosis with neurogenic claudication 2019    Medial meniscus tear 10/14/2015    Prolonged emergence from general anesthesia, initial encounter 2019    Tobacco use     Venous ulcer of left leg ([address] 2020     Family History   Problem Relation Age of Onset    Depression Sister     Mental Illness Sister     Diabetes Mother     Heart Disease Mother     Diabetes Father     Heart Disease Father      Past Surgical History:   Procedure Laterality Date    ABDOMINAL EXPLORATION SURGERY      CARPAL TUNNEL RELEASE Bilateral 2005     SECTION      COLONOSCOPY      normal    COLONOSCOPY N/A 2021    COLON W/ANES. (13:30) performed by Simon Nuñez MD at [address] Left     atrhroscopic unispacer    KNEE SURGERY Right 10/28/2015    Right knee video arthroscopy with partial medial and lateral menisectomy with loose body removal    LUMBAR SPINE SURGERY N/A 2019    MICROLUMBAR DISCECTOMY L4-5 performed by Stephanie Watson MD at [address] History     Socioeconomic History    Marital status:       Spouse name: Not on file    Number of children: Not on file    Years of education: Not on file    Highest education level: Not on file   Occupational History    Not on file   Social Needs    Financial resource strain: Not on file    Food insecurity     Worry: Not on file     Inability: Not on file    Transportation needs     Medical: Not on file     Non-medical: Not on file   Tobacco Use    Smoking status: Former Smoker     Packs/day: 1.50     Years: 21.00     Pack years: 31.50     Types: Cigarettes     Quit date: 1997     Years since quittin.2    Smokeless tobacco: Never Used   Substance and Sexual Activity    Alcohol use: No    Drug use: No    Sexual activity: Not Currently   Lifestyle    Physical activity Days per week: Not on file     Minutes per session: Not on file    Stress: Not on file   Relationships    Social connections     Talks on phone: Not on file     Gets together: Not on file     Attends Judaism service: Not on file     Active member of club or organization: Not on file     Attends meetings of clubs or organizations: Not on file     Relationship status: Not on file    Intimate partner violence     Fear of current or ex partner: Not on file     Emotionally abused: Not on file     Physically abused: Not on file     Forced sexual activity: Not on file   Other Topics Concern    Not on file   Social History Narrative    Not on file     Current Outpatient Medications   Medication Sig Dispense Refill    fluticasone (FLONASE) 50 MCG/ACT nasal spray USE 2 SPRAYS IN EACH NOSTRIL EVERY DAY 48 g 1    VORTIoxetine HBr (TRINTELLIX) 20 MG TABS tablet TAKE 1 TABLET EVERY DAY 90 tablet 1    celecoxib (CELEBREX) 200 MG capsule Take 1 capsule by mouth 2 times daily 180 capsule 1    rosuvastatin (CRESTOR) 5 MG tablet TAKE 1 TABLET EVERY DAY 90 tablet 1    buPROPion (WELLBUTRIN XL) 150 MG extended release tablet TAKE 1 TABLET EVERY DAY 90 tablet 1    metoprolol succinate (TOPROL XL) 50 MG extended release tablet TAKE 1 TABLET EVERY DAY 90 tablet 1    loratadine (CLARITIN) 10 MG tablet Take 10 mg by mouth daily      ferrous sulfate 325 (65 Fe) MG tablet Take 1 tablet by mouth daily (with breakfast) 90 tablet 3    CALCIUM-MAGNESIUM-VITAMIN D PO Take 2 capsules by mouth nightly      multivitamin (THERAGRAN) per tablet Take 1 tablet by mouth daily.  Omega-3 Fatty Acids (FISH OIL) 1200 MG CAPS Take  by mouth daily. No current facility-administered medications for this visit. No changes in past medical history, past surgical history, social history, orfamily history were noted during the patient encounter unless specifically listed above.   All updates of past medical history, past surgical history, social history, or family history were reviewed personally by me [facility] office visit. All problems listed in the assessment are stable unless noted otherwise. Medication profile reviewed personally by me during the office visit. Medication side effects and possible impairments frommedications were discussed as applicable. Objective:     /64 (Site: Left Upper Arm, Position: Sitting, Cuff Size: Large Adult)   Pulse 85   Ht 5' 2\" (1.575 m)   Wt (!) 328 lb (148.8 kg)   LMP  (LMP Unknown)   SpO2 95%   BMI 59.99 kg/m²   Body mass index is 59.99 kg/m². Wt Readings from Last 3 Encounters:   04/28/21 (!) 328 lb (148.8 kg)   04/01/21 (!) 331 lb (150.1 kg)   03/10/21 (!) 331 lb (150.1 kg)       Physical Exam  Vitals signs and nursing note reviewed. Constitutional:       General: She is not in acute distress. Appearance: Normal appearance. She is well-developed. She is obese. HENT:      Head: Normocephalic and atraumatic. Right Ear: Tympanic membrane, ear canal and external ear normal.      Left Ear: Tympanic membrane, ear canal and external ear normal.      Nose: Nose normal.      Mouth/Throat:      Pharynx: Uvula midline. No oropharyngeal exudate. Eyes:      General: Lids are normal.      Conjunctiva/sclera: Conjunctivae normal.      Pupils: Pupils are equal, round, and reactive to light. Neck:      Musculoskeletal: Normal range of motion and neck supple. Thyroid: No thyromegaly. Vascular: No carotid bruit or JVD. Cardiovascular:      Rate and Rhythm: Normal rate and regular rhythm. Pulses: Normal pulses. Radial pulses are 2+ on the right side and 2+ on the left side. Dorsalis pedis pulses are 2+ on the right side and 2+ on the left side. Posterior tibial pulses are 2+ on the right side and 2+ on the left side. Heart sounds: Normal heart sounds. No murmur. No friction rub. No gallop.     Pulmonary:      Effort: Pulmonary effort is normal.      Breath sounds: Normal breath sounds. Abdominal:      General: Bowel sounds are normal.      Palpations: Abdomen is soft. There is no mass. Tenderness: There is no abdominal tenderness. Musculoskeletal: Normal range of motion. Right lower leg: No edema. Left lower leg: No edema. Lymphadenopathy:      Head:      Right side of head: No submandibular adenopathy. Left side of head: No submandibular adenopathy. Cervical: No cervical adenopathy. Skin:     General: Skin is warm and dry. Findings: No lesion or rash. Neurological:      Mental Status: She is alert and oriented to person, place, and time. Gait: Gait normal.   Psychiatric:         Speech: Speech normal.         Behavior: Behavior normal.         Thought Content: Thought content normal.         Judgment: Judgment normal.         Lab Review   Office Visit on 03/10/2021   Component Date Value    Uric Acid, Serum 03/10/2021 3.7        No results found for this visit on 04/28/21. Assessment:       1. Recurrent major depressive disorder, remission status unspecified ([address]    2. Morbid obesity with BMI of 50.0-59.9, adult ([address]    3. Dyspnea, unspecified type    4. Wheezing    5. Essential hypertension    6. Otalgia of left ear    7. Other hyperlipidemia    8. Restless legs syndrome (RLS)    9. Primary insomnia    10. Gastroesophageal reflux disease without esophagitis    11. Pre-diabetes    12. Anemia, unspecified type    13. Vitamin D deficiency    14. Venous stasis ulcer of left calf, unspecified ulcer stage, unspecified whether varicose veins present ([address]    15. Seasonal allergies        No results found for this visit on 04/28/21. Plan:       Taylor Amin was seen today for hypertension, hyperlipidemia, insomnia, depression, other and ear fullness.     Diagnoses and all orders for this visit:    Recurrent major depressive disorder, remission status unspecified ([address]  Condition appears stable with current medication regimen. No reported or noted side effects of medication. Will continue to monitor at routine intervals as appropriate. Continue current medications as follows:  -     buPROPion (WELLBUTRIN XL) 150 MG extended release tablet; TAKE 1 TABLET EVERY DAY  -     lamoTRIgine (LAMICTAL) 100 MG tablet; Take 1 tablet by mouth 2 times daily  Educated if patient develops SI/HI/chapito to call 911 or go to ER. Discussed use, benefit, risks and side effects of prescribed medications. Barriers to compliance discussed. All patient questions answered. Pt voiced understanding. Morbid obesity with BMI of 50.0-59.9, adult ([facility]   Discussed healthy lifestyle choices to attempt to incorporate into daily routine to attempt to obtain and maintain a healthy weight. General weight loss/lifestyle modification strategies discussed (elicit support from others; identify saboteurs; non-food rewards, etc). Discussed healthy nutritional options as well. Diet interventions: moderate (500 kCal/d) deficit diet. Disscussed trying to incorporate routine physical activity into daily regimen. Informal exercise measures discussed, e.g. taking stairs instead of elevator. Regular aerobic exercise program discussed. Surgical Procedure: was not discussed  Behavioral treatment: discussed commercial programs (Weight Watchers, Nutrisystem,etc), Overeaters Anonymous, Slim Fast, stress management and TOPS. Discussed consequences of obesity in terms of medical conditions that may develop in the future. Patient verbalized understanding. Dyspnea, unspecified type  -     Full PFT Study With Bronchodilator; Future  Former smoker, need to check for COPD    Wheezing  -     Full PFT Study With Bronchodilator;  Future    Essential hypertension  -     CBC Auto Differential  -     metoprolol succinate (TOPROL XL) 50 MG extended release tablet; TAKE 1 TABLET EVERY DAY  Hypertension, Blood pressure is  well controlled on current medication regimen. Medication: no change. Dietary sodium restriction. Regular aerobic exercise. Check blood pressures monthly and record. Otalgia of left ear--chronic  Referred to  -     Anna Brown DO, Otolaryngology, [facility]    Other hyperlipidemia  -     Lipid Panel  -     Comprehensive Metabolic Panel  -     TSH without Reflex  Condition appears stable with current medication regimen. No reported or noted side effects of medication. Will continue to monitor at routine intervals as appropriate. Continue current medications as follows:  -     rosuvastatin (CRESTOR) 5 MG tablet; TAKE 1 TABLET EVERY DAY    Restless legs syndrome (RLS)  Condition appears stable with current medication regimen. Continue current medications. No reported or noted side effects of medication. Will continue to monitor at routine intervals as appropriate. Primary insomnia  Condition appears stable with current medication regimen. Continue current medications. No reported or noted side effects of medication. Will continue to monitor at routine intervals as appropriate. Gastroesophageal reflux disease without esophagitis  Condition appears stable with current medication regimen. Continue current medications. No reported or noted side effects of medication. Will continue to monitor at routine intervals as appropriate. Pre-diabetes  Monitor glucose at least yearly    Anemia, unspecified type  Condition appears stable with current medication regimen. No reported or noted side effects of medication. Will continue to monitor at routine intervals as appropriate. Continue current medications as follows:  -     ferrous sulfate (IRON 325) 325 (65 Fe) MG tablet;  Take 1 tablet by mouth daily (with breakfast)  -     Ferritin  -     Iron and TIBC    Vitamin D deficiency  -     Vitamin D 25 Hydroxy  Discussed with patient that we make vitamin D from the sun and get it from some food sources, but it is very common to be deficient. Discussed the need for vitamin D replacement because low vitamin D can cause fatigue, joint aches and has been implicated in heart disease, bone disease like osteoporosis, and some other chronic illnesses. Patient will start/continue vitamin D supplement as per order. Recommend vitamin D to be rechecked in 6 months. Venous stasis ulcer of left calf, unspecified ulcer stage, unspecified whether varicose veins present [facility])  Resolved    Seasonal allergies  -     Anna Funk DO, Otolaryngology, [facility]      Patient has been instructed call the office immediately with new symptoms, change in symptoms or worseningof symptoms. If this is not feasible, patient is instructed to report to the emergency room. Medication profile reviewed. Medication side effects and possible impairments from medications were discussed as applicable. Allergies were reviewed. Health maintenance was reviewed and updated as appropriate.

## 2021-04-29 LAB
A/G RATIO: 2 (ref 1.1–2.2)
ALBUMIN SERPL-MCNC: 4.1 G/DL (ref 3.4–5)
ALP BLD-CCNC: 117 U/L (ref 40–129)
ALT SERPL-CCNC: 16 U/L (ref 10–40)
ANION GAP SERPL CALCULATED.3IONS-SCNC: 10 MMOL/L (ref 3–16)
AST SERPL-CCNC: 15 U/L (ref 15–37)
BASOPHILS ABSOLUTE: 0 K/UL (ref 0–0.2)
BASOPHILS RELATIVE PERCENT: 0.7 %
BILIRUB SERPL-MCNC: 0.3 MG/DL (ref 0–1)
BUN BLDV-MCNC: 16 MG/DL (ref 7–20)
CALCIUM SERPL-MCNC: 8.9 MG/DL (ref 8.3–10.6)
CHLORIDE BLD-SCNC: 106 MMOL/L (ref 99–110)
CHOLESTEROL, TOTAL: 150 MG/DL (ref 0–199)
CO2: 30 MMOL/L (ref 21–32)
CREAT SERPL-MCNC: 0.6 MG/DL (ref 0.6–1.2)
EOSINOPHILS ABSOLUTE: 0.2 K/UL (ref 0–0.6)
EOSINOPHILS RELATIVE PERCENT: 3 %
FERRITIN: 76.3 NG/ML (ref 15–150)
GFR AFRICAN AMERICAN: >60
GFR NON-AFRICAN AMERICAN: >60
GLOBULIN: 2.1 G/DL
GLUCOSE BLD-MCNC: 88 MG/DL (ref 70–99)
HCT VFR BLD CALC: 41 % (ref 36–48)
HDLC SERPL-MCNC: 55 MG/DL (ref 40–60)
HEMOGLOBIN: 13.3 G/DL (ref 12–16)
IRON SATURATION: 18 % (ref 15–50)
IRON: 56 UG/DL (ref 37–145)
LDL CHOLESTEROL CALCULATED: 77 MG/DL
LYMPHOCYTES ABSOLUTE: 1.6 K/UL (ref 1–5.1)
LYMPHOCYTES RELATIVE PERCENT: 25.4 %
MCH RBC QN AUTO: 28.7 PG (ref 26–34)
MCHC RBC AUTO-ENTMCNC: 32.4 G/DL (ref 31–36)
MCV RBC AUTO: 88.7 FL (ref 80–100)
MONOCYTES ABSOLUTE: 0.5 K/UL (ref 0–1.3)
MONOCYTES RELATIVE PERCENT: 8.1 %
NEUTROPHILS ABSOLUTE: 4.1 K/UL (ref 1.7–7.7)
NEUTROPHILS RELATIVE PERCENT: 62.8 %
PDW BLD-RTO: 15.5 % (ref 12.4–15.4)
PLATELET # BLD: 283 K/UL (ref 135–450)
PMV BLD AUTO: 7.5 FL (ref 5–10.5)
POTASSIUM SERPL-SCNC: 4.3 MMOL/L (ref 3.5–5.1)
RBC # BLD: 4.62 M/UL (ref 4–5.2)
SODIUM BLD-SCNC: 146 MMOL/L (ref 136–145)
TOTAL IRON BINDING CAPACITY: 320 UG/DL (ref 260–445)
TOTAL PROTEIN: 6.2 G/DL (ref 6.4–8.2)
TRIGL SERPL-MCNC: 92 MG/DL (ref 0–150)
TSH SERPL DL<=0.05 MIU/L-ACNC: 1.55 UIU/ML (ref 0.27–4.2)
VITAMIN D 25-HYDROXY: 32.3 NG/ML
VLDLC SERPL CALC-MCNC: 18 MG/DL
WBC # BLD: 6.5 K/UL (ref 4–11)

## 2021-05-01 ASSESSMENT — ENCOUNTER SYMPTOMS
ALLERGIC/IMMUNOLOGIC NEGATIVE: 1
BLOOD IN STOOL: 0
ANAL BLEEDING: 0
GASTROINTESTINAL NEGATIVE: 1
EYES NEGATIVE: 1
NAUSEA: 0

## 2021-05-05 ENCOUNTER — OFFICE VISIT (OUTPATIENT)
Dept: ENT CLINIC | Age: 61
End: 2021-05-05
Payer: MEDICARE

## 2021-05-05 VITALS
BODY MASS INDEX: 55.32 KG/M2 | DIASTOLIC BLOOD PRESSURE: 56 MMHG | HEIGHT: 61 IN | HEART RATE: 72 BPM | SYSTOLIC BLOOD PRESSURE: 138 MMHG | WEIGHT: 293 LBS | TEMPERATURE: 97.3 F

## 2021-05-05 DIAGNOSIS — J30.9 ALLERGIC RHINITIS, UNSPECIFIED SEASONALITY, UNSPECIFIED TRIGGER: Primary | ICD-10-CM

## 2021-05-05 DIAGNOSIS — R09.82 POST-NASAL DRIP: ICD-10-CM

## 2021-05-05 PROCEDURE — 3017F COLORECTAL CA SCREEN DOC REV: CPT | Performed by: OTOLARYNGOLOGY

## 2021-05-05 PROCEDURE — G8427 DOCREV CUR MEDS BY ELIG CLIN: HCPCS | Performed by: OTOLARYNGOLOGY

## 2021-05-05 PROCEDURE — 1036F TOBACCO NON-USER: CPT | Performed by: OTOLARYNGOLOGY

## 2021-05-05 PROCEDURE — 99204 OFFICE O/P NEW MOD 45 MIN: CPT | Performed by: OTOLARYNGOLOGY

## 2021-05-05 PROCEDURE — G8417 CALC BMI ABV UP PARAM F/U: HCPCS | Performed by: OTOLARYNGOLOGY

## 2021-05-05 RX ORDER — AZELASTINE 1 MG/ML
1 SPRAY, METERED NASAL 2 TIMES DAILY
Qty: 3 BOTTLE | Refills: 3 | Status: SHIPPED | OUTPATIENT
Start: 2021-05-05 | End: 2021-05-19 | Stop reason: SINTOL

## 2021-05-05 RX ORDER — MONTELUKAST SODIUM 10 MG/1
10 TABLET ORAL NIGHTLY
Qty: 90 TABLET | Refills: 3 | Status: SHIPPED | OUTPATIENT
Start: 2021-05-05 | End: 2021-05-19 | Stop reason: SINTOL

## 2021-05-05 RX ORDER — FLUTICASONE PROPIONATE 50 MCG
1 SPRAY, SUSPENSION (ML) NASAL 2 TIMES DAILY
Qty: 3 BOTTLE | Refills: 3 | Status: SHIPPED | OUTPATIENT
Start: 2021-05-05 | End: 2022-07-22 | Stop reason: SDUPTHER

## 2021-05-05 RX ORDER — AZELASTINE 1 MG/ML
1 SPRAY, METERED NASAL 2 TIMES DAILY
Qty: 1 BOTTLE | Refills: 0 | Status: SHIPPED | OUTPATIENT
Start: 2021-05-05 | End: 2021-05-05 | Stop reason: SDUPTHER

## 2021-05-05 RX ORDER — FLUTICASONE PROPIONATE 50 MCG
1 SPRAY, SUSPENSION (ML) NASAL 2 TIMES DAILY
Qty: 1 BOTTLE | Refills: 0 | Status: SHIPPED | OUTPATIENT
Start: 2021-05-05 | End: 2021-05-05 | Stop reason: SDUPTHER

## 2021-05-05 RX ORDER — MONTELUKAST SODIUM 10 MG/1
10 TABLET ORAL NIGHTLY
Qty: 30 TABLET | Refills: 0 | Status: SHIPPED | OUTPATIENT
Start: 2021-05-05 | End: 2021-05-05 | Stop reason: SDUPTHER

## 2021-05-05 ASSESSMENT — ENCOUNTER SYMPTOMS
SORE THROAT: 0
VOICE CHANGE: 0
COUGH: 0
SINUS PRESSURE: 0
APNEA: 0
TROUBLE SWALLOWING: 0
SHORTNESS OF BREATH: 0
EYE ITCHING: 1
FACIAL SWELLING: 0

## 2021-05-05 NOTE — PROGRESS NOTES
 GASTRIC BYPASS SURGERY      KNEE SURGERY Left     atrhroscopic unispacer    KNEE SURGERY Right 10/28/2015    Right knee video arthroscopy with partial medial and lateral menisectomy with loose body removal    LUMBAR SPINE SURGERY N/A 2019    MICROLUMBAR DISCECTOMY L4-5 performed by Chavo Johnson MD at [address] [place] [address]         Family History     Family History   Problem Relation Age of Onset    Depression Sister     Mental Illness Sister     Diabetes Mother     Heart Disease Mother     Diabetes Father     Heart Disease Father        Social History     Social History     Tobacco Use    Smoking status: Former Smoker     Packs/day: 1.50     Years: 21.00     Pack years: 31.50     Types: Cigarettes     Quit date: 1997     Years since quittin.2    Smokeless tobacco: Never Used   Substance Use Topics    Alcohol use: No    Drug use: No        Allergies     Allergies   Allergen Reactions    Calamine Other (See Comments)     Leaves skin tender and burning     Amoxicillin-Pot Clavulanate Hives    Daypro [Oxaprozin] Hives    Duravent-Da [Chlorphen-Phenyleph-Methscop] Hives    Lithium      Caused shakes    Norvasc [Amlodipine]      LE edema      Nsaids      Avoid d/t gastric bypass    Seldane [Terfenadine] Hives    Suprax [Cefixime] Hives    Levofloxacin Rash       Medications     Current Outpatient Medications   Medication Sig Dispense Refill    azelastine (ASTELIN) 0.1 % nasal spray 1 spray by Nasal route 2 times daily Use in each nostril as directed 3 Bottle 3    fluticasone (FLONASE) 50 MCG/ACT nasal spray 1 spray by Nasal route 2 times daily 3 Bottle 3    montelukast (SINGULAIR) 10 MG tablet Take 1 tablet by mouth nightly 90 tablet 3    rosuvastatin (CRESTOR) 5 MG tablet TAKE 1 TABLET EVERY DAY 90 tablet 1    ferrous sulfate (IRON 325) 325 (65 Fe) MG tablet Take 1 tablet by mouth daily (with breakfast) 90 tablet 3    metoprolol succinate (TOPROL XL) 50 MG extended General: She is not in acute distress. Appearance: She is well-developed. HENT:      Head: Normocephalic and atraumatic. Right Ear: Tympanic membrane, ear canal and external ear normal. No drainage. No middle ear effusion. Tympanic membrane is not bulging. Tympanic membrane has normal mobility. Left Ear: Tympanic membrane, ear canal and external ear normal. No drainage. No middle ear effusion. Tympanic membrane is not bulging. Tympanic membrane has normal mobility. Nose: Rhinorrhea present. No septal deviation or mucosal edema. Rhinorrhea is clear. Right Turbinates: Swollen and pale. Left Turbinates: Swollen and pale. Mouth/Throat:      Lips: Pink. Mouth: Mucous membranes are moist.      Tongue: No lesions. Palate: No mass. Pharynx: Uvula midline. Eyes:      Pupils: Pupils are equal, round, and reactive to light. Neck:      Musculoskeletal: Full passive range of motion without pain. Thyroid: No thyroid mass or thyromegaly. Trachea: Trachea and phonation normal.   Cardiovascular:      Pulses: Normal pulses. Pulmonary:      Effort: Pulmonary effort is normal. No accessory muscle usage or respiratory distress. Breath sounds: No stridor. Lymphadenopathy:      Head:      Right side of head: No submental or submandibular adenopathy. Left side of head: No submental or submandibular adenopathy. Cervical: No cervical adenopathy. Right cervical: No superficial, deep or posterior cervical adenopathy. Left cervical: No superficial, deep or posterior cervical adenopathy. Skin:     General: Skin is warm and dry. Neurological:      Mental Status: She is alert and oriented to person, place, and time. Cranial Nerves: No cranial nerve deficit. Coordination: Coordination normal.      Gait: Gait normal.   Psychiatric:         Thought Content: Thought content normal.           Assessment and Plan     1.  Allergic rhinitis, unspecified seasonality, unspecified trigger  -Longstanding with previous immunotherapy  -Currently on Flonase and Claritin  -Continue Flonase, Claritin start Astelin, Singulair  -Discussed black box warning associated with Singulair patient understands to stop medication should she develop any vivid nightmares  - azelastine (ASTELIN) 0.1 % nasal spray; 1 spray by Nasal route 2 times daily Use in each nostril as directed  Dispense: 3 Bottle; Refill: 3  - fluticasone (FLONASE) 50 MCG/ACT nasal spray; 1 spray by Nasal route 2 times daily  Dispense: 3 Bottle; Refill: 3  - montelukast (SINGULAIR) 10 MG tablet; Take 1 tablet by mouth nightly  Dispense: 90 tablet; Refill: 3    2. Post-nasal drip  -Continue Flonase, Claritin start Astelin, Singulair      Follow-up in 4 weeks if symptoms persist will proceed with allergy testing at that time        Radha Santos DO  5/5/21      Portions of this note were dictated using Dragon.  There may be linguistic errors secondary to the use of this program.

## 2021-05-07 ENCOUNTER — OFFICE VISIT (OUTPATIENT)
Dept: PRIMARY CARE CLINIC | Age: 61
End: 2021-05-07
Payer: MEDICARE

## 2021-05-07 DIAGNOSIS — Z01.818 PREOP TESTING: Primary | ICD-10-CM

## 2021-05-07 PROCEDURE — G8417 CALC BMI ABV UP PARAM F/U: HCPCS | Performed by: NURSE PRACTITIONER

## 2021-05-07 PROCEDURE — 99211 OFF/OP EST MAY X REQ PHY/QHP: CPT | Performed by: NURSE PRACTITIONER

## 2021-05-07 PROCEDURE — G8428 CUR MEDS NOT DOCUMENT: HCPCS | Performed by: NURSE PRACTITIONER

## 2021-05-07 NOTE — PATIENT INSTRUCTIONS
Advance Care Planning  People with COVID-19 may have no symptoms, mild symptoms, such as fever, cough, and shortness of breath or they may have more severe illness, developing severe and fatal pneumonia. As a result, Advance Care Planning with attention to naming a health care decision maker (someone you trust to make healthcare decisions for you if you could not speak for yourself) and sharing other health care preferences is important BEFORE a possible health crisis. Please contact your Primary Care Provider to discuss Advance Care Planning. Preventing the Spread of Coronavirus Disease 2019 in Homes and Residential Communities  For the most recent information go to [email or web address]    Prevention steps for People with confirmed or suspected COVID-19 (including persons under investigation) who do not need to be hospitalized  and   People with confirmed COVID-19 who were hospitalized and determined to be medically stable to go home    Your healthcare provider and public health staff will evaluate whether you can be cared for at home. If it is determined that you do not need to be hospitalized and can be isolated at home, you will be monitored by staff from your local or state health department. You should follow the prevention steps below until a healthcare provider or local or state health department says you can return to your normal activities. Stay home except to get medical care  People who are mildly ill with COVID-19 are able to isolate at home during their illness. You should restrict activities outside your home, except for getting medical care. Do not go to work, school, or public areas. Avoid using public transportation, ride-sharing, or taxis. Separate yourself from other people and animals in your home  People: As much as possible, you should stay in a specific room and away from other people in your home.  Also, you should use a separate have a medical emergency and need to call 911, notify the dispatch personnel that you have, or are being evaluated for COVID-19. If possible, put on a facemask before emergency medical services arrive. Discontinuing home isolation  Patients with confirmed COVID-19 should remain under home isolation precautions until the risk of secondary transmission to others is thought to be low. The decision to discontinue home isolation precautions should be made on a case-by-case basis, in consultation with healthcare providers and state and local health departments.

## 2021-05-07 NOTE — PROGRESS NOTES
Bear Dean received a viral test for COVID-19. They were educated on isolation and quarantine as appropriate. For any symptoms, they were directed to seek care from their PCP, given contact information to establish with a doctor, directed to an urgent care or the emergency room.
Yes

## 2021-05-08 LAB — SARS-COV-2: NOT DETECTED

## 2021-05-13 ENCOUNTER — HOSPITAL ENCOUNTER (OUTPATIENT)
Dept: PULMONOLOGY | Age: 61
Discharge: HOME | End: 2021-05-13
Payer: MEDICARE

## 2021-05-13 VITALS — OXYGEN SATURATION: 97 %

## 2021-05-13 DIAGNOSIS — R06.2 WHEEZING: ICD-10-CM

## 2021-05-13 DIAGNOSIS — R06.00 DYSPNEA, UNSPECIFIED TYPE: ICD-10-CM

## 2021-05-13 PROCEDURE — 94640 AIRWAY INHALATION TREATMENT: CPT

## 2021-05-13 PROCEDURE — 6370000000 HC RX 637 (ALT 250 FOR IP): Performed by: NURSE PRACTITIONER

## 2021-05-13 PROCEDURE — 94760 N-INVAS EAR/PLS OXIMETRY 1: CPT

## 2021-05-13 PROCEDURE — 94060 EVALUATION OF WHEEZING: CPT

## 2021-05-13 PROCEDURE — 94726 PLETHYSMOGRAPHY LUNG VOLUMES: CPT

## 2021-05-13 PROCEDURE — 94729 DIFFUSING CAPACITY: CPT

## 2021-05-13 RX ORDER — ALBUTEROL SULFATE 90 UG/1
2 AEROSOL, METERED RESPIRATORY (INHALATION) ONCE
Status: COMPLETED | OUTPATIENT
Start: 2021-05-13 | End: 2021-05-13

## 2021-05-13 RX ADMIN — Medication 2 PUFF: at 13:57

## 2021-05-17 ENCOUNTER — TELEPHONE (OUTPATIENT)
Dept: FAMILY MEDICINE CLINIC | Age: 61
End: 2021-05-17

## 2021-05-17 NOTE — TELEPHONE ENCOUNTER
----- Message from Kelly Wise sent at 5/17/2021  9:45 AM EDT -----  Subject: Appointment Request    Reason for Call: Urgent Back Neck Pain    QUESTIONS  Type of Appointment? Established Patient  Reason for appointment request? No appointments available during search  Additional Information for Provider? Patient would like an appointment   with Miryam Swanson about back pain that began about a week or aa week and a half   ago. Patient screened green  ---------------------------------------------------------------------------  --------------  CALL BACK INFO  What is the best way for the office to contact you? OK to leave message on   voicemail  Preferred Call Back Phone Number? [phone]  ---------------------------------------------------------------------------  --------------  SCRIPT ANSWERS  Relationship to Patient? Self  Appointment reason? Symptomatic  Select script based on patient symptoms? Adult Back or Neck Pain [Slipped   disc, Herniated disc, sciatica]   Did you have an injury or trauma within the past 3 days? No  Are you having numbness, tingling, or weakness in your arms and/or legs   with this pain? No  Are you having new problems with your bowel or bladder control? No  Are you having fevers (100.4), chills, or sweats? No  Did your pain begin within the past 14 days? Yes  Have you been diagnosed with, tested for, or told that you are suspected   of having COVID-19 (Coronavirus)? Yes  Did your symptoms begin or have you been tested for COVID-19 in the last   10 days? No  Have you had a fever or taken medication to treat a fever within the past   3 days? No  Have you had a cough, shortness of breath or flu-like symptoms within the   past 3 days? No  Do you currently have flu-like symptoms including fever or chills, cough,   shortness of breath, or difficulty breathing, or new loss of taste or   smell? No  (Service Expert  click yes below to proceed with [facility] As Usual   Scheduling)?  Yes

## 2021-05-17 NOTE — TELEPHONE ENCOUNTER
Patient can be scheduled at 940 on Wednesday morning however if she is unable to wait until then you can see if Jennifer Esquivel has any availability today or tomorrow

## 2021-05-19 ENCOUNTER — TELEPHONE (OUTPATIENT)
Dept: FAMILY MEDICINE CLINIC | Age: 61
End: 2021-05-19

## 2021-05-19 ENCOUNTER — OFFICE VISIT (OUTPATIENT)
Dept: FAMILY MEDICINE CLINIC | Age: 61
End: 2021-05-19
Payer: MEDICARE

## 2021-05-19 VITALS
HEART RATE: 87 BPM | DIASTOLIC BLOOD PRESSURE: 84 MMHG | WEIGHT: 293 LBS | SYSTOLIC BLOOD PRESSURE: 156 MMHG | HEIGHT: 64 IN | BODY MASS INDEX: 50.02 KG/M2 | OXYGEN SATURATION: 96 %

## 2021-05-19 DIAGNOSIS — M54.2 NECK PAIN: Primary | ICD-10-CM

## 2021-05-19 DIAGNOSIS — J45.20 MILD INTERMITTENT REACTIVE AIRWAY DISEASE WITHOUT COMPLICATION: ICD-10-CM

## 2021-05-19 DIAGNOSIS — G47.30 SLEEP APNEA, UNSPECIFIED TYPE: Primary | ICD-10-CM

## 2021-05-19 PROCEDURE — 3017F COLORECTAL CA SCREEN DOC REV: CPT | Performed by: NURSE PRACTITIONER

## 2021-05-19 PROCEDURE — G8427 DOCREV CUR MEDS BY ELIG CLIN: HCPCS | Performed by: NURSE PRACTITIONER

## 2021-05-19 PROCEDURE — 1036F TOBACCO NON-USER: CPT | Performed by: NURSE PRACTITIONER

## 2021-05-19 PROCEDURE — G8417 CALC BMI ABV UP PARAM F/U: HCPCS | Performed by: NURSE PRACTITIONER

## 2021-05-19 PROCEDURE — 99213 OFFICE O/P EST LOW 20 MIN: CPT | Performed by: NURSE PRACTITIONER

## 2021-05-19 RX ORDER — ALBUTEROL SULFATE 90 UG/1
2 AEROSOL, METERED RESPIRATORY (INHALATION) EVERY 6 HOURS PRN
Qty: 1 INHALER | Refills: 3 | Status: SHIPPED | OUTPATIENT
Start: 2021-05-19 | End: 2021-05-20 | Stop reason: SDUPTHER

## 2021-05-19 ASSESSMENT — ENCOUNTER SYMPTOMS
VISUAL CHANGE: 0
PHOTOPHOBIA: 0
ABDOMINAL PAIN: 0
EYES NEGATIVE: 1
GASTROINTESTINAL NEGATIVE: 1
ALLERGIC/IMMUNOLOGIC NEGATIVE: 1
WHEEZING: 1
ANAL BLEEDING: 0
BLOOD IN STOOL: 0
TROUBLE SWALLOWING: 0
NAUSEA: 0
SHORTNESS OF BREATH: 1

## 2021-05-19 ASSESSMENT — PATIENT HEALTH QUESTIONNAIRE - PHQ9
SUM OF ALL RESPONSES TO PHQ QUESTIONS 1-9: 0
SUM OF ALL RESPONSES TO PHQ9 QUESTIONS 1 & 2: 0
SUM OF ALL RESPONSES TO PHQ QUESTIONS 1-9: 0
2. FEELING DOWN, DEPRESSED OR HOPELESS: 0

## 2021-05-19 NOTE — TELEPHONE ENCOUNTER
Pt called stating that PCP had referred her to sleep apnea Dr. Nikolas Kelsey. Pt is needing referral sent to them before she can schedule.  Call pt when faxed [phone]

## 2021-05-19 NOTE — PROGRESS NOTES
[facility]  [address] [place] [id number]  Dept: [phone]  Dept Fax: [phone]  Loc: [phone]    Clemente Godoy is a 61 y.o. female who presents today for her medical conditions/complaints as noted below. Clemente Godoy is c/o of Neck Pain (pain is on going pt said it started friday and has just gotten worse )       Subjective:     Chief Complaint   Patient presents with    Neck Pain     pain is on going pt said it started friday and has just gotten worse        Neck Pain   This is a recurrent problem. The current episode started in the past 7 days. The problem occurs daily. The problem has been gradually improving. The pain is associated with nothing. The pain is present in the midline. The quality of the pain is described as aching. The pain is moderate. The symptoms are aggravated by position and twisting. The pain is worse during the day. Associated symptoms include headaches (but not bad). Pertinent negatives include no chest pain, fever, leg pain, numbness, pain with swallowing, paresis, photophobia, syncope, tingling, trouble swallowing, visual change, weakness or weight loss. She has tried muscle relaxants and heat for the symptoms. The treatment provided mild relief. Patient is also here today to follow-up on her results from her PFT done on 5/13/2021 due to dyspnea and wheezing  Her past medical history is significant for allergies and asthma. There is no history of aspirin allergies, bronchiolitis, CAD, chronic lung disease, COPD, DVT, a heart failure, PE, pneumonia or a recent surgery. She has had significant long-term exposure to secondhand tobacco as well as the patient herself did smoke for a period of time. She also had a long-term exposure to oil and wood-burning furnace is growing up.   Has 2 relatives with COPD--mother and brother  PFT shows the following  IMPRESSION:  This patient has a mild restrictive lung defect. This  could certainly be impacted by the patient's body habitus, but clinical  correlation is recommended. Past Medical History:   Diagnosis Date    Bipolar disorder     Borderline osteopenia     Cellulitis of left leg 8/5/2020    GERD (gastroesophageal reflux disease)     Headache(784.0)     HNP (herniated nucleus pulposus), lumbar 6/6/2019    Hyperlipidemia     Hypertension     Intention tremor     due to lithium    Iron deficiency anemia 11/4/2019    Lateral meniscal tear 10/14/2015    Lumbar stenosis with neurogenic claudication 6/6/2019    Medial meniscus tear 10/14/2015    Prolonged emergence from general anesthesia, initial encounter 6/12/2019    Tobacco use     Venous ulcer of left leg ([facility] [address] 07/2020         Review of Systems   Constitutional: Negative. Negative for appetite change, fatigue, fever, unexpected weight change and weight loss. HENT: Negative. Negative for trouble swallowing. Eyes: Negative. Negative for photophobia. Respiratory: Positive for shortness of breath and wheezing. Cardiovascular: Negative. Negative for chest pain, palpitations, leg swelling and syncope. Gastrointestinal: Negative. Negative for abdominal pain, anal bleeding, blood in stool and nausea. Endocrine: Negative. Genitourinary: Negative. Negative for hematuria. Musculoskeletal: Positive for neck pain. Skin: Negative. Negative for rash. Allergic/Immunologic: Negative. Neurological: Positive for headaches (but not bad). Negative for dizziness, tingling, syncope, weakness, light-headedness and numbness. Hematological: Negative. Does not bruise/bleed easily. Psychiatric/Behavioral: Negative. All other systems reviewed and are negative.        Past Medical History:   Diagnosis Date    Bipolar disorder     Borderline osteopenia     Cellulitis of left leg 8/5/2020    GERD (gastroesophageal reflux disease)     Headache(784.0)     HNP (herniated nucleus pulposus), lumbar 2019    Hyperlipidemia     Hypertension     Intention tremor     due to lithium    Iron deficiency anemia 2019    Lateral meniscal tear 10/14/2015    Lumbar stenosis with neurogenic claudication 2019    Medial meniscus tear 10/14/2015    Prolonged emergence from general anesthesia, initial encounter 2019    Tobacco use     Venous ulcer of left leg ([facility] [address] 2020     Family History   Problem Relation Age of Onset    Depression Sister     Mental Illness Sister     Diabetes Mother     Heart Disease Mother     Diabetes Father     Heart Disease Father      Past Surgical History:   Procedure Laterality Date    ABDOMINAL EXPLORATION SURGERY      CARPAL TUNNEL RELEASE Bilateral 2005     SECTION      COLONOSCOPY      normal    COLONOSCOPY N/A 2021    COLON W/ANES. (13:30) performed by Simon Nuñez MD at [address] Left     atrhroscopic unispacer    KNEE SURGERY Right 10/28/2015    Right knee video arthroscopy with partial medial and lateral menisectomy with loose body removal    LUMBAR SPINE SURGERY N/A 2019    MICROLUMBAR DISCECTOMY L4-5 performed by Stephanie Watson MD at [address] History     Socioeconomic History    Marital status:       Spouse name: Not on file    Number of children: Not on file    Years of education: Not on file    Highest education level: Not on file   Occupational History    Not on file   Tobacco Use    Smoking status: Former Smoker     Packs/day: 1.50     Years: 21.00     Pack years: 31.50     Types: Cigarettes     Quit date: 1997     Years since quittin.2    Smokeless tobacco: Never Used   Vaping Use    Vaping Use: Never used   Substance and Sexual Activity    Alcohol use: No    Drug use: No    Sexual activity: Not Currently   Other Topics Concern    Not on file   Social History Narrative    Not on file [facility] CAPS Take  by mouth daily. No current facility-administered medications for this visit. No changes in past medical history, past surgical history, social history, orfamily history were noted during the patient encounter unless specifically listed above. All updates of past medical history, past surgical history, social history, or family history were reviewed personally by me duringthe office visit. All problems listed in the assessment are stable unless noted otherwise. Medication profile reviewed personally by me during the office visit. Medication side effects and possible impairments frommedications were discussed as applicable. Objective:     Physical Exam  Vitals and nursing note reviewed. Constitutional:       General: She is not in acute distress. Appearance: Normal appearance. She is well-developed. She is obese. HENT:      Head: Normocephalic and atraumatic. Right Ear: Tympanic membrane, ear canal and external ear normal.      Left Ear: Tympanic membrane, ear canal and external ear normal.      Nose: Nose normal.      Mouth/Throat:      Pharynx: Uvula midline. No oropharyngeal exudate. Eyes:      General: Lids are normal.      Conjunctiva/sclera: Conjunctivae normal.      Pupils: Pupils are equal, round, and reactive to light. Neck:      Thyroid: No thyromegaly. Vascular: No carotid bruit or JVD. Trachea: Trachea normal.   Cardiovascular:      Rate and Rhythm: Normal rate and regular rhythm. Pulses: Normal pulses. Radial pulses are 2+ on the right side and 2+ on the left side. Dorsalis pedis pulses are 2+ on the right side and 2+ on the left side. Posterior tibial pulses are 2+ on the right side and 2+ on the left side. Heart sounds: Normal heart sounds. No murmur heard. No friction rub. No gallop. Pulmonary:      Effort: Pulmonary effort is normal.      Breath sounds: Normal breath sounds.    Abdominal:      General: Bowel sounds are normal.      Palpations: Abdomen is soft. There is no mass. Tenderness: There is no abdominal tenderness. Musculoskeletal:         General: Normal range of motion. Cervical back: Normal range of motion and neck supple. Spasms and tenderness (C1-T2) present. No edema, erythema, signs of trauma, rigidity or crepitus. Normal range of motion. Back:       Comments: Negative Spurling's bilaterally   Lymphadenopathy:      Head:      Right side of head: No submandibular adenopathy. Left side of head: No submandibular adenopathy. Cervical: No cervical adenopathy. Right cervical: No superficial cervical adenopathy. Left cervical: No superficial cervical adenopathy. Skin:     General: Skin is warm and dry. Findings: No lesion or rash. Neurological:      Mental Status: She is alert and oriented to person, place, and time. Gait: Gait normal.   Psychiatric:         Speech: Speech normal.         Behavior: Behavior normal.         Thought Content: Thought content normal.         Judgment: Judgment normal.         BP (!) 156/84 (Site: Right Lower Arm, Position: Sitting, Cuff Size: Medium Adult)   Pulse 87   Ht 5' 4\" (1.626 m)   Wt (!) 329 lb (149.2 kg)   LMP  (LMP Unknown)   SpO2 96%   BMI 56.47 kg/m²   Body mass index is 56.47 kg/m².     BP Readings from Last 2 Encounters:   05/19/21 (!) 156/84   05/05/21 (!) 138/56       Wt Readings from Last 3 Encounters:   05/19/21 (!) 329 lb (149.2 kg)   05/05/21 (!) 328 lb 12.8 oz (149.1 kg)   04/28/21 (!) 328 lb (148.8 kg)       Lab Review   Office Visit on 05/07/2021   Component Date Value    SARS-CoV-2 05/07/2021 Not Detected    Office Visit on 04/28/2021   Component Date Value    Cholesterol, Total 04/28/2021 150     Triglycerides 04/28/2021 92     HDL 04/28/2021 55     LDL Calculated 04/28/2021 77     VLDL Cholesterol Calcula* 04/28/2021 18     Sodium 04/28/2021 146*    Potassium 04/28/2021 4.3     Chloride 04/28/2021 106     CO2 04/28/2021 30     Anion Gap 04/28/2021 10     Glucose 04/28/2021 88     BUN 04/28/2021 16     CREATININE 04/28/2021 0.6     GFR Non- 04/28/2021 >60     GFR  04/28/2021 >60     Calcium 04/28/2021 8.9     Total Protein 04/28/2021 6.2*    Albumin 04/28/2021 4.1     Albumin/Globulin Ratio 04/28/2021 2.0     Total Bilirubin 04/28/2021 0.3     Alkaline Phosphatase 04/28/2021 117     ALT 04/28/2021 16     AST 04/28/2021 15     Globulin 04/28/2021 2.1     Vit D, 25-Hydroxy 04/28/2021 32.3     WBC 04/28/2021 6.5     RBC 04/28/2021 4.62     Hemoglobin 04/28/2021 13.3     Hematocrit 04/28/2021 41.0     MCV 04/28/2021 88.7     MCH 04/28/2021 28.7     MCHC 04/28/2021 32.4     RDW 04/28/2021 15.5*    Platelets 99/51/6320 283     MPV 04/28/2021 7.5     Neutrophils % 04/28/2021 62.8     Lymphocytes % 04/28/2021 25.4     Monocytes % 04/28/2021 8.1     Eosinophils % 04/28/2021 3.0     Basophils % 04/28/2021 0.7     Neutrophils Absolute 04/28/2021 4.1     Lymphocytes Absolute 04/28/2021 1.6     Monocytes Absolute 04/28/2021 0.5     Eosinophils Absolute 04/28/2021 0.2     Basophils Absolute 04/28/2021 0.0     TSH 04/28/2021 1.55     Ferritin 04/28/2021 76.3     Iron 04/28/2021 56     TIBC 04/28/2021 320     Iron Saturation 04/28/2021 18        No results found for this visit on 05/19/21. Assessment:       1. Neck pain    2. Mild intermittent reactive airway disease without complication        No results found for this visit on 05/19/21. Plan:       Elisha Kyle was seen today for neck pain. Diagnoses and all orders for this visit:    Neck pain  Most likely arthritis is the cause since it is recurrent and limited. Will obtain x-ray  -     XR CERVICAL SPINE (4-5 VIEWS);  Future  Patient is already on Celebrex  She has Robaxin at home that she uses as needed  Neck exercises given to perform at home  Natural history/expected course discussed, and patient's questions answered  Proper lifting/bending technique discussed  Stretching exercises discussed with patient  Educational materials distributed  Avoidance of exacerbating activities advised  Moist heat to affected area recommended    Mild intermittent reactive airway disease without complication  Reviewed and discussed the PFT results from 5/13/2021. It does show a mild restrictive lung defect which could be related to patient's obesity however she does have a history of reactive air way disease/asthma so it is most likely that her dyspnea and intermittent wheezing is due to mild intermittent asthma  Will start the following  -     albuterol sulfate HFA (PROVENTIL HFA) 108 (90 Base) MCG/ACT inhaler; Inhale 2 puffs into the lungs every 6 hours as needed for Wheezing or Shortness of Breath  Review treatment goals of minimizing limitation in activity, prevention of exacerbations and use of ER/inpatient care and minimization of adverse effects of treatment. Medications: begin Albuterol MDI as noted above. Discussed medication dosage, use, side effects, and goals of treatment in detail. Warning signs of respiratory distress were reviewed with the patient. Reduce exposure to inhaled allergens: use impermeable mattress and pillow covers, wash bedding weekly in water > 130'F to kill dust mites, vacuum 2x/week (the patient should not do the vacuuming), remove carpets in bedroom, remove carpets overlying concrete, avoid lying on upholstered furniture and don't use humidifiers (may increased dust & mold). Patient has been instructed call the office immediately with new symptoms, change in symptoms or worseningof symptoms. If this is not feasible, patient is instructed to report to the emergency room. Medication profile reviewed. Medication side effects and possible impairments from medications were discussed as applicable. Allergies were reviewed.  Health maintenance was reviewed and updated as appropriate. Time spent: 25  Minutes, >50% of which was spent face to face with patient counseling, discussing HPI/plan/reviewing records and coordinating care    Return if symptoms worsen or fail to improve. (Comment: Please note this report has been produced using a combination of typing and speech recognition software and may contain errors related to that system including errors in grammar, punctuation, and spelling, as well as words and phrases that may be inappropriate.  If there are any questions or concerns please feel free to contact the dictating provider for clarification.)

## 2021-05-19 NOTE — PATIENT INSTRUCTIONS
Patient Education      Patient Education        Neck Spasm: Exercises  Introduction  Here are some examples of exercises for you to try. The exercises may be suggested for a condition or for rehabilitation. Start each exercise slowly. Ease off the exercises if you start to have pain. You will be told when to start these exercises and which ones will work best for you. How to do the exercises  Levator scapula stretch   1. Sit in a firm chair, or stand up straight. 2. Gently tilt your head toward your left shoulder. 3. Turn your head to look down into your armpit, bending your head slightly forward. Let the weight of your head stretch your neck muscles. 4. Hold for 15 to 30 seconds. 5. Return to your starting position. 6. Follow the same instructions above, but tilt your head toward your right shoulder. 7. Repeat 2 to 4 times toward each shoulder. Upper trapezius stretch   1. Sit in a firm chair, or stand up straight. 2. This stretch works best if you keep your shoulder down as you lean away from it. To help you remember to do this, start by relaxing your shoulders and lightly holding on to your thighs or your chair. 3. Tilt your head toward your shoulder and hold for 15 to 30 seconds. Let the weight of your head stretch your muscles. 4. If you would like a little added stretch, place your arm behind your back. Use the arm opposite of the direction you are tilting your head. For example, if you are tilting your head to the left, place your right arm behind your back. 5. Repeat 2 to 4 times toward each shoulder. Neck rotation   1. Sit in a firm chair, or stand up straight. 2. Keeping your chin level, turn your head to the right, and hold for 15 to 30 seconds. 3. Turn your head to the left, and hold for 15 to 30 seconds. 4. Repeat 2 to 4 times to each side. Chin tuck   1. Lie on the floor with a rolled-up towel under your neck. Your head should be touching the floor.   2. Slowly bring your chin toward the front of your neck. 3. Hold for a count of 6, and then relax for up to 10 seconds. 4. Repeat 8 to 12 times. Forward neck flexion   1. Sit in a firm chair, or stand up straight. 2. Bend your head forward. 3. Hold for 15 to 30 seconds, then return to your starting position. 4. Repeat 2 to 4 times. Follow-up care is a key part of your treatment and safety. Be sure to make and go to all appointments, and call your doctor if you are having problems. It's also a good idea to know your test results and keep a list of the medicines you take. Where can you learn more? Go to https://ClarassancepeTempMine.Aislelabs. org and sign in to your Skimble account. Enter P962 in the Navera box to learn more about \"Neck Spasm: Exercises. \"     If you do not have an account, please click on the \"Sign Up Now\" link. Current as of: November 16, 2020               Content Version: 12.8  © 2006-2021 Healthwise, [employer]. Care instructions adapted under license by [facility] ([facility]). If you have questions about a medical condition or this instruction, always ask your healthcare professional. [place] [address] any warranty or liability for your use of this information. Neck: Exercises  Introduction  Here are some examples of exercises for you to try. The exercises may be suggested for a condition or for rehabilitation. Start each exercise slowly. Ease off the exercises if you start to have pain. You will be told when to start these exercises and which ones will work best for you. How to do the exercises  Neck stretch   8. This stretch works best if you keep your shoulder down as you lean away from it. To help you remember to do this, start by relaxing your shoulders and lightly holding on to your thighs or your chair. 9. Tilt your head toward your shoulder and hold for 15 to 30 seconds. Let the weight of your head stretch your muscles.   10. If you would like a little added stretch, use your hand to gently and steadily pull your head toward your shoulder. For example, keeping your right shoulder down, lean your head to the left. 11. Repeat 2 to 4 times toward each shoulder. Diagonal neck stretch   6. Turn your head slightly toward the direction you will be stretching, and tilt your head diagonally toward your chest and hold for 15 to 30 seconds. 7. If you would like a little added stretch, use your hand to gently and steadily pull your head forward on the diagonal.  8. Repeat 2 to 4 times toward each side. Dorsal glide stretch   The dorsal glide stretches the back of the neck. If you feel pain, do not glide so far back. Some people find this exercise easier to do while lying on their backs with an ice pack on the neck. 5. Sit or stand tall and look straight ahead. 6. Slowly tuck your chin as you glide your head backward over your body  7. Hold for a count of 6, and then relax for up to 10 seconds. 8. Repeat 8 to 12 times. Chest and shoulder stretch   5. Sit or stand tall and glide your head backward as in the dorsal glide stretch. 6. Raise both arms so that your hands are next to your ears. 7. Take a deep breath, and as you breathe out, lower your elbows down and behind your back. You will feel your shoulder blades slide down and together, and at the same time you will feel a stretch across your chest and the front of your shoulders. 8. Hold for about 6 seconds, and then relax for up to 10 seconds. 9. Repeat 8 to 12 times. Strengthening: Hands on head   5. Move your head backward, forward, and side to side against gentle pressure from your hands, holding each position for about 6 seconds. 6. Repeat 8 to 12 times. Follow-up care is a key part of your treatment and safety. Be sure to make and go to all appointments, and call your doctor if you are having problems. It's also a good idea to know your test results and keep a list of the medicines you take.   Where can you learn more? Go to https://chpepiceweb.[email or web address] org and sign in to your QQTechnology account. Enter P975 in the Precise Business Grouphire box to learn more about \"Neck: Exercises. \"     If you do not have an account, please click on the \"Sign Up Now\" link. Current as of: November 16, 2020               Content Version: 12.8  © 2006-2021 [email or web address]. Care instructions adapted under license by [facility] ([facility]). If you have questions about a medical condition or this instruction, always ask your healthcare professional. [address] any warranty or liability for your use of this information. Patient Education        Asthma in Adults: Care Instructions  Overview     Asthma makes it hard for you to breathe. During an asthma attack, the airways swell and narrow. Severe asthma attacks can be dangerous, but you can usually prevent them. Controlling asthma and treating symptoms before they get bad can help you avoid bad attacks. You may also avoid future trips to the doctor. Follow-up care is a key part of your treatment and safety. Be sure to make and go to all appointments, and call your doctor if you are having problems. It's also a good idea to know your test results and keep a list of the medicines you take. How can you care for yourself at home? · Follow your asthma action plan so you can manage your symptoms at home. An asthma action plan will help you prevent and control airway reactions and will tell you what to do during an asthma attack. If you do not have an asthma action plan, work with your doctor to build one. · Take your asthma medicine exactly as prescribed. Medicine plays an important role in controlling asthma. Talk to your doctor right away if you have any questions about what to take and how to take it. ? Use your quick-relief medicine when you have symptoms of an attack. Quick-relief medicine often is an albuterol inhaler.  Some people need to use quick-relief medicine before they exercise. ? Take your controller medicine every day, not just when you have symptoms. Controller medicine is usually an inhaled corticosteroid. The goal is to prevent problems before they occur. ? If your doctor prescribed corticosteroid pills to use during an attack, take them as directed. They may take hours to work, but they may shorten the attack and help you breathe better. ? Keep your quick-relief medicine with you at all times. · Talk to your doctor before using other medicines. Some medicines, such as aspirin, can cause asthma attacks in some people. · Check yourself for asthma symptoms to know which step to follow in your action plan. Watch for things like being short of breath, having chest tightness, coughing, and wheezing. Also notice if symptoms wake you up at night or if you get tired quickly when you exercise. · If you have a peak flow meter, use it to check how well you are breathing. This can help you predict when an asthma attack is going to occur. Then you can take medicine to prevent the asthma attack or make it less severe. · See your doctor regularly. These visits will help you learn more about asthma and what you can do to control it. Your doctor will monitor your treatment to make sure the medicine is helping you. · Keep track of your asthma attacks and your treatment. After you have had an attack, write down what triggered it, what helped end it, and any concerns you have about your asthma action plan. Take your diary when you see your doctor. You can then review your asthma action plan and decide if it is working. · Do not smoke or allow others to smoke around you. Avoid smoky places. Smoking makes asthma worse. If you need help quitting, talk to your doctor about stop-smoking programs and medicines. These can increase your chances of quitting for good. · Learn what triggers an asthma attack for you, and avoid the triggers when you can.  Common triggers include colds, smoke, air pollution, dust, pollen, mold, pets, cockroaches, stress, and cold air. · Avoid colds and the flu. Talk to your doctor about getting a pneumococcal vaccine shot. If you have had one before, ask your doctor whether you need a second dose. Get a flu vaccine every fall. If you must be around people with colds or the flu, wash your hands often. When should you call for help? Call 911 anytime you think you may need emergency care. For example, call if:    · You have severe trouble breathing. Call your doctor now or seek immediate medical care if:    · Your symptoms do not get better after you have followed your asthma action plan.     · You cough up yellow, dark brown, or bloody mucus (sputum). Watch closely for changes in your health, and be sure to contact your doctor if:    · Your coughing and wheezing get worse.     · You need to use quick-relief medicine on more than 2 days a week within a month (unless it is just for exercise).     · You need help figuring out what is triggering your asthma attacks. Where can you learn more? Go to https://Physician Practice [email or web address] org and sign in to your [employer]GRADE [employer] account. Enter P597 in the Spotwise box to learn more about \"Asthma in Adults: Care Instructions. \"     If you do not have an account, please click on the \"Sign Up Now\" link. Current as of: October 26, 2020               Content Version: 12.8  © 8106-6309 Healthwise, [facility]. Care instructions adapted under license by [facility] ([facility]). If you have questions about a medical condition or this instruction, always ask your healthcare professional. [place] [address] any warranty or liability for your use of this information.

## 2021-05-20 ENCOUNTER — TELEPHONE (OUTPATIENT)
Dept: PULMONOLOGY | Age: 61
End: 2021-05-20

## 2021-05-20 ENCOUNTER — TELEPHONE (OUTPATIENT)
Dept: FAMILY MEDICINE CLINIC | Age: 61
End: 2021-05-20

## 2021-05-20 DIAGNOSIS — J45.20 MILD INTERMITTENT REACTIVE AIRWAY DISEASE WITHOUT COMPLICATION: ICD-10-CM

## 2021-05-20 RX ORDER — ALBUTEROL SULFATE 90 UG/1
2 AEROSOL, METERED RESPIRATORY (INHALATION) EVERY 6 HOURS PRN
Qty: 3 INHALER | Refills: 3 | Status: SHIPPED | OUTPATIENT
Start: 2021-05-20

## 2021-05-20 NOTE — TELEPHONE ENCOUNTER

## 2021-05-24 ENCOUNTER — HOSPITAL ENCOUNTER (OUTPATIENT)
Age: 61
Discharge: HOME | End: 2021-05-24
Payer: MEDICARE

## 2021-05-24 ENCOUNTER — HOSPITAL ENCOUNTER (OUTPATIENT)
Dept: GENERAL RADIOLOGY | Age: 61
Discharge: HOME | End: 2021-05-24
Payer: MEDICARE

## 2021-05-24 DIAGNOSIS — M54.2 NECK PAIN: ICD-10-CM

## 2021-05-24 PROCEDURE — 72040 X-RAY EXAM NECK SPINE 2-3 VW: CPT

## 2021-06-03 ENCOUNTER — TELEPHONE (OUTPATIENT)
Dept: FAMILY MEDICINE CLINIC | Age: 61
End: 2021-06-03

## 2021-06-08 ENCOUNTER — TELEPHONE (OUTPATIENT)
Dept: PULMONOLOGY | Age: 61
End: 2021-06-08

## 2021-06-08 ENCOUNTER — VIRTUAL VISIT (OUTPATIENT)
Dept: PULMONOLOGY | Age: 61
End: 2021-06-08
Payer: MEDICARE

## 2021-06-08 DIAGNOSIS — R06.83 SNORING: ICD-10-CM

## 2021-06-08 DIAGNOSIS — R53.83 FATIGUE, UNSPECIFIED TYPE: ICD-10-CM

## 2021-06-08 DIAGNOSIS — G47.30 OBSERVED SLEEP APNEA: Primary | ICD-10-CM

## 2021-06-08 DIAGNOSIS — I10 ESSENTIAL HYPERTENSION: ICD-10-CM

## 2021-06-08 DIAGNOSIS — G47.10 HYPERSOMNIA: ICD-10-CM

## 2021-06-08 PROCEDURE — G8417 CALC BMI ABV UP PARAM F/U: HCPCS | Performed by: INTERNAL MEDICINE

## 2021-06-08 PROCEDURE — G8427 DOCREV CUR MEDS BY ELIG CLIN: HCPCS | Performed by: INTERNAL MEDICINE

## 2021-06-08 PROCEDURE — 1036F TOBACCO NON-USER: CPT | Performed by: INTERNAL MEDICINE

## 2021-06-08 PROCEDURE — 3017F COLORECTAL CA SCREEN DOC REV: CPT | Performed by: INTERNAL MEDICINE

## 2021-06-08 PROCEDURE — 99204 OFFICE O/P NEW MOD 45 MIN: CPT | Performed by: INTERNAL MEDICINE

## 2021-06-08 RX ORDER — VITAMIN B COMPLEX
TABLET ORAL
COMMUNITY

## 2021-06-08 ASSESSMENT — SLEEP AND FATIGUE QUESTIONNAIRES
HOW LIKELY ARE YOU TO NOD OFF OR FALL ASLEEP WHILE SITTING AND TALKING TO SOMEONE: 1
HOW LIKELY ARE YOU TO NOD OFF OR FALL ASLEEP IN A CAR, WHILE STOPPED FOR A FEW MINUTES IN TRAFFIC: 1
HOW LIKELY ARE YOU TO NOD OFF OR FALL ASLEEP WHILE WATCHING TV: 2
HOW LIKELY ARE YOU TO NOD OFF OR FALL ASLEEP WHILE SITTING INACTIVE IN A PUBLIC PLACE: 3
ESS TOTAL SCORE: 16
HOW LIKELY ARE YOU TO NOD OFF OR FALL ASLEEP WHILE SITTING AND READING: 2
HOW LIKELY ARE YOU TO NOD OFF OR FALL ASLEEP WHILE LYING DOWN TO REST IN THE AFTERNOON WHEN CIRCUMSTANCES PERMIT: 3
HOW LIKELY ARE YOU TO NOD OFF OR FALL ASLEEP WHEN YOU ARE A PASSENGER IN A CAR FOR AN HOUR WITHOUT A BREAK: 3
HOW LIKELY ARE YOU TO NOD OFF OR FALL ASLEEP WHILE SITTING QUIETLY AFTER LUNCH WITHOUT ALCOHOL: 1

## 2021-06-08 NOTE — TELEPHONE ENCOUNTER
Need to obtain records. Both orders for split and titration are in will cancel one. Patient states she completed sleep study about 10 yrs ago in [facility]    LOV: 6/8/20    Assessment:       · Snoring  · Observed sleep apnea   · Hypersomnia and Fatigue   · History of mild ANTOINETTE on CPAP 9 cmH2O   · Essential HTN   · Allergic rhinitis       Plan:       · Order for CPAP supplies   · Obtain old records for prior PSG/PAP titration. · CPAP/BiPAP titration if able to fine old PSG, otherwise split night  · ANTOINETTE treatment options were discussed with patient, including CPAP therapy, oral appliances, upper airway surgery and hypoglossal nerve stimulation. · Discussed with patient the pathophysiology of apnea. · Sleep hygiene  · Avoid sedatives, alcohol and caffeinated drinks at bed time. · No driving motorized vehicles or operating heavy machinery while fatigue, drowsy or sleepy. · Continue blood pressure medications - treatment of ANTOINETTE can lower blood pressure by levels that are clinically significant. · Weight loss is also recommended as a long-term intervention. · Complications of ANTOINETTE if not treated were discussed with patient patient to include systemic hypertension, pulmonary hypertension, cardiovascular morbidities, car accidents and all cause mortality.

## 2021-06-08 NOTE — TELEPHONE ENCOUNTER
[address] no answer LM asking for records to be faxed or return the call with any issues.

## 2021-06-08 NOTE — PROGRESS NOTES
[facility]                                                          TELEHEALTH EVALUATION: Service performed was Audio/Visual (During [id number] public health emergency) and not a face-to-face visit           CHIEF COMPLAINT: Sleep apnea    Consulting provider: MADINA Rm CNP      HPI:   Patient was diagnosed with mild ANTOINETTE in the . Used CPAP up until , stopped after losing 100 pounds. Started using again  after she gained them back. Has been using on and off since then. Better with CPAP and worse without CPAP. Associated with snoring, witnessed apnea, hypersomnia. ESS 16. Wakes up at night choking and gasping for air. No restorative sleep. Patient is complaining of daytime sleepiness, fatigue and tiredness during the day. No history of sleep apnea. Bedtime 8-1 am and rise time is 6-8 am. Sleep onset few-45 minutes. No nap during the day.+ headache in am. No car wrecks or near wrecks because of the sleepiness. No nodding off while driving. Gain 60 pounds since her last sleep study. Old records were reviewed and summarized by me.        Past Medical History:   Diagnosis Date    Bipolar disorder     Borderline osteopenia     Cellulitis of left leg 2020    GERD (gastroesophageal reflux disease)     Headache(784.0)     HNP (herniated nucleus pulposus), lumbar 2019    Hyperlipidemia     Hypertension     Intention tremor     due to lithium    Iron deficiency anemia 2019    Lateral meniscal tear 10/14/2015    Lumbar stenosis with neurogenic claudication 2019    Medial meniscus tear 10/14/2015    Prolonged emergence from general anesthesia, initial encounter 2019    Tobacco use     Venous ulcer of left leg ([address] 2020       Past Surgical History:        Procedure Laterality Date    ABDOMINAL EXPLORATION SURGERY      CARPAL TUNNEL RELEASE Bilateral 2005     SECTION      COLONOSCOPY  1995    normal    COLONOSCOPY N/A 02/02/2021    COLON W/ANES. (13:30) [facility] [address] [facility] Left     atrhroscopic unispacer    KNEE SURGERY Right 10/28/2015    Right knee video arthroscopy with partial medial and lateral menisectomy with loose body removal    LUMBAR SPINE SURGERY N/A 06/12/2019    MICROLUMBAR DISCECTOMY L4-5 performed by Collette Love MD at [address] [facility]:  is allergic to calamine, amoxicillin-pot clavulanate, daypro [oxaprozin], duravent-da [chlorphen-phenyleph-methscop], lithium, norvasc [amlodipine], nsaids, seldane [terfenadine], suprax [cefixime], and levofloxacin. Social History:    TOBACCO:   reports that she quit smoking about 24 years ago. Her smoking use included cigarettes. She has a 31.50 pack-year smoking history. She has never used smokeless tobacco.  ETOH:   reports no history of alcohol use.       Family History:       Problem Relation Age of Onset    Depression Sister     Mental Illness Sister     Diabetes Mother     Heart Disease Mother     Diabetes Father     Heart Disease Father        Current Medications:    Current Outpatient Medications:     albuterol sulfate HFA (PROVENTIL HFA) 108 (90 Base) MCG/ACT inhaler, Inhale 2 puffs into the lungs every 6 hours as needed for Wheezing or Shortness of Breath, Disp: 3 Inhaler, Rfl: 3    rosuvastatin (CRESTOR) 5 MG tablet, TAKE 1 TABLET EVERY DAY, Disp: 90 tablet, Rfl: 1    ferrous sulfate (IRON 325) 325 (65 Fe) MG tablet, Take 1 tablet by mouth daily (with breakfast), Disp: 90 tablet, Rfl: 3    metoprolol succinate (TOPROL XL) 50 MG extended release tablet, TAKE 1 TABLET EVERY DAY, Disp: 90 tablet, Rfl: 1    buPROPion (WELLBUTRIN XL) 150 MG extended release tablet, TAKE 1 TABLET EVERY DAY, Disp: 90 tablet, Rfl: 1    lamoTRIgine (LAMICTAL) 100 MG tablet, Take 1 tablet by mouth 2 times daily, Disp: 180 tablet, Rfl: 1    VORTIoxetine HBr (TRINTELLIX) 20 MG TABS tablet, TAKE 1 TABLET EVERY DAY, Disp: 90 tablet, Rfl: 1    celecoxib (CELEBREX) 200 MG capsule, Take 1 capsule by mouth 2 times daily, Disp: 180 capsule, Rfl: 1    loratadine (CLARITIN) 10 MG tablet, Take 10 mg by mouth daily, Disp: , Rfl:     CALCIUM-MAGNESIUM-VITAMIN D PO, Take 2 capsules by mouth nightly, Disp: , Rfl:     multivitamin (THERAGRAN) per tablet, Take 1 tablet by mouth daily. , Disp: , Rfl:     Omega-3 Fatty Acids (FISH OIL) 1200 MG CAPS, Take  by mouth daily. , Disp: , Rfl:     fluticasone (FLONASE) 50 MCG/ACT nasal spray, 1 spray by Nasal route 2 times daily, Disp: 3 Bottle, Rfl: 3      REVIEW OF SYSTEMS:  Constitutional: Negative for fever  HENT: Negative for sore throat  Eyes: Negative for redness   Respiratory: Negative for dyspnea, cough  Cardiovascular: Negative for chest pain  Gastrointestinal: Negative for vomiting, diarrhea   Genitourinary: Negative for hematuria   Musculoskeletal: Negative for arthralgias   Skin: Negative for rash  Neurological: Negative for syncope  Hematological: Negative for adenopathy  Psychiatric/Behavorial: Negative for anxiety      Objective:   PHYSICAL EXAM:    not currently breastfeeding.' on RA  O2 Sat:  Mallampati class IV. Temperature:  Constitutional:  No acute distress. Appears well developed and nourished. Eyes: No sclera icterus. EOM intact. No visible discharge. HENT: Head is normocephalic and atraumatic. Mucus membranes are moist and the tongue appears normal. Normal appearing nose. External Ears normal.   Neck: No visualized mass. Geoffry Dale is midline   Resp: No accessory muscle use. Respiratory effort normal. No visualized signs of difficulty breathing or respiratory distress. Cardiovascular: No LE edema. Musculoskeletal: Normal gait with no signs of ataxia. Normal range of motion of the neck.   Skin: No significant exanthematous lesions or discoloration noted on facial skin Act, this Virtual Visit was conducted with patient's (and/or legal guardian's) consent, to reduce the patient's risk of exposure to COVID-19 and provide necessary medical care. The patient (and/or legal guardian) has also been advised to contact this office for worsening conditions or problems, and seek emergency medical treatment and/or call 911 if deemed necessary. Services were provided through a video synchronous discussion virtually to substitute for in-person clinic visit. Patient was located in her home, provider was located in his office. --Jim Galvan MD on 6/8/2021 at 8:51 AM    An electronic signature was used to authenticate this note.

## 2021-06-09 ENCOUNTER — TELEPHONE (OUTPATIENT)
Dept: FAMILY MEDICINE CLINIC | Age: 61
End: 2021-06-09

## 2021-06-09 NOTE — TELEPHONE ENCOUNTER
----- Message from Jacky Pato sent at 6/9/2021  9:09 AM EDT -----  Subject: Appointment Request    Reason for Call: Urgent (Patient Request) No Script    QUESTIONS  Type of Appointment? Established Patient  Reason for appointment request? Available appointments did not meet   patient need  Additional Information for Provider? Pt needs to reschedule her   appointment for 6/10, she is comin gin for hole in her tongue and very   painful. Offered a VV for 6/11 but she said she would prefhe would prefer   to come into the office and she would prefer to only see CNP Conn.  ---------------------------------------------------------------------------  --------------  [address]  What is the best way for the office to contact you? OK to leave message on   voicemail  Preferred Call Back Phone Number? [phone]  ---------------------------------------------------------------------------  --------------  SCRIPT ANSWERS  Relationship to Patient? Self  Have your symptoms changed? No  Appointment reason? Symptomatic  Select script based on patient symptoms? Adult No Script   (Is the patient requesting to see the provider for a procedure?)? No  (Is the patient requesting to see the provider urgently  today or   tomorrow. )? Yes  Have you been diagnosed with, awaiting test results for, or told that you   are suspected of having COVID-19 (Coronavirus)? (If patient has tested   negative or was tested as a requirement for work, school, or travel and   not based on symptoms, answer no)? No  Do you currently have flu-like symptoms including fever or chills, cough,   shortness of breath, difficulty breathing, or new loss of taste or smell? No  Have you had close contact with someone with COVID-19 in the last 14 days? No  (Service Expert  click yes below to proceed with Adzuna As Usual   Scheduling)?  Yes

## 2021-06-09 NOTE — TELEPHONE ENCOUNTER
I am confused. She does not have an appointment with me tomorrow. The appointment is with an ENT.   Please assist patient

## 2021-06-10 ENCOUNTER — OFFICE VISIT (OUTPATIENT)
Dept: ENT CLINIC | Age: 61
End: 2021-06-10
Payer: MEDICARE

## 2021-06-10 VITALS
HEIGHT: 61 IN | BODY MASS INDEX: 55.32 KG/M2 | SYSTOLIC BLOOD PRESSURE: 145 MMHG | WEIGHT: 293 LBS | HEART RATE: 95 BPM | DIASTOLIC BLOOD PRESSURE: 77 MMHG

## 2021-06-10 DIAGNOSIS — J30.9 ALLERGIC RHINITIS, UNSPECIFIED SEASONALITY, UNSPECIFIED TRIGGER: Primary | ICD-10-CM

## 2021-06-10 DIAGNOSIS — R68.89 ITCHY EYES: ICD-10-CM

## 2021-06-10 DIAGNOSIS — H69.83 ETD (EUSTACHIAN TUBE DYSFUNCTION), BILATERAL: ICD-10-CM

## 2021-06-10 PROCEDURE — 1036F TOBACCO NON-USER: CPT | Performed by: OTOLARYNGOLOGY

## 2021-06-10 PROCEDURE — G8427 DOCREV CUR MEDS BY ELIG CLIN: HCPCS | Performed by: OTOLARYNGOLOGY

## 2021-06-10 PROCEDURE — G8417 CALC BMI ABV UP PARAM F/U: HCPCS | Performed by: OTOLARYNGOLOGY

## 2021-06-10 PROCEDURE — 99213 OFFICE O/P EST LOW 20 MIN: CPT | Performed by: OTOLARYNGOLOGY

## 2021-06-10 PROCEDURE — 3017F COLORECTAL CA SCREEN DOC REV: CPT | Performed by: OTOLARYNGOLOGY

## 2021-06-10 ASSESSMENT — ENCOUNTER SYMPTOMS
APNEA: 0
TROUBLE SWALLOWING: 0
EYE ITCHING: 1
COUGH: 0
SINUS PRESSURE: 0
VOICE CHANGE: 0
SORE THROAT: 0
FACIAL SWELLING: 0
SHORTNESS OF BREATH: 0

## 2021-06-10 NOTE — PROGRESS NOTES
Allan Lux MD at [facility] KNEE SURGERY Left     atrhroscopic unispacer    KNEE SURGERY Right 10/28/2015    Right knee video arthroscopy with partial medial and lateral menisectomy with loose body removal    LUMBAR SPINE SURGERY N/A 2019    MICROLUMBAR DISCECTOMY L4-5 performed by Radha Mejia MD at [place] [id number].         Family History     Family History   Problem Relation Age of Onset    Depression Sister     Mental Illness Sister     Diabetes Mother     Heart Disease Mother     Diabetes Father     Heart Disease Father        Social History     Social History     Tobacco Use    Smoking status: Former Smoker     Packs/day: 1.50     Years: 21.00     Pack years: 31.50     Types: Cigarettes     Quit date: 1997     Years since quittin.3    Smokeless tobacco: Never Used   Vaping Use    Vaping Use: Never used   Substance Use Topics    Alcohol use: No    Drug use: No        Allergies     Allergies   Allergen Reactions    Calamine Other (See Comments)     Leaves skin tender and burning     Amoxicillin-Pot Clavulanate Hives    Daypro [Oxaprozin] Hives    Duravent-Da [Chlorphen-Phenyleph-Methscop] Hives    Lithium      Caused shakes    Norvasc [Amlodipine]      LE edema      Nsaids      Avoid d/t gastric bypass    Seldane [Terfenadine] Hives    Suprax [Cefixime] Hives    Levofloxacin Rash       Medications     Current Outpatient Medications   Medication Sig Dispense Refill    Coenzyme Q10 (COQ10) 100 MG CAPS Take by mouth      albuterol sulfate HFA (PROVENTIL HFA) 108 (90 Base) MCG/ACT inhaler Inhale 2 puffs into the lungs every 6 hours as needed for Wheezing or Shortness of Breath 3 Inhaler 3    rosuvastatin (CRESTOR) 5 MG tablet TAKE 1 TABLET EVERY DAY 90 tablet 1    ferrous sulfate (IRON 325) 325 (65 Fe) MG tablet Take 1 tablet by mouth daily (with breakfast) 90 tablet 3    metoprolol succinate (TOPROL XL) 50 MG extended release tablet TAKE 1 TABLET EVERY DAY 90 tablet 1    buPROPion (WELLBUTRIN XL) 150 MG extended release tablet TAKE 1 TABLET EVERY DAY 90 tablet 1    lamoTRIgine (LAMICTAL) 100 MG tablet Take 1 tablet by mouth 2 times daily 180 tablet 1    VORTIoxetine HBr (TRINTELLIX) 20 MG TABS tablet TAKE 1 TABLET EVERY DAY 90 tablet 1    celecoxib (CELEBREX) 200 MG capsule Take 1 capsule by mouth 2 times daily 180 capsule 1    loratadine (CLARITIN) 10 MG tablet Take 10 mg by mouth daily      CALCIUM-MAGNESIUM-VITAMIN D PO Take 2 capsules by mouth nightly      multivitamin (THERAGRAN) per tablet Take 1 tablet by mouth daily.  Omega-3 Fatty Acids (FISH OIL) 1200 MG CAPS Take  by mouth daily.  fluticasone (FLONASE) 50 MCG/ACT nasal spray 1 spray by Nasal route 2 times daily 3 Bottle 3     No current facility-administered medications for this visit. Review of Systems     Review of Systems   Constitutional: Positive for fatigue. Negative for appetite change, chills, fever and unexpected weight change. HENT: Positive for congestion and postnasal drip. Negative for ear discharge, ear pain, facial swelling, hearing loss, nosebleeds, sinus pressure, sneezing, sore throat, tinnitus, trouble swallowing and voice change. +ear pressure   Eyes: Positive for itching. Respiratory: Negative for apnea, cough and shortness of breath. Gastrointestinal:        Negative for dysphasia   Endocrine: Negative for cold intolerance and heat intolerance. Musculoskeletal: Negative for myalgias and neck pain. Skin: Negative for rash. Allergic/Immunologic: Negative for environmental allergies. Neurological: Negative for dizziness and headaches. Psychiatric/Behavioral: Negative for confusion, decreased concentration and sleep disturbance.          PhysicalExam     Vitals:    06/10/21 1255   BP: (!) 145/77   Site: Right Wrist   Position: Sitting   Cuff Size: Medium Adult   Pulse: 95   Weight: (!) 330 lb 6.4 oz (149.9 kg)   Height: 5' 1\" (1.549 m)       Physical Exam  Constitutional:       General: She is not in acute distress. Appearance: She is well-developed. HENT:      Head: Normocephalic and atraumatic. Right Ear: Tympanic membrane, ear canal and external ear normal. No drainage. No middle ear effusion. Tympanic membrane is not bulging. Tympanic membrane has normal mobility. Left Ear: Ear canal and external ear normal. No drainage. No middle ear effusion. Tympanic membrane is not bulging. Tympanic membrane has decreased mobility (mild). Nose: Septal deviation present. No mucosal edema or rhinorrhea. Right Turbinates: Swollen and pale. Left Turbinates: Swollen and pale. Mouth/Throat:      Lips: Pink. Mouth: Mucous membranes are moist.      Tongue: No lesions. Palate: No mass. Pharynx: Uvula midline. Eyes:      Pupils: Pupils are equal, round, and reactive to light. Neck:      Thyroid: No thyroid mass or thyromegaly. Trachea: Trachea and phonation normal.   Cardiovascular:      Pulses: Normal pulses. Pulmonary:      Effort: Pulmonary effort is normal. No accessory muscle usage or respiratory distress. Breath sounds: No stridor. Musculoskeletal:      Cervical back: Full passive range of motion without pain. Lymphadenopathy:      Head:      Right side of head: No submental or submandibular adenopathy. Left side of head: No submental or submandibular adenopathy. Cervical: No cervical adenopathy. Right cervical: No superficial, deep or posterior cervical adenopathy. Left cervical: No superficial, deep or posterior cervical adenopathy. Skin:     General: Skin is warm and dry. Neurological:      Mental Status: She is alert and oriented to person, place, and time. Cranial Nerves: No cranial nerve deficit. Coordination: Coordination normal.      Gait: Gait normal.   Psychiatric:         Thought Content:  Thought content normal.           Assessment and Plan     1. Allergic rhinitis, unspecified seasonality, unspecified trigger  -Persistent symptoms with use of OTC antihistamine and Flonase antimetabolite Astelin or Singulair  - MT PERCUTANEOUS TESTS W/ALLERGENIC EXTRACTS  - MT ALLERGY SKIN TESTS    2. Itchy eyes  -Seen by ophthalmology diagnosed with dry eye but also allergic symptoms    3. ETD (Eustachian tube dysfunction), bilateral  -Persistent despite treatment of allergy to medications  -Plan for allergy testing and shots      Plan for allergy testing    Gerardo Lam DO  6/10/21      Portions of this note were dictated using Dragon.  There may be linguistic errors secondary to the use of this program.

## 2021-06-28 ENCOUNTER — TELEPHONE (OUTPATIENT)
Dept: PULMONOLOGY | Age: 61
End: 2021-06-28

## 2021-06-28 NOTE — TELEPHONE ENCOUNTER
Patient cancelled appointment on 9/8/21 with Lexi Jim for 31/90 day    Reason: financial    Patient did not reschedule appointment. Cancelled via my chart. Last OV 6/8/21       Assessment:       · Snoring  · Observed sleep apnea   · Hypersomnia and Fatigue   · History of mild ANTOINETTE on CPAP 9 cmH2O   · Essential HTN   · Allergic rhinitis       Plan:       · Order for CPAP supplies   · Obtain old records for prior PSG/PAP titration. · CPAP/BiPAP titration if able to fine old PSG, otherwise split night  · ANTOINETTE treatment options were discussed with patient, including CPAP therapy, oral appliances, upper airway surgery and hypoglossal nerve stimulation. · Discussed with patient the pathophysiology of apnea. · Sleep hygiene  · Avoid sedatives, alcohol and caffeinated drinks at bed time. · No driving motorized vehicles or operating heavy machinery while fatigue, drowsy or sleepy. · Continue blood pressure medications - treatment of ANTOINETTE can lower blood pressure by levels that are clinically significant. · Weight loss is also recommended as a long-term intervention.     · Complications of ANTOINETTE if not treated were discussed with patient patient to include systemic hypertension, pulmonary hypertension,

## 2021-06-30 NOTE — TELEPHONE ENCOUNTER
Pt states that she did not have the new sleep study, she said it was partially financial, she has a cpap machine and doesn't want to pay for a new one therefore does not want to reschedule her appt.

## 2021-06-30 NOTE — TELEPHONE ENCOUNTER
Appears from notes sleep apnea not well controlled on current unit/settings.   Can send financial aid information if needed

## 2021-07-01 NOTE — TELEPHONE ENCOUNTER
Noted. Patient did not complete recommended testing or keep follow up appointment as was recommended. Please schedule a follow up visit for this patient if she calls requesting such appointment.

## 2021-08-09 ENCOUNTER — OFFICE VISIT (OUTPATIENT)
Dept: ORTHOPEDIC SURGERY | Age: 61
End: 2021-08-09
Payer: MEDICARE

## 2021-08-09 VITALS — HEIGHT: 61 IN | BODY MASS INDEX: 55.32 KG/M2 | WEIGHT: 293 LBS

## 2021-08-09 DIAGNOSIS — M17.12 PRIMARY OSTEOARTHRITIS OF LEFT KNEE: Primary | ICD-10-CM

## 2021-08-09 DIAGNOSIS — M25.562 ACUTE PAIN OF LEFT KNEE: ICD-10-CM

## 2021-08-09 PROCEDURE — 99213 OFFICE O/P EST LOW 20 MIN: CPT | Performed by: ORTHOPAEDIC SURGERY

## 2021-08-09 NOTE — PROGRESS NOTES
ORTHOPAEDIC SURGERY H&P / CONSULTATION NOTE    Chief complaint:   Chief Complaint   Patient presents with    Knee Pain     LEFT KNEE PAIN        History of present illness: The patient is a 64 y.o. female with subjective symptoms of left knee pain. The chief complaint is located at usually at the medial aspect of the left knee but has recently been the lateral aspect for the last 8 days. Duration of symptoms has been for 8 days. The severity of symptoms is rated at 3/10 pain on intake form. Patient denies trauma but states that she got up and had difficulty walking with pain along the lateral aspect of the knee. At baseline she has pain with walking and difficulty with range of motion. She states pain with attempted stair walking. She has not done any therapy of recent and has been taking Aleve and Tylenol as well as resting her knee is able to. The patient has tried the below listed items prior to today's consultation for above listed chief complaint.     +   Over-the-counter anti-inflammatories/prescription medication anti-inflammatory.      -   Physical therapy / guided home exercise program -     -   Previous corticosteroid injections    Past medical history:    Past Medical History:   Diagnosis Date    Bipolar disorder     Borderline osteopenia     Cellulitis of left leg 8/5/2020    GERD (gastroesophageal reflux disease)     Headache(784.0)     HNP (herniated nucleus pulposus), lumbar 6/6/2019    Hyperlipidemia     Hypertension     Intention tremor     due to lithium    Iron deficiency anemia 11/4/2019    Lateral meniscal tear 10/14/2015    Lumbar stenosis with neurogenic claudication 6/6/2019    Medial meniscus tear 10/14/2015    Prolonged emergence from general anesthesia, initial encounter 6/12/2019    Tobacco use     Venous ulcer of left leg ([address] 07/2020        Past surgical history:    Past Surgical History:   Procedure Laterality Date    ABDOMINAL EXPLORATION SURGERY      [facility] TUNNEL RELEASE Bilateral 2005     SECTION      COLONOSCOPY      normal    COLONOSCOPY N/A 2021    COLON W/ANES. (13:30) [facility] Left     atrhroscopic unispacer    KNEE SURGERY Right 10/28/2015    Right knee video arthroscopy with partial medial and lateral menisectomy with loose body removal    LUMBAR SPINE SURGERY N/A 2019    MICROLUMBAR DISCECTOMY L4-5 performed by Michael Thomas MD at [address]:     Allergies   Allergen Reactions    Calamine Other (See Comments)     Leaves skin tender and burning     Amoxicillin-Pot Clavulanate Hives    Daypro [Oxaprozin] Hives    Duravent-Da [Chlorphen-Phenyleph-Methscop] Hives    Lithium      Caused shakes    Norvasc [Amlodipine]      LE edema      Nsaids      Avoid d/t gastric bypass    Seldane [Terfenadine] Hives    Suprax [Cefixime] Hives    Levofloxacin Rash         Medications:   Current Outpatient Medications:     Coenzyme Q10 (COQ10) 100 MG CAPS, Take by mouth, Disp: , Rfl:     albuterol sulfate HFA (PROVENTIL HFA) 108 (90 Base) MCG/ACT inhaler, Inhale 2 puffs into the lungs every 6 hours as needed for Wheezing or Shortness of Breath, Disp: 3 Inhaler, Rfl: 3    fluticasone (FLONASE) 50 MCG/ACT nasal spray, 1 spray by Nasal route 2 times daily, Disp: 3 Bottle, Rfl: 3    rosuvastatin (CRESTOR) 5 MG tablet, TAKE 1 TABLET EVERY DAY, Disp: 90 tablet, Rfl: 1    ferrous sulfate (IRON 325) 325 (65 Fe) MG tablet, Take 1 tablet by mouth daily (with breakfast), Disp: 90 tablet, Rfl: 3    metoprolol succinate (TOPROL XL) 50 MG extended release tablet, TAKE 1 TABLET EVERY DAY, Disp: 90 tablet, Rfl: 1    buPROPion (WELLBUTRIN XL) 150 MG extended release tablet, TAKE 1 TABLET EVERY DAY, Disp: 90 tablet, Rfl: 1    lamoTRIgine (LAMICTAL) 100 MG tablet, Take 1 tablet by mouth 2 times daily, Disp: 180 tablet, Rfl: 1    VORTIoxetine HBr (TRINTELLIX) 20 MG TABS tablet, TAKE 1 TABLET EVERY DAY, Disp: 90 tablet, Rfl: 1    celecoxib (CELEBREX) 200 MG capsule, Take 1 capsule by mouth 2 times daily, Disp: 180 capsule, Rfl: 1    loratadine (CLARITIN) 10 MG tablet, Take 10 mg by mouth daily, Disp: , Rfl:     CALCIUM-MAGNESIUM-VITAMIN D PO, Take 2 capsules by mouth nightly, Disp: , Rfl:     multivitamin (THERAGRAN) per tablet, Take 1 tablet by mouth daily. , Disp: , Rfl:     Omega-3 Fatty Acids (FISH OIL) 1200 MG CAPS, Take  by mouth daily. , Disp: , Rfl:      Social history: Denies IV drug use. Social History     Socioeconomic History    Marital status:      Spouse name: Not on file    Number of children: Not on file    Years of education: Not on file    Highest education level: Not on file   Occupational History    Not on file   Tobacco Use    Smoking status: Former Smoker     Packs/day: 1.50     Years: 21.00     Pack years: 31.50     Types: Cigarettes     Quit date: 1997     Years since quittin.5    Smokeless tobacco: Never Used   Vaping Use    Vaping Use: Never used   Substance and Sexual Activity    Alcohol use: No    Drug use: No    Sexual activity: Not Currently   Other Topics Concern    Not on file   Social History Narrative    Not on file     Social Determinants of Health     Financial Resource Strain:     Difficulty of Paying Living Expenses:    Food Insecurity:     Worried About Running Out of Food in the Last Year:     [address] in the Last Year:    Transportation Needs:     Lack of Transportation (Medical):      Lack of Transportation (Non-Medical):    Physical Activity:     Days of Exercise per Week:     Minutes of Exercise per Session:    Stress:     Feeling of Stress :    Social Connections:     Frequency of Communication with Friends and Family:     Frequency of Social Gatherings with Friends and Family:     Attends Jewish Services:     Active Member of physical exam findings with diagnostic imaging correlating to left knee osteoarthritis. -Time of 16 minutes was spent coordinating and discussing the clinical findings, reviewing diagnostic imaging as indicated, coordinating care with prior notes review and current clinical encounter documentation as it pertains to the patient's presenting subjective symptoms and diagnoses. -I reviewed with the patient the imaging findings as well as clinical exam and  how it correlates to subjective symptoms.  -I had a pleasant discussion with the patient today. I reviewed with her, and her daughter who accompanies her, her current findings with regard to her clinical examination however correlates to her subjective symptoms. 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 She also has previous medial spacer in place. At this time I did not advocate for any corticosteroid injections to treat osteoarthritis given metallic implant.  -She already takes anti-inflammatories and this is reasonable for her to continue  -With regard to physical therapy, I did advocate for formal physical therapy for them to work on SASHA knee reconditioning and strengthening. She wishes to go to physical therapy as well and this is reasonable  -Activity modification to include low impact elliptical stationary bike swimming and walking as well as I did discuss with her generalized herbal elevated BMI/height to weight ratio as any decrease in this would likely be of direct benefit to her knees. She expressed understanding  -All questions answered to the patient's satisfaction and the patient expressed understanding and agreement with the above listed treatment plan  -Follow up 8 to 12 weeks as needed  -Thank you for the clinical consultation and allowing me to participate in the patient's care.       Electronically signed by Ernie Swift MD on 8/9/21 at 3:54 PM EDT         Ernie Swift MD       Orthopaedic Surgery-Sports Medicine        Disclaimer: This note was dictated with voice recognition software. Though review and correction are routinely performed, please contact the office/medical records for any errors requiring correction.

## 2021-08-27 ENCOUNTER — VIRTUAL VISIT (OUTPATIENT)
Dept: FAMILY MEDICINE CLINIC | Age: 61
End: 2021-08-27
Payer: MEDICARE

## 2021-08-27 DIAGNOSIS — J45.40 MODERATE PERSISTENT REACTIVE AIRWAY DISEASE WITHOUT COMPLICATION: Primary | ICD-10-CM

## 2021-08-27 PROCEDURE — G8427 DOCREV CUR MEDS BY ELIG CLIN: HCPCS | Performed by: NURSE PRACTITIONER

## 2021-08-27 PROCEDURE — 99213 OFFICE O/P EST LOW 20 MIN: CPT | Performed by: NURSE PRACTITIONER

## 2021-08-27 PROCEDURE — 3017F COLORECTAL CA SCREEN DOC REV: CPT | Performed by: NURSE PRACTITIONER

## 2021-08-27 PROCEDURE — G8417 CALC BMI ABV UP PARAM F/U: HCPCS | Performed by: NURSE PRACTITIONER

## 2021-08-27 PROCEDURE — 1036F TOBACCO NON-USER: CPT | Performed by: NURSE PRACTITIONER

## 2021-08-27 RX ORDER — BUDESONIDE AND FORMOTEROL FUMARATE DIHYDRATE 160; 4.5 UG/1; UG/1
1 AEROSOL RESPIRATORY (INHALATION) 2 TIMES DAILY
Qty: 1 INHALER | Refills: 3 | Status: SHIPPED | OUTPATIENT
Start: 2021-08-27 | End: 2021-10-20 | Stop reason: SDUPTHER

## 2021-08-27 RX ORDER — PREDNISONE 10 MG/1
TABLET ORAL
Qty: 53 TABLET | Refills: 0 | Status: SHIPPED | OUTPATIENT
Start: 2021-08-27 | End: 2021-11-19 | Stop reason: ALTCHOICE

## 2021-08-27 ASSESSMENT — ENCOUNTER SYMPTOMS
GASTROINTESTINAL NEGATIVE: 1
STRIDOR: 0
NAUSEA: 0
ANAL BLEEDING: 0
CHEST TIGHTNESS: 0
WHEEZING: 1
COUGH: 1
EYES NEGATIVE: 1
ALLERGIC/IMMUNOLOGIC NEGATIVE: 1
BLOOD IN STOOL: 0
ORTHOPNEA: 0
SPUTUM PRODUCTION: 0
ABDOMINAL PAIN: 0
SHORTNESS OF BREATH: 1

## 2021-08-27 NOTE — PROGRESS NOTES
Natalio Serra is a 64 y.o. female evaluated via telephone on 8/27/2021.       Consent:  She and/or health care decision maker is aware that that she may receive a bill for this telephone service, depending on her insurance coverage, and has provided verbal consent to proceed: Yes      Milly Jensen MA

## 2021-08-27 NOTE — PROGRESS NOTES
Skin: Negative. Negative for rash. Allergic/Immunologic: Negative. Neurological: Negative. Negative for dizziness, syncope, light-headedness and numbness. Hematological: Negative. Does not bruise/bleed easily. Psychiatric/Behavioral: Negative. All other systems reviewed and are negative. Prior to Visit Medications    Medication Sig Taking? Authorizing Provider   Coenzyme Q10 (COQ10) 100 MG CAPS Take by mouth Yes Historical Provider, MD   albuterol sulfate HFA (PROVENTIL HFA) 108 (90 Base) MCG/ACT inhaler Inhale 2 puffs into the lungs every 6 hours as needed for Wheezing or Shortness of Breath Yes MADINA Babb CNP   fluticasone (FLONASE) 50 MCG/ACT nasal spray 1 spray by Nasal route 2 times daily Yes Bobby Bold,    rosuvastatin (CRESTOR) 5 MG tablet TAKE 1 TABLET EVERY DAY Yes MADINA Luke CNP   ferrous sulfate (IRON 325) 325 (65 Fe) MG tablet Take 1 tablet by mouth daily (with breakfast) Yes MADINA Gastelum CNP   metoprolol succinate (TOPROL XL) 50 MG extended release tablet TAKE 1 TABLET EVERY DAY Yes MADINA Gastelum CNP   buPROPion (WELLBUTRIN XL) 150 MG extended release tablet TAKE 1 TABLET EVERY DAY Yes MADINA Luke CNP   lamoTRIgine (LAMICTAL) 100 MG tablet Take 1 tablet by mouth 2 times daily Yes MADINA Gastelum CNP   VORTIoxetine HBr (TRINTELLIX) 20 MG TABS tablet TAKE 1 TABLET EVERY DAY Yes MADINA Babb CNP   celecoxib (CELEBREX) 200 MG capsule Take 1 capsule by mouth 2 times daily Yes MADINA Gastelum CNP   loratadine (CLARITIN) 10 MG tablet Take 10 mg by mouth daily Yes Historical Provider, MD   CALCIUM-MAGNESIUM-VITAMIN D PO Take 2 capsules by mouth nightly Yes Historical Provider, MD   multivitamin (THERAGRAN) per tablet Take 1 tablet by mouth daily. Yes Historical Provider, MD   Omega-3 Fatty Acids (FISH OIL) 1200 MG CAPS Take  by mouth daily.  Yes Historical Provider, MD Social History     Tobacco Use    Smoking status: Former Smoker     Packs/day: 1.50     Years: 21.00     Pack years: 31.50     Types: Cigarettes     Quit date: 1997     Years since quittin.5    Smokeless tobacco: Never Used   Vaping Use    Vaping Use: Never used   Substance Use Topics    Alcohol use: No    Drug use: No        Allergies   Allergen Reactions    Calamine Other (See Comments)     Leaves skin tender and burning     Amoxicillin-Pot Clavulanate Hives    Daypro [Oxaprozin] Hives    Duravent-Da [Chlorphen-Phenyleph-Methscop] Hives    Lithium      Caused shakes    Norvasc [Amlodipine]      LE edema      Nsaids      Avoid d/t gastric bypass    Seldane [Terfenadine] Hives    Suprax [Cefixime] Hives    Levofloxacin Rash   ,   Past Medical History:   Diagnosis Date    Bipolar disorder     Borderline osteopenia     Cellulitis of left leg 2020    GERD (gastroesophageal reflux disease)     Headache(784.0)     HNP (herniated nucleus pulposus), lumbar 2019    Hyperlipidemia     Hypertension     Intention tremor     due to lithium    Iron deficiency anemia 2019    Lateral meniscal tear 10/14/2015    Lumbar stenosis with neurogenic claudication 2019    Medial meniscus tear 10/14/2015    Prolonged emergence from general anesthesia, initial encounter 2019    Tobacco use     Venous ulcer of left leg ([address] 2020   ,   Past Surgical History:   Procedure Laterality Date    ABDOMINAL EXPLORATION SURGERY      CARPAL TUNNEL RELEASE Bilateral 2005     SECTION      COLONOSCOPY      normal    COLONOSCOPY N/A 2021    COLON W/ANES. (13:30) performed by David Sewell MD at [facility] Left     atrhroscopic unispacer    KNEE SURGERY Right 10/28/2015    Right knee video arthroscopy with partial medial and lateral menisectomy with loose body removal    LUMBAR SPINE SURGERY N/A 2019    MICROLUMBAR DISCECTOMY L4-5 performed by Guerda Rod MD at [address]     ,   Social History     Tobacco Use    Smoking status: Former Smoker     Packs/day: 1.50     Years: 21.00     Pack years: 31.50     Types: Cigarettes     Quit date: 1997     Years since quittin.5    Smokeless tobacco: Never Used   Vaping Use    Vaping Use: Never used   Substance Use Topics    Alcohol use: No    Drug use: No   ,   Family History   Problem Relation Age of Onset    Depression Sister     Mental Illness Sister     Diabetes Mother     Heart Disease Mother     Diabetes Father     Heart Disease Father    ,   Immunization History   Administered Date(s) Administered    Influenza 10/30/2013    Influenza Virus Vaccine 2014, 2015    Influenza, Quadv, IM, (6 mo and older Fluzone, Flulaval, Fluarix and 3 yrs and older Afluria) 2017, 2018    Influenza, Quadv, IM, PF (6 mo and older Fluzone, Flulaval, Fluarix, and 3 yrs and older Afluria) 2021    Tdap (Boostrix, Adacel) 2017   ,   Health Maintenance   Topic Date Due    COVID-19 Vaccine (1) Never done    Cervical cancer screen  Never done    Shingles Vaccine (1 of 2) Never done    Breast cancer screen  2020    A1C test (Diabetic or Prediabetic)  2021    Annual Wellness Visit (AWV)  2021 (Originally 2019)    Flu vaccine (1) 2021    Lipid screen  2022    Colon cancer screen colonoscopy  2024    DTaP/Tdap/Td vaccine (2 - Td or Tdap) 2027    Hepatitis C screen  Completed    HIV screen  Completed    Hepatitis A vaccine  Aged Out    Hepatitis B vaccine  Aged Out    Hib vaccine  Aged Out    Meningococcal (ACWY) vaccine  Aged Out    Pneumococcal 0-64 years Vaccine  Aged Out       PHYSICAL EXAMINATION:  [ INSTRUCTIONS:  \"[x]\" Indicates a positive item  \"[]\" Indicates a negative item  -- DELETE ALL ITEMS NOT EXAMINED]  Vital Signs: (As obtained by patient/caregiver or practitioner observation)    Blood pressure-  Heart rate-    Respiratory rate-    Temperature-  Pulse oximetry-     Constitutional: [x] Appears well-developed and well-nourished [x] No apparent distress      [] Abnormal-   Mental status  [x] Alert and awake  [x] Oriented to person/place/time [x]Able to follow commands      Eyes:  EOM    [x]  Normal  [] Abnormal-  Sclera  [x]  Normal  [] Abnormal -         Discharge [x]  None visible  [] Abnormal -    HENT:   [x] Normocephalic, atraumatic. [] Abnormal   [x] Mouth/Throat: Mucous membranes are moist.     External Ears [x] Normal  [] Abnormal-     Neck: [x] No visualized mass     Pulmonary/Chest: [x] Respiratory effort normal.  [x] No visualized signs of difficulty breathing or respiratory distress        [] Abnormal-      Musculoskeletal:   [x] Normal gait with no signs of ataxia         [x] Normal range of motion of neck        [] Abnormal-       Neurological:        [x] No Facial Asymmetry (Cranial nerve 7 motor function) (limited exam to video visit)          [x] No gaze palsy        [] Abnormal-         Skin:        [x] No significant exanthematous lesions or discoloration noted on facial skin         [] Abnormal-            Psychiatric:       [x] Normal Affect [x] No Hallucinations        [] Abnormal-     Other pertinent observable physical exam findings-     ASSESSMENT/PLAN:  1. Moderate persistent reactive airway disease without complication  Significant wheezing and increased dyspnea  PFT done 5/2021  Saw pulmo 6/2021 but only discussed ANTOINETTE  Only on albuterol MDI and using tid w/o relief  Start   - budesonide-formoterol (SYMBICORT) 160-4.5 MCG/ACT AERO; Inhale 1 puff into the lungs 2 times daily  Dispense: 1 Inhaler; Refill: 3  - predniSONE (DELTASONE) 10 MG tablet; Take 6 tablets by mouth daily x3 days, 5 tablets x3 days, 4 tablets x2 days, 3 tablets x2 days, 2 tablets x2 days, then 1 tablet x2 days. Dispense: 53 tablet;  Refill: 0  Review treatment goals of symptom prevention, minimizing limitation in activity, prevention of exacerbations and use of ER/inpatient care, maintenance of optimal pulmonary function and minimization of adverse effects of treatment. Discussed distinction between quick-relief and controlled medications. Discussed medication dosage, use, side effects, and goals of treatment in detail. Warning signs of respiratory distress were reviewed with the patient. Reduce exposure to inhaled allergens: use impermeable mattress and pillow covers, wash bedding weekly in water > 130'F to kill dust mites, vacuum 2x/week (the patient should not do the vacuuming), remove carpets in bedroom, avoid lying on upholstered furniture and don't use humidifiers (may increased dust & mold). Discussed avoidance of precipitants. No follow-ups on file. Yaron Toledo is a 64 y.o. female being evaluated by a Virtual Visit (video visit) encounter to address concerns as mentioned above. A caregiver was present when appropriate. Due to this being a TeleHealth encounter (During [place] [address] public health emergency), evaluation of the following organ systems was limited: Vitals/Constitutional/EENT/Resp/CV/GI//MS/Neuro/Skin/Heme-Lymph-Imm. Pursuant to the emergency declaration under the [address] [place] [address] authority and the [employer] and [employer] Act, this Virtual Visit was conducted with patient's (and/or legal guardian's) consent, to reduce the patient's risk of exposure to COVID-19 and provide necessary medical care. The patient (and/or legal guardian) has also been advised to contact this office for worsening conditions or problems, and seek emergency medical treatment and/or call 911 if deemed necessary.      Patient identification was verified at the start of the visit: Yes    Total time spent on this encounter: 20 minutes    Services were provided through a video synchronous discussion virtually to substitute for in-person clinic visit. Patient and provider were located at their individual homes. --MADINA Solis CNP on 8/27/2021 at 3:03 PM    An electronic signature was used to authenticate this note.

## 2021-09-02 ENCOUNTER — HOSPITAL ENCOUNTER (EMERGENCY)
Age: 61
Discharge: HOME | End: 2021-09-02
Payer: MEDICARE

## 2021-09-02 ENCOUNTER — APPOINTMENT (OUTPATIENT)
Dept: GENERAL RADIOLOGY | Age: 61
End: 2021-09-02
Payer: MEDICARE

## 2021-09-02 VITALS
SYSTOLIC BLOOD PRESSURE: 168 MMHG | DIASTOLIC BLOOD PRESSURE: 81 MMHG | RESPIRATION RATE: 18 BRPM | TEMPERATURE: 97.7 F | HEART RATE: 77 BPM | OXYGEN SATURATION: 96 %

## 2021-09-02 DIAGNOSIS — M17.11 PRIMARY OSTEOARTHRITIS OF RIGHT KNEE: ICD-10-CM

## 2021-09-02 DIAGNOSIS — S89.91XA INJURY OF RIGHT KNEE, INITIAL ENCOUNTER: ICD-10-CM

## 2021-09-02 DIAGNOSIS — M25.561 ACUTE PAIN OF RIGHT KNEE: Primary | ICD-10-CM

## 2021-09-02 PROCEDURE — 73562 X-RAY EXAM OF KNEE 3: CPT

## 2021-09-02 PROCEDURE — 99285 EMERGENCY DEPT VISIT HI MDM: CPT

## 2021-09-02 PROCEDURE — 6370000000 HC RX 637 (ALT 250 FOR IP): Performed by: PHYSICIAN ASSISTANT

## 2021-09-02 RX ORDER — HYDROCODONE BITARTRATE AND ACETAMINOPHEN 5; 325 MG/1; MG/1
1 TABLET ORAL EVERY 6 HOURS PRN
Qty: 10 TABLET | Refills: 0 | Status: SHIPPED | OUTPATIENT
Start: 2021-09-02 | End: 2021-09-07

## 2021-09-02 RX ORDER — HYDROCODONE BITARTRATE AND ACETAMINOPHEN 5; 325 MG/1; MG/1
1 TABLET ORAL ONCE
Status: COMPLETED | OUTPATIENT
Start: 2021-09-02 | End: 2021-09-02

## 2021-09-02 RX ORDER — ACETAMINOPHEN 325 MG/1
650 TABLET ORAL ONCE
Status: COMPLETED | OUTPATIENT
Start: 2021-09-02 | End: 2021-09-02

## 2021-09-02 RX ADMIN — HYDROCODONE BITARTRATE AND ACETAMINOPHEN 1 TABLET: 5; 325 TABLET ORAL at 22:34

## 2021-09-02 RX ADMIN — ACETAMINOPHEN 650 MG: 325 TABLET ORAL at 22:34

## 2021-09-02 ASSESSMENT — PAIN DESCRIPTION - FREQUENCY: FREQUENCY: CONTINUOUS

## 2021-09-02 ASSESSMENT — PAIN SCALES - GENERAL
PAINLEVEL_OUTOF10: 6
PAINLEVEL_OUTOF10: 3
PAINLEVEL_OUTOF10: 6

## 2021-09-02 ASSESSMENT — PAIN DESCRIPTION - DESCRIPTORS: DESCRIPTORS: SHARP

## 2021-09-02 ASSESSMENT — PAIN DESCRIPTION - LOCATION: LOCATION: LEG

## 2021-09-02 ASSESSMENT — PAIN DESCRIPTION - PAIN TYPE: TYPE: ACUTE PAIN

## 2021-09-02 ASSESSMENT — PAIN DESCRIPTION - ORIENTATION: ORIENTATION: RIGHT

## 2021-09-03 NOTE — ED PROVIDER NOTES
[address] ED  EMERGENCY DEPARTMENT ENCOUNTER        Pt Name: Lucia Humphrey  MRN: [id number]  [place] 1960  Date of evaluation: 9/2/2021  Provider: Wandy Sahu PA-C  PCP: MADINA Braga CNP  Note Started: 9:27 PM EDT       GAGANDEEP. I have evaluated this patient. My supervising physician was available for consultation. Tesfaye Esteban      CHIEF COMPLAINT       Chief Complaint   Patient presents with    Leg Pain     twisted right leg today at home; felt a pop; c/o pain when sitting or standing below right knee       HISTORY OF PRESENT ILLNESS   (Location, Timing/Onset, Context/Setting, Quality, Duration, Modifying Factors, Severity, Associated Signs and Symptoms)  Note limiting factors. Chief Complaint: Pain right knee    Lucia Humphrey is a 64 y.o. female who presents presents with complaint pain lateral aspect right knee. About 6 PM this evening working in her house and she had a slight stumble and she experienced a twisting and sudden pop and pain involving the right knee. Previous arthroscopy reported with meniscus injury in the past.  No knee replacement or open procedure of the knee. Pain laterally and somewhat distally in the vicinity of the fibular head. No pain otherwise involving the leg. She presents with friend for evaluation. Nursing Notes were all reviewed and agreed with or any disagreements were addressed in the HPI. REVIEW OF SYSTEMS    (2-9 systems for level 4, 10 or more for level 5)     Review of Systems    Positives and Pertinent negatives as per HPI. Except as noted above in the ROS, all other systems were reviewed and negative.        PAST MEDICAL HISTORY     Past Medical History:   Diagnosis Date    Bipolar disorder     Borderline osteopenia     Cellulitis of left leg 8/5/2020    GERD (gastroesophageal reflux disease)     Headache(784.0)     HNP (herniated nucleus pulposus), lumbar 6/6/2019    Hyperlipidemia TABLET    Take 10 mg by mouth daily    METOPROLOL SUCCINATE (TOPROL XL) 50 MG EXTENDED RELEASE TABLET    TAKE 1 TABLET EVERY DAY    MULTIVITAMIN (THERAGRAN) PER TABLET    Take 1 tablet by mouth daily. OMEGA-3 FATTY ACIDS (FISH OIL) 1200 MG CAPS    Take  by mouth daily. PREDNISONE (DELTASONE) 10 MG TABLET    Take 6 tablets by mouth daily x3 days, 5 tablets x3 days, 4 tablets x2 days, 3 tablets x2 days, 2 tablets x2 days, then 1 tablet x2 days. ROSUVASTATIN (CRESTOR) 5 MG TABLET    TAKE 1 TABLET EVERY DAY    VORTIOXETINE HBR (TRINTELLIX) 20 MG TABS TABLET    TAKE 1 TABLET EVERY DAY         ALLERGIES     Calamine, Amoxicillin-pot clavulanate, Daypro [oxaprozin], Duravent-da [chlorphen-phenyleph-methscop], Lithium, Norvasc [amlodipine], Nsaids, Seldane [terfenadine], Suprax [cefixime], and Levofloxacin    FAMILYHISTORY       Family History   Problem Relation Age of Onset    Depression Sister     Mental Illness Sister     Diabetes Mother     Heart Disease Mother     Diabetes Father     Heart Disease Father           SOCIAL HISTORY       Social History     Tobacco Use    Smoking status: Former Smoker     Packs/day: 1.50     Years: 21.00     Pack years: 31.50     Types: Cigarettes     Quit date: 1997     Years since quittin.5    Smokeless tobacco: Never Used   Vaping Use    Vaping Use: Never used   Substance Use Topics    Alcohol use: No    Drug use: No       SCREENINGS    Zelda Coma Scale  Eye Opening: Spontaneous  Best Verbal Response: Oriented  Best Motor Response: Obeys commands  Zelda Coma Scale Score: 15        PHYSICAL EXAM    (up to 7 for level 4, 8 or more for level 5)     ED Triage Vitals [21]   BP Temp Temp Source Pulse Resp SpO2 Height Weight   (!) 158/83 97.7 °F (36.5 °C) Oral 77 22 100 % -- --       Physical Exam  Vitals and nursing note reviewed. Constitutional:       Appearance: Normal appearance. She is well-developed. She is obese.       Comments: Patient obese and in wheelchair. She does have walker at home. HENT:      Head: Normocephalic and atraumatic. Right Ear: External ear normal.      Left Ear: External ear normal.   Eyes:      General: No scleral icterus. Right eye: No discharge. Left eye: No discharge. Conjunctiva/sclera: Conjunctivae normal.   Cardiovascular:      Rate and Rhythm: Normal rate. Pulmonary:      Effort: Pulmonary effort is normal.   Musculoskeletal:         General: Tenderness present. Normal range of motion. Cervical back: Normal range of motion and neck supple. Comments: Patient without obvious effusion. Difficult to fully assess due to the patient's size. Joint appears stable. Tenderness over the fibular head region. Will obtain x-ray to evaluate. Skin:     General: Skin is warm and dry. Neurological:      General: No focal deficit present. Mental Status: She is alert and oriented to person, place, and time. Mental status is at baseline. Psychiatric:         Mood and Affect: Mood normal.         Behavior: Behavior normal.         Thought Content: Thought content normal.         Judgment: Judgment normal.         DIAGNOSTIC RESULTS   LABS:    Labs Reviewed - No data to display    When ordered only abnormal lab results are displayed. All other labs were within normal range or not returned as of this dictation. EKG: When ordered, EKG's are interpreted by the Emergency Department Physician in the absence of a cardiologist.  Please see their note for interpretation of EKG. RADIOLOGY:   Non-plain film images such as CT, Ultrasound and MRI are read by the radiologist. Plain radiographic images are visualized and preliminarily interpreted by the ED Provider with the below findings:        Interpretation per the Radiologist below, if available at the time of this note:    XR KNEE RIGHT (3 VIEWS)   Final Result   No acute abnormality.       Severe patellofemoral and medial compartment [address] [facility]  [phone]    If symptoms worsen      DISCHARGE MEDICATIONS:  New Prescriptions    HYDROCODONE-ACETAMINOPHEN (NORCO) 5-325 MG PER TABLET    Take 1 tablet by mouth every 6 hours as needed for Pain for up to 5 days. DISCONTINUED MEDICATIONS:  Discontinued Medications    No medications on file              (Please note that portions of this note were completed with a voice recognition program.  Efforts were made to edit the dictations but occasionally words are mis-transcribed. )    Luz Maria Alonzo PA-C (electronically signed)           Luz Maria Alonzo PA-C  09/02/21 [id number]

## 2021-09-06 DIAGNOSIS — F33.9 RECURRENT MAJOR DEPRESSIVE DISORDER, REMISSION STATUS UNSPECIFIED (HCC): ICD-10-CM

## 2021-09-08 ENCOUNTER — OFFICE VISIT (OUTPATIENT)
Dept: FAMILY MEDICINE CLINIC | Age: 61
End: 2021-09-08
Payer: MEDICARE

## 2021-09-08 VITALS
DIASTOLIC BLOOD PRESSURE: 86 MMHG | TEMPERATURE: 96.8 F | OXYGEN SATURATION: 95 % | WEIGHT: 293 LBS | BODY MASS INDEX: 62.16 KG/M2 | HEART RATE: 98 BPM | SYSTOLIC BLOOD PRESSURE: 138 MMHG

## 2021-09-08 DIAGNOSIS — M79.604 PAIN OF RIGHT LOWER EXTREMITY: Primary | ICD-10-CM
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
## 2021-09-08 PROCEDURE — 3017F COLORECTAL CA SCREEN DOC REV: CPT | Performed by: NURSE PRACTITIONER

## 2021-09-08 PROCEDURE — G8427 DOCREV CUR MEDS BY ELIG CLIN: HCPCS | Performed by: NURSE PRACTITIONER

## 2021-09-08 PROCEDURE — 1036F TOBACCO NON-USER: CPT | Performed by: NURSE PRACTITIONER

## 2021-09-08 PROCEDURE — G8417 CALC BMI ABV UP PARAM F/U: HCPCS | Performed by: NURSE PRACTITIONER

## 2021-09-08 PROCEDURE — 99213 OFFICE O/P EST LOW 20 MIN: CPT | Performed by: NURSE PRACTITIONER

## 2021-09-08 SDOH — ECONOMIC STABILITY: FOOD INSECURITY: WITHIN THE PAST 12 MONTHS, THE FOOD YOU BOUGHT JUST DIDN'T LAST AND YOU DIDN'T HAVE MONEY TO GET MORE.: NEVER TRUE

## 2021-09-08 SDOH — ECONOMIC STABILITY: FOOD INSECURITY: WITHIN THE PAST 12 MONTHS, YOU WORRIED THAT YOUR FOOD WOULD RUN OUT BEFORE YOU GOT MONEY TO BUY MORE.: NEVER TRUE

## 2021-09-08 ASSESSMENT — ENCOUNTER SYMPTOMS
ALLERGIC/IMMUNOLOGIC NEGATIVE: 1
EYES NEGATIVE: 1
GASTROINTESTINAL NEGATIVE: 1
BLOOD IN STOOL: 0
ABDOMINAL PAIN: 0
SHORTNESS OF BREATH: 0
NAUSEA: 0
RESPIRATORY NEGATIVE: 1
ANAL BLEEDING: 0

## 2021-09-08 ASSESSMENT — SOCIAL DETERMINANTS OF HEALTH (SDOH): HOW HARD IS IT FOR YOU TO PAY FOR THE VERY BASICS LIKE FOOD, HOUSING, MEDICAL CARE, AND HEATING?: NOT HARD AT ALL

## 2021-09-08 NOTE — PROGRESS NOTES
[address] [facility] [address]  FAMILY [facility]IC[facility]  [address] [place] [id number]  Dept: [phone]  Dept Fax: [phone]  Loc: [phone]    Hipolito Acosta is a 64 y.o. female who presents today for her medical conditions/complaints as noted below. Hipolito Acosta is c/o of Leg Pain (right x 8 days)       Subjective:     Chief Complaint   Patient presents with    Leg Pain     right x 8 days       HPI  Patient comes in today for follow-up from an ER visit on 9/2/2021 where she was seen for right lower extremity pain after having a twisting injury to that area on that day. The patient states that she had stumbled and started to fall and twisted her body 1 way in her lower right extremity was the opposite way. The patient states that she heard/felt a pop in her lower leg and has had pain ever since. The patient states that it is mostly on the lateral aspect below her knee. The pain is mostly in the lateral aspect of her shin. The patient states that she has difficulty and pain with extending her right lower extremity. She also complains of difficulty and pain with bearing weight on the right lower extremity. she was already on steroids for an acute exacerbation of asthma. The patient has been using Tylenol over-the-counter as well as rest without any significant improvement.   The patient did have an x-ray of her right knee that was done on 9-2-21 no acute abnormality but there was severe patellofemoral and medial compartment osteoarthritis, with specific note of proximal fibula intact    Past Medical History:   Diagnosis Date    Bipolar disorder     Borderline osteopenia     Cellulitis of left leg 8/5/2020    GERD (gastroesophageal reflux disease)     Headache(784.0)     HNP (herniated nucleus pulposus), lumbar 6/6/2019    Hyperlipidemia     Hypertension     Intention tremor     due to lithium    Iron deficiency anemia 11/4/2019    Lateral meniscal tear 10/14/2015  Lumbar stenosis with neurogenic claudication 6/6/2019    Medial meniscus tear 10/14/2015    Prolonged emergence from general anesthesia, initial encounter 6/12/2019    Tobacco use     Venous ulcer of left leg ([address] 07/2020         Review of Systems   Constitutional: Negative. Negative for appetite change, fatigue and unexpected weight change. HENT: Negative. Eyes: Negative. Respiratory: Negative. Negative for shortness of breath. Cardiovascular: Negative. Negative for chest pain, palpitations and leg swelling. Gastrointestinal: Negative. Negative for abdominal pain, anal bleeding, blood in stool and nausea. Endocrine: Negative. Genitourinary: Negative. Negative for hematuria. Musculoskeletal: Positive for arthralgias, gait problem and myalgias. Skin: Negative. Negative for rash. Allergic/Immunologic: Negative. Neurological: Negative for dizziness, syncope, light-headedness and numbness. Hematological: Negative. Does not bruise/bleed easily. Psychiatric/Behavioral: Negative. All other systems reviewed and are negative.        Past Medical History:   Diagnosis Date    Bipolar disorder     Borderline osteopenia     Cellulitis of left leg 8/5/2020    GERD (gastroesophageal reflux disease)     Headache(784.0)     HNP (herniated nucleus pulposus), lumbar 6/6/2019    Hyperlipidemia     Hypertension     Intention tremor     due to lithium    Iron deficiency anemia 11/4/2019    Lateral meniscal tear 10/14/2015    Lumbar stenosis with neurogenic claudication 6/6/2019    Medial meniscus tear 10/14/2015    Prolonged emergence from general anesthesia, initial encounter 6/12/2019    Tobacco use     Venous ulcer of left leg ([address] 07/2020     Family History   Problem Relation Age of Onset    Depression Sister     Mental Illness Sister     Diabetes Mother     Heart Disease Mother     Diabetes Father     Heart Disease Father      Past Surgical History:   Procedure Laterality Date    ABDOMINAL EXPLORATION SURGERY      CARPAL TUNNEL RELEASE Bilateral 2005     SECTION      COLONOSCOPY      normal    COLONOSCOPY N/A 2021    COLON W/ANES. (13:30) performed by Magdy Cooley MD at [address] Left     atrhroscopic unispacer    KNEE SURGERY Right 10/28/2015    Right knee video arthroscopy with partial medial and lateral menisectomy with loose body removal    LUMBAR SPINE SURGERY N/A 2019    MICROLUMBAR DISCECTOMY L4-5 performed by Marimar Ulloa MD at [facility] History     Socioeconomic History    Marital status:      Spouse name: Not on file    Number of children: Not on file    Years of education: Not on file    Highest education level: Not on file   Occupational History    Not on file   Tobacco Use    Smoking status: Former Smoker     Packs/day: 1.50     Years: 21.00     Pack years: 31.50     Types: Cigarettes     Quit date: 1997     Years since quittin.5    Smokeless tobacco: Never Used   Vaping Use    Vaping Use: Never used   Substance and Sexual Activity    Alcohol use: No    Drug use: No    Sexual activity: Not Currently   Other Topics Concern    Not on file   Social History Narrative    Not on file     Social Determinants of Health     Financial Resource Strain: Low Risk     Difficulty of Paying Living Expenses: Not hard at all   Food Insecurity: No Food Insecurity    Worried About [facility] in the Last Year: Never true    [facility] in the Last Year: Never true   Transportation Needs:     Lack of Transportation (Medical):      Lack of Transportation (Non-Medical):    Physical Activity:     Days of Exercise per Week:     Minutes of Exercise per Session:    Stress:     Feeling of Stress :    Social Connections:     Frequency of Communication with Friends and Family:     Frequency of Social Gatherings with Friends and Family:     Attends Yazidi Services:     Active Member of Clubs or Organizations:     Attends Club or Organization Meetings:     Marital Status:    Intimate Partner Violence:     Fear of Current or Ex-Partner:     Emotionally Abused:     Physically Abused:     Sexually Abused:      Current Outpatient Medications   Medication Sig Dispense Refill    VORTIoxetine HBr (TRINTELLIX) 20 MG TABS tablet TAKE 1 TABLET EVERY DAY 90 tablet 1    budesonide-formoterol (SYMBICORT) 160-4.5 MCG/ACT AERO Inhale 1 puff into the lungs 2 times daily 1 Inhaler 3    predniSONE (DELTASONE) 10 MG tablet Take 6 tablets by mouth daily x3 days, 5 tablets x3 days, 4 tablets x2 days, 3 tablets x2 days, 2 tablets x2 days, then 1 tablet x2 days. 53 tablet 0    Coenzyme Q10 (COQ10) 100 MG CAPS Take by mouth      albuterol sulfate HFA (PROVENTIL HFA) 108 (90 Base) MCG/ACT inhaler Inhale 2 puffs into the lungs every 6 hours as needed for Wheezing or Shortness of Breath 3 Inhaler 3    rosuvastatin (CRESTOR) 5 MG tablet TAKE 1 TABLET EVERY DAY 90 tablet 1    ferrous sulfate (IRON 325) 325 (65 Fe) MG tablet Take 1 tablet by mouth daily (with breakfast) 90 tablet 3    metoprolol succinate (TOPROL XL) 50 MG extended release tablet TAKE 1 TABLET EVERY DAY 90 tablet 1    buPROPion (WELLBUTRIN XL) 150 MG extended release tablet TAKE 1 TABLET EVERY DAY 90 tablet 1    lamoTRIgine (LAMICTAL) 100 MG tablet Take 1 tablet by mouth 2 times daily 180 tablet 1    celecoxib (CELEBREX) 200 MG capsule Take 1 capsule by mouth 2 times daily 180 capsule 1    loratadine (CLARITIN) 10 MG tablet Take 10 mg by mouth daily      CALCIUM-MAGNESIUM-VITAMIN D PO Take 2 capsules by mouth nightly      multivitamin (THERAGRAN) per tablet Take 1 tablet by mouth daily.  Omega-3 Fatty Acids (FISH OIL) 1200 MG CAPS Take  by mouth daily.       fluticasone (FLONASE) 50 MCG/ACT nasal spray 1 spray by Nasal route 2 times daily 3 Bottle 3     No current facility-administered medications for this visit. No changes in past medical history, past surgical history, social history, orfamily history were noted during the patient encounter unless specifically listed above. All updates of past medical history, past surgical history, social history, or family history were reviewed personally by me duringthe office visit. All problems listed in the assessment are stable unless noted otherwise. Medication profile reviewed personally by me during the office visit. Medication side effects and possible impairments frommedications were discussed as applicable. Objective:     Physical Exam  Musculoskeletal:      Right knee: Tenderness present over the MCL. Normal pulse. Right lower leg: Tenderness present. No swelling. No edema. Legs:          /86   Pulse 98   Temp 96.8 °F (36 °C)   Wt (!) 329 lb (149.2 kg)   LMP  (LMP Unknown)   SpO2 95%   BMI 62.16 kg/m²   Body mass index is 62.16 kg/m². BP Readings from Last 2 Encounters:   09/08/21 138/86   09/02/21 (!) 168/81       Wt Readings from Last 3 Encounters:   09/08/21 (!) 329 lb (149.2 kg)   08/09/21 (!) 324 lb (147 kg)   06/10/21 (!) 330 lb 6.4 oz (149.9 kg)       Lab Review   No visits with results within 2 Month(s) from this visit. Latest known visit with results is:   Office Visit on 05/07/2021   Component Date Value    SARS-CoV-2 05/07/2021 Not Detected        No results found for this visit on 09/08/21. Assessment:       1. Pain of right lower extremity    2. Injury caused by twisting        No results found for this visit on 09/08/21. Plan:       Mariely Garza was seen today for leg pain. Diagnoses and all orders for this visit:    Pain of right lower extremity  -     XR TIBIA FIBULA RIGHT (2 VIEWS); Future    Injury caused by twisting  -     XR TIBIA FIBULA RIGHT (2 VIEWS);  Future    There is concern regarding her MCL tenderness and hearing a pop at the time of injury. There is also concern of peroneal muscle of the right lower extremity injury. Will obtain x-ray to ensure that there is no type of stress fracture since she has difficulty with bearing weight. If it is negative most likely she will need an Ortho consultation to address the MCL and peroneal muscle issues    Patient has been instructed call the office immediately with new symptoms, change in symptoms or worseningof symptoms. If this is not feasible, patient is instructed to report to the emergency room. Medication profile reviewed. Medication side effects and possible impairments from medications were discussed as applicable. Allergies were reviewed. Health maintenance was reviewed and updated as appropriate. (Comment: Please note this report has been produced using a combination of typing and speech recognition software and may contain errors related to that system including errors in grammar, punctuation, and spelling, as well as words and phrases that may be inappropriate.  If there are any questions or concerns please feel free to contact the dictating provider for clarification.)

## 2021-09-09 ENCOUNTER — HOSPITAL ENCOUNTER (OUTPATIENT)
Dept: GENERAL RADIOLOGY | Age: 61
Discharge: HOME | End: 2021-09-09
Payer: MEDICARE

## 2021-09-09 ENCOUNTER — HOSPITAL ENCOUNTER (OUTPATIENT)
Age: 61
Discharge: HOME | End: 2021-09-09
Payer: MEDICARE

## 2021-09-09 DIAGNOSIS — M79.604 PAIN OF RIGHT LOWER EXTREMITY: ICD-10-CM
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
## 2021-09-09 PROCEDURE — 73590 X-RAY EXAM OF LOWER LEG: CPT

## 2021-09-21 RX ORDER — CELECOXIB 200 MG/1
CAPSULE ORAL
Qty: 180 CAPSULE | Refills: 1 | Status: SHIPPED | OUTPATIENT
Start: 2021-09-21 | End: 2021-10-07 | Stop reason: SDUPTHER

## 2021-09-28 ENCOUNTER — OFFICE VISIT (OUTPATIENT)
Dept: ORTHOPEDIC SURGERY | Age: 61
End: 2021-09-28
Payer: MEDICARE

## 2021-09-28 VITALS — BODY MASS INDEX: 55.32 KG/M2 | HEIGHT: 61 IN | WEIGHT: 293 LBS

## 2021-09-28 DIAGNOSIS — M17.11 PRIMARY OSTEOARTHRITIS OF RIGHT KNEE: Primary | ICD-10-CM

## 2021-09-28 PROCEDURE — 1036F TOBACCO NON-USER: CPT | Performed by: ORTHOPAEDIC SURGERY

## 2021-09-28 PROCEDURE — G8427 DOCREV CUR MEDS BY ELIG CLIN: HCPCS | Performed by: ORTHOPAEDIC SURGERY

## 2021-09-28 PROCEDURE — 99213 OFFICE O/P EST LOW 20 MIN: CPT | Performed by: ORTHOPAEDIC SURGERY

## 2021-09-28 PROCEDURE — 3017F COLORECTAL CA SCREEN DOC REV: CPT | Performed by: ORTHOPAEDIC SURGERY

## 2021-09-28 PROCEDURE — G8417 CALC BMI ABV UP PARAM F/U: HCPCS | Performed by: ORTHOPAEDIC SURGERY

## 2021-09-28 NOTE — PROGRESS NOTES
ORTHOPAEDIC SURGERY FOLLOWUP VISIT    CHIEF COMPLAINT:  Right knee/leg pain    DATE OF INJURY: 9/2/2021  DATE OF SURGERY: N/A    HISTORY OF PRESENT ILLNESS:  35-year-old morbidly obese female presents for evaluation of her right leg injury. She sustained a stumble at her home and twisted her right knee. She fell to pop in the knee at that time. She developed some pain over the lateral aspect of the leg. Her acute pain has resolved and she has been having more significant medial knee pain. She has managed her knee pain in the past with an arthroscopy which was not beneficial for her. She has used oral anti-inflammatories as well as injections in the past as well which have not significantly benefited her. She continues to struggle with pain in the knee refractory to conservative managements. Her pain level is 5 out of 10. She has been taking Celebrex, Tylenol, ibuprofen, and heat. She has not done physical therapy. She was hoping to have a discussion about knee surgery to solve her arthritis knee pain. PHYSICAL EXAM:  BMI 62  General: Morbidly obese female of stated age. No acute distress. Right lower extremity:  There are no cuts, open wounds, or abrasions. No bruising or ecchymosis. Unable to appreciate an effusion given the patient's body habitus. Patella tracking appears appropriate. There is diffuse tenderness palpation mostly about the medial joint line. There is tenderness about the anterior knee as well. No significant lateral joint line tenderness. No calf tenderness. There is no significant instability appreciated with varus or valgus stress at 0 or 30 degrees of flexion. There is negative anterior and posterior drawer test.  Negative Lachman. There is pain that localizes to the medial joint line with Dewayne exam.  There is subpatellar crepitus during range of motion.   Sensation is intact to light touch in deep peroneal, superficial peroneal, tibial, sural, and saphenous nerve distributions. Motor function is intact to EHL, FHL, tibialis anterior, and gastroc. There is brisk capillary refill to the toes and a strong palpable dorsalis pedis pulse. Compartments are soft and compressible. There is no calf tenderness and a negative Homans' sign. RADIOGRAPHIC EXAM:  No new x-rays obtained today. X-rays from September 2, 2021 demonstrate no evidence of fracture or dislocation. There is varus malalignment with medial joint space loss and osteophyte formation. Lateral compartment is relatively well-preserved but also includes significant osteophyte formation. Lateral view fails to demonstrate significant effusion. There is spurring about the superior and inferior pole of patella as well as matching spurring of the trochlear groove. No significant soft tissue swelling. ASSESSMENT AND PLAN:  70-year-old morbidly obese female with right knee end-stage osteoarthritis    I had a discussion with her regarding the severity of her arthritis as well as the conservative managements that are available to her. I discussed the full breadth of nonoperative treatment. She was encouraged to proceed with weight loss to make herself a more appropriate surgical candidate. I discussed her BMI of 62 and that it is not amenable to surgery due to high surgical risks and lack of longevity to a potential arthroplasty component. She was informed that she would need to lose at least 100 pounds to be in the realm of a BMI 40. I discussed that a BMI of 40 is a benchmark for exponentially increasing surgical complications including but not limited to infection, blood loss, DVT, early implant loosening. I did inform her that a more minor procedure such as an arthroscopy would not be beneficial given her degree of arthritis. I offered her oral anti-inflammatories, physical therapy, intra-articular injections.   She states the injections have not been useful for her in the past.  She declined corticosteroid and viscosupplementation injections. She will follow up on an as-needed basis in the future.     Lance Dyson MD

## 2021-10-04 ENCOUNTER — HOSPITAL ENCOUNTER (EMERGENCY)
Age: 61
Discharge: HOME | End: 2021-10-04
Attending: STUDENT IN AN ORGANIZED HEALTH CARE EDUCATION/TRAINING PROGRAM
Payer: MEDICARE

## 2021-10-04 ENCOUNTER — APPOINTMENT (OUTPATIENT)
Dept: GENERAL RADIOLOGY | Age: 61
End: 2021-10-04
Payer: MEDICARE

## 2021-10-04 VITALS
TEMPERATURE: 97.8 F | WEIGHT: 293 LBS | OXYGEN SATURATION: 94 % | HEIGHT: 61 IN | SYSTOLIC BLOOD PRESSURE: 152 MMHG | DIASTOLIC BLOOD PRESSURE: 78 MMHG | RESPIRATION RATE: 18 BRPM | HEART RATE: 81 BPM | BODY MASS INDEX: 55.32 KG/M2

## 2021-10-04 DIAGNOSIS — R53.1 GENERAL WEAKNESS: Primary | ICD-10-CM

## 2021-10-04 LAB
A/G RATIO: 1.5 (ref 1.1–2.2)
ALBUMIN SERPL-MCNC: 4 G/DL (ref 3.4–5)
ALP BLD-CCNC: 124 U/L (ref 40–129)
ALT SERPL-CCNC: 16 U/L (ref 10–40)
ANION GAP SERPL CALCULATED.3IONS-SCNC: 10 MMOL/L (ref 3–16)
AST SERPL-CCNC: 13 U/L (ref 15–37)
BASOPHILS ABSOLUTE: 0.1 K/UL (ref 0–0.2)
BASOPHILS RELATIVE PERCENT: 0.7 %
BILIRUB SERPL-MCNC: <0.2 MG/DL (ref 0–1)
BILIRUBIN URINE: NEGATIVE
BLOOD, URINE: NEGATIVE
BUN BLDV-MCNC: 19 MG/DL (ref 7–20)
CALCIUM SERPL-MCNC: 8.4 MG/DL (ref 8.3–10.6)
CHLORIDE BLD-SCNC: 107 MMOL/L (ref 99–110)
CLARITY: CLEAR
CO2: 27 MMOL/L (ref 21–32)
COLOR: YELLOW
CREAT SERPL-MCNC: 0.7 MG/DL (ref 0.6–1.2)
EKG ATRIAL RATE: 77 BPM
EKG DIAGNOSIS: NORMAL
EKG P AXIS: 45 DEGREES
EKG P-R INTERVAL: 142 MS
EKG Q-T INTERVAL: 414 MS
EKG QRS DURATION: 90 MS
EKG QTC CALCULATION (BAZETT): 468 MS
EKG R AXIS: -42 DEGREES
EKG T AXIS: 58 DEGREES
EKG VENTRICULAR RATE: 77 BPM
EOSINOPHILS ABSOLUTE: 0.1 K/UL (ref 0–0.6)
EOSINOPHILS RELATIVE PERCENT: 0.8 %
GFR AFRICAN AMERICAN: >60
GFR NON-AFRICAN AMERICAN: >60
GLOBULIN: 2.6 G/DL
GLUCOSE BLD-MCNC: 139 MG/DL (ref 70–99)
GLUCOSE BLD-MCNC: 166 MG/DL (ref 70–99)
GLUCOSE URINE: NEGATIVE MG/DL
HCT VFR BLD CALC: 40.9 % (ref 36–48)
HEMOGLOBIN: 13 G/DL (ref 12–16)
KETONES, URINE: NEGATIVE MG/DL
LEUKOCYTE ESTERASE, URINE: NEGATIVE
LYMPHOCYTES ABSOLUTE: 1.2 K/UL (ref 1–5.1)
LYMPHOCYTES RELATIVE PERCENT: 13.3 %
MCH RBC QN AUTO: 28.6 PG (ref 26–34)
MCHC RBC AUTO-ENTMCNC: 31.8 G/DL (ref 31–36)
MCV RBC AUTO: 89.8 FL (ref 80–100)
MICROSCOPIC EXAMINATION: NORMAL
MONOCYTES ABSOLUTE: 0.7 K/UL (ref 0–1.3)
MONOCYTES RELATIVE PERCENT: 7.1 %
NEUTROPHILS ABSOLUTE: 7.2 K/UL (ref 1.7–7.7)
NEUTROPHILS RELATIVE PERCENT: 78.1 %
NITRITE, URINE: NEGATIVE
PDW BLD-RTO: 15.4 % (ref 12.4–15.4)
PERFORMED ON: ABNORMAL
PH UA: 6.5 (ref 5–8)
PLATELET # BLD: 313 K/UL (ref 135–450)
PMV BLD AUTO: 7.1 FL (ref 5–10.5)
POTASSIUM REFLEX MAGNESIUM: 4.2 MMOL/L (ref 3.5–5.1)
PROTEIN UA: NEGATIVE MG/DL
RBC # BLD: 4.56 M/UL (ref 4–5.2)
SARS-COV-2, NAAT: NOT DETECTED
SODIUM BLD-SCNC: 144 MMOL/L (ref 136–145)
SPECIFIC GRAVITY UA: 1.01 (ref 1–1.03)
TOTAL PROTEIN: 6.6 G/DL (ref 6.4–8.2)
TROPONIN: <0.01 NG/ML
URINE TYPE: NORMAL
UROBILINOGEN, URINE: 0.2 E.U./DL
WBC # BLD: 9.3 K/UL (ref 4–11)

## 2021-10-04 PROCEDURE — 85025 COMPLETE CBC W/AUTO DIFF WBC: CPT

## 2021-10-04 PROCEDURE — 93010 ELECTROCARDIOGRAM REPORT: CPT | Performed by: INTERNAL MEDICINE

## 2021-10-04 PROCEDURE — 93005 ELECTROCARDIOGRAM TRACING: CPT | Performed by: STUDENT IN AN ORGANIZED HEALTH CARE EDUCATION/TRAINING PROGRAM

## 2021-10-04 PROCEDURE — 99283 EMERGENCY DEPT VISIT LOW MDM: CPT

## 2021-10-04 PROCEDURE — 84484 ASSAY OF TROPONIN QUANT: CPT

## 2021-10-04 PROCEDURE — 81003 URINALYSIS AUTO W/O SCOPE: CPT

## 2021-10-04 PROCEDURE — 80053 COMPREHEN METABOLIC PANEL: CPT

## 2021-10-04 PROCEDURE — 71045 X-RAY EXAM CHEST 1 VIEW: CPT

## 2021-10-04 PROCEDURE — 87635 SARS-COV-2 COVID-19 AMP PRB: CPT

## 2021-10-04 ASSESSMENT — ENCOUNTER SYMPTOMS
ABDOMINAL PAIN: 0
BACK PAIN: 0
RHINORRHEA: 0
SHORTNESS OF BREATH: 0
SORE THROAT: 0
VOMITING: 0
PHOTOPHOBIA: 0
NAUSEA: 0
COUGH: 0

## 2021-10-04 NOTE — ED PROVIDER NOTES
[address] ED  EMERGENCY DEPARTMENT ENCOUNTER      Pt Name: Tessa Hightower  MRN: [id number]  [place] 1960  Date of evaluation: 10/4/2021  Provider: Gerardo Taylor, [address] [place] [address]       Chief Complaint   Patient presents with    Extremity Weakness     Pt arrived via EMS w/ report of weakness earlier today. Pt states she is not diabetic but believed her \"sugar\" was getting low so ate 6 cookies, 2 ice cream sandwiches, and took 4 glucose tablets. States she is feeling better since. POCT 139 during triage. HISTORY OF PRESENT ILLNESS   (Location/Symptom, Timing/Onset, Context/Setting, Quality, Duration, Modifying Factors, Severity)  Note limiting factors. Tessa Hightower is a 64 y.o. female history hypertension hyperlipidemia who presents to the emergency department complaining of generalized weakness fatigue. Patient states that she had episode today where she became weak fatigue diaphoretic jittery. Has had multiple episodes in last 4 months like this, she is not a diabetic but believes her sugar was low, one episode was improved after eating a piece of candy. States that she did consume sugary foods and by time of arrival her point-of-care glucose was in the 130s and symptoms have nearly resolved. On arrival now she is complaining of generalized weakness. Denies chest pain palpitations shortness of breath. States that she has some fluttering in her abdomen but denies pain denies nausea vomiting diarrhea. Patient has urinary incontinence at baseline denies new change. No fevers no chills no recent illness. Denies new or changed medications. Nursing Notes were reviewed.     PAST MEDICAL HISTORY     Past Medical History:   Diagnosis Date    Bipolar disorder     Borderline osteopenia     Cellulitis of left leg 8/5/2020    GERD (gastroesophageal reflux disease)     Headache(784.0)     HNP (herniated nucleus pulposus), lumbar 6/6/2019    Hyperlipidemia     Hypertension     Intention tremor     due to lithium    Iron deficiency anemia 2019    Lateral meniscal tear 10/14/2015    Lumbar stenosis with neurogenic claudication 2019    Medial meniscus tear 10/14/2015    Prolonged emergence from general anesthesia, initial encounter 2019    Tobacco use     Venous ulcer of left leg ([address] 2020         SURGICAL HISTORY       Past Surgical History:   Procedure Laterality Date    ABDOMINAL EXPLORATION SURGERY      CARPAL TUNNEL RELEASE Bilateral 2005     SECTION      COLONOSCOPY      normal    COLONOSCOPY N/A 2021    COLON W/ANES. (13:30) performed by Polina Doan MD at [facility] Left     atrhroscopic unispacer    KNEE SURGERY Right 10/28/2015    Right knee video arthroscopy with partial medial and lateral menisectomy with loose body removal    LUMBAR SPINE SURGERY N/A 2019    MICROLUMBAR DISCECTOMY L4-5 performed by Tod Oneil MD at [address]       Previous Medications    ALBUTEROL SULFATE HFA (PROVENTIL HFA) 108 (90 BASE) MCG/ACT INHALER    Inhale 2 puffs into the lungs every 6 hours as needed for Wheezing or Shortness of Breath    BUDESONIDE-FORMOTEROL (SYMBICORT) 160-4.5 MCG/ACT AERO    Inhale 1 puff into the lungs 2 times daily    BUPROPION (WELLBUTRIN XL) 150 MG EXTENDED RELEASE TABLET    TAKE 1 TABLET EVERY DAY    CALCIUM-MAGNESIUM-VITAMIN D PO    Take 2 capsules by mouth nightly    CELECOXIB (CELEBREX) 200 MG CAPSULE    TAKE 1 CAPSULE TWICE DAILY    COENZYME Q10 (COQ10) 100 MG CAPS    Take by mouth    FERROUS SULFATE (IRON 325) 325 (65 FE) MG TABLET    Take 1 tablet by mouth daily (with breakfast)    FLUTICASONE (FLONASE) 50 MCG/ACT NASAL SPRAY    1 spray by Nasal route 2 times daily    LAMOTRIGINE (LAMICTAL) 100 MG TABLET    Take 1 tablet by mouth 2 times daily    LORATADINE (CLARITIN) 10 MG TABLET    Take 10 mg by mouth daily    METOPROLOL SUCCINATE (TOPROL XL) 50 MG EXTENDED RELEASE TABLET    TAKE 1 TABLET EVERY DAY    MULTIVITAMIN (THERAGRAN) PER TABLET    Take 1 tablet by mouth daily. OMEGA-3 FATTY ACIDS (FISH OIL) 1200 MG CAPS    Take  by mouth daily. PREDNISONE (DELTASONE) 10 MG TABLET    Take 6 tablets by mouth daily x3 days, 5 tablets x3 days, 4 tablets x2 days, 3 tablets x2 days, 2 tablets x2 days, then 1 tablet x2 days. ROSUVASTATIN (CRESTOR) 5 MG TABLET    TAKE 1 TABLET EVERY DAY    VORTIOXETINE HBR (TRINTELLIX) 20 MG TABS TABLET    TAKE 1 TABLET EVERY DAY       ALLERGIES     Calamine, Amoxicillin-pot clavulanate, Daypro [oxaprozin], Duravent-da [chlorphen-phenyleph-methscop], Lithium, Norvasc [amlodipine], Nsaids, Seldane [terfenadine], Suprax [cefixime], and Levofloxacin    FAMILY HISTORY       Family History   Problem Relation Age of Onset    Depression Sister     Mental Illness Sister     Diabetes Mother     Heart Disease Mother     Diabetes Father     Heart Disease Father           SOCIAL HISTORY       Social History     Socioeconomic History    Marital status:       Spouse name: None    Number of children: None    Years of education: None    Highest education level: None   Occupational History    None   Tobacco Use    Smoking status: Former Smoker     Packs/day: 1.50     Years: 21.00     Pack years: 31.50     Types: Cigarettes     Quit date: 1997     Years since quittin.6    Smokeless tobacco: Never Used   Vaping Use    Vaping Use: Never used   Substance and Sexual Activity    Alcohol use: No    Drug use: No    Sexual activity: Not Currently   Other Topics Concern    None   Social History Narrative    None     Social Determinants of Health     Financial Resource Strain: Low Risk     Difficulty of Paying Living Expenses: Not hard at all   Food Insecurity: No Food Insecurity    Worried About Running Out of Food in the Last Year: Never true    Ran Out of Food in the Last Year: Never true   Transportation Needs:     Lack of Transportation (Medical):  Lack of Transportation (Non-Medical):    Physical Activity:     Days of Exercise per Week:     Minutes of Exercise per Session:    Stress:     Feeling of Stress :    Social Connections:     Frequency of Communication with Friends and Family:     Frequency of Social Gatherings with Friends and Family:     Attends Gnosticist Services:     Active Member of Clubs or Organizations:     Attends Club or Organization Meetings:     Marital Status:    Intimate Partner Violence:     Fear of Current or Ex-Partner:     Emotionally Abused:     Physically Abused:     Sexually Abused:        SCREENINGS                            REVIEW OF SYSTEMS    (2-9 systems for level 4, 10 or more for level 5)   Review of Systems   Constitutional: Positive for diaphoresis and fatigue. Negative for chills and fever. HENT: Negative for congestion, rhinorrhea and sore throat. Eyes: Negative for photophobia and visual disturbance. Respiratory: Negative for cough and shortness of breath. Cardiovascular: Negative for chest pain. Gastrointestinal: Negative for abdominal pain, nausea and vomiting. Genitourinary: Negative for decreased urine volume. History of urinary incontinence baseline   Musculoskeletal: Negative for back pain, neck pain and neck stiffness. Skin: Negative for rash. Hematological: Negative for adenopathy. Psychiatric/Behavioral: Negative for confusion. PHYSICAL EXAM    (up to 7 for level 4, 8 or more for level 5)   RECENT VITALS:     Temp: 97.8 °F (36.6 °C),  Pulse: 81, Resp: 22, BP: (!) 111/46, SpO2: 93 %    Physical Exam  Constitutional:       General: She is not in acute distress. Appearance: She is not diaphoretic. HENT:      Head: Normocephalic and atraumatic. Eyes:      Pupils: Pupils are equal, round, and reactive to light.    Neck: Trachea: No tracheal deviation. Cardiovascular:      Rate and Rhythm: Normal rate and regular rhythm. Pulmonary:      Effort: Pulmonary effort is normal. No respiratory distress. Abdominal:      General: There is no distension. Palpations: Abdomen is soft. Musculoskeletal:         General: Normal range of motion. Cervical back: Normal range of motion and neck supple. Skin:     General: Skin is warm. Neurological:      Mental Status: She is oriented to person, place, and time. DIAGNOSTIC RESULTS     EKG: All EKG's are interpreted by the Emergency Department Physician who either signs or Co-signs this chart in the absence of a cardiologist.      The Ekg interpreted by me shows  normal sinus rhythm with a rate of 77  Axis is   Left axis deviation  QTc is  normal  Intervals and Durations are unremarkable. ST Segments: no acute change    No significant change from prior EKG dated 6/6/2019        RADIOLOGY:   Non-plain film images such as CT, Ultrasound and MRI are read by the radiologist. Plain radiographic images are visualized and preliminarily interpreted by the emergency physician.     Interpretation per the Radiologist below, if available at the time of this note:    XR CHEST PORTABLE   Final Result   No active cardiopulmonary disease               LABS:  Labs Reviewed   COMPREHENSIVE METABOLIC PANEL W/ REFLEX TO MG FOR LOW K - Abnormal; Notable for the following components:       Result Value    Glucose 166 (*)     AST 13 (*)     All other components within normal limits    Narrative:     Performed at:  [facility]) [place] [address],  [place], [facility]   Phone [phone]   POCT GLUCOSE - Abnormal; Notable for the following components:    POC Glucose 139 (*)     All other components within normal limits    Narrative:     Performed at:  [facility],  [place], [facility]   Phone [phone] COVID-19, RAPID    Narrative:     Performed at:  [facility] [address],  [place], [facility]   Phone [phone]   CBC WITH AUTO DIFFERENTIAL    Narrative:     Performed at:  [facility] [address],  [place], [facility]   Phone [phone]   TROPONIN    Narrative:     Performed at:  [facility] [address],  [place], [facility]   Phone [phone]   URINALYSIS    Narrative:     Performed at:  [facility]) [facility] [address],  [place], [facility]   Phone [phone]       All other labs were within normal range or not returned as of this dictation. EMERGENCY DEPARTMENT COURSE and DIFFERENTIAL DIAGNOSIS/MDM:   Lydia Ballesteros is a 64 y.o. female who presents to the emergency department with the complaint of patient arrives complaining of an episode of diaphoresis nausea jitteriness. She believes her sugars were low at this time however does not have a glucometer at home is not a diabetic but has had prior episodes which were resolved with candy. On arrival mild generalized fatigue no chest pain palpitation shortness of breath abdominal pain nausea vomiting denies new urinary complaints does have chronic incontinence. Blood sugars on arrival in the 130s. Suspect episodes of hypoglycemia patient reports she goes long periods throughout the day without eating. However due to age and other risk factors will check basic labs including cardiac studies will add a chest x-ray and urine to evaluate for signs of infection. Patient's work-up here was very reassuring and she had resolution of her symptoms. Did discuss with her timing of diet throughout the day as she goes long stretches without eating that this may be contributing. Patient to follow-up with PCP for reevaluation in the next 48 hours.       CRITICAL CARE TIME   Total Critical Care time

## 2021-10-04 NOTE — ED NOTES
Bed: 06  Expected date:   Expected time:   Means of arrival:   Comments:  Venice Beard  10/04/21 7907

## 2021-10-04 NOTE — ED NOTES
Patient discharged after visit summary reviewed with the patient. Patient wheeled to lobby in a wheelchair.      Navin Zhang RN  10/04/21 [id number]

## 2021-10-05 ENCOUNTER — TELEPHONE (OUTPATIENT)
Dept: FAMILY MEDICINE CLINIC | Age: 61
End: 2021-10-05

## 2021-10-05 NOTE — TELEPHONE ENCOUNTER
Would recommend ER follow up with PCP if openings.   If no openings, she may schedule VV follow up with this provider

## 2021-10-05 NOTE — TELEPHONE ENCOUNTER
Pt called back and refuses to see anyone but PCP. Scheduled next avail ED f/u for 10/18(next avail).

## 2021-10-05 NOTE — TELEPHONE ENCOUNTER
Pt called stating that she's been having issues with her glucose dropping. Pt states that yesterday was really bad. She was eating lunch and felt it coming on. She took a couple of glucose tablets, it was getting worse, took a couple more glucose tablets, was still getting worse, ate a couple cookies, and ice cream. States that she was sweating and shaking, ended up calling the [employer] [facility] and went to ER. Pt is requesting to have an appt with PCP ASAP to discuss glucose issues. Okay to schedule in a same day?  Call back pt [phone]

## 2021-10-06 NOTE — TELEPHONE ENCOUNTER
Have a 10 AM appointment open tomorrow in the office--please call patient and schedule her in the spine

## 2021-10-07 ENCOUNTER — TELEPHONE (OUTPATIENT)
Dept: FAMILY MEDICINE CLINIC | Age: 61
End: 2021-10-07

## 2021-10-07 ENCOUNTER — OFFICE VISIT (OUTPATIENT)
Dept: FAMILY MEDICINE CLINIC | Age: 61
End: 2021-10-07
Payer: MEDICARE

## 2021-10-07 VITALS
TEMPERATURE: 96.1 F | SYSTOLIC BLOOD PRESSURE: 170 MMHG | HEIGHT: 61 IN | DIASTOLIC BLOOD PRESSURE: 84 MMHG | BODY MASS INDEX: 55.32 KG/M2 | WEIGHT: 293 LBS

## 2021-10-07 DIAGNOSIS — R73.9 HYPERGLYCEMIA: Primary | ICD-10-CM

## 2021-10-07 LAB — HBA1C MFR BLD: 5.8 %

## 2021-10-07 PROCEDURE — 99213 OFFICE O/P EST LOW 20 MIN: CPT | Performed by: NURSE PRACTITIONER

## 2021-10-07 PROCEDURE — G8427 DOCREV CUR MEDS BY ELIG CLIN: HCPCS | Performed by: NURSE PRACTITIONER

## 2021-10-07 PROCEDURE — G8484 FLU IMMUNIZE NO ADMIN: HCPCS | Performed by: NURSE PRACTITIONER

## 2021-10-07 PROCEDURE — G8417 CALC BMI ABV UP PARAM F/U: HCPCS | Performed by: NURSE PRACTITIONER

## 2021-10-07 PROCEDURE — 3017F COLORECTAL CA SCREEN DOC REV: CPT | Performed by: NURSE PRACTITIONER

## 2021-10-07 PROCEDURE — 83036 HEMOGLOBIN GLYCOSYLATED A1C: CPT | Performed by: NURSE PRACTITIONER

## 2021-10-07 PROCEDURE — 1036F TOBACCO NON-USER: CPT | Performed by: NURSE PRACTITIONER

## 2021-10-07 RX ORDER — GLUCOSAMINE HCL/CHONDROITIN SU 500-400 MG
CAPSULE ORAL
Qty: 100 STRIP | Refills: 5 | Status: SHIPPED | OUTPATIENT
Start: 2021-10-07 | End: 2021-10-07 | Stop reason: SDUPTHER

## 2021-10-07 RX ORDER — LANCETS 30 GAUGE
EACH MISCELLANEOUS
Qty: 100 EACH | Refills: 5 | Status: SHIPPED | OUTPATIENT
Start: 2021-10-07 | End: 2021-10-14 | Stop reason: SDUPTHER

## 2021-10-07 RX ORDER — CELECOXIB 200 MG/1
200 CAPSULE ORAL 2 TIMES DAILY
Qty: 180 CAPSULE | Refills: 1 | Status: SHIPPED | OUTPATIENT
Start: 2021-10-07 | End: 2021-10-28 | Stop reason: SDUPTHER

## 2021-10-07 RX ORDER — GLUCOSAMINE HCL/CHONDROITIN SU 500-400 MG
CAPSULE ORAL
Qty: 100 STRIP | Refills: 5 | Status: SHIPPED | OUTPATIENT
Start: 2021-10-07 | End: 2021-10-14 | Stop reason: SDUPTHER

## 2021-10-07 RX ORDER — LANCETS 30 GAUGE
1 EACH MISCELLANEOUS DAILY
Qty: 100 EACH | Refills: 5 | Status: SHIPPED | OUTPATIENT
Start: 2021-10-07 | End: 2021-10-07 | Stop reason: SDUPTHER

## 2021-10-07 NOTE — TELEPHONE ENCOUNTER
Pharmacy called and they are needing a \"max per day\" listed in Sig for the Lancets and test strips. Please update and resend Rx's to pharmacy.

## 2021-10-07 NOTE — TELEPHONE ENCOUNTER
Pt called stating that she'd forgotten to mention to PCP that a couple of days ago she was \"hurting so bad\" that she doubled her Celebrex and it really helped. Pt wanting to know if increasing the Rx to 400mg is possible. Pt uses [facility].  Call back [phone]

## 2021-10-07 NOTE — TELEPHONE ENCOUNTER
It is okay to use Celebrex 200 mg twice a day--- please update her medication list to reflect this change and notify patient

## 2021-10-07 NOTE — PROGRESS NOTES
[facility]  [address] [place] [id number]  Dept: [phone]  Dept Fax: [phone]  Loc: [phone]    Carolyn Huff is a 64 y.o. female who presents today for her medical conditions/complaints as noted below. Carolyn Huff is c/o of Follow-Up from Hospital (F/u October 4, 2021, pt states she thought her sugar was low so she took 4 glucose tablets, and cookies and nothing helped. Pt does not check sugar at home. )       Subjective:     Chief Complaint   Patient presents with   [address] from Hospital     F/u October 4, 2021, pt states she thought her sugar was low so she took 4 glucose tablets, and cookies and nothing helped. Pt does not check sugar at home. HPI   Patient is being seen for an ER follow up from 10/4/21 where she was seen for weakness and believed she was having an episode of hypoglycemia.   She states she has had this before and her Stomach will start fluttering and just feels weird  Patient was eating lunch when she started with a Red faced, sweaty and shaking all over  Patient took a lot of glucose in the form of sugary foods and patient states it took about 45 minutes to \"get back to normal\"  No nausea, no syncope, no diarrhea  Gets a little lightheaded and states he heart beats a little faster but no palpitations  Started years ago,  Worsening past few months,  Now happening every 2 weeks  Under a lot of stress right now,    No hypoglycemia found at ER and she is not a diabetic      Past Medical History:   Diagnosis Date    Bipolar disorder     Borderline osteopenia     Cellulitis of left leg 8/5/2020    GERD (gastroesophageal reflux disease)     Headache(784.0)     HNP (herniated nucleus pulposus), lumbar 6/6/2019    Hyperlipidemia     Hypertension     Intention tremor     due to lithium    Iron deficiency anemia 11/4/2019    Lateral meniscal tear 10/14/2015    Lumbar stenosis with neurogenic claudication 6/6/2019    Medial meniscus tear 10/14/2015    Prolonged emergence from general anesthesia, initial encounter 6/12/2019    Tobacco use     Venous ulcer of left leg ([address] 07/2020         Review of Systems   Constitutional: Positive for diaphoresis. Negative for appetite change, fatigue and unexpected weight change. Shaky, red face   HENT: Negative. Eyes: Negative. Respiratory: Negative. Negative for shortness of breath. Cardiovascular: Negative. Negative for chest pain, palpitations and leg swelling. Gastrointestinal: Negative. Negative for abdominal pain, anal bleeding, blood in stool and nausea. Endocrine: Negative. Negative for polydipsia, polyphagia and polyuria. Genitourinary: Negative. Negative for hematuria. Musculoskeletal: Negative. Skin: Negative. Negative for rash. Allergic/Immunologic: Negative. Neurological: Negative. Negative for dizziness, syncope, light-headedness and numbness. Hematological: Negative. Does not bruise/bleed easily. Psychiatric/Behavioral: Negative. All other systems reviewed and are negative.        Past Medical History:   Diagnosis Date    Bipolar disorder     Borderline osteopenia     Cellulitis of left leg 8/5/2020    GERD (gastroesophageal reflux disease)     Headache(784.0)     HNP (herniated nucleus pulposus), lumbar 6/6/2019    Hyperlipidemia     Hypertension     Intention tremor     due to lithium    Iron deficiency anemia 11/4/2019    Lateral meniscal tear 10/14/2015    Lumbar stenosis with neurogenic claudication 6/6/2019    Medial meniscus tear 10/14/2015    Prolonged emergence from general anesthesia, initial encounter 6/12/2019    Tobacco use     Venous ulcer of left leg ([address] 07/2020     Family History   Problem Relation Age of Onset    Depression Sister     Mental Illness Sister     Diabetes Mother     Heart Disease Mother     Diabetes Father     Heart Disease Father      Past Surgical History:   Procedure Laterality Date    ABDOMINAL EXPLORATION SURGERY      CARPAL TUNNEL RELEASE Bilateral 2005     SECTION      COLONOSCOPY      normal    COLONOSCOPY N/A 2021    COLON W/ANES. (13:30) performed by Chico Mir MD at [address] Left     atrhroscopic unispacer    KNEE SURGERY Right 10/28/2015    Right knee video arthroscopy with partial medial and lateral menisectomy with loose body removal    LUMBAR SPINE SURGERY N/A 2019    MICROLUMBAR DISCECTOMY L4-5 performed by Victor M Pardo MD at [place] [address] Marital status:      Spouse name: Not on file    Number of children: Not on file    Years of education: Not on file    Highest education level: Not on file   Occupational History    Not on file   Tobacco Use    Smoking status: Former Smoker     Packs/day: 1.50     Years: 21.00     Pack years: 31.50     Types: Cigarettes     Quit date: 1997     Years since quittin.6    Smokeless tobacco: Never Used   Vaping Use    Vaping Use: Never used   Substance and Sexual Activity    Alcohol use: No    Drug use: No    Sexual activity: Not Currently   Other Topics Concern    Not on file   Social History Narrative    Not on file     Social Determinants of Health     Financial Resource Strain: Low Risk     Difficulty of Paying Living Expenses: Not hard at all   Food Insecurity: No Food Insecurity    Worried About [address] in the Last Year: Never true    [facility] in the Last Year: Never true   Transportation Needs:     Lack of Transportation (Medical):      Lack of Transportation (Non-Medical):    Physical Activity:     Days of Exercise per Week:     Minutes of Exercise per Session:    Stress:     Feeling of Stress :    Social Connections:     Frequency of Communication with Friends and Family:     Frequency of Social Gatherings with Friends and Family:     Attends [employer] Services:     Active Member of Clubs or Organizations:     Attends Club or Organization Meetings:     Marital Status:    Intimate Partner Violence:     Fear of Current or Ex-Partner:     Emotionally Abused:     Physically Abused:     Sexually Abused:      Current Outpatient Medications   Medication Sig Dispense Refill    celecoxib (CELEBREX) 200 MG capsule TAKE 1 CAPSULE TWICE DAILY 180 capsule 1    VORTIoxetine HBr (TRINTELLIX) 20 MG TABS tablet TAKE 1 TABLET EVERY DAY 90 tablet 1    budesonide-formoterol (SYMBICORT) 160-4.5 MCG/ACT AERO Inhale 1 puff into the lungs 2 times daily 1 Inhaler 3    Coenzyme Q10 (COQ10) 100 MG CAPS Take by mouth      albuterol sulfate HFA (PROVENTIL HFA) 108 (90 Base) MCG/ACT inhaler Inhale 2 puffs into the lungs every 6 hours as needed for Wheezing or Shortness of Breath 3 Inhaler 3    fluticasone (FLONASE) 50 MCG/ACT nasal spray 1 spray by Nasal route 2 times daily 3 Bottle 3    rosuvastatin (CRESTOR) 5 MG tablet TAKE 1 TABLET EVERY DAY 90 tablet 1    ferrous sulfate (IRON 325) 325 (65 Fe) MG tablet Take 1 tablet by mouth daily (with breakfast) 90 tablet 3    metoprolol succinate (TOPROL XL) 50 MG extended release tablet TAKE 1 TABLET EVERY DAY 90 tablet 1    buPROPion (WELLBUTRIN XL) 150 MG extended release tablet TAKE 1 TABLET EVERY DAY 90 tablet 1    lamoTRIgine (LAMICTAL) 100 MG tablet Take 1 tablet by mouth 2 times daily 180 tablet 1    loratadine (CLARITIN) 10 MG tablet Take 10 mg by mouth daily      CALCIUM-MAGNESIUM-VITAMIN D PO Take 2 capsules by mouth nightly      multivitamin (THERAGRAN) per tablet Take 1 tablet by mouth daily.  Omega-3 Fatty Acids (FISH OIL) 1200 MG CAPS Take  by mouth daily.  predniSONE (DELTASONE) 10 MG tablet Take 6 tablets by mouth daily x3 days, 5 tablets x3 days, 4 tablets x2 days, 3 tablets x2 days, 2 tablets x2 days, then 1 tablet x2 days.  (Patient not taking: Reported on 10/7/2021) 53 tablet 0     No current facility-administered medications for this visit. No changes in past medical history, past surgical history, social history, orfamily history were noted during the patient encounter unless specifically listed above. All updates of past medical history, past surgical history, social history, or family history were reviewed personally by me duringthe office visit. All problems listed in the assessment are stable unless noted otherwise. Medication profile reviewed personally by me during the office visit. Medication side effects and possible impairments frommedications were discussed as applicable. Objective:     Physical Exam  Constitutional:       General: She is not in acute distress. Appearance: Normal appearance. She is well-developed. She is obese. She is not toxic-appearing. HENT:      Head: Normocephalic and atraumatic. Right Ear: Hearing, tympanic membrane and ear canal normal.      Left Ear: Hearing, tympanic membrane and ear canal normal.      Nose: Nose normal.      Mouth/Throat:      Pharynx: Uvula midline. Eyes:      General: Lids are normal.      Conjunctiva/sclera: Conjunctivae normal.   Cardiovascular:      Rate and Rhythm: Normal rate and regular rhythm. Pulses: Normal pulses. Heart sounds: Normal heart sounds. Pulmonary:      Effort: Pulmonary effort is normal. No accessory muscle usage or respiratory distress. Breath sounds: Normal breath sounds. Abdominal:      Palpations: Abdomen is soft. Tenderness: There is no abdominal tenderness. Musculoskeletal:      Cervical back: Neck supple. Lymphadenopathy:      Head:      Right side of head: No submental or submandibular adenopathy. Left side of head: No submental or submandibular adenopathy. Cervical: No cervical adenopathy. Skin:     General: Skin is warm and dry. Findings: No lesion or rash.    Neurological:      Mental Status: She is alert and oriented to person, place, and time. Psychiatric:         Speech: Speech normal.         Behavior: Behavior normal. Behavior is cooperative. BP (!) 170/84 (Site: Left Lower Arm, Position: Sitting, Cuff Size: Medium Adult)   Temp 96.1 °F (35.6 °C)   Ht 5' 1\" (1.549 m)   Wt (!) 330 lb (149.7 kg)   LMP  (LMP Unknown)   BMI 62.35 kg/m²   Body mass index is 62.35 kg/m².     BP Readings from Last 2 Encounters:   10/07/21 (!) 170/84   10/04/21 (!) 152/78       Wt Readings from Last 3 Encounters:   10/07/21 (!) 330 lb (149.7 kg)   10/04/21 (!) 334 lb (151.5 kg)   09/28/21 (!) 329 lb (149.2 kg)       Lab Review   Admission on 10/04/2021, Discharged on 10/04/2021   Component Date Value    POC Glucose 10/04/2021 139*    Performed on 10/04/2021 ACCU-CHEK     WBC 10/04/2021 9.3     RBC 10/04/2021 4.56     Hemoglobin 10/04/2021 13.0     Hematocrit 10/04/2021 40.9     MCV 10/04/2021 89.8     MCH 10/04/2021 28.6     MCHC 10/04/2021 31.8     RDW 10/04/2021 15.4     Platelets 47/15/5558 313     MPV 10/04/2021 7.1     Neutrophils % 10/04/2021 78.1     Lymphocytes % 10/04/2021 13.3     Monocytes % 10/04/2021 7.1     Eosinophils % 10/04/2021 0.8     Basophils % 10/04/2021 0.7     Neutrophils Absolute 10/04/2021 7.2     Lymphocytes Absolute 10/04/2021 1.2     Monocytes Absolute 10/04/2021 0.7     Eosinophils Absolute 10/04/2021 0.1     Basophils Absolute 10/04/2021 0.1     Sodium 10/04/2021 144     Potassium reflex Magnesi* 10/04/2021 4.2     Chloride 10/04/2021 107     CO2 10/04/2021 27     Anion Gap 10/04/2021 10     Glucose 10/04/2021 166*    BUN 10/04/2021 19     CREATININE 10/04/2021 0.7     GFR Non- 10/04/2021 >60     GFR  10/04/2021 >60     Calcium 10/04/2021 8.4     Total Protein 10/04/2021 6.6     Albumin 10/04/2021 4.0     Albumin/Globulin Ratio 10/04/2021 1.5     Total Bilirubin 10/04/2021 <0.2     Alkaline Phosphatase 10/04/2021 124     ALT 10/04/2021 16     AST 10/04/2021 13*    Globulin 10/04/2021 2.6     Troponin 10/04/2021 <0.01     SARS-CoV-2, NAAT 10/04/2021 Not Detected     Color, UA 10/04/2021 Yellow     Clarity, UA 10/04/2021 Clear     Glucose, Ur 10/04/2021 Negative     Bilirubin Urine 10/04/2021 Negative     Ketones, Urine 10/04/2021 Negative     Specific Gravity, UA 10/04/2021 1.010     Blood, Urine 10/04/2021 Negative     pH, UA 10/04/2021 6.5     Protein, UA 10/04/2021 Negative     Urobilinogen, Urine 10/04/2021 0.2     Nitrite, Urine 10/04/2021 Negative     Leukocyte Esterase, Urine 10/04/2021 Negative     Microscopic Examination 10/04/2021 Not Indicated     Urine Type 10/04/2021 NotGiven     Ventricular Rate 10/04/2021 77     Atrial Rate 10/04/2021 77     P-R Interval 10/04/2021 142     QRS Duration 10/04/2021 90     Q-T Interval 10/04/2021 414     QTc Calculation (Bazett) 10/04/2021 468     P Axis 10/04/2021 45     R Axis 10/04/2021 -43     T Axis 10/04/2021 58     Diagnosis 10/04/2021 Normal sinus rhythmLeft axis deviationAbnormal ECGNo significant change was found When compared with ECG [id number] by SUGEY DUKES, [address] ([id number]) on 10/4/2021 5:57:30 PM        No results found for this visit on 10/07/21. Assessment:       1. Hyperglycemia        Results for POC orders placed in visit on 10/07/21   POCT glycosylated hemoglobin (Hb A1C)   Result Value Ref Range    Hemoglobin A1C 5.8 %            Plan:       Tremaine Armando was seen today for follow-up from hospital.    Diagnoses and all orders for this visit:    Hyperglycemia  -     POCT glycosylated hemoglobin (Hb A1C)    Other orders  -     Start  blood glucose monitor kit and supplies; Dispense sufficient amount for indicated testing frequency plus additional to accommodate PRN testing needs. Dispense all needed supplies to include: monitor, strips, lancing device, lancets, control solutions, alcohol swabs.   -   Start : blood glucose monitor strips; Test 1 times a day & as needed for symptoms of irregular blood glucose. Dispense sufficient amount for indicated testing frequency plus additional to accommodate PRN testing needs.  -     Start : Lancets MISC; 1 each by Does not apply route daily  Hypoglycemia s/s reviewed   Monitor glucose at times of episodes    Patient has been instructed call the office immediately with new symptoms, change in symptoms or worseningof symptoms. If this is not feasible, patient is instructed to report to the emergency room. Medication profile reviewed. Medication side effects and possible impairments from medications were discussed as applicable. Allergies were reviewed. Health maintenance was reviewed and updated as appropriate. (Comment: Please note this report has been produced using a combination of typing and speech recognition software and may contain errors related to that system including errors in grammar, punctuation, and spelling, as well as words and phrases that may be inappropriate.  If there are any questions or concerns please feel free to contact the dictating provider for clarification.)

## 2021-10-14 ENCOUNTER — TELEPHONE (OUTPATIENT)
Dept: FAMILY MEDICINE CLINIC | Age: 61
End: 2021-10-14

## 2021-10-14 DIAGNOSIS — E16.2 HYPOGLYCEMIA: Primary | ICD-10-CM

## 2021-10-14 RX ORDER — LANCETS 30 GAUGE
EACH MISCELLANEOUS
Qty: 100 EACH | Refills: 5 | Status: SHIPPED | OUTPATIENT
Start: 2021-10-14 | End: 2022-06-22 | Stop reason: ALTCHOICE

## 2021-10-14 RX ORDER — GLUCOSAMINE HCL/CHONDROITIN SU 500-400 MG
CAPSULE ORAL
Qty: 100 STRIP | Refills: 5 | Status: SHIPPED | OUTPATIENT
Start: 2021-10-14 | End: 2022-06-22 | Stop reason: ALTCHOICE

## 2021-10-14 NOTE — TELEPHONE ENCOUNTER
Pt called stating that she's still having issues getting her diabetic supplies from the pharmacy. Called pharmacy and pt's insurance is needing a Dx code for the supplies and a F2F form filled out(faxed to us today, placed on PCP desk). They won't accept any of the \"R\" ICD. 10 codes. They will also need a copy of pt's \"red white and blue\" card from pt, not from us.     Call pt with update [phone]

## 2021-10-14 NOTE — TELEPHONE ENCOUNTER
Pharmacy called stating pt's insurance isn't accepting the E19.2 either. States another option for pt would be the low cost meter they have there. Meter is $9.99  100 test strips are $20  Lancets are $1.99  Matty Barnett at the pharmacy put the meter and supplies on hold until she hears from us about what to do for pt. Please advise on recommendations and notify pharmacy and pt.

## 2021-10-14 NOTE — TELEPHONE ENCOUNTER
Spoke with patient and she said she gave up on her insurance last night and ended up ordering a meter and supplies off of [employer] so she is going to try that and if it doesn't work she will let us know so we can contact the pharmacy

## 2021-10-20 DIAGNOSIS — J45.40 MODERATE PERSISTENT REACTIVE AIRWAY DISEASE WITHOUT COMPLICATION: ICD-10-CM

## 2021-10-20 RX ORDER — BUDESONIDE AND FORMOTEROL FUMARATE DIHYDRATE 160; 4.5 UG/1; UG/1
1 AEROSOL RESPIRATORY (INHALATION) 2 TIMES DAILY
Qty: 3 EACH | Refills: 2 | Status: SHIPPED | OUTPATIENT
Start: 2021-10-20 | End: 2021-10-28 | Stop reason: SDUPTHER

## 2021-10-22 DIAGNOSIS — F33.9 RECURRENT MAJOR DEPRESSIVE DISORDER, REMISSION STATUS UNSPECIFIED (HCC): ICD-10-CM

## 2021-10-22 DIAGNOSIS — J45.40 MODERATE PERSISTENT REACTIVE AIRWAY DISEASE WITHOUT COMPLICATION: ICD-10-CM

## 2021-10-28 DIAGNOSIS — J45.40 MODERATE PERSISTENT REACTIVE AIRWAY DISEASE WITHOUT COMPLICATION: ICD-10-CM

## 2021-10-28 DIAGNOSIS — F33.9 RECURRENT MAJOR DEPRESSIVE DISORDER, REMISSION STATUS UNSPECIFIED (HCC): ICD-10-CM

## 2021-10-28 RX ORDER — CELECOXIB 200 MG/1
200 CAPSULE ORAL 2 TIMES DAILY
Qty: 340 CAPSULE | Refills: 3 | Status: SHIPPED | OUTPATIENT
Start: 2021-10-28 | End: 2022-02-14 | Stop reason: SDUPTHER

## 2021-10-28 RX ORDER — BUDESONIDE AND FORMOTEROL FUMARATE DIHYDRATE 160; 4.5 UG/1; UG/1
1 AEROSOL RESPIRATORY (INHALATION) 2 TIMES DAILY
Qty: 3 EACH | Refills: 3 | Status: SHIPPED | OUTPATIENT
Start: 2021-10-28 | End: 2022-02-14 | Stop reason: SDUPTHER

## 2021-11-01 ENCOUNTER — TELEPHONE (OUTPATIENT)
Dept: FAMILY MEDICINE CLINIC | Age: 61
End: 2021-11-01

## 2021-11-01 NOTE — TELEPHONE ENCOUNTER
----- Message from Jorge Wallace sent at 11/1/2021  3:35 PM EDT -----  Subject: Appointment Request    Reason for Call: Routine Pre-Op    QUESTIONS  Type of Appointment? Established Patient  Reason for appointment request? No appointments available during search  Additional Information for Provider? pt has surgery on 11/18/21 scope on   her bladder at urology center in [facility] with Dr Bouchra Thomas please call to   make pt a pre op appt .   ---------------------------------------------------------------------------  --------------  CALL BACK INFO  What is the best way for the office to contact you? OK to leave message on   voicemail  Preferred Call Back Phone Number? [phone]  ---------------------------------------------------------------------------  --------------  SCRIPT ANSWERS  Relationship to Patient? Self  Do you have questions for your provider that need to be answered prior to   scheduling your pre-op appointment? No  Have you been diagnosed with, awaiting test results for, or told that you   are suspected of having COVID-19 (Coronavirus)? (If patient has tested   negative or was tested as a requirement for work, school, or travel and   not based on symptoms, answer no)? No  Within the past two weeks have you developed any of the following symptoms   (answer no if symptoms have been present longer than 2 weeks or began   more than 2 weeks ago)? Fever or Chills, Cough, Shortness of breath or   difficulty breathing, Loss of taste or smell, Sore throat, Nasal   congestion, Sneezing or runny nose, Fatigue or generalized body aches   (answer no if pain is specific to a body part e.g. back pain), Diarrhea,   Headache? No  Have you had close contact with someone with COVID-19 in the last 14 days? No  (Service Expert  click yes below to proceed with [employer] As Usual   Scheduling)?  Yes

## 2021-11-06 ASSESSMENT — ENCOUNTER SYMPTOMS
GASTROINTESTINAL NEGATIVE: 1
RESPIRATORY NEGATIVE: 1
ALLERGIC/IMMUNOLOGIC NEGATIVE: 1
NAUSEA: 0
BLOOD IN STOOL: 0
SHORTNESS OF BREATH: 0
EYES NEGATIVE: 1
ANAL BLEEDING: 0
ABDOMINAL PAIN: 0

## 2021-11-09 ENCOUNTER — TELEPHONE (OUTPATIENT)
Dept: FAMILY MEDICINE CLINIC | Age: 61
End: 2021-11-09

## 2021-11-09 DIAGNOSIS — J06.9 VIRAL URI: Primary | ICD-10-CM

## 2021-11-09 NOTE — TELEPHONE ENCOUNTER
Pt said that she had a covid test done at [facility] urgent care in [facility] and it was negative. States that she has a cough, head and chest congestion and she was wanting to see if she could get something called in. She is suppose to have a scope done on 11/18 and she was wanting to get rid of this so she did not have to cancel it. She uses [facility] in [facility].

## 2021-11-10 RX ORDER — METHYLPREDNISOLONE 4 MG/1
TABLET ORAL
Qty: 1 KIT | Refills: 0 | Status: SHIPPED | OUTPATIENT
Start: 2021-11-10 | End: 2021-11-19 | Stop reason: ALTCHOICE

## 2021-11-10 NOTE — TELEPHONE ENCOUNTER
Ok to send medrol dose fermin, use as directed #1 NRF to pharmacy of choice and notify patient.   Also patient should call provider doing procedure and let them know

## 2021-11-10 NOTE — TELEPHONE ENCOUNTER
Medication sent to patients pharmacy.  Patient called and informed of medication and need to inform her provider that is completing the scope of her symptoms

## 2021-11-19 ENCOUNTER — TELEMEDICINE (OUTPATIENT)
Dept: FAMILY MEDICINE CLINIC | Age: 61
End: 2021-11-19
Payer: MEDICARE

## 2021-11-19 ENCOUNTER — TELEPHONE (OUTPATIENT)
Dept: ORTHOPEDIC SURGERY | Age: 61
End: 2021-11-19

## 2021-11-19 DIAGNOSIS — M79.601 RIGHT ARM PAIN: Primary | ICD-10-CM

## 2021-11-19 PROCEDURE — G8417 CALC BMI ABV UP PARAM F/U: HCPCS | Performed by: NURSE PRACTITIONER

## 2021-11-19 PROCEDURE — 99213 OFFICE O/P EST LOW 20 MIN: CPT | Performed by: NURSE PRACTITIONER

## 2021-11-19 PROCEDURE — 3017F COLORECTAL CA SCREEN DOC REV: CPT | Performed by: NURSE PRACTITIONER

## 2021-11-19 PROCEDURE — G8484 FLU IMMUNIZE NO ADMIN: HCPCS | Performed by: NURSE PRACTITIONER

## 2021-11-19 PROCEDURE — 1036F TOBACCO NON-USER: CPT | Performed by: NURSE PRACTITIONER

## 2021-11-19 PROCEDURE — G8427 DOCREV CUR MEDS BY ELIG CLIN: HCPCS | Performed by: NURSE PRACTITIONER

## 2021-11-19 RX ORDER — BENZONATATE 100 MG/1
CAPSULE ORAL
COMMUNITY
Start: 2021-11-07 | End: 2021-12-20 | Stop reason: ALTCHOICE

## 2021-11-19 ASSESSMENT — ENCOUNTER SYMPTOMS
NAUSEA: 0
ALLERGIC/IMMUNOLOGIC NEGATIVE: 1
EYES NEGATIVE: 1
ANAL BLEEDING: 0
GASTROINTESTINAL NEGATIVE: 1
ABDOMINAL PAIN: 0
RESPIRATORY NEGATIVE: 1
SHORTNESS OF BREATH: 0
BLOOD IN STOOL: 0

## 2021-11-19 ASSESSMENT — PATIENT HEALTH QUESTIONNAIRE - PHQ9
SUM OF ALL RESPONSES TO PHQ QUESTIONS 1-9: 5
4. FEELING TIRED OR HAVING LITTLE ENERGY: 2
7. TROUBLE CONCENTRATING ON THINGS, SUCH AS READING THE NEWSPAPER OR WATCHING TELEVISION: 1
6. FEELING BAD ABOUT YOURSELF - OR THAT YOU ARE A FAILURE OR HAVE LET YOURSELF OR YOUR FAMILY DOWN: 0
SUM OF ALL RESPONSES TO PHQ QUESTIONS 1-9: 5
5. POOR APPETITE OR OVEREATING: 0
8. MOVING OR SPEAKING SO SLOWLY THAT OTHER PEOPLE COULD HAVE NOTICED. OR THE OPPOSITE, BEING SO FIGETY OR RESTLESS THAT YOU HAVE BEEN MOVING AROUND A LOT MORE THAN USUAL: 0
SUM OF ALL RESPONSES TO PHQ QUESTIONS 1-9: 5
SUM OF ALL RESPONSES TO PHQ9 QUESTIONS 1 & 2: 0
2. FEELING DOWN, DEPRESSED OR HOPELESS: 0
1. LITTLE INTEREST OR PLEASURE IN DOING THINGS: 0
3. TROUBLE FALLING OR STAYING ASLEEP: 2
9. THOUGHTS THAT YOU WOULD BE BETTER OFF DEAD, OR OF HURTING YOURSELF: 0
10. IF YOU CHECKED OFF ANY PROBLEMS, HOW DIFFICULT HAVE THESE PROBLEMS MADE IT FOR YOU TO DO YOUR WORK, TAKE CARE OF THINGS AT HOME, OR GET ALONG WITH OTHER PEOPLE: 1

## 2021-11-19 NOTE — PROGRESS NOTES
2021    TELEHEALTH EVALUATION -- Audio/Visual (During BKGHR-61 public health emergency)    Chief Complaint   Patient presents with    Other     Right shoulder pain for about 2 months. Don't recall any injury getting worse       HPI:  Shante Blunt (:  1960) has requested an audio/video evaluation for the following concern(s):    Shoulder Pain   The pain is present in the right shoulder, right wrist and right arm. This is a new problem. The current episode started more than 1 month ago (2 months). The problem occurs intermittently. The problem has been gradually worsening. The quality of the pain is described as aching and sharp. The pain is moderate. Associated symptoms include a limited range of motion (around back is limited). Pertinent negatives include no fever, inability to bear weight, itching, joint locking, joint swelling, numbness, stiffness or tingling. She has tried movement, rest, acetaminophen, NSAIDS and heat for the symptoms. The treatment provided mild relief. Family history does not include gout or rheumatoid arthritis. There is no history of diabetes. Review of Systems   Constitutional: Negative. Negative for appetite change, fatigue, fever and unexpected weight change. HENT: Negative. Eyes: Negative. Respiratory: Negative. Negative for shortness of breath. Cardiovascular: Negative. Negative for chest pain, palpitations and leg swelling. Gastrointestinal: Negative. Negative for abdominal pain, anal bleeding, blood in stool and nausea. Endocrine: Negative. Genitourinary: Negative. Negative for hematuria. Musculoskeletal: Positive for arthralgias. Negative for stiffness. Skin: Negative. Negative for itching and rash. Allergic/Immunologic: Negative. Neurological: Negative. Negative for dizziness, tingling, syncope, light-headedness and numbness. Hematological: Negative. Does not bruise/bleed easily. Psychiatric/Behavioral: Negative. All other systems reviewed and are negative. Prior to Visit Medications    Medication Sig Taking? Authorizing Provider   benzonatate (TESSALON) 100 MG capsule TAKE 1 CAPSULE BY MOUTH THREE TIMES A DAY AS NEEDED Yes Historical Provider, MD   budesonide-formoterol (SYMBICORT) 160-4.5 MCG/ACT AERO Inhale 1 puff into the lungs 2 times daily Yes MADINA Soares CNP   VORTIoxetine HBr (TRINTELLIX) 20 MG TABS tablet TAKE 1 TABLET EVERY DAY Yes MADINA Warren CNP   celecoxib (CELEBREX) 200 MG capsule Take 1 capsule by mouth 2 times daily Yes MADINA Soares CNP   blood glucose monitor kit and supplies Dispense sufficient amount for indicated testing frequency plus additional to accommodate PRN testing needs. Dispense all needed supplies to include: monitor, strips, lancing device, lancets, control solutions, alcohol swabs.  Yes MADINA Soares CNP   Lancets MISC Use to check fasting glucose level q am/once daily Yes MADINA Soares CNP   blood glucose monitor strips Test 1 times a day & as needed for symptoms of irregular blood up to twice per day Yes MADINA Soares CNP   Coenzyme Q10 (COQ10) 100 MG CAPS Take by mouth Yes Historical Provider, MD   albuterol sulfate HFA (PROVENTIL HFA) 108 (90 Base) MCG/ACT inhaler Inhale 2 puffs into the lungs every 6 hours as needed for Wheezing or Shortness of Breath Yes MADINA William CNP   fluticasone (FLONASE) 50 MCG/ACT nasal spray 1 spray by Nasal route 2 times daily Yes Jaspreet Carrington,    rosuvastatin (CRESTOR) 5 MG tablet TAKE 1 TABLET EVERY DAY Yes MADINA William CNP   ferrous sulfate (IRON 325) 325 (65 Fe) MG tablet Take 1 tablet by mouth daily (with breakfast) Yes MADINA Soares CNP   metoprolol succinate (TOPROL XL) 50 MG extended release tablet TAKE 1 TABLET EVERY DAY Yes MADINA Soares CNP   buPROPion (WELLBUTRIN XL) 150 MG extended release tablet TAKE 1 TABLET EVERY DAY Yes MADINA Franks CNP   lamoTRIgine (LAMICTAL) 100 MG tablet Take 1 tablet by mouth 2 times daily Yes MADINA Morales CNP   loratadine (CLARITIN) 10 MG tablet Take 10 mg by mouth daily Yes Historical Provider, MD   CALCIUM-MAGNESIUM-VITAMIN D PO Take 2 capsules by mouth nightly Yes Historical Provider, MD   multivitamin [facility]) per tablet Take 1 tablet by mouth daily. Yes Historical Provider, MD   Omega-3 Fatty Acids (FISH OIL) 1200 MG CAPS Take  by mouth daily.  Yes Historical Provider, MD       Social History     Tobacco Use    Smoking status: Former Smoker     Packs/day: 1.50     Years: 21.00     Pack years: 31.50     Types: Cigarettes     Quit date: 1997     Years since quittin.7    Smokeless tobacco: Never Used   Vaping Use    Vaping Use: Never used   Substance Use Topics    Alcohol use: No    Drug use: No        Allergies   Allergen Reactions    Calamine Other (See Comments)     Leaves skin tender and burning     Amoxicillin Other (See Comments)    Amoxicillin-Pot Clavulanate Hives    Chlorpheniramine Maleate     Daypro [Oxaprozin] Hives    Duravent-Da [Chlorphen-Phenyleph-Methscop] Hives    Lithium      Caused shakes    Methscopolamine     Norvasc [Amlodipine]      LE edema      Nsaids      Avoid d/t gastric bypass    Phenylephrine Hcl     Seldane [Terfenadine] Hives    Suprax [Cefixime] Hives    Levofloxacin Rash   ,   Past Medical History:   Diagnosis Date    Bipolar disorder     Borderline osteopenia     Cellulitis of left leg 2020    Frequency of micturition     GERD (gastroesophageal reflux disease)     Headache(784.0)     HNP (herniated nucleus pulposus), lumbar 2019    Hyperlipidemia     Hypertension     Intention tremor     due to lithium    Iron deficiency anemia 2019    Lateral meniscal tear 10/14/2015    Lumbar stenosis with neurogenic claudication 2019    Medial meniscus tear 10/14/2015    Mixed incontinence     Morbid (severe) obesity due to excess calories (HCC)     Prolonged emergence from general anesthesia, initial encounter 2019    Tobacco use     Venous ulcer of left leg ([address] 2020   ,   Past Surgical History:   Procedure Laterality Date    ABDOMINAL EXPLORATION SURGERY      CARPAL TUNNEL RELEASE Bilateral 2005     SECTION      COLONOSCOPY      normal    COLONOSCOPY N/A 2021    COLON W/ANES. (13:30) performed by Cuba Guzman MD at [facility] Left     atrhroscopic unispacer    KNEE SURGERY Right 10/28/2015    Right knee video arthroscopy with partial medial and lateral menisectomy with loose body removal    LUMBAR SPINE SURGERY N/A 2019    MICROLUMBAR DISCECTOMY L4-5 performed by Jonathan Soriano MD at [address]     ,   Social History     Tobacco Use    Smoking status: Former Smoker     Packs/day: 1.50     Years: 21.00     Pack years: 31.50     Types: Cigarettes     Quit date: 1997     Years since quittin.7    Smokeless tobacco: Never Used   Vaping Use    Vaping Use: Never used   Substance Use Topics    Alcohol use: No    Drug use: No   ,   Family History   Problem Relation Age of Onset    Depression Sister     Mental Illness Sister     Diabetes Mother     Heart Disease Mother     Diabetes Father     Heart Disease Father    ,   Immunization History   Administered Date(s) Administered    Influenza 10/30/2013    Influenza Virus Vaccine 2014, 2015    Influenza, Quadv, IM, (6 mo and older Fluzone, Flulaval, Fluarix and 3 yrs and older Afluria) 2017, 2018    Influenza, Quadv, IM, PF (6 mo and older Fluzone, Flulaval, Fluarix, and 3 yrs and older Afluria) 2021    Tdap (Boostrix, Adacel) 2017   ,   Health Maintenance   Topic Date Due    Pneumococcal 0-64 years Vaccine (1 of 2 - PPSV23) Never done   [facility] COVID-19 Vaccine (1) Never done    Cervical cancer screen  Never done    Shingles Vaccine (1 of 2) Never done    Annual Wellness Visit (AWV)  Never done    Breast cancer screen  07/30/2020    Flu vaccine (1) 09/01/2021    Lipid screen  04/28/2022    A1C test (Diabetic or Prediabetic)  10/07/2022    Colon cancer screen colonoscopy  02/02/2024    DTaP/Tdap/Td vaccine (2 - Td or Tdap) 08/04/2027    Hepatitis C screen  Completed    HIV screen  Completed    Hepatitis A vaccine  Aged Out    Hepatitis B vaccine  Aged Out    Hib vaccine  Aged Out    Meningococcal (ACWY) vaccine  Aged Out       PHYSICAL EXAMINATION:  [ INSTRUCTIONS:  \"[x]\" Indicates a positive item  \"[]\" Indicates a negative item  -- DELETE ALL ITEMS NOT EXAMINED]  Vital Signs: (As obtained by patient/caregiver or practitioner observation)    Blood pressure-  Heart rate-    Respiratory rate-    Temperature-  Pulse oximetry-     Constitutional: [x] Appears well-developed and well-nourished [x] No apparent distress      [] Abnormal-   Mental status  [x] Alert and awake  [x] Oriented to person/place/time [x]Able to follow commands      Eyes:  EOM    [x]  Normal  [] Abnormal-  Sclera  [x]  Normal  [] Abnormal -         Discharge [x]  None visible  [] Abnormal -    HENT:   [x] Normocephalic, atraumatic.   [] Abnormal   [x] Mouth/Throat: Mucous membranes are moist.     External Ears [x] Normal  [] Abnormal-     Neck: [x] No visualized mass     Pulmonary/Chest: [x] Respiratory effort normal.  [x] No visualized signs of difficulty breathing or respiratory distress        [] Abnormal-      Musculoskeletal:   [x] Normal gait with no signs of ataxia         [x] Normal range of motion of neck        [] Abnormal-       Neurological:        [x] No Facial Asymmetry (Cranial nerve 7 motor function) (limited exam to video visit)          [x] No gaze palsy        [] Abnormal-         Skin:        [x] No significant exanthematous lesions or discoloration noted on facial skin         [] Abnormal-            Psychiatric:       [x] Normal Affect [x] No Hallucinations        [] Abnormal-     Other pertinent observable physical exam findings-     ASSESSMENT/PLAN:  1. Right arm pain  Offered EMG first but patient prefers to see ortho  Referral provided  - [address], DO Eric, [facility]opaedics [facility], [facility]  Continue NSAID, heat and avoid provocative factors        Markell Brown is a 64 y.o. female being evaluated by a Virtual Visit (video visit) encounter to address concerns as mentioned above. A caregiver was present when appropriate. Due to this being a TeleHealth encounter (During HJEIG-93 public health emergency), evaluation of the following organ systems was limited: Vitals/Constitutional/EENT/Resp/CV/GI//MS/Neuro/Skin/Heme-Lymph-Imm. Pursuant to the emergency declaration under the [address] [place] [address], [address] [place] [address] authority and the [employer] Act, this Virtual Visit was conducted with patient's (and/or legal guardian's) consent, to reduce the patient's risk of exposure to COVID-19 and provide necessary medical care. The patient (and/or legal guardian) has also been advised to contact this office for worsening conditions or problems, and seek emergency medical treatment and/or call 911 if deemed necessary. Patient identification was verified at the start of the visit: Yes    Total time spent on this encounter: 20 minutes    Services were provided through a video synchronous discussion virtually to substitute for in-person clinic visit. Patient and provider were located at their individual homes. --MADINA Johnson CNP on 11/19/2021 at 1:05 PM    An electronic signature was used to authenticate this note.

## 2021-12-02 ENCOUNTER — OFFICE VISIT (OUTPATIENT)
Dept: FAMILY MEDICINE CLINIC | Age: 61
End: 2021-12-02
Payer: MEDICARE

## 2021-12-02 ENCOUNTER — OFFICE VISIT (OUTPATIENT)
Dept: ORTHOPEDIC SURGERY | Age: 61
End: 2021-12-02
Payer: MEDICARE

## 2021-12-02 VITALS — BODY MASS INDEX: 55.32 KG/M2 | HEIGHT: 61 IN | WEIGHT: 293 LBS

## 2021-12-02 VITALS
SYSTOLIC BLOOD PRESSURE: 142 MMHG | OXYGEN SATURATION: 95 % | DIASTOLIC BLOOD PRESSURE: 88 MMHG | BODY MASS INDEX: 55.32 KG/M2 | WEIGHT: 293 LBS | HEART RATE: 72 BPM | HEIGHT: 61 IN

## 2021-12-02 DIAGNOSIS — J45.40 MODERATE PERSISTENT REACTIVE AIRWAY DISEASE WITHOUT COMPLICATION: ICD-10-CM

## 2021-12-02 DIAGNOSIS — G25.81 RESTLESS LEGS SYNDROME (RLS): ICD-10-CM

## 2021-12-02 DIAGNOSIS — G47.33 OSA ON CPAP: ICD-10-CM

## 2021-12-02 DIAGNOSIS — F33.2 SEVERE EPISODE OF RECURRENT MAJOR DEPRESSIVE DISORDER, WITHOUT PSYCHOTIC FEATURES (HCC): ICD-10-CM

## 2021-12-02 DIAGNOSIS — I10 PRIMARY HYPERTENSION: ICD-10-CM

## 2021-12-02 DIAGNOSIS — E78.49 OTHER HYPERLIPIDEMIA: ICD-10-CM

## 2021-12-02 DIAGNOSIS — M25.511 RIGHT SHOULDER PAIN, UNSPECIFIED CHRONICITY: ICD-10-CM

## 2021-12-02 DIAGNOSIS — M48.062 LUMBAR STENOSIS WITH NEUROGENIC CLAUDICATION: ICD-10-CM

## 2021-12-02 DIAGNOSIS — I87.2 PERIPHERAL VENOUS INSUFFICIENCY: ICD-10-CM

## 2021-12-02 DIAGNOSIS — E66.01 MORBID OBESITY WITH BMI OF 50.0-59.9, ADULT (HCC): ICD-10-CM

## 2021-12-02 DIAGNOSIS — K21.9 GASTROESOPHAGEAL REFLUX DISEASE WITHOUT ESOPHAGITIS: ICD-10-CM

## 2021-12-02 DIAGNOSIS — Z99.89 OSA ON CPAP: ICD-10-CM

## 2021-12-02 DIAGNOSIS — Z01.818 PRE-OP EXAMINATION: ICD-10-CM

## 2021-12-02 DIAGNOSIS — N39.46 MIXED STRESS AND URGE URINARY INCONTINENCE: ICD-10-CM

## 2021-12-02 DIAGNOSIS — E55.9 VITAMIN D DEFICIENCY: ICD-10-CM

## 2021-12-02 DIAGNOSIS — F51.01 PRIMARY INSOMNIA: ICD-10-CM

## 2021-12-02 DIAGNOSIS — M75.81 TENDINITIS OF RIGHT ROTATOR CUFF: Primary | ICD-10-CM

## 2021-12-02 PROCEDURE — 20610 DRAIN/INJ JOINT/BURSA W/O US: CPT | Performed by: STUDENT IN AN ORGANIZED HEALTH CARE EDUCATION/TRAINING PROGRAM

## 2021-12-02 PROCEDURE — G8427 DOCREV CUR MEDS BY ELIG CLIN: HCPCS | Performed by: STUDENT IN AN ORGANIZED HEALTH CARE EDUCATION/TRAINING PROGRAM

## 2021-12-02 PROCEDURE — 99213 OFFICE O/P EST LOW 20 MIN: CPT | Performed by: NURSE PRACTITIONER

## 2021-12-02 PROCEDURE — G8484 FLU IMMUNIZE NO ADMIN: HCPCS | Performed by: NURSE PRACTITIONER

## 2021-12-02 PROCEDURE — G8417 CALC BMI ABV UP PARAM F/U: HCPCS | Performed by: STUDENT IN AN ORGANIZED HEALTH CARE EDUCATION/TRAINING PROGRAM

## 2021-12-02 PROCEDURE — G8427 DOCREV CUR MEDS BY ELIG CLIN: HCPCS | Performed by: NURSE PRACTITIONER

## 2021-12-02 PROCEDURE — G8417 CALC BMI ABV UP PARAM F/U: HCPCS | Performed by: NURSE PRACTITIONER

## 2021-12-02 PROCEDURE — 99214 OFFICE O/P EST MOD 30 MIN: CPT | Performed by: STUDENT IN AN ORGANIZED HEALTH CARE EDUCATION/TRAINING PROGRAM

## 2021-12-02 PROCEDURE — 1036F TOBACCO NON-USER: CPT | Performed by: NURSE PRACTITIONER

## 2021-12-02 PROCEDURE — 1036F TOBACCO NON-USER: CPT | Performed by: STUDENT IN AN ORGANIZED HEALTH CARE EDUCATION/TRAINING PROGRAM

## 2021-12-02 PROCEDURE — 3017F COLORECTAL CA SCREEN DOC REV: CPT | Performed by: NURSE PRACTITIONER

## 2021-12-02 PROCEDURE — 36415 COLL VENOUS BLD VENIPUNCTURE: CPT | Performed by: NURSE PRACTITIONER

## 2021-12-02 PROCEDURE — 3017F COLORECTAL CA SCREEN DOC REV: CPT | Performed by: STUDENT IN AN ORGANIZED HEALTH CARE EDUCATION/TRAINING PROGRAM

## 2021-12-02 PROCEDURE — G8484 FLU IMMUNIZE NO ADMIN: HCPCS | Performed by: STUDENT IN AN ORGANIZED HEALTH CARE EDUCATION/TRAINING PROGRAM

## 2021-12-02 RX ORDER — LIDOCAINE HYDROCHLORIDE 10 MG/ML
4 INJECTION, SOLUTION INFILTRATION; PERINEURAL ONCE
Status: COMPLETED | OUTPATIENT
Start: 2021-12-02 | End: 2021-12-02

## 2021-12-02 RX ORDER — METHYLPREDNISOLONE ACETATE 40 MG/ML
80 INJECTION, SUSPENSION INTRA-ARTICULAR; INTRALESIONAL; INTRAMUSCULAR; SOFT TISSUE ONCE
Status: COMPLETED | OUTPATIENT
Start: 2021-12-02 | End: 2021-12-02

## 2021-12-02 RX ADMIN — LIDOCAINE HYDROCHLORIDE 4 ML: 10 INJECTION, SOLUTION INFILTRATION; PERINEURAL at 08:34

## 2021-12-02 RX ADMIN — METHYLPREDNISOLONE ACETATE 80 MG: 40 INJECTION, SUSPENSION INTRA-ARTICULAR; INTRALESIONAL; INTRAMUSCULAR; SOFT TISSUE at 08:35

## 2021-12-02 NOTE — LETTER
[address] [place] [address]  [facility] [address]  Phone: [phone]  Fax: [phone]    Trinidad Asher DO    December 2, 2021     Odessa Cano, APRN - CNP  [address] [facility],[address]    Patient: Regis Lanes   MR Number: [id number]   YOB: 1960   Date of Visit: 12/2/2021       Dear Odessa Cano: Thank you for referring Regis Lanes to me for evaluation/treatment. Below are the relevant portions of my assessment and plan of care. If you have questions, please do not hesitate to call me. I look forward to following Belkis Zeng along with you.     Sincerely,      Trinidad Asher DO

## 2021-12-02 NOTE — PROGRESS NOTES
[address]  Berhane Pool CNP                                                                     Preoperative Evaluation        Kay Blunt  YOB: 1960    Date of Service:  12/2/2021    Vitals:    12/02/21 1112 12/02/21 1114   BP: (!) 146/84 (!) 142/88   Site: Right Lower Arm Right Lower Arm   Position: Sitting Sitting   Cuff Size: Medium Adult Medium Adult   Pulse: 72    SpO2: 95%    Weight: (!) 327 lb (148.3 kg)    Height: 5' 1\" (1.549 m)       Wt Readings from Last 2 Encounters:   12/02/21 (!) 327 lb (148.3 kg)   12/02/21 (!) 330 lb (149.7 kg)     BP Readings from Last 3 Encounters:   12/02/21 (!) 142/88   10/07/21 (!) 170/84   10/04/21 (!) 152/78        Chief Complaint   Patient presents with   Elian Duran is preforming a bladder scope scheduled 12/10/2021     Allergies   Allergen Reactions    Calamine Other (See Comments)     Leaves skin tender and burning     Amoxicillin Other (See Comments)    Amoxicillin-Pot Clavulanate Hives    Chlorpheniramine Maleate     Daypro [Oxaprozin] Hives    Duravent-Da [Chlorphen-Phenyleph-Methscop] Hives    Lithium      Caused shakes    Methscopolamine     Norvasc [Amlodipine]      LE edema      Nsaids      Avoid d/t gastric bypass    Phenylephrine Hcl     Seldane [Terfenadine] Hives    Suprax [Cefixime] Hives    Levofloxacin Rash     Outpatient Medications Marked as Taking for the 12/2/21 encounter (Office Visit) with MADINA De Paz - CNP   Medication Sig Dispense Refill    benzonatate (TESSALON) 100 MG capsule TAKE 1 CAPSULE BY MOUTH THREE TIMES A DAY AS NEEDED      budesonide-formoterol (SYMBICORT) 160-4.5 MCG/ACT AERO Inhale 1 puff into the lungs 2 times daily 3 each 3    VORTIoxetine HBr (TRINTELLIX) 20 MG TABS tablet TAKE 1 TABLET EVERY DAY 90 tablet 3    celecoxib (CELEBREX) 200 MG capsule Take 1 capsule by mouth 2 times daily 340 capsule 3    blood glucose monitor kit and supplies Dispense sufficient amount for indicated testing frequency plus additional to accommodate PRN testing needs. Dispense all needed supplies to include: monitor, strips, lancing device, lancets, control solutions, alcohol swabs. 1 kit 0    Lancets MISC Use to check fasting glucose level q am/once daily 100 each 5    blood glucose monitor strips Test 1 times a day & as needed for symptoms of irregular blood up to twice per day 100 strip 5    Coenzyme Q10 (COQ10) 100 MG CAPS Take by mouth      albuterol sulfate HFA (PROVENTIL HFA) 108 (90 Base) MCG/ACT inhaler Inhale 2 puffs into the lungs every 6 hours as needed for Wheezing or Shortness of Breath 3 Inhaler 3    rosuvastatin (CRESTOR) 5 MG tablet TAKE 1 TABLET EVERY DAY 90 tablet 1    ferrous sulfate (IRON 325) 325 (65 Fe) MG tablet Take 1 tablet by mouth daily (with breakfast) 90 tablet 3    metoprolol succinate (TOPROL XL) 50 MG extended release tablet TAKE 1 TABLET EVERY DAY 90 tablet 1    buPROPion (WELLBUTRIN XL) 150 MG extended release tablet TAKE 1 TABLET EVERY DAY 90 tablet 1    lamoTRIgine (LAMICTAL) 100 MG tablet Take 1 tablet by mouth 2 times daily 180 tablet 1    loratadine (CLARITIN) 10 MG tablet Take 10 mg by mouth daily      CALCIUM-MAGNESIUM-VITAMIN D PO Take 2 capsules by mouth nightly      multivitamin (THERAGRAN) per tablet Take 1 tablet by mouth daily.  Omega-3 Fatty Acids (FISH OIL) 1200 MG CAPS Take  by mouth daily. This patient presents to the office today for a preoperative consultation at the request of surgeon, Dr. Kamron Ambrosio, who plans on performing cystoscopy due to mixed incontinence and urinary frequency on December 10 at [address] [place] [address] at [facility]. The current problem began several months ago, and symptoms have been worsening with time.   Conservative therapy: N/A.     Planned anesthesia: General   Known anesthesia problems: None   Bleeding risk: No recent or remote history of abnormal bleeding  Personal or FH of DVT/PE:   Yes - brother but no PMH of DVT/PE    Patient objection to receiving blood products: No    Patient Active Problem List   Diagnosis    PCOS (polycystic ovarian syndrome)    Hyperlipidemia    Restless legs syndrome (RLS)    Edema of both legs    Insomnia    Vitamin D deficiency    Morbid obesity with BMI of 50.0-59.9, adult (HCC)    Urinary incontinence    Primary osteoarthritis of both knees    Severe episode of recurrent major depressive disorder, without psychotic features ([address]    GERD (gastroesophageal reflux disease)    RAD (reactive airway disease)    Hypertension    ANTOINETTE on CPAP    Lumbar stenosis with neurogenic claudication    HNP (herniated nucleus pulposus), lumbar    Ulcer of left shin limited to breakdown of skin (HCC)    Peripheral venous insufficiency    Allergic rhinitis    Dysmetabolic syndrome    Abdominal bloating    Diverticulosis of colon    Polyp of descending colon    Venous stasis ulcer of left calf (HCC)    Cyst of eyelid       Past Medical History:   Diagnosis Date    Bipolar disorder     Borderline osteopenia     Cellulitis of left leg 8/5/2020    Frequency of micturition     GERD (gastroesophageal reflux disease)     Headache(784.0)     HNP (herniated nucleus pulposus), lumbar 6/6/2019    Hyperlipidemia     Hypertension     Intention tremor     due to lithium    Iron deficiency anemia 11/4/2019    Lateral meniscal tear 10/14/2015    Lumbar stenosis with neurogenic claudication 6/6/2019    Medial meniscus tear 10/14/2015    Mixed incontinence     Morbid (severe) obesity due to excess calories (HCC)     Prolonged emergence from general anesthesia, initial encounter 6/12/2019    Tobacco use     Venous ulcer of left leg ([address] 07/2020     Past Surgical History:   Procedure Laterality Date    ABDOMINAL EXPLORATION SURGERY      CARPAL TUNNEL RELEASE Bilateral 2005     SECTION      COLONOSCOPY      normal    COLONOSCOPY N/A 2021    COLON W/ANES. (13:30) performed by Venkata Villagomez MD at [address] Left     atrhroscopic unispacer    KNEE SURGERY Right 10/28/2015    Right knee video arthroscopy with partial medial and lateral menisectomy with loose body removal    LUMBAR SPINE SURGERY N/A 2019    MICROLUMBAR DISCECTOMY L4-5 performed by Jr Stoddard MD at [place] [id number].       Family History   Problem Relation Age of Onset    Depression Sister     Mental Illness Sister     Diabetes Mother     Heart Disease Mother     Diabetes Father     Heart Disease Father      Social History     Socioeconomic History    Marital status:      Spouse name: Not on file    Number of children: Not on file    Years of education: Not on file    Highest education level: Not on file   Occupational History    Not on file   Tobacco Use    Smoking status: Former Smoker     Packs/day: 1.50     Years: 21.00     Pack years: 31.50     Types: Cigarettes     Quit date: 1997     Years since quittin.8    Smokeless tobacco: Never Used   Vaping Use    Vaping Use: Never used   Substance and Sexual Activity    Alcohol use: No    Drug use: No    Sexual activity: Not Currently   Other Topics Concern    Not on file   Social History Narrative    Not on file     Social Determinants of Health     Financial Resource Strain: Low Risk     Difficulty of Paying Living Expenses: Not hard at all   Food Insecurity: No Food Insecurity    Worried About [address] in the Last Year: Never true    [address] in the Last Year: Never true   Transportation Needs:     Lack of Transportation (Medical): Not on file    Lack of Transportation (Non-Medical):  Not on file   Physical Activity:     Days of Exercise per Week: Not on file    Minutes of Exercise per Session: Not on file   Stress:     Feeling of Stress : Not on file   Social Connections:     Frequency of Communication with Friends and Family: Not on file    Frequency of Social Gatherings with Friends and Family: Not on file    Attends Yarsanism Services: Not on file    Active Member of [address] [place] [address] [employer] or Organizations: Not on file    Attends Club or Organization Meetings: Not on file    Marital Status: Not on file   Intimate Partner Violence:     Fear of Current or Ex-Partner: Not on file    Emotionally Abused: Not on file    Physically Abused: Not on file    Sexually Abused: Not on file   Housing Stability:     Unable to Pay for Housing in the Last Year: Not on file    Number of [place] in the Last Year: Not on file    Unstable Housing in the Last Year: Not on file       Review of Systems  A comprehensive review of systems was negative except for what was noted in the HPI. Physical Exam   Constitutional: She is oriented to person, place, and time. She appears well-developed and well-nourished. No distress. HENT:   Head: Normocephalic and atraumatic. Mouth/Throat: Uvula is midline, oropharynx is clear and moist and mucous membranes are normal.   Eyes: Conjunctivae and EOM are normal. Pupils are equal, round, and reactive to light. Neck: Trachea normal and normal range of motion. Neck supple. No JVD present. Carotid bruit is not present. No mass and no thyromegaly present. Cardiovascular: Normal rate, regular rhythm, normal heart sounds and intact distal pulses. Exam reveals no gallop and no friction rub. No murmur heard. Pulmonary/Chest: Effort normal and breath sounds normal. No respiratory distress. She has no wheezes. She has no rales. Abdominal: Soft. Normal aorta and bowel sounds are normal. She exhibits no distension and no mass. There is no hepatosplenomegaly. No tenderness.    Musculoskeletal: She exhibits no edema and no tenderness. Neurological: She is alert and oriented to person, place, and time. She has normal strength. No cranial nerve deficit or sensory deficit. Coordination and gait normal.   Skin: Skin is warm and dry. No rash noted. No erythema. Psychiatric: She has a normal mood and affect. Her behavior is normal.     EKG Interpretation:  10/4/2021, unchanged from previous tracings.     Lab Review   Office Visit on 10/07/2021   Component Date Value    Hemoglobin A1C 10/07/2021 5.8    Admission on 10/04/2021, Discharged on 10/04/2021   Component Date Value    POC Glucose 10/04/2021 139*    Performed on 10/04/2021 ACCU-CHEK     WBC 10/04/2021 9.3     RBC 10/04/2021 4.56     Hemoglobin 10/04/2021 13.0     Hematocrit 10/04/2021 40.9     MCV 10/04/2021 89.8     MCH 10/04/2021 28.6     MCHC 10/04/2021 31.8     RDW 10/04/2021 15.4     Platelets 00/96/1076 313     MPV 10/04/2021 7.1     Neutrophils % 10/04/2021 78.1     Lymphocytes % 10/04/2021 13.3     Monocytes % 10/04/2021 7.1     Eosinophils % 10/04/2021 0.8     Basophils % 10/04/2021 0.7     Neutrophils Absolute 10/04/2021 7.2     Lymphocytes Absolute 10/04/2021 1.2     Monocytes Absolute 10/04/2021 0.7     Eosinophils Absolute 10/04/2021 0.1     Basophils Absolute 10/04/2021 0.1     Sodium 10/04/2021 144     Potassium reflex Magnesi* 10/04/2021 4.2     Chloride 10/04/2021 107     CO2 10/04/2021 27     Anion Gap 10/04/2021 10     Glucose 10/04/2021 166*    BUN 10/04/2021 19     CREATININE 10/04/2021 0.7     GFR Non- 10/04/2021 >60     GFR  10/04/2021 >60     Calcium 10/04/2021 8.4     Total Protein 10/04/2021 6.6     Albumin 10/04/2021 4.0     Albumin/Globulin Ratio 10/04/2021 1.5     Total Bilirubin 10/04/2021 <0.2     Alkaline Phosphatase 10/04/2021 124     ALT 10/04/2021 16     AST 10/04/2021 13*    Globulin 10/04/2021 2.6     Troponin 10/04/2021 <0.01     SARS-CoV-2, NAAT 10/04/2021 Not Detected     Color, UA 10/04/2021 Yellow     Clarity, UA 10/04/2021 Clear     Glucose, Ur 10/04/2021 Negative     Bilirubin Urine 10/04/2021 Negative     Ketones, Urine 10/04/2021 Negative     Specific Gravity, UA 10/04/2021 1.010     Blood, Urine 10/04/2021 Negative     pH, UA 10/04/2021 6.5     Protein, UA 10/04/2021 Negative     Urobilinogen, Urine 10/04/2021 0.2     Nitrite, Urine 10/04/2021 Negative     Leukocyte Esterase, Urine 10/04/2021 Negative     Microscopic Examination 10/04/2021 Not Indicated     Urine Type 10/04/2021 NotGiven     Ventricular Rate 10/04/2021 77     Atrial Rate 10/04/2021 77     P-R Interval 10/04/2021 142     QRS Duration 10/04/2021 90     Q-T Interval 10/04/2021 414     QTc Calculation (Bazett) 10/04/2021 468     P Axis 10/04/2021 45     R Axis 10/04/2021 -42     T Axis 10/04/2021 58     Diagnosis 10/04/2021 Normal sinus rhythmLeft axis deviationAbnormal ECGNo significant change was found When compared with ECG of10.28.15Confirmed by Antoine Correa MD, [address] (1986) on 10/4/2021 5:57:30 PM            Assessment:       64 y.o. patient with planned surgery as above. Known risk factors for perioperative complications: Asthma, Hypertension, Obstructive sleep apnea  Current medications which may produce withdrawal symptoms if withheld perioperatively: none     1. Pre-op examination    2. Primary hypertension    3. Other hyperlipidemia    4. Gastroesophageal reflux disease without esophagitis    5. Primary insomnia    6. Morbid obesity with BMI of 50.0-59.9, adult ([facility]    7. Lumbar stenosis with neurogenic claudication    8. ANTOINETTE on CPAP    9. Peripheral venous insufficiency    10. Moderate persistent reactive airway disease without complication    11. Restless legs syndrome (RLS)    12. Severe episode of recurrent major depressive disorder, without psychotic features ([facility]    13. Vitamin D deficiency    14.  Mixed stress and urge urinary incontinence Plan:     1. Preoperative workup as follows: CBC, BMP  2. Change in medication regimen before surgery: Take symbicort, lamictal on morning of surgery with sip of water, and hold all other medications until after surgery, Discontinue NSAIDs (celebrex) 5 days before surgery, Discontinue fish oil and multivitamin 5 days before surgery  3. Prophylaxis for cardiac events with perioperative beta-blockers: Currently taking  metoprolol  ACC/AHA indications for pre-operative beta-blocker use:    · Vascular surgery with history of postitive stress test  · Intermediate or high risk surgery with history of CAD   · Intermediate or high risk surgery with multiple clinical predictors of CAD- 2 of the following: history of compensated or prior heart failure, history of cerebrovascular disease, DM, or renal insufficiency    Routine administration of higher-dose, long-acting metoprolol in beta-blockernaïve patients on the day of surgery, and in the absence of dose titration is associated with an overall increase in mortality. Beta-blockers should be started days to weeks prior to surgery and titrated to pulse < 70.  4. Deep vein thrombosis prophylaxis: regimen to be chosen by surgical team  5. No contraindications to planned surgery      If you have questions, please do not hesitate to call me ([phone]). Sincerely,    Urban Carranza.  Tyson Cortez, CNP

## 2021-12-02 NOTE — PATIENT INSTRUCTIONS
Take symbicort, lamictal on morning of surgery with sip of water, and hold all other medications until after surgery, Discontinue NSAIDs (celebrex) 5 days before surgery, Discontinue fish oil and multivitamin 5 days before surgery

## 2021-12-02 NOTE — PROGRESS NOTES
Date:  2021    Name:  Braeden Pardo  Address:  [address] [place] [address] [place] [id number]    :  1960      Age:   64 y.o.    SSN:  [id number]      Medical Record Number:  [id number]    Reason for Visit:    Chief Complaint    New Patient (R shoulder)      DOS:2021     HPI: Braeden Pardo is a 64 y.o. female here today for new patient evaluation regarding her right shoulder. The patient is right-hand dominant. She reports worsening right shoulder pain for the past 2 months. Sometimes the pain goes down into her elbow. Sometimes also radiates up to the bottom part of her neck. She denies any numbness and tingling down the arm. She cannot recall specific injury. She has some difficulty reaching behind her back and putting on her bra. She takes Celebrex for her knees which helps her knee pain but does not help her shoulder much. The patient has been seen by multiple orthopedic providers over the last few years for her knees and lower back, including my partners Dr. Caryl Townsend and Dr. Gabriel Arevalo. She has severe end stage OA of her bilateral knees, with a medial compartment metallic spacer in the right knee. Due to her weight she unfortunately is not a good surgical candidate for her knees. ROS: All systems reviewed on patient intake form. Pertinent items are noted in HPI.         Past Medical History:   Diagnosis Date    Bipolar disorder     Borderline osteopenia     Cellulitis of left leg 2020    Frequency of micturition     GERD (gastroesophageal reflux disease)     Headache(784.0)     HNP (herniated nucleus pulposus), lumbar 2019    Hyperlipidemia     Hypertension     Intention tremor     due to lithium    Iron deficiency anemia 2019    Lateral meniscal tear 10/14/2015    Lumbar stenosis with neurogenic claudication 2019    Medial meniscus tear 10/14/2015    Mixed incontinence     Morbid (severe) obesity due to excess calories ([address]     Prolonged emergence from general anesthesia, initial encounter 2019    Tobacco use     Venous ulcer of left leg ([address] 2020        Past Surgical History:   Procedure Laterality Date    ABDOMINAL EXPLORATION SURGERY      CARPAL TUNNEL RELEASE Bilateral 2005     SECTION      COLONOSCOPY      normal    COLONOSCOPY N/A 2021    COLON W/ANES. (13:30) performed by Major Correa MD at [address] Left     atrhroscopic unispacer    KNEE SURGERY Right 10/28/2015    Right knee video arthroscopy with partial medial and lateral menisectomy with loose body removal    LUMBAR SPINE SURGERY N/A 2019    MICROLUMBAR DISCECTOMY L4-5 performed by Mario Carter MD at [place] [address].         Family History   Problem Relation Age of Onset    Depression Sister     Mental Illness Sister     Diabetes Mother     Heart Disease Mother     Diabetes Father     Heart Disease Father        Social History     Socioeconomic History    Marital status:       Spouse name: None    Number of children: None    Years of education: None    Highest education level: None   Occupational History    None   Tobacco Use    Smoking status: Former Smoker     Packs/day: 1.50     Years: 21.00     Pack years: 31.50     Types: Cigarettes     Quit date: 1997     Years since quittin.8    Smokeless tobacco: Never Used   Vaping Use    Vaping Use: Never used   Substance and Sexual Activity    Alcohol use: No    Drug use: No    Sexual activity: Not Currently   Other Topics Concern    None   Social History Narrative    None     Social Determinants of Health     Financial Resource Strain: Low Risk     Difficulty of Paying Living Expenses: Not hard at all   Food Insecurity: No Food Insecurity    Worried About Running Out of Food in the Last Year: Never true    Maggie of Food in the Last Year: Never true   Transportation Needs:  Lack of Transportation (Medical): Not on file    Lack of Transportation (Non-Medical): Not on file   Physical Activity:     Days of Exercise per Week: Not on file    Minutes of Exercise per Session: Not on file   Stress:     Feeling of Stress : Not on file   Social Connections:     Frequency of Communication with Friends and Family: Not on file    Frequency of Social Gatherings with Friends and Family: Not on file    Attends Orthodoxy Services: Not on file    Active Member of [address] [place] [address] or Organizations: Not on file    Attends Club or Organization Meetings: Not on file    Marital Status: Not on file   Intimate Partner Violence:     Fear of Current or Ex-Partner: Not on file    Emotionally Abused: Not on file    Physically Abused: Not on file    Sexually Abused: Not on file   Housing Stability:     Unable to Pay for Housing in the Last Year: Not on file    Number of [place] in the Last Year: Not on file    Unstable Housing in the Last Year: Not on file       Current Outpatient Medications   Medication Sig Dispense Refill    benzonatate (TESSALON) 100 MG capsule TAKE 1 CAPSULE BY MOUTH THREE TIMES A DAY AS NEEDED      budesonide-formoterol (SYMBICORT) 160-4.5 MCG/ACT AERO Inhale 1 puff into the lungs 2 times daily 3 each 3    VORTIoxetine HBr (TRINTELLIX) 20 MG TABS tablet TAKE 1 TABLET EVERY DAY 90 tablet 3    celecoxib (CELEBREX) 200 MG capsule Take 1 capsule by mouth 2 times daily 340 capsule 3    blood glucose monitor kit and supplies Dispense sufficient amount for indicated testing frequency plus additional to accommodate PRN testing needs. Dispense all needed supplies to include: monitor, strips, lancing device, lancets, control solutions, alcohol swabs.  1 kit 0    Lancets MISC Use to check fasting glucose level q am/once daily 100 each 5    blood glucose monitor strips Test 1 times a day & as needed for symptoms of irregular blood up to twice per day 100 strip 5    Coenzyme Q10 crepitus. Mild tenderness to the [facility] joint and tender over the anterior shoulder in the bicipital groove. Range of Motion: Full passive and active ROM. Normal scapulothoracic rhythm. Strength: 4+ out of 5 supraspinatus strength, 5 out of 5 infraspinatus, 4+ out of 5 subscapularis. Stability: No anterior instability. No posterior instability. Special Tests: Impingement findings are negative. Labral findings are negative. Speed sign positive. Crossover sign is negative. Belly press sign is negative. Lift off sign is negative. Other findings: The skin is warm dry and well perfused. 2+ radial pulse. Sensation is intact to light touch over the deltoid. Left comparison shoulder exam    Inspection:  Held in a normal posture. Normal contour at the acromioclavicular joint. No swelling, ecchymosis, or erythema about the shoulder. No atrophy appreciated. No scapular winging. Palpation:  No subacromial crepitus. No tenderness of the AC joint. No greater tuberosity tenderness. No tenderness in the bicipital groove. Range of Motion: Full passive and active ROM. Normal scapulothoracic rhythm. Strength:  Normal supraspinatus, infraspinatus, and subscapularis muscle strength. Stability: No anterior instability. No posterior instability. Special Tests: Impingement findings are negative. Labral findings are negative. Speed sign and Yergason signs are both negative. Crossover sign is negative. Belly press sign is negative. Lift off sign is negative. Other findings: The skin is warm dry and well perfused. 2+ radial pulse. Sensation is intact to light touch over the deltoid. Diagnostics:  Radiology:       XR Right Shoulder Findings:     Views:  3 views R shoulder  Weight bearing: No  Findings: 3 views of the R shoulder taken in the office today and interpreted by me demonstrate no acute osseous abnormalities.  Well-maintained joint space to the glenohumeral joint with moderate to severe [facility] joint arthritis. Concentric alignment of the glenohumeral joint. No fractures, dislocations, or radio-opaque foreign bodies noted. Appropriate humeral head height with normal acromiohumeral interval. Lung fields demonstrate no apparent masses or calcifications. Previous comparison films: None    Impression:  1. No acute osseous abnormalities Right shoulder with moderate to severe AC joint arthritis        Assessment: 64 y.o. female with the following conditions:  1. Right shoulder rotator cuff tendinitis versus partial tear  2. Moderate to severe right shoulder AC joint arthritis    Comorbidities: Morbid obesity BMI 62    Plan: Pertinent imaging was reviewed. The etiology, natural history, and treatment options for the disorder were discussed. The roles of activity medication, antiinflammatories, injections, bracing, physical therapy, and surgical interventions were all described to the patient and questions were answered. The patient has overall good motion and good strength although a little weak in the rotator cuff muscles. She is tender in the biceps area as well. I will do an injection into the subacromial space of the glenohumeral joint today. I will also set her up with physical therapy for rotator cuff program.  Follow-up with me in 2 months to see how she is progressing. I recommend she continue taking the anti-inflammatory medications that she is on which is helping her knees. Pao Oconnor is in agreement with this plan. All questions were answered to patient's satisfaction and was encouraged to call with any further questions. The patient was advised that NSAID-type medications have several potential side effects that include: gastrointestinal irritation including hemorrhage, renal injury, as well as an increased risk for heart attack and stroke.  The patient was asked to take the medication with food and to stop if there is any symptoms of GI upset, including heartburn, nausea, increased gas or diarrhea. I asked the patient to contact their medical provider for vomiting, abdominal pain or black/bloody stools. The patient should have renal function testing per his medical provider periodically if the medication is taken on a regular basis. The patient should be alert for any swelling in the lower extremities and should stop taking the medication immediately and contact their medical provider should this occur. In addition, the patient should stop taking the medication immediately and contact their medical provider should there be any shortness of breath, fatigue and be evaluated in an emergency facility for any chest pain. The patient expresses understanding of these issues and questions were answered. The indications and risks of steroid injection as well as treatment alternatives were discussed with the patient who consented to the procedure. Under sterile conditions and after informed consent was obtained, using posterior approach the patient was given an injection into the right shoulder split equally between subacromial space and glenohumeral joint. 2mL 40 mg of Depo-Medrol and 4 mL of 1% lidocaine were placed in the shoulder after it was prepped with chlorhexidine. This resulted in good relief of symptoms. There were no complications. The patient was advised to ice the shoulder this evening and to avoid vigorous activities for the next 2 days. They were advised to call us if there was any erythema, enduration, swelling or increasing pain. Total time spent for evaluation, education, and development of treatment plan: 30 minutes.     Carmen Shine DO  Orthopedic Surgery and Sports Medicine  12/2/2021    Orders Placed This Encounter   Procedures    XR SHOULDER RIGHT (MIN 2 VIEWS)     Standing Status:   Future     Number of Occurrences:   1     Standing Expiration Date:   12/2/2022

## 2021-12-03 LAB
ANION GAP SERPL CALCULATED.3IONS-SCNC: 13 MMOL/L (ref 3–16)
BASOPHILS ABSOLUTE: 0 K/UL (ref 0–0.2)
BASOPHILS RELATIVE PERCENT: 0.5 %
BUN BLDV-MCNC: 28 MG/DL (ref 7–20)
CALCIUM SERPL-MCNC: 9.6 MG/DL (ref 8.3–10.6)
CHLORIDE BLD-SCNC: 102 MMOL/L (ref 99–110)
CO2: 29 MMOL/L (ref 21–32)
CREAT SERPL-MCNC: 0.7 MG/DL (ref 0.6–1.2)
EOSINOPHILS ABSOLUTE: 0.1 K/UL (ref 0–0.6)
EOSINOPHILS RELATIVE PERCENT: 1.6 %
GFR AFRICAN AMERICAN: >60
GFR NON-AFRICAN AMERICAN: >60
GLUCOSE BLD-MCNC: 88 MG/DL (ref 70–99)
HCT VFR BLD CALC: 42.9 % (ref 36–48)
HEMOGLOBIN: 13.6 G/DL (ref 12–16)
LYMPHOCYTES ABSOLUTE: 1.8 K/UL (ref 1–5.1)
LYMPHOCYTES RELATIVE PERCENT: 20.5 %
MCH RBC QN AUTO: 28.5 PG (ref 26–34)
MCHC RBC AUTO-ENTMCNC: 31.6 G/DL (ref 31–36)
MCV RBC AUTO: 90.3 FL (ref 80–100)
MONOCYTES ABSOLUTE: 0.8 K/UL (ref 0–1.3)
MONOCYTES RELATIVE PERCENT: 8.9 %
NEUTROPHILS ABSOLUTE: 6.1 K/UL (ref 1.7–7.7)
NEUTROPHILS RELATIVE PERCENT: 68.5 %
PDW BLD-RTO: 15.9 % (ref 12.4–15.4)
PLATELET # BLD: 294 K/UL (ref 135–450)
PMV BLD AUTO: 7.9 FL (ref 5–10.5)
POTASSIUM SERPL-SCNC: 4.7 MMOL/L (ref 3.5–5.1)
RBC # BLD: 4.75 M/UL (ref 4–5.2)
SODIUM BLD-SCNC: 144 MMOL/L (ref 136–145)
WBC # BLD: 8.9 K/UL (ref 4–11)

## 2021-12-07 ENCOUNTER — TELEPHONE (OUTPATIENT)
Dept: ORTHOPEDIC SURGERY | Age: 61
End: 2021-12-07

## 2021-12-16 ENCOUNTER — TELEPHONE (OUTPATIENT)
Dept: FAMILY MEDICINE CLINIC | Age: 61
End: 2021-12-16

## 2021-12-16 RX ORDER — CLINDAMYCIN HYDROCHLORIDE 300 MG/1
300 CAPSULE ORAL 3 TIMES DAILY
Qty: 30 CAPSULE | Refills: 0 | Status: SHIPPED | OUTPATIENT
Start: 2021-12-16 | End: 2021-12-26

## 2021-12-16 NOTE — TELEPHONE ENCOUNTER
Pt states that she has an abscessed tooth and she can't get in to the dentist till next month. Was wanting to see if she could get something called in to [facility].

## 2021-12-20 ENCOUNTER — TELEMEDICINE (OUTPATIENT)
Dept: FAMILY MEDICINE CLINIC | Age: 61
End: 2021-12-20
Payer: MEDICARE

## 2021-12-20 ENCOUNTER — TELEPHONE (OUTPATIENT)
Dept: FAMILY MEDICINE CLINIC | Age: 61
End: 2021-12-20

## 2021-12-20 DIAGNOSIS — M26.621 ARTHRALGIA OF RIGHT TEMPOROMANDIBULAR JOINT: Primary | ICD-10-CM

## 2021-12-20 PROCEDURE — G8427 DOCREV CUR MEDS BY ELIG CLIN: HCPCS | Performed by: NURSE PRACTITIONER

## 2021-12-20 PROCEDURE — 99213 OFFICE O/P EST LOW 20 MIN: CPT | Performed by: NURSE PRACTITIONER

## 2021-12-20 PROCEDURE — 3017F COLORECTAL CA SCREEN DOC REV: CPT | Performed by: NURSE PRACTITIONER

## 2021-12-20 RX ORDER — METHYLPREDNISOLONE 4 MG/1
TABLET ORAL
Qty: 1 KIT | Refills: 0 | Status: SHIPPED | OUTPATIENT
Start: 2021-12-20 | End: 2021-12-26

## 2021-12-20 ASSESSMENT — ENCOUNTER SYMPTOMS
VOICE CHANGE: 0
SINUS PRESSURE: 0
SORE THROAT: 0
TROUBLE SWALLOWING: 1
SINUS PAIN: 0
RHINORRHEA: 0
FACIAL SWELLING: 0
RESPIRATORY NEGATIVE: 1

## 2021-12-20 NOTE — TELEPHONE ENCOUNTER
----- Message from Loise Hand sent at 12/20/2021 10:18 AM EST -----  Subject: Appointment Request    Reason for Call: Routine (Patient Request) No Script    QUESTIONS  Type of Appointment? Established Patient  Reason for appointment request? Available appointments did not meet   patient need  Additional Information for Provider? Pt would like a VV for her mouth pain   Pt was prescribed anti biotics last week but the pain has gotten worse and   would like to see Dr and get a recommendation on what to do   ---------------------------------------------------------------------------  --------------  CALL BACK INFO  What is the best way for the office to contact you? OK to leave message on   voicemail  Preferred Call Back Phone Number? [phone]  ---------------------------------------------------------------------------  --------------  SCRIPT ANSWERS  Relationship to Patient? Self  (Is the patient requesting to see the provider for a procedure?)? No  (Is the patient requesting to see the provider urgently  today or   tomorrow. )? No  Have you been diagnosed with, awaiting test results for, or told that you   are suspected of having COVID-19 (Coronavirus)? (If patient has tested   negative or was tested as a requirement for work, school, or travel and   not based on symptoms, answer no)? No  Within the past two weeks have you developed any of the following symptoms   (answer no if symptoms have been present longer than 2 weeks or began   more than 2 weeks ago)? Fever or Chills, Cough, Shortness of breath or   difficulty breathing, Loss of taste or smell, Sore throat, Nasal   congestion, Sneezing or runny nose, Fatigue or generalized body aches   (answer no if pain is specific to a body part e.g. back pain), Diarrhea,   Headache? No  Have you had close contact with someone with COVID-19 in the last 14 days? No  (Service Expert  click yes below to proceed with [employer] As Usual   Scheduling)?  Yes

## 2021-12-20 NOTE — PROGRESS NOTES
2021    TELEHEALTH EVALUATION -- Audio/Visual (During XXGED-58 public health emergency)    HPI:    Carolyn Huff (:  1960) has requested an audio/video evaluation for the following concern(s):    HPI    Antonette Holter presents for a virtual visit to discuss jaw pain. Antonette Holter thought she had an abscess tooth on her right lower mouth and started on antibiotics since 2021. She admits to having pain in her right ear. She admits to grinding her teeth during the day. She admits to pain when she opens her mouth wide. She has pain when chewing. Pain is worse right in front of her right ear canal. She does not feel like the antibiotics are helping and denies any chipped tooth. She is not having any fever or chills. Review of Systems   Constitutional: Negative. HENT: Positive for ear pain and trouble swallowing (From chewing on food). Negative for congestion, dental problem, drooling, ear discharge, facial swelling, hearing loss, mouth sores, nosebleeds, postnasal drip, rhinorrhea, sinus pressure, sinus pain, sneezing, sore throat, tinnitus and voice change. Respiratory: Negative. Cardiovascular: Negative. Skin: Negative. Neurological: Negative. Psychiatric/Behavioral: Negative. Prior to Visit Medications    Medication Sig Taking? Authorizing Provider   methylPREDNISolone (MEDROL DOSEPACK) 4 MG tablet Take by mouth.  Yes MADINA Santos CNP   clindamycin (CLEOCIN) 300 MG capsule Take 1 capsule by mouth 3 times daily for 10 days Yes MADINA Hunter CNP   budesonide-formoterol (SYMBICORT) 160-4.5 MCG/ACT AERO Inhale 1 puff into the lungs 2 times daily Yes MADINA Hunter CNP   VORTIoxetine HBr (TRINTELLIX) 20 MG TABS tablet TAKE 1 TABLET EVERY DAY Yes MADINA Belle CNP   celecoxib (CELEBREX) 200 MG capsule Take 1 capsule by mouth 2 times daily Yes MADINA Hunter CNP   blood glucose monitor kit and supplies Dispense sufficient amount for indicated testing frequency plus additional to accommodate PRN testing needs. Dispense all needed supplies to include: monitor, strips, lancing device, lancets, control solutions, alcohol swabs. Yes MADINA Gaffney CNP   Lancets MISC Use to check fasting glucose level q am/once daily Yes MADINA Gaffney CNP   blood glucose monitor strips Test 1 times a day & as needed for symptoms of irregular blood up to twice per day Yes MADINA Gaffney CNP   Coenzyme Q10 (COQ10) 100 MG CAPS Take by mouth Yes Historical Provider, MD   albuterol sulfate HFA (PROVENTIL HFA) 108 (90 Base) MCG/ACT inhaler Inhale 2 puffs into the lungs every 6 hours as needed for Wheezing or Shortness of Breath Yes MADINA Gaffney CNP   fluticasone (FLONASE) 50 MCG/ACT nasal spray 1 spray by Nasal route 2 times daily Yes Declan Bynum,    rosuvastatin (CRESTOR) 5 MG tablet TAKE 1 TABLET EVERY DAY Yes MADINA Carey CNP   ferrous sulfate (IRON 325) 325 (65 Fe) MG tablet Take 1 tablet by mouth daily (with breakfast) Yes MADINA Gaffney CNP   metoprolol succinate (TOPROL XL) 50 MG extended release tablet TAKE 1 TABLET EVERY DAY Yes MADINA Gaffney CNP   buPROPion (WELLBUTRIN XL) 150 MG extended release tablet TAKE 1 TABLET EVERY DAY Yes MADINA Carey CNP   lamoTRIgine (LAMICTAL) 100 MG tablet Take 1 tablet by mouth 2 times daily Yes MADINA Gaffney CNP   loratadine (CLARITIN) 10 MG tablet Take 10 mg by mouth daily Yes Historical Provider, MD   CALCIUM-MAGNESIUM-VITAMIN D PO Take 2 capsules by mouth nightly Yes Historical Provider, MD   multivitamin (THERAGRAN) per tablet Take 1 tablet by mouth daily. Yes Historical Provider, MD   Omega-3 Fatty Acids (FISH OIL) 1200 MG CAPS Take  by mouth daily.  Yes Historical Provider, MD       Social History     Tobacco Use    Smoking status: Former Smoker     Packs/day: 1.50     Years: 21.00     Pack years: 31.50     Types: Cigarettes     Quit date: 1997     Years since quittin.8    Smokeless tobacco: Never Used   Vaping Use    Vaping Use: Never used   Substance Use Topics    Alcohol use: No    Drug use: No        Allergies   Allergen Reactions    Calamine Other (See Comments)     Leaves skin tender and burning     Amoxicillin Other (See Comments)    Amoxicillin-Pot Clavulanate Hives    Chlorpheniramine Maleate     Daypro [Oxaprozin] Hives    Duravent-Da [Chlorphen-Phenyleph-Methscop] Hives    Lithium      Caused shakes    Methscopolamine     Norvasc [Amlodipine]      LE edema      Nsaids      Avoid d/t gastric bypass    Phenylephrine Hcl     Seldane [Terfenadine] Hives    Suprax [Cefixime] Hives    Levofloxacin Rash   ,   Past Medical History:   Diagnosis Date    Bipolar disorder     Borderline osteopenia     Cellulitis of left leg 2020    Frequency of micturition     GERD (gastroesophageal reflux disease)     Headache(784.0)     HNP (herniated nucleus pulposus), lumbar 2019    Hyperlipidemia     Hypertension     Intention tremor     due to lithium    Iron deficiency anemia 2019    Lateral meniscal tear 10/14/2015    Lumbar stenosis with neurogenic claudication 2019    Medial meniscus tear 10/14/2015    Mixed incontinence     Morbid (severe) obesity due to excess calories (HCC)     Prolonged emergence from general anesthesia, initial encounter 2019    Tobacco use     Venous ulcer of left leg ([address] 2020   ,   Past Surgical History:   Procedure Laterality Date    ABDOMINAL EXPLORATION SURGERY      CARPAL TUNNEL RELEASE Bilateral 2005     SECTION      COLONOSCOPY      normal    COLONOSCOPY N/A 2021    COLON W/ANES. (13:30) performed by Zana Middleton MD at [facility] Left     atrhroscopic unispacer    KNEE SURGERY Right 10/28/2015    Right knee video arthroscopy with partial medial and lateral menisectomy with loose body removal    LUMBAR SPINE SURGERY N/A 2019    MICROLUMBAR DISCECTOMY L4-5 performed by Mary Tiwari MD at [address] [place] [address]     ,   Social History     Tobacco Use    Smoking status: Former Smoker     Packs/day: 1.50     Years: 21.00     Pack years: 31.50     Types: Cigarettes     Quit date: 1997     Years since quittin.8    Smokeless tobacco: Never Used   Vaping Use    Vaping Use: Never used   Substance Use Topics    Alcohol use: No    Drug use: No   ,   Family History   Problem Relation Age of Onset    Depression Sister     Mental Illness Sister     Diabetes Mother     Heart Disease Mother     Diabetes Father     Heart Disease Father    ,   Immunization History   Administered Date(s) Administered    Influenza 10/30/2013    Influenza Virus Vaccine 2014, 2015    Influenza, Quadv, IM, (6 mo and older Fluzone, Flulaval, Fluarix and 3 yrs and older Afluria) 2017, 2018    Influenza, Quadv, IM, PF (6 mo and older Fluzone, Flulaval, Fluarix, and 3 yrs and older Afluria) 2021    Tdap (Boostrix, Adacel) 2017   ,   Health Maintenance   Topic Date Due    COVID-19 Vaccine (1) Never done    Pneumococcal 0-64 years Vaccine (1 of 2 - PPSV23) Never done    Cervical cancer screen  Never done    Shingles Vaccine (1 of 2) Never done   [employer] Visit (AWV)  Never done    Breast cancer screen  2020    Flu vaccine (1) 2021    Lipid screen  2022    A1C test (Diabetic or Prediabetic)  10/07/2022    Colon cancer screen colonoscopy  2024    DTaP/Tdap/Td vaccine (2 - Td or Tdap) 2027    Hepatitis C screen  Completed    HIV screen  Completed    Hepatitis A vaccine  Aged Out    Hepatitis B vaccine  Aged Out    Hib vaccine  Aged Out    Meningococcal (ACWY) vaccine  Aged Out       PHYSICAL EXAMINATION:  [ INSTRUCTIONS:  \"[x]\" Indicates a positive item  \"[]\" Indicates a negative item      Vital Signs: (As obtained by patient/caregiver or practitioner observation)    Blood pressure-  Heart rate-    Respiratory rate-    Temperature-  Pulse oximetry-     Constitutional: [x] Appears well-developed and well-nourished [x] No apparent distress      [] Abnormal-   Mental status  [x] Alert and awake  [x] Oriented to person/place/time [x]Able to follow commands      Eyes:  EOM    [x]  Normal  [] Abnormal-  Sclera  [x]  Normal  [] Abnormal -         Discharge [x]  None visible  [] Abnormal -    HENT:   [x] Normocephalic, atraumatic. [] Abnormal   [x] Mouth/Throat: Mucous membranes are moist.     External Ears [x] Normal  [] Abnormal-     Neck: [x] No visualized mass     Pulmonary/Chest: [x] Respiratory effort normal.  [x] No visualized signs of difficulty breathing or respiratory distress        [] Abnormal-      Musculoskeletal:   [x] Normal gait with no signs of ataxia         [x] Normal range of motion of neck        [] Abnormal-       Neurological:        [x] No Facial Asymmetry (Cranial nerve 7 motor function) (limited exam to video visit)          [x] No gaze palsy        [] Abnormal-         Skin:        [x] No significant exanthematous lesions or discoloration noted on facial skin         [] Abnormal-            Psychiatric:       [x] Normal Affect [x] No Hallucinations        [] Abnormal-     Other pertinent observable physical exam findings-  Pain when opening mouth wide     ASSESSMENT/PLAN:  Becky Noriega was seen today for facial pain. Pain in front of her right ear and jaw pain seem to be more related to TMJ. She admits to grinding her teeth. Medrol dose pack sent into the pharmacy. She is to follow up with her dentist. Recommend her alternating between heat and ice. Diagnoses and all orders for this visit:    Arthralgia of right temporomandibular joint  -     methylPREDNISolone (MEDROL DOSEPACK) 4 MG tablet; Take by mouth.         Return if symptoms worsen or fail to improve. Connor De La Paz is a 64 y.o. female being evaluated by a Virtual Visit (video visit) encounter to address concerns as mentioned above. A caregiver was present when appropriate. Due to this being a TeleHealth encounter (During [facility] public health emergency), evaluation of the following organ systems was limited: Vitals/Constitutional/EENT/Resp/CV/GI//MS/Neuro/Skin/Heme-Lymph-Imm. Pursuant to the emergency declaration under the [address] [place] [address], [address] [place] [address] authority and the [employer] Act, this Virtual Visit was conducted with patient's (and/or legal guardian's) consent, to reduce the patient's risk of exposure to COVID-19 and provide necessary medical care. The patient (and/or legal guardian) has also been advised to contact this office for worsening conditions or problems, and seek emergency medical treatment and/or call 911 if deemed necessary. Patient identification was verified at the start of the visit: Yes    Total time spent on this encounter: Not billed by time    Services were provided through a video synchronous discussion virtually to substitute for in-person clinic visit. Patient and provider were located at their individual homes. --MADINA Cardoso CNP on 12/20/2021 at 11:57 AM    An electronic signature was used to authenticate this note. Patient should call the office immediately with new or ongoing signs or symptoms or worsening, or proceed to the emergency room. All entries in chief complaint and history of present illness are reviewed and validated by me. No changes in past medical history, past surgical history, social history, or family history were noted during the patient encounter unless specifically listed above.   All updates of past medical history, past surgical history, social history, or family history were reviewed personally by me during the office visit. All problems listed in the assessment are stable unless noted otherwise. Medication profile reviewed personally by me during the office visit. Medication side effects and possible impairments from medications were discussed as applicable. Every effort has been made to assure accurate transcription by this voice recognition software. However, mistakes in transcription may still occur    You are being started on a new medication. All medications have the potential for adverse effects. All medications effect each person differently. Please read and review provided information related to medication. If the medication that you have been prescribed has the potential to cause sedation, do not drive or operate car, truck, or heavy machinery until you know how the medication will effect you. If you experience any adverse effects from the medication, please call the office or report to the emergency department. Temporomandibular Disorder: Care Instructions  Overview     Temporomandibular disorders (TMDs) are problems with the muscles and joints that connect your jaw to your skull. The disorders cause pain when you talk, chew, swallow, or yawn. You may feel this pain on one or both sides. TMDs are often caused by tight jaw muscles. The tightness can be caused by clenching or grinding your teeth. This may happen when you have a lot of stress in your life. If you lower your stress, you may be able to stop clenching or grinding your teeth. This will help relax your jaw and reduce your pain. Your doctor may suggest a dental splint. Splints can help reduce teeth grinding and clenching. You may also be able to do some things at home to feel better. But if none of this works, your doctor may prescribe medicine to help relax your muscles and control the pain. Follow-up care is a key part of your treatment and safety.  Be sure to make and go to all appointments, and call your doctor if you are having problems. It's also a good idea to know your test results and keep a list of the medicines you take. How can you care for yourself at home? · Put either an ice pack or a warm, moist cloth on your jaw for 15 minutes several times a day. You can try switching back and forth between moist heat and cold. · Make eating easy on your jaw. Choose softer foods that are easy to chew like eggs, yogurt, or soup. Avoid hard foods that cause your jaws to work very hard. Try cutting your food into small pieces. And if your jaw gets too painful to chew, or if it locks, you may need to puree your food for a while. · To relax your jaw, repeat this exercise for a few minutes every morning and evening. Watch yourself in a mirror. Gently open and close your mouth. Move your jaw straight up and down. But don't do this if it makes your pain worse. · Manage stress. You may be clenching or tightening your muscles when you are under stress. · Get at least 30 minutes of exercise on most days of the week to relieve stress. Walking is a good choice. · Ask your doctor if you can take an over-the-counter pain medicine, such as acetaminophen (Tylenol), ibuprofen (Advil, Motrin), or naproxen (Aleve). Be safe with medicines. Read and follow all instructions on the label. · Use good posture for sitting and standing. Slumping your shoulders disturbs the alignment of your facial bones and muscles. · Don't:  ? Hold a phone between your shoulder and your jaw. ? Open your mouth all the way, like when you sing loudly or yawn. ? Clench or grind your teeth, bite your lips, or chew your fingernails. ? Clench things such as pens, pipes, or cigars between your teeth. When should you call for help? Call your doctor now or seek immediate medical care if:    · Your jaw is locked open or shut or it is hard to move your jaw.    Watch closely for changes in your health, and be sure to contact your doctor if:    · Your jaw pain gets worse.     · Your face is swollen.     · You do not get better as expected. Where can you learn more? Go to https://chpepiceweb.[email or web address] org and sign in to your [employer] account. Enter P579 in the [facility] box to learn more about \"Temporomandibular Disorder: Care Instructions. \"     If you do not have an account, please click on the \"Sign Up Now\" link. Current as of: June 30, 2021               Content Version: 13.0  © 0962-6016 Healthwise, Incorporated. Care instructions adapted under license by [facility] ([facility]). If you have questions about a medical condition or this instruction, always ask your healthcare professional. [address] any warranty or liability for your use of this information.

## 2021-12-20 NOTE — PROGRESS NOTES
Lydia Ballesteros is a 64 y.o. female evaluated via telephone on 12/20/2021. Consent:  She and/or health care decision maker is aware that that she may receive a bill for this telephone service, depending on her insurance coverage, and has provided verbal consent to proceed: Yes      Documentation:  I communicated with the patient and/or health care decision maker about facial pain. Details of this discussion including any medical advice provided: n/a      I affirm this is a Patient Initiated Episode with a Patient who has not had a related appointment within my department in the past 7 days or scheduled within the next 24 hours. Patient identification was verified at the start of the visit: Yes    Total Time: minutes: 5-10 minutes    The visit was conducted pursuant to the emergency declaration under the [address] [place] [address], [address] [place] [address] authority and the [employer] Act. Patient identification was verified, and a caregiver was present when appropriate. The patient was located in a state where the provider was credentialed to provide care.     Note: not billable if this call serves to triage the patient into an appointment for the relevant concern      Mo Ulloa MA

## 2021-12-20 NOTE — PATIENT INSTRUCTIONS
Patient Education        Temporomandibular Disorder: Care Instructions  Overview     Temporomandibular disorders (TMDs) are problems with the muscles and joints that connect your jaw to your skull. The disorders cause pain when you talk, chew, swallow, or yawn. You may feel this pain on one or both sides. TMDs are often caused by tight jaw muscles. The tightness can be caused by clenching or grinding your teeth. This may happen when you have a lot of stress in your life. If you lower your stress, you may be able to stop clenching or grinding your teeth. This will help relax your jaw and reduce your pain. Your doctor may suggest a dental splint. Splints can help reduce teeth grinding and clenching. You may also be able to do some things at home to feel better. But if none of this works, your doctor may prescribe medicine to help relax your muscles and control the pain. Follow-up care is a key part of your treatment and safety. Be sure to make and go to all appointments, and call your doctor if you are having problems. It's also a good idea to know your test results and keep a list of the medicines you take. How can you care for yourself at home? · Put either an ice pack or a warm, moist cloth on your jaw for 15 minutes several times a day. You can try switching back and forth between moist heat and cold. · Make eating easy on your jaw. Choose softer foods that are easy to chew like eggs, yogurt, or soup. Avoid hard foods that cause your jaws to work very hard. Try cutting your food into small pieces. And if your jaw gets too painful to chew, or if it locks, you may need to puree your food for a while. · To relax your jaw, repeat this exercise for a few minutes every morning and evening. Watch yourself in a mirror. Gently open and close your mouth. Move your jaw straight up and down. But don't do this if it makes your pain worse. · Manage stress.  You may be clenching or tightening your muscles when you are under stress. · Get at least 30 minutes of exercise on most days of the week to relieve stress. Walking is a good choice. · Ask your doctor if you can take an over-the-counter pain medicine, such as acetaminophen (Tylenol), ibuprofen (Advil, Motrin), or naproxen (Aleve). Be safe with medicines. Read and follow all instructions on the label. · Use good posture for sitting and standing. Slumping your shoulders disturbs the alignment of your facial bones and muscles. · Don't:  ? Hold a phone between your shoulder and your jaw. ? Open your mouth all the way, like when you sing loudly or yawn. ? Clench or grind your teeth, bite your lips, or chew your fingernails. ? Clench things such as pens, pipes, or cigars between your teeth. When should you call for help? Call your doctor now or seek immediate medical care if:    · Your jaw is locked open or shut or it is hard to move your jaw. Watch closely for changes in your health, and be sure to contact your doctor if:    · Your jaw pain gets worse.     · Your face is swollen.     · You do not get better as expected. Where can you learn more? Go to https://Cyanto.eDabba. org and sign in to your [employer] account. Enter Z245 in the [facility] box to learn more about \"Temporomandibular Disorder: Care Instructions. \"     If you do not have an account, please click on the \"Sign Up Now\" link. Current as of: June 30, 2021               Content Version: 13.0  © 2006-2021 Healthwise, Incorporated. Care instructions adapted under license by [facility] Instant Information [facility] ([facility]). If you have questions about a medical condition or this instruction, always ask your healthcare professional. [place] [address] any warranty or liability for your use of this information.

## 2021-12-28 ENCOUNTER — OFFICE VISIT (OUTPATIENT)
Dept: ORTHOPEDIC SURGERY | Age: 61
End: 2021-12-28
Payer: MEDICARE

## 2021-12-28 VITALS — HEIGHT: 61 IN | WEIGHT: 293 LBS | BODY MASS INDEX: 55.32 KG/M2

## 2021-12-28 DIAGNOSIS — M75.81 TENDINITIS OF RIGHT ROTATOR CUFF: Primary | ICD-10-CM

## 2021-12-28 PROCEDURE — G8417 CALC BMI ABV UP PARAM F/U: HCPCS | Performed by: STUDENT IN AN ORGANIZED HEALTH CARE EDUCATION/TRAINING PROGRAM

## 2021-12-28 PROCEDURE — G8484 FLU IMMUNIZE NO ADMIN: HCPCS | Performed by: STUDENT IN AN ORGANIZED HEALTH CARE EDUCATION/TRAINING PROGRAM

## 2021-12-28 PROCEDURE — 1036F TOBACCO NON-USER: CPT | Performed by: STUDENT IN AN ORGANIZED HEALTH CARE EDUCATION/TRAINING PROGRAM

## 2021-12-28 PROCEDURE — 3017F COLORECTAL CA SCREEN DOC REV: CPT | Performed by: STUDENT IN AN ORGANIZED HEALTH CARE EDUCATION/TRAINING PROGRAM

## 2021-12-28 PROCEDURE — 99212 OFFICE O/P EST SF 10 MIN: CPT | Performed by: STUDENT IN AN ORGANIZED HEALTH CARE EDUCATION/TRAINING PROGRAM

## 2021-12-28 PROCEDURE — G8427 DOCREV CUR MEDS BY ELIG CLIN: HCPCS | Performed by: STUDENT IN AN ORGANIZED HEALTH CARE EDUCATION/TRAINING PROGRAM

## 2021-12-28 NOTE — PROGRESS NOTES
Chief Complaint  Follow-up (RIGHT SHOULDER)      History of Present Illness: Lydia Ballesteros is a pleasant 64 y.o. female here today for follow-up regarding her right shoulder. She reports that the injection in the subacromial space a month ago did help her pain in the shoulder. She is still having some pain to the shoulder as well as in the neck and sometimes into the middle aspect of her elbow and forearm. She denies any issues with her fingers. Unfortunately she is taking care of her sister who has cancer and has not been able to go to formal physical therapy    Prior HPI 12/2/21:  The patient is right-hand dominant. She reports worsening right shoulder pain for the past 2 months. Sometimes the pain goes down into her elbow. Sometimes also radiates up to the bottom part of her neck. She denies any numbness and tingling down the arm. She cannot recall specific injury. She has some difficulty reaching behind her back and putting on her bra. She takes Celebrex for her knees which helps her knee pain but does not help her shoulder much.     The patient has been seen by multiple orthopedic providers over the last few years for her knees and lower back, including my partners Dr. Flex Rothman and Dr. Herlinda Pablo. She has severe end stage OA of her bilateral knees, with a medial compartment metallic spacer in the right knee. Due to her weight she unfortunately is not a good surgical candidate for her knees. Medical History:  Patient's medications, allergies, past medical, surgical, social and family histories were reviewed and updated as appropriate. Pertinent items are noted in HPI  Review of systems reviewed from Patient History Form dated on 12/28/21 and available in the patient's chart under the Media tab. Vital Signs: There were no vitals filed for this visit. Constitutional: In no apparent distress. Normal affect. Alert and oriented X3 and is cooperative.        R shoulder exam    Inspection:  Held in a normal posture. Normal contour at the acromioclavicular joint. No swelling, ecchymosis, or erythema about the shoulder. No atrophy appreciated. No scapular winging. Palpation:  No subacromial crepitus. No tenderness of the AC joint. No greater tuberosity tenderness. Some tenderness noted over the anterior shoulder and bicipital groove. Tenderness noted over the medial epicondyle of the elbow. Range of Motion: Full passive and active ROM. Normal scapulothoracic rhythm. Strength:  Normal supraspinatus, infraspinatus, and subscapularis muscle strength. Stability: No anterior instability. No posterior instability. Special Tests: Impingement findings are negative. Labral findings are negative. Speed sign and Yergason signs are both negative. Crossover sign is negative. Belly press sign is negative. Lift off sign is negative. Other findings: The skin is warm dry and well perfused. 2+ radial pulse. Sensation is intact to light touch over the deltoid. Radiology:       No new x-rays taken today. Assessment: 64 y.o. female with the following conditions:  1. Right shoulder rotator cuff tendinitis versus partial tear  2. Moderate to severe right shoulder AC joint arthritis     Comorbidities: Morbid obesity BMI 62    Impression:  Encounter Diagnosis   Name Primary?  Tendinitis of right rotator cuff Yes       Office Procedures:  No orders of the defined types were placed in this encounter. Plan: At this point the patient continues to have some demonstrated benefit to the injection. She likely had some subacromial bursitis or rotator cuff tendinitis that was treated well with the injection. She still has some relief from it. Rotator cuff strength is still strong on exam.  She may have some underlying cervical pathology or golfers elbow, but these are both minor findings right now.   I would like the patient to do a home exercise program for rotator cuffs that she does not have time for formal therapies. These were printed out and given to her today. Follow-up with me in 6 weeks to check on her progress. Erniesilverio Elam is in agreement with this plan. All questions were answered to patient's satisfaction and was encouraged to call with any further questions. Huan Howard, DO  Orthopedic Surgery and Sports Medicine  12/28/2021      This dictation was performed with a verbal recognition program [facility]) and it was checked for errors. It is possible that there are still dictated errors within this office note. If so, please bring any errors to my attention for an addendum. All efforts were made to ensure that this office note is accurate.

## 2021-12-28 NOTE — PATIENT INSTRUCTIONS
Patient Education        Rotator Cuff: Exercises  Introduction  Here are some examples of exercises for you to try. The exercises may be suggested for a condition or for rehabilitation. Start each exercise slowly. Ease off the exercises if you start to have pain. You will be told when to start these exercises and which ones will work best for you. How to do the exercises  Pendulum swing    If you have pain in your back, do not do this exercise. 1. Hold on to a table or the back of a chair with your good arm. Then bend forward a little and let your sore arm hang straight down. This exercise does not use the arm muscles. Rather, use your legs and your hips to create movement that makes your arm swing freely. 2. Use the movement from your hips and legs to guide the slightly swinging arm back and forth like a pendulum (or elephant trunk). Then guide it in circles that start small (about the size of a dinner plate). Make the circles a bit larger each day, as your pain allows. 3. Do this exercise for 5 minutes, 5 to 7 times each day. 4. As you have less pain, try bending over a little farther to do this exercise. This will increase the amount of movement at your shoulder. Posterior stretching exercise    1. Hold the elbow of your injured arm with your other hand. 2. Use your hand to pull your injured arm gently up and across your body. You will feel a gentle stretch across the back of your injured shoulder. 3. Hold for at least 15 to 30 seconds. Then slowly lower your arm. 4. Repeat 2 to 4 times. Up-the-back stretch    Your doctor or physical therapist may want you to wait to do this stretch until you have regained most of your range of motion and strength. You can do this stretch in different ways. Hold any of these stretches for at least 15 to 30 seconds. Repeat them 2 to 4 times. 1. Light stretch: Put your hand in your back pocket. Let it rest there to stretch your shoulder. 2. Moderate stretch:  With your other hand, hold your injured arm (palm outward) behind your back by the wrist. Pull your arm up gently to stretch your shoulder. 3. Advanced stretch: Put a towel over your other shoulder. Put the hand of your injured arm behind your back. Now hold the back end of the towel. With the other hand, hold the front end of the towel in front of your body. Pull gently on the front end of the towel. This will bring your hand farther up your back to stretch your shoulder. Overhead stretch    1. Standing about an arm's length away, grasp onto a solid surface. You could use a countertop, a doorknob, or the back of a sturdy chair. 2. With your knees slightly bent, bend forward with your arms straight. Lower your upper body, and let your shoulders stretch. 3. As your shoulders are able to stretch farther, you may need to take a step or two backward. 4. Hold for at least 15 to 30 seconds. Then stand up and relax. If you had stepped back during your stretch, step forward so you can keep your hands on the solid surface. 5. Repeat 2 to 4 times. Shoulder flexion (lying down)    To make a wand for this exercise, use a piece of PVC pipe or a broom handle with the broom removed. Make the wand about a foot wider than your shoulders. 1. Lie on your back, holding a wand with both hands. Your palms should face down as you hold the wand. 2. Keeping your elbows straight, slowly raise your arms over your head. Raise them until you feel a stretch in your shoulders, upper back, and chest.  3. Hold for 15 to 30 seconds. 4. Repeat 2 to 4 times. Shoulder rotation (lying down)    To make a wand for this exercise, use a piece of PVC pipe or a broom handle with the broom removed. Make the wand about a foot wider than your shoulders. 1. Lie on your back. Hold a wand with both hands with your elbows bent and palms up. 2. Keep your elbows close to your body, and move the wand across your body toward the sore arm.   3. Hold for 8 to 12 seconds. 4. Repeat 2 to 4 times. Wall climbing (to the side)    Avoid any movement that is straight to your side, and be careful not to arch your back. Your arm should stay about 30 degrees to the front of your side. 1. Stand with your side to a wall so that your fingers can just touch it at an angle about 30 degrees toward the front of your body. 2. Walk the fingers of your injured arm up the wall as high as pain permits. Try not to shrug your shoulder up toward your ear as you move your arm up. 3. Hold that position for a count of at least 15 to 20.  4. Walk your fingers back down to the starting position. 5. Repeat at least 2 to 4 times. Try to reach higher each time. Wall climbing (to the front)    During this stretching exercise, be careful not to arch your back. 1. Face a wall, and stand so your fingers can just touch it. 2. Keeping your shoulder down, walk the fingers of your injured arm up the wall as high as pain permits. (Don't shrug your shoulder up toward your ear.)  3. Hold your arm in that position for at least 15 to 30 seconds. 4. Slowly walk your fingers back down to where you started. 5. Repeat at least 2 to 4 times. Try to reach higher each time. Shoulder blade squeeze    1. Stand with your arms at your sides, and squeeze your shoulder blades together. Do not raise your shoulders up as you squeeze. 2. Hold 6 seconds. 3. Repeat 8 to 12 times. Scapular exercise: Arm reach    1. Lie flat on your back. This exercise is a very slight motion that starts with your arms raised (elbows straight, arms straight). 2. From this position, reach higher toward the jessica or ceiling. Keep your elbows straight. All motion should be from your shoulder blade only. 3. Relax your arms back to where you started. 4. Repeat 8 to 12 times. Arm raise to the side    During this strengthening exercise, your arm should stay about 30 degrees to the front of your side.   1. Slowly raise your injured arm to the side, with your thumb facing up. Raise your arm 60 degrees at the most (shoulder level is 90 degrees). 2. Hold the position for 3 to 5 seconds. Then lower your arm back to your side. If you need to, bring your \"good\" arm across your body and place it under the elbow as you lower your injured arm. Use your good arm to keep your injured arm from dropping down too fast.  3. Repeat 8 to 12 times. 4. When you first start out, don't hold any extra weight in your hand. As you get stronger, you may use a 1-pound to 2-pound dumbbell or a small can of food. Shoulder flexor and extensor exercise    These are isometric exercises. That means you contract your muscles without actually moving. 1. Push forward (flex): Stand facing a wall or doorjamb, about 6 inches or less back. Hold your injured arm against your body. Make a closed fist with your thumb on top. Then gently push your hand forward into the wall with about 25% to 50% of your strength. Don't let your body move backward as you push. Hold for about 6 seconds. Relax for a few seconds. Repeat 8 to 12 times. 2. Push backward (extend): Stand with your back flat against a wall. Your upper arm should be against the wall, with your elbow bent 90 degrees (your hand straight ahead). Push your elbow gently back against the wall with about 25% to 50% of your strength. Don't let your body move forward as you push. Hold for about 6 seconds. Relax for a few seconds. Repeat 8 to 12 times. Scapular exercise: Wall push-ups    This exercise is best done with your fingers somewhat turned out, rather than straight up and down. 1. Stand facing a wall, about 12 inches to 18 inches away. 2. Place your hands on the wall at shoulder height. 3. Slowly bend your elbows and bring your face to the wall. Keep your back and hips straight. 4. Push back to where you started. 5. Repeat 8 to 12 times.   6. When you can do this exercise against a wall comfortably, you can try it against a counter. You can then slowly progress to the end of a couch, then to a sturdy chair, and finally to the floor. Scapular exercise: Retraction    For this exercise, you will need elastic exercise material, such as surgical tubing or Thera-Band. 1. Put the band around a solid object at about waist level. (A bedpost will work well.) Each hand should hold an end of the band. 2. With your elbows at your sides and bent to 90 degrees, pull the band back. Your shoulder blades should move toward each other. Then move your arms back where you started. 3. Repeat 8 to 12 times. 4. If you have good range of motion in your shoulders, try this exercise with your arms lifted out to the sides. Keep your elbows at a 90-degree angle. Raise the elastic band up to about shoulder level. Pull the band back to move your shoulder blades toward each other. Then move your arms back where you started. Internal rotator strengthening exercise    1. Start by tying a piece of elastic exercise material to a doorknob. You can use surgical tubing or Thera-Band. 2. Stand or sit with your shoulder relaxed and your elbow bent 90 degrees. Your upper arm should rest comfortably against your side. Squeeze a rolled towel between your elbow and your body for comfort. This will help keep your arm at your side. 3. Hold one end of the elastic band in the hand of the painful arm. 4. Slowly rotate your forearm toward your body until it touches your belly. Slowly move it back to where you started. 5. Keep your elbow and upper arm firmly tucked against the towel roll or at your side. 6. Repeat 8 to 12 times. External rotator strengthening exercise    1. Start by tying a piece of elastic exercise material to a doorknob. You can use surgical tubing or Thera-Band. (You may also hold one end of the band in each hand.)  2. Stand or sit with your shoulder relaxed and your elbow bent 90 degrees. Your upper arm should rest comfortably against your side.  Squeeze a rolled towel between your elbow and your body for comfort. This will help keep your arm at your side. 3. Hold one end of the elastic band with the hand of the painful arm. 4. Start with your forearm across your belly. Slowly rotate the forearm out away from your body. Keep your elbow and upper arm tucked against the towel roll or the side of your body until you begin to feel tightness in your shoulder. Slowly move your arm back to where you started. 5. Repeat 8 to 12 times. Follow-up care is a key part of your treatment and safety. Be sure to make and go to all appointments, and call your doctor if you are having problems. It's also a good idea to know your test results and keep a list of the medicines you take. Where can you learn more? Go to https://[employer]. org and sign in to your [employer] account. Enter Brisa Carvalho in the [employer] box to learn more about \"Rotator Cuff: Exercises. \"     If you do not have an account, please click on the \"Sign Up Now\" link. Current as of: July 1, 2021               Content Version: 13.1  © 8434-3461 [employer], [employer]. Care instructions adapted under license by [facility] ([facility]). If you have questions about a medical condition or this instruction, always ask your healthcare professional. [address] any warranty or liability for your use of this information. Patient Education        Neck: Exercises  Introduction  Here are some examples of exercises for you to try. The exercises may be suggested for a condition or for rehabilitation. Start each exercise slowly. Ease off the exercises if you start to have pain. You will be told when to start these exercises and which ones will work best for you. How to do the exercises  Neck stretch    1. This stretch works best if you keep your shoulder down as you lean away from it.  To help you remember to do this, start by relaxing your shoulders and lightly holding on to your thighs or your chair. 2. Tilt your head toward your shoulder and hold for 15 to 30 seconds. Let the weight of your head stretch your muscles. 3. If you would like a little added stretch, use your hand to gently and steadily pull your head toward your shoulder. For example, keeping your right shoulder down, lean your head to the left. 4. Repeat 2 to 4 times toward each shoulder. Diagonal neck stretch    1. Turn your head slightly toward the direction you will be stretching, and tilt your head diagonally toward your chest and hold for 15 to 30 seconds. 2. If you would like a little added stretch, use your hand to gently and steadily pull your head forward on the diagonal.  3. Repeat 2 to 4 times toward each side. Dorsal glide stretch    The dorsal glide stretches the back of the neck. If you feel pain, do not glide so far back. Some people find this exercise easier to do while lying on their backs with an ice pack on the neck. 1. Sit or stand tall and look straight ahead. 2. Slowly tuck your chin as you glide your head backward over your body  3. Hold for a count of 6, and then relax for up to 10 seconds. 4. Repeat 8 to 12 times. Chest and shoulder stretch    1. Sit or stand tall and glide your head backward as in the dorsal glide stretch. 2. Raise both arms so that your hands are next to your ears. 3. Take a deep breath, and as you breathe out, lower your elbows down and behind your back. You will feel your shoulder blades slide down and together, and at the same time you will feel a stretch across your chest and the front of your shoulders. 4. Hold for about 6 seconds, and then relax for up to 10 seconds. 5. Repeat 8 to 12 times. Strengthening: Hands on head    1. Move your head backward, forward, and side to side against gentle pressure from your hands, holding each position for about 6 seconds. 2. Repeat 8 to 12 times. Follow-up care is a key part of your treatment and safety.  Be sure to make and

## 2022-01-13 PROBLEM — R33.9 INCOMPLETE BLADDER EMPTYING: Status: ACTIVE | Noted: 2022-01-13

## 2022-01-13 PROBLEM — R39.198 VOIDING DIFFICULTY: Status: ACTIVE | Noted: 2022-01-13

## 2022-01-19 DIAGNOSIS — F33.9 RECURRENT MAJOR DEPRESSIVE DISORDER, REMISSION STATUS UNSPECIFIED (HCC): ICD-10-CM

## 2022-01-19 DIAGNOSIS — E78.49 OTHER HYPERLIPIDEMIA: ICD-10-CM

## 2022-01-19 RX ORDER — BUPROPION HYDROCHLORIDE 150 MG/1
TABLET ORAL
Qty: 90 TABLET | Refills: 1 | Status: SHIPPED | OUTPATIENT
Start: 2022-01-19 | End: 2022-02-14 | Stop reason: SDUPTHER

## 2022-01-19 RX ORDER — ROSUVASTATIN CALCIUM 5 MG/1
TABLET, COATED ORAL
Qty: 90 TABLET | Refills: 1 | Status: SHIPPED | OUTPATIENT
Start: 2022-01-19 | End: 2022-02-18 | Stop reason: SDUPTHER

## 2022-01-19 RX ORDER — LAMOTRIGINE 100 MG/1
TABLET ORAL
Qty: 180 TABLET | Refills: 1 | Status: SHIPPED | OUTPATIENT
Start: 2022-01-19 | End: 2022-02-18 | Stop reason: SDUPTHER

## 2022-01-31 ENCOUNTER — TELEPHONE (OUTPATIENT)
Dept: FAMILY MEDICINE CLINIC | Age: 62
End: 2022-01-31

## 2022-01-31 DIAGNOSIS — K80.50 GALL BLADDER PAIN: Primary | ICD-10-CM

## 2022-02-09 ENCOUNTER — INITIAL CONSULT (OUTPATIENT)
Dept: SURGERY | Age: 62
End: 2022-02-09
Payer: MEDICARE

## 2022-02-09 VITALS
WEIGHT: 293 LBS | BODY MASS INDEX: 55.32 KG/M2 | HEIGHT: 61 IN | SYSTOLIC BLOOD PRESSURE: 127 MMHG | HEART RATE: 98 BPM | DIASTOLIC BLOOD PRESSURE: 71 MMHG | TEMPERATURE: 96.8 F

## 2022-02-09 DIAGNOSIS — K80.10 CHRONIC CALCULOUS CHOLECYSTITIS: Primary | ICD-10-CM

## 2022-02-09 PROCEDURE — 99204 OFFICE O/P NEW MOD 45 MIN: CPT | Performed by: SURGERY

## 2022-02-09 PROCEDURE — G8417 CALC BMI ABV UP PARAM F/U: HCPCS | Performed by: SURGERY

## 2022-02-09 PROCEDURE — 1036F TOBACCO NON-USER: CPT | Performed by: SURGERY

## 2022-02-09 PROCEDURE — G8427 DOCREV CUR MEDS BY ELIG CLIN: HCPCS | Performed by: SURGERY

## 2022-02-09 PROCEDURE — 3017F COLORECTAL CA SCREEN DOC REV: CPT | Performed by: SURGERY

## 2022-02-09 PROCEDURE — G8484 FLU IMMUNIZE NO ADMIN: HCPCS | Performed by: SURGERY

## 2022-02-09 RX ORDER — SODIUM CHLORIDE 0.9 % (FLUSH) 0.9 %
5-40 SYRINGE (ML) INJECTION EVERY 12 HOURS SCHEDULED
Status: CANCELLED | OUTPATIENT
Start: 2022-02-09

## 2022-02-09 RX ORDER — HEPARIN SODIUM 5000 [USP'U]/ML
5000 INJECTION, SOLUTION INTRAVENOUS; SUBCUTANEOUS ONCE
Status: CANCELLED | OUTPATIENT
Start: 2022-02-09

## 2022-02-09 RX ORDER — SODIUM CHLORIDE 9 MG/ML
25 INJECTION, SOLUTION INTRAVENOUS PRN
Status: CANCELLED | OUTPATIENT
Start: 2022-02-09

## 2022-02-09 RX ORDER — SODIUM CHLORIDE 0.9 % (FLUSH) 0.9 %
5-40 SYRINGE (ML) INJECTION PRN
Status: CANCELLED | OUTPATIENT
Start: 2022-02-09

## 2022-02-10 DIAGNOSIS — K80.10 CCC (CHRONIC CALCULOUS CHOLECYSTITIS): ICD-10-CM

## 2022-02-14 DIAGNOSIS — F33.9 RECURRENT MAJOR DEPRESSIVE DISORDER, REMISSION STATUS UNSPECIFIED (HCC): ICD-10-CM

## 2022-02-14 DIAGNOSIS — J45.40 MODERATE PERSISTENT REACTIVE AIRWAY DISEASE WITHOUT COMPLICATION: ICD-10-CM

## 2022-02-14 RX ORDER — CELECOXIB 200 MG/1
200 CAPSULE ORAL 2 TIMES DAILY
Qty: 180 CAPSULE | Refills: 1 | Status: SHIPPED | OUTPATIENT
Start: 2022-02-14 | End: 2022-08-15

## 2022-02-14 RX ORDER — BUPROPION HYDROCHLORIDE 150 MG/1
TABLET ORAL
Qty: 90 TABLET | Refills: 3 | Status: SHIPPED | OUTPATIENT
Start: 2022-02-14 | End: 2022-09-09 | Stop reason: DRUGHIGH

## 2022-02-14 RX ORDER — BUDESONIDE AND FORMOTEROL FUMARATE DIHYDRATE 160; 4.5 UG/1; UG/1
1 AEROSOL RESPIRATORY (INHALATION) 2 TIMES DAILY
Qty: 3 EACH | Refills: 3 | Status: SHIPPED | OUTPATIENT
Start: 2022-02-14 | End: 2022-02-16 | Stop reason: DRUGHIGH

## 2022-02-16 ENCOUNTER — TELEPHONE (OUTPATIENT)
Dept: SURGERY | Age: 62
End: 2022-02-16

## 2022-02-16 ENCOUNTER — TELEPHONE (OUTPATIENT)
Dept: FAMILY MEDICINE CLINIC | Age: 62
End: 2022-02-16

## 2022-02-16 RX ORDER — BUDESONIDE AND FORMOTEROL FUMARATE DIHYDRATE 160; 4.5 UG/1; UG/1
2 AEROSOL RESPIRATORY (INHALATION) 2 TIMES DAILY
Qty: 3 EACH | Refills: 2 | Status: SHIPPED | OUTPATIENT
Start: 2022-02-16

## 2022-02-16 NOTE — TELEPHONE ENCOUNTER
I called and discussed with patient that she does not need to get a physical, they will do her PAT physical when she arrives for surgery.

## 2022-02-16 NOTE — TELEPHONE ENCOUNTER
Pt called in asking if she is supposed to get a surgery physical before her surgery that is scheduled on Tuesday

## 2022-02-16 NOTE — TELEPHONE ENCOUNTER
[facility] faxed over to clarify directions for Symbicort 160-4.5 mcg as it is tyically dosed 2 inhalations twice daily

## 2022-02-16 NOTE — TELEPHONE ENCOUNTER
Prescription directions have been updated and a new prescription was sent to [facility].   Please inform patient of the change

## 2022-02-17 ENCOUNTER — HOSPITAL ENCOUNTER (OUTPATIENT)
Age: 62
Discharge: HOME | End: 2022-02-17
Payer: MEDICARE

## 2022-02-17 ENCOUNTER — ANESTHESIA EVENT (OUTPATIENT)
Dept: OPERATING ROOM | Age: 62
End: 2022-02-17
Payer: MEDICARE

## 2022-02-17 PROCEDURE — U0003 INFECTIOUS AGENT DETECTION BY NUCLEIC ACID (DNA OR RNA); SEVERE ACUTE RESPIRATORY SYNDROME CORONAVIRUS 2 (SARS-COV-2) (CORONAVIRUS DISEASE [COVID-19]), AMPLIFIED PROBE TECHNIQUE, MAKING USE OF HIGH THROUGHPUT TECHNOLOGIES AS DESCRIBED BY CMS-2020-01-R: HCPCS

## 2022-02-17 PROCEDURE — U0005 INFEC AGEN DETEC AMPLI PROBE: HCPCS

## 2022-02-17 RX ORDER — MULTIVIT-MIN/IRON/FOLIC ACID/K 18-600-40
1 CAPSULE ORAL DAILY
COMMUNITY

## 2022-02-17 RX ORDER — CEPHRADINE 500 MG
1 CAPSULE ORAL DAILY
COMMUNITY

## 2022-02-17 NOTE — PROGRESS NOTES
Preoperative Screening for Elective Surgery/Invasive Procedures While COVID-19 present in the community     Have you had any of the following symptoms?no  o Fever, chills  o Cough  o Shortness of breath  o Muscle aches/pain  o Diarrhea  o Abdominal pain, nausea, vomiting  o Loss or decrease in taste and / or smell   Risk of Exposure  o Have you recently been hospitalized for COVID-19 or flu-like illness, if so when?no  o Recently diagnosed with COVID-19, if so when?no  o Recently tested for COVID-19, if so when?yes 2/17/22 for surgery  o Have you been in close contact with a person or family member who currently has or recently had COVID-19? If yes, when and in what context? 2/15/22 sister had covid,drove her from [address] you live with anybody who in the last 14 days has had fever, chills, shortness of breath, muscle aches, flu-like illness?no  o Do you have any close contacts or family members who are currently in the hospital for COVID-19 or flu-like illness? If yes, assess recent close contact with this person. not currently but 2 days ago    Indicate if the patient has a positive screen by answering yes to one or more of the above questions. Patients who test positive or screen positive prior to surgery or on the day of surgery should be evaluated in conjunction with the surgeon/proceduralist/anesthesiologist to determine the urgency of the procedure.

## 2022-02-17 NOTE — PROGRESS NOTES
PRE OP INSTRUCTION SHEET   1. Do not eat or drink anything after 12 midnight  prior to surgery. This includes no water, chewing gum or mints. 2. Take the following pills will a small sip of water (see MAR)                                        3. Aspirin, Ibuprofen, Advil, Naproxen, Vitamin E, fish oil and other Anti-inflammatory products should be stopped for 5 days before surgery or as directed by your physician. 4. Check with your Doctor regarding stopping Plavix, Coumadin, Lovenox, Fragmin or other blood thinners   5. Do not smoke, and do not drink any alcoholic beverages 24 hours prior to surgery. This includes NA Beer. 6. You may brush your teeth and gargle the morning of surgery. DO NOT SWALLOW WATER   7. You MUST make arrangements for a responsible adult to take you home after your surgery. You will not be allowed to leave alone or drive yourself home. It is strongly suggested someone stay with you the first 24 hrs. Your surgery will be cancelled if you do not have a ride home. 8. A parent/legal guardian must accompany a child scheduled for surgery and plan to stay at the hospital until the child is discharged. Please do not bring other children with you. 9. Please wear simple, loose fitting clothing to the hospital.  Limmie Bird not bring valuables (money, credit cards, checkbooks, etc.) Do not wear any makeup (including no eye makeup) or nail polish on your fingers or toes. 10. DO NOT wear any jewelry or piercings on day of surgery. All body piercing jewelry must be removed. 11. If you have dentures,glasses, or contacts they will be removed before going to the OR; we will provide you a container. 12. Please see your family doctor/and cardiologist for a history & physical and/or concerning medications. Bring any test results/reports from your physician's office. Have history and labs faxed to [phone].  Remember to bring Blood Bank bracelet on the day of surgery. 14. If you have a Living Will and Durable Power of  for Healthcare, please bring in a copy. 13. Notify your Surgeon if you develop any illness between now and surgery  time, cough, cold, fever, sore throat, nausea, vomiting, etc.  Please notify your surgeon if you experience dizziness, shortness of breath or blurred vision between now & the time of your surgery   16. DO NOT shave your operative site 96 hours prior to surgery. For face & neck surgery, men may use an electric razor 48 hours prior to surgery. 17. Shower with _x__Antibacterial soap (x_chlorhexidine for total joint  Pt's) shower two times before surgery.(the morning of and the night before. 18. To provide excellent care visitors will be limited to one in the room at any given time.   Please call pre admission testing if you any further questions [phone] or [phone]

## 2022-02-18 DIAGNOSIS — F33.9 RECURRENT MAJOR DEPRESSIVE DISORDER, REMISSION STATUS UNSPECIFIED (HCC): ICD-10-CM

## 2022-02-18 DIAGNOSIS — E78.49 OTHER HYPERLIPIDEMIA: ICD-10-CM

## 2022-02-18 DIAGNOSIS — I10 ESSENTIAL HYPERTENSION: ICD-10-CM

## 2022-02-18 LAB — SARS-COV-2: NOT DETECTED

## 2022-02-18 RX ORDER — ROSUVASTATIN CALCIUM 5 MG/1
TABLET, COATED ORAL
Qty: 90 TABLET | Refills: 1 | Status: SHIPPED | OUTPATIENT
Start: 2022-02-18 | End: 2022-08-15

## 2022-02-18 RX ORDER — METOPROLOL SUCCINATE 50 MG/1
TABLET, EXTENDED RELEASE ORAL
Qty: 90 TABLET | Refills: 1 | Status: SHIPPED | OUTPATIENT
Start: 2022-02-18 | End: 2022-08-15

## 2022-02-18 RX ORDER — LAMOTRIGINE 100 MG/1
TABLET ORAL
Qty: 180 TABLET | Refills: 1 | Status: SHIPPED | OUTPATIENT
Start: 2022-02-18 | End: 2022-08-15

## 2022-02-18 NOTE — TELEPHONE ENCOUNTER
Rx's sent to wrong pharmacy.  Please resend to [facility]   Last appt 12/20/2021(VV), no appt scheduled

## 2022-02-20 NOTE — ANESTHESIA PRE PROCEDURE
Department of Anesthesiology  Preprocedure Note       Name:  Deloris Siddiqui   Age:  64 y.o.  :  1960                                          MRN:  [id number]         Date:  2022      Surgeon: Liudmila Urbina):  Zaid Estevez MD    Procedure: Procedure(s):  LAPAROSCOPIC CHOLECYSTECTOMY, POSSIBLE CHOLANGIOGRAMS, POSSIBLE CONVENTIONAL CHOLECYSTECTOMY    Medications prior to admission:   Prior to Admission medications    Medication Sig Start Date End Date Taking?  Authorizing Provider   Cholecalciferol (VITAMIN D) 50 MCG (2000 UT) CAPS capsule Take 1 capsule by mouth daily   Yes Historical Provider, MD   Vitamin D-Vitamin K (K2 PLUS D3) 100-1000 MCG-UNIT TABS Take 1 tablet by mouth daily   Yes Historical Provider, MD   budesonide-formoterol (SYMBICORT) 160-4.5 MCG/ACT AERO Inhale 2 puffs into the lungs 2 times daily 22  Yes MADINA Arevalo CNP   celecoxib (CELEBREX) 200 MG capsule Take 1 capsule by mouth 2 times daily 22  Yes MADINA Arevalo CNP   VORTIoxetine HBr (TRINTELLIX) 20 MG TABS tablet TAKE 1 TABLET EVERY DAY 22  Yes MADINA Arevalo CNP   buPROPion (WELLBUTRIN XL) 150 MG extended release tablet Take 1 tablet every day 22  Yes MADINA Arevalo CNP   Coenzyme Q10 (COQ10) 100 MG CAPS Take by mouth   Yes Historical Provider, MD   albuterol sulfate HFA (PROVENTIL HFA) 108 (90 Base) MCG/ACT inhaler Inhale 2 puffs into the lungs every 6 hours as needed for Wheezing or Shortness of Breath 21  Yes MADINA Arevalo CNP   fluticasone [facility]) 50 MCG/ACT nasal spray 1 spray by Nasal route 2 times daily 21 Yes Robin Plunkett DO   ferrous sulfate (IRON 325) 325 (65 Fe) MG tablet Take 1 tablet by mouth daily (with breakfast) 21  Yes MADINA Arevalo CNP   loratadine (CLARITIN) 10 MG tablet Take 10 mg by mouth daily   Yes Historical Provider, MD   multivitamin [facility]) per tablet Take 1 tablet by mouth daily. Yes Historical Provider, MD   Omega-3 Fatty Acids (FISH OIL) 1200 MG CAPS Take  by mouth daily. Yes Historical Provider, MD   lamoTRIgine (LAMICTAL) 100 MG tablet TAKE 1 TABLET TWICE DAILY 2/18/22   MADINA Stone CNP   metoprolol succinate (TOPROL XL) 50 MG extended release tablet TAKE 1 TABLET EVERY DAY 2/18/22   MADINA Stone CNP   rosuvastatin (CRESTOR) 5 MG tablet TAKE 1 TABLET EVERY DAY 2/18/22   MADINA Stone CNP   blood glucose monitor kit and supplies Dispense sufficient amount for indicated testing frequency plus additional to accommodate PRN testing needs. Dispense all needed supplies to include: monitor, strips, lancing device, lancets, control solutions, alcohol swabs. 10/14/21   MADINA Stone CNP   Lancets MISC Use to check fasting glucose level q am/once daily 10/14/21   MADINA Stone CNP   blood glucose monitor strips Test 1 times a day & as needed for symptoms of irregular blood up to twice per day 10/14/21   MADINA Stone CNP       Current medications:    No current facility-administered medications for this encounter.      Current Outpatient Medications   Medication Sig Dispense Refill    Cholecalciferol (VITAMIN D) 50 MCG (2000 UT) CAPS capsule Take 1 capsule by mouth daily      Vitamin D-Vitamin K (K2 PLUS D3) 100-1000 MCG-UNIT TABS Take 1 tablet by mouth daily      budesonide-formoterol (SYMBICORT) 160-4.5 MCG/ACT AERO Inhale 2 puffs into the lungs 2 times daily 3 each 2    celecoxib (CELEBREX) 200 MG capsule Take 1 capsule by mouth 2 times daily 180 capsule 1    VORTIoxetine HBr (TRINTELLIX) 20 MG TABS tablet TAKE 1 TABLET EVERY DAY 90 tablet 1    buPROPion (WELLBUTRIN XL) 150 MG extended release tablet Take 1 tablet every day 90 tablet 3    Coenzyme Q10 (COQ10) 100 MG CAPS Take by mouth      albuterol sulfate HFA (PROVENTIL HFA) 108 (90 Base) MCG/ACT inhaler Inhale 2 puffs into the lungs every 6 hours as needed for Wheezing or Shortness of Breath 3 Inhaler 3    fluticasone (FLONASE) 50 MCG/ACT nasal spray 1 spray by Nasal route 2 times daily 3 Bottle 3    ferrous sulfate (IRON 325) 325 (65 Fe) MG tablet Take 1 tablet by mouth daily (with breakfast) 90 tablet 3    loratadine (CLARITIN) 10 MG tablet Take 10 mg by mouth daily      multivitamin (THERAGRAN) per tablet Take 1 tablet by mouth daily.  Omega-3 Fatty Acids (FISH OIL) 1200 MG CAPS Take  by mouth daily.  lamoTRIgine (LAMICTAL) 100 MG tablet TAKE 1 TABLET TWICE DAILY 180 tablet 1    metoprolol succinate (TOPROL XL) 50 MG extended release tablet TAKE 1 TABLET EVERY DAY 90 tablet 1    rosuvastatin (CRESTOR) 5 MG tablet TAKE 1 TABLET EVERY DAY 90 tablet 1    blood glucose monitor kit and supplies Dispense sufficient amount for indicated testing frequency plus additional to accommodate PRN testing needs. Dispense all needed supplies to include: monitor, strips, lancing device, lancets, control solutions, alcohol swabs. 1 kit 0    Lancets MISC Use to check fasting glucose level q am/once daily 100 each 5    blood glucose monitor strips Test 1 times a day & as needed for symptoms of irregular blood up to twice per day 100 strip 5       Allergies:     Allergies   Allergen Reactions    Calamine Other (See Comments)     Leaves skin tender and burning     Amoxicillin Other (See Comments)    Amoxicillin-Pot Clavulanate Hives    Chlorpheniramine Maleate     Daypro [Oxaprozin] Hives    Duravent-Da [Chlorphen-Phenyleph-Methscop] Hives    Lithium      Caused shakes    Methscopolamine     Norvasc [Amlodipine]      LE edema      Nsaids      Avoid d/t gastric bypass    Phenylephrine Hcl     Seldane [Terfenadine] Hives    Suprax [Cefixime] Hives    Levofloxacin Rash       Problem List:    Patient Active Problem List   Diagnosis Code    PCOS (polycystic ovarian syndrome) E28.2    Hyperlipidemia E78.5    Restless legs syndrome (RLS) G25.81    Edema of both legs R60.0    Insomnia G47.00    Vitamin D deficiency E55.9    Morbid obesity with BMI of 50.0-59.9, adult (HCC) E66.01, Z68.43    Urinary incontinence R32    Primary osteoarthritis of both knees M17.0    Severe episode of recurrent major depressive disorder, without psychotic features (HCC) F33.2    GERD (gastroesophageal reflux disease) K21.9    RAD (reactive airway disease) J45.909    Hypertension I10    ANTOINETTE on CPAP G47.33, Z99.89    Lumbar stenosis with neurogenic claudication M48.062    HNP (herniated nucleus pulposus), lumbar M51.26    Ulcer of left shin limited to breakdown of skin ([facility]) L97.821    Peripheral venous insufficiency I87.2    Allergic rhinitis B95.5    Dysmetabolic syndrome Y09.85    Abdominal bloating R14.0    Diverticulosis of colon K57.30    Polyp of descending colon K63.5    Venous stasis ulcer of left calf ([facility]) I83.022, L97.229    Cyst of eyelid H02.829    Voiding difficulty R39.198    Incomplete bladder emptying R33.9    CCC (chronic calculous cholecystitis) K80.10       Past Medical History:        Diagnosis Date    Bipolar disorder     Borderline osteopenia     Cellulitis of left leg 8/5/2020    Frequency of micturition     GERD (gastroesophageal reflux disease)     Headache(784.0)     HNP (herniated nucleus pulposus), lumbar 6/6/2019    Hyperlipidemia     Hypertension     Intention tremor     due to lithium    Iron deficiency anemia 11/4/2019    Lateral meniscal tear 10/14/2015    Lumbar stenosis with neurogenic claudication 6/6/2019    Medial meniscus tear 10/14/2015    Mixed incontinence     Morbid (severe) obesity due to excess calories ([facility])     Prolonged emergence from general anesthesia     Prolonged emergence from general anesthesia, initial encounter 6/12/2019    Tobacco use     Venous ulcer of left leg ([facility] [address] 07/2020       Past Surgical History:        Procedure Laterality Date    ABDOMINAL EXPLORATION SURGERY      CARPAL TUNNEL RELEASE Bilateral 2005     SECTION      COLONOSCOPY      normal    COLONOSCOPY N/A 2021    COLON W/ANES. (13:30) performed [facility] Left     atrhroscopic unispacer    KNEE SURGERY Right 10/28/2015    Right knee video arthroscopy with partial medial and lateral menisectomy with loose body removal    LUMBAR SPINE SURGERY N/A 2019    MICROLUMBAR DISCECTOMY L4-5 performed by Paola Verduzco MD at [address] History:    Social History     Tobacco Use    Smoking status: Former Smoker     Packs/day: 1.50     Years: 21.00     Pack years: 31.50     Types: Cigarettes     Quit date: 1997     Years since quittin.0    Smokeless tobacco: Never Used   Substance Use Topics    Alcohol use: No                                Counseling given: Not Answered      Vital Signs (Current):   Vitals:    22 1329   Weight: (!) 329 lb (149.2 kg)   Height: 5' 1\" (1.549 m)                                              BP Readings from Last 3 Encounters:   22 127/71   21 (!) 142/88   10/07/21 (!) 170/84       NPO Status:                                                                                 BMI:   Wt Readings from Last 3 Encounters:   22 (!) 335 lb 3.2 oz (152 kg)   21 (!) 327 lb (148.3 kg)   21 (!) 327 lb (148.3 kg)     Body mass index is 62.16 kg/m².     CBC:   Lab Results   Component Value Date    WBC 8.9 2021    RBC 4.75 2021    HGB 13.6 2021    HCT 42.9 2021    MCV 90.3 2021    RDW 15.9 2021     2021       CMP:   Lab Results   Component Value Date     2021    K 4.7 2021    K 4.2 10/04/2021     2021    CO2 29 2021    BUN 28 2021    CREATININE 0.7 2021    GFRAA >60 2021    GFRAA >60 2013    AGRATIO 1.5 10/04/2021    LABGLOM >60 12/02/2021    GLUCOSE 88 12/02/2021    PROT 6.6 10/04/2021    PROT 6.5 03/04/2013    CALCIUM 9.6 12/02/2021    BILITOT <0.2 10/04/2021    ALKPHOS 124 10/04/2021    AST 13 10/04/2021    ALT 16 10/04/2021       POC Tests: No results for input(s): POCGLU, POCNA, POCK, POCCL, POCBUN, POCHEMO, POCHCT in the last 72 hours. Coags: No results found for: PROTIME, INR, APTT    HCG (If Applicable): No results found for: PREGTESTUR, PREGSERUM, HCG, HCGQUANT     ABGs: No results found for: PHART, PO2ART, HEL4AEX, EIM6TOM, BEART, O3KAEOXO     Type & Screen (If Applicable):  No results found for: LABABO, LABRH    Drug/Infectious Status (If Applicable):  No results found for: HIV, HEPCAB    COVID-19 Screening (If Applicable):   Lab Results   Component Value Date    COVID19 Not Detected 02/17/2022           Anesthesia Evaluation  Patient summary reviewed and Nursing notes reviewed no history of anesthetic complications:   Airway: Mallampati: III     Neck ROM: full   Dental:          Pulmonary:Negative Pulmonary ROS and normal exam    (+) sleep apnea:  asthma:                            Cardiovascular:Negative CV ROS    (+) hypertension:, hyperlipidemia                  Neuro/Psych:   Negative Neuro/Psych ROS  (+) headaches:, psychiatric history:            GI/Hepatic/Renal: Neg GI/Hepatic/Renal ROS  (+) GERD: well controlled,      (-) hiatal hernia       Endo/Other: Negative Endo/Other ROS                    Abdominal:             Vascular: Other Findings:           Anesthesia Plan      general     ASA 4     (I discussed with the patient the risks and benefits of PIV, general anesthesia, IV Narcotics, PACU. All questions were answered the patient agrees with the plan and wishes to proceed.  )  Induction: intravenous. Pre-Operative Diagnosis: CHRONIC CALCULOUS CHOLECYSTITIS    64 y.o.   BMI:  Body mass index is 62.16 kg/m².      Vitals:    02/17/22 1329 02/22/22 0722   BP: (!) 132/90   Pulse:  78   Resp:  24   Temp:  97 °F (36.1 °C)   TempSrc:  Temporal   SpO2:  92%   Weight: (!) 329 lb (149.2 kg) (!) 329 lb (149.2 kg)   Height: 5' 1\" (1.549 m) 5' 1\" (1.549 m)       Allergies   Allergen Reactions    Calamine Other (See Comments)     Leaves skin tender and burning     Amoxicillin Other (See Comments)    Amoxicillin-Pot Clavulanate Hives    Chlorpheniramine Maleate     Daypro [Oxaprozin] Hives    Duravent-Da [Chlorphen-Phenyleph-Methscop] Hives    Lithium      Caused shakes    Methscopolamine     Norvasc [Amlodipine]      LE edema      Nsaids      Avoid d/t gastric bypass    Phenylephrine Hcl     Seldane [Terfenadine] Hives    Suprax [Cefixime] Hives    Levofloxacin Rash     Social History     Tobacco Use    Smoking status: Former Smoker     Packs/day: 1.50     Years: 21.00     Pack years: 31.50     Types: Cigarettes     Quit date: 1997     Years since quittin.0    Smokeless tobacco: Never Used   Substance Use Topics    Alcohol use: No     LABS:    CBC  Lab Results   Component Value Date/Time    WBC 8.9 2021 11:47 AM    HGB 13.6 2021 11:47 AM    HCT 42.9 2021 11:47 AM     2021 11:47 AM     RENAL  Lab Results   Component Value Date/Time     2021 11:47 AM    K 4.7 2021 11:47 AM    K 4.2 10/04/2021 05:11 PM     2021 11:47 AM    CO2 29 2021 11:47 AM    BUN 28 (H) 2021 11:47 AM    CREATININE 0.7 2021 11:47 AM    GLUCOSE 88 2021 11:47 AM     COAGS  No results found for: PROTIME, INR, APTT    Reid Murdock MD   2022

## 2022-02-21 NOTE — PROGRESS NOTES
[facility]    HPI:  Patient is 64y.o. year old female seen at request of MADINA Ahmadi CNP. She reports pain in right upper quadrant and midepigastric region. It is pressure-like and sharp. It is described as moderate. Other associated symptoms are bloating/abdominal distension and nausea. These symptoms have been present for  years . They have worsened recently. They are  made worse by eating. The pain does radiate to the back.     Past Medical History:   Diagnosis Date    Bipolar disorder     Borderline osteopenia     Cellulitis of left leg 2020    Frequency of micturition     GERD (gastroesophageal reflux disease)     Headache(784.0)     HNP (herniated nucleus pulposus), lumbar 2019    Hyperlipidemia     Hypertension     Intention tremor     due to lithium    Iron deficiency anemia 2019    Lateral meniscal tear 10/14/2015    Lumbar stenosis with neurogenic claudication 2019    Medial meniscus tear 10/14/2015    Mixed incontinence     Morbid (severe) obesity due to excess calories (HCC)     Prolonged emergence from general anesthesia     Prolonged emergence from general anesthesia, initial encounter 2019    Tobacco use     Venous ulcer of left leg ([address] 2020       Past Surgical History:   Procedure Laterality Date    ABDOMINAL EXPLORATION SURGERY      CARPAL TUNNEL RELEASE Bilateral 2005     SECTION      COLONOSCOPY      normal    COLONOSCOPY N/A 2021    COLON W/ANES. (13:30) performed by Sophie Leon MD at [address] Left     atrhroscopic unispacer    KNEE SURGERY Right 10/28/2015    Right knee video arthroscopy with partial medial and lateral menisectomy with loose body removal    LUMBAR SPINE SURGERY N/A 2019    MICROLUMBAR DISCECTOMY L4-5 performed by Rosa Bautista MD at [facility]         Current Outpatient Medications on File Prior to Visit   Medication Sig Dispense Refill    blood glucose monitor kit and supplies Dispense sufficient amount for indicated testing frequency plus additional to accommodate PRN testing needs. Dispense all needed supplies to include: monitor, strips, lancing device, lancets, control solutions, alcohol swabs. 1 kit 0    Lancets MISC Use to check fasting glucose level q am/once daily 100 each 5    blood glucose monitor strips Test 1 times a day & as needed for symptoms of irregular blood up to twice per day 100 strip 5    Coenzyme Q10 (COQ10) 100 MG CAPS Take by mouth      albuterol sulfate HFA (PROVENTIL HFA) 108 (90 Base) MCG/ACT inhaler Inhale 2 puffs into the lungs every 6 hours as needed for Wheezing or Shortness of Breath 3 Inhaler 3    ferrous sulfate (IRON 325) 325 (65 Fe) MG tablet Take 1 tablet by mouth daily (with breakfast) 90 tablet 3    loratadine (CLARITIN) 10 MG tablet Take 10 mg by mouth daily      multivitamin (THERAGRAN) per tablet Take 1 tablet by mouth daily.  Omega-3 Fatty Acids (FISH OIL) 1200 MG CAPS Take  by mouth daily.  fluticasone (FLONASE) 50 MCG/ACT nasal spray 1 spray by Nasal route 2 times daily 3 Bottle 3     No current facility-administered medications on file prior to visit. Allergies   Allergen Reactions    Calamine Other (See Comments)     Leaves skin tender and burning     Amoxicillin Other (See Comments)    Amoxicillin-Pot Clavulanate Hives    Chlorpheniramine Maleate     Daypro [Oxaprozin] Hives    Duravent-Da [Chlorphen-Phenyleph-Methscop] Hives    Lithium      Caused shakes    Methscopolamine     Norvasc [Amlodipine]      LE edema      Nsaids      Avoid d/t gastric bypass    Phenylephrine Hcl     Seldane [Terfenadine] Hives    Suprax [Cefixime] Hives    Levofloxacin Rash       Social History     Socioeconomic History    Marital status:       Spouse name: Not on file    Number of children: Not on file    Years of education: Not on file    Highest education level: Not on file   Occupational History    Not on file   Tobacco Use    Smoking status: Former Smoker     Packs/day: 1.50     Years: 21.00     Pack years: 31.50     Types: Cigarettes     Quit date: 1997     Years since quittin.0    Smokeless tobacco: Never Used   Vaping Use    Vaping Use: Never used   Substance and Sexual Activity    Alcohol use: No    Drug use: No    Sexual activity: Not Currently   Other Topics Concern    Not on file   Social History Narrative    Not on file     Social Determinants of Health     Financial Resource Strain: Low Risk     Difficulty of Paying Living Expenses: Not hard at all   Food Insecurity: No Food Insecurity    Worried About Running Out of Food in the Last Year: Never true    Maggie of Food in the Last Year: Never true   Transportation Needs:     Lack of Transportation (Medical): Not on file    Lack of Transportation (Non-Medical):  Not on file   Physical Activity:     Days of Exercise per Week: Not on file    Minutes of Exercise per Session: Not on file   Stress:     Feeling of Stress : Not on file   Social Connections:     Frequency of Communication with Friends and Family: Not on file    Frequency of Social Gatherings with Friends and Family: Not on file    Attends Christian Services: Not on file    Active Member of [address] [place] [address] [email or web address] or Organizations: Not on file    Attends Club or Organization Meetings: Not on file    Marital Status: Not on file   Intimate Partner Violence:     Fear of Current or Ex-Partner: Not on file    Emotionally Abused: Not on file    Physically Abused: Not on file    Sexually Abused: Not on file   Housing Stability:     Unable to Pay for Housing in the Last Year: Not on file    Number of [place] in the Last Year: Not on file    Unstable Housing in the Last Year: Not on file       Family History   Problem Relation Age of Onset    Depression Sister     Mental Illness Sister     Diabetes Mother     Heart Disease Mother     Diabetes Father     Heart Disease Father        ROS:  She reports no complaints related to the eyes, ears , nose throat or mouth. She denies weight loss. No chest pain. No SOB. No urinary complaints. No musculoskeletal complaints. No skin rashes. No neurologic deficits. No bleeding tendencies. GI complaints include RUQ pain. Physical Exam:  Vitals:    02/09/22 1427   BP: 127/71   Pulse: 98   Temp: 96.8 °F (36 °C)   335#   General:  Comfortable. No distress. Eyes:  No scleral icterus  Ears:  Normal  Nose:  Normal  Mouth:  Mucous membranes moist  Respiratory: Lungs CTA. No accessory muscle use. Heart:  Regular rhythm  Abdomen:  Soft. Obese. Tender RUQ. Musculoskeletal:  No abnormal movements. ROM extremities normal.  Skin:  No rashes. Neurologic:  No focal deficits. Psychiatric:  AAA. O x 3.    Radiographic studies:  GBUS and CT 2015 with suspected stones/sludge and GB wall calcification. Laboratory Studies:   Lab Results   Component Value Date    BILITOT <0.2 10/04/2021    ALKPHOS 124 10/04/2021    AST 13 10/04/2021    ALT 16 10/04/2021    LABALBU 4.0 10/04/2021       ASSESSMENT:  1. Chronic calculous cholecystitis    2. Body mass index (BMI) 60.0-69.9, adult (HCC)            PLAN:  The diagnosis and recommended procedure were explained. Questions answered. Prepare for gallbladder surgery. 45 minutes spent reviewing chart, reviewing imaging and interaction with patient to formulate treatment plan.     Edie Pritchett MD

## 2022-02-22 ENCOUNTER — ANESTHESIA (OUTPATIENT)
Dept: OPERATING ROOM | Age: 62
End: 2022-02-22
Payer: MEDICARE

## 2022-02-22 ENCOUNTER — HOSPITAL ENCOUNTER (OUTPATIENT)
Age: 62
Setting detail: OUTPATIENT SURGERY
Discharge: HOME | End: 2022-02-22
Attending: SURGERY | Admitting: SURGERY
Payer: MEDICARE

## 2022-02-22 VITALS
HEIGHT: 61 IN | TEMPERATURE: 97.2 F | DIASTOLIC BLOOD PRESSURE: 72 MMHG | BODY MASS INDEX: 55.32 KG/M2 | RESPIRATION RATE: 17 BRPM | WEIGHT: 293 LBS | OXYGEN SATURATION: 95 % | HEART RATE: 66 BPM | SYSTOLIC BLOOD PRESSURE: 152 MMHG

## 2022-02-22 VITALS
RESPIRATION RATE: 11 BRPM | OXYGEN SATURATION: 95 % | SYSTOLIC BLOOD PRESSURE: 87 MMHG | DIASTOLIC BLOOD PRESSURE: 52 MMHG

## 2022-02-22 DIAGNOSIS — K80.10 CHRONIC CALCULOUS CHOLECYSTITIS: Primary | ICD-10-CM

## 2022-02-22 LAB
ALBUMIN SERPL-MCNC: 3.8 G/DL (ref 3.4–5)
ALP BLD-CCNC: 113 U/L (ref 40–129)
ALT SERPL-CCNC: 18 U/L (ref 10–40)
AST SERPL-CCNC: 17 U/L (ref 15–37)
BILIRUB SERPL-MCNC: 0.5 MG/DL (ref 0–1)
BILIRUBIN DIRECT: <0.2 MG/DL (ref 0–0.3)
BILIRUBIN, INDIRECT: NORMAL MG/DL (ref 0–1)
EKG ATRIAL RATE: 76 BPM
EKG DIAGNOSIS: NORMAL
EKG P AXIS: 28 DEGREES
EKG P-R INTERVAL: 152 MS
EKG Q-T INTERVAL: 402 MS
EKG QRS DURATION: 92 MS
EKG QTC CALCULATION (BAZETT): 452 MS
EKG R AXIS: -53 DEGREES
EKG T AXIS: 67 DEGREES
EKG VENTRICULAR RATE: 76 BPM
TOTAL PROTEIN: 6.5 G/DL (ref 6.4–8.2)

## 2022-02-22 PROCEDURE — 36415 COLL VENOUS BLD VENIPUNCTURE: CPT

## 2022-02-22 PROCEDURE — 93010 ELECTROCARDIOGRAM REPORT: CPT | Performed by: INTERNAL MEDICINE

## 2022-02-22 PROCEDURE — 6360000002 HC RX W HCPCS: Performed by: NURSE ANESTHETIST, CERTIFIED REGISTERED

## 2022-02-22 PROCEDURE — 6370000000 HC RX 637 (ALT 250 FOR IP): Performed by: ANESTHESIOLOGY

## 2022-02-22 PROCEDURE — 2580000003 HC RX 258: Performed by: SURGERY

## 2022-02-22 PROCEDURE — 2720000010 HC SURG SUPPLY STERILE: Performed by: SURGERY

## 2022-02-22 PROCEDURE — 7100000011 HC PHASE II RECOVERY - ADDTL 15 MIN: Performed by: SURGERY

## 2022-02-22 PROCEDURE — 80076 HEPATIC FUNCTION PANEL: CPT

## 2022-02-22 PROCEDURE — 88304 TISSUE EXAM BY PATHOLOGIST: CPT

## 2022-02-22 PROCEDURE — 7100000000 HC PACU RECOVERY - FIRST 15 MIN: Performed by: SURGERY

## 2022-02-22 PROCEDURE — 2500000003 HC RX 250 WO HCPCS: Performed by: NURSE ANESTHETIST, CERTIFIED REGISTERED

## 2022-02-22 PROCEDURE — 7100000010 HC PHASE II RECOVERY - FIRST 15 MIN: Performed by: SURGERY

## 2022-02-22 PROCEDURE — 6360000002 HC RX W HCPCS: Performed by: SURGERY

## 2022-02-22 PROCEDURE — 47562 LAPAROSCOPIC CHOLECYSTECTOMY: CPT | Performed by: SURGERY

## 2022-02-22 PROCEDURE — 3700000000 HC ANESTHESIA ATTENDED CARE: Performed by: SURGERY

## 2022-02-22 PROCEDURE — 3600000014 HC SURGERY LEVEL 4 ADDTL 15MIN: Performed by: SURGERY

## 2022-02-22 PROCEDURE — 6360000002 HC RX W HCPCS: Performed by: ANESTHESIOLOGY

## 2022-02-22 PROCEDURE — 7100000001 HC PACU RECOVERY - ADDTL 15 MIN: Performed by: SURGERY

## 2022-02-22 PROCEDURE — 2580000003 HC RX 258: Performed by: NURSE ANESTHETIST, CERTIFIED REGISTERED

## 2022-02-22 PROCEDURE — A4217 STERILE WATER/SALINE, 500 ML: HCPCS | Performed by: SURGERY

## 2022-02-22 PROCEDURE — 3700000001 HC ADD 15 MINUTES (ANESTHESIA): Performed by: SURGERY

## 2022-02-22 PROCEDURE — 3600000004 HC SURGERY LEVEL 4 BASE: Performed by: SURGERY

## 2022-02-22 PROCEDURE — 2709999900 HC NON-CHARGEABLE SUPPLY: Performed by: SURGERY

## 2022-02-22 PROCEDURE — 2500000003 HC RX 250 WO HCPCS: Performed by: SURGERY

## 2022-02-22 PROCEDURE — 2580000003 HC RX 258: Performed by: ANESTHESIOLOGY

## 2022-02-22 PROCEDURE — 93005 ELECTROCARDIOGRAM TRACING: CPT | Performed by: ANESTHESIOLOGY

## 2022-02-22 RX ORDER — ONDANSETRON 2 MG/ML
4 INJECTION INTRAMUSCULAR; INTRAVENOUS
Status: DISCONTINUED | OUTPATIENT
Start: 2022-02-22 | End: 2022-02-22 | Stop reason: HOSPADM

## 2022-02-22 RX ORDER — SODIUM CHLORIDE 0.9 % (FLUSH) 0.9 %
5-40 SYRINGE (ML) INJECTION PRN
Status: DISCONTINUED | OUTPATIENT
Start: 2022-02-22 | End: 2022-02-22 | Stop reason: HOSPADM

## 2022-02-22 RX ORDER — MEPERIDINE HYDROCHLORIDE 25 MG/ML
12.5 INJECTION INTRAMUSCULAR; INTRAVENOUS; SUBCUTANEOUS EVERY 5 MIN PRN
Status: DISCONTINUED | OUTPATIENT
Start: 2022-02-22 | End: 2022-02-22 | Stop reason: HOSPADM

## 2022-02-22 RX ORDER — HEPARIN SODIUM 5000 [USP'U]/ML
INJECTION, SOLUTION INTRAVENOUS; SUBCUTANEOUS
Status: DISCONTINUED
Start: 2022-02-22 | End: 2022-02-22 | Stop reason: HOSPADM

## 2022-02-22 RX ORDER — DIPHENHYDRAMINE HYDROCHLORIDE 50 MG/ML
12.5 INJECTION INTRAMUSCULAR; INTRAVENOUS
Status: DISCONTINUED | OUTPATIENT
Start: 2022-02-22 | End: 2022-02-22 | Stop reason: HOSPADM

## 2022-02-22 RX ORDER — FENTANYL CITRATE 50 UG/ML
INJECTION, SOLUTION INTRAMUSCULAR; INTRAVENOUS PRN
Status: DISCONTINUED | OUTPATIENT
Start: 2022-02-22 | End: 2022-02-22 | Stop reason: SDUPTHER

## 2022-02-22 RX ORDER — SODIUM CHLORIDE, SODIUM LACTATE, POTASSIUM CHLORIDE, AND CALCIUM CHLORIDE .6; .31; .03; .02 G/100ML; G/100ML; G/100ML; G/100ML
IRRIGANT IRRIGATION PRN
Status: DISCONTINUED | OUTPATIENT
Start: 2022-02-22 | End: 2022-02-22 | Stop reason: ALTCHOICE

## 2022-02-22 RX ORDER — SODIUM CHLORIDE 9 MG/ML
25 INJECTION, SOLUTION INTRAVENOUS PRN
Status: DISCONTINUED | OUTPATIENT
Start: 2022-02-22 | End: 2022-02-22 | Stop reason: HOSPADM

## 2022-02-22 RX ORDER — OXYCODONE HYDROCHLORIDE 5 MG/1
10 TABLET ORAL PRN
Status: COMPLETED | OUTPATIENT
Start: 2022-02-22 | End: 2022-02-22

## 2022-02-22 RX ORDER — SODIUM CHLORIDE 0.9 % (FLUSH) 0.9 %
10 SYRINGE (ML) INJECTION EVERY 12 HOURS SCHEDULED
Status: DISCONTINUED | OUTPATIENT
Start: 2022-02-22 | End: 2022-02-22 | Stop reason: HOSPADM

## 2022-02-22 RX ORDER — MAGNESIUM HYDROXIDE 1200 MG/15ML
LIQUID ORAL CONTINUOUS PRN
Status: COMPLETED | OUTPATIENT
Start: 2022-02-22 | End: 2022-02-22

## 2022-02-22 RX ORDER — SODIUM CHLORIDE 0.9 % (FLUSH) 0.9 %
10 SYRINGE (ML) INJECTION PRN
Status: DISCONTINUED | OUTPATIENT
Start: 2022-02-22 | End: 2022-02-22 | Stop reason: HOSPADM

## 2022-02-22 RX ORDER — OXYCODONE HYDROCHLORIDE 5 MG/1
5 TABLET ORAL EVERY 6 HOURS PRN
Qty: 24 TABLET | Refills: 0 | Status: SHIPPED | OUTPATIENT
Start: 2022-02-22 | End: 2022-03-01

## 2022-02-22 RX ORDER — DEXAMETHASONE SODIUM PHOSPHATE 4 MG/ML
INJECTION, SOLUTION INTRA-ARTICULAR; INTRALESIONAL; INTRAMUSCULAR; INTRAVENOUS; SOFT TISSUE PRN
Status: DISCONTINUED | OUTPATIENT
Start: 2022-02-22 | End: 2022-02-22 | Stop reason: SDUPTHER

## 2022-02-22 RX ORDER — LABETALOL HYDROCHLORIDE 5 MG/ML
5 INJECTION, SOLUTION INTRAVENOUS EVERY 10 MIN PRN
Status: DISCONTINUED | OUTPATIENT
Start: 2022-02-22 | End: 2022-02-22 | Stop reason: HOSPADM

## 2022-02-22 RX ORDER — LIDOCAINE HYDROCHLORIDE 20 MG/ML
INJECTION, SOLUTION INFILTRATION; PERINEURAL PRN
Status: DISCONTINUED | OUTPATIENT
Start: 2022-02-22 | End: 2022-02-22 | Stop reason: SDUPTHER

## 2022-02-22 RX ORDER — SODIUM CHLORIDE 0.9 % (FLUSH) 0.9 %
5-40 SYRINGE (ML) INJECTION EVERY 12 HOURS SCHEDULED
Status: DISCONTINUED | OUTPATIENT
Start: 2022-02-22 | End: 2022-02-22 | Stop reason: HOSPADM

## 2022-02-22 RX ORDER — HEPARIN SODIUM 5000 [USP'U]/ML
5000 INJECTION, SOLUTION INTRAVENOUS; SUBCUTANEOUS ONCE
Status: COMPLETED | OUTPATIENT
Start: 2022-02-22 | End: 2022-02-22

## 2022-02-22 RX ORDER — ONDANSETRON 2 MG/ML
INJECTION INTRAMUSCULAR; INTRAVENOUS PRN
Status: DISCONTINUED | OUTPATIENT
Start: 2022-02-22 | End: 2022-02-22 | Stop reason: SDUPTHER

## 2022-02-22 RX ORDER — SODIUM CHLORIDE, SODIUM LACTATE, POTASSIUM CHLORIDE, CALCIUM CHLORIDE 600; 310; 30; 20 MG/100ML; MG/100ML; MG/100ML; MG/100ML
INJECTION, SOLUTION INTRAVENOUS CONTINUOUS
Status: DISCONTINUED | OUTPATIENT
Start: 2022-02-22 | End: 2022-02-22 | Stop reason: HOSPADM

## 2022-02-22 RX ORDER — IPRATROPIUM BROMIDE AND ALBUTEROL SULFATE 2.5; .5 MG/3ML; MG/3ML
SOLUTION RESPIRATORY (INHALATION)
Status: DISCONTINUED
Start: 2022-02-22 | End: 2022-02-22 | Stop reason: HOSPADM

## 2022-02-22 RX ORDER — SODIUM CHLORIDE, SODIUM LACTATE, POTASSIUM CHLORIDE, CALCIUM CHLORIDE 600; 310; 30; 20 MG/100ML; MG/100ML; MG/100ML; MG/100ML
INJECTION, SOLUTION INTRAVENOUS CONTINUOUS PRN
Status: DISCONTINUED | OUTPATIENT
Start: 2022-02-22 | End: 2022-02-22 | Stop reason: SDUPTHER

## 2022-02-22 RX ORDER — ROCURONIUM BROMIDE 10 MG/ML
INJECTION, SOLUTION INTRAVENOUS PRN
Status: DISCONTINUED | OUTPATIENT
Start: 2022-02-22 | End: 2022-02-22 | Stop reason: SDUPTHER

## 2022-02-22 RX ORDER — BUPIVACAINE HYDROCHLORIDE 5 MG/ML
INJECTION, SOLUTION EPIDURAL; INTRACAUDAL PRN
Status: DISCONTINUED | OUTPATIENT
Start: 2022-02-22 | End: 2022-02-22 | Stop reason: ALTCHOICE

## 2022-02-22 RX ORDER — PROPOFOL 10 MG/ML
INJECTION, EMULSION INTRAVENOUS PRN
Status: DISCONTINUED | OUTPATIENT
Start: 2022-02-22 | End: 2022-02-22 | Stop reason: SDUPTHER

## 2022-02-22 RX ORDER — OXYCODONE HYDROCHLORIDE 5 MG/1
5 TABLET ORAL PRN
Status: COMPLETED | OUTPATIENT
Start: 2022-02-22 | End: 2022-02-22

## 2022-02-22 RX ADMIN — DEXAMETHASONE SODIUM PHOSPHATE 8 MG: 4 INJECTION, SOLUTION INTRAMUSCULAR; INTRAVENOUS at 08:41

## 2022-02-22 RX ADMIN — ONDANSETRON HYDROCHLORIDE 4 MG: 2 INJECTION, SOLUTION INTRAMUSCULAR; INTRAVENOUS at 08:49

## 2022-02-22 RX ADMIN — FENTANYL CITRATE 50 MCG: 50 INJECTION INTRAMUSCULAR; INTRAVENOUS at 08:37

## 2022-02-22 RX ADMIN — SODIUM CHLORIDE, POTASSIUM CHLORIDE, SODIUM LACTATE AND CALCIUM CHLORIDE: 600; 310; 30; 20 INJECTION, SOLUTION INTRAVENOUS at 07:43

## 2022-02-22 RX ADMIN — OXYCODONE HYDROCHLORIDE 10 MG: 5 TABLET ORAL at 10:04

## 2022-02-22 RX ADMIN — SUGAMMADEX 200 MG: 100 INJECTION, SOLUTION INTRAVENOUS at 09:21

## 2022-02-22 RX ADMIN — PROPOFOL 200 MG: 10 INJECTION, EMULSION INTRAVENOUS at 08:37

## 2022-02-22 RX ADMIN — HYDROMORPHONE HYDROCHLORIDE 0.5 MG: 1 INJECTION, SOLUTION INTRAMUSCULAR; INTRAVENOUS; SUBCUTANEOUS at 10:31

## 2022-02-22 RX ADMIN — ROCURONIUM BROMIDE 50 MG: 10 INJECTION, SOLUTION INTRAVENOUS at 08:37

## 2022-02-22 RX ADMIN — HEPARIN SODIUM 5000 UNITS: 5000 INJECTION INTRAVENOUS; SUBCUTANEOUS at 07:42

## 2022-02-22 RX ADMIN — SUGAMMADEX 200 MG: 100 INJECTION, SOLUTION INTRAVENOUS at 09:17

## 2022-02-22 RX ADMIN — VANCOMYCIN HYDROCHLORIDE 2000 MG: 1 INJECTION, POWDER, LYOPHILIZED, FOR SOLUTION INTRAVENOUS at 08:21

## 2022-02-22 RX ADMIN — LIDOCAINE HYDROCHLORIDE 50 MG: 20 INJECTION, SOLUTION INFILTRATION; PERINEURAL at 08:37

## 2022-02-22 RX ADMIN — SODIUM CHLORIDE, POTASSIUM CHLORIDE, SODIUM LACTATE AND CALCIUM CHLORIDE: 600; 310; 30; 20 INJECTION, SOLUTION INTRAVENOUS at 08:37

## 2022-02-22 ASSESSMENT — PULMONARY FUNCTION TESTS
PIF_VALUE: 32
PIF_VALUE: 4
PIF_VALUE: 32
PIF_VALUE: 32
PIF_VALUE: 24
PIF_VALUE: 2
PIF_VALUE: 33
PIF_VALUE: 31
PIF_VALUE: 33
PIF_VALUE: 32
PIF_VALUE: 33
PIF_VALUE: 1
PIF_VALUE: 30
PIF_VALUE: 30
PIF_VALUE: 1
PIF_VALUE: 31
PIF_VALUE: 32
PIF_VALUE: 31
PIF_VALUE: 29
PIF_VALUE: 3
PIF_VALUE: 28
PIF_VALUE: 5
PIF_VALUE: 31
PIF_VALUE: 32
PIF_VALUE: 2
PIF_VALUE: 32
PIF_VALUE: 33
PIF_VALUE: 33
PIF_VALUE: 30
PIF_VALUE: 33
PIF_VALUE: 29
PIF_VALUE: 32
PIF_VALUE: 29
PIF_VALUE: 31
PIF_VALUE: 30
PIF_VALUE: 34
PIF_VALUE: 33
PIF_VALUE: 27
PIF_VALUE: 30
PIF_VALUE: 4
PIF_VALUE: 32
PIF_VALUE: 31
PIF_VALUE: 31
PIF_VALUE: 7
PIF_VALUE: 34
PIF_VALUE: 28
PIF_VALUE: 30
PIF_VALUE: 31
PIF_VALUE: 32
PIF_VALUE: 24
PIF_VALUE: 33
PIF_VALUE: 31
PIF_VALUE: 33
PIF_VALUE: 7
PIF_VALUE: 29

## 2022-02-22 ASSESSMENT — PAIN DESCRIPTION - LOCATION: LOCATION: ABDOMEN

## 2022-02-22 ASSESSMENT — PAIN SCALES - GENERAL
PAINLEVEL_OUTOF10: 8
PAINLEVEL_OUTOF10: 7

## 2022-02-22 ASSESSMENT — PAIN - FUNCTIONAL ASSESSMENT: PAIN_FUNCTIONAL_ASSESSMENT: 0-10

## 2022-02-22 ASSESSMENT — PAIN DESCRIPTION - ORIENTATION: ORIENTATION: MID

## 2022-02-22 ASSESSMENT — PAIN DESCRIPTION - PAIN TYPE: TYPE: SURGICAL PAIN

## 2022-02-22 NOTE — H&P
[facility] GENERAL SURGERY      The H&P was reviewed, the patient was examined, and no change has occurred in the patient's condition since the H&P was completed. The indications for the procedure were reviewed, and any questions were answered. I updated the progress note from 2/9/2022 which is the H&P.     Vitals:    02/22/22 0722   BP: (!) 132/90   Pulse: 78   Resp: 24   Temp: 97 °F (36.1 °C)   SpO2: 92%

## 2022-02-22 NOTE — ANESTHESIA POSTPROCEDURE EVALUATION
Department of Anesthesiology  Postprocedure Note    Patient: Pro Miranda  MRN: [id number]  YOB: 1960  Date of evaluation: 2/22/2022  Time:  12:08 PM     Procedure Summary     Date: 02/22/22 Room / Location: [place] [address] / [facility]    Anesthesia Start: 7770 Anesthesia Stop: 0678    Procedure: LAPAROSCOPIC CHOLECYSTECTOMY (N/A Abdomen) Diagnosis: (CHRONIC CALCULOUS CHOLECYSTITIS)    Surgeons: Tonja Luna MD Responsible Provider: Richie Martinez MD    Anesthesia Type: general ASA Status: 4          Anesthesia Type: general    Adamaris Phase I: Adamaris Score: 8    Adamaris Phase II: Adamaris Score: 10    Last vitals: Reviewed and per EMR flowsheets.        Anesthesia Post Evaluation    Comments: Postoperative Anesthesia Note    Name:    Pro Miranda  MRN:      [id number]    Patient Vitals in the past 12 hrs:  02/22/22 1032, BP:(!) 152/72, Pulse:66, Resp:17, SpO2:95 %  02/22/22 1010, BP:130/83, Temp:97.2 °F (36.2 °C), Temp src:Temporal, Pulse:67, Resp:25, SpO2:95 %  02/22/22 1006, BP:(!) 122/54, Pulse:65, Resp:20, SpO2:99 %  02/22/22 1004, SpO2:96 %  02/22/22 1000, Pulse:66, SpO2:95 %  02/22/22 0955, BP:(!) 122/54, Pulse:70, Resp:22, SpO2:96 %  02/22/22 0950, BP:(!) 155/77, Pulse:73, Resp:21, SpO2:92 %  02/22/22 0946, BP:(!) 160/80, Pulse:70, Resp:19, SpO2:94 %  02/22/22 0940, BP:(!) 159/79, Pulse:74, Resp:25, SpO2:95 %  02/22/22 0935, BP:(!) 147/83, Temp:97.2 °F (36.2 °C), Temp src:Temporal, Pulse:77, Resp:19, SpO2:90 %  02/22/22 0722, BP:(!) 132/90, Temp:97 °F (36.1 °C), Temp src:Temporal, Pulse:78, Resp:24, SpO2:92 %, Height:5' 1\" (1.549 m), Weight:(!) 329 lb (149.2 kg)     LABS:    CBC  Lab Results       Component                Value               Date/Time                  WBC                      8.9                 12/02/2021 11:47 AM        HGB                      13.6                12/02/2021 11:47 AM        HCT                      42.9 12/02/2021 11:47 AM        PLT                      294                 12/02/2021 11:47 AM   RENAL  Lab Results       Component                Value               Date/Time                  NA                       144                 12/02/2021 11:47 AM        K                        4.7                 12/02/2021 11:47 AM        K                        4.2                 10/04/2021 05:11 PM        CL                       102                 12/02/2021 11:47 AM        CO2                      29                  12/02/2021 11:47 AM        BUN                      28 (H)              12/02/2021 11:47 AM        CREATININE               0.7                 12/02/2021 11:47 AM        GLUCOSE                  88                  12/02/2021 11:47 AM   COAGS  No results found for: PROTIME, INR, APTT    Intake & Output:  @24HRIO@    Nausea & Vomiting:  No    Level of Consciousness:  Awake    Pain Assessment:  Adequate analgesia    Anesthesia Complications:  No apparent anesthetic complications    SUMMARY      Vital signs stable  OK to discharge from Stage I post anesthesia care.   Care transferred from Anesthesiology department on discharge from perioperative area

## 2022-02-22 NOTE — BRIEF OP NOTE
Brief Postoperative Note      Patient: Mony Calvo  YOB: 1960  MRN: [id number]    Date of Procedure: 2/22/2022    Pre-Op Diagnosis: CHRONIC CALCULOUS CHOLECYSTITIS    Post-Op Diagnosis: Same       Procedure(s):  LAPAROSCOPIC CHOLECYSTECTOMY    Surgeon(s):  Leopoldo Elbe, MD    Assistant:  Surgical Assistant: Mesha Barragan    Anesthesia: General    Estimated Blood Loss (mL): Minimal    Complications: None    Specimens:   ID Type Source Tests Collected by Time Destination   A : gallbladder and contents   Tissue Gallbladder SURGICAL PATHOLOGY Leopoldo Elbe, MD 2/22/2022 6707        Implants:  * No implants in log *      Drains: * No LDAs found *    Findings: As above    Electronically signed by Ubaldo Louis MD on 2/22/2022 at 9:23 AM

## 2022-02-23 ENCOUNTER — TELEPHONE (OUTPATIENT)
Dept: SURGERY | Age: 62
End: 2022-02-23

## 2022-02-23 NOTE — TELEPHONE ENCOUNTER
I called patient. She states she got a thermometer and does not have a fever. She sates she gets hot then chills off and on. She denies nausea, vomiting, pain, or any other symptoms. We discussed when she can removed outer bandage and explained she can shower with the steri strips. Since she does not have a fever or any other symptoms, she will monitor her symptoms, and call us back if no improvement, or worsening symptoms.

## 2022-02-23 NOTE — TELEPHONE ENCOUNTER
Pt called in stating that she thinks she has an infection from surgery. She says that she gets hot and then cold.      Unsure if she is running a fever, she does not have a thermometer    Charlie [address]: [phone]

## 2022-02-27 NOTE — OP NOTE
[address]                 [address] [facility], [place] [address]                                OPERATIVE REPORT    PATIENT NAME: Zaina Raya              :        1960  MED REC NO:   [id number]                          ROOM:  ACCOUNT NO:   [de-identified]                           ADMIT DATE: 2022  PROVIDER:     Gilberto Haynes MD    DATE OF PROCEDURE:  2022    OPERATION PERFORMED:  Laparoscopic cholecystectomy. PREOPERATIVE DIAGNOSIS:  Chronic calculous cholecystitis. POSTOPERATIVE DIAGNOSIS:  Chronic calculous cholecystitis. SURGEON:  Gilberto Haynes MD    ANESTHESIA:  General.    COMPLICATIONS:  None. ESTIMATED BLOOD LOSS:  Less than 50 mL. INDICATIONS FOR OPERATION:  A 78-year-old female, morbidly obese, with  longstanding known gallstones and calcium in the gallbladder wall. Symptoms recently worsened. I recommended operative intervention. The  risks and benefits were explained. The patient understood them,  accepted them, and elected to proceed. DESCRIPTION OF OPERATION:  The patient was brought to the operating  room. General anesthesia was induced. She was prepped and draped in  the usual surgical sterile fashion. Upper midline 5-mm incision was  made. Veress needle was inserted. Pneumoperitoneum was established. A  5-mm trocar was passed through the incision. The laparoscope was  inserted. Under direct vision, an 11-mm port was placed in the  epigastrium and two 5-mm ports in the right upper quadrant. We had  adequate visualization and retraction. There was a stone impacted in  the neck of the gallbladder. I identified the cystic duct. Three clips  were placed away from the gallbladder, one clip next to gallbladder, and  the cystic duct was divided. Cystic artery had two clips placed  proximal, one clip distal, and it was divided. Posterior branch of the  artery was clipped as well.   The gallbladder was dissected from the  gallbladder fossa of liver bed. Gallbladder was brought out through the  epigastric incision. I reinspected the right upper quadrant. I  copiously irrigated the area. I suctioned out the irrigant. There was  no evident bleeding, bile leak or complication. I deflated the abdomen  and removed the trocars. Fascia at the epigastric port site was  re-approximated with 0 Vicryl suture. Local anesthetic was infiltrated. 4-0 Vicryl was used to reapproximate the skin at all the incisions. Benzoin and Steri-Strip dressing were placed. DISPOSITION:  The patient tolerated the procedure without any acute  complication. Val Adkins MD    D: 02/27/2022 10:29:30       T: 02/27/2022 10:33:00     [id number]  Job#: [id number]     Doc#: [id number]    CC:   Gagandeep Schultz NP

## 2022-03-30 ENCOUNTER — OFFICE VISIT (OUTPATIENT)
Dept: FAMILY MEDICINE CLINIC | Age: 62
End: 2022-03-30
Payer: MEDICARE

## 2022-03-30 VITALS
HEART RATE: 70 BPM | OXYGEN SATURATION: 94 % | SYSTOLIC BLOOD PRESSURE: 136 MMHG | BODY MASS INDEX: 55.32 KG/M2 | DIASTOLIC BLOOD PRESSURE: 84 MMHG | WEIGHT: 293 LBS | HEIGHT: 61 IN

## 2022-03-30 DIAGNOSIS — M79.10 MYALGIA: ICD-10-CM

## 2022-03-30 DIAGNOSIS — D64.9 ANEMIA, UNSPECIFIED TYPE: ICD-10-CM

## 2022-03-30 DIAGNOSIS — E55.9 VITAMIN D DEFICIENCY: ICD-10-CM

## 2022-03-30 DIAGNOSIS — Z01.84 IMMUNITY STATUS TESTING: ICD-10-CM

## 2022-03-30 DIAGNOSIS — M25.561 CHRONIC PAIN OF RIGHT KNEE: ICD-10-CM

## 2022-03-30 DIAGNOSIS — Z12.39 ENCOUNTER FOR SCREENING FOR MALIGNANT NEOPLASM OF BREAST, UNSPECIFIED SCREENING MODALITY: ICD-10-CM

## 2022-03-30 DIAGNOSIS — Z00.00 INITIAL MEDICARE ANNUAL WELLNESS VISIT: Primary | ICD-10-CM

## 2022-03-30 DIAGNOSIS — Z12.31 ENCOUNTER FOR SCREENING MAMMOGRAM FOR MALIGNANT NEOPLASM OF BREAST: ICD-10-CM

## 2022-03-30 DIAGNOSIS — Z23 NEED FOR PROPHYLACTIC VACCINATION AGAINST STREPTOCOCCUS PNEUMONIAE (PNEUMOCOCCUS): ICD-10-CM

## 2022-03-30 DIAGNOSIS — G89.29 CHRONIC PAIN OF RIGHT KNEE: ICD-10-CM

## 2022-03-30 DIAGNOSIS — E78.49 OTHER HYPERLIPIDEMIA: ICD-10-CM

## 2022-03-30 DIAGNOSIS — I10 ESSENTIAL HYPERTENSION: ICD-10-CM

## 2022-03-30 LAB
A/G RATIO: 1.7 (ref 1.1–2.2)
ALBUMIN SERPL-MCNC: 4 G/DL (ref 3.4–5)
ALP BLD-CCNC: 113 U/L (ref 40–129)
ALT SERPL-CCNC: 21 U/L (ref 10–40)
ANION GAP SERPL CALCULATED.3IONS-SCNC: 14 MMOL/L (ref 3–16)
AST SERPL-CCNC: 21 U/L (ref 15–37)
BASOPHILS ABSOLUTE: 0.1 K/UL (ref 0–0.2)
BASOPHILS RELATIVE PERCENT: 1.2 %
BILIRUB SERPL-MCNC: 0.3 MG/DL (ref 0–1)
BUN BLDV-MCNC: 25 MG/DL (ref 7–20)
C-REACTIVE PROTEIN: 14.2 MG/L (ref 0–5.1)
CALCIUM SERPL-MCNC: 9.2 MG/DL (ref 8.3–10.6)
CHLORIDE BLD-SCNC: 102 MMOL/L (ref 99–110)
CHOLESTEROL, TOTAL: 178 MG/DL (ref 0–199)
CO2: 26 MMOL/L (ref 21–32)
CREAT SERPL-MCNC: 0.7 MG/DL (ref 0.6–1.2)
EOSINOPHILS ABSOLUTE: 0.2 K/UL (ref 0–0.6)
EOSINOPHILS RELATIVE PERCENT: 2.7 %
FERRITIN: 93.2 NG/ML (ref 15–150)
FOLATE: 16.24 NG/ML (ref 4.78–24.2)
GFR AFRICAN AMERICAN: >60
GFR NON-AFRICAN AMERICAN: >60
GLUCOSE BLD-MCNC: 101 MG/DL (ref 70–99)
HCT VFR BLD CALC: 41.6 % (ref 36–48)
HDLC SERPL-MCNC: 57 MG/DL (ref 40–60)
HEMOGLOBIN: 13.6 G/DL (ref 12–16)
IRON SATURATION: 20 % (ref 15–50)
IRON: 68 UG/DL (ref 37–145)
LDL CHOLESTEROL CALCULATED: 99 MG/DL
LYMPHOCYTES ABSOLUTE: 1.5 K/UL (ref 1–5.1)
LYMPHOCYTES RELATIVE PERCENT: 23.5 %
MCH RBC QN AUTO: 29.2 PG (ref 26–34)
MCHC RBC AUTO-ENTMCNC: 32.7 G/DL (ref 31–36)
MCV RBC AUTO: 89.5 FL (ref 80–100)
MONOCYTES ABSOLUTE: 0.5 K/UL (ref 0–1.3)
MONOCYTES RELATIVE PERCENT: 8.3 %
NEUTROPHILS ABSOLUTE: 4.2 K/UL (ref 1.7–7.7)
NEUTROPHILS RELATIVE PERCENT: 64.3 %
PDW BLD-RTO: 15.3 % (ref 12.4–15.4)
PLATELET # BLD: 303 K/UL (ref 135–450)
PMV BLD AUTO: 7.5 FL (ref 5–10.5)
POTASSIUM SERPL-SCNC: 4.5 MMOL/L (ref 3.5–5.1)
RBC # BLD: 4.65 M/UL (ref 4–5.2)
RHEUMATOID FACTOR: <10 IU/ML
SEDIMENTATION RATE, ERYTHROCYTE: 62 MM/HR (ref 0–30)
SODIUM BLD-SCNC: 142 MMOL/L (ref 136–145)
TOTAL IRON BINDING CAPACITY: 342 UG/DL (ref 260–445)
TOTAL PROTEIN: 6.3 G/DL (ref 6.4–8.2)
TRIGL SERPL-MCNC: 108 MG/DL (ref 0–150)
TSH SERPL DL<=0.05 MIU/L-ACNC: 2.1 UIU/ML (ref 0.27–4.2)
VITAMIN B-12: 660 PG/ML (ref 211–911)
VITAMIN D 25-HYDROXY: 31.3 NG/ML
VLDLC SERPL CALC-MCNC: 22 MG/DL
WBC # BLD: 6.5 K/UL (ref 4–11)

## 2022-03-30 PROCEDURE — G0009 ADMIN PNEUMOCOCCAL VACCINE: HCPCS | Performed by: NURSE PRACTITIONER

## 2022-03-30 PROCEDURE — 90732 PPSV23 VACC 2 YRS+ SUBQ/IM: CPT | Performed by: NURSE PRACTITIONER

## 2022-03-30 PROCEDURE — G8484 FLU IMMUNIZE NO ADMIN: HCPCS | Performed by: NURSE PRACTITIONER

## 2022-03-30 PROCEDURE — G0438 PPPS, INITIAL VISIT: HCPCS | Performed by: NURSE PRACTITIONER

## 2022-03-30 PROCEDURE — 36415 COLL VENOUS BLD VENIPUNCTURE: CPT | Performed by: NURSE PRACTITIONER

## 2022-03-30 PROCEDURE — 3017F COLORECTAL CA SCREEN DOC REV: CPT | Performed by: NURSE PRACTITIONER

## 2022-03-30 ASSESSMENT — PATIENT HEALTH QUESTIONNAIRE - PHQ9
6. FEELING BAD ABOUT YOURSELF - OR THAT YOU ARE A FAILURE OR HAVE LET YOURSELF OR YOUR FAMILY DOWN: 1
SUM OF ALL RESPONSES TO PHQ QUESTIONS 1-9: 9
3. TROUBLE FALLING OR STAYING ASLEEP: 1
5. POOR APPETITE OR OVEREATING: 1
10. IF YOU CHECKED OFF ANY PROBLEMS, HOW DIFFICULT HAVE THESE PROBLEMS MADE IT FOR YOU TO DO YOUR WORK, TAKE CARE OF THINGS AT HOME, OR GET ALONG WITH OTHER PEOPLE: 1
4. FEELING TIRED OR HAVING LITTLE ENERGY: 3
9. THOUGHTS THAT YOU WOULD BE BETTER OFF DEAD, OR OF HURTING YOURSELF: 0
8. MOVING OR SPEAKING SO SLOWLY THAT OTHER PEOPLE COULD HAVE NOTICED. OR THE OPPOSITE, BEING SO FIGETY OR RESTLESS THAT YOU HAVE BEEN MOVING AROUND A LOT MORE THAN USUAL: 0
7. TROUBLE CONCENTRATING ON THINGS, SUCH AS READING THE NEWSPAPER OR WATCHING TELEVISION: 2
SUM OF ALL RESPONSES TO PHQ QUESTIONS 1-9: 9
2. FEELING DOWN, DEPRESSED OR HOPELESS: 0
SUM OF ALL RESPONSES TO PHQ QUESTIONS 1-9: 9
1. LITTLE INTEREST OR PLEASURE IN DOING THINGS: 1
SUM OF ALL RESPONSES TO PHQ9 QUESTIONS 1 & 2: 1
SUM OF ALL RESPONSES TO PHQ QUESTIONS 1-9: 9

## 2022-03-30 ASSESSMENT — LIFESTYLE VARIABLES: HOW OFTEN DO YOU HAVE A DRINK CONTAINING ALCOHOL: NEVER

## 2022-03-30 NOTE — PATIENT INSTRUCTIONS
Learning About Low-Carbohydrate Diets  What is a low-carbohydrate diet? A low-carbohydrate (or \"low-carb\") diet limits foods and drinks that have carbohydrates. This includes grains, fruits, milk and yogurt, and starchy vegetables like potatoes, beans, and corn. It also avoids foods and drinks that have added sugar. Instead, low-carb diets include foods that are high inprotein and fat. Why might you follow a low-carb diet? Low-carb diets may be used for a variety of reasons, such as for weight loss. People who have diabetes may use a low-carb diet to help manage their bloodsugar levels. What should you do before you start the diet? Talk to your doctor before you try any diet. This is even more important if you have health problems like kidney disease, heart disease, or diabetes. Your doctor may suggest that you meet with a registered dietitian. A dietitian canhelp you make an eating plan that works for you. What foods do you eat on a low-carb diet? On a low-carb diet, you choose foods that are high in protein and fat. Examplesof these are:  [employer], poultry, and fish.  Eggs.  Nuts, such as walnuts, pecans, almonds, and peanuts.  Peanut butter and other nut butters.  Tofu.  Avocado.  Olives.  Non-starchy vegetables like broccoli, cauliflower, green beans, mushrooms, peppers, lettuce, and spinach.  Unsweetened non-dairy milks like almond milk and coconut milk.  Cheese, cottage cheese, and cream cheese. Where can you learn more? Go to https://AlphaSightsruiz.Addoway. org and sign in to your [employer] account. Enter C335 in the [employer] box to learn more about \"Learning About Low-Carbohydrate Diets. \"     If you do not have an account, please click on the \"Sign Up Now\" link. Current as of: September 8, 2021               Content Version: 13.2  © 1571-2165 Healthwise, Incorporated. Care instructions adapted under license by [facility] ([facility]).  If you have questions about a medical condition or this instruction, always ask your healthcare professional. [address] any warranty or liability for your use of this information. Eating Healthy Foods: Care Instructions  Your Care Instructions     Eating healthy foods can help lower your risk for disease. Healthy food gives you energy and keeps your heart strong, your brain active, your musclesworking, and your bones strong. A healthy diet includes a variety of foods from the basic food groups: grains, vegetables, fruits, milk and milk products, and meat and beans. Some people may eat more of their favorite foods from only one food group and, as a result, miss getting the nutrients they need. So, it is important to pay attention not only to what you eat but also to what you are missing from your diet. You caneat a healthy, balanced diet by making a few small changes. Follow-up care is a key part of your treatment and safety. Be sure to make and go to all appointments, and call your doctor if you are having problems. It's also a good idea to know your test results and keep alist of the medicines you take. How can you care for yourself at home? Look at what you eat   Keep a food diary for a week or two and record everything you eat or drink. Track the number of servings you eat from each food group.  For a balanced diet every day, eat a variety of:  ? 6 or more ounce-equivalents of grains, such as cereals, breads, crackers, rice, or pasta, every day. An ounce-equivalent is 1 slice of bread, 1 cup of ready-to-eat cereal, or ½ cup of cooked rice, cooked pasta, or cooked cereal.  ? 2½ cups of vegetables, especially:  - Dark-green vegetables such as broccoli and spinach.  - Orange vegetables such as carrots and sweet potatoes. - Dry beans (such as tejeda and kidney beans) and peas (such as lentils). ? 2 cups of fresh, frozen, or canned fruit. A small apple or 1 banana or orange equals 1 cup.   ? 3 cups of try:  ? A veggie pizza with a whole wheat crust or grilled chicken (instead of sausage or pepperoni). ? Pasta with roasted vegetables, grilled chicken, or marinara sauce instead of cream sauce. ? A vegetable wrap or grilled chicken wrap. ? Broiled or poached food instead of fried or breaded items. Make healthy choices easy   Buy packaged, prewashed, ready-to-eat fresh vegetables and fruits, such as baby carrots, salad mixes, and chopped or shredded broccoli and cauliflower.  Buy packaged, presliced fruits, such as melon or pineapple.  Choose 100% fruit or vegetable juice instead of soda. Limit juice intake to 4 to 6 oz (½ to ¾ cup) a day.  Blend low-fat yogurt, fruit juice, and canned or frozen fruit to make a smoothie for breakfast or a snack. Where can you learn more? Go to https://Cooking.compeVtagOeweb.Tudou. org and sign in to your [employer] account. Enter Z687 in the [employer] box to learn more about \"Eating Healthy Foods: Care Instructions. \"     If you do not have an account, please click on the \"Sign Up Now\" link. Current as of: September 8, 2021               Content Version: 13.2  © 5126-4796 Healthwise, Incorporated. Care instructions adapted under license by [facility] ([facility]). If you have questions about a medical condition or this instruction, always ask your healthcare professional. [place] [address] any warranty or liability for your use of this information. Personalized Preventive Plan for Odilia Mins - 3/30/2022  Medicare offers a range of preventive health benefits. Some of the tests and screenings are paid in full while other may be subject to a deductible, co-insurance, and/or copay. Some of these benefits include a comprehensive review of your medical history including lifestyle, illnesses that may run in your family, and various assessments and screenings as appropriate.     After reviewing your medical record and screening and assessments performed today your provider may have ordered immunizations, labs, imaging, and/or referrals for you. A list of these orders (if applicable) as well as your Preventive Care list are included within your After Visit Summary for your review. Other Preventive Recommendations:    · A preventive eye exam performed by an eye specialist is recommended every 1-2 years to screen for glaucoma; cataracts, macular degeneration, and other eye disorders. · A preventive dental visit is recommended every 6 months. · Try to get at least 150 minutes of exercise per week or 10,000 steps per day on a pedometer . · Order or download the FREE \"Exercise & Physical Activity: Your Everyday Guide\" from [employer]. Call 6-536.935.1009 or search [employer] online. · You need 4317-9085 mg of calcium and 3810-5235 IU of vitamin D per day. It is possible to meet your calcium requirement with diet alone, but a vitamin D supplement is usually necessary to meet this goal.  · When exposed to the sun, use a sunscreen that protects against both UVA and UVB radiation with an SPF of 30 or greater. Reapply every 2 to 3 hours or after sweating, drying off with a towel, or swimming. · Always wear a seat belt when traveling in a car. Always wear a helmet when riding a bicycle or motorcycle. Preventing Osteoporosis: After Your Visit  Your Care Instructions  Osteoporosis means the bones are weak and thin enough that they can break easily. The older you are, the more likely you are to get osteoporosis. But with plenty of calcium, vitamin D, and exercise, you can help prevent osteoporosis. The preteen and teen years are a key time for bone building. With the help of calcium, vitamin D, and exercise in those early years and beyond, the bones reach their peak density and strength by age 27. After age 27, your bones naturally start to thin and weaken.   The stronger your bones are at around age 27, the lower your risk for osteoporosis. But no matter what your age and risk are, your bones still need calcium, vitamin D, and exercise to stay strong. Also avoid smoking, and limit alcohol. Smoking and heavy alcohol use can make your bones thinner. Talk to your doctor about any special risks you might have, such as having a close relative with osteoporosis or taking a medicine that can weaken bones. Your doctor can tell you the best ways to protect your bones from thinning. Follow-up care is a key part of your treatment and safety. Be sure to make and go to all appointments, and call your doctor if you are having problems. It's also a good idea to know your test results and keep a list of the medicines you take. How can you care for yourself at home? Get enough calcium and vitamin D. The [place] of [employer] recommends adults younger than age 46 need 1,000 mg of calcium and 600 IU of vitamin D each day. Women ages 46 to 79 need 1,200 mg of calcium and 600 IU of vitamin D each day. Men ages 46 to 79 need 1,000 mg of calcium and 600 IU of vitamin D each day. Adults 71 and older need 1,200 mg of calcium and 800 IU of vitamin D each day. Eat foods rich in calcium, like yogurt, cheese, milk, and dark green vegetables. Eat foods rich in vitamin D, like eggs, fatty fish, cereal, and fortified milk. Get some sunshine. Your body uses sunshine to make its own vitamin D. The safest time to be out in the sun is before 10 a.m. or after 3 p.m. Avoid getting sunburned. Sunburn can increase your risk of skin cancer. Talk to your doctor about taking a calcium plus vitamin D supplement. Ask about what type of calcium is right for you, and how much to take at a time. Adults ages 23 to 48 should not get more than 2,500 mg of calcium and 4,000 IU of vitamin D each day, whether it is from supplements and/or food.  Adults ages 46 and older should not get more than 2,000 mg of calcium and 4,000 IU of vitamin D each day from supplements and/or food. Get regular bone-building exercise. Weight-bearing and resistance exercises keep bones healthy by working the muscles and bones against gravity. Start out at an exercise level that feels right for you. Add a little at a time until you can do the following:  Do 30 minutes of weight-bearing exercise on most days of the week. Walking, jogging, stair climbing, and dancing are good choices. Do resistance exercises with weights or elastic bands 2 to 3 days a week. Limit alcohol. Drink no more than 1 alcohol drink a day if you are a woman. Drink no more than 2 alcohol drinks a day if you are a man. Do not smoke. Smoking can make bones thin faster. If you need help quitting, talk to your doctor about stop-smoking programs and medicines. These can increase your chances of quitting for good. When should you call for help? Watch closely for changes in your health, and be sure to contact your doctor if:  You need help with a healthy eating plan. You need help with an exercise plan    © 3895-0722 [employer], Incorporated. Care instructions adapted under license by [facility]. This care instruction is for use with your licensed healthcare professional. If you have questions about a medical condition or this instruction, always ask your healthcare professional. [address] any warranty or liability for your use of this information. Content Version: 9.4.97349; Last Revised: June 20, 2011              ·     Keep Your Memory Otconnie Deems       Many factors can affect your ability to remembera hectic lifestyle, aging, stress, chronic disease, and certain medicines. But, there are steps you can take to sharpen your mind and help preserve your memory. Challenge Your Brain   Regularly challenging your mind may help keeps it in top shape. Good mental exercises include:   Crossword puzzlesUse a dictionary if you need it; you will learn more that way. Brainteasers Try some! Crafts, such as wood working and sewing   Hobbies, such as gardening and building model airplanes   SocializingVisit old friends or join groups to meet new ones. Reading   Learning a new language   Taking a class, whether it be art history or warren chi   TravelingExperience the food, history, and culture of your destination   Learning to use a computer   Going to museums, the theater, or thought-provoking movies   Changing things in your daily life, such as reversing your pattern in the grocery store or brushing your teeth using your nondominant hand   Use Memory Aids   There is no need to remember every detail on your own. These memory aids can help:   Calendars and day planners   Electronic organizers to store all sorts of helpful informationThese devices can \"beep\" to remind you of appointments. A book of days to record birthdays, anniversaries, and other occasions that occur on the same date every year   Detailed \"to-do\" lists and strategically placed sticky notes   Quick \"study\" sessionsBefore a gathering, review who will be there so their names will be fresh in your mind. Establish routinesFor example, keep your keys, wallet, and umbrella in the same place all the time or take medicine with your 8:00 AM glass of juice   Live a Healthy Life   Many actions that will keep your body strong will do the same for your mind. For example:   Talk to Your Doctor About Herbs and Supplements    Malnutrition and vitamin deficiencies can impair your mental function. For example, vitamin B12 deficiency can cause a range of symptoms, including confusion. But, what if your nutritional needs are being met? Can herbs and supplements still offer a benefit? Researchers have investigated a range of natural remedies, such as ginkgo , ginseng , and the supplement phosphatidylserine (PS). So far, though, the evidence is inconsistent as to whether these products can improve memory or thinking.    If you are interested in taking brittleness of your bones, the consequences of a fall can be serious and long lasting. Home Life   Research by the Association of Aging [facility]) shows that some home accidents among older adults can be prevented by making simple lifestyle changes and basic modifications and repairs to the home environment. Here are some lifestyle changes that experts recommend:   Have your hearing and vision checked regularly. Be sure to wear prescription glasses that are right for you. Speak to your doctor or pharmacist about the possible side effects of your medicines. A number of medicines can cause dizziness. If you have problems with sleep, talk to your doctor. Limit your intake of alcohol. If necessary, use a cane or walker to help maintain your balance. Wear supportive, rubber-soled shoes, even at home. If you live in a region that gets wintry weather, you may want to put special cleats on your shoes to prevent you from slipping on the snow and ice. Exercise regularly to help maintain muscle tone, agility, and balance. Always hold the banister when going up or down stairs. Also, use  bars when getting in or out of the bath or shower, or using the toilet. To avoid dizziness, get up slowly from a lying down position. Sit up first, dangling your legs for a minute or two before rising to a standing position. Overall Home Safety Check   According to the Consumer Product Safety Commision's \"Older Consumer Home Safety Checklist,\" it is important to check for potential hazards in each room. And remember, proper lighting is an essential factor in home safety. If you cannot see clearly, you are more likely to fall. Important questions to ask yourself include:   Are lamp, electric, extension, and telephone cords placed out of the flow of traffic and maintained in good condition? Have frayed cords been replaced? Are all small rugs and runners slip resistant?  If not, you can secure them to the floor with a special double-sided carpet tape. Are smoke detectors properly locatedone on every floor of your home and one outside of every sleeping area? Are they in good working order? Are batteries replaced at least once a year? Do you have a well-maintained carbon monoxide detector outside every sleeping are in your home? Does your furniture layout leave plenty of space to maneuver between and around chairs, tables, beds, and sofas? Are hallways, stairs and passages between rooms well lit? Can you reach a lamp without getting out of bed? Are floor surfaces well maintained? Shag rugs, high-pile carpeting, tile floors, and polished wood floors can be particularly slippery. Stairs should always have handrails and be carpeted or fitted with a non-skid tread. Is your telephone easily reachable. Is the cord safely tucked away? Room by Room   According to the Association of Aging, bathrooms and daniela are the two most potentially hazardous rooms in your home. In the Kitchen    Be sure your stove is in proper working order and always make sure burners and the oven are off before you go out or go to sleep. Keep pots on the back burners, turn handles away from the front of the stove, and keep stove clean and free of grease build-up. Kitchen ventilation systems and range exhausts should be working properly. Keep flammable objects such as towels and pot holders away from the cooking area except when in use. Make sure kitchen curtains are tied back. Move cords and appliances away from the sink and hot surfaces. If extension cords are needed, install wiring guides so they do not hang over the sink, range, or working areas. Look for coffee pots, kettles and toaster ovens with automatic shut-offs. Keep a mop handy in the kitchen so you can wipe up spills instantly. You should also have a small fire extinguisher.     Arrange your kitchen with frequently used items on lower shelves to avoid the need to stand on a stepstool to reach them. Make sure countertops are well-lit to avoid injuries while cutting and preparing food. In the Bathroom    Use a non-slip mat or decals in the tub and shower, since wet, soapy tile or porcelain surfaces are extremely slippery. Make sure bathroom rugs are non-skid or tape them firmly to the floor. Bathtubs should have at least one, preferably two, grab bars, firmly attached to structural supports in the wall. (Do not use built-in soap holders or glass shower doors as grab bars.)    Tub seats fitted with non-slip material on the legs allow you to wash sitting down. For people with limited mobility, bathtub transfer benches allow you to slide safely into the tub. Raised toilet seats and toilet safety rails are helpful for those with knee or hip problems. In the [facility]    Make sure you use a nightlight and that the area around your bed is clear of potential obstacles. Be careful with electric blankets and never go to sleep with a heating pad, which can cause serious burns even if on a low setting. Use fire-resistant mattress covers and pillows, and NEVER smoke in bed. Keep a phone next to the bed that is programmed to dial 911 at the push of a button. If you have a chronic condition, you may want to sign on with an automatic call-in service. Typically the system includes a small pendant that connects directly to an emergency medical voice-response system. You should also make arrangements to stay in contact with someonefriend, neighbor, family memberon a regular schedule. Fire Prevention   According to the [employer]. (Smoke Alarms for Every) [address] [place] [address], senior citizens are one of the two highest risk groups for death and serious injuries due to residential fires. When cooking, wear short-sleeved items, never a bulky long-sleeved robe.     The Psychiatric's Safety Checklist for Older Consumers emphasizes the importance of checking basements, garages, workshops found in a variety of foods. High-protein foods include lean meat, poultry, and fish. A serving of these foods is 2 to 3 ounces. (3 ounces isabout the size and thickness of a deck of cards.)  Protein isn't just found in meat. If you are looking for other types ofprotein, the following foods are equal to about 1 ounce of meat:   ¼ cup of cooked beans, peas, or lentils   ¼ cup of tofu (about 2 ounces)   2 Tbsp of hummus   ½ ounce of nuts or seeds (for example, 12 almonds or 7 walnut halves)   1 egg   1 Tbsp of peanut butter or other nut or seed butter  Other sources of protein include cheese, milk, and other milk products. You can also buy protein bars, drinks, and powders. Check the nutrition label for theamount of protein in each serving. What are some tips for getting more protein? You can get more protein in your food by adding high-protein ingredients. Forexample, you can:   Add powdered milk to other foods, such as pudding or soups.  Add powdered protein to fruit smoothies and cooked cereal.   Add beans to soup and chili.  Add nuts, seeds, or wheat germ to yogurt. You can also:   Spread peanut butter onto a banana.  Mix cottage cheese into noodle dishes or casseroles.  Sprinkle hard-boiled eggs on a salad.  Grate cheese over vegetables and soups. Where can you learn more? Go to https://Attend.comruiz.healthAyehu Software Technologies. org and sign in to your [employer] account. Enter M748 in the [facility] box to learn more about \"Learning About Getting More Protein. \"     If you do not have an account, please click on the \"Sign Up Now\" link. Current as of: September 8, 2021               Content Version: 13.2  © 2006-2022 Healthwise, Incorporated. Care instructions adapted under license by [facility] ([facility]).  If you have questions about a medical condition or this instruction, always ask your healthcare professional. [address] any warranty or liability for your use of this information.

## 2022-03-30 NOTE — PROGRESS NOTES
Medicare Annual Wellness Visit    Kristal Salgado is here for Medicare AWV and Other (Would like to discuss muscle relaxer that she has had in the past for her back pain)    Assessment & Plan   Initial Medicare annual wellness visit  Encounter for screening for malignant neoplasm of breast, unspecified screening modality  -     JULIAN DIGITAL SCREEN W OR WO CAD BILATERAL; Future  BMI 60.0-69.9, adult (HCC)  Obesity Counseling: Assessed behavioral health risks and factors affecting choice of behavior. Suggested weight control approaches, including dietary changes behavioral modification and follow up plan. Provided educational and support documentation. Time spent (minutes): 15 minutes  Need for prophylactic vaccination against Streptococcus pneumoniae (pneumococcus)  -     Pneumococcal polysaccharide vaccine 23-valent PPSV23  Encounter for screening mammogram for malignant neoplasm of breast   -     JULAIN DIGITAL SCREEN W OR WO CAD BILATERAL; Future  Chronic pain of right knee  -     Henry Simmons MD, Orthopedic Surgery, [facility]  Myalgia  -     C-Reactive Protein  -     Sedimentation Rate  -     Rheumatoid Factor  Immunity status testing  -     Varicella Zoster Antibody, IgG  Essential hypertension  -     CBC with Auto Differential  -     Comprehensive Metabolic Panel  Other hyperlipidemia  -     Comprehensive Metabolic Panel  -     TSH  -     Lipid Panel  Vitamin D deficiency  -     Vitamin D 25 Hydroxy  Anemia, unspecified type  -     CBC with Auto Differential  -     Vitamin B12 & Folate  -     Ferritin  -     Iron and TIBC      Recommendations for Preventive Services Due: see orders and patient instructions/AVS.  Recommended screening schedule for the next 5-10 years is provided to the patient in written form: see Patient Instructions/AVS.     Return for Medicare Annual Wellness Visit in 1 year.      Subjective   The following acute and/or chronic problems were also addressed today:  Chronic right knee pain,  Needs ortho referral for knee replacement    Patient's complete Health Risk Assessment and screening values have been reviewed and are found in Flowsheets. The following problems were reviewed today and where indicated follow up appointments were made and/or referrals ordered. Positive Risk Factor Screenings with Interventions:    Fall Risk:  Do you feel unsteady or are you worried about falling? : (!) yes (Due to weakness in bi-lateral knees and hips)  2 or more falls in past year?: no  Fall with injury in past year?: no     Fall Risk Interventions:    · Home safety tips provided  · patient needs left knee replacement---ortho referral provided     Depression:  PHQ-2 Score: 1  PHQ-9 Total Score: 9    Severity:1-4 = minimal depression, 5-9 = mild depression, 10-14 = moderate depression, 15-19 = moderately severe depression, 20-27 = severe depression    Depression Interventions:  · on medication already but under stress right now and does not want to change medication at this time  · Patient declines any further evaluation/treatment for this issue          General Health and ACP:  General  In general, how would you say your health is?: Good  In the past 7 days, have you experienced any of the following: New or Increased Pain, New or Increased Fatigue, Loneliness, Social Isolation, Stress or Anger?: (!) Yes  Select all that apply: Julaine Rule or Increased Fatigue  Do you get the social and emotional support that you need?: Yes  Do you have a Living Will?: Yes    Advance Directives     Power of  Living Will ACP-Advance Directive ACP-Power of Junious Stairs on 04/21/21 Filed on 04/21/21 Ki Fabian      General Health Risk Interventions:  · Fatigue: patient will f/u in office.  feels r/t chronic pain and not sleeping well  · Stress: relaxation techniques discussed  · Anger: relaxation techniques discussed    Health Habits/Nutrition:     Physical Activity: Inactive    Days of normal S1 and S2, no murmurs, rubs, clicks, or gallops, distal pulses intact, no carotid bruits  Abdomen: soft, non-tender, non-distended, normal bowel sounds, no masses or organomegaly  Extremities: no cyanosis, clubbing or edema  Musculoskeletal: normal range of motion, no joint swelling, deformity or tenderness  Neurologic: reflexes normal and symmetric, no cranial nerve deficit, gait, coordination and speech normal       Allergies   Allergen Reactions    Calamine Other (See Comments)     Leaves skin tender and burning     Amoxicillin Other (See Comments)    Amoxicillin-Pot Clavulanate Hives    Chlorpheniramine Maleate     Daypro [Oxaprozin] Hives    Duravent-Da [Chlorphen-Phenyleph-Methscop] Hives    Lithium      Caused shakes    Methscopolamine     Norvasc [Amlodipine]      LE edema      Nsaids      Avoid d/t gastric bypass    Phenylephrine Hcl     Seldane [Terfenadine] Hives    Suprax [Cefixime] Hives    Levofloxacin Rash     Prior to Visit Medications    Medication Sig Taking?  Authorizing Provider   lamoTRIgine (LAMICTAL) 100 MG tablet TAKE 1 TABLET TWICE DAILY Yes MADINA Andrade CNP   metoprolol succinate (TOPROL XL) 50 MG extended release tablet TAKE 1 TABLET EVERY DAY Yes MADINA Andrade CNP   rosuvastatin (CRESTOR) 5 MG tablet TAKE 1 TABLET EVERY DAY Yes MADINA Andrade CNP   Cholecalciferol (VITAMIN D) 50 MCG (2000 UT) CAPS capsule Take 1 capsule by mouth daily Yes Historical Provider, MD   Vitamin D-Vitamin K (K2 PLUS D3) 100-1000 MCG-UNIT TABS Take 1 tablet by mouth daily Yes Historical Provider, MD   budesonide-formoterol (SYMBICORT) 160-4.5 MCG/ACT AERO Inhale 2 puffs into the lungs 2 times daily Yes MADINA Andrade CNP   celecoxib (CELEBREX) 200 MG capsule Take 1 capsule by mouth 2 times daily Yes MADINA Andrade CNP   VORTIoxetine HBr (TRINTELLIX) 20 MG TABS tablet TAKE 1 TABLET EVERY DAY Yes MADINA Andrade CNP buPROPion (WELLBUTRIN XL) 150 MG extended release tablet Take 1 tablet every day Yes MADINA Souza CNP   blood glucose monitor kit and supplies Dispense sufficient amount for indicated testing frequency plus additional to accommodate PRN testing needs. Dispense all needed supplies to include: monitor, strips, lancing device, lancets, control solutions, alcohol swabs. Yes MADINA Souza CNP   Lancets MISC Use to check fasting glucose level q am/once daily Yes MADINA Souza CNP   blood glucose monitor strips Test 1 times a day & as needed for symptoms of irregular blood up to twice per day Yes MADINA Souza CNP   Coenzyme Q10 (COQ10) 100 MG CAPS Take by mouth Yes Historical Provider, MD   albuterol sulfate HFA (PROVENTIL HFA) 108 (90 Base) MCG/ACT inhaler Inhale 2 puffs into the lungs every 6 hours as needed for Wheezing or Shortness of Breath Yes MADINA Souza CNP   ferrous sulfate (IRON 325) 325 (65 Fe) MG tablet Take 1 tablet by mouth daily (with breakfast) Yes MADINA Souza CNP   loratadine (CLARITIN) 10 MG tablet Take 10 mg by mouth daily Yes Historical Provider, MD   multivitamin [facility]) per tablet Take 1 tablet by mouth daily. Yes Historical Provider, MD   Omega-3 Fatty Acids (FISH OIL) 1200 MG CAPS Take  by mouth daily.  Yes Historical Provider, MD   fluticasone Wyvoradha Murdock) 50 MCG/ACT nasal spray 1 spray by Nasal route 2 times daily  DO Phyllis Chin (Including outside providers/suppliers regularly involved in providing care):   Patient Care Team:  MADINA Souza CNP as PCP - General (Nurse Practitioner)  MADINA Souza CNP as PCP - [facility] Empaneled Provider  Patricia Escalera MD (Orthopedic Surgery)  Gaynelle Pallas, MD as Consulting Physician (Gastroenterology)  Grace Fisher MD as Consulting Physician (Pulmonology)    Reviewed and updated this visit:  Tobacco  Allergies  Meds Problems  Med Hx  Surg Hx  Soc Hx  Fam Hx

## 2022-03-31 ENCOUNTER — OFFICE VISIT (OUTPATIENT)
Dept: ORTHOPEDIC SURGERY | Age: 62
End: 2022-03-31
Payer: MEDICARE

## 2022-03-31 VITALS — BODY MASS INDEX: 55.32 KG/M2 | HEIGHT: 61 IN | WEIGHT: 293 LBS

## 2022-03-31 DIAGNOSIS — M25.561 RIGHT KNEE PAIN, UNSPECIFIED CHRONICITY: Primary | ICD-10-CM

## 2022-03-31 DIAGNOSIS — E66.01 CLASS 3 SEVERE OBESITY DUE TO EXCESS CALORIES WITH SERIOUS COMORBIDITY AND BODY MASS INDEX (BMI) OF 60.0 TO 69.9 IN ADULT (HCC): ICD-10-CM

## 2022-03-31 DIAGNOSIS — M17.11 LOCALIZED OSTEOARTHRITIS OF RIGHT KNEE: ICD-10-CM

## 2022-03-31 LAB — VARICELLA-ZOSTER VIRUS AB, IGG: NORMAL

## 2022-03-31 PROCEDURE — 99214 OFFICE O/P EST MOD 30 MIN: CPT | Performed by: PHYSICIAN ASSISTANT

## 2022-03-31 PROCEDURE — G8484 FLU IMMUNIZE NO ADMIN: HCPCS | Performed by: PHYSICIAN ASSISTANT

## 2022-03-31 PROCEDURE — G8417 CALC BMI ABV UP PARAM F/U: HCPCS | Performed by: PHYSICIAN ASSISTANT

## 2022-03-31 PROCEDURE — 1036F TOBACCO NON-USER: CPT | Performed by: PHYSICIAN ASSISTANT

## 2022-03-31 PROCEDURE — 3017F COLORECTAL CA SCREEN DOC REV: CPT | Performed by: PHYSICIAN ASSISTANT

## 2022-03-31 PROCEDURE — G8427 DOCREV CUR MEDS BY ELIG CLIN: HCPCS | Performed by: PHYSICIAN ASSISTANT

## 2022-03-31 NOTE — PROGRESS NOTES
Dr Elizabeth Hurley      Date /Time 3/31/2022       8:27 AM EDT  Name Wileen Osler             1960   Location  [address]  MRN [id number]                Chief Complaint   Patient presents with    Knee Pain     RIGHT KNEE PAIN         History of Present Illness  Wileen Osler is a 64 y.o. female who presents with  right knee pain, . Sent in consultation by MADINA Lr CNP. Occupation:   Occupational activities: clerical work. Athletic/exercise activity: no sports. Injury Mechanism:  none. Worker's Comp. & legal issues:   none. Previous Treatments: Ice, Heat and NSAIDs    Patient presents to the office today with chief complaint of right knee pain. Patient's right knee has been painful for years. She has had previous arthroscopic surgery. Her symptoms have increased recently. No injury or trauma. She does have increased pain with activities and improvement with rest.  Symptoms are global in nature. She has had viscosupplementation injections and cortisone injections in the past without relief.     Past History  Past Medical History:   Diagnosis Date    Bipolar disorder     Borderline osteopenia     Cellulitis of left leg 8/5/2020    Frequency of micturition     GERD (gastroesophageal reflux disease)     Headache(784.0)     HNP (herniated nucleus pulposus), lumbar 6/6/2019    Hyperlipidemia     Hypertension     Intention tremor     due to lithium    Iron deficiency anemia 11/4/2019    Lateral meniscal tear 10/14/2015    Lumbar stenosis with neurogenic claudication 6/6/2019    Medial meniscus tear 10/14/2015    Mixed incontinence     Morbid (severe) obesity due to excess calories (HCC)     Prolonged emergence from general anesthesia     Prolonged emergence from general anesthesia, initial encounter 6/12/2019    Tobacco use     Venous ulcer of left leg ([facility] [address] 07/2020     Past Surgical History:   Procedure Laterality Date    ABDOMINAL EXPLORATION SURGERY      CARPAL TUNNEL RELEASE Bilateral 2005     SECTION      CHOLECYSTECTOMY, LAPAROSCOPIC N/A 2022    LAPAROSCOPIC CHOLECYSTECTOMY performed by Rooney Eisenmenger, MD at [facility]    normal    COLONOSCOPY N/A 2021    COLON W/ANES. (13:30) performed by Thomas Carranza MD at [facility] Left     atrhroscopic unispacer    KNEE SURGERY Right 10/28/2015    Right knee video arthroscopy with partial medial and lateral menisectomy with loose body removal    LUMBAR SPINE SURGERY N/A 2019    MICROLUMBAR DISCECTOMY L4-5 performed by Felecia Infante MD at [facility] History     Tobacco Use    Smoking status: Former Smoker     Packs/day: 1.50     Years: 21.00     Pack years: 31.50     Types: Cigarettes     Quit date: 1997     Years since quittin.1    Smokeless tobacco: Never Used   Substance Use Topics    Alcohol use: No      Current Outpatient Medications on File Prior to Visit   Medication Sig Dispense Refill    lamoTRIgine (LAMICTAL) 100 MG tablet TAKE 1 TABLET TWICE DAILY 180 tablet 1    metoprolol succinate (TOPROL XL) 50 MG extended release tablet TAKE 1 TABLET EVERY DAY 90 tablet 1    rosuvastatin (CRESTOR) 5 MG tablet TAKE 1 TABLET EVERY DAY 90 tablet 1    Cholecalciferol (VITAMIN D) 50 MCG (2000 UT) CAPS capsule Take 1 capsule by mouth daily      Vitamin D-Vitamin K (K2 PLUS D3) 100-1000 MCG-UNIT TABS Take 1 tablet by mouth daily      budesonide-formoterol (SYMBICORT) 160-4.5 MCG/ACT AERO Inhale 2 puffs into the lungs 2 times daily 3 each 2    celecoxib (CELEBREX) 200 MG capsule Take 1 capsule by mouth 2 times daily 180 capsule 1    VORTIoxetine HBr (TRINTELLIX) 20 MG TABS tablet TAKE 1 TABLET EVERY DAY 90 tablet 1    buPROPion (WELLBUTRIN XL) 150 MG extended release tablet Take 1 tablet every day 90 tablet 3    blood glucose monitor kit and supplies Dispense sufficient amount for indicated testing frequency plus additional to accommodate PRN testing needs. Dispense all needed supplies to include: monitor, strips, lancing device, lancets, control solutions, alcohol swabs. 1 kit 0    Lancets MISC Use to check fasting glucose level q am/once daily 100 each 5    blood glucose monitor strips Test 1 times a day & as needed for symptoms of irregular blood up to twice per day 100 strip 5    Coenzyme Q10 (COQ10) 100 MG CAPS Take by mouth      albuterol sulfate HFA (PROVENTIL HFA) 108 (90 Base) MCG/ACT inhaler Inhale 2 puffs into the lungs every 6 hours as needed for Wheezing or Shortness of Breath 3 Inhaler 3    fluticasone (FLONASE) 50 MCG/ACT nasal spray 1 spray by Nasal route 2 times daily 3 Bottle 3    ferrous sulfate (IRON 325) 325 (65 Fe) MG tablet Take 1 tablet by mouth daily (with breakfast) 90 tablet 3    loratadine (CLARITIN) 10 MG tablet Take 10 mg by mouth daily      multivitamin (THERAGRAN) per tablet Take 1 tablet by mouth daily.  Omega-3 Fatty Acids (FISH OIL) 1200 MG CAPS Take  by mouth daily. No current facility-administered medications on file prior to visit. ASCVD 10-YEAR RISK SCORE  The 10-year ASCVD risk score (Wandy Smith., et al., 2013) is: 3.7%    Values used to calculate the score:      Age: 64 years      Sex: Female      Is Non- : No      Diabetic: No      Tobacco smoker: No      Systolic Blood Pressure: 135 mmHg      Is BP treated: No      HDL Cholesterol: 57 mg/dL      Total Cholesterol: 178 mg/dL     Review of Systems  10-point ROS is negative other than HPI. Physical Exam  Based off 1997 Exam Criteria  Ht 5' 1\" (1.549 m)   Wt (!) 324 lb (147 kg)   LMP  (LMP Unknown)   BMI 61.22 kg/m²      Constitutional:       General: He is not in acute distress. Appearance: Normal appearance.    Cardiovascular:      Rate and Rhythm: Normal rate and regular rhythm. Pulses: Normal pulses. Pulmonary:      Effort: Pulmonary effort is normal. No respiratory distress. Neurological:      Mental Status: He is alert and oriented to person, place, and time. Mental status is at baseline. Musculoskeletal:  Gait:  normal  Lumbar spine: There is no swelling, warmth, or erythema. Range of motion is within normal limits. There is no paraspinal or spinous process tenderness. . The distal neurovascular exam is grossly intact and symmetric. Martínez Hip: Examination of the right and left hip reveals intact skin. The patient demonstrates full painless range of motion with regards to flexion, abduction, internal and external rotation. There is no tenderness about the greater trochanter. R Knee: Examination of the right knee reveals intact skin. There is no focal tenderness. The patient demonstrates full painless range of motion with regard to flexion and extension. Imaging  Right Knee: [facility]  Radiographs: X-rays ordered today reviewed of the right knee. 4 views. Standing AP, standing AP flexed, lateral, and skyline views. They demonstrate no evidence of fractures or dislocations with end-stage tricompartment osteoarthritis. Procedure:  Orders Placed This Encounter   Procedures    XR KNEE RIGHT (MIN 4 VIEWS)     Standing Status:   Future     Number of Occurrences:   1     Standing Expiration Date:   4/30/2022     Order Specific Question:   Reason for exam:     Answer:   pain       Assessment and Plan  Davida Dougherty was seen today for knee pain. Diagnoses and all orders for this visit:    Right knee pain, unspecified chronicity  -     XR KNEE RIGHT (MIN 4 VIEWS); Future        Patient does have advanced right knee osteoarthritis. She will need a total knee arthroplasty at some point in the future. She will need her BMI less than 40 before proceeding with surgery. Her current BMI is 61.22.   We have offered her weight management and aquatic therapy but she has refused. He does not want any more cortisone or viscosupplementation injections. They have not worked in the past.    Electronically signed by Gabriella Currie MD on 3/31/2022 at 8:27 AM  This dictation was generated by voice recognition computer software. Although all attempts are made to edit the dictation for accuracy, there may be errors in the transcription that are not intended.

## 2022-04-02 ENCOUNTER — HOSPITAL ENCOUNTER (EMERGENCY)
Age: 62
Discharge: HOME | End: 2022-04-02
Attending: EMERGENCY MEDICINE
Payer: MEDICARE

## 2022-04-02 VITALS
BODY MASS INDEX: 55.32 KG/M2 | RESPIRATION RATE: 16 BRPM | HEART RATE: 64 BPM | DIASTOLIC BLOOD PRESSURE: 71 MMHG | HEIGHT: 61 IN | SYSTOLIC BLOOD PRESSURE: 155 MMHG | OXYGEN SATURATION: 95 % | TEMPERATURE: 97.5 F | WEIGHT: 293 LBS

## 2022-04-02 DIAGNOSIS — M79.10 MYALGIA: Primary | ICD-10-CM

## 2022-04-02 LAB
ANION GAP SERPL CALCULATED.3IONS-SCNC: 11 MMOL/L (ref 3–16)
BASOPHILS ABSOLUTE: 0.1 K/UL (ref 0–0.2)
BASOPHILS RELATIVE PERCENT: 1.1 %
BUN BLDV-MCNC: 19 MG/DL (ref 7–20)
CALCIUM SERPL-MCNC: 9.5 MG/DL (ref 8.3–10.6)
CHLORIDE BLD-SCNC: 104 MMOL/L (ref 99–110)
CO2: 28 MMOL/L (ref 21–32)
CREAT SERPL-MCNC: 0.7 MG/DL (ref 0.6–1.2)
EOSINOPHILS ABSOLUTE: 0.2 K/UL (ref 0–0.6)
EOSINOPHILS RELATIVE PERCENT: 2.8 %
GFR AFRICAN AMERICAN: >60
GFR NON-AFRICAN AMERICAN: >60
GLUCOSE BLD-MCNC: 112 MG/DL (ref 70–99)
HCT VFR BLD CALC: 40.4 % (ref 36–48)
HEMOGLOBIN: 13.2 G/DL (ref 12–16)
LYMPHOCYTES ABSOLUTE: 1.7 K/UL (ref 1–5.1)
LYMPHOCYTES RELATIVE PERCENT: 25.3 %
MCH RBC QN AUTO: 28.6 PG (ref 26–34)
MCHC RBC AUTO-ENTMCNC: 32.6 G/DL (ref 31–36)
MCV RBC AUTO: 87.5 FL (ref 80–100)
MONOCYTES ABSOLUTE: 0.6 K/UL (ref 0–1.3)
MONOCYTES RELATIVE PERCENT: 9.5 %
NEUTROPHILS ABSOLUTE: 4.1 K/UL (ref 1.7–7.7)
NEUTROPHILS RELATIVE PERCENT: 61.3 %
PDW BLD-RTO: 14.5 % (ref 12.4–15.4)
PLATELET # BLD: 290 K/UL (ref 135–450)
PMV BLD AUTO: 6.6 FL (ref 5–10.5)
POTASSIUM REFLEX MAGNESIUM: 4.1 MMOL/L (ref 3.5–5.1)
RBC # BLD: 4.62 M/UL (ref 4–5.2)
SODIUM BLD-SCNC: 143 MMOL/L (ref 136–145)
TOTAL CK: 67 U/L (ref 26–192)
WBC # BLD: 6.8 K/UL (ref 4–11)

## 2022-04-02 PROCEDURE — 99283 EMERGENCY DEPT VISIT LOW MDM: CPT

## 2022-04-02 PROCEDURE — 80048 BASIC METABOLIC PNL TOTAL CA: CPT

## 2022-04-02 PROCEDURE — 36415 COLL VENOUS BLD VENIPUNCTURE: CPT

## 2022-04-02 PROCEDURE — 85025 COMPLETE CBC W/AUTO DIFF WBC: CPT

## 2022-04-02 PROCEDURE — 82550 ASSAY OF CK (CPK): CPT

## 2022-04-02 ASSESSMENT — PAIN DESCRIPTION - PAIN TYPE: TYPE: CHRONIC PAIN

## 2022-04-02 ASSESSMENT — ENCOUNTER SYMPTOMS
ABDOMINAL PAIN: 0
RHINORRHEA: 0
SORE THROAT: 0
NAUSEA: 0
EYE PAIN: 0
SHORTNESS OF BREATH: 0
DIARRHEA: 0
BACK PAIN: 0
COUGH: 0
BLOOD IN STOOL: 0
VOMITING: 0

## 2022-04-02 ASSESSMENT — PAIN DESCRIPTION - PROGRESSION: CLINICAL_PROGRESSION: GRADUALLY WORSENING

## 2022-04-02 ASSESSMENT — PAIN DESCRIPTION - FREQUENCY: FREQUENCY: INTERMITTENT

## 2022-04-02 ASSESSMENT — PAIN SCALES - GENERAL: PAINLEVEL_OUTOF10: 6

## 2022-04-02 ASSESSMENT — PAIN - FUNCTIONAL ASSESSMENT: PAIN_FUNCTIONAL_ASSESSMENT: 0-10

## 2022-04-02 NOTE — ED PROVIDER NOTES
[address] ED  EMERGENCY DEPARTMENT ENCOUNTER        Pt Name: Lucia Marquez  MRN: [id number]  [place] 1960  Date of evaluation: 4/2/2022  Provider: MADINA Schulz CNP  PCP: MADINA Gonzalez CNP  Note Started: 12:08 PM EDT            Triage CHIEF COMPLAINT       Chief Complaint   Patient presents with    Muscle Pain     reports lots of pain in the muscles. reports pain all over. reports this has been going on for a month. reports it comes and goes         HISTORY OF PRESENT ILLNESS   (Location/Symptom, Timing/Onset, Context/Setting, Quality, Duration, Modifying Factors, Severity)  Note limiting factors. Chief Complaint: Body aches for past month    Lucia Marquez is a 64 y.o. female who presents to the emerge department symptoms of general body aches. Patient states that she said symptoms of some right shoulder pain and pain in her right upper extremity and she was concerned that she was in rhabdomyolysis. Patient states that she has no history of rhabdomyolysis but reported that she notified her family doctor about her complaints and they told her to come to the hospital to be checked for rhabdo. Patient denies any increase in activity. Denies any numbness tingling in the extremity. States that she performed some laboratory work on 3 -30-22 and reported that she did have findings of rhabdo on her laboratory work. Due to these findings her family doctor sent to the hospital be evaluated. She denies any symptoms of abdominal pain. No chest pain. Denies any short of breath. Patient states that she believes this is due to a medication called Crestor. She denies any other acute complaints at this time states otherwise feeling well. Nursing Notes were all reviewed and agreed with or any disagreements were addressed in the HPI.     REVIEW OF SYSTEMS    (2-9 systems for level 4, 10 or more for level 5)     Review of Systems   Constitutional: Negative for chills, diaphoresis and fever. HENT: Negative for congestion, ear pain, rhinorrhea and sore throat. Eyes: Negative for pain and visual disturbance. Respiratory: Negative for cough and shortness of breath. Cardiovascular: Negative for chest pain and leg swelling. Gastrointestinal: Negative for abdominal pain, blood in stool, diarrhea, nausea and vomiting. Genitourinary: Negative for difficulty urinating, dysuria, flank pain and frequency. Musculoskeletal: Negative for back pain and neck pain. Skin: Negative for rash and wound. Neurological: Negative for dizziness and light-headedness.        PAST MEDICAL HISTORY     Past Medical History:   Diagnosis Date    Bipolar disorder     Borderline osteopenia     Cellulitis of left leg 2020    Frequency of micturition     GERD (gastroesophageal reflux disease)     Headache(784.0)     HNP (herniated nucleus pulposus), lumbar 2019    Hyperlipidemia     Hypertension     Intention tremor     due to lithium    Iron deficiency anemia 2019    Lateral meniscal tear 10/14/2015    Lumbar stenosis with neurogenic claudication 2019    Medial meniscus tear 10/14/2015    Mixed incontinence     Morbid (severe) obesity due to excess calories (HCC)     Prolonged emergence from general anesthesia     Prolonged emergence from general anesthesia, initial encounter 2019    Tobacco use     Venous ulcer of left leg ([facility] [address] 2020       SURGICAL HISTORY     Past Surgical History:   Procedure Laterality Date    ABDOMINAL EXPLORATION SURGERY      CARPAL TUNNEL RELEASE Bilateral 2005     SECTION      CHOLECYSTECTOMY, LAPAROSCOPIC N/A 2022    LAPAROSCOPIC CHOLECYSTECTOMY performed by Jodi Garcia MD at [facility]    normal    COLONOSCOPY N/A 2021    COLON W/ANES. (13:30) performed by Barry Norris MD at [facility] atrhroscopic unispacer    KNEE SURGERY Right 10/28/2015    Right knee video arthroscopy with partial medial and lateral menisectomy with loose body removal    LUMBAR SPINE SURGERY N/A 06/12/2019    MICROLUMBAR DISCECTOMY L4-5 performed by Tu Pugh MD at [address] [place] [address]       Discharge Medication List as of 4/2/2022 12:59 PM      CONTINUE these medications which have NOT CHANGED    Details   lamoTRIgine (LAMICTAL) 100 MG tablet TAKE 1 TABLET TWICE DAILY, Disp-180 tablet, R-1Normal      metoprolol succinate (TOPROL XL) 50 MG extended release tablet TAKE 1 TABLET EVERY DAY, Disp-90 tablet, R-1Normal      rosuvastatin (CRESTOR) 5 MG tablet TAKE 1 TABLET EVERY DAY, Disp-90 tablet, R-1Normal      Cholecalciferol (VITAMIN D) 50 MCG (2000 UT) CAPS capsule Take 1 capsule by mouth dailyHistorical Med      Vitamin D-Vitamin K (K2 PLUS D3) 100-1000 MCG-UNIT TABS Take 1 tablet by mouth dailyHistorical Med      budesonide-formoterol (SYMBICORT) 160-4.5 MCG/ACT AERO Inhale 2 puffs into the lungs 2 times daily, Disp-3 each, R-2Normal      celecoxib (CELEBREX) 200 MG capsule Take 1 capsule by mouth 2 times daily, Disp-180 capsule, R-1Normal      VORTIoxetine HBr (TRINTELLIX) 20 MG TABS tablet TAKE 1 TABLET EVERY DAY, Disp-90 tablet, R-1Normal      buPROPion (WELLBUTRIN XL) 150 MG extended release tablet Take 1 tablet every day, Disp-90 tablet, R-3Normal      blood glucose monitor kit and supplies Dispense sufficient amount for indicated testing frequency plus additional to accommodate PRN testing needs.  Dispense all needed supplies to include: monitor, strips, lancing device, lancets, control solutions, alcohol swabs., Disp-1 kit, R-0, Normal      Lancets MISC Disp-100 each, R-5, NormalUse to check fasting glucose level q am/once daily      blood glucose monitor strips Test 1 times a day & as needed for symptoms of irregular blood up to twice per day, Disp-100 strip, R-5, Normal Coenzyme Q10 (COQ10) 100 MG CAPS Take by mouthHistorical Med      albuterol sulfate HFA (PROVENTIL HFA) 108 (90 Base) MCG/ACT inhaler Inhale 2 puffs into the lungs every 6 hours as needed for Wheezing or Shortness of Breath, Disp-3 Inhaler, R-3Normal      fluticasone (FLONASE) 50 MCG/ACT nasal spray 1 spray by Nasal route 2 times daily, Disp-3 Bottle, R-3Normal      ferrous sulfate (IRON 325) 325 (65 Fe) MG tablet Take 1 tablet by mouth daily (with breakfast), Disp-90 tablet, R-3Normal      loratadine (CLARITIN) 10 MG tablet Take 10 mg by mouth dailyHistorical Med      multivitamin (THERAGRAN) per tablet Take 1 tablet by mouth daily. Omega-3 Fatty Acids (FISH OIL) 1200 MG CAPS Take  by mouth daily. ALLERGIES     Calamine, Amoxicillin, Amoxicillin-pot clavulanate, Chlorpheniramine maleate, Daypro [oxaprozin], Duravent-da [chlorphen-phenyleph-methscop], Lithium, Methscopolamine, Norvasc [amlodipine], Nsaids, Phenylephrine hcl, Seldane [terfenadine], Suprax [cefixime], and Levofloxacin    FAMILYHISTORY       Family History   Problem Relation Age of Onset    Depression Sister     Mental Illness Sister     Diabetes Mother     Heart Disease Mother     Diabetes Father     Heart Disease Father         SOCIAL HISTORY       Social History     Socioeconomic History    Marital status:       Spouse name: None    Number of children: None    Years of education: None    Highest education level: None   Occupational History    None   Tobacco Use    Smoking status: Former Smoker     Packs/day: 1.50     Years: 21.00     Pack years: 31.50     Types: Cigarettes     Quit date: 1997     Years since quittin.1    Smokeless tobacco: Never Used   Vaping Use    Vaping Use: Never used   Substance and Sexual Activity    Alcohol use: No    Drug use: No    Sexual activity: Not Currently   Other Topics Concern    None   Social History Narrative    None     Social Determinants of Health Financial Resource Strain: Low Risk     Difficulty of Paying Living Expenses: Not hard at all   Food Insecurity: No Food Insecurity    Worried About Running Out of Food in the Last Year: Never true    Maggie of Food in the Last Year: Never true   Transportation Needs:     Lack of Transportation (Medical): Not on file    Lack of Transportation (Non-Medical): Not on file   Physical Activity: Inactive    Days of Exercise per Week: 0 days    Minutes of Exercise per Session: 0 min   Stress:     Feeling of Stress : Not on file   Social Connections:     Frequency of Communication with Friends and Family: Not on file    Frequency of Social Gatherings with Friends and Family: Not on file    Attends [employer] Services: Not on file    Active Member of [address] [place] [address] [employer] or Organizations: Not on file    Attends Club or Organization Meetings: Not on file    Marital Status: Not on file   Intimate Partner Violence:     Fear of Current or Ex-Partner: Not on file    Emotionally Abused: Not on file    Physically Abused: Not on file    Sexually Abused: Not on file   Housing Stability:     Unable to Pay for Housing in the Last Year: Not on file    Number of [place] in the Last Year: Not on file    Unstable Housing in the Last Year: Not on file       SCREENINGS    Victor Coma Scale  Eye Opening: Spontaneous  Best Verbal Response: Oriented  Best Motor Response: Obeys commands  Zelda Coma Scale Score: 15        PHYSICAL EXAM    (up to 7 for level 4, 8 or more for level 5)     ED Triage Vitals   BP Temp Temp Source Pulse Resp SpO2 Height Weight   04/02/22 1109 04/02/22 1108 04/02/22 1108 04/02/22 1109 04/02/22 1109 04/02/22 1109 04/02/22 1108 04/02/22 1108   (!) 191/97 97.5 °F (36.4 °C) Skin 76 16 95 % 5' 1\" (1.549 m) (!) 324 lb (147 kg)       Physical Exam  Vitals and nursing note reviewed. Constitutional:       Appearance: Normal appearance. She is not toxic-appearing or diaphoretic.    HENT:      Head: Normocephalic and atraumatic. Nose: Nose normal.   Eyes:      General:         Right eye: No discharge. Left eye: No discharge. Cardiovascular:      Rate and Rhythm: Normal rate and regular rhythm. Pulses: Normal pulses. Heart sounds: No murmur heard. Pulmonary:      Effort: Pulmonary effort is normal. No respiratory distress. Breath sounds: No wheezing. Abdominal:      Palpations: Abdomen is soft. Tenderness: There is no abdominal tenderness. There is no guarding or rebound. Musculoskeletal:         General: Normal range of motion. Cervical back: Normal range of motion and neck supple. Skin:     General: Skin is warm and dry. Neurological:      General: No focal deficit present. Mental Status: She is alert and oriented to person, place, and time. Psychiatric:         Mood and Affect: Mood normal.         Behavior: Behavior normal.         DIAGNOSTIC RESULTS   LABS:    Labs Reviewed   BASIC METABOLIC PANEL W/ REFLEX TO MG FOR LOW K - Abnormal; Notable for the following components:       Result Value    Glucose 112 (*)     All other components within normal limits   CBC WITH AUTO DIFFERENTIAL   CK       When ordered, only abnormal lab results are displayed. All other labs were within normal range or not returned as of this dictation. EKG: When ordered, EKG's are interpreted by the Emergency Department Physician in the absence of a cardiologist.  Please see their note for interpretation of EKG. RADIOLOGY:   Non-plain film images such as CT, Ultrasound and MRI are read by the radiologist. Plain radiographic images are visualized andpreliminarily interpreted by the  ED Provider with the below findings:        Interpretation perthe Radiologist below, if available at the time of this note:    No orders to display     No results found.       PROCEDURES   Unless otherwise noted below, none     Procedures    CRITICAL CARE TIME N/A    CONSULTS:  None      EMERGENCY DEPARTMENT COURSE and DIFFERENTIAL DIAGNOSIS/MDM:   Vitals:    Vitals:    04/02/22 1108 04/02/22 1109 04/02/22 1259   BP:  (!) 191/97 (!) 155/71   Pulse:  76 64   Resp:  16 16   Temp: 97.5 °F (36.4 °C)     TempSrc: Skin     SpO2:  95% 95%   Weight: (!) 324 lb (147 kg)     Height: 5' 1\" (1.549 m)         Patient was given thefollowing medications:  Medications - No data to display        Patient displays no acute abnormality here in the emergency department. Her blood pressure has improved. Patient states that she is going to take her blood pressure medication when she arrives at home. The patient noted to have some body aches which have been ongoing now for the last month. She had concerns of rhabdomyolysis. I performed laboratory analysis including CBC, CMP, and CK. All of laboratory findings appear to be her normal baseline. Advised to follow back with her family doctor for the evaluation treatment. Also advised return back to the emergency department for any further acute concerns. FINAL IMPRESSION      1.  Myalgia          DISPOSITION/PLAN   DISPOSITION Decision To Discharge 04/02/2022 12:42:48 PM      PATIENT REFERREDTO:  MADINA Mcconnell CNP  [address] [facility],[address]  [phone]    Schedule an appointment as soon as possible for a visit         DISCHARGE MEDICATIONS:  Discharge Medication List as of 4/2/2022 12:59 PM          DISCONTINUED MEDICATIONS:  Discharge Medication List as of 4/2/2022 12:59 PM                 (Please note that portions ofthis note were completed with a voice recognition program.  Efforts were made to edit the dictations but occasionally words are mis-transcribed.)    MADINA Jaed CNP (electronically signed)            MADINA Jade CNP  04/02/22 1238

## 2022-04-14 DIAGNOSIS — M53.3 SACROILIAC PAIN: ICD-10-CM

## 2022-04-14 DIAGNOSIS — M54.50 ACUTE MIDLINE LOW BACK PAIN WITHOUT SCIATICA: ICD-10-CM

## 2022-04-14 RX ORDER — METHOCARBAMOL 500 MG/1
500 TABLET, FILM COATED ORAL 3 TIMES DAILY PRN
Qty: 30 TABLET | Refills: 0 | Status: SHIPPED | OUTPATIENT
Start: 2022-04-14 | End: 2022-04-24

## 2022-04-14 NOTE — TELEPHONE ENCOUNTER
Pt switched to [facility] due to other pharmacy closing. Routing to [facility] due to PCP out of office.   Next appt 4/25/2022

## 2022-04-18 ENCOUNTER — HOSPITAL ENCOUNTER (OUTPATIENT)
Dept: MAMMOGRAPHY | Age: 62
Discharge: HOME | End: 2022-04-23

## 2022-04-18 VITALS — BODY MASS INDEX: 45.99 KG/M2 | HEIGHT: 67 IN | WEIGHT: 293 LBS

## 2022-04-18 DIAGNOSIS — Z12.31 ENCOUNTER FOR SCREENING MAMMOGRAM FOR MALIGNANT NEOPLASM OF BREAST: ICD-10-CM

## 2022-04-18 DIAGNOSIS — Z12.39 ENCOUNTER FOR SCREENING FOR MALIGNANT NEOPLASM OF BREAST, UNSPECIFIED SCREENING MODALITY: ICD-10-CM

## 2022-04-25 ENCOUNTER — OFFICE VISIT (OUTPATIENT)
Dept: FAMILY MEDICINE CLINIC | Age: 62
End: 2022-04-25
Payer: MEDICARE

## 2022-04-25 VITALS
HEIGHT: 61 IN | HEART RATE: 84 BPM | WEIGHT: 293 LBS | BODY MASS INDEX: 55.32 KG/M2 | DIASTOLIC BLOOD PRESSURE: 72 MMHG | OXYGEN SATURATION: 99 % | SYSTOLIC BLOOD PRESSURE: 126 MMHG

## 2022-04-25 DIAGNOSIS — F33.2 SEVERE EPISODE OF RECURRENT MAJOR DEPRESSIVE DISORDER, WITHOUT PSYCHOTIC FEATURES (HCC): ICD-10-CM

## 2022-04-25 DIAGNOSIS — Z12.4 SCREENING FOR CERVICAL CANCER: ICD-10-CM

## 2022-04-25 DIAGNOSIS — Z00.00 ROUTINE PHYSICAL EXAMINATION: Primary | ICD-10-CM

## 2022-04-25 PROBLEM — L97.229 VENOUS STASIS ULCER OF LEFT CALF (HCC): Status: RESOLVED | Noted: 2021-04-28 | Resolved: 2022-04-25

## 2022-04-25 PROBLEM — I83.022 VENOUS STASIS ULCER OF LEFT CALF (HCC): Status: RESOLVED | Noted: 2021-04-28 | Resolved: 2022-04-25

## 2022-04-25 PROBLEM — L97.821 ULCER OF LEFT SHIN LIMITED TO BREAKDOWN OF SKIN (HCC): Status: RESOLVED | Noted: 2020-08-05 | Resolved: 2022-04-25

## 2022-04-25 PROCEDURE — G0101 CA SCREEN;PELVIC/BREAST EXAM: HCPCS | Performed by: NURSE PRACTITIONER

## 2022-04-25 PROCEDURE — G8417 CALC BMI ABV UP PARAM F/U: HCPCS | Performed by: NURSE PRACTITIONER

## 2022-04-25 PROCEDURE — G8427 DOCREV CUR MEDS BY ELIG CLIN: HCPCS | Performed by: NURSE PRACTITIONER

## 2022-04-25 ASSESSMENT — ENCOUNTER SYMPTOMS
ANAL BLEEDING: 0
RESPIRATORY NEGATIVE: 1
GASTROINTESTINAL NEGATIVE: 1
ALLERGIC/IMMUNOLOGIC NEGATIVE: 1
ABDOMINAL PAIN: 0
NAUSEA: 0
SHORTNESS OF BREATH: 0
BLOOD IN STOOL: 0
EYES NEGATIVE: 1

## 2022-04-25 NOTE — PROGRESS NOTES
[unfilled]    Annual GYN Visit  [address] [facility]  [address] [place] [id number]  Dept: [phone]  Dept Fax: [phone]  Loc: [phone]    Roberto Farrar is a 64 y.o. female who presents today for her medical conditions/complaints as noted below. Roberto Farrar is c/o of Gynecologic Exam       Subjective:   Patient Name: Roberto Farrar is a 64 y.o. female. Chief Complaint   Patient presents with    Gynecologic Exam       HPI  Annual Exam-Postmenopausal  Patient presents for annual exam. The patient has no complaints today. The patient is not currently sexually active. last pap: approximate date 20 years ago and was normal, last mammogram: approximate date 7/2018 and was normal. The patient has never been taking hormone replacement therapy. Patient denies post-menopausal vaginal bleeding. The patient wears seatbelts: yes. The patient has regular exercise: no. The patient has ever been transfused or tattooed?: no. The patient reports that domestic violence in her life is absent.           Preventive Care and Risk Factor Assessment  Health Maintenance   Topic Date Due    COVID-19 Vaccine (1) Never done    Cervical cancer screen  Never done    Shingles Vaccine (1 of 2) Never done    Breast cancer screen  07/30/2020    Flu vaccine (Season Ended) 09/01/2022    A1C test (Diabetic or Prediabetic)  10/07/2022    Lipids  03/30/2023    Depression Monitoring  03/30/2023    Pneumococcal 0-64 years Vaccine (2 - PCV) 03/30/2023    Annual Wellness Visit (AWV)  03/31/2023    Colorectal Cancer Screen  02/02/2024    DTaP/Tdap/Td vaccine (2 - Td or Tdap) 08/04/2027    Hepatitis C screen  Completed    HIV screen  Completed    Hepatitis A vaccine  Aged Out    Hepatitis B vaccine  Aged Out    Hib vaccine  Aged Out    Meningococcal (ACWY) vaccine  Aged Out      Menarche age: 15    periods are regular  no unusual pelvic pain  no unusual vaginal discharge  no previous abnormal Pap tests  no family or personal history of cervical cancer  Hx of STD: no  no new or changing breast lumps  no unusual breast pains  no discharge from the nipples  no previous abnormal mammograms  no personal or family history of breast cancer  Self-breast exams: no  Previous DEXA scan: no  Social History     Tobacco Use   Smoking Status Former Smoker    Packs/day: 1.50    Years: 21.00    Pack years: 31.50    Types: Cigarettes    Quit date: 1997    Years since quittin.2   Smokeless Tobacco Never Used      Social History     Substance and Sexual Activity   Alcohol Use No      Family history of cancer  breast cancer   No   GYN cancer: no   prostate cancer No   lung cancer Yes -  sister   colon cancer (or polyps)Yes - rectal father   lymphoma cancer father and sister    Immunization History   Administered Date(s) Administered    Influenza 10/30/2013    Influenza Virus Vaccine 2014, 2015    Influenza, Quadv, IM, (6 mo and older Fluzone, Flulaval, Fluarix and 3 yrs and older Afluria) 2017, 2018    Influenza, Quadv, IM, PF (6 mo and older Fluzone, Flulaval, Fluarix, and 3 yrs and older Afluria) 2021    Pneumococcal Polysaccharide ([id number]) 2022    Tdap (Boostrix, Adacel) 2017       Review of Systems   Constitutional: Negative. Negative for appetite change, fatigue and unexpected weight change. HENT: Negative. Eyes: Negative. Respiratory: Negative. Negative for shortness of breath. Cardiovascular: Negative. Negative for chest pain, palpitations and leg swelling. Gastrointestinal: Negative. Negative for abdominal pain, anal bleeding, blood in stool and nausea. Endocrine: Negative. Genitourinary: Negative. Negative for hematuria. Musculoskeletal: Negative. Skin: Negative. Negative for rash. Allergic/Immunologic: Negative. Neurological: Negative.   Negative for dizziness, syncope, light-headedness and numbness. Hematological: Negative. Does not bruise/bleed easily. Psychiatric/Behavioral: Negative. All other systems reviewed and are negative.       Past Medical History:   Diagnosis Date    Bipolar disorder     Borderline osteopenia     Cellulitis of left leg 8/5/2020    Frequency of micturition     GERD (gastroesophageal reflux disease)     Headache(784.0)     HNP (herniated nucleus pulposus), lumbar 6/6/2019    Hyperlipidemia     Hypertension     Intention tremor     due to lithium    Iron deficiency anemia 11/4/2019    Lateral meniscal tear 10/14/2015    Lumbar stenosis with neurogenic claudication 6/6/2019    Medial meniscus tear 10/14/2015    Mixed incontinence     Morbid (severe) obesity due to excess calories (HCC)     Prolonged emergence from general anesthesia     Prolonged emergence from general anesthesia, initial encounter 6/12/2019    Tobacco use     Venous ulcer of left leg ([address] 07/2020     Patient Active Problem List    Diagnosis Date Noted    Edema of both legs 05/02/2014    Chronic calculous cholecystitis 02/10/2022    Voiding difficulty 01/13/2022    Incomplete bladder emptying 01/13/2022    Venous stasis ulcer of left calf ([address] 04/28/2021    Diverticulosis of colon     Polyp of descending colon     Abdominal bloating 12/09/2020    Ulcer of left shin limited to breakdown of skin ([address] 08/05/2020    Peripheral venous insufficiency 08/05/2020    Allergic rhinitis 93/10/2026    Dysmetabolic syndrome     Lumbar stenosis with neurogenic claudication 06/06/2019    HNP (herniated nucleus pulposus), lumbar 06/06/2019    GERD (gastroesophageal reflux disease)     RAD (reactive airway disease)     Hypertension     Severe episode of recurrent major depressive disorder, without psychotic features ([address] 04/05/2019    Primary osteoarthritis of both knees 10/09/2018    Vitamin D deficiency 07/31/2015    Morbid obesity with BMI of 50.0-59.9, adult [facility]) 2015    Urinary incontinence 2015    Cyst of eyelid 04/15/2015    Insomnia 10/03/2014    Hyperlipidemia 2014    Restless legs syndrome (RLS) 2014    PCOS (polycystic ovarian syndrome)     ANTOINETTE on CPAP 1995     Past Surgical History:   Procedure Laterality Date    ABDOMINAL EXPLORATION SURGERY      CARPAL TUNNEL RELEASE Bilateral 2005     SECTION      CHOLECYSTECTOMY, LAPAROSCOPIC N/A 2022    LAPAROSCOPIC CHOLECYSTECTOMY performed by Rick Islas MD at [facility]    normal    COLONOSCOPY N/A 2021    COLON W/ANES. (13:30) [facility] Left     atrhroscopic unispacer    KNEE SURGERY Right 10/28/2015    Right knee video arthroscopy with partial medial and lateral menisectomy with loose body removal    LUMBAR SPINE SURGERY N/A 2019    MICROLUMBAR DISCECTOMY L4-5 performed by Grzegorz Dozier MD at [facility]       Family History   Problem Relation Age of Onset    Depression Sister     Mental Illness Sister     Diabetes Mother     Heart Disease Mother     Diabetes Father     Heart Disease Father      Social History     Socioeconomic History    Marital status:       Spouse name: None    Number of children: None    Years of education: None    Highest education level: None   Occupational History    None   Tobacco Use    Smoking status: Former Smoker     Packs/day: 1.50     Years: 21.00     Pack years: 31.50     Types: Cigarettes     Quit date: 1997     Years since quittin.2    Smokeless tobacco: Never Used   Vaping Use    Vaping Use: Never used   Substance and Sexual Activity    Alcohol use: No    Drug use: No    Sexual activity: Not Currently   Other Topics Concern    None   Social History Narrative    None     Social Determinants of Health     Financial Resource Strain: Low Risk     Difficulty of Paying Living Expenses: Not hard at all   Food Insecurity: No Food Insecurity    Worried About Running Out of Food in the Last Year: Never true    Maggie of Food in the Last Year: Never true   Transportation Needs:     Lack of Transportation (Medical): Not on file    Lack of Transportation (Non-Medical):  Not on file   Physical Activity: Inactive    Days of Exercise per Week: 0 days    Minutes of Exercise per Session: 0 min   Stress:     Feeling of Stress : Not on file   Social Connections:     Frequency of Communication with Friends and Family: Not on file    Frequency of Social Gatherings with Friends and Family: Not on file    Attends Jewish Services: Not on file    Active Member of Clubs or Organizations: Not on file    Attends Club or Organization Meetings: Not on file    Marital Status: Not on file   Intimate Partner Violence:     Fear of Current or Ex-Partner: Not on file    Emotionally Abused: Not on file    Physically Abused: Not on file    Sexually Abused: Not on file   Housing Stability:     Unable to Pay for Housing in the Last Year: Not on file    Number of [place] in the Last Year: Not on file    Unstable Housing in the Last Year: Not on file     Current Outpatient Medications   Medication Sig Dispense Refill    lamoTRIgine (LAMICTAL) 100 MG tablet TAKE 1 TABLET TWICE DAILY 180 tablet 1    metoprolol succinate (TOPROL XL) 50 MG extended release tablet TAKE 1 TABLET EVERY DAY 90 tablet 1    rosuvastatin (CRESTOR) 5 MG tablet TAKE 1 TABLET EVERY DAY 90 tablet 1    Cholecalciferol (VITAMIN D) 50 MCG (2000 UT) CAPS capsule Take 1 capsule by mouth daily      Vitamin D-Vitamin K (K2 PLUS D3) 100-1000 MCG-UNIT TABS Take 1 tablet by mouth daily      budesonide-formoterol (SYMBICORT) 160-4.5 MCG/ACT AERO Inhale 2 puffs into the lungs 2 times daily 3 each 2    celecoxib (CELEBREX) 200 MG capsule Take 1 capsule by mouth 2 times daily 180 capsule 1    VORTIoxetine HBr (TRINTELLIX) 20 MG TABS tablet TAKE 1 TABLET EVERY DAY 90 tablet 1    buPROPion (WELLBUTRIN XL) 150 MG extended release tablet Take 1 tablet every day 90 tablet 3    blood glucose monitor kit and supplies Dispense sufficient amount for indicated testing frequency plus additional to accommodate PRN testing needs. Dispense all needed supplies to include: monitor, strips, lancing device, lancets, control solutions, alcohol swabs. 1 kit 0    Lancets MISC Use to check fasting glucose level q am/once daily 100 each 5    blood glucose monitor strips Test 1 times a day & as needed for symptoms of irregular blood up to twice per day 100 strip 5    Coenzyme Q10 (COQ10) 100 MG CAPS Take by mouth      albuterol sulfate HFA (PROVENTIL HFA) 108 (90 Base) MCG/ACT inhaler Inhale 2 puffs into the lungs every 6 hours as needed for Wheezing or Shortness of Breath 3 Inhaler 3    fluticasone (FLONASE) 50 MCG/ACT nasal spray 1 spray by Nasal route 2 times daily 3 Bottle 3    ferrous sulfate (IRON 325) 325 (65 Fe) MG tablet Take 1 tablet by mouth daily (with breakfast) 90 tablet 3    loratadine (CLARITIN) 10 MG tablet Take 10 mg by mouth daily      multivitamin (THERAGRAN) per tablet Take 1 tablet by mouth daily.  Omega-3 Fatty Acids (FISH OIL) 1200 MG CAPS Take  by mouth daily. No current facility-administered medications for this visit.      Current Outpatient Medications on File Prior to Visit   Medication Sig Dispense Refill    lamoTRIgine (LAMICTAL) 100 MG tablet TAKE 1 TABLET TWICE DAILY 180 tablet 1    metoprolol succinate (TOPROL XL) 50 MG extended release tablet TAKE 1 TABLET EVERY DAY 90 tablet 1    rosuvastatin (CRESTOR) 5 MG tablet TAKE 1 TABLET EVERY DAY 90 tablet 1    Cholecalciferol (VITAMIN D) 50 MCG (2000 UT) CAPS capsule Take 1 capsule by mouth daily      Vitamin D-Vitamin K (K2 PLUS D3) 100-1000 MCG-UNIT TABS Take 1 tablet by mouth daily      budesonide-formoterol (SYMBICORT) 160-4.5 MCG/ACT AERO Inhale 2 puffs into the lungs 2 times daily 3 each 2    celecoxib (CELEBREX) 200 MG capsule Take 1 capsule by mouth 2 times daily 180 capsule 1    VORTIoxetine HBr (TRINTELLIX) 20 MG TABS tablet TAKE 1 TABLET EVERY DAY 90 tablet 1    buPROPion (WELLBUTRIN XL) 150 MG extended release tablet Take 1 tablet every day 90 tablet 3    blood glucose monitor kit and supplies Dispense sufficient amount for indicated testing frequency plus additional to accommodate PRN testing needs. Dispense all needed supplies to include: monitor, strips, lancing device, lancets, control solutions, alcohol swabs. 1 kit 0    Lancets MISC Use to check fasting glucose level q am/once daily 100 each 5    blood glucose monitor strips Test 1 times a day & as needed for symptoms of irregular blood up to twice per day 100 strip 5    Coenzyme Q10 (COQ10) 100 MG CAPS Take by mouth      albuterol sulfate HFA (PROVENTIL HFA) 108 (90 Base) MCG/ACT inhaler Inhale 2 puffs into the lungs every 6 hours as needed for Wheezing or Shortness of Breath 3 Inhaler 3    fluticasone (FLONASE) 50 MCG/ACT nasal spray 1 spray by Nasal route 2 times daily 3 Bottle 3    ferrous sulfate (IRON 325) 325 (65 Fe) MG tablet Take 1 tablet by mouth daily (with breakfast) 90 tablet 3    loratadine (CLARITIN) 10 MG tablet Take 10 mg by mouth daily      multivitamin (THERAGRAN) per tablet Take 1 tablet by mouth daily.  Omega-3 Fatty Acids (FISH OIL) 1200 MG CAPS Take  by mouth daily. No current facility-administered medications on file prior to visit.      Allergies   Allergen Reactions    Calamine Other (See Comments)     Leaves skin tender and burning     Amoxicillin Other (See Comments)    Amoxicillin-Pot Clavulanate Hives    Chlorpheniramine Maleate     Daypro [Oxaprozin] Hives    Duravent-Da [Chlorphen-Phenyleph-Methscop] Hives    Lithium      Caused shakes    Methscopolamine     Norvasc [Amlodipine]      LE edema      Nsaids Avoid d/t gastric bypass    Phenylephrine Hcl     Seldane [Terfenadine] Hives    Suprax [Cefixime] Hives    Levofloxacin Rash        No changes in past medical history, past surgical history,social history, or family history were noted during the patient encounter unless specifically listed above. All updates of past medical history, past surgical history, social history, or family history were reviewedpersonally by me during the office visit. All problems listed in the assessment are stable unless noted otherwise. Medication profile reviewed personally by me during the office visit. Medication side effects andpossible impairments from medications were discussed as applicable.     Objective:     /72 (Site: Left Lower Arm, Position: Sitting, Cuff Size: Medium Adult)   Pulse 84   Ht 5' 1\" (1.549 m)   Wt (!) 325 lb (147.4 kg)   LMP  (LMP Unknown)   SpO2 99%   BMI 61.41 kg/m²     General Appearance:    Alert, cooperative, no distress, appears stated age, morbidly obese   Head:    Normocephalic, without obvious abnormality, atraumatic   Eyes:    PERRL, conjunctiva/corneas clear, EOM's intact, fundi     benign, both eyes   Ears:    Normal TM's and external ear canals, both ears   Nose:   Nares normal, septum midline, mucosa normal, no drainage    or sinus tenderness   Throat:   Lips, mucosa, and tongue normal; teeth and gums normal   Neck:   Supple, symmetrical, trachea midline, no adenopathy;     thyroid:  no enlargement/tenderness/nodules; no carotid    bruit or JVD   Back:     Symmetric, no curvature, ROM normal, no CVA tenderness   Lungs:     Clear to auscultation bilaterally, respirations unlabored   Chest Wall:    No tenderness or deformity    Heart:    Regular rate and rhythm, S1 and S2 normal, no murmur, rub   or gallop   Breast Exam:    No tenderness, masses, or nipple abnormality   Abdomen:     Soft, non-tender, bowel sounds active all four quadrants,     no masses, no organomegaly Genitalia:    Normal female without lesion, discharge or tenderness       Extremities:   Extremities normal, atraumatic, no cyanosis or edema   Pulses:   2+ and symmetric all extremities   Skin:   Skin color, texture, turgor normal, no rashes or lesions   Lymph nodes:   Cervical, supraclavicular, and axillary nodes normal   Neurologic:   CNII-XII intact, normal strength, sensation and reflexes     throughout       /72 (Site: Left Lower Arm, Position: Sitting, Cuff Size: Medium Adult)   Pulse 84   Ht 5' 1\" (1.549 m)   Wt (!) 325 lb (147.4 kg)   LMP  (LMP Unknown)   SpO2 99%   BMI 61.41 kg/m²   Body mass index is 61.41 kg/m².     BP Readings from Last 2 Encounters:   04/25/22 126/72   04/02/22 (!) 155/71       Wt Readings from Last 3 Encounters:   04/25/22 (!) 325 lb (147.4 kg)   04/18/22 (!) 320 lb (145.2 kg)   04/02/22 (!) 324 lb (147 kg)       Lab Review   Admission on 04/02/2022, Discharged on 04/02/2022   Component Date Value    WBC 04/02/2022 6.8     RBC 04/02/2022 4.62     Hemoglobin 04/02/2022 13.2     Hematocrit 04/02/2022 40.4     MCV 04/02/2022 87.5     MCH 04/02/2022 28.6     MCHC 04/02/2022 32.6     RDW 04/02/2022 14.5     Platelets 53/26/8994 290     MPV 04/02/2022 6.6     Neutrophils % 04/02/2022 61.3     Lymphocytes % 04/02/2022 25.3     Monocytes % 04/02/2022 9.5     Eosinophils % 04/02/2022 2.8     Basophils % 04/02/2022 1.1     Neutrophils Absolute 04/02/2022 4.1     Lymphocytes Absolute 04/02/2022 1.7     Monocytes Absolute 04/02/2022 0.6     Eosinophils Absolute 04/02/2022 0.2     Basophils Absolute 04/02/2022 0.1     Sodium 04/02/2022 143     Potassium reflex Magnesi* 04/02/2022 4.1     Chloride 04/02/2022 104     CO2 04/02/2022 28     Anion Gap 04/02/2022 11     Glucose 04/02/2022 112*    BUN 04/02/2022 19     CREATININE 04/02/2022 0.7     GFR Non- 04/02/2022 >60     GFR  04/02/2022 >60     Calcium 04/02/2022 9.5     Total CK 04/02/2022 67    Office Visit on 03/30/2022   Component Date Value    CRP 03/30/2022 14.2*    Sed Rate 03/30/2022 62*    Rheumatoid Factor 03/30/2022 <10.0     Varicella-Zoster Virus A* 03/30/2022 Immune     WBC 03/30/2022 6.5     RBC 03/30/2022 4.65     Hemoglobin 03/30/2022 13.6     Hematocrit 03/30/2022 41.6     MCV 03/30/2022 89.5     MCH 03/30/2022 29.2     MCHC 03/30/2022 32.7     RDW 03/30/2022 15.3     Platelets 77/56/1470 303     MPV 03/30/2022 7.5     Neutrophils % 03/30/2022 64.3     Lymphocytes % 03/30/2022 23.5     Monocytes % 03/30/2022 8.3     Eosinophils % 03/30/2022 2.7     Basophils % 03/30/2022 1.2     Neutrophils Absolute 03/30/2022 4.2     Lymphocytes Absolute 03/30/2022 1.5     Monocytes Absolute 03/30/2022 0.5     Eosinophils Absolute 03/30/2022 0.2     Basophils Absolute 03/30/2022 0.1     Sodium 03/30/2022 142     Potassium 03/30/2022 4.5     Chloride 03/30/2022 102     CO2 03/30/2022 26     Anion Gap 03/30/2022 14     Glucose 03/30/2022 101*    BUN 03/30/2022 25*    CREATININE 03/30/2022 0.7     GFR Non- 03/30/2022 >60     GFR  03/30/2022 >60     Calcium 03/30/2022 9.2     Total Protein 03/30/2022 6.3*    Albumin 03/30/2022 4.0     Albumin/Globulin Ratio 03/30/2022 1.7     Total Bilirubin 03/30/2022 0.3     Alkaline Phosphatase 03/30/2022 113     ALT 03/30/2022 21     AST 03/30/2022 21     TSH 03/30/2022 2.10     Vit D, 25-Hydroxy 03/30/2022 31.3     Cholesterol, Total 03/30/2022 178     Triglycerides 03/30/2022 108     HDL 03/30/2022 57     LDL Calculated 03/30/2022 99     VLDL Cholesterol Calcula* 03/30/2022 22     Vitamin B-12 03/30/2022 660     Folate 03/30/2022 16.24     Ferritin 03/30/2022 93.2     Iron 03/30/2022 68     TIBC 03/30/2022 342     Iron Saturation 03/30/2022 20         Assessment:     1. Routine physical examination    2. Screening for cervical cancer    3.  Severe episode of recurrent major depressive disorder, without psychotic features ([facility]        No results found for this visit on 04/25/22. Plan:      Margaret Nair was seen today for gynecologic exam.    Diagnoses and all orders for this visit:    Routine physical examination  -     PAP SMEAR     Patient Counseling:  --Nutrition: Stressed importance of moderation in sodium/caffeine intake, saturated fat and cholesterol, caloric balance, sufficient intake of fresh fruits, vegetables, fiber, calcium,iron, and 1 mg of folate supplement per day (for females capable of pregnancy). --Exercise: Stressed the importance of regular exercise. --Substance Abuse: Discussed cessation/primary prevention of tobacco, alcohol, or other drug use; driving or otherdangerous activities under the influence; availability of treatment for abuse. --Sexuality: Discussed sexually transmitted diseases, partner selection, use of condoms, avoidance of unintended pregnancy  andcontraceptive alternatives. --Injury prevention: Discussed safety belts, safety helmets, smoke detector, smoking near bedding or upholstery. --Dental health: Discussed importance of regular tooth brushing,flossing, and dental visits. --Immunizations reviewed. --Discussed benefits of screening colonoscopy. --After hours service discussed with patient    Well woman exam, no current issues or complaints. Pelvic exam revealed no abnormalities. Breast exam revealed no abnormalities, no lumps, masses, dimpling, or nipple discharge noted. Follow up for repeat well woman exam in 1 year. Return sooner with any issues or concerns. Screening for cervical cancer  -     PAP SMEAR    Severe episode of recurrent major depressive disorder, without psychotic features ([facility]  Condition appears stable with current medication regimen. Continue current medications and counseling/psych services. No reported or noted side effects of medication.  Will continue to monitor at routine intervals as appropriate.

## 2022-04-28 LAB
HPV COMMENT: NORMAL
HPV TYPE 16: NOT DETECTED
HPV TYPE 18: NOT DETECTED
HPVOH (OTHER TYPES): NOT DETECTED

## 2022-04-29 RX ORDER — METRONIDAZOLE 500 MG/1
500 TABLET ORAL 2 TIMES DAILY
Qty: 14 TABLET | Refills: 0 | Status: SHIPPED | OUTPATIENT
Start: 2022-04-29 | End: 2022-05-06

## 2022-05-04 ENCOUNTER — OFFICE VISIT (OUTPATIENT)
Dept: ORTHOPEDIC SURGERY | Age: 62
End: 2022-05-04
Payer: MEDICARE

## 2022-05-04 VITALS — BODY MASS INDEX: 55.32 KG/M2 | HEIGHT: 61 IN | WEIGHT: 293 LBS

## 2022-05-04 DIAGNOSIS — M54.2 NECK PAIN: Primary | ICD-10-CM

## 2022-05-04 DIAGNOSIS — M47.22 CERVICAL SPONDYLOSIS WITH RADICULOPATHY: ICD-10-CM

## 2022-05-04 PROCEDURE — 1036F TOBACCO NON-USER: CPT | Performed by: ORTHOPAEDIC SURGERY

## 2022-05-04 PROCEDURE — G8417 CALC BMI ABV UP PARAM F/U: HCPCS | Performed by: ORTHOPAEDIC SURGERY

## 2022-05-04 PROCEDURE — G8427 DOCREV CUR MEDS BY ELIG CLIN: HCPCS | Performed by: ORTHOPAEDIC SURGERY

## 2022-05-04 PROCEDURE — 99214 OFFICE O/P EST MOD 30 MIN: CPT | Performed by: ORTHOPAEDIC SURGERY

## 2022-05-04 PROCEDURE — 3017F COLORECTAL CA SCREEN DOC REV: CPT | Performed by: ORTHOPAEDIC SURGERY

## 2022-05-04 NOTE — PROGRESS NOTES
New Patient: CERVICAL SPINE    Referring Provider:  Referral, Self    CHIEF COMPLAINT:    Chief Complaint   Patient presents with    Neck Pain     Pain began about 3 months ago. Patient has seen someone for shoulder pain and had cortisone injection with little to no relief. Patient has bilateral shoulder and arm pain. HISTORY OF PRESENT ILLNESS:   Ms. Phuong Dillon presents today for the evaluation of neck and right greater than left arm pain. She notes her symptoms began insidiously about 3 months ago. Those have increased since that time. She rates the intensity of her neck pain 5/10 in her bilateral shoulder pain 7/10. Her arm pain radiates to her elbows. she notes weakness of her arms and loss of fine motor control. She denies numbness and tingling of her arms and gait abnormality. Recent shoulder injection not help her pain.   She is status post MLD by me a few years ago    Current/Past Treatment:   · Physical Therapy: None  · Chiropractic: None  · Injection: None  · Medications: None    Past Medical History:   Past Medical History:   Diagnosis Date    Bipolar disorder     Borderline osteopenia     Cellulitis of left leg 8/5/2020    Frequency of micturition     GERD (gastroesophageal reflux disease)     Headache(784.0)     HNP (herniated nucleus pulposus), lumbar 6/6/2019    Hyperlipidemia     Hypertension     Intention tremor     due to lithium    Iron deficiency anemia 11/4/2019    Lateral meniscal tear 10/14/2015    Lumbar stenosis with neurogenic claudication 6/6/2019    Medial meniscus tear 10/14/2015    Mixed incontinence     Morbid (severe) obesity due to excess calories (HCC)     Prolonged emergence from general anesthesia     Prolonged emergence from general anesthesia, initial encounter 6/12/2019    Tobacco use     Venous ulcer of left leg ([address] 07/2020      Past Surgical History:     Past Surgical History:   Procedure Laterality Date    ABDOMINAL EXPLORATION SURGERY      CARPAL TUNNEL RELEASE Bilateral 2005     SECTION      CHOLECYSTECTOMY, LAPAROSCOPIC N/A 2022    LAPAROSCOPIC CHOLECYSTECTOMY performed by Guevara Marks MD at [facility]    normal    COLONOSCOPY N/A 2021    COLON W/ANES. (13:30) performed by Ayanna Clayton MD at [facility] Left     atrhroscopic unispacer    KNEE SURGERY Right 10/28/2015    Right knee video arthroscopy with partial medial and lateral menisectomy with loose body removal    LUMBAR SPINE SURGERY N/A 2019    MICROLUMBAR DISCECTOMY L4-5 performed by Michelle Jensen MD at [place] [address].       Current Medications:     Current Outpatient Medications:     metroNIDAZOLE (FLAGYL) 500 MG tablet, Take 1 tablet by mouth 2 times daily for 7 days, Disp: 14 tablet, Rfl: 0    lamoTRIgine (LAMICTAL) 100 MG tablet, TAKE 1 TABLET TWICE DAILY, Disp: 180 tablet, Rfl: 1    metoprolol succinate (TOPROL XL) 50 MG extended release tablet, TAKE 1 TABLET EVERY DAY, Disp: 90 tablet, Rfl: 1    rosuvastatin (CRESTOR) 5 MG tablet, TAKE 1 TABLET EVERY DAY, Disp: 90 tablet, Rfl: 1    Cholecalciferol (VITAMIN D) 50 MCG (2000 UT) CAPS capsule, Take 1 capsule by mouth daily, Disp: , Rfl:     Vitamin D-Vitamin K (K2 PLUS D3) 100-1000 MCG-UNIT TABS, Take 1 tablet by mouth daily, Disp: , Rfl:     budesonide-formoterol (SYMBICORT) 160-4.5 MCG/ACT AERO, Inhale 2 puffs into the lungs 2 times daily, Disp: 3 each, Rfl: 2    celecoxib (CELEBREX) 200 MG capsule, Take 1 capsule by mouth 2 times daily, Disp: 180 capsule, Rfl: 1    VORTIoxetine HBr (TRINTELLIX) 20 MG TABS tablet, TAKE 1 TABLET EVERY DAY, Disp: 90 tablet, Rfl: 1    buPROPion (WELLBUTRIN XL) 150 MG extended release tablet, Take 1 tablet every day, Disp: 90 tablet, Rfl: 3    blood glucose monitor kit and supplies, Dispense sufficient amount for indicated testing frequency plus additional to accommodate PRN testing needs. Dispense all needed supplies to include: monitor, strips, lancing device, lancets, control solutions, alcohol swabs., Disp: 1 kit, Rfl: 0    Lancets MISC, Use to check fasting glucose level q am/once daily, Disp: 100 each, Rfl: 5    blood glucose monitor strips, Test 1 times a day & as needed for symptoms of irregular blood up to twice per day, Disp: 100 strip, Rfl: 5    Coenzyme Q10 (COQ10) 100 MG CAPS, Take by mouth, Disp: , Rfl:     albuterol sulfate HFA (PROVENTIL HFA) 108 (90 Base) MCG/ACT inhaler, Inhale 2 puffs into the lungs every 6 hours as needed for Wheezing or Shortness of Breath, Disp: 3 Inhaler, Rfl: 3    fluticasone (FLONASE) 50 MCG/ACT nasal spray, 1 spray by Nasal route 2 times daily, Disp: 3 Bottle, Rfl: 3    ferrous sulfate (IRON 325) 325 (65 Fe) MG tablet, Take 1 tablet by mouth daily (with breakfast), Disp: 90 tablet, Rfl: 3    loratadine (CLARITIN) 10 MG tablet, Take 10 mg by mouth daily, Disp: , Rfl:     multivitamin (THERAGRAN) per tablet, Take 1 tablet by mouth daily. , Disp: , Rfl:     Omega-3 Fatty Acids (FISH OIL) 1200 MG CAPS, Take  by mouth daily. , Disp: , Rfl:   Allergies:  Calamine, Amoxicillin, Amoxicillin-pot clavulanate, Chlorpheniramine maleate, Daypro [oxaprozin], Duravent-da [chlorphen-phenyleph-methscop], Lithium, Methscopolamine, Norvasc [amlodipine], Nsaids, Phenylephrine hcl, Seldane [terfenadine], Suprax [cefixime], and Levofloxacin  Social History:    reports that she quit smoking about 25 years ago. Her smoking use included cigarettes. She has a 31.50 pack-year smoking history. She has never used smokeless tobacco. She reports that she does not drink alcohol and does not use drugs.   Family History:   Family History   Problem Relation Age of Onset    Depression Sister     Mental Illness Sister     Diabetes Mother     Heart Disease Mother     Diabetes Father     Heart Disease Father        REVIEW OF SYSTEMS: Full ROS noted & scanned   CONSTITUTIONAL: Denies unexplained weight loss, fevers, chills or fatigue  NEUROLOGICAL: Denies unsteady gait or progressive weakness  MUSCULOSKELETAL: Denies joint swelling or redness  PSYCHOLOGICAL: Denies anxiety, depression   SKIN: Denies skin changes, delayed healing, rash, itching   HEMATOLOGIC: Denies easy bleeding or bruising  ENDOCRINE: Denies excessive thirst, urination, heat/cold  RESPIRATORY: Denies current dyspnea, cough  GI: Denies nausea, vomiting, diarrhea   : Denies bowel or bladder issues       PHYSICAL EXAM:    Vitals: Height 5' 1\" (1.549 m), weight (!) 325 lb (147.4 kg), not currently breastfeeding. GENERAL EXAM:  · General Apparence: Patient is adequately groomed with no evidence of malnutrition. · Orientation: The patient is oriented to time, place and person. · Mood & Affect:The patient's mood and affect are appropriate   · Vascular: Examination reveals no swelling tenderness in upper or lower extremities. Good capillary refill  · Lymphatic: The lymphatic examination bilaterally reveals all areas to be without enlargement or induration  · Sensation: Sensation is intact without deficit  · Coordination/Balance: Good coordination     CERVICAL EXAMINATION:  · Inspection: Local inspection shows no step-off or bruising. Cervical alignment is normal.     · Palpation: No evidence of tenderness at the midline, and trapezius. Paraspinal tenderness is present. There is no step-off or paraspinal spasm. · Range of Motion: Cervical flexion, extension, and rotation are mildly reduced with pain. · Strength: 5/5 bilateral upper extremities   · Special Tests:    ·  Garza's negative bilaterally. ·      Cubital and Carpal tunnel Tinel's negative bilaterally. · Skin:There are no rashes, ulcerations or lesions in right & left upper extremities. · Reflexes: Bilaterally triceps, biceps and brachioradialis are 2+. Clonus absent bilaterally at the feet.    · Gait & station: normal, patient ambulates without assistance     · Additional Examinations:       · RIGHT UPPER EXTREMITY:  Inspection/examination of the right upper extremity does not show any tenderness, deformity or injury. Range of motion is unremarkable. There is no gross instability. There are no rashes, ulcerations or lesions. Strength and tone are normal.  · LEFT UPPER EXTREMITY: Inspection/examination of the left upper extremity does not show any tenderness, deformity or injury. Range of motion is unremarkable. There is no gross instability. There are no rashes, ulcerations or lesions. Strength and tone are normal.    Diagnostic Testing:  Reviewed AP and lateral x-rays of her cervical spine were obtained in the office today. Those show spondylosis    Impression:   Cervical spondylosis with radiculitis    Plan:    Discussed treatment options including observation, physical therapy, medications, epidural injections and additional imaging. She would like to proceed with sickle therapy and a home traction unit.   She will call to schedule cervical MRI if her symptoms persist after those

## 2022-05-09 NOTE — PROGRESS NOTES
Daughter back to visit, discharge instructions given to daughter and pt, voiced understanding, questions encouraged yes

## 2022-05-16 ENCOUNTER — TELEPHONE (OUTPATIENT)
Dept: FAMILY MEDICINE CLINIC | Age: 62
End: 2022-05-16

## 2022-05-16 NOTE — TELEPHONE ENCOUNTER
Pt states thst she has a family member in her house that has the flu. She was wanting to see if she could get some Tamiflu called in. The person with the flu has type A. Garett Johnathon uses [employer].

## 2022-05-17 RX ORDER — OSELTAMIVIR PHOSPHATE 75 MG/1
75 CAPSULE ORAL DAILY
Qty: 10 CAPSULE | Refills: 0 | Status: SHIPPED | OUTPATIENT
Start: 2022-05-17 | End: 2022-05-27

## 2022-05-26 ENCOUNTER — TELEPHONE (OUTPATIENT)
Dept: FAMILY MEDICINE CLINIC | Age: 62
End: 2022-05-26

## 2022-05-26 DIAGNOSIS — R05.3 PERSISTENT COUGH: Primary | ICD-10-CM

## 2022-05-26 RX ORDER — BENZONATATE 200 MG/1
200 CAPSULE ORAL 3 TIMES DAILY PRN
Qty: 30 CAPSULE | Refills: 0 | Status: SHIPPED | OUTPATIENT
Start: 2022-05-26 | End: 2022-06-02

## 2022-05-26 NOTE — TELEPHONE ENCOUNTER
Spoke with patient she did not get tested she just assumed since her daughter had flu a then she did to.  She will  the med and have the xray

## 2022-05-26 NOTE — TELEPHONE ENCOUNTER
I know that the patient's daughter had influenza a. And the patient was started on Tamiflu as a preventative. Please find out if the patient did have a positive flu test somewhere. Also went ahead and sent a prescription for cough medication and an order for a chest x-ray has been placed.   Patient can go get the chest x-ray at any time  She has any new or worsening symptoms she should report to the ER immediately

## 2022-05-27 ENCOUNTER — OFFICE VISIT (OUTPATIENT)
Dept: ORTHOPEDIC SURGERY | Age: 62
End: 2022-05-27

## 2022-05-27 VITALS — WEIGHT: 293 LBS | BODY MASS INDEX: 55.32 KG/M2 | HEIGHT: 61 IN

## 2022-05-27 DIAGNOSIS — M16.12 OSTEOARTHRITIS OF LEFT HIP, UNSPECIFIED OSTEOARTHRITIS TYPE: ICD-10-CM

## 2022-05-27 DIAGNOSIS — M54.16 LUMBAR RADICULOPATHY: ICD-10-CM

## 2022-05-27 DIAGNOSIS — R52 PAIN: Primary | ICD-10-CM

## 2022-05-27 RX ORDER — GABAPENTIN 300 MG/1
300 CAPSULE ORAL 3 TIMES DAILY
Qty: 90 CAPSULE | Refills: 0 | Status: SHIPPED | OUTPATIENT
Start: 2022-05-27 | End: 2022-06-22 | Stop reason: ALTCHOICE

## 2022-05-27 NOTE — PROGRESS NOTES
Chief Complaint    Hip Pain (left buttock)      History of Present Illness: Kelly Sanchez is a 64 y.o. female who presents today for evaluation of left buttock pain. Patient states that approximately 1 to 2 weeks ago he was getting in the bed when she felt an acute pain within her left buttocks. She states that the following day she had increased pain with weightbearing. Patient states that she had difficulty with ambulation due to a limp. She has a significant history of L4-5 discectomy performed by Dr. Belem Monte and 2019. Patient does complain of pain intermittently that radiates down the posterior lateral aspect of her left leg into her lateral calf. She states that pain is worse with prolonged standing and walking and improved with sitting and lying. She has been using heat and alternating between ibuprofen and Tylenol even though she has been on Celebrex. He denies any weakness, paresthesias, or decreased sensation at the present time within the left lower extremity. Denies any left groin pain. He denies any recent bowel or bladder dysfunction. Pain Assessment  Location of Pain: Other (Comment)  Location Modifiers: Left  Severity of Pain: 5  Quality of Pain: Other (Comment)  Duration of Pain: Other (Comment)]      Medical History:  Patient's medications, allergies, past medical, surgical, social and family histories were reviewed and updated as appropriate. Review of Systems:  Relevant review of systems dated 5/27/2022 was reviewed and available in the patient's chart under the media tab. Vital Signs: There were no vitals filed for this visit. General Exam:   Constitutional: Patient is adequately groomed with no evidence of malnutrition  DTRs: Deep tendon reflexes are intact  Mental Status: The patient is oriented to time, place and person. The patient's mood and affect are appropriate.   Lymphatic: The lymphatic examination bilaterally reveals all areas to be without enlargement or induration. Vascular: Examination reveals no swelling or calf tenderness. Peripheral pulses are palpable and 2+. Neurological: The patient has good coordination. There is no weakness or sensory deficit. Body mass index is 61.44 kg/m². Left hip Examination:    Inspection:  No erythema or signs of infection. There are no cutaneous lesions. Palpation: Mild tenderness to palpation over the greater trochanteric region with mild palpable pain noted into the left low back and left sacroiliac joint. .    Range of Motion:  Painful limited range of motion of the hip particularly with flexion and external rotation is limited due to pain and body habitus. Strength: -5/5 strength in flexion and abduction limited by pain on the left with 5/5 on the right. Special Tests: Negative Stinchfield    Skin: There are no rashes, ulcerations or lesions. Gait: Antalgic favoring the left side        Additional Examinations:         Contralateral Exam: Examination of the right hip reveals intact skin. The patient demonstrates full painless range of motion with regards to flexion, abduction, internal and external rotation. There is no tenderness about the greater trochanter. There is a negative straight leg raise against resistance. Strength is 5/5 throughout all planes. Lower Back: Examination of the lower back does show mild tenderness over the left buttocks into the left SI joint. .  Range of motion is decreased to flexion extension and lateral movement. There is no gross instability. There are no rashes, ulcerations or lesions. Strength and tone are normal.    Radiology:     X-rays obtained and reviewed in office:  Views 3 views including AP pelvis and lateral, and AP of the left hip  Location left hip  Impression no fractures or malalignment identified. There is moderate osteoarthritis of the femoral acetabular joint space narrowing with subchondral cysts, sclerosis and osteophyte formation.        Impression: Left hip strain with osteoarthritis left hip  Encounter Diagnoses   Name Primary?  Pain Yes    Osteoarthritis of left hip, unspecified osteoarthritis type     Lumbar radiculopathy        Office Procedures:  Orders Placed This Encounter   Procedures    XR HIP LEFT (2-3 VIEWS)     Standing Status:   Future     Number of Occurrences:   1     Standing Expiration Date:   5/27/2023   [facility] (Or[facility]     Referral Priority:   Routine     Referral Type:   Eval and Treat     Referral Reason:   Specialty Services Required     Requested Specialty:   Physical Therapist     Number of Visits Requested:   1    Cane Medline     Patient was prescribed a Medline Cane. This mobility device is required for the following reasons:    Patient has mobility limitations that significantly impair their ability to participate in one or more mobility related activities of daily living. The patient is able to safely use the mobility device. Functional mobility deficit can be sufficiently resolved with the use of this device. Verbal and written instructions for the use of and application of this item were provided. The patient was educated and fit by a healthcare professional with expert knowledge and specialization in brace application while under the direct supervision of the treating physician. They were instructed to contact the office immediately should the equipment result in increased pain, decreased sensation, increased swelling or worsening of the condition. Treatment Plan: Today we discussed the diagnosis and treatment plan and the patient was referred to outpatient physical therapy for range of motion progressive strengthening exercises for the lumbar spine and for the left hip. She was instructed to obtain a cane to be used in the right hand and gait training will be instructed at physical therapy.   Patient is already on Celebrex twice a day for her osteoarthritis but we did add gabapentin 300 mg to be taken 1 p.o. 3 times daily for her radicular component of her pain. Follow-up: In 6 weeks for repeat clinical examination.     Arun Gonzalez PA-C  Board certified by the [address] [facility]

## 2022-06-08 ENCOUNTER — HOSPITAL ENCOUNTER (OUTPATIENT)
Dept: PHYSICAL THERAPY | Age: 62
Setting detail: THERAPIES SERIES
Discharge: HOME | End: 2022-06-08
Payer: MEDICARE

## 2022-06-08 PROCEDURE — 97140 MANUAL THERAPY 1/> REGIONS: CPT

## 2022-06-08 PROCEDURE — 97110 THERAPEUTIC EXERCISES: CPT

## 2022-06-08 PROCEDURE — 97161 PT EVAL LOW COMPLEX 20 MIN: CPT

## 2022-06-08 NOTE — PLAN OF CARE
[address],  [facility] [address], [address], [place], [address] [facility] [place]  Phone: [phone], [phone]                                                       Physical Therapy Certification    Dear  Dr. Demetris Santos,    We had the pleasure of evaluating the following patient for physical therapy services at [address] [place] [address]. A summary of our findings can be found in the initial assessment below. This includes our plan of care. If you have any questions or concerns regarding these findings, please do not hesitate to contact me at the office phone number checked above. Thank you for the referral.       Physician Signature:_______________________________Date:__________________  By signing above (or electronic signature), therapists plan is approved by physician      Patient: Bethany Meyers   : 1960   MRN: [id number]  Referring Physician:  Dr. Demetris Santos     Evaluation Date: 2022      Medical Diagnosis Information:  Diagnosis: M47.22 Cervical spondylosis with radiculopathy   Treatment diagnosis - Cervical pain M54.2, generalized UE weaknes M62.81/2                                      Insurance information: PT Insurance Information: [facility] $35 copay no auth BMN     Precautions/ Contra-indications/Relevant Medical History: see chart    C-SSRS Triggered by Intake questionnaire (Past 2 wk assessment):   [x] No, Questionnaire did not trigger screening.   [] Yes, Patient intake triggered further evaluation      [] C-SSRS Screening completed  [] PCP notified via Plan of Care  [] Emergency services notified     Latex Allergy:  [x]NO      []YES   Preferred Language for Healthcare:   [x]English       []other:     SUBJECTIVE: Patient stated complaint: Pt reports neck pain started 6-8 months ago, was originally going down right arm. Now is mostly across top of shoulders and base of neck. No direct MARIA ELENA.  Does have trouble using both arms to drive at long periods of time. Patient is a side sleeper and struggles from laying on shoulder . No n/t only shooting pain. 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 Cervical Extension 80% limited P! Cervical SB Limited 50% biltaerally     Cervical rotation          ROM Left Right   Shoulder Flex 145 125 P! Shoulder Abd     Shoulder ER     Shoulder IR               Strength  Left Right   Shoulder Flex 4 3+   Shoulder Scap     Shoulder ER     Shoulder IR           Joint mobility:    []Normal    [x]Hypo   []Hyper    Palpation: Large palpable trigger point present in R UT reproducing patients symptoms - possible dry needling    Functional Mobility/Transfers: independent, slow guarded    Posture: forward head, rounded shoulders, increased thoracic kyphosis    Bandages/Dressings/Incisions:     Gait (include devices/WB status): SPC, wide LOLA, slow dante and decreased stride length                     [x] Patient history, allergies, meds reviewed. Medical chart reviewed. See intake form. Review Of Systems (ROS):  [x]Performed Review of systems (Integumentary, CardioPulmonary, Neurological) by intake and observation. Intake form has been scanned into medical record. Patient has been instructed to contact their primary care physician regarding ROS issues if not already being addressed at this time.     Co-morbidities/Complexities (which will affect course of rehabilitation):     []None           Arthritic conditions   []Rheumatoid arthritis (M05.9)  [x]Osteoarthritis (M19.91)   Cardiovascular conditions   []Hypertension (I10)  []Hyperlipidemia (E78.5)  []Angina pectoris (I20)  []Atherosclerosis (I70)   Musculoskeletal conditions   [x]Disc pathology   []Congenital spine pathologies   []Prior surgical intervention  [x]Osteoporosis (M81.8)  []Osteopenia (M85.8)   Endocrine conditions   []Hypothyroid (E03.9)  []Hyperthyroid Gastrointestinal conditions   []Constipation (K59.00)  X incontinence Metabolic conditions   [x]Morbid obesity (E66.01)  [x]Diabetes type 1(E10.65) or 2 (E11.65)   []Neuropathy (G60.9)     Pulmonary conditions   [x]Asthma (J45)  []Coughing   []COPD (J44.9)   Psychological Disorders  [x]Anxiety (F41.9)  [x]Depression (F32.9)   []Other:   []Other:          Barriers to/and or personal factors that will affect rehab potential:              [x]Age  [x]Sex              [x]Motivation/Lack of Motivation                        [x]Co-Morbidities              []Cognitive Function, education/learning barriers              []Environmental, home barriers              []profession/work barriers  [x]past PT/medical experience  []other:  Justification:     ASSESSMENT:    Functional Impairments:     [x]Noted cervical/thoracic/GHJ joint hypomobility   []Noted cervical/thoracic/GHJ joint hypermobility   [x]Decreased cervical/UE functional ROM   []Noted Headache pain aggravated by neck movements with/without dizziness   [x]Abnormal reflexes/sensation/myotomal/dermatomal deficits   []Decreased DCF control or ability to hold head up   [x]Decreased RC/scapular/core strength and neuromuscular control    [x]Decreased UE functional strength   []other:      Functional Activity Limitations (from functional questionnaire and intake)   [x]Reduced ability to tolerate prolonged functional positions   [x]Reduced ability or difficulty with changes of positions or transfers between positions   [x]Reduced ability to maintain good posture and demonstrate good body mechanics with sitting, bending, and lifting   [x] Reduced ability or tolerance with driving and/or computer work   [x]Reduced ability to perform lifting, reaching, carrying tasks   [x]Reduced ability to concentrate   [x]Reduced ability to sleep    [x]Reduced ability to tolerate any impact through UE or spine   [x]Reduced ability to ambulate prolonged functional periods/distances   []other:    Participation Restrictions   [x]Reduced participation in self care activities   [x]Reduced participation in home management activities   [x]Reduced participation in work activities   [x]Reduced participation in social activities. [x]Reduced participation in sport/recreational activities. Classification/Subgrouping:   [x]signs/symptoms consistent with neck pain with mobility deficits     []signs/symptoms consistent with neck pain with movement coordinated impairments    []signs/symptoms consistent with neck pain with radiating pain    []signs/symptoms consistent with neck pain with headaches (cervicogenic)    []Signs/symptoms consistent with nerve root involvement including myotome & dermatome dysfunction   []sign/symptoms consistent with facet dysfunction of cervical and thoracic spine    []signs/symptoms consistent suggesting central cord compression/UMN syndromes   []signs/symptoms consistent with discogenic cervical pain   [x]signs/symptoms consistent with rib dysfunction   [x]signs/symptoms consistent with postural dysfunction   []signs/symptoms consistent with shoulder pathology    []signs/symptoms consistent with post-surgical status including decreased ROM, strength and function.    [x]signs/symptoms consistent with pathology which may benefit from Dry Needling   []signs/symptoms which may limit the use of advanced manual therapy techniques: (Elevated CV risk profile, recent trauma, intolerance to end range positions, prior TIA, visual issues, UE neurological compromise )     Prognosis/Rehab Potential:      []Excellent   []Good    [x]Fair   []Poor    Tolerance of evaluation/treatment:    []Excellent   []Good    [x]Fair   []Poor    Physical Therapy Evaluation Complexity Justification  [x] A history of present problem with:  [] no personal factors and/or comorbidities that impact the plan of care;  []1-2 personal factors and/or comorbidities that impact the plan of care  [x]3 personal factors and/or comorbidities that impact the plan of care  [x] An examination of body systems using standardized tests and measures addressing any of the following: body structures and functions (impairments), activity limitations, and/or participation restrictions;:  [] a total of 1-2 or more elements   [] a total of 3 or more elements   [x] a total of 4 or more elements   [x] A clinical presentation with:  [x] stable and/or uncomplicated characteristics   [] evolving clinical presentation with changing characteristics  [] unstable and unpredictable characteristics;   [x] Clinical decision making of [x] low, [] moderate, [] high complexity using standardized patient assessment instrument and/or measurable assessment of functional outcome. [x] EVAL (LOW) 95753 (typically 20 minutes face-to-face)  [] EVAL (MOD) 72104 (typically 30 minutes face-to-face)  [] EVAL (HIGH) 34657 (typically 45 minutes face-to-face)  [] RE-EVAL     PLAN:   Frequency/Duration:  1-2 days per week for 12 Weeks:  Interventions:  [x]  Therapeutic exercise including: strength training, ROM, for cervical spine,scapula, core and Upper extremity, including postural re-education. [x]  NMR activation and proprioception for Deep cervical flexors, periscapular and RC muscles and Core, including postural re-education. [x]  Manual therapy as indicated for C/T spine, ribs, Soft tissue to include: Dry Needling/IASTM, STM, PROM, Gr I-IV mobilizations, manipulation.    [x] Modalities as needed that may include: thermal agents, E-stim, Biofeedback, US, iontophoresis as indicated  [x] Patient education on joint protection, postural re-education, activity modification, progression of HEP. HEP instruction: (see scanned forms)    GOALS:    Short Term Goals: To be achieved in: 2 weeks  1. Independent in HEP and progression per patient tolerance, in order to prevent re-injury. [] Progressing: [] Met: [] Not Met: [] Adjusted   2. Patient will have a decrease in pain to facilitate improvement in movement, function, and ADLs as indicated by Functional Deficits. [] Progressing: [] Met: [] Not Met: [] Adjusted     Long Term Goals: To be achieved in: 12 weeks  1. FOTO score will be within 10 points of the predicted score to assist with reaching prior level of function. [] Progressing: [] Met: [] Not Met: [] Adjusted   2. Patient will demonstrate increased AROM to equal the opposite side bilaterally to allow for normal ADLs as indicated by patients Functional Deficits. [] Progressing: [] Met: [] Not Met: [] Adjusted   3. Patient will demonstrate an increase in strength to  to allow for 4+/5 as indicated by patients Functional Deficits. [] Progressing: [] Met: [] Not Met: [] Adjusted   4. Patient will return to all transfers, work activities, and functional activities without increased symptoms or restriction. [] Progressing: [] Met: [] Not Met: [] Adjusted   5.  Patient will have 0/10 pain with ADL's.  [] Progressing: [] Met: [] Not Met: [] Adjusted        Electronically signed by:  Dewey Carlton, PT

## 2022-06-09 ENCOUNTER — TELEPHONE (OUTPATIENT)
Dept: FAMILY MEDICINE CLINIC | Age: 62
End: 2022-06-09

## 2022-06-09 DIAGNOSIS — F33.9 RECURRENT MAJOR DEPRESSIVE DISORDER, REMISSION STATUS UNSPECIFIED (HCC): ICD-10-CM

## 2022-06-09 RX ORDER — BUPROPION HYDROCHLORIDE 300 MG/1
300 TABLET ORAL DAILY
COMMUNITY
End: 2022-06-09 | Stop reason: SDUPTHER

## 2022-06-09 RX ORDER — BUPROPION HYDROCHLORIDE 300 MG/1
300 TABLET ORAL DAILY
Qty: 30 TABLET | Refills: 1 | Status: SHIPPED | OUTPATIENT
Start: 2022-06-09 | End: 2022-07-12 | Stop reason: ALTCHOICE

## 2022-06-09 NOTE — TELEPHONE ENCOUNTER
Pt was wanting to see if you could continue to help her with her bio polar meds. She states that she has been having some depression, mood swings and not getting a lot of sleep. She uses [address]

## 2022-06-09 NOTE — TELEPHONE ENCOUNTER
Then I recommend that we increase her Wellbutrin XL from 150 mg to Wellbutrin  mg 1 p.o. daily #30 with 1 refill and will need a 6-week follow-up again virtual can be fine

## 2022-06-09 NOTE — TELEPHONE ENCOUNTER
Pt notified. She said she is not taking any sleeping pills because she cant stand the side effects and she stays tired the whole next day. States its easier for her to not sleep at all then take sleeping pills. She will just continue taking her meds as prescribed.

## 2022-06-09 NOTE — TELEPHONE ENCOUNTER
Patient is already at max dose of Lamictal and Trintellix. She should continue both of these. I do recommend that she continues her Wellbutrin XL from 150 mg daily  Will try trazodone 100 mg 1 p.o. q. at bedtime #30 with 1 refill  Please send prescription to pharmacy of choice and notify patient. Patient will need follow-up in 6 weeks.   This can be a virtual visit if she desires

## 2022-06-09 NOTE — TELEPHONE ENCOUNTER
Patient advised on message Rx sent to [facility]

## 2022-06-14 ENCOUNTER — HOSPITAL ENCOUNTER (OUTPATIENT)
Dept: PHYSICAL THERAPY | Age: 62
Setting detail: THERAPIES SERIES
Discharge: HOME | End: 2022-06-14
Payer: MEDICARE

## 2022-06-15 ENCOUNTER — HOSPITAL ENCOUNTER (OUTPATIENT)
Dept: PHYSICAL THERAPY | Age: 62
Setting detail: THERAPIES SERIES
Discharge: HOME | End: 2022-06-15
Payer: MEDICARE

## 2022-06-15 NOTE — FLOWSHEET NOTE
[address] [facility], [address] [place] [address], [address] [place] [address] [address]  Phone: [phone]   Fax:     [phone]    Physical Therapy  Cancellation/No-show Note  Patient Name:  Mehdi Kan  :  1960   Date:  6/15/2022    Cancelled visits to date: 2  No-shows to date: 0    For today's appointment patient:  [x]  Cancelled  []  Rescheduled appointment  []  No-show     Reason given by patient:  []  Patient ill  []  Conflicting appointment  []  No transportation    []  Conflict with work  []  No reason given  [x]  Other:     Comments:  Poison ivy    Phone call information:   []  Phone call made today to patient at am/pm at the number provided:      []  Patient answered, conversation as follows:    []  Patient did not answer, message left as follows:  [x]  Phone call not needed - pt contacted us to cancel and provided reason for cancellation.      Electronically signed by:  Jey Kapoor PT, PT

## 2022-06-21 ENCOUNTER — HOSPITAL ENCOUNTER (OUTPATIENT)
Dept: PHYSICAL THERAPY | Age: 62
Setting detail: THERAPIES SERIES
Discharge: HOME | End: 2022-06-21
Payer: MEDICARE

## 2022-06-21 ENCOUNTER — APPOINTMENT (OUTPATIENT)
Dept: PHYSICAL THERAPY | Age: 62
End: 2022-06-21
Payer: MEDICARE

## 2022-06-21 PROCEDURE — 97140 MANUAL THERAPY 1/> REGIONS: CPT | Performed by: PHYSICAL THERAPIST

## 2022-06-21 PROCEDURE — 97161 PT EVAL LOW COMPLEX 20 MIN: CPT | Performed by: PHYSICAL THERAPIST

## 2022-06-21 PROCEDURE — 97110 THERAPEUTIC EXERCISES: CPT | Performed by: PHYSICAL THERAPIST

## 2022-06-21 NOTE — PLAN OF CARE
723 [facility] and 500 [facility], [address] [place] [address] 904 [facility], 620 [facility] ([facility]), [facility]1 [facility]  Phone: [phone], [phone]     Physical Therapy Certification    Dear Referring Provider (secondary): Dwayne Tello,    We had the pleasure of evaluating the following patient for physical therapy services at [address] [place] [address]. A summary of our findings can be found in the initial assessment below. This includes our plan of care. If you have any questions or concerns regarding these findings, please do not hesitate to contact me at the office phone number checked above. Thank you for the referral.       Physician Signature:_______________________________Date:__________________  By signing above (or electronic signature), therapists plan is approved by physician    Patient: Behzad Grubbs   : 1960   MRN: [id number]  Referring Physician: Referring Provider (secondary): Anthony      Evaluation Date: 2022      Medical Diagnosis Information:  Diagnosis: Left hip OA   Treatment Diagnosis: M16.12                                         Insurance information: PT Insurance Information: [facility] Medicare     Precautions/ Contra-indications/Relevant Medical History:     C-SSRS Triggered by Intake questionnaire (Past 2 wk assessment):   [x] No, Questionnaire did not trigger screening.   [] Yes, Patient intake triggered further evaluation      [] C-SSRS Screening completed  [] PCP notified via Plan of Care  [] Emergency services notified     Latex Allergy:  [x]NO      []YES  Preferred Language for Healthcare:   [x]English       []other:    SUBJECTIVE: Patient stated complaint: Patient originally was seeing Pradeep Bowie for left sided pain, especially in her hip. She recently fell on Saturday on her left side and now she's having pain in her lumbar spine, left hip and left knee.      FOTO Score: 43  FOTO Predicted Score: 53    Pain Scale: 3/10  Easing factors: Rest, Ice, Heat, altering sleeping habits, compensating gait patterns. Provocative factors: walking long distances, stairs, kneeling, squatting, standing. 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 HIP Abd     HIP Ext     HIP IR     HIP ER     Knee ext Lacking 5 °     Knee Flex 65 °     Ankle PF     Ankle DF     Ankle In     Ankle Ev     Strength  Involved Un-Involved   HIP Flexors 3+ 5/5   HIP Abductors 3+ 5/5   HIP Ext  5/5   Hip ER  5/5   Knee EXT (quad) 3+ 5/5   Knee Flex (HS) 3+ 5/5   Ankle DF     Ankle PF     Ankle Inv     Ankle EV          Balance (up to 10 sec) Involved Un-Involved   Feet Together     Off Set Stance     Tandem Stance     Single Limb          Circumference             Reflexes/Sensation:    []Dermatomes/Myotomes intact    []Reflexes equal and normal bilaterally   []Other:    Joint mobility:    [x]Normal    []Hypo   []Hyper    Palpation/Observation: Tenderness with palpation throughout. Functional Mobility/Transfers: Independent with SBA    Posture: WNL    Bandages/Dressings/Incisions: NA    Gait: (include devices/WB status) Compensating gait pattern, decreased stride length on the involved side. Orthopedic Special Tests: NA                       [x] Patient history, allergies, meds reviewed. Medical chart reviewed. See intake form. Review Of Systems (ROS):  [x]Performed Review of systems (Integumentary, CardioPulmonary, Neurological) by intake and observation. Intake form has been scanned into medical record. Patient has been instructed to contact their primary care physician regarding ROS issues if not already being addressed at this time.       Co-morbidities/Complexities (which will affect course of rehabilitation):   []None Arthritic conditions   []Rheumatoid arthritis (M05.9)  [x]Osteoarthritis (M19.91)   Cardiovascular conditions   [x]Hypertension (I10)  []Hyperlipidemia (E78.5)  []Angina pectoris (I20)  []Atherosclerosis (I70)   Musculoskeletal conditions   []Disc pathology   []Congenital spine pathologies   []Prior surgical intervention  []Osteoporosis (M81.8)  []Osteopenia (M85.8)   Endocrine conditions   []Hypothyroid (E03.9)  []Hyperthyroid Gastrointestinal conditions   []Constipation (C97.59)   Metabolic conditions   [x]Morbid obesity (E66.01)  []Diabetes type 1(E10.65) or 2 (E11.65)   [x]Neuropathy (G60.9)     Pulmonary conditions   []Asthma (J45)  []Coughing   []COPD (J44.9)   Psychological Disorders  []Anxiety (F41.9)  []Depression (F32.9)   []Other:   [x]Other:  Knee injections        Barriers to/and or personal factors that will affect rehab potential:              []Age  []Sex              []Motivation/Lack of Motivation                        []Co-Morbidities              []Cognitive Function, education/learning barriers              []Environmental, home barriers              []profession/work barriers  []past PT/medical experience  []other:  Justification:     Falls Risk Assessment (30 days):   [x] Falls Risk assessed and no intervention required.   [] Falls Risk assessed and Patient requires intervention due to being higher risk   TUG score (>12s at risk):     [] Falls education provided    ASSESSMENT:   Functional Impairments:     []Noted lumbar/proximal hip/LE joint hypomobility   [x]Decreased LE functional ROM   [x]Decreased core/proximal hip strength and neuromuscular control   [x]Decreased LE functional strength   [x]Reduced balance/proprioceptive control   []other:      Functional Activity Limitations (from functional questionnaire and intake)   []Reduced ability to tolerate prolonged functional positions   []Reduced ability or difficulty with changes of positions or transfers between positions   []Reduced ability to maintain good posture and demonstrate good body mechanics with sitting, bending, and lifting   [x]Reduced ability to sleep   [x] Reduced ability or tolerance with driving and/or computer work   [x]Reduced ability to perform lifting, carrying tasks   [x]Reduced ability to squat   [x]Reduced ability to forward bend   [x]Reduced ability to ambulate prolonged functional periods/distances/surfaces   [x]Reduced ability to ascend/descend stairs   [x]Reduced ability to run, hop, cut or jump   []other:    Participation Restrictions   []Reduced participation in self care activities   [x]Reduced participation in home management activities   []Reduced participation in work activities   [x]Reduced participation in social activities. []Reduced participation in sport/recreation activities. Classification :     []Signs/symptoms consistent with post-surgical status including decreased ROM, strength and function.    []Signs/symptoms consistent with joint sprain/strain   []Signs/symptoms consistent with patella-femoral syndrome   [x]Signs/symptoms consistent with knee OA/hip OA   []Signs/symptoms consistent with internal derangement of knee/Hip   []Signs/symptoms consistent with functional hip weakness/NMR control      []Signs/symptoms consistent with tendinitis/tendinosis    []signs/symptoms consistent with pathology which may benefit from Dry needling      []other:      Prognosis/Rehab Potential:      []Excellent   [x]Good    []Fair   []Poor    Tolerance of evaluation/treatment:    []Excellent   [x]Good    []Fair   []Poor    Physical Therapy Evaluation Complexity Justification   [x] A history of present problem with:  [] no personal factors and/or comorbidities that impact the plan of care;  [x]1-2 personal factors and/or comorbidities that impact the plan of care  []3 personal factors and/or comorbidities that impact the plan of care  [x] An examination of body systems using standardized tests and measures addressing any of the following: body structures and functions (impairments), activity limitations, and/or participation restrictions;:  [] a total of 1-2 or more elements   [x] a total of 3 or more elements   [] a total of 4 or more elements   [x] A clinical presentation with:  [x] stable and/or uncomplicated characteristics   [] evolving clinical presentation with changing characteristics  [] unstable and unpredictable characteristics;   [x] Clinical decision making of [x] low, [] moderate, [] high complexity using standardized patient assessment instrument and/or measurable assessment of functional outcome. [x] EVAL (LOW) 29758 (typically 20 minutes face-to-face)  [] EVAL (MOD) 89189 (typically 30 minutes face-to-face)  [] EVAL (HIGH) 60999 (typically 45 minutes face-to-face)  [] RE-EVAL     PLAN  Frequency/Duration:  1-2 days per week for 12 weeks:  Interventions:  [x]  Therapeutic exercise including: strength training, ROM, for Lower extremity and core   [x]  NMR activation and proprioception for LE, Glutes and Core   [x]  Manual therapy as indicated for LE, Hip and spine to include: Dry Needling/IASTM, STM, PROM, Gr I-IV mobilizations, manipulation. [x] Modalities as needed that may include: thermal agents, E-stim, Biofeedback, US, iontophoresis as indicated  [x] Patient education on joint protection, postural re-education, activity modification, progression of HEP. HEP instruction: Refer to [address] [place] [address] access code and exercises on the 1st visit treatment note    GOALS:    Short Term Goals: To be achieved in: 2 weeks  1. Independent in HEP and progression per patient tolerance, in order to prevent re-injury. [] Progressing: [] Met: [] Not Met: [] Adjusted   2. Patient will have a decrease in pain to facilitate improvement in movement, function, and ADLs as indicated by Functional Deficits. [] Progressing: [] Met: [] Not Met: [] Adjusted     Long Term Goals: To be achieved in: 12 weeks  1.  FOTO score will be within 10 points of the predicted score to assist with reaching prior level of function. [] Progressing: [] Met: [] Not Met: [] Adjusted   2. Patient will demonstrate increased AROM to equal the opposite side bilaterally to allow for proper joint functioning as indicated by patients Functional Deficits. [] Progressing: [] Met: [] Not Met: [] Adjusted   3. Patient will demonstrate an increase in strength to be within 3lbs on the HHD or match bilaterally to allow for proper functional mobility as indicated by patients Functional Deficits. [] Progressing: [] Met: [] Not Met: [] Adjusted   4. Patient will return to all transfers, work activities, and functional activities without increased symptoms or restriction. [] Progressing: [] Met: [] Not Met: [] Adjusted   5. Patient will have 0/10 pain with ADL's.  [] Progressing: [] Met: [] Not Met: [] Adjusted   6. Patient stated goal: Would like to be able to walk for exercise for 15 min without stopping.    [] Progressing: [] Met: [] Not Met: [] Adjusted     Electronically signed by:  Meir Kapoor PT

## 2022-06-21 NOTE — FLOWSHEET NOTE
723 [facility] and 500 [facility], [address] [place] [address] 904 [facility], [address] [place] [address] ([facility]), [address] [place] [address]  Phone: [phone], [phone]    Physical Therapy Treatment Note/ Progress Report:     Date:  2022    Patient Name:  Dorothy Angel    :  1960  MRN: [id number]  Restrictions/Precautions:    Medical/Treatment Diagnosis Information:  · Diagnosis: Left hip OA  · Treatment Diagnosis: J74.50  Insurance/Certification information:  PT Insurance Information: [facility] Medicare  Physician Information:     Has the plan of care been signed (Y/N):        []  Yes  [x]  No     Date of Patient follow up with Physician:     Is this a Progress Report:     []  Yes  [x]  No      If Yes:  Date Range for reporting period:  Initial Eval: 2022  Beginnin2022 --- Endin22    Progress report will be due (10 Rx or 30 days whichever is less): 54     Recertification will be due (POC Duration  / 90 days whichever is less): 22      Visit # Insurance Allowable Auth Required   In Person 1 BMN []  Yes     []  No    Tele Health 0  []  Yes     []  No    Total 1       FOTO Score: 43 (Predicted: 53)   Date assessed: 2022      Latex Allergy:  [x]NO      []YES  Preferred Language for Healthcare:   [x]English       []other:    Pain level:  5/10     SUBJECTIVE:  See eval    OBJECTIVE:    Observation:    Test measurements: See Eval    RESTRICTIONS/PRECAUTIONS:     Exercises/Interventions:   Therapeutic Ex (18865)  Therapeutic Activity (87159)  NMR re-education (59695) Sets/Reps Notes/CUES   Bike               Hook lying:  Glute squeeze  Add squeeze  Abd clam  Marching  Mini crunch   5\"x20  10\"x10  x20  x20 ea  x15         Supine SAQ x20              EOB LAQ x20                                                                               Manual Intervention (65549)     STM - left IT band 10 min                             Medbridge access code: [facility]                    Patient relaxation, increasing ROM, reducing/eliminating soft tissue swelling/inflammation/restriction, improving soft tissue extensibility and allowing for proper ROM for normal function with self-care, mobility, lifting and ambulation. Modalities:      Charges:  Timed Code Treatment Minutes: 30   Total Treatment Minutes:  50   BWC:  TE TIME:  NMR TIME:  MANUAL TIME:  UNTIMED MINUTES:  Medicare Total:                [x] EVAL (LOW) 09356 (typically 20 minutes face-to-face)  [] EVAL (MOD) 32131 (typically 30 minutes face-to-face)  [] EVAL (HIGH) 26511 (typically 45 minutes face-to-face)  [] RE-EVAL     [x] CQ(13513) x 2   [] IONTO  [] NMR (66514) x     [] VASO  [x] Manual (43454) x  1   [] Other:  [] TA x      [] Mech Traction (00670)  [] ES(attended) (45149)      [] ES (un) (50655):    ASSESSMENT:  Patient presents with signs and symptoms consistent with diagnosis of left hip OA/pain. Rehab potential is good at this time. Key impairments include: decreased ROM and strength of the left lower extremity, poor balance and compensatory gait patterning, increased swelling, and pain with functional activities such as squatting, walking, lunging, and stairs. Skilled PT is required to address these key impairments and to provide and progress with an appropriate home exercise program. The patient's complexity may change due to the nature of the patients presentation as well as the comorbidities and medical factors included in this patient's evaluation. GOALS:   Short Term Goals: To be achieved in: 2 weeks  1. Independent in HEP and progression per patient tolerance, in order to prevent re-injury. []? Progressing: []? Met: []? Not Met: []? Adjusted       2. Patient will have a decrease in pain to facilitate improvement in movement, function, and ADLs as indicated by Functional Deficits. []? Progressing: []? Met: []? Not Met: []? Adjusted          Long Term Goals: To be achieved in: 12 weeks  1.  FOTO score will be within 10 points of the predicted score to assist with reaching prior level of function. []? Progressing: []? Met: []? Not Met: []? Adjusted      2. Patient will demonstrate increased AROM to equal the opposite side bilaterally to allow for proper joint functioning as indicated by patients Functional Deficits. []? Progressing: []? Met: []? Not Met: []? Adjusted       3. Patient will demonstrate an increase in strength to be within 3lbs on the HHD or match bilaterally to allow for proper functional mobility as indicated by patients Functional Deficits. []? Progressing: []? Met: []? Not Met: []? Adjusted       4. Patient will return to all transfers, work activities, and functional activities without increased symptoms or restriction. []? Progressing: []? Met: []? Not Met: []? Adjusted       5. Patient will have 0/10 pain with ADL's.  []? Progressing: []? Met: []? Not Met: []? Adjusted       6. Patient stated goal: Would like to be able to walk for exercise for 15 min without stopping. []? Progressing: []? Met: []? Not Met: []? Adjusted      Overall Progression Towards Functional goals/ Treatment Progress Update:  [] Patient is progressing as expected towards functional goals listed. [] Progression is slowed due to complexities/Impairments listed. [] Progression has been slowed due to co-morbidities.   [x] Plan just implemented, too soon to assess goals progression <30days   [] Goals require adjustment due to lack of progress  [] Patient is not progressing as expected and requires additional follow up with physician  [] Other    Prognosis for POC: [x] Good [] Fair  [] Poor    Patient requires continued skilled intervention: [x] Yes  [] No    Treatment/Activity Tolerance:  [x] Patient able to complete treatment  [] Patient limited by fatigue  [] Patient limited by pain    [] Patient limited by other medical complications  [] Other:     Return to Play: (if applicable)   []  Stage 1: Intro to Strength   []  Stage 2: Return to Run and Strength   []  Stage 3: Return to Jump and Strength   []  Stage 4: Dynamic Strength and Agility   []  Stage 5: Sport Specific Training     []  Ready to Return to Play, Meets All Above Stages   []  Not Ready for Return to Sports   Comments:                         PLAN: See eval  [] Continue per plan of care [] Alter current plan (see comments above)  [x] Plan of care initiated [] Hold pending MD visit [] Discharge    Electronically signed by:  Rosie Huff PT    Note: If patient does not return for scheduled/ recommended follow up visits, this note will serve as a discharge from care along with most recent update on progress.

## 2022-06-22 ENCOUNTER — OFFICE VISIT (OUTPATIENT)
Dept: FAMILY MEDICINE CLINIC | Age: 62
End: 2022-06-22
Payer: MEDICARE

## 2022-06-22 ENCOUNTER — HOSPITAL ENCOUNTER (OUTPATIENT)
Dept: PHYSICAL THERAPY | Age: 62
Setting detail: THERAPIES SERIES
Discharge: HOME | End: 2022-06-22
Payer: MEDICARE

## 2022-06-22 VITALS
WEIGHT: 293 LBS | OXYGEN SATURATION: 96 % | HEART RATE: 78 BPM | DIASTOLIC BLOOD PRESSURE: 84 MMHG | SYSTOLIC BLOOD PRESSURE: 146 MMHG | HEIGHT: 61 IN | BODY MASS INDEX: 55.32 KG/M2

## 2022-06-22 DIAGNOSIS — L25.5 RHUS DERMATITIS: Primary | ICD-10-CM

## 2022-06-22 PROCEDURE — G8417 CALC BMI ABV UP PARAM F/U: HCPCS | Performed by: NURSE PRACTITIONER

## 2022-06-22 PROCEDURE — 96372 THER/PROPH/DIAG INJ SC/IM: CPT | Performed by: NURSE PRACTITIONER

## 2022-06-22 PROCEDURE — 1036F TOBACCO NON-USER: CPT | Performed by: NURSE PRACTITIONER

## 2022-06-22 PROCEDURE — 99213 OFFICE O/P EST LOW 20 MIN: CPT | Performed by: NURSE PRACTITIONER

## 2022-06-22 PROCEDURE — G8427 DOCREV CUR MEDS BY ELIG CLIN: HCPCS | Performed by: NURSE PRACTITIONER

## 2022-06-22 PROCEDURE — 3017F COLORECTAL CA SCREEN DOC REV: CPT | Performed by: NURSE PRACTITIONER

## 2022-06-22 RX ORDER — METHYLPREDNISOLONE SODIUM SUCCINATE 125 MG/2ML
125 INJECTION, POWDER, LYOPHILIZED, FOR SOLUTION INTRAMUSCULAR; INTRAVENOUS ONCE
Status: COMPLETED | OUTPATIENT
Start: 2022-06-22 | End: 2022-06-22

## 2022-06-22 RX ADMIN — METHYLPREDNISOLONE SODIUM SUCCINATE 125 MG: 125 INJECTION, POWDER, LYOPHILIZED, FOR SOLUTION INTRAMUSCULAR; INTRAVENOUS at 12:23

## 2022-06-22 ASSESSMENT — ENCOUNTER SYMPTOMS
ALLERGIC/IMMUNOLOGIC NEGATIVE: 1
ABDOMINAL PAIN: 0
NAUSEA: 0
RESPIRATORY NEGATIVE: 1
SHORTNESS OF BREATH: 0
EYES NEGATIVE: 1
ANAL BLEEDING: 0
BLOOD IN STOOL: 0
GASTROINTESTINAL NEGATIVE: 1

## 2022-06-22 NOTE — PROGRESS NOTES
[address] [facility]  FAMILY MEDICINE  [address] [place] [address] [address] [address]  Dept: [phone]  Dept Fax: [phone]  Loc: [phone]    Verdis Eisenmenger is a 58 y.o. female who presents today for her medical conditions/complaints as noted below. Verdis Eisenmenger is c/o of Other (Poison ivy on back on both legs and both arms)       Subjective:     Chief Complaint   Patient presents with    Other     Poison ivy on back on both legs and both arms       HPI  Patient states that last week after her dog ran through her fence line which was filled with poison ivy she allowed her dog to sleep with her. Not too long after that the patient started with a rash on her bilateral lower extremities. A few days ago she started noticing the rash on her bilateral upper arms. Patient was concerned at first about possible bug bites however patient states that it is itching and looks just like it did when she has had poison ivy previously. Patient has not been using anything over-the-counter for this. Past Medical History:   Diagnosis Date    Bipolar disorder     Borderline osteopenia     Cellulitis of left leg 8/5/2020    Frequency of micturition     GERD (gastroesophageal reflux disease)     Headache(784.0)     HNP (herniated nucleus pulposus), lumbar 6/6/2019    Hyperlipidemia     Hypertension     Intention tremor     due to lithium    Iron deficiency anemia 11/4/2019    Lateral meniscal tear 10/14/2015    Lumbar stenosis with neurogenic claudication 6/6/2019    Medial meniscus tear 10/14/2015    Mixed incontinence     Morbid (severe) obesity due to excess calories (HCC)     Prolonged emergence from general anesthesia     Prolonged emergence from general anesthesia, initial encounter 6/12/2019    Tobacco use     Venous ulcer of left leg ([address] 07/2020         Review of Systems   Constitutional: Negative.   Negative for appetite change, fatigue and unexpected weight change. HENT: Negative. Eyes: Negative. Respiratory: Negative. Negative for shortness of breath. Cardiovascular: Negative. Negative for chest pain, palpitations and leg swelling. Gastrointestinal: Negative. Negative for abdominal pain, anal bleeding, blood in stool and nausea. Endocrine: Negative. Genitourinary: Negative. Negative for hematuria. Musculoskeletal: Negative. Skin: Positive for rash. Allergic/Immunologic: Negative. Neurological: Negative. Negative for dizziness, syncope, light-headedness and numbness. Hematological: Negative. Does not bruise/bleed easily. Psychiatric/Behavioral: Negative. All other systems reviewed and are negative.        Past Medical History:   Diagnosis Date    Bipolar disorder     Borderline osteopenia     Cellulitis of left leg 2020    Frequency of micturition     GERD (gastroesophageal reflux disease)     Headache(784.0)     HNP (herniated nucleus pulposus), lumbar 2019    Hyperlipidemia     Hypertension     Intention tremor     due to lithium    Iron deficiency anemia 2019    Lateral meniscal tear 10/14/2015    Lumbar stenosis with neurogenic claudication 2019    Medial meniscus tear 10/14/2015    Mixed incontinence     Morbid (severe) obesity due to excess calories (HCC)     Prolonged emergence from general anesthesia     Prolonged emergence from general anesthesia, initial encounter 2019    Tobacco use     Venous ulcer of left leg ([facility] 2020     Family History   Problem Relation Age of Onset    Depression Sister     Mental Illness Sister     Diabetes Mother     Heart Disease Mother     Diabetes Father     Heart Disease Father      Past Surgical History:   Procedure Laterality Date    ABDOMINAL EXPLORATION SURGERY      CARPAL TUNNEL RELEASE Bilateral      SECTION      CHOLECYSTECTOMY, LAPAROSCOPIC N/A 2022    LAPAROSCOPIC CHOLECYSTECTOMY performed by Mark Loya MD [place] at [facility]    normal    COLONOSCOPY N/A 2021    COLON W/ANES. (13:30) performed by Ayanna Haines MD at [address] Left     atrhroscopic unispacer    KNEE SURGERY Right 10/28/2015    Right knee video arthroscopy with partial medial and lateral menisectomy with loose body removal    LUMBAR SPINE SURGERY N/A 2019    MICROLUMBAR DISCECTOMY L4-5 performed by Debora Soni MD at [place] [address] Marital status:      Spouse name: Not on file    Number of children: Not on file    Years of education: Not on file    Highest education level: Not on file   Occupational History    Not on file   Tobacco Use    Smoking status: Former Smoker     Packs/day: 1.50     Years: 21.00     Pack years: 31.50     Types: Cigarettes     Quit date: 1997     Years since quittin.3    Smokeless tobacco: Never Used   Vaping Use    Vaping Use: Never used   Substance and Sexual Activity    Alcohol use: No    Drug use: No    Sexual activity: Not Currently   Other Topics Concern    Not on file   Social History Narrative    Not on file     Social Determinants of Health     Financial Resource Strain: Low Risk     Difficulty of Paying Living Expenses: Not hard at all   Food Insecurity: No Food Insecurity    Worried About [facility] in the Last Year: Never true    [facility] in the Last Year: Never true   Transportation Needs:     Lack of Transportation (Medical): Not on file    Lack of Transportation (Non-Medical):  Not on file   Physical Activity: Inactive    Days of Exercise per Week: 0 days    Minutes of Exercise per Session: 0 min   Stress:     Feeling of Stress : Not on file   Social Connections:     Frequency of Communication with Friends and Family: Not on file    Frequency of Social Gatherings with Friends and Family: Not on file    Attends Roman [employer] Services: Not on file    Active Member of Clubs or Organizations: Not on file    Attends Club or Organization Meetings: Not on file    Marital Status: Not on file   Intimate Partner Violence:     Fear of Current or Ex-Partner: Not on file    Emotionally Abused: Not on file    Physically Abused: Not on file    Sexually Abused: Not on file   Housing Stability:     Unable to Pay for Housing in the Last Year: Not on file    Number of [place] in the Last Year: Not on file    Unstable Housing in the Last Year: Not on file     Current Outpatient Medications   Medication Sig Dispense Refill    buPROPion (WELLBUTRIN XL) 300 MG extended release tablet Take 1 tablet by mouth daily 30 tablet 1    lamoTRIgine (LAMICTAL) 100 MG tablet TAKE 1 TABLET TWICE DAILY 180 tablet 1    metoprolol succinate (TOPROL XL) 50 MG extended release tablet TAKE 1 TABLET EVERY DAY 90 tablet 1    rosuvastatin (CRESTOR) 5 MG tablet TAKE 1 TABLET EVERY DAY 90 tablet 1    Cholecalciferol (VITAMIN D) 50 MCG (2000 UT) CAPS capsule Take 1 capsule by mouth daily      Vitamin D-Vitamin K (K2 PLUS D3) 100-1000 MCG-UNIT TABS Take 1 tablet by mouth daily      budesonide-formoterol (SYMBICORT) 160-4.5 MCG/ACT AERO Inhale 2 puffs into the lungs 2 times daily 3 each 2    celecoxib (CELEBREX) 200 MG capsule Take 1 capsule by mouth 2 times daily 180 capsule 1    VORTIoxetine HBr (TRINTELLIX) 20 MG TABS tablet TAKE 1 TABLET EVERY DAY 90 tablet 1    buPROPion (WELLBUTRIN XL) 150 MG extended release tablet Take 1 tablet every day 90 tablet 3    Coenzyme Q10 (COQ10) 100 MG CAPS Take by mouth      albuterol sulfate HFA (PROVENTIL HFA) 108 (90 Base) MCG/ACT inhaler Inhale 2 puffs into the lungs every 6 hours as needed for Wheezing or Shortness of Breath 3 Inhaler 3    ferrous sulfate (IRON 325) 325 (65 Fe) MG tablet Take 1 tablet by mouth daily (with breakfast) 90 tablet 3    loratadine (CLARITIN) 10 MG tablet Take 10 mg by mouth daily      multivitamin (THERAGRAN) per tablet Take 1 tablet by mouth daily.  Omega-3 Fatty Acids (FISH OIL) 1200 MG CAPS Take  by mouth daily.  gabapentin (NEURONTIN) 300 MG capsule Take 1 capsule by mouth 3 times daily for 30 days. Intended supply: 30 days (Patient not taking: Reported on 6/22/2022) 90 capsule 0    blood glucose monitor kit and supplies Dispense sufficient amount for indicated testing frequency plus additional to accommodate PRN testing needs. Dispense all needed supplies to include: monitor, strips, lancing device, lancets, control solutions, alcohol swabs. 1 kit 0    Lancets MISC Use to check fasting glucose level q am/once daily 100 each 5    blood glucose monitor strips Test 1 times a day & as needed for symptoms of irregular blood up to twice per day 100 strip 5    fluticasone (FLONASE) 50 MCG/ACT nasal spray 1 spray by Nasal route 2 times daily 3 Bottle 3     No current facility-administered medications for this visit. No changes in past medical history, past surgical history, social history, orfamily history were noted during the patient encounter unless specifically listed above. All updates of past medical history, past surgical history, social history, or family history were reviewed personally by me duringthe office visit. All problems listed in the assessment are stable unless noted otherwise. Medication profile reviewed personally by me during the office visit. Medication side effects and possible impairments frommedications were discussed as applicable. Objective:     Physical Exam  Constitutional:       General: She is not in acute distress. Appearance: Normal appearance. She is well-developed. She is obese. She is not toxic-appearing. HENT:      Head: Normocephalic and atraumatic.       Right Ear: Hearing, tympanic membrane and ear canal normal.      Left Ear: Hearing, tympanic membrane and ear canal normal.      Nose: Nose normal. Mouth/Throat:      Pharynx: Uvula midline. Eyes:      General: Lids are normal.      Conjunctiva/sclera: Conjunctivae normal.   Cardiovascular:      Rate and Rhythm: Normal rate and regular rhythm. Pulses: Normal pulses. Heart sounds: Normal heart sounds. Pulmonary:      Effort: Pulmonary effort is normal. No accessory muscle usage or respiratory distress. Breath sounds: Normal breath sounds. Abdominal:      Palpations: Abdomen is soft. Tenderness: There is no abdominal tenderness. Musculoskeletal:      Cervical back: Neck supple. Lymphadenopathy:      Head:      Right side of head: No submental or submandibular adenopathy. Left side of head: No submental or submandibular adenopathy. Cervical: No cervical adenopathy. Skin:     General: Skin is warm and dry. Findings: Rash present. No lesion. Rash is papular and vesicular. Comments: A papulovesicular rash noted on bilateral upper extremities and neck. Significant papulovesicular rash on bilateral lower extremities especially in the posterior knee region   Neurological:      Mental Status: She is alert and oriented to person, place, and time. Psychiatric:         Speech: Speech normal.         Behavior: Behavior normal. Behavior is cooperative. BP (!) 146/84 (Site: Left Upper Arm, Position: Sitting, Cuff Size: Medium Adult)   Pulse 78   Ht 5' 1\" (1.549 m)   Wt (!) 321 lb (145.6 kg)   LMP  (LMP Unknown)   SpO2 96%   BMI 60.65 kg/m²   Body mass index is 60.65 kg/m².     BP Readings from Last 2 Encounters:   06/22/22 (!) 146/84   04/25/22 126/72       Wt Readings from Last 3 Encounters:   06/22/22 (!) 321 lb (145.6 kg)   05/27/22 (!) 325 lb (147.4 kg)   05/04/22 (!) 325 lb (147.4 kg)       Lab Review   Office Visit on 04/25/2022   Component Date Value    HPV TYPE 16 04/25/2022 Not Detected     HPV TYPE 18 04/25/2022 Not Detected     HPVOH (OTHER TYPES) 04/25/2022 Not Detected     HPV Comment 04/25/2022 See below        No results found for this visit on 06/22/22. Assessment:       1. Rhus dermatitis        No results found for this visit on 06/22/22. Plan:       [facility] was seen today for other. Diagnoses and all orders for this visit:    Rhus dermatitis  -     methylPREDNISolone sodium (SOLU-MEDROL) injection 125 mg  Patient counselled on proper usage of medication, and was advised of potential side effects and risks associated with this medication. The patient expressed understanding of above and agreed to proceed with treatment. Patient has been instructed call the office immediately with new symptoms, change in symptoms or worseningof symptoms. If this is not feasible, patient is instructed to report to the emergency room. Medication profile reviewed. Medication side effects and possible impairments from medications were discussed as applicable. Allergies were reviewed. Health maintenance was reviewed and updated as appropriate. Return if symptoms worsen or fail to improve. (Comment: Please note this report has been produced using a combination of typing and speech recognition software and may contain errors related to that system including errors in grammar, punctuation, and spelling, as well as words and phrases that may be inappropriate.  If there are any questions or concerns please feel free to contact the dictating provider for clarification.)

## 2022-06-22 NOTE — FLOWSHEET NOTE
[facility], [address] [place] [address], [address] [place] [address] [address]  Phone: [phone]   Fax:     [phone]    Physical Therapy  Cancellation/No-show Note  Patient Name:  Kyree Wiseman  :  1960   Date:  2022    Cancelled visits to date: 3  No-shows to date: 0    For today's appointment patient:  [x]  Cancelled  []  Rescheduled appointment  []  No-show     Reason given by patient:  []  Patient ill  []  Conflicting appointment  []  No transportation    []  Conflict with work  []  No reason given  [x]  Other:     Comments:  Poison ivy    Phone call information:   []  Phone call made today to patient at am/pm at the number provided:      []  Patient answered, conversation as follows:    []  Patient did not answer, message left as follows:  [x]  Phone call not needed - pt contacted us to cancel and provided reason for cancellation.      Electronically signed by:  Randall Masters PTA

## 2022-06-27 ENCOUNTER — OFFICE VISIT (OUTPATIENT)
Dept: ORTHOPEDIC SURGERY | Age: 62
End: 2022-06-27
Payer: MEDICARE

## 2022-06-27 VITALS — BODY MASS INDEX: 55.32 KG/M2 | HEIGHT: 61 IN | WEIGHT: 293 LBS

## 2022-06-27 DIAGNOSIS — M47.812 CERVICAL SPONDYLOSIS: Primary | ICD-10-CM

## 2022-06-27 PROCEDURE — G8427 DOCREV CUR MEDS BY ELIG CLIN: HCPCS | Performed by: ORTHOPAEDIC SURGERY

## 2022-06-27 PROCEDURE — 1036F TOBACCO NON-USER: CPT | Performed by: ORTHOPAEDIC SURGERY

## 2022-06-27 PROCEDURE — G8417 CALC BMI ABV UP PARAM F/U: HCPCS | Performed by: ORTHOPAEDIC SURGERY

## 2022-06-27 PROCEDURE — 3017F COLORECTAL CA SCREEN DOC REV: CPT | Performed by: ORTHOPAEDIC SURGERY

## 2022-06-27 PROCEDURE — 99213 OFFICE O/P EST LOW 20 MIN: CPT | Performed by: ORTHOPAEDIC SURGERY

## 2022-06-27 NOTE — PROGRESS NOTES
New Patient: CERVICAL SPINE    Referring Provider:  No ref. provider found    CHIEF COMPLAINT:    Chief Complaint   Patient presents with    Follow-up     Cervical spine         HISTORY OF PRESENT ILLNESS:   Ms. Claudia Dejesus retunrs today for the evaluation of neck and right greater than left arm pain. She notes her symptoms began insidiously about 4 months ago. Those have increased since that time. She rates the intensity of her neck pain 5/10 in her bilateral shoulder pain 7/10. Her arm pain radiates to her elbows. she notes weakness of her arms and loss of fine motor control. She denies numbness and tingling of her arms and gait abnormality. Recent shoulder injection not help her pain.   She is status post MLD by me a few years ago    Current/Past Treatment:   · Physical Therapy: 1 visit  · Chiropractic: None  · Injection: None  · Medications: None    Past Medical History:   Past Medical History:   Diagnosis Date    Bipolar disorder     Borderline osteopenia     Cellulitis of left leg 2020    Frequency of micturition     GERD (gastroesophageal reflux disease)     Headache(784.0)     HNP (herniated nucleus pulposus), lumbar 2019    Hyperlipidemia     Hypertension     Intention tremor     due to lithium    Iron deficiency anemia 2019    Lateral meniscal tear 10/14/2015    Lumbar stenosis with neurogenic claudication 2019    Medial meniscus tear 10/14/2015    Mixed incontinence     Morbid (severe) obesity due to excess calories (HCC)     Prolonged emergence from general anesthesia     Prolonged emergence from general anesthesia, initial encounter 2019    Tobacco use     Venous ulcer of left leg ([address] 2020      Past Surgical History:     Past Surgical History:   Procedure Laterality Date    ABDOMINAL EXPLORATION SURGERY      CARPAL TUNNEL RELEASE Bilateral 2005     SECTION      CHOLECYSTECTOMY, LAPAROSCOPIC N/A 2022    LAPAROSCOPIC CHOLECYSTECTOMY performed by Sayda Ramirez MD at [facility]    normal    COLONOSCOPY N/A 02/02/2021    COLON W/ANES. (13:30) performed by Arnold Navarro MD at [facility] Left     atrhroscopic unispacer    KNEE SURGERY Right 10/28/2015    Right knee video arthroscopy with partial medial and lateral menisectomy with loose body removal    LUMBAR SPINE SURGERY N/A 06/12/2019    MICROLUMBAR DISCECTOMY L4-5 performed by Edilma Wiley MD at [place] [address].       Current Medications:     Current Outpatient Medications:     buPROPion (WELLBUTRIN XL) 300 MG extended release tablet, Take 1 tablet by mouth daily, Disp: 30 tablet, Rfl: 1    lamoTRIgine (LAMICTAL) 100 MG tablet, TAKE 1 TABLET TWICE DAILY, Disp: 180 tablet, Rfl: 1    metoprolol succinate (TOPROL XL) 50 MG extended release tablet, TAKE 1 TABLET EVERY DAY, Disp: 90 tablet, Rfl: 1    rosuvastatin (CRESTOR) 5 MG tablet, TAKE 1 TABLET EVERY DAY, Disp: 90 tablet, Rfl: 1    Cholecalciferol (VITAMIN D) 50 MCG (2000 UT) CAPS capsule, Take 1 capsule by mouth daily, Disp: , Rfl:     Vitamin D-Vitamin K (K2 PLUS D3) 100-1000 MCG-UNIT TABS, Take 1 tablet by mouth daily, Disp: , Rfl:     budesonide-formoterol (SYMBICORT) 160-4.5 MCG/ACT AERO, Inhale 2 puffs into the lungs 2 times daily, Disp: 3 each, Rfl: 2    celecoxib (CELEBREX) 200 MG capsule, Take 1 capsule by mouth 2 times daily, Disp: 180 capsule, Rfl: 1    VORTIoxetine HBr (TRINTELLIX) 20 MG TABS tablet, TAKE 1 TABLET EVERY DAY, Disp: 90 tablet, Rfl: 1    buPROPion (WELLBUTRIN XL) 150 MG extended release tablet, Take 1 tablet every day, Disp: 90 tablet, Rfl: 3    Coenzyme Q10 (COQ10) 100 MG CAPS, Take by mouth, Disp: , Rfl:     albuterol sulfate HFA (PROVENTIL HFA) 108 (90 Base) MCG/ACT inhaler, Inhale 2 puffs into the lungs every 6 hours as needed for Wheezing or Shortness of Breath, Disp: 3 Inhaler, Rfl: 3    ferrous sulfate (IRON 325) 325 (65 Fe) MG tablet, Take 1 tablet by mouth daily (with breakfast), Disp: 90 tablet, Rfl: 3    loratadine (CLARITIN) 10 MG tablet, Take 10 mg by mouth daily, Disp: , Rfl:     multivitamin (THERAGRAN) per tablet, Take 1 tablet by mouth daily. , Disp: , Rfl:     Omega-3 Fatty Acids (FISH OIL) 1200 MG CAPS, Take  by mouth daily. , Disp: , Rfl:     fluticasone (FLONASE) 50 MCG/ACT nasal spray, 1 spray by Nasal route 2 times daily, Disp: 3 Bottle, Rfl: 3  Allergies:  Calamine, Amoxicillin, Amoxicillin-pot clavulanate, Chlorpheniramine maleate, Daypro [oxaprozin], Duravent-da [chlorphen-phenyleph-methscop], Lithium, Methscopolamine, Norvasc [amlodipine], Nsaids, Phenylephrine hcl, Seldane [terfenadine], Suprax [cefixime], and Levofloxacin  Social History:    reports that she quit smoking about 25 years ago. Her smoking use included cigarettes. She has a 31.50 pack-year smoking history. She has never used smokeless tobacco. She reports that she does not drink alcohol and does not use drugs. Family History:   Family History   Problem Relation Age of Onset    Depression Sister     Mental Illness Sister     Diabetes Mother     Heart Disease Mother     Diabetes Father     Heart Disease Father        REVIEW OF SYSTEMS: Full ROS noted & scanned   CONSTITUTIONAL: Denies unexplained weight loss, fevers, chills or fatigue  NEUROLOGICAL: Denies unsteady gait or progressive weakness  MUSCULOSKELETAL: Denies joint swelling or redness  PSYCHOLOGICAL: Denies anxiety, depression   SKIN: Denies skin changes, delayed healing, rash, itching   HEMATOLOGIC: Denies easy bleeding or bruising  ENDOCRINE: Denies excessive thirst, urination, heat/cold  RESPIRATORY: Denies current dyspnea, cough  GI: Denies nausea, vomiting, diarrhea   : Denies bowel or bladder issues       PHYSICAL EXAM:    Vitals: Height 5' 1\" (1.549 m), weight (!) 321 lb (145.6 kg), not currently breastfeeding.     GENERAL EXAM:  · General Apparence: Patient is adequately groomed with no evidence of malnutrition. · Orientation: The patient is oriented to time, place and person. · Mood & Affect:The patient's mood and affect are appropriate   · Vascular: Examination reveals no swelling tenderness in upper or lower extremities. Good capillary refill  · Lymphatic: The lymphatic examination bilaterally reveals all areas to be without enlargement or induration  · Sensation: Sensation is intact without deficit  · Coordination/Balance: Good coordination     CERVICAL EXAMINATION:  · Inspection: Local inspection shows no step-off or bruising. Cervical alignment is normal.     · Palpation: No evidence of tenderness at the midline, and trapezius. Paraspinal tenderness is present. There is no step-off or paraspinal spasm. · Range of Motion: Cervical flexion, extension, and rotation are mildly reduced with pain. · Strength: 5/5 bilateral upper extremities   · Special Tests:    ·  Garza's negative bilaterally. ·      Cubital and Carpal tunnel Tinel's negative bilaterally. · Skin:There are no rashes, ulcerations or lesions in right & left upper extremities. · Reflexes: Bilaterally triceps, biceps and brachioradialis are 2+. Clonus absent bilaterally at the feet. · Gait & station: normal, patient ambulates without assistance     · Additional Examinations:       · RIGHT UPPER EXTREMITY:  Inspection/examination of the right upper extremity does not show any tenderness, deformity or injury. Range of motion is unremarkable. There is no gross instability. There are no rashes, ulcerations or lesions. Strength and tone are normal.  · LEFT UPPER EXTREMITY: Inspection/examination of the left upper extremity does not show any tenderness, deformity or injury. Range of motion is unremarkable. There is no gross instability. There are no rashes, ulcerations or lesions.  Strength and tone are normal.    Diagnostic Testing:  I reviewed AP and lateral x-rays of her cervical spine were obtained in the office during her last visit. Those show spondylosis. Impression:   Cervical spondylosis with radiculitis    Plan:    Discussed treatment options including observation, physical therapy, medications, epidural injections and additional imaging. She would like to continue with physical therapy.   She will call to schedule cervical MRI if her symptoms persist after those

## 2022-06-28 ENCOUNTER — HOSPITAL ENCOUNTER (OUTPATIENT)
Dept: PHYSICAL THERAPY | Age: 62
Setting detail: THERAPIES SERIES
Discharge: HOME | End: 2022-06-28
Payer: MEDICARE

## 2022-06-28 PROCEDURE — 97140 MANUAL THERAPY 1/> REGIONS: CPT | Performed by: PHYSICAL THERAPY ASSISTANT

## 2022-06-28 PROCEDURE — 97110 THERAPEUTIC EXERCISES: CPT | Performed by: PHYSICAL THERAPY ASSISTANT

## 2022-06-28 NOTE — FLOWSHEET NOTE
[address] [facility] and [address] [facility], [address] ([facility]), [facility] [place]  Phone: [phone], [phone]    Physical Therapy Treatment Note/ Progress Report:     Date:  2022    Patient Name:  Behzad Grubbs    :  1960  MRN: [id number]  Restrictions/Precautions:    Medical/Treatment Diagnosis Information:  · Diagnosis: Left hip OA  · Treatment Diagnosis: R51.76  Insurance/Certification information:  PT Insurance Information: [facility] Medicare  Physician Information:     Has the plan of care been signed (Y/N):        []  Yes  [x]  No     Date of Patient follow up with Physician:     Is this a Progress Report:     []  Yes  [x]  No      If Yes:  Date Range for reporting period:  Initial Eval: 2022  Beginnin2022 --- Endin22    Progress report will be due (10 Rx or 30 days whichever is less): 2/10/14     Recertification will be due (POC Duration  / 90 days whichever is less): 22      Visit # Insurance Allowable Auth Required   In Person 2 BMN []  Yes     []  No    Tele Health 0  []  Yes     []  No    Total 2       FOTO Score: 43 (Predicted: 53)   Date assessed: 2022      Latex Allergy:  [x]NO      []YES  Preferred Language for Healthcare:   [x]English       []other:    Pain level:  0/10 at treatment time    SUBJECTIVE:  States the hip feels a little better. She has not been too good at doing her HEP secondary to the pain it causes in her left knee. Moving in bed causes increased pain, moving supine to sit or moving in a chair causes hip discomfort.       OBJECTIVE:    Observation:    Test measurements: See Eval    RESTRICTIONS/PRECAUTIONS:     Exercises/Interventions:   Therapeutic Ex (41437)  Therapeutic Activity (24859)  NMR re-education (58587) Sets/Reps Notes/CUES   Bike Unable to ride a bike secondary to implant in left knee    Standing HR  2x10          Hook lying:  Glute squeeze  Seated Add squeeze  Abd clam  Marching  Mini crunch   5\"x20  10\"x10  X20 red   x20 ea  x    Bent knee drop out  X20 R,L     Supine SAQ x20    Supine knee flexion with slide board  x20    Supine hip ABD with slide board  X20     EOB LAQ 2x10    Seated HS curl Red 2x10                                                                          Manual Intervention (40946)     STM - left IT band                              Medbridge access code: [email or web address] [facility]                    Patient Education 5 min Provided biomechanics/ergonomics training to reduce stress across injured/healing structures. Therapeutic Exercise and NMR EXR  [x] (13184) Provided verbal/tactile cueing for activities related to strengthening, flexibility, endurance, ROM for improvements in LE, proximal hip, and core control with self care, mobility, lifting, ambulation. [x] (28878) Provided verbal/tactile cueing for activities related to improving balance, coordination, kinesthetic sense, posture, motor skill, proprioception to assist with LE, proximal hip, and core control in self-care, mobility, lifting, ambulation and eccentric single leg control. NMR and Therapeutic Activities:    [x] (77907 or 47379) Provided verbal/tactile cueing for activities related to improving balance, coordination, kinesthetic sense, posture, motor skill, proprioception and motor activation to allow for proper function of core, proximal hip and LE with self-care and ADLs and functional mobility.    [x] (33078) Gait Re-education- Provided training and instruction to the patient for proper LE, core and proximal hip recruitment and positioning and eccentric body weight control with ambulation re-education including up and down stairs     Home Exercise Program:    [x] (39359) Reviewed/Progressed HEP activities related to strengthening, flexibility, endurance, ROM of core, proximal hip and LE for functional self-care, mobility, lifting and ambulation/stair navigation   [x] (98674) Reviewed/Progressed HEP activities related to improving balance, coordination, kinesthetic sense, posture, motor skill, proprioception of core, proximal hip and LE for self-care, mobility, lifting, and ambulation/stair navigation      Manual Treatments:  PROM / STM / Oscillations-Mobs:  G-I, II, III, IV (PA's, Inf., Post.)  [x] (13569) Provided manual therapy to mobilize LE, proximal hip and/or LS spine soft tissue/joints for the purpose of modulating pain, promoting relaxation, increasing ROM, reducing/eliminating soft tissue swelling/inflammation/restriction, improving soft tissue extensibility and allowing for proper ROM for normal function with self-care, mobility, lifting and ambulation. Modalities:      Charges:  Timed Code Treatment Minutes: 45   Total Treatment Minutes:  45   BWC:  TE TIME:  NMR TIME:  MANUAL TIME:  UNTIMED MINUTES:  Medicare Total:                [] EVAL (LOW) 14137 (typically 20 minutes face-to-face)  [] EVAL (MOD) 39343 (typically 30 minutes face-to-face)  [] EVAL (HIGH) 07507 (typically 45 minutes face-to-face)  [] RE-EVAL     [x] UN(75010) x 2   [] IONTO  [x] NMR (02101) x 1    [] VASO  [] Manual (91478) x     [] Other:  [] TA x      [] Mech Traction (10801)  [] ES(attended) (29748)      [] ES (un) (39264):    ASSESSMENT:  Patient presents with signs and symptoms consistent with diagnosis of left hip OA/pain. Rehab potential is good at this time. Key impairments include: decreased ROM and strength of the left lower extremity, poor balance and compensatory gait patterning, increased swelling, and pain with functional activities such as squatting, walking, lunging, and stairs. Skilled PT is required to address these key impairments and to provide and progress with an appropriate home exercise program. The patient's complexity may change due to the nature of the patients presentation as well as the comorbidities and medical factors included in this patient's evaluation. GOALS:   Short Term Goals:  To be achieved in: 2 weeks  1. Independent in HEP and progression per patient tolerance, in order to prevent re-injury. []? Progressing: []? Met: []? Not Met: []? Adjusted       2. Patient will have a decrease in pain to facilitate improvement in movement, function, and ADLs as indicated by Functional Deficits. []? Progressing: []? Met: []? Not Met: []? Adjusted          Long Term Goals: To be achieved in: 12 weeks  1. FOTO score will be within 10 points of the predicted score to assist with reaching prior level of function. []? Progressing: []? Met: []? Not Met: []? Adjusted      2. Patient will demonstrate increased AROM to equal the opposite side bilaterally to allow for proper joint functioning as indicated by patients Functional Deficits. []? Progressing: []? Met: []? Not Met: []? Adjusted       3. Patient will demonstrate an increase in strength to be within 3lbs on the HHD or match bilaterally to allow for proper functional mobility as indicated by patients Functional Deficits. []? Progressing: []? Met: []? Not Met: []? Adjusted       4. Patient will return to all transfers, work activities, and functional activities without increased symptoms or restriction. []? Progressing: []? Met: []? Not Met: []? Adjusted       5. Patient will have 0/10 pain with ADL's.  []? Progressing: []? Met: []? Not Met: []? Adjusted       6. Patient stated goal: Would like to be able to walk for exercise for 15 min without stopping. []? Progressing: []? Met: []? Not Met: []? Adjusted      Overall Progression Towards Functional goals/ Treatment Progress Update:  [] Patient is progressing as expected towards functional goals listed. [] Progression is slowed due to complexities/Impairments listed. [] Progression has been slowed due to co-morbidities.   [x] Plan just implemented, too soon to assess goals progression <30days   [] Goals require adjustment due to lack of progress  [] Patient is not progressing as expected and requires additional follow up with physician  [] Other    Prognosis for POC: [x] Good [] Fair  [] Poor    Patient requires continued skilled intervention: [x] Yes  [] No    Treatment/Activity Tolerance:  [x] Patient able to complete treatment  [] Patient limited by fatigue  [] Patient limited by pain    [] Patient limited by other medical complications  [] Other:     Return to Play: (if applicable)   []  Stage 1: Intro to Strength   []  Stage 2: Return to Run and Strength   []  Stage 3: Return to Jump and Strength   []  Stage 4: Dynamic Strength and Agility   []  Stage 5: Sport Specific Training     []  Ready to Return to Play, Meets All Above Stages   []  Not Ready for Return to Sports   Comments:                         PLAN: See eval  [] Continue per plan of care [] Alter current plan (see comments above)  [x] Plan of care initiated [] Hold pending MD visit [] Discharge    Electronically signed by:  Sylvia Dominguez PTA    Note: If patient does not return for scheduled/ recommended follow up visits, this note will serve as a discharge from care along with most recent update on progress.

## 2022-06-29 ENCOUNTER — HOSPITAL ENCOUNTER (OUTPATIENT)
Dept: PHYSICAL THERAPY | Age: 62
Setting detail: THERAPIES SERIES
Discharge: HOME | End: 2022-06-29
Payer: MEDICARE

## 2022-06-29 PROCEDURE — 97110 THERAPEUTIC EXERCISES: CPT

## 2022-06-29 PROCEDURE — 97140 MANUAL THERAPY 1/> REGIONS: CPT

## 2022-06-29 NOTE — FLOWSHEET NOTE
[facility], [address] [place] [address], [facility]  Phone: [phone], [phone]    Physical Therapy Treatment Note/ Progress Report:     Date:  2022    Patient Name:  Phyllis Chowdhury    :  1960  MRN: [id number]  Restrictions/Precautions:    Medical/Treatment Diagnosis Information:  · Diagnosis: M47.22 Cervical spondylosis with radiculopathy  ·  Treatment diagnosis - Cervical pain M54.2, generalized UE weaknes L06.73/7  Insurance/Certification information:  PT Insurance Information: [employer] $35 copay no auth BMN  Physician Information:   Dr. Luis Wiseman   Has the plan of care been signed (Y/N):        []  Yes  [x]  No     Date of Patient follow up with Physician:     Is this a Progress Report:     []  Yes  [x]  No      If Yes:  Date Range for reporting period:  Initial Eval: 2022  Beginnin2022 --- Ending:     Progress report will be due (10 Rx or 30 days whichever is less): 1560     Recertification will be due (POC Duration  / 90 days whichever is less): 2022     Visit # Insurance Allowable Auth Required   2 BMN $35 []  Yes     []  No      FOTO Score: 59 (Predicted: 62)   Date assessed: 2022     Latex Allergy:  [x]NO      []YES  Preferred Language for Healthcare:   [x]English       []other:    Pain level:  3/10     SUBJECTIVE:  Pt reports pain with chin tucks    OBJECTIVE:    Observation:    Test measurements:      RESTRICTIONS/PRECAUTIONS: none    Exercises/Interventions:   Therapeutic Ex (62143)  Therapeutic Activity (20124)  NMR re-education () Sets/Reps Notes/CUES   Supine chin tuck 10 x 5\"    SBS  No $ 10 x 5\"  2 x 10 RTB  RTB   TB row  TB ext 2 x 10  2 x 10 Cues for UT compensation   Cheerleaders  2 x 10 RTB                                                                                                       Manual Intervention (54335)     UT trigger point release,  Global cervical STM, 1st rib mob 10 mins Patient Education 10 mins Prognosis, progression, scapular mechanics and UT compensation      Access Code: [email or web address]  URL: [email or web address]  Date: 06/29/2022  Prepared by: Jacques Manriquez    Exercises  Supine Deep Neck Flexor Training - Repetitions - 1-2 x daily - 7 x weekly - 2 sets - 10 reps  Seated Scapular Retraction - 1-2 x daily - 7 x weekly - 15 reps - 3 hold  Shoulder External Rotation and Scapular Retraction with Resistance - 1-2 x daily - 7 x weekly - 2 sets - 10 reps - 3 hold  Scapular Retraction with Resistance - 1-2 x daily - 7 x weekly - 2 sets - 10 reps  Standing Cheerleaders - 1-2 x daily - 7 x weekly - 2 sets - 10 reps      Therapeutic Exercise and NMR EXR  [x] (14843) Provided verbal/tactile cueing for activities related to strengthening, flexibility, endurance, ROM  for improvements in scapular, scapulothoracic and UE control with self care, reaching, carrying, lifting, house/yardwork, driving/computer work. [x] (37439) Provided verbal/tactile cueing for activities related to improving balance, coordination, kinesthetic sense, posture, motor skill, proprioception  to assist with  scapular, scapulothoracic and UE control with self care, reaching, carrying, lifting, house/yardwork, driving/computer work. Therapeutic Activities:    [x] (01174 or 87673) Provided verbal/tactile cueing for activities related to improving balance, coordination, kinesthetic sense, posture, motor skill, proprioception and motor activation to allow for proper function of scapular, scapulothoracic and UE control with self care, carrying, lifting, driving/computer work.      Home Exercise Program:    [x] (66153) Reviewed/Progressed HEP activities related to strengthening, flexibility, endurance, ROM of scapular, scapulothoracic and UE control with self care, reaching, carrying, lifting, house/yardwork, driving/computer work  [x] (54194) Reviewed/Progressed HEP activities related to improving balance, coordination, kinesthetic sense, posture, motor skill, proprioception of scapular, scapulothoracic and UE control with self care, reaching, carrying, lifting, house/yardwork, driving/computer work      Manual Treatments:  PROM / STM / Oscillations-Mobs:  G-I, II, III, IV (PA's, Inf., Post.)  [x] (64939) Provided manual therapy to mobilize soft tissue/joints of cervical/CT, scapular GHJ and UE for the purpose of modulating pain, promoting relaxation,  increasing ROM, reducing/eliminating soft tissue swelling/inflammation/restriction, improving soft tissue extensibility and allowing for proper ROM for normal function with self care, reaching, carrying, lifting, house/yardwork, driving/computer work    Modalities:     [] GAME READY (VASO)- for significant edema, swelling, pain control. Charges:  Timed Code Treatment Minutes: 40   Total Treatment Minutes:  40      [] EVAL (LOW) 46215 (typically 20 minutes face-to-face)  [] EVAL (MOD) 34160 (typically 30 minutes face-to-face)  [] EVAL (HIGH) 40475 (typically 45 minutes face-to-face)  [] RE-EVAL     [x] DF(91356) x 2    [] IONTO  [] NMR (43581) x     [] VASO  [x] Manual (77192) x 1    [] Other:  [] TA x      [] Mech Traction (84203)  [] ES(attended) (55778)     [] ES (un) (45733):    ASSESSMENT SUMMARY:  Tolerated treatment well. May benefit from dry needling of R UT for trigger points. Patient received education on their current pathology and how their condition effects them with their functional activities. Patient understood discussion and questions were answered. Patient understands their activity limitations and understands rational for treatment progression. Pt educated on plan of care and HEP, if worsening symptoms to d/c that exercise. GOALS:   Short Term Goals: To be achieved in: 2 weeks  1. Independent in HEP and progression per patient tolerance, in order to prevent re-injury. []? Progressing: []? Met: []?  Not Met: []? Adjusted       2. Patient will have a decrease in pain to facilitate improvement in movement, function, and ADLs as indicated by Functional Deficits. []? Progressing: []? Met: []? Not Met: []? Adjusted          Long Term Goals: To be achieved in: 12 weeks  1. FOTO score will be within 10 points of the predicted score to assist with reaching prior level of function. []? Progressing: []? Met: []? Not Met: []? Adjusted      2. Patient will demonstrate increased AROM to equal the opposite side bilaterally to allow for normal ADLs as indicated by patients Functional Deficits. []? Progressing: []? Met: []? Not Met: []? Adjusted       3. Patient will demonstrate an increase in strength to  to allow for 4+/5 as indicated by patients Functional Deficits. []? Progressing: []? Met: []? Not Met: []? Adjusted       4. Patient will return to all transfers, work activities, and functional activities without increased symptoms or restriction. []? Progressing: []? Met: []? Not Met: []? Adjusted       5. Patient will have 0/10 pain with ADL's.  []? Progressing: []? Met: []? Not Met: []? Adjusted     Overall Progression Towards Functional goals/ Treatment Progress Update:  [] Patient is progressing as expected towards functional goals listed. [] Progression is slowed due to complexities/Impairments listed. [] Progression has been slowed due to co-morbidities.   [x] Plan just implemented, too soon to assess goals progression <30days   [] Goals require adjustment due to lack of progress  [] Patient is not progressing as expected and requires additional follow up with physician  [] Other    Prognosis for POC: [x] Good [] Fair  [] Poor    Patient requires continued skilled intervention: [x] Yes  [] No    Treatment/Activity Tolerance:  [x] Patient able to complete treatment  [] Patient limited by fatigue  [] Patient limited by pain    [] Patient limited by other medical complications  [] Other:                  PLAN: See eval  [] Continue per plan of care [] Alter current plan (see comments above)  [x] Plan of care initiated [] Hold pending MD visit [] Discharge    Electronically signed by:  Rose Reich PT    Note: If patient does not return for scheduled/ recommended follow up visits, this note will serve as a discharge from care along with most recent update on progress.

## 2022-07-05 ENCOUNTER — HOSPITAL ENCOUNTER (OUTPATIENT)
Dept: PHYSICAL THERAPY | Age: 62
Setting detail: THERAPIES SERIES
Discharge: HOME | End: 2022-07-05
Payer: MEDICARE

## 2022-07-05 PROCEDURE — 97112 NEUROMUSCULAR REEDUCATION: CPT | Performed by: PHYSICAL THERAPY ASSISTANT

## 2022-07-05 PROCEDURE — 97110 THERAPEUTIC EXERCISES: CPT | Performed by: PHYSICAL THERAPY ASSISTANT

## 2022-07-05 NOTE — FLOWSHEET NOTE
723 [facility] and 500 [facility], [address] [place] [address] 904 [facility], [address] [place] [address] ([facility]), [address] [place] [address]  Phone: [phone], [phone]    Physical Therapy Treatment Note/ Progress Report:     Date:  2022    Patient Name:  Alexandra Toledo    :  1960  MRN: [id number]  Restrictions/Precautions:    Medical/Treatment Diagnosis Information:  · Diagnosis: Left hip OA  · Treatment Diagnosis: J60.52  Insurance/Certification information:  PT Insurance Information: [facility] Medicare  Physician Information:     Has the plan of care been signed (Y/N):        []  Yes  [x]  No     Date of Patient follow up with Physician:     Is this a Progress Report:     []  Yes  [x]  No      If Yes:  Date Range for reporting period:  Initial Eval: 2022  Beginnin2022 --- Endin22    Progress report will be due (10 Rx or 30 days whichever is less): 70     Recertification will be due (POC Duration  / 90 days whichever is less): 22      Visit # Insurance Allowable Auth Required   In Person 3 BMN []  Yes     []  No    Tele Health 0  []  Yes     []  No    Total 3       FOTO Score: 43 (Predicted: 53)   Date assessed: 2022      Latex Allergy:  [x]NO      []YES  Preferred Language for Healthcare:   [x]English       []other:    Pain level:  3/10 at treatment time    SUBJECTIVE:  Patient reports that hip is doing better - complains of pain with transitional movements but not once she is up and moving.     OBJECTIVE:    Observation:    Test measurements: See Eval    RESTRICTIONS/PRECAUTIONS:     Exercises/Interventions:   Therapeutic Ex (00173)  Therapeutic Activity (08897)  NMR re-education (43474) Sets/Reps Notes/CUES   Bike Unable to ride a bike secondary to implant in left knee    Standing HR  3x10          Hook lying:  Glute squeeze  Seated Add squeeze  Abd clam  Marching  Mini crunch   5\"x20  10\"x10  X20 red   x30 ea  x    Bent knee drop out  x15 R, L     Supine SAQ x20 Supine knee flexion with slide board  x20    Supine hip ABD with slide board  x20     EOB LAQ 2x10    Seated HS curl Red 2x10                                                                          Manual Intervention ([address] [facility])     STM - left IT band                              Medbridge access code: [email or web address]                    Patient Education 5 min Provided biomechanics/ergonomics training to reduce stress across injured/healing structures. Therapeutic Exercise and NMR EXR  [x] (22798) Provided verbal/tactile cueing for activities related to strengthening, flexibility, endurance, ROM for improvements in LE, proximal hip, and core control with self care, mobility, lifting, ambulation. [x] (40177) Provided verbal/tactile cueing for activities related to improving balance, coordination, kinesthetic sense, posture, motor skill, proprioception to assist with LE, proximal hip, and core control in self-care, mobility, lifting, ambulation and eccentric single leg control. NMR and Therapeutic Activities:    [x] (71439 or 26489) Provided verbal/tactile cueing for activities related to improving balance, coordination, kinesthetic sense, posture, motor skill, proprioception and motor activation to allow for proper function of core, proximal hip and LE with self-care and ADLs and functional mobility.    [x] (99389) Gait Re-education- Provided training and instruction to the patient for proper LE, core and proximal hip recruitment and positioning and eccentric body weight control with ambulation re-education including up and down stairs     Home Exercise Program:    [x] (16939) Reviewed/Progressed HEP activities related to strengthening, flexibility, endurance, ROM of core, proximal hip and LE for functional self-care, mobility, lifting and ambulation/stair navigation   [x] (35160) Reviewed/Progressed HEP activities related to improving balance, coordination, kinesthetic sense, posture, motor skill, proprioception of core, proximal hip and LE for self-care, mobility, lifting, and ambulation/stair navigation      Manual Treatments:  PROM / STM / Oscillations-Mobs:  G-I, II, III, IV (PA's, Inf., Post.)  [x] (06287) Provided manual therapy to mobilize LE, proximal hip and/or LS spine soft tissue/joints for the purpose of modulating pain, promoting relaxation, increasing ROM, reducing/eliminating soft tissue swelling/inflammation/restriction, improving soft tissue extensibility and allowing for proper ROM for normal function with self-care, mobility, lifting and ambulation. Modalities:      Charges:  Timed Code Treatment Minutes: 45   Total Treatment Minutes:  45   BWC:  TE TIME:  NMR TIME:  MANUAL TIME:  UNTIMED MINUTES:  Medicare Total:                [] EVAL (LOW) 20487 (typically 20 minutes face-to-face)  [] EVAL (MOD) 27112 (typically 30 minutes face-to-face)  [] EVAL (HIGH) 74933 (typically 45 minutes face-to-face)  [] RE-EVAL     [x] TV(32068) x 2   [] IONTO  [x] NMR (23674) x 1    [] VASO  [] Manual (07545) x     [] Other:  [] TA x      [] Mech Traction (83817)  [] ES(attended) (60943)      [] ES (un) (35315):    ASSESSMENT:  Patient presents with signs and symptoms consistent with diagnosis of left hip OA/pain. Rehab potential is good at this time. Key impairments include: decreased ROM and strength of the left lower extremity, poor balance and compensatory gait patterning, increased swelling, and pain with functional activities such as squatting, walking, lunging, and stairs. Skilled PT is required to address these key impairments and to provide and progress with an appropriate home exercise program. The patient's complexity may change due to the nature of the patients presentation as well as the comorbidities and medical factors included in this patient's evaluation. GOALS:   Short Term Goals: To be achieved in: 2 weeks  1.  Independent in HEP and progression per patient tolerance, in order to prevent re-injury. []? Progressing: []? Met: []? Not Met: []? Adjusted       2. Patient will have a decrease in pain to facilitate improvement in movement, function, and ADLs as indicated by Functional Deficits. []? Progressing: []? Met: []? Not Met: []? Adjusted          Long Term Goals: To be achieved in: 12 weeks  1. FOTO score will be within 10 points of the predicted score to assist with reaching prior level of function. []? Progressing: []? Met: []? Not Met: []? Adjusted      2. Patient will demonstrate increased AROM to equal the opposite side bilaterally to allow for proper joint functioning as indicated by patients Functional Deficits. []? Progressing: []? Met: []? Not Met: []? Adjusted       3. Patient will demonstrate an increase in strength to be within 3lbs on the HHD or match bilaterally to allow for proper functional mobility as indicated by patients Functional Deficits. []? Progressing: []? Met: []? Not Met: []? Adjusted       4. Patient will return to all transfers, work activities, and functional activities without increased symptoms or restriction. []? Progressing: []? Met: []? Not Met: []? Adjusted       5. Patient will have 0/10 pain with ADL's.  []? Progressing: []? Met: []? Not Met: []? Adjusted       6. Patient stated goal: Would like to be able to walk for exercise for 15 min without stopping. []? Progressing: []? Met: []? Not Met: []? Adjusted      Overall Progression Towards Functional goals/ Treatment Progress Update:  [] Patient is progressing as expected towards functional goals listed. [] Progression is slowed due to complexities/Impairments listed. [] Progression has been slowed due to co-morbidities.   [x] Plan just implemented, too soon to assess goals progression <30days   [] Goals require adjustment due to lack of progress  [] Patient is not progressing as expected and requires additional follow up with physician  [] Other    Prognosis for POC: [x] Good [] Fair  [] Poor    Patient requires continued skilled intervention: [x] Yes  [] No    Treatment/Activity Tolerance:  [x] Patient able to complete treatment  [] Patient limited by fatigue  [] Patient limited by pain    [] Patient limited by other medical complications  [] Other:     Return to Play: (if applicable)   []  Stage 1: Intro to Strength   []  Stage 2: Return to Run and Strength   []  Stage 3: Return to Jump and Strength   []  Stage 4: Dynamic Strength and Agility   []  Stage 5: Sport Specific Training     []  Ready to Return to Play, Meets All Above Stages   []  Not Ready for Return to Sports   Comments:                         PLAN: See eval  [x] Continue per plan of care [] Alter current plan (see comments above)  [] Plan of care initiated [] Hold pending MD visit [] Discharge    Electronically signed by:  Kasi Morrison PTA    Note: If patient does not return for scheduled/ recommended follow up visits, this note will serve as a discharge from care along with most recent update on progress.

## 2022-07-06 ENCOUNTER — TELEPHONE (OUTPATIENT)
Dept: FAMILY MEDICINE CLINIC | Age: 62
End: 2022-07-06

## 2022-07-06 ENCOUNTER — HOSPITAL ENCOUNTER (OUTPATIENT)
Dept: PHYSICAL THERAPY | Age: 62
Setting detail: THERAPIES SERIES
Discharge: HOME | End: 2022-07-06
Payer: MEDICARE

## 2022-07-06 PROCEDURE — 97140 MANUAL THERAPY 1/> REGIONS: CPT

## 2022-07-06 PROCEDURE — 97110 THERAPEUTIC EXERCISES: CPT

## 2022-07-06 NOTE — FLOWSHEET NOTE
Formerly [facility], [address] [place] [address] [address], [address]  Phone: [phone], [phone]    Physical Therapy Treatment Note/ Progress Report:     Date:  2022    Patient Name:  Clara Klinefelter    :  1960  MRN: [id number]  Restrictions/Precautions:    Medical/Treatment Diagnosis Information:  · Diagnosis: M47.22 Cervical spondylosis with radiculopathy  ·  Treatment diagnosis - Cervical pain M54.2, generalized UE weaknes G63.10/4  Insurance/Certification information:  PT Insurance Information: [facility] $35 copay no auth BMN  Physician Information:   Dr. Alberto Martinez   Has the plan of care been signed (Y/N):        []  Yes  [x]  No     Date of Patient follow up with Physician:     Is this a Progress Report:     []  Yes  [x]  No      If Yes:  Date Range for reporting period:  Initial Eval: 2022  Beginnin2022 --- Ending:     Progress report will be due (10 Rx or 30 days whichever is less): 3/9/8952     Recertification will be due (POC Duration  / 90 days whichever is less): 2022     Visit # Insurance Allowable Auth Required   3 BMN $35 []  Yes     []  No      FOTO Score: 59 (Predicted: 62)   Date assessed: 2022     Latex Allergy:  [x]NO      []YES  Preferred Language for Healthcare:   [x]English       []other:    Pain level:  3/10     SUBJECTIVE:  Pt reports she had a lot of pain in arms after last session. Feeling it into elbow and wrist. Has been icing to try to help.      OBJECTIVE:    Observation:    Test measurements:      RESTRICTIONS/PRECAUTIONS: none    Exercises/Interventions:   Therapeutic Ex (14417)  Therapeutic Activity (65828)  NMR re-education (85163) Sets/Reps Notes/CUES   Supine chin tuck with lift 15 x 5\"    SBS  No $ 10 x 5\"  3 x 10 RTB  RTB   TB row  TB ext  x 10   x 10 Cues for UT compensation   Cheerleaders   x 10 RTB                                                                                                       Manual Intervention (68537) UT trigger point release,  Global cervical STM, 1st rib mob 10 mins                                                 Patient Education 10 mins Spreading therex out through day to assess tolerance     Access Code: [email or web address]  Date: 06/29/2022  Prepared by: Corrinne Ona    Exercises  Supine Deep Neck Flexor Training - Repetitions - 1-2 x daily - 7 x weekly - 2 sets - 10 reps  Seated Scapular Retraction - 1-2 x daily - 7 x weekly - 15 reps - 3 hold  Shoulder External Rotation and Scapular Retraction with Resistance - 1-2 x daily - 7 x weekly - 2 sets - 10 reps - 3 hold  Scapular Retraction with Resistance - 1-2 x daily - 7 x weekly - 2 sets - 10 reps  Standing Cheerleaders - 1-2 x daily - 7 x weekly - 2 sets - 10 reps      Therapeutic Exercise and NMR EXR  [x] (92744) Provided verbal/tactile cueing for activities related to strengthening, flexibility, endurance, ROM  for improvements in scapular, scapulothoracic and UE control with self care, reaching, carrying, lifting, house/yardwork, driving/computer work. [x] (12213) Provided verbal/tactile cueing for activities related to improving balance, coordination, kinesthetic sense, posture, motor skill, proprioception  to assist with  scapular, scapulothoracic and UE control with self care, reaching, carrying, lifting, house/yardwork, driving/computer work. Therapeutic Activities:    [x] (25736 or 53689) Provided verbal/tactile cueing for activities related to improving balance, coordination, kinesthetic sense, posture, motor skill, proprioception and motor activation to allow for proper function of scapular, scapulothoracic and UE control with self care, carrying, lifting, driving/computer work.      Home Exercise Program:    [x] (21870) Reviewed/Progressed HEP activities related to strengthening, flexibility, endurance, ROM of scapular, scapulothoracic and UE control with self care, reaching, carrying, lifting, house/yardwork, in order to prevent re-injury. []? Progressing: []? Met: []? Not Met: []? Adjusted       2. Patient will have a decrease in pain to facilitate improvement in movement, function, and ADLs as indicated by Functional Deficits. []? Progressing: []? Met: []? Not Met: []? Adjusted          Long Term Goals: To be achieved in: 12 weeks  1. FOTO score will be within 10 points of the predicted score to assist with reaching prior level of function. []? Progressing: []? Met: []? Not Met: []? Adjusted      2. Patient will demonstrate increased AROM to equal the opposite side bilaterally to allow for normal ADLs as indicated by patients Functional Deficits. []? Progressing: []? Met: []? Not Met: []? Adjusted       3. Patient will demonstrate an increase in strength to  to allow for 4+/5 as indicated by patients Functional Deficits. []? Progressing: []? Met: []? Not Met: []? Adjusted       4. Patient will return to all transfers, work activities, and functional activities without increased symptoms or restriction. []? Progressing: []? Met: []? Not Met: []? Adjusted       5. Patient will have 0/10 pain with ADL's.  []? Progressing: []? Met: []? Not Met: []? Adjusted     Overall Progression Towards Functional goals/ Treatment Progress Update:  [] Patient is progressing as expected towards functional goals listed. [] Progression is slowed due to complexities/Impairments listed. [] Progression has been slowed due to co-morbidities.   [x] Plan just implemented, too soon to assess goals progression <30days   [] Goals require adjustment due to lack of progress  [] Patient is not progressing as expected and requires additional follow up with physician  [] Other    Prognosis for POC: [x] Good [] Fair  [] Poor    Patient requires continued skilled intervention: [x] Yes  [] No    Treatment/Activity Tolerance:  [x] Patient able to complete treatment  [] Patient limited by fatigue  [] Patient limited by pain    [] Patient limited by other medical complications  [] Other:                  PLAN: See eval  [] Continue per plan of care [] Alter current plan (see comments above)  [x] Plan of care initiated [] Hold pending MD visit [] Discharge    Electronically signed by:  Ranjana Diaz PT    Note: If patient does not return for scheduled/ recommended follow up visits, this note will serve as a discharge from care along with most recent update on progress.

## 2022-07-06 NOTE — TELEPHONE ENCOUNTER
Recommend hydrocortisone cream bid to affected areas and benadryl otc prn itching and start either claritin or zyrtec daily to help with the histamine

## 2022-07-06 NOTE — TELEPHONE ENCOUNTER
----- Message from [facility] sent at 7/6/2022 10:34 AM EDT -----  Subject: Message to Provider    QUESTIONS  Information for Provider? patient calledin about a steroid shot for poison   ivy, she is still having symptoms and would like to know if she can have   another shot or be called in some steroid to the pharmacy. [facility]riy in [facility].   ---------------------------------------------------------------------------  --------------  David DELGADO  [phone]; OK to leave message on voicemail  ---------------------------------------------------------------------------  --------------  SCRIPT ANSWERS  Relationship to Patient?  Self

## 2022-07-11 ENCOUNTER — TELEPHONE (OUTPATIENT)
Dept: FAMILY MEDICINE CLINIC | Age: 62
End: 2022-07-11

## 2022-07-11 NOTE — TELEPHONE ENCOUNTER
Pt called stating that for the last week she's noticed that the tremors in her right arm, from wrist down have gotten worse. Pt thinks this could be from the Wellbutrin. Pt has appt with PCP on 7/22, but not sure if she should continue taking Rx or not.  Call back pt with recommendations [phone]

## 2022-07-12 ENCOUNTER — HOSPITAL ENCOUNTER (OUTPATIENT)
Dept: PHYSICAL THERAPY | Age: 62
Setting detail: THERAPIES SERIES
Discharge: HOME | End: 2022-07-12
Payer: MEDICARE

## 2022-07-12 PROCEDURE — 97110 THERAPEUTIC EXERCISES: CPT | Performed by: PHYSICAL THERAPY ASSISTANT

## 2022-07-12 PROCEDURE — 97112 NEUROMUSCULAR REEDUCATION: CPT | Performed by: PHYSICAL THERAPY ASSISTANT

## 2022-07-12 NOTE — FLOWSHEET NOTE
[facility] and [address] [place] [address] [address] [facility], [address] [place] [address]  Phone: [phone], [phone]    Physical Therapy Treatment Note/ Progress Report:     Date:  2022    Patient Name:  Jules Amin    :  1960  MRN: [id number]  Restrictions/Precautions:    Medical/Treatment Diagnosis Information:  · Diagnosis: Left hip OA  · Treatment Diagnosis: S26.60  Insurance/Certification information:  PT Insurance Information: [facility] Medicare  Physician Information:     Has the plan of care been signed (Y/N):        []  Yes  [x]  No     Date of Patient follow up with Physician:     Is this a Progress Report:     []  Yes  [x]  No      If Yes:  Date Range for reporting period:  Initial Eval: 2022  Beginnin2022 --- Endin22    Progress report will be due (10 Rx or 30 days whichever is less):      Recertification will be due (POC Duration  / 90 days whichever is less): 22      Visit # Insurance Allowable Auth Required   In Person 4 BMN []  Yes     []  No    Tele Health 0  []  Yes     []  No    Total 4       FOTO Score: 43 (Predicted: 53)   Date assessed: 2022      Latex Allergy:  [x]NO      []YES  Preferred Language for Healthcare:   [x]English       []other:    Pain level:  3/10 at treatment time    SUBJECTIVE:  Legs are feeling stronger and knees are feeling better. She is not noticing much change in the hip itself. Laying down or sitting up from laying down. Symptoms have moved from hip down into the left glut. She is getting significant twinges of pain in the bilateral low back \"hip to hip\".     OBJECTIVE:    Observation:    Test measurements: See Eval    RESTRICTIONS/PRECAUTIONS:     Exercises/Interventions:   Therapeutic Ex (03582)  Therapeutic Activity (82824)  NMR re-education (14241) Sets/Reps Notes/CUES   Bike Unable to ride a bike secondary to implant in left knee    Standing HR  2x10 Standing HS Curl  2X10 R, L    Standing Hip ABD  2X10 R, L               Hook lying:  Glute squeeze  Seated Add squeeze  Abd clam  Marching  Mini crunch   5\"x20  10\"x10  X30 red   2# x20 ea  x    Bent knee drop out  x20 R, L     Supine SAQ 2# x20    Supine knee flexion with slide board  x20    Supine hip ABD with slide board  x20     EOB LAQ 2# 2x15    Seated HS curl Red 2x15         Supine lumbar rotation  10x10\" ea     Single knee to chest with belt  5\" x10 R, L                                                            Manual Intervention (46523)     STM - left IT band                              Medbridge access code: [email or web address]                    Patient Education 5 min Provided biomechanics/ergonomics training to reduce stress across injured/healing structures. Therapeutic Exercise and NMR EXR  [x] (74927) Provided verbal/tactile cueing for activities related to strengthening, flexibility, endurance, ROM for improvements in LE, proximal hip, and core control with self care, mobility, lifting, ambulation. [x] (46722) Provided verbal/tactile cueing for activities related to improving balance, coordination, kinesthetic sense, posture, motor skill, proprioception to assist with LE, proximal hip, and core control in self-care, mobility, lifting, ambulation and eccentric single leg control. NMR and Therapeutic Activities:    [x] (23778 or 29190) Provided verbal/tactile cueing for activities related to improving balance, coordination, kinesthetic sense, posture, motor skill, proprioception and motor activation to allow for proper function of core, proximal hip and LE with self-care and ADLs and functional mobility.    [x] (28820) Gait Re-education- Provided training and instruction to the patient for proper LE, core and proximal hip recruitment and positioning and eccentric body weight control with ambulation re-education including up and down stairs     Home Exercise Program:    [x] (47656) Reviewed/Progressed HEP activities related to strengthening, flexibility, endurance, ROM of core, proximal hip and LE for functional self-care, mobility, lifting and ambulation/stair navigation   [x] (82418) Reviewed/Progressed HEP activities related to improving balance, coordination, kinesthetic sense, posture, motor skill, proprioception of core, proximal hip and LE for self-care, mobility, lifting, and ambulation/stair navigation      Manual Treatments:  PROM / STM / Oscillations-Mobs:  G-I, II, III, IV (PA's, Inf., Post.)  [x] (86555) Provided manual therapy to mobilize LE, proximal hip and/or LS spine soft tissue/joints for the purpose of modulating pain, promoting relaxation, increasing ROM, reducing/eliminating soft tissue swelling/inflammation/restriction, improving soft tissue extensibility and allowing for proper ROM for normal function with self-care, mobility, lifting and ambulation. Modalities:      Charges:  Timed Code Treatment Minutes: 45   Total Treatment Minutes:  45   BWC:  TE TIME:  NMR TIME:  MANUAL TIME:  UNTIMED MINUTES:  Medicare Total:                [] EVAL (LOW) 21332 (typically 20 minutes face-to-face)  [] EVAL (MOD) 97235 (typically 30 minutes face-to-face)  [] EVAL (HIGH) 67412 (typically 45 minutes face-to-face)  [] RE-EVAL     [x] DA(98378) x 2   [] IONTO  [x] NMR (58322) x 1    [] VASO  [] Manual (28301) x     [] Other:  [] TA x      [] Mech Traction (28102)  [] ES(attended) (79902)      [] ES (un) (45209):    ASSESSMENT:  Patient presents with signs and symptoms consistent with diagnosis of left hip OA/pain. Rehab potential is good at this time. Key impairments include: decreased ROM and strength of the left lower extremity, poor balance and compensatory gait patterning, increased swelling, and pain with functional activities such as squatting, walking, lunging, and stairs.  Skilled PT is required to address these key impairments and to provide and progress with an appropriate home exercise program. The patient's complexity may change due to the nature of the patients presentation as well as the comorbidities and medical factors included in this patient's evaluation. GOALS:   Short Term Goals: To be achieved in: 2 weeks  1. Independent in HEP and progression per patient tolerance, in order to prevent re-injury. []? Progressing: [x]? Met: []? Not Met: []? Adjusted       2. Patient will have a decrease in pain to facilitate improvement in movement, function, and ADLs as indicated by Functional Deficits. []? Progressing: [x]? Met: []? Not Met: []? Adjusted          Long Term Goals: To be achieved in: 12 weeks  1. FOTO score will be within 10 points of the predicted score to assist with reaching prior level of function. []? Progressing: []? Met: []? Not Met: []? Adjusted      2. Patient will demonstrate increased AROM to equal the opposite side bilaterally to allow for proper joint functioning as indicated by patients Functional Deficits. []? Progressing: []? Met: []? Not Met: []? Adjusted       3. Patient will demonstrate an increase in strength to be within 3lbs on the HHD or match bilaterally to allow for proper functional mobility as indicated by patients Functional Deficits. []? Progressing: []? Met: []? Not Met: []? Adjusted       4. Patient will return to all transfers, work activities, and functional activities without increased symptoms or restriction. []? Progressing: []? Met: []? Not Met: []? Adjusted       5. Patient will have 0/10 pain with ADL's.  []? Progressing: []? Met: []? Not Met: []? Adjusted       6. Patient stated goal: Would like to be able to walk for exercise for 15 min without stopping. []? Progressing: []? Met: []? Not Met: []? Adjusted      Overall Progression Towards Functional goals/ Treatment Progress Update:  [] Patient is progressing as expected towards functional goals listed.     [] Progression is slowed due to complexities/Impairments listed. [x] Progression has been slowed due to co-morbidities. [] Plan just implemented, too soon to assess goals progression <30days   [] Goals require adjustment due to lack of progress  [] Patient is not progressing as expected and requires additional follow up with physician  [] Other    Prognosis for POC: [x] Good [] Fair  [] Poor    Patient requires continued skilled intervention: [x] Yes  [] No    Treatment/Activity Tolerance:  [x] Patient able to complete treatment  [] Patient limited by fatigue  [] Patient limited by pain    [] Patient limited by other medical complications  [] Other:     Return to Play: (if applicable)   []  Stage 1: Intro to Strength   []  Stage 2: Return to Run and Strength   []  Stage 3: Return to Jump and Strength   []  Stage 4: Dynamic Strength and Agility   []  Stage 5: Sport Specific Training     []  Ready to Return to Play, Meets All Above Stages   []  Not Ready for Return to Sports   Comments:                         PLAN: See eval  [x] Continue per plan of care [] Alter current plan (see comments above)  [] Plan of care initiated [] Hold pending MD visit [] Discharge    Electronically signed by:  Jacques Layne PTA    Note: If patient does not return for scheduled/ recommended follow up visits, this note will serve as a discharge from care along with most recent update on progress.

## 2022-07-12 NOTE — TELEPHONE ENCOUNTER
Appears that patient is still receiving the wellbutrin 150mg from her pharmacy, so just discontinued the 300mg script.  Patient was informed that she can stop the 300mg and take the 150mg daily and that it will be discussed at her up coming appointment

## 2022-07-12 NOTE — TELEPHONE ENCOUNTER
Recommend decreasing wellbutrin xl from 300 mg daily to wellbutrin xl 150 mg 1 po qd #30 with NRF and will discuss at appointment  Notify patient, send all prescriptions to pharmacy of choice and discontinue/ update any medications that have been changed, place any future lab orders as noted above

## 2022-07-13 ENCOUNTER — HOSPITAL ENCOUNTER (OUTPATIENT)
Dept: PHYSICAL THERAPY | Age: 62
Setting detail: THERAPIES SERIES
Discharge: HOME | End: 2022-07-13
Payer: MEDICARE

## 2022-07-13 PROCEDURE — 97140 MANUAL THERAPY 1/> REGIONS: CPT

## 2022-07-13 PROCEDURE — 97110 THERAPEUTIC EXERCISES: CPT

## 2022-07-13 NOTE — FLOWSHEET NOTE
[facility],  [place] [address] [place], [address]  Phone: [phone], [phone]    Physical Therapy Treatment Note/ Progress Report:     Date:  2022     Patient Name:  Jules Amin    :  1960  MRN: [id number]  Restrictions/Precautions:    Medical/Treatment Diagnosis Information:  · Diagnosis: M47.22 Cervical spondylosis with radiculopathy  ·  Treatment diagnosis - Cervical pain M54.2, generalized UE weaknes F44.86/9  Insurance/Certification information:  PT Insurance Information: [facility] $35 copay no auth BMN  Physician Information:   Dr. Derrell Schulte   Has the plan of care been signed (Y/N):        []  Yes  [x]  No     Date of Patient follow up with Physician:     Is this a Progress Report:     []  Yes  [x]  No      If Yes:  Date Range for reporting period:  Initial Eval: 2022  Beginnin2022 --- Ending:     Progress report will be due (10 Rx or 30 days whichever is less):      Recertification will be due (POC Duration  / 90 days whichever is less): 2022     Visit # Insurance Allowable Auth Required   4 BMN $35 []  Yes     []  No      FOTO Score: 59 (Predicted: 62)   Date assessed: 2022     Latex Allergy:  [x]NO      []YES  Preferred Language for Healthcare:   [x]English       []other:    Pain level:  3/10     SUBJECTIVE:  Pt reports she has been spreading exercises out and not having pain during the day. Did wake up the next morning with more pain in arm like when she did all exercises together.      OBJECTIVE:    Observation:    Test measurements:      RESTRICTIONS/PRECAUTIONS: none    Exercises/Interventions:   Therapeutic Ex ()  Therapeutic Activity (03546)  NMR re-education (25921) Sets/Reps Notes/CUES   Supine chin tuck with lift 15 x 5\"    SBS  No $ 10 x 5\"  3 x 10 RTB  RTB   TB row  TB ext  x 15   x 15 Cues for UT compensation    TB ABD  x 15 RTB Manual Intervention (05592)     UT trigger point release,  Global cervical STM, 1st rib mob 15 mins                                                 Patient Education 5 mins Spreading therex out through day to assess tolerance     Access Code: [email or web address]: [email or web address]  Date: 06/29/2022  Prepared by: Phuong Cody    Exercises  Supine Deep Neck Flexor Training - Repetitions - 1-2 x daily - 7 x weekly - 2 sets - 10 reps  Seated Scapular Retraction - 1-2 x daily - 7 x weekly - 15 reps - 3 hold  Shoulder External Rotation and Scapular Retraction with Resistance - 1-2 x daily - 7 x weekly - 2 sets - 10 reps - 3 hold  Scapular Retraction with Resistance - 1-2 x daily - 7 x weekly - 2 sets - 10 reps  Standing Cheerleaders - 1-2 x daily - 7 x weekly - 2 sets - 10 reps      Therapeutic Exercise and NMR EXR  [x] (74656) Provided verbal/tactile cueing for activities related to strengthening, flexibility, endurance, ROM  for improvements in scapular, scapulothoracic and UE control with self care, reaching, carrying, lifting, house/yardwork, driving/computer work. [x] (68360) Provided verbal/tactile cueing for activities related to improving balance, coordination, kinesthetic sense, posture, motor skill, proprioception  to assist with  scapular, scapulothoracic and UE control with self care, reaching, carrying, lifting, house/yardwork, driving/computer work. Therapeutic Activities:    [x] (75954 or 14191) Provided verbal/tactile cueing for activities related to improving balance, coordination, kinesthetic sense, posture, motor skill, proprioception and motor activation to allow for proper function of scapular, scapulothoracic and UE control with self care, carrying, lifting, driving/computer work.      Home Exercise Program:    [x] (93399) Reviewed/Progressed HEP activities related to strengthening, flexibility, endurance, ROM of scapular, scapulothoracic and UE control with self care, reaching, carrying, lifting, house/yardwork, driving/computer work  [x] (28727) Reviewed/Progressed HEP activities related to improving balance, coordination, kinesthetic sense, posture, motor skill, proprioception of scapular, scapulothoracic and UE control with self care, reaching, carrying, lifting, house/yardwork, driving/computer work      Manual Treatments:  PROM / STM / Oscillations-Mobs:  G-I, II, III, IV (PA's, Inf., Post.)  [x] (96557) Provided manual therapy to mobilize soft tissue/joints of cervical/CT, scapular GHJ and UE for the purpose of modulating pain, promoting relaxation,  increasing ROM, reducing/eliminating soft tissue swelling/inflammation/restriction, improving soft tissue extensibility and allowing for proper ROM for normal function with self care, reaching, carrying, lifting, house/yardwork, driving/computer work    Modalities:     [] GAME READY (VASO)- for significant edema, swelling, pain control. Charges:  Timed Code Treatment Minutes: 30   Total Treatment Minutes:  30      [] EVAL (LOW) 87790 (typically 20 minutes face-to-face)  [] EVAL (MOD) 73418 (typically 30 minutes face-to-face)  [] EVAL (HIGH) 06752 (typically 45 minutes face-to-face)  [] RE-EVAL     [x] VK(83548) x 1    [] IONTO  [] NMR (42789) x     [] VASO  [x] Manual (42442) x 1    [] Other:  [] TA x      [] Mech Traction (17682)  [] ES(attended) (00790)     [] ES (un) (54882):    ASSESSMENT SUMMARY:  Tolerated treatment well. May benefit from dry needling of R UT for trigger points. Patient received education on their current pathology and how their condition effects them with their functional activities. Patient understood discussion and questions were answered. Patient understands their activity limitations and understands rational for treatment progression. Pt educated on plan of care and HEP, if worsening symptoms to d/c that exercise. GOALS:   Short Term Goals: To be achieved in: 2 weeks  1. Independent in HEP and progression per patient tolerance, in order to prevent re-injury. []? Progressing: []? Met: []? Not Met: []? Adjusted       2. Patient will have a decrease in pain to facilitate improvement in movement, function, and ADLs as indicated by Functional Deficits. []? Progressing: []? Met: []? Not Met: []? Adjusted          Long Term Goals: To be achieved in: 12 weeks  1. FOTO score will be within 10 points of the predicted score to assist with reaching prior level of function. []? Progressing: []? Met: []? Not Met: []? Adjusted      2. Patient will demonstrate increased AROM to equal the opposite side bilaterally to allow for normal ADLs as indicated by patients Functional Deficits. []? Progressing: []? Met: []? Not Met: []? Adjusted       3. Patient will demonstrate an increase in strength to  to allow for 4+/5 as indicated by patients Functional Deficits. []? Progressing: []? Met: []? Not Met: []? Adjusted       4. Patient will return to all transfers, work activities, and functional activities without increased symptoms or restriction. []? Progressing: []? Met: []? Not Met: []? Adjusted       5. Patient will have 0/10 pain with ADL's.  []? Progressing: []? Met: []? Not Met: []? Adjusted     Overall Progression Towards Functional goals/ Treatment Progress Update:  [] Patient is progressing as expected towards functional goals listed. [] Progression is slowed due to complexities/Impairments listed. [] Progression has been slowed due to co-morbidities.   [x] Plan just implemented, too soon to assess goals progression <30days   [] Goals require adjustment due to lack of progress  [] Patient is not progressing as expected and requires additional follow up with physician  [] Other    Prognosis for POC: [x] Good [] Fair  [] Poor    Patient requires continued skilled intervention: [x] Yes  [] No    Treatment/Activity Tolerance:  [x] Patient able to complete treatment  [] Patient limited by fatigue  [] Patient limited by pain    [] Patient limited by other medical complications  [] Other:                  PLAN: See eval  [] Continue per plan of care [] Alter current plan (see comments above)  [x] Plan of care initiated [] Hold pending MD visit [] Discharge    Electronically signed by:  Lawrence Cole PT    Note: If patient does not return for scheduled/ recommended follow up visits, this note will serve as a discharge from care along with most recent update on progress.

## 2022-07-18 ENCOUNTER — TELEPHONE (OUTPATIENT)
Dept: FAMILY MEDICINE CLINIC | Age: 62
End: 2022-07-18

## 2022-07-18 NOTE — TELEPHONE ENCOUNTER
Pt states that she has the covid and feels pretty bad. Was wanting to see if she could get the covid medication called in. Said to give her a call where she needs to pick it up at.

## 2022-07-19 ENCOUNTER — HOSPITAL ENCOUNTER (OUTPATIENT)
Dept: PHYSICAL THERAPY | Age: 62
Setting detail: THERAPIES SERIES
End: 2022-07-19
Payer: MEDICARE

## 2022-07-19 ENCOUNTER — TELEMEDICINE (OUTPATIENT)
Dept: FAMILY MEDICINE CLINIC | Age: 62
End: 2022-07-19
Payer: MEDICARE

## 2022-07-19 DIAGNOSIS — U07.1 COVID-19: Primary | ICD-10-CM

## 2022-07-19 DIAGNOSIS — R11.0 NAUSEA: ICD-10-CM

## 2022-07-19 PROCEDURE — G8427 DOCREV CUR MEDS BY ELIG CLIN: HCPCS | Performed by: NURSE PRACTITIONER

## 2022-07-19 PROCEDURE — 99213 OFFICE O/P EST LOW 20 MIN: CPT | Performed by: NURSE PRACTITIONER

## 2022-07-19 PROCEDURE — 3017F COLORECTAL CA SCREEN DOC REV: CPT | Performed by: NURSE PRACTITIONER

## 2022-07-19 RX ORDER — ONDANSETRON 4 MG/1
4 TABLET, ORALLY DISINTEGRATING ORAL 3 TIMES DAILY PRN
Qty: 21 TABLET | Refills: 0 | Status: SHIPPED | OUTPATIENT
Start: 2022-07-19

## 2022-07-19 RX ORDER — METHOCARBAMOL 500 MG/1
TABLET, FILM COATED ORAL
COMMUNITY
Start: 2022-04-29

## 2022-07-19 ASSESSMENT — ENCOUNTER SYMPTOMS
BACK PAIN: 0
FACIAL SWELLING: 0
COUGH: 1
DIARRHEA: 0
CONSTIPATION: 0
TROUBLE SWALLOWING: 0
RHINORRHEA: 0
BLOOD IN STOOL: 0
SINUS PRESSURE: 1
CHOKING: 0
NAUSEA: 1
SINUS PAIN: 0
SWOLLEN GLANDS: 0
ABDOMINAL DISTENTION: 0
APNEA: 0
ANAL BLEEDING: 0
VOMITING: 0
STRIDOR: 0
WHEEZING: 0
ABDOMINAL PAIN: 0
RECTAL PAIN: 0
VOICE CHANGE: 0
SORE THROAT: 0

## 2022-07-19 NOTE — PROGRESS NOTES
joint swelling, neck pain and neck stiffness. Skin: Negative. Negative for rash. Neurological:  Positive for dizziness, weakness (Fatigue and malaise) and headaches. Negative for tremors, seizures, syncope, facial asymmetry, speech difficulty, light-headedness and numbness. Prior to Visit Medications    Medication Sig Taking? Authorizing Provider   methocarbamol (ROBAXIN) 500 MG tablet  Yes Historical Provider, MD   nirmatrelvir/ritonavir (PAXLOVID) 20 x 150 MG & 10 x 100MG Take 3 tablets (two 150 mg nirmatrelvir and one 100 mg ritonavir tablets) by mouth every 12 hours for 5 days.  Yes MADINA Paniagua CNP   ondansetron (ZOFRAN-ODT) 4 MG disintegrating tablet Take 1 tablet by mouth 3 times daily as needed for Nausea or Vomiting Yes MADINA Paniagua CNP   lamoTRIgine (LAMICTAL) 100 MG tablet TAKE 1 TABLET TWICE DAILY Yes MADINA Gaffney CNP   metoprolol succinate (TOPROL XL) 50 MG extended release tablet TAKE 1 TABLET EVERY DAY Yes MADINA Gaffney CNP   rosuvastatin (CRESTOR) 5 MG tablet TAKE 1 TABLET EVERY DAY Yes MADINA Gaffney CNP   Cholecalciferol (VITAMIN D) 50 MCG (2000 UT) CAPS capsule Take 1 capsule by mouth daily Yes Historical Provider, MD   Vitamin D-Vitamin K (K2 PLUS D3) 100-1000 MCG-UNIT TABS Take 1 tablet by mouth daily Yes Historical Provider, MD   budesonide-formoterol (SYMBICORT) 160-4.5 MCG/ACT AERO Inhale 2 puffs into the lungs 2 times daily Yes MADINA Gaffney CNP   celecoxib (CELEBREX) 200 MG capsule Take 1 capsule by mouth 2 times daily Yes MADINA Gaffney CNP   VORTIoxetine HBr (TRINTELLIX) 20 MG TABS tablet TAKE 1 TABLET EVERY DAY Yes MADINA Walsh CNP   buPROPion (WELLBUTRIN XL) 150 MG extended release tablet Take 1 tablet every day Yes MADINA Gaffney CNP   Coenzyme Q10 (COQ10) 100 MG CAPS Take by mouth Yes Historical Provider, MD   albuterol sulfate HFA (PROVENTIL HFA) 108 (90 Base) MCG/ACT inhaler Inhale 2 puffs into the lungs every 6 hours as needed for Wheezing or Shortness of Breath Yes MADINA Bright CNP   fluticasone (FLONASE) 50 MCG/ACT nasal spray 1 spray by Nasal route 2 times daily Yes Clifton Bryan DO   ferrous sulfate (IRON 325) 325 (65 Fe) MG tablet Take 1 tablet by mouth daily (with breakfast) Yes Clotilde Apley, APRN - CNP   loratadine (CLARITIN) 10 MG tablet Take 10 mg by mouth daily Yes Historical Provider, MD   multivitamin [facility]) per tablet Take 1 tablet by mouth daily. Yes Historical Provider, MD   Omega-3 Fatty Acids (FISH OIL) 1200 MG CAPS Take  by mouth daily.  Yes Historical Provider, MD       Social History     Tobacco Use    Smoking status: Former     Packs/day: 1.50     Years: 21.00     Pack years: 31.50     Types: Cigarettes     Quit date: 1997     Years since quittin.4    Smokeless tobacco: Never   Vaping Use    Vaping Use: Never used   Substance Use Topics    Alcohol use: No    Drug use: No        Allergies   Allergen Reactions    Calamine Other (See Comments)     Leaves skin tender and burning     Amoxicillin Other (See Comments)    Amoxicillin-Pot Clavulanate Hives    Chlorpheniramine Maleate     Daypro [Oxaprozin] Hives    Duravent-Da [Chlorphen-Phenyleph-Methscop] Hives    Lithium      Caused shakes    Methscopolamine     Norvasc [Amlodipine]      LE edema      Nsaids      Avoid d/t gastric bypass    Phenylephrine Hcl     Seldane [Terfenadine] Hives    Suprax [Cefixime] Hives    Levofloxacin Rash   ,   Past Medical History:   Diagnosis Date    Bipolar disorder     Borderline osteopenia     Cellulitis of left leg 2020    Frequency of micturition     GERD (gastroesophageal reflux disease)     Headache(784.0)     HNP (herniated nucleus pulposus), lumbar 2019    Hyperlipidemia     Hypertension     Intention tremor     due to lithium    Iron deficiency anemia 2019    Lateral meniscal tear 10/14/2015 Lumbar stenosis with neurogenic claudication 2019    Medial meniscus tear 10/14/2015    Mixed incontinence     Morbid (severe) obesity due to excess calories (HCC)     Prolonged emergence from general anesthesia     Prolonged emergence from general anesthesia, initial encounter 2019    Tobacco use     Venous ulcer of left leg ([facility] [address] 2020   ,   Past Surgical History:   Procedure Laterality Date    ABDOMINAL EXPLORATION SURGERY      CARPAL TUNNEL RELEASE Bilateral 2005     SECTION      CHOLECYSTECTOMY, LAPAROSCOPIC N/A 2022    LAPAROSCOPIC CHOLECYSTECTOMY performed by Lilly Jaime MD at [address] [facility]    normal    COLONOSCOPY N/A 2021    COLON W/ANES. (13:30) performed by Venkata Villagomez MD at [address] [facility]     atrhroscopic unispacer    KNEE SURGERY Right 10/28/2015    Right knee video arthroscopy with partial medial and lateral menisectomy with loose body removal    LUMBAR SPINE SURGERY N/A 2019    MICROLUMBAR DISCECTOMY L4-5 performed by Jr Stoddard MD at [address]     ,   Social History     Tobacco Use    Smoking status: Former     Packs/day: 1.50     Years: 21.00     Pack years: 31.50     Types: Cigarettes     Quit date: 1997     Years since quittin.4    Smokeless tobacco: Never   Vaping Use    Vaping Use: Never used   Substance Use Topics    Alcohol use: No    Drug use: No   ,   Family History   Problem Relation Age of Onset    Depression Sister     Mental Illness Sister     Diabetes Mother     Heart Disease Mother     Diabetes Father     Heart Disease Father    ,   Immunization History   Administered Date(s) Administered    Influenza 10/30/2013    Influenza Virus Vaccine 2014, 2015    Influenza, Quadv, IM, (6 mo and older Fluzone, Flulaval, Fluarix and 3 yrs and older Afluria) 2017, 2018    Influenza, Quadv, IM, PF (6 mo and older Fluzone, Flulaval, Fluarix, and 3 yrs and older Afluria) 02/03/2021    Pneumococcal Polysaccharide ([id number]) 03/30/2022    Tdap (Boostrix, Adacel) 08/04/2017   ,   Health Maintenance   Topic Date Due    COVID-19 Vaccine (1) Never done    Shingles vaccine (1 of 2) Never done    Breast cancer screen  07/30/2020    Flu vaccine (1) 09/01/2022    A1C test (Diabetic or Prediabetic)  10/07/2022    Lipids  03/30/2023    Depression Monitoring  03/30/2023    Pneumococcal 0-64 years Vaccine (2 - PCV) 03/30/2023    Annual Wellness Visit (AWV)  03/31/2023    Colorectal Cancer Screen  02/02/2024    Cervical cancer screen  04/25/2027    DTaP/Tdap/Td vaccine (2 - Td or Tdap) 08/04/2027    Hepatitis C screen  Completed    HIV screen  Completed    Hepatitis A vaccine  Aged Out    Hepatitis B vaccine  Aged Out    Hib vaccine  Aged Out    Meningococcal (ACWY) vaccine  Aged Out       PHYSICAL EXAMINATION:  [ INSTRUCTIONS:  \"[x]\" Indicates a positive item  \"[]\" Indicates a negative item      Constitutional: [x] Appears well-developed and well-nourished [x] No apparent distress      [] Abnormal-   Mental status  [x] Alert and awake  [x] Oriented to person/place/time [x]Able to follow commands      Eyes:  EOM    [x]  Normal  [] Abnormal-  Sclera  [x]  Normal  [] Abnormal -         Discharge [x]  None visible  [] Abnormal -    HENT:   [x] Normocephalic, atraumatic.   [] Abnormal   [x] Mouth/Throat: Mucous membranes are moist.     External Ears [x] Normal  [] Abnormal-     Neck: [x] No visualized mass     Pulmonary/Chest: [x] Respiratory effort normal.  [x] No visualized signs of difficulty breathing or respiratory distress        [] Abnormal-      Musculoskeletal:   [x] Normal gait with no signs of ataxia         [x] Normal range of motion of neck        [] Abnormal-       Neurological:        [x] No Facial Asymmetry (Cranial nerve 7 motor function) (limited exam to video visit)          [x] No gaze palsy        [] Abnormal-         Skin: [x] No significant exanthematous lesions or discoloration noted on facial skin         [] Abnormal-            Psychiatric:       [x] Normal Affect [x] No Hallucinations        [] Abnormal-     ASSESSMENT/PLAN:  Lashaun Goodson was seen today for other. Diagnoses and all orders for this visit:    COVID-19  -     nirmatrelvir/ritonavir (PAXLOVID) 20 x 150 MG & 10 x 100MG; Take 3 tablets (two 150 mg nirmatrelvir and one 100 mg ritonavir tablets) by mouth every 12 hours for 5 days. Nausea  -     ondansetron (ZOFRAN-ODT) 4 MG disintegrating tablet; Take 1 tablet by mouth 3 times daily as needed for Nausea or Vomiting      Return if symptoms worsen or fail to improve. Tessa Abdi is a 58 y.o. female being evaluated by a Virtual Visit (video visit) encounter to address concerns as mentioned above. A caregiver was present when appropriate. Due to this being a TeleHealth encounter (During SYEJM-38 public health emergency), evaluation of the following organ systems was limited: Vitals/Constitutional/EENT/Resp/CV/GI//MS/Neuro/Skin/Heme-Lymph-Imm. Pursuant to the emergency declaration under the [address] [place] [address] and the [employer] and [employer] Act, this Virtual Visit was conducted with patient's (and/or legal guardian's) consent, to reduce the patient's risk of exposure to COVID-19 and provide necessary medical care. The patient (and/or legal guardian) has also been advised to contact this office for worsening conditions or problems, and seek emergency medical treatment and/or call 911 if deemed necessary. Patient identification was verified at the start of the visit: Yes    Total time spent on this encounter: Not billed by time    Services were provided through a video synchronous discussion virtually to substitute for in-person clinic visit.  Patient and provider were located at their individual homes.    --Olga Larson, APRN - CNP on 7/19/2022 at 4:32 PM    An electronic signature was used to authenticate this note. Patient should call the office immediately with new or ongoing signs or symptoms or worsening, or proceed to the emergency room. All entries in chief complaint and history of present illness are reviewed and validated by me. No changes in past medical history, past surgical history, social history, or family history were noted during the patient encounter unless specifically listed above. All updates of past medical history, past surgical history, social history, or family history were reviewed personally by me during the office visit. All problems listed in the assessment are stable unless noted otherwise. Medication profile reviewed personally by me during the office visit. Medication side effects and possible impairments from medications were discussed as applicable. Every effort has been made to assure accurate transcription by this voice recognition software. However, mistakes in transcription may still occur    You are being started on a new medication. All medications have the potential for adverse effects. All medications effect each person differently. Please read and review provided information related to medication. If the medication that you have been prescribed has the potential to cause sedation, do not drive or operate car, truck, or heavy machinery until you know how the medication will effect you. If you experience any adverse effects from the medication, please call the office or report to the emergency department. CDC is shortening the recommended time for isolation for the public. People with COVID-19 should isolate for 5 days and if they are asymptomatic or their symptoms are resolving (without fever for 24 hours without antipyretics), follow that by 5 days of wearing a mask when around others to minimize the risk of infecting people they encounter. The change is motivated by science demonstrating that the majority of SARS-CoV-2 transmission occurs early in the course of illness, generally in the 1-2 days prior to onset of symptoms and the 2-3 days after. Steps to help prevent the spread of COVID-19 if you are sick  SOURCE - https://[employer].info/. html     Stay home except to get medical care   Stay home: People who are mildly ill with COVID-19 are able to isolate at home during their illness. You should restrict activities outside your home, except for getting medical care. Avoid public areas: Do not go to work, school, or public areas. Avoid public transportation: Avoid using public transportation, ride-sharing, or taxis. Separate yourself from other people and animals in your home   Stay away from others: As much as possible, you should stay in a specific room and away from other people in your home. Also, you should use a separate bathroom, if available. Limit contact with pets & animals: You should restrict contact with pets and other animals while you are sick with COVID-19, just like you would around other people. Although there have not been reports of pets or other animals becoming sick with COVID-19, it is still recommended that people sick with COVID-19 limit contact with animals until more information is known about the virus. When possible, have another member of your household care for your animals while you are sick. If you are sick with COVID-19, avoid contact with your pet, including petting, snuggling, being kissed or licked, and sharing food. If you must care for your pet or be around animals while you are sick, wash your hands before and after you interact with pets and wear a facemask. See COVID-19 and Animals for more information. Other considerations  The ill person should eat/be fed in their room if possible.  Non-disposable  items used should be handled with gloves and washed with hot water or in a . Clean hands after handling used  items. If possible, dedicate a lined trash can for the ill person. Use gloves when removing garbage bags, handling, and disposing of trash. Wash hands after handling or disposing of trash. Consider consulting with your local health department about trash disposal guidance if available. Information for Household Members and Caregivers of Someone who is Sick   Call ahead before visiting your doctor   Call ahead: If you have a medical appointment, call the healthcare provider and tell them that you have or may have COVID-19. This will help the healthcare provider's office take steps to keep other people from getting infected or exposed. Wear a facemask if you are sick   If you are sick: You should wear a facemask when you are around other people (e.g., sharing a room or vehicle) or pets and before you enter a healthcare provider's office. If you are caring for others: If the person who is sick is not able to wear a facemask (for example, because it causes trouble breathing), then people who live with the person who is sick should not stay in the same room with them, or they should wear a facemask if they enter a room with the person who is sick. Cover your coughs and sneezes   Cover: Cover your mouth and nose with a tissue when you cough or sneeze. Dispose: Throw used tissues in a lined trash can. Wash hands: Immediately wash your hands with soap and water for at least 20 seconds or, if soap and water are not available, clean your hands with an alcohol-based hand  that contains at least 60% alcohol. Clean your hands often   Wash hands: Wash your hands often with soap and water for at least 20 seconds, especially after blowing your nose, coughing, or sneezing; going to the bathroom; and before eating or preparing food.    Hand : If soap and water are not readily available, use an alcohol-based hand  with at least 60% alcohol, covering all surfaces of your hands and rubbing them together until they feel dry. Soap and water: Soap and water are the best option if hands are visibly dirty. Avoid touching: Avoid touching your eyes, nose, and mouth with unwashed hands. Handwashing Tips   Wet your hands with clean, running water (warm or cold), turn off the tap, and apply soap. Lather your hands by rubbing them together with the soap. Lather the backs of your hands, between your fingers, and under your nails. Scrub your hands for at least 20 seconds. Need a timer? Hum the [place] from beginning to end twice. Rinse your hands well under clean, running water. Dry your hands using a clean towel or air dry them. Avoid sharing personal household items   Do not share: You should not share dishes, drinking glasses, cups, eating utensils, towels, or bedding with other people or pets in your home. Wash thoroughly after use: After using these items, they should be washed thoroughly with soap and water. Clean all high-touch surfaces everyday   Clean and disinfect: Practice routine cleaning of high touch surfaces. High touch surfaces include counters, tabletops, doorknobs, bathroom fixtures, toilets, phones, keyboards, tablets, and bedside tables. Disinfect areas with bodily fluids: Also, clean any surfaces that may have blood, stool, or body fluids on them. Household : Use a household cleaning spray or wipe, according to the label instructions. Labels contain instructions for safe and effective use of the cleaning product including precautions you should take when applying the product, such as wearing gloves and making sure you have good ventilation during use of the product. nirmatrelvir and ritonavir  Brand: Paxlovid  What is the most important information I should know about nirmatrelvir and ritonavir?   The [address] and Drug Administration (FDA) has authorized emergency use of nirmatrelvir in combination with another medicine called ritonavir for the treatment of mild-to-moderate COVID-19 in adults and people 15years of age andolder (weighing at least 40 kg or 88 lbs). What is nirmatrelvir and ritonavir? Nirmatrelvir in combination with ritonavir is an experimental medicine being studied for the treatment of mild-to-moderate COVID-19. This drug is still being studied and all of its risks are not yet known. The [address] and Drug Administration (FDA) has authorized emergency use of nirmatrelvir in combination with another medicine called ritonavir for the treatment of mild-to-moderate COVID-19 in adults and people 15years of age and older (weighing at least 40 kg or 88 lbs) testing positive for COVID-19, and who are at high risk for progression to severe COVID-19, includinghospitalization or death. Nirmatrelvir and ritonavir is not authorized for use:  for initiation of treatment in people needing hospitalization due to COVID-19;  for prevention before or after exposure of COVID-19; or  longer than 5 consecutive days. Nirmatrelvir and ritonavir may also be used for purposes not listed in thismedication guide. What should I discuss with my healthcare provider before taking nirmatrelvir and ritonavir? You should not use nirmatrelvir and ritonavir if you are allergic to it. Some drugs should not be used with nirmatrelvir and ritonavir, such as those listed below.    alfuzosin, colchicine;  sildenafil (Revatio) when used to treat pulmonary arterial hypertension (PAH);  pain medicine --pethidine, piroxicam, propoxyphene;  heart medicine --amiodarone, dronedarone, flecainide, propafenone, quinidine, ranolazine;  antipsychotic medicine --lurasidone, pimozide, clozapine;  ergot medicine --dihydroergotamine, ergotamine, methylergonovine;  cholesterol-lowering medicine --lovastatin, simvastatin; or  a sedative --triazolam, oral midazolam.  Nirmatrelvir and ritonavir should not be started immediately afterdiscontinuation of any of the following drugs:  rifampin;  Zoey's Wort;  a cancer medicine --apalutamide; or  seizure medicine --carbamazepine, phenobarbital, phenytoin. Tell your doctor if:  you have liver problems or a liver disease such as hepatitis;  you have kidney problems;  you have an HIV-1 infection;  you are pregnant or breastfeeding; or  you have any serious or chronic disease. COVID-19 is more likely to cause serious illness or death in a pregnant woman. Not all risks are known yet, but being treated with nirmatrelvir and ritonaviris likely to be less harmful than being infected with COVID-19 during pregnancy. Ritonavir can make birth control pills or skin patches less effective. Ask your doctor about other birth control options such as an injection, implant, vaginalring, condom, diaphragm, cervical cap, or contraceptive sponge. How should I take nirmatrelvir and ritonavir? Follow all directions on your prescription label and read all medication guidesor instruction sheets. Use the medicine exactly as directed. Take nirmatrelvir together with ritonavir (two tablets of nirmatrelvir and onetablet of ritonavir) twice a day for 5 consecutive days. Take this medicine as soon as possible after diagnosis of COVID-19 and within 5days of when symptoms first appear. You may take nirmatrelvir and ritonavir with or without food. Swallow the tablets whole and do not crush, chew, or break them. You may need frequent blood tests to check your liver function. Being treated with nirmatrelvir and ritonavir will not make you less contagious to other people. Keep using infection control methods such as self-isolation, social distancing, hand-washing, using protective face covering, disinfecting surfacesyou touch a lot, and not sharing personal items with others. Nirmatrelvir and ritonavir are still being studied and all of the risks are not yet known. What happens if I miss a dose?   Use the medicine as soon as you can, but skip the missed dose if you are more than 8 hours late for the dose. Do not use two doses at one time. What happens if I overdose? Seek emergency medical attention or call the Poison Help line at 1-576.662.2140. What should I avoid while taking nirmatrelvir and ritonavir? Follow your doctor's instructions about any restrictions on food, beverages, oractivity. What are the possible side effects of nirmatrelvir and ritonavir? Get emergency medical help if you have signs of an allergic reaction (hives, difficult breathing, swelling in your face or throat) or a severe skin reaction (fever, sore throat, burning eyes, skin pain, red or purple skin rash withblistering and peeling). Call your doctor at once if you have:  liver problems --loss of appetite, stomach pain (upper right side), tiredness, itching, dark urine, rehan-colored stools, jaundice (yellowing of the skin or eyes). Ritonavir affects your immune system, which may cause certain side effects (even weeks or months after you've taken this medicine). Tell your doctor ifyou have:  signs of a new infection --fever, night sweats, swollen glands, cold sores, cough, wheezing, diarrhea, weight loss;  trouble speaking or swallowing, problems with balance or eye movement, weakness or prickly feeling; or  swelling in your neck or throat (enlarged thyroid), menstrual changes, impotence. Common side effects may include:  changes in your sense of taste;  diarrhea;  elevated blood pressure; or  muscle pain. This is not a complete list of side effects and others may occur. Call your doctor for medical advice about side effects. You may report side effects toFDA at 1-805-PMH-9330. What other drugs will affect nirmatrelvir and ritonavir? Sometimes it is not safe to use certain medicines at the same time.  Some drugs can affect your blood levels of other drugs you use, which mayincrease side effects or make the medicines less effective. Many drugs can affect nirmatrelvir and ritonavir, and some drugs should not be used at the same time. Tell your doctor about all other medicines you use. This includes prescription and over-the-counter medicines, vitamins, and herbal products. Not all possible interactions are listed here. Where can I get more information? Your doctor or pharmacist can provide more information about nirmatrelvir andritonavir. Remember, keep this and all other medicines out of the reach of children, never share your medicines with others, and use this medication only for the indication prescribed. Every effort has been made to ensure that the information provided by [address] [place], [place] [address]can [address] is accurate, up-to-date, and complete, but no guarantee is made to that effect. Drug information contained herein may be time sensitive. [facility] information has been compiled for use by healthcare practitioners and consumers in the [place] and therefore [facility] does not warrant that uses outside of the [place] are appropriate, unless specifically indicated otherwise. [facility]'s drug information does not endorse drugs, diagnose patients or recommend therapy. [facility] drug information is an informational resource designed to assist licensed healthcare practitioners in caring for their patients and/or to serve consumers viewing this service as a supplement to, and not a substitute for, the expertise, skill, knowledge and judgment of healthcare practitioners. The absence of a warning for a given drug or drug combination in no way should be construed to indicate that the drug or drug combination is safe, effective or appropriate for any given patient. [facility] does not assume any responsibility for any aspect of healthcare administered with the aid of information [facility] provides.  The information contained herein is not intended to cover all possible uses, directions, precautions, warnings, drug interactions, allergic

## 2022-07-19 NOTE — TELEPHONE ENCOUNTER
You have no appts available today unless you would like to add the 340? She took a covid test at home and it was positive. States symptoms started yesterday morning and she wants to make sure she gets the Paxlovid in time to be beneficial for her. You do have some openings tomorrow if you would rather her to the VV then?

## 2022-07-20 ENCOUNTER — APPOINTMENT (OUTPATIENT)
Dept: PHYSICAL THERAPY | Age: 62
End: 2022-07-20
Payer: MEDICARE

## 2022-07-22 ENCOUNTER — TELEMEDICINE (OUTPATIENT)
Dept: FAMILY MEDICINE CLINIC | Age: 62
End: 2022-07-22
Payer: MEDICARE

## 2022-07-22 DIAGNOSIS — G25.1 DRUG-INDUCED TREMOR: ICD-10-CM

## 2022-07-22 DIAGNOSIS — J30.9 ALLERGIC RHINITIS, UNSPECIFIED SEASONALITY, UNSPECIFIED TRIGGER: ICD-10-CM

## 2022-07-22 DIAGNOSIS — U07.1 COVID-19: ICD-10-CM

## 2022-07-22 DIAGNOSIS — F33.1 MODERATE EPISODE OF RECURRENT MAJOR DEPRESSIVE DISORDER (HCC): Primary | ICD-10-CM

## 2022-07-22 PROCEDURE — 1036F TOBACCO NON-USER: CPT | Performed by: NURSE PRACTITIONER

## 2022-07-22 PROCEDURE — G8427 DOCREV CUR MEDS BY ELIG CLIN: HCPCS | Performed by: NURSE PRACTITIONER

## 2022-07-22 PROCEDURE — G8417 CALC BMI ABV UP PARAM F/U: HCPCS | Performed by: NURSE PRACTITIONER

## 2022-07-22 PROCEDURE — 3017F COLORECTAL CA SCREEN DOC REV: CPT | Performed by: NURSE PRACTITIONER

## 2022-07-22 PROCEDURE — 99214 OFFICE O/P EST MOD 30 MIN: CPT | Performed by: NURSE PRACTITIONER

## 2022-07-22 RX ORDER — FLUTICASONE PROPIONATE 50 MCG
1 SPRAY, SUSPENSION (ML) NASAL 2 TIMES DAILY
Qty: 3 EACH | Refills: 3 | Status: SHIPPED | OUTPATIENT
Start: 2022-07-22 | End: 2022-09-09

## 2022-07-22 ASSESSMENT — PATIENT HEALTH QUESTIONNAIRE - PHQ9
5. POOR APPETITE OR OVEREATING: 2
1. LITTLE INTEREST OR PLEASURE IN DOING THINGS: 0
8. MOVING OR SPEAKING SO SLOWLY THAT OTHER PEOPLE COULD HAVE NOTICED. OR THE OPPOSITE, BEING SO FIGETY OR RESTLESS THAT YOU HAVE BEEN MOVING AROUND A LOT MORE THAN USUAL: 0
SUM OF ALL RESPONSES TO PHQ QUESTIONS 1-9: 7
7. TROUBLE CONCENTRATING ON THINGS, SUCH AS READING THE NEWSPAPER OR WATCHING TELEVISION: 0
10. IF YOU CHECKED OFF ANY PROBLEMS, HOW DIFFICULT HAVE THESE PROBLEMS MADE IT FOR YOU TO DO YOUR WORK, TAKE CARE OF THINGS AT HOME, OR GET ALONG WITH OTHER PEOPLE: 1
9. THOUGHTS THAT YOU WOULD BE BETTER OFF DEAD, OR OF HURTING YOURSELF: 0
4. FEELING TIRED OR HAVING LITTLE ENERGY: 3
SUM OF ALL RESPONSES TO PHQ9 QUESTIONS 1 & 2: 0
SUM OF ALL RESPONSES TO PHQ QUESTIONS 1-9: 7
6. FEELING BAD ABOUT YOURSELF - OR THAT YOU ARE A FAILURE OR HAVE LET YOURSELF OR YOUR FAMILY DOWN: 0
SUM OF ALL RESPONSES TO PHQ QUESTIONS 1-9: 7
SUM OF ALL RESPONSES TO PHQ QUESTIONS 1-9: 7
3. TROUBLE FALLING OR STAYING ASLEEP: 2
2. FEELING DOWN, DEPRESSED OR HOPELESS: 0

## 2022-07-22 ASSESSMENT — ENCOUNTER SYMPTOMS
COUGH: 1
ANAL BLEEDING: 0
ALLERGIC/IMMUNOLOGIC NEGATIVE: 1
NAUSEA: 0
SHORTNESS OF BREATH: 0
CHEST TIGHTNESS: 0
ABDOMINAL PAIN: 0
GASTROINTESTINAL NEGATIVE: 1
EYES NEGATIVE: 1
BLOOD IN STOOL: 0

## 2022-07-22 NOTE — PROGRESS NOTES
2022    TELEHEALTH EVALUATION -- Audio/Visual (During CVOTB-15 public health emergency)    HPI:    Donta Tanner (:  1960) has requested an audio/video evaluation for the following concern(s):  Chief Complaint   Patient presents with    Depression    Other     F/u covid         Patient had virtual visit on 22 after doing a home test for covid was found to be positive. She was started on paxlovid due to patient being high risk. Today she has cough and congestion. She denies any difficulty breathing, chest pain or leg swelling. Overall patient feels she is doing fine. Patient is also here for f.u on her depression. On   patient called stating that she has been having some depression, mood swings and not getting a lot of sleep (only about 4 hours per night)Her wellbutrin was increased. Patient is already at max dose of Lamictal and Trintellix. Instructed She should continue both of these. I  recommended that she continues her Wellbutrin XL from 150 mg daily  Will try trazodone 100 mg 1 p.o. q. at bedtime. Patient did not want trazodone as she does not like side effects of \"sleeping medications\"  Therefor I increased her Wellbutrin XL from 150 mg to Wellbutrin  mg 1 p.o. daily    However on 22 the patient called stating that for the last week she's noticed that the tremors in her right arm, from wrist down have gotten worse. Pt thinks this could be from the Wellbutrin. She was told to stop the higher dose of wellbutrin and go back to her original dose. The tremors are in both hands now and is intermittent. She does feel that the temporary increase of the wellbutrin \"put her back on track\". She denies any issues with her depression today. Review of Systems   Constitutional: Negative. Negative for appetite change, fatigue and unexpected weight change. HENT:  Positive for congestion. Eyes: Negative. Respiratory:  Positive for cough.  Negative for chest tightness and shortness of breath. Cardiovascular: Negative. Negative for chest pain, palpitations and leg swelling. Gastrointestinal: Negative. Negative for abdominal pain, anal bleeding, blood in stool and nausea. Endocrine: Negative. Genitourinary: Negative. Negative for hematuria. Musculoskeletal: Negative. Skin: Negative. Negative for rash. Allergic/Immunologic: Negative. Neurological:  Positive for tremors. Negative for dizziness, syncope, light-headedness and numbness. Hematological: Negative. Does not bruise/bleed easily. Psychiatric/Behavioral: Negative. All other systems reviewed and are negative. Prior to Visit Medications    Medication Sig Taking? Authorizing Provider   methocarbamol (ROBAXIN) 500 MG tablet  Yes Historical Provider, MD   nirmatrelvir/ritonavir (PAXLOVID) 20 x 150 MG & 10 x 100MG Take 3 tablets (two 150 mg nirmatrelvir and one 100 mg ritonavir tablets) by mouth every 12 hours for 5 days.  Yes Cynthia Bosworth, APRN - CNP   ondansetron (ZOFRAN-ODT) 4 MG disintegrating tablet Take 1 tablet by mouth 3 times daily as needed for Nausea or Vomiting Yes Cynthia Bosworth, APRN - CNP   lamoTRIgine (LAMICTAL) 100 MG tablet TAKE 1 TABLET TWICE DAILY Yes MADINA De Paz CNP   metoprolol succinate (TOPROL XL) 50 MG extended release tablet TAKE 1 TABLET EVERY DAY Yes MADINA De Paz CNP   rosuvastatin (CRESTOR) 5 MG tablet TAKE 1 TABLET EVERY DAY Yes MADINA Haines CNP   Cholecalciferol (VITAMIN D) 50 MCG (2000 UT) CAPS capsule Take 1 capsule by mouth daily Yes Historical Provider, MD   Vitamin D-Vitamin K (K2 PLUS D3) 100-1000 MCG-UNIT TABS Take 1 tablet by mouth daily Yes Historical Provider, MD   budesonide-formoterol (SYMBICORT) 160-4.5 MCG/ACT AERO Inhale 2 puffs into the lungs 2 times daily Yes MADINA De Paz CNP   celecoxib (CELEBREX) 200 MG capsule Take 1 capsule by mouth 2 times daily Yes Annika Fritz MADINA Tran CNP   VORTIoxetine HBr (TRINTELLIX) 20 MG TABS tablet TAKE 1 TABLET EVERY DAY Yes MADINA Ames CNP   buPROPion (WELLBUTRIN XL) 150 MG extended release tablet Take 1 tablet every day Yes MADINA Fletcher CNP   Coenzyme Q10 (COQ10) 100 MG CAPS Take by mouth Yes Historical Provider, MD   albuterol sulfate HFA (PROVENTIL HFA) 108 (90 Base) MCG/ACT inhaler Inhale 2 puffs into the lungs every 6 hours as needed for Wheezing or Shortness of Breath Yes MADINA Ames CNP   fluticasone (FLONASE) 50 MCG/ACT nasal spray 1 spray by Nasal route 2 times daily Yes Lucina Claude, DO   ferrous sulfate (IRON 325) 325 (65 Fe) MG tablet Take 1 tablet by mouth daily (with breakfast) Yes MADINA Fletcher CNP   loratadine (CLARITIN) 10 MG tablet Take 10 mg by mouth daily Yes Historical Provider, MD   multivitamin [facility]) per tablet Take 1 tablet by mouth daily. Yes Historical Provider, MD   Omega-3 Fatty Acids (FISH OIL) 1200 MG CAPS Take  by mouth daily.  Yes Historical Provider, MD       Social History     Tobacco Use    Smoking status: Former     Packs/day: 1.50     Years: 21.00     Pack years: 31.50     Types: Cigarettes     Quit date: 1997     Years since quittin.4    Smokeless tobacco: Never   Vaping Use    Vaping Use: Never used   Substance Use Topics    Alcohol use: No    Drug use: No        Allergies   Allergen Reactions    Calamine Other (See Comments)     Leaves skin tender and burning     Amoxicillin Other (See Comments)    Amoxicillin-Pot Clavulanate Hives    Chlorpheniramine Maleate     Daypro [Oxaprozin] Hives    Duravent-Da [Chlorphen-Phenyleph-Methscop] Hives    Lithium      Caused shakes    Methscopolamine     Norvasc [Amlodipine]      LE edema      Nsaids      Avoid d/t gastric bypass    Phenylephrine Hcl     Seldane [Terfenadine] Hives    Suprax [Cefixime] Hives    Levofloxacin Rash   ,   Past Medical History:   Diagnosis Date    Bipolar disorder     Borderline osteopenia     Cellulitis of left leg 2020    Frequency of micturition     GERD (gastroesophageal reflux disease)     Headache(784.0)     HNP (herniated nucleus pulposus), lumbar 2019    Hyperlipidemia     Hypertension     Intention tremor     due to lithium    Iron deficiency anemia 2019    Lateral meniscal tear 10/14/2015    Lumbar stenosis with neurogenic claudication 2019    Medial meniscus tear 10/14/2015    Mixed incontinence     Morbid (severe) obesity due to excess calories ([address]     Prolonged emergence from general anesthesia     Prolonged emergence from general anesthesia, initial encounter 2019    Tobacco use     Venous ulcer of left leg ([address] 2020   ,   Past Surgical History:   Procedure Laterality Date    ABDOMINAL EXPLORATION SURGERY      CARPAL TUNNEL RELEASE Bilateral 2005     SECTION      CHOLECYSTECTOMY, LAPAROSCOPIC N/A 2022    LAPAROSCOPIC CHOLECYSTECTOMY performed by Lalo Fraga MD at [facility]    normal    COLONOSCOPY N/A 2021    COLON W/ANES. (13:30) performed by Jazlyn Villavicencio MD at [address] [facility] Left     atrhroscopic unispacer    KNEE SURGERY Right 10/28/2015    Right knee video arthroscopy with partial medial and lateral menisectomy with loose body removal    LUMBAR SPINE SURGERY N/A 2019    MICROLUMBAR DISCECTOMY L4-5 performed by Zelalem Donnelly MD at [facility]     ,   Social History     Tobacco Use    Smoking status: Former     Packs/day: 1.50     Years: 21.00     Pack years: 31.50     Types: Cigarettes     Quit date: 1997     Years since quittin.4    Smokeless tobacco: Never   Vaping Use    Vaping Use: Never used   Substance Use Topics    Alcohol use: No    Drug use: No   ,   Family History   Problem Relation Age of Onset    Depression Sister     Mental Illness Sister     Diabetes Mother     Heart Disease No Hallucinations        [] Abnormal-     Other pertinent observable physical exam findings-     ASSESSMENT/PLAN:  1. Moderate episode of recurrent major depressive disorder ([facility]  2. Drug-induced tremor  Patient has gone back to wellbutrin xl 150 mg dose. Tremors present prior to increase but did worsen with increase in medication  Patient states tremors seem to be improving and are now intermittent  Discussed again the likelyhood of EP symptoms due to long term use of psych medications. Patient states she is aware but can not go off medications  She does not want to see psychiatry  She does not want to start medication for tremor but will wait about 1 month and if no improvement then will discuss tremor medication optiosn    3. COVID-19  Doing well at this time  Continue paxlovid  Discussed s/s to go to ER    4. Allergic rhinitis, unspecified seasonality, unspecified trigger  Patient request refill of the following  - fluticasone (FLONASE) 50 MCG/ACT nasal spray; 1 spray by Nasal route in the morning and 1 spray before bedtime. Dispense: 3 each; Refill: [address] Laurent, was evaluated through a synchronous (real-time) audio-video encounter. The patient (or guardian if applicable) is aware that this is a billable service, which includes applicable co-pays. This Virtual Visit was conducted with patient's (and/or legal guardian's) consent. The visit was conducted pursuant to the emergency declaration under the [facility], [address] [place] [address] authority and the [employer]. Patient identification was verified, and a caregiver was present when appropriate. The patient was located at Home: [address] [place] [address]. Provider was located at Other: [place] .        Total time spent on this encounter:  32 minutes    --MADINA Marvin CNP on 7/22/2022 at 10:04 AM    An electronic signature was used to authenticate this note.

## 2022-07-26 ENCOUNTER — HOSPITAL ENCOUNTER (OUTPATIENT)
Dept: PHYSICAL THERAPY | Age: 62
Setting detail: THERAPIES SERIES
Discharge: HOME | End: 2022-07-26
Payer: MEDICARE

## 2022-07-26 NOTE — FLOWSHEET NOTE
[facility])    Physical Therapy  Cancellation/No-show Note  Patient Name:  Seema Hermosillo  :  1960   Date:  2022    Cancelled visits to date: 4  No-shows to date: 0    For today's appointment patient:  [x]  Cancelled  []  Rescheduled appointment  []  No-show     Reason given by patient:  []  Patient ill  []  Conflicting appointment  []  No transportation    []  Conflict with work  []  No reason given  [x]  Other:     Comments:  Patient cant make it today    Phone call information:   []  Phone call made today to patient. []  Patient answered, conversation as follows:    []  Patient did not answer. [x]  Phone call not needed - pt contacted us to cancel and provided reason for cancellation.      Electronically signed by:  Katina Gunn PTA

## 2022-07-27 ENCOUNTER — HOSPITAL ENCOUNTER (OUTPATIENT)
Dept: PHYSICAL THERAPY | Age: 62
Setting detail: THERAPIES SERIES
Discharge: HOME | End: 2022-07-27
Payer: MEDICARE

## 2022-07-27 NOTE — FLOWSHEET NOTE
[facility])    Physical Therapy  Cancellation/No-show Note  Patient Name:  Shaka Anaya  :  1960   Date:  2022    Cancelled visits to date: 5  No-shows to date: 0    For today's appointment patient:  [x]  Cancelled  []  Rescheduled appointment  []  No-show     Reason given by patient:  []  Patient ill  []  Conflicting appointment  []  No transportation    []  Conflict with work  []  No reason given  [x]  Other:     Comments:  still coughing. Can't seem to shake it. She doesn't have covid anymore, but doesn't feel that she should come today because of the coughing    Phone call information:   []  Phone call made today to patient. []  Patient answered, conversation as follows:    []  Patient did not answer. [x]  Phone call not needed - pt contacted us to cancel and provided reason for cancellation.      Electronically signed by:  Roseline Mora, PT,  MPT, ATC, cert DN

## 2022-08-02 ENCOUNTER — HOSPITAL ENCOUNTER (OUTPATIENT)
Dept: PHYSICAL THERAPY | Age: 62
Setting detail: THERAPIES SERIES
Discharge: HOME | End: 2022-08-02
Payer: MEDICARE

## 2022-08-02 PROCEDURE — 97140 MANUAL THERAPY 1/> REGIONS: CPT | Performed by: PHYSICAL THERAPY ASSISTANT

## 2022-08-02 PROCEDURE — 97110 THERAPEUTIC EXERCISES: CPT | Performed by: PHYSICAL THERAPY ASSISTANT

## 2022-08-02 PROCEDURE — 97112 NEUROMUSCULAR REEDUCATION: CPT | Performed by: PHYSICAL THERAPY ASSISTANT

## 2022-08-02 NOTE — FLOWSHEET NOTE
723 [facility] and 500 [facility], [address] [place] [address] 904 [facility], [address] [place] [address] ([facility]), [address] [place] [address]  Phone: [phone], [phone]    Date: 2022          Patient Name; :  Concepcion Cook; 1960   Dx/ICD Code:  Diagnosis: Left hip OA  Treatment Diagnosis: M16.12      Physician:  Santa Omer        Total PT Visits: 5     Measures Previous Current   Pain (0-10)  Laying down 6-8  Standing 1-2        FOTO Score  52        PROM     Hamstring flexibility  0 R, L                         Specific Functional Improvements & Impressions:  Patient reports that she has had a minor setback due to illness and feels the pain has worsened over time. She feels that therapy was helping prior to her illness and will continue to improve as she gets back into it. Plan & Recommendations:  [x] Continue rehabilitation due to objective improvement and continued functional deficits with frequency and duration: 2x week - 4 weeks  [] Progress toward  []GAP, []Work Conditioning, []Independent HEP   [] Discharge due to   [] All goals achieved, [] Maximized \"medical necessity\" [] No subjective or objective improvements      Electronically signed by:  Dolores Valera PTA; Muna Olivares PT,MPT,ATC, cert DN     Therapy Plan of Care Re-Certification  This patient has been re-evaluated for physical therapy services and for therapy to continue, Medicare, Medicaid and other insurances require periodic physician review of the treatment plan.  Please review the above re-evaluation and verify that you agree with plan of care as established above by signing the attached document and return it to our office or note changes to established plan below  [] Follow treatment plan as above [] Discontinue physical therapy  [] Change plan to:                                 __________________________________________________    Physician Signature:____________________________________ Date:____________  By signing above, therapists plan is approved by physician    If you have any questions or concerns, please don't hesitate to call. Thank you for your referral.    Natasha Marks [facility] office  ([phone])     Fax [phone]        Physical Therapy Treatment Note/ Progress Report:     Date:  2022    Patient Name:  Jose Angel Callahan    :  1960  MRN: [id number]  Restrictions/Precautions:    Medical/Treatment Diagnosis Information:  Diagnosis: Left hip OA  Treatment Diagnosis: I09.43  Insurance/Certification information:  PT Insurance Information: [facility] Medicare  Physician Information:     Has the plan of care been signed (Y/N):        []  Yes  [x]  No     Date of Patient follow up with Physician:     Is this a Progress Report:     []  Yes  [x]  No      If Yes:  Date Range for reporting period:  Initial Eval: 2022  Beginnin2022 --- Endin22  Beginnin22 -- Ending 22    Progress report will be due (10 Rx or 30 days whichever is less): 44     Recertification will be due (POC Duration  / 90 days whichever is less): 22      Visit # Insurance Allowable Auth Required   In Person 5 BMN []  Yes     []  No    Tele Health 0  []  Yes     []  No    Total 5       FOTO Score: 43 (Predicted: 53)   Date assessed: 2022      Latex Allergy:  [x]NO      []YES  Preferred Language for Healthcare:   [x]English       []other:    Pain level:  1-2/10 standing   6-8/10 while supine    SUBJECTIVE:  See updated progress note.     OBJECTIVE:   Observation:   Test measurements: See Eval    RESTRICTIONS/PRECAUTIONS:     Exercises/Interventions:   Therapeutic Ex (57827)  Therapeutic Activity (45803)  NMR re-education (59157) Sets/Reps Notes/CUES   Bike Unable to ride a bike secondary to implant in left knee    Standing HR  2 x 10          Standing HS Curl  2 x 10 R, L    Standing Hip ABD  2 x 10 R, L               Hook lying:  Glute squeeze  Seated Add squeeze  Abd clam  Marching  Mini crunch   5\"x20  5\" x20  x20 green    x20 ea  x   Bent knee drop out  x20 R, L     Supine SAQ x20    Supine knee flexion with slide board  x20    Supine hip ABD with slide board  x20     EOB LAQ x20    Seated HS curl Red 2x15         Supine lumbar rotation  10 x 10\" ea     Single knee to chest with belt  5\" x10 R, L                                                            Manual Intervention (16751)     STM - left IT band         HS stretch  Piriformis stretch 3x30\"  3x30\"                   Medbridge access code: [email or web address]                    Patient Education 5 min Provided biomechanics/ergonomics training to reduce stress across injured/healing structures. Therapeutic Exercise and NMR EXR  [x] (92577) Provided verbal/tactile cueing for activities related to strengthening, flexibility, endurance, ROM for improvements in LE, proximal hip, and core control with self care, mobility, lifting, ambulation. [x] (11284) Provided verbal/tactile cueing for activities related to improving balance, coordination, kinesthetic sense, posture, motor skill, proprioception to assist with LE, proximal hip, and core control in self-care, mobility, lifting, ambulation and eccentric single leg control. NMR and Therapeutic Activities:    [x] (12455 or 64879) Provided verbal/tactile cueing for activities related to improving balance, coordination, kinesthetic sense, posture, motor skill, proprioception and motor activation to allow for proper function of core, proximal hip and LE with self-care and ADLs and functional mobility.    [x] (82118) Gait Re-education- Provided training and instruction to the patient for proper LE, core and proximal hip recruitment and positioning and eccentric body weight control with ambulation re-education including up and down stairs     Home Exercise Program:    [x] (94540) Reviewed/Progressed HEP activities related to strengthening, flexibility, endurance, ROM of core, proximal hip and LE for functional self-care, mobility, lifting and ambulation/stair navigation   [x] (59564) Reviewed/Progressed HEP activities related to improving balance, coordination, kinesthetic sense, posture, motor skill, proprioception of core, proximal hip and LE for self-care, mobility, lifting, and ambulation/stair navigation      Manual Treatments:  PROM / STM / Oscillations-Mobs:  G-I, II, III, IV (PA's, Inf., Post.)  [x] (10926) Provided manual therapy to mobilize LE, proximal hip and/or LS spine soft tissue/joints for the purpose of modulating pain, promoting relaxation, increasing ROM, reducing/eliminating soft tissue swelling/inflammation/restriction, improving soft tissue extensibility and allowing for proper ROM for normal function with self-care, mobility, lifting and ambulation. Modalities:      Charges:  Timed Code Treatment Minutes: 39'   Total Treatment Minutes:  54'   [facility]:  [facility] TIME:  MANUAL TIME:  UNTIMED MINUTES:  Medicare Total:                [] EVAL (LOW) 96722 (typically 20 minutes face-to-face)  [] EVAL (MOD) 18285 (typically 30 minutes face-to-face)  [] EVAL (HIGH) 65353 (typically 45 minutes face-to-face)  [] RE-EVAL     [x] EC(96529) x 2   [] IONTO  [x] NMR (03764) x 1    [] VASO  [x] Manual (51679) x 1    [] Other:  [] TA x      [] Mech Traction (81456)  [] ES(attended) (62407)      [] ES (un) (08100):    ASSESSMENT:  Patient presents with signs and symptoms consistent with diagnosis of left hip OA/pain. Rehab potential is good at this time. Key impairments include: decreased ROM and strength of the left lower extremity, poor balance and compensatory gait patterning, increased swelling, and pain with functional activities such as squatting, walking, lunging, and stairs.  Skilled PT is required to address these key impairments and to provide and progress with an appropriate home exercise program. The patient's complexity may change due to the nature of the patients presentation as well as the comorbidities and medical factors included in this patient's evaluation. GOALS:   Short Term Goals: To be achieved in: 2 weeks  1. Independent in HEP and progression per patient tolerance, in order to prevent re-injury. [] Progressing: [x] Met: [] Not Met: [] Adjusted       2. Patient will have a decrease in pain to facilitate improvement in movement, function, and ADLs as indicated by Functional Deficits. [] Progressing: [x] Met: [] Not Met: [] Adjusted          Long Term Goals: To be achieved in: 12 weeks  1. FOTO score will be within 10 points of the predicted score to assist with reaching prior level of function. [x] Progressing: [] Met: [] Not Met: [] Adjusted      2. Patient will demonstrate increased AROM to equal the opposite side bilaterally to allow for proper joint functioning as indicated by patients Functional Deficits. [x] Progressing: [] Met: [] Not Met: [] Adjusted       3. Patient will demonstrate an increase in strength to be within 3lbs on the HHD or match bilaterally to allow for proper functional mobility as indicated by patients Functional Deficits. [x] Progressing: [] Met: [] Not Met: [] Adjusted       4. Patient will return to all transfers, work activities, and functional activities without increased symptoms or restriction. [x] Progressing: [] Met: [] Not Met: [] Adjusted       5. Patient will have 0/10 pain with ADL's. [x] Progressing: [] Met: [] Not Met: [] Adjusted       6. Patient stated goal: Would like to be able to walk for exercise for 15 min without stopping. [x] Progressing: [] Met: [] Not Met: [] Adjusted      Overall Progression Towards Functional goals/ Treatment Progress Update:  [] Patient is progressing as expected towards functional goals listed. [x] Progression is slowed due to complexities/Impairments listed. [] Progression has been slowed due to co-morbidities.   [] Plan just implemented, too soon to assess goals progression <30days   [] Goals require adjustment due to lack of progress  [] Patient is not progressing as expected and requires additional follow up with physician  [] Other    Prognosis for POC: [x] Good [] Fair  [] Poor    Patient requires continued skilled intervention: [x] Yes  [] No    Treatment/Activity Tolerance:  [x] Patient able to complete treatment  [] Patient limited by fatigue  [] Patient limited by pain    [] Patient limited by other medical complications  [] Other:     Return to Play: (if applicable)   []  Stage 1: Intro to Strength   []  Stage 2: Return to Run and Strength   []  Stage 3: Return to Jump and Strength   []  Stage 4: Dynamic Strength and Agility   []  Stage 5: Sport Specific Training     []  Ready to Return to Play, Meets All Above Stages   []  Not Ready for Return to Sports   Comments:                         PLAN: See eval  [x] Continue per plan of care [] Alter current plan (see comments above)  [] Plan of care initiated [] Hold pending MD visit [] Discharge    Electronically signed by:  Betina Saini PTA; Yas Loyola PT,MPT,ATC, cert DN      Note: If patient does not return for scheduled/ recommended follow up visits, this note will serve as a discharge from care along with most recent update on progress.

## 2022-08-03 ENCOUNTER — HOSPITAL ENCOUNTER (OUTPATIENT)
Dept: PHYSICAL THERAPY | Age: 62
Setting detail: THERAPIES SERIES
Discharge: HOME | End: 2022-08-03
Payer: MEDICARE

## 2022-08-03 PROCEDURE — 97112 NEUROMUSCULAR REEDUCATION: CPT

## 2022-08-03 PROCEDURE — 97140 MANUAL THERAPY 1/> REGIONS: CPT

## 2022-08-03 PROCEDURE — 97110 THERAPEUTIC EXERCISES: CPT

## 2022-08-03 NOTE — FLOWSHEET NOTE
[facility], [address] [facility] [place], [address]  Phone: [phone], [phone]    Date: 8/3/2022          Patient Name; :  Kay Blunt; 1960   Dx/ICD Code: Diagnosis: M47.22 Cervical spondylosis with radiculopathy  Treatment diagnosis - Cervical pain M54.2, generalized UE weaknes M62.812       Physician: Garrett Schneider        Total PT Visits: 5     Measures Previous Current   Pain (0-10)  4-5/10   Disability % 59 47   FOTO Score       Specific Functional Improvements & Impressions:  Pt has been seen x 5 visits since start of PT tx. Pt has had other medical issues that has limited her ability to make it to PT appts and continue with HEP at home. Educated pt to attempt to be more consistent with HEP until she returns for next visit. Plan & Recommendations:  [x] Continue rehabilitation due to objective improvement and continued functional deficits with frequency and duration: 1-2 x week for 4 weeks  [x] Progress toward  []GAP, []Work Conditioning, [x]Independent HEP   [] Discharge due to   [] All goals achieved, [] Maximized \"medical necessity\" [] No subjective or objective improvements      Electronically signed by:  Nickie Kumar, PT, MPT,ATC, cert DN      Therapy Plan of Care Re-Certification  This patient has been re-evaluated for physical therapy services and for therapy to continue, Medicare, Medicaid and other insurances require periodic physician review of the treatment plan.  Please review the above re-evaluation and verify that you agree with plan of care as established above by signing the attached document and return it to our office or note changes to established plan below  [] Follow treatment plan as above [] Discontinue physical therapy  [] Change plan to:                                 __________________________________________________    Physician Signature:____________________________________ Date:____________  By signing above, therapists plan is approved by physician    If you have any questions or concerns, please don't hesitate to call. Thank you for your referral.    Natasha Marks 23 office  ([phone])     Fax [phone]         Physical Therapy Treatment Note/ Progress Report:     Date:  8/3/2022     Patient Name:  Carolyn Huff    :  1960  MRN: [id number]  Restrictions/Precautions:    Medical/Treatment Diagnosis Information:  Diagnosis: M47.22 Cervical spondylosis with radiculopathy  Treatment diagnosis - Cervical pain M54.2, generalized UE weaknes F38.022  Insurance/Certification information:  PT Insurance Information: [facility] $35 copay no auth BMN  Physician Information:   Dr. Joyce Guadalupe   Has the plan of care been signed (Y/N):        []  Yes  [x]  No     Date of Patient follow up with Physician:     Is this a Progress Report:     []  Yes  [x]  No      If Yes:  Date Range for reporting period:  Initial Eval: 2022  Beginnin/3/2022 --- Endin2022      Progress report will be due (10 Rx or 30 days whichever is less):   Recertification will be due (POC Duration  / 90 days whichever is less): 2022     Visit # Insurance Allowable Auth Required   5 BMN $35 []  Yes     []  No      FOTO Score: 59 (Predicted: 62)   Date assessed: 2022     Latex Allergy:  [x]NO      []YES  Preferred Language for Healthcare:   [x]English       []other:    Pain level:  3/10     SUBJECTIVE:  Pt reports not having kept up on her exercises since she was sick with covid so her neck isn't as good as it had been.      OBJECTIVE:   Observation:   Test measurements:      RESTRICTIONS/PRECAUTIONS: none    Exercises/Interventions:   Therapeutic Ex (03703)  Therapeutic Activity (52344)  NMR re-education (38890) Sets/Reps Notes/CUES   Supine chin tuck with lift 15 x 5\"    Supine Scap Squeeze 20 x     Supine Horizontal Shoulder  Abd 20 x         SBS  No $ 10 x 5\"  3 x 10   YTB   TB row  TB ext  20 x    20 x Green Tband  [employer] for [place] compensation   B Horizontal ABD  x 15 RTB             Postural Education for seated posture, and proper sleeping  5'                                                                                         Manual Intervention (76015)     UT trigger point release,  Global cervical STM, 1st rib mob 15 mins                                                 Patient Education 5 mins Spreading therex out through day to assess tolerance     Access Code: Wandy Conroy  URL: [email or web address]  Date: 06/29/2022  Prepared by: Lawrence Cole    Exercises  Supine Deep Neck Flexor Training - Repetitions - 1-2 x daily - 7 x weekly - 2 sets - 10 reps  Seated Scapular Retraction - 1-2 x daily - 7 x weekly - 15 reps - 3 hold  Shoulder External Rotation and Scapular Retraction with Resistance - 1-2 x daily - 7 x weekly - 2 sets - 10 reps - 3 hold  Scapular Retraction with Resistance - 1-2 x daily - 7 x weekly - 2 sets - 10 reps  Standing Cheerleaders - 1-2 x daily - 7 x weekly - 2 sets - 10 reps      Therapeutic Exercise and NMR EXR  [x] (97896) Provided verbal/tactile cueing for activities related to strengthening, flexibility, endurance, ROM  for improvements in scapular, scapulothoracic and UE control with self care, reaching, carrying, lifting, house/yardwork, driving/computer work. [x] (28590) Provided verbal/tactile cueing for activities related to improving balance, coordination, kinesthetic sense, posture, motor skill, proprioception  to assist with  scapular, scapulothoracic and UE control with self care, reaching, carrying, lifting, house/yardwork, driving/computer work. Therapeutic Activities:    [x] (07606 or 97705) Provided verbal/tactile cueing for activities related to improving balance, coordination, kinesthetic sense, posture, motor skill, proprioception and motor activation to allow for proper function of scapular, scapulothoracic and UE control with self care, carrying, lifting, driving/computer work. Home Exercise Program:    [x] (52569) Reviewed/Progressed HEP activities related to strengthening, flexibility, endurance, ROM of scapular, scapulothoracic and UE control with self care, reaching, carrying, lifting, house/yardwork, driving/computer work  [x] (75406) Reviewed/Progressed HEP activities related to improving balance, coordination, kinesthetic sense, posture, motor skill, proprioception of scapular, scapulothoracic and UE control with self care, reaching, carrying, lifting, house/yardwork, driving/computer work      Manual Treatments:  PROM / STM / Oscillations-Mobs:  G-I, II, III, IV (PA's, Inf., Post.)  [x] (28324) Provided manual therapy to mobilize soft tissue/joints of cervical/CT, scapular GHJ and UE for the purpose of modulating pain, promoting relaxation,  increasing ROM, reducing/eliminating soft tissue swelling/inflammation/restriction, improving soft tissue extensibility and allowing for proper ROM for normal function with self care, reaching, carrying, lifting, house/yardwork, driving/computer work    Modalities:     [] GAME READY (VASO)- for significant edema, swelling, pain control. Charges:  Timed Code Treatment Minutes: 40'   Total Treatment Minutes:  36'      [] EVAL (LOW) 91386 (typically 20 minutes face-to-face)  [] EVAL (MOD) 51731 (typically 30 minutes face-to-face)  [] EVAL (HIGH) 89386 (typically 45 minutes face-to-face)  [] RE-EVAL     [x] PM(81564) x 1    [] IONTO  [x] NMR (83744) x 1     [] VASO  [x] Manual (16850) x 1    [] Other:  [] TA x      [] Mech Traction (34587)  [] ES(attended) (81182)     [] ES (un) (79081):    ASSESSMENT SUMMARY:  Tolerated treatment well. May benefit from dry needling of R UT for trigger points. Patient received education on their current pathology and how their condition effects them with their functional activities. Patient understood discussion and questions were answered.  Patient understands their activity limitations and understands rational for treatment progression. Pt educated on plan of care and HEP, if worsening symptoms to d/c that exercise. GOALS:   Short Term Goals: To be achieved in: 2 weeks  1. Independent in HEP and progression per patient tolerance, in order to prevent re-injury. [x] Progressing: [] Met: [] Not Met: [] Adjusted       2. Patient will have a decrease in pain to facilitate improvement in movement, function, and ADLs as indicated by Functional Deficits. [x] Progressing: [] Met: [] Not Met: [] Adjusted          Long Term Goals: To be achieved in: 12 weeks  1. FOTO score will be within 10 points of the predicted score to assist with reaching prior level of function. [] Progressing: [] Met: [] Not Met: [] Adjusted      2. Patient will demonstrate increased AROM to equal the opposite side bilaterally to allow for normal ADLs as indicated by patients Functional Deficits. [x] Progressing: [] Met: [] Not Met: [] Adjusted       3. Patient will demonstrate an increase in strength to  to allow for 4+/5 as indicated by patients Functional Deficits. [x] Progressing: [] Met: [] Not Met: [] Adjusted       4. Patient will return to all transfers, work activities, and functional activities without increased symptoms or restriction. [x] Progressing: [] Met: [] Not Met: [] Adjusted       5. Patient will have 0/10 pain with ADL's. [x] Progressing: [] Met: [] Not Met: [] Adjusted     Overall Progression Towards Functional goals/ Treatment Progress Update:  [] Patient is progressing as expected towards functional goals listed. [x] Progression is slowed due to complexities/Impairments listed. [x] Progression has been slowed due to co-morbidities.   [] Plan just implemented, too soon to assess goals progression <30days   [] Goals require adjustment due to lack of progress  [] Patient is not progressing as expected and requires additional follow up with physician  [] Other    Prognosis for POC: [x] Good [] Fair  [] Poor    Patient requires continued skilled intervention: [x] Yes  [] No    Treatment/Activity Tolerance:  [x] Patient able to complete treatment  [] Patient limited by fatigue  [] Patient limited by pain    [] Patient limited by other medical complications  [] Other:                  PLAN: See eval  [x] Continue per plan of care [] Alter current plan (see comments above)  [] Plan of care initiated [] Hold pending MD visit [] Discharge    Electronically signed by:  Jennifer Allen, PT, MPT,ATC, cert DN     Note: If patient does not return for scheduled/ recommended follow up visits, this note will serve as a discharge from care along with most recent update on progress.

## 2022-08-09 ENCOUNTER — HOSPITAL ENCOUNTER (OUTPATIENT)
Dept: PHYSICAL THERAPY | Age: 62
Setting detail: THERAPIES SERIES
End: 2022-08-09
Payer: MEDICARE

## 2022-08-15 DIAGNOSIS — I10 ESSENTIAL HYPERTENSION: ICD-10-CM

## 2022-08-15 DIAGNOSIS — E78.49 OTHER HYPERLIPIDEMIA: ICD-10-CM

## 2022-08-15 DIAGNOSIS — F33.9 RECURRENT MAJOR DEPRESSIVE DISORDER, REMISSION STATUS UNSPECIFIED (HCC): ICD-10-CM

## 2022-08-15 RX ORDER — CELECOXIB 200 MG/1
CAPSULE ORAL
Qty: 180 CAPSULE | Refills: 1 | Status: SHIPPED | OUTPATIENT
Start: 2022-08-15

## 2022-08-15 RX ORDER — ROSUVASTATIN CALCIUM 5 MG/1
TABLET, COATED ORAL
Qty: 90 TABLET | Refills: 1 | Status: SHIPPED | OUTPATIENT
Start: 2022-08-15

## 2022-08-15 RX ORDER — LAMOTRIGINE 100 MG/1
TABLET ORAL
Qty: 180 TABLET | Refills: 3 | Status: SHIPPED | OUTPATIENT
Start: 2022-08-15

## 2022-08-15 RX ORDER — METOPROLOL SUCCINATE 50 MG/1
TABLET, EXTENDED RELEASE ORAL
Qty: 90 TABLET | Refills: 3 | Status: SHIPPED | OUTPATIENT
Start: 2022-08-15

## 2022-08-16 ENCOUNTER — APPOINTMENT (OUTPATIENT)
Dept: PHYSICAL THERAPY | Age: 62
End: 2022-08-16
Payer: MEDICARE

## 2022-08-17 ENCOUNTER — HOSPITAL ENCOUNTER (OUTPATIENT)
Dept: PHYSICAL THERAPY | Age: 62
Setting detail: THERAPIES SERIES
End: 2022-08-17
Payer: MEDICARE

## 2022-09-09 ENCOUNTER — TELEMEDICINE (OUTPATIENT)
Dept: FAMILY MEDICINE CLINIC | Age: 62
End: 2022-09-09
Payer: MEDICARE

## 2022-09-09 DIAGNOSIS — F33.9 RECURRENT MAJOR DEPRESSIVE DISORDER, REMISSION STATUS UNSPECIFIED (HCC): ICD-10-CM

## 2022-09-09 DIAGNOSIS — G25.1 DRUG-INDUCED TREMOR: Primary | ICD-10-CM

## 2022-09-09 PROCEDURE — 99443 PR PHYS/QHP TELEPHONE EVALUATION 21-30 MIN: CPT | Performed by: NURSE PRACTITIONER

## 2022-09-09 RX ORDER — BENZTROPINE MESYLATE 1 MG/1
1 TABLET ORAL DAILY
Qty: 30 TABLET | Refills: 2 | Status: SHIPPED | OUTPATIENT
Start: 2022-09-09 | End: 2022-10-14 | Stop reason: SDUPTHER

## 2022-09-09 RX ORDER — BUPROPION HYDROCHLORIDE 100 MG/1
100 TABLET, EXTENDED RELEASE ORAL 2 TIMES DAILY
Qty: 60 TABLET | Refills: 3 | Status: SHIPPED | OUTPATIENT
Start: 2022-09-09 | End: 2022-09-14 | Stop reason: ALTCHOICE

## 2022-09-09 ASSESSMENT — ENCOUNTER SYMPTOMS
RESPIRATORY NEGATIVE: 1
NAUSEA: 0
BLOOD IN STOOL: 0
GASTROINTESTINAL NEGATIVE: 1
ALLERGIC/IMMUNOLOGIC NEGATIVE: 1
SHORTNESS OF BREATH: 0
ANAL BLEEDING: 0
ABDOMINAL PAIN: 0
EYES NEGATIVE: 1

## 2022-09-09 NOTE — PROGRESS NOTES
[address] [facility]  [address] [place] [id number]  Dept: [phone]  Dept Fax: [phone]  Loc: [phone]    Carolyn Huff is a 58 y.o. female evaluated via telephone on 9/9/2022. Consent:  She and/or health care decision maker is aware that that she may receive a bill for this telephone service, which includes applicable co-pays, depending on her insurance coverage, and has provided verbal consent to proceed. [address] [facility]  [address] [place] [address]  Dept: [phone]  Dept Fax: [phone]  Loc: [phone]    Carolyn Huff is a 58 y.o. female who presents today for her medical conditions/complaints as noted below. Carolyn Huff is c/o of Other (Follow up on tremors)       Subjective:     Chief Complaint   Patient presents with    Other     Follow up on tremors       Other  Pertinent negatives include no abdominal pain, chest pain, fatigue, nausea, numbness or rash. However on 7/11/22 the patient called stating that for the last week she's noticed that the tremors in her right arm, from wrist down have gotten worse. Pt thinks this could be from the Wellbutrin. She was told to stop the higher dose of wellbutrin and go back to her original dose. The tremors are in both hands now and is intermittent.     Past Medical History:   Diagnosis Date    Bipolar disorder     Borderline osteopenia     Cellulitis of left leg 8/5/2020    Frequency of micturition     GERD (gastroesophageal reflux disease)     Headache(784.0)     HNP (herniated nucleus pulposus), lumbar 6/6/2019    Hyperlipidemia     Hypertension     Intention tremor     due to lithium    Iron deficiency anemia 11/4/2019    Lateral meniscal tear 10/14/2015    Lumbar stenosis with neurogenic claudication 6/6/2019    Medial meniscus tear 10/14/2015    Mixed incontinence     Morbid (severe) obesity due to excess calories (ROBAXIN) 500 MG tablet       ondansetron (ZOFRAN-ODT) 4 MG disintegrating tablet Take 1 tablet by mouth 3 times daily as needed for Nausea or Vomiting 21 tablet 0    Cholecalciferol (VITAMIN D) 50 MCG (2000 UT) CAPS capsule Take 1 capsule by mouth daily      Vitamin D-Vitamin K (K2 PLUS D3) 100-1000 MCG-UNIT TABS Take 1 tablet by mouth daily      budesonide-formoterol (SYMBICORT) 160-4.5 MCG/ACT AERO Inhale 2 puffs into the lungs 2 times daily 3 each 2    Coenzyme Q10 (COQ10) 100 MG CAPS Take by mouth      albuterol sulfate HFA (PROVENTIL HFA) 108 (90 Base) MCG/ACT inhaler Inhale 2 puffs into the lungs every 6 hours as needed for Wheezing or Shortness of Breath 3 Inhaler 3    ferrous sulfate (IRON 325) 325 (65 Fe) MG tablet Take 1 tablet by mouth daily (with breakfast) 90 tablet 3    loratadine (CLARITIN) 10 MG tablet Take 10 mg by mouth daily      multivitamin (THERAGRAN) per tablet Take 1 tablet by mouth daily. Omega-3 Fatty Acids (FISH OIL) 1200 MG CAPS Take  by mouth daily. No current facility-administered medications for this visit. No changes in past medical history, past surgical history, social history, orfamily history were noted during the patient encounter unless specifically listed above. All updates of past medical history, past surgical history, social history, or family history were reviewed personally by me duringthe office visit. All problems listed in the assessment are stable unless noted otherwise. Medication profile reviewed personally by me during the office visit. Medication side effects and possible impairments frommedications were discussed as applicable. Objective:     Physical Exam    LMP  (LMP Unknown)   There is no height or weight on file to calculate BMI.     BP Readings from Last 2 Encounters:   06/22/22 (!) 146/84   04/25/22 126/72       Wt Readings from Last 3 Encounters:   06/27/22 (!) 321 lb (145.6 kg)   06/22/22 (!) 321 lb (145.6 kg)   05/27/22 (!) 325 lb (147.4 kg)       Lab Review   No visits with results within 2 Month(s) from this visit. Latest known visit with results is:   Office Visit on 04/25/2022   Component Date Value    HPV Genotype 16 04/25/2022 Not Detected     HPV TYPE 18 04/25/2022 Not Detected     HPVOH (OTHER TYPES) 04/25/2022 Not Detected     HPV Comment 04/25/2022 See below        No results found for this visit on 09/09/22. Assessment:       1. Drug-induced tremor    2. Recurrent major depressive disorder, remission status unspecified ([facility] [address]        No results found for this visit on 09/09/22. Plan:       Saad Feliz was seen today for other. Diagnoses and all orders for this visit:    Drug-induced tremor  Trial of  -     benztropine (COGENTIN) 1 MG tablet; Take 1 tablet by mouth daily  Patient counselled on proper usage of medication, and was advised of potential side effects and risks associated with this medication. The patient expressed understanding of above and agreed to proceed with treatment. Recurrent major depressive disorder, remission status unspecified ([facility] [address]  Patient is already at max dose of Lamictal and Trintellix. Patient is seeing counselor  Has appointment with psych but not until 10/2022  She is not doing will on lower dose of wellbutrin xl 150 mg but 300 mg made tremors worse  Trial of  -     buPROPion (WELLBUTRIN SR) 100 MG extended release tablet; Take 1 tablet by mouth 2 times daily  Educated if patient develops SI/HI/chapito to call 911 or go to ER. Discussed use, benefit, risks and side effects of prescribed medications. Barriers to compliance discussed. All patient questions answered. Pt voiced understanding. Patient has been instructed call the office immediately with new symptoms, change in symptoms or worseningof symptoms. If this is not feasible, patient is instructed to report to the emergency room. Medication profile reviewed.  Medication side effects and possible impairments from Act.  Patient identification was verified, and a caregiver was present when appropriate. The patient was located at Home: [address] [place] [address]. Provider was located at Other: [facility] .         Note: not billable if this call serves to triage the patient into an appointment for the relevant concern      MADINA Delgadillo - CNP

## 2022-09-14 ENCOUNTER — TELEPHONE (OUTPATIENT)
Dept: FAMILY MEDICINE CLINIC | Age: 62
End: 2022-09-14

## 2022-09-14 RX ORDER — BUPROPION HYDROCHLORIDE 150 MG/1
150 TABLET ORAL EVERY MORNING
COMMUNITY

## 2022-09-14 NOTE — TELEPHONE ENCOUNTER
Her tremors are most likely worse due to the increase in the Wellbutrin to 100 mg twice a day. --Recommend that the patient go back on the Wellbutrin  mg daily  Patient should continue with the Cogentin as prescribed

## 2022-09-14 NOTE — TELEPHONE ENCOUNTER
Is clarify this message because the patient was just started on medication for her tremors on 9/9/2022. Is she stating that her tremors are worse since starting this medication?

## 2022-09-14 NOTE — TELEPHONE ENCOUNTER
Pt states that the last meds that you put her on for the tremors. States that she can not control her hands, states that she uses [employer].

## 2022-09-21 ENCOUNTER — OFFICE VISIT (OUTPATIENT)
Dept: FAMILY MEDICINE CLINIC | Age: 62
End: 2022-09-21
Payer: MEDICARE

## 2022-09-21 VITALS
BODY MASS INDEX: 55.32 KG/M2 | WEIGHT: 293 LBS | HEART RATE: 90 BPM | DIASTOLIC BLOOD PRESSURE: 72 MMHG | HEIGHT: 61 IN | OXYGEN SATURATION: 97 % | SYSTOLIC BLOOD PRESSURE: 126 MMHG

## 2022-09-21 DIAGNOSIS — K61.2 ABSCESS OF ANAL AND RECTAL REGIONS: Primary | ICD-10-CM

## 2022-09-21 PROCEDURE — G8427 DOCREV CUR MEDS BY ELIG CLIN: HCPCS | Performed by: NURSE PRACTITIONER

## 2022-09-21 PROCEDURE — 99213 OFFICE O/P EST LOW 20 MIN: CPT | Performed by: NURSE PRACTITIONER

## 2022-09-21 PROCEDURE — G8417 CALC BMI ABV UP PARAM F/U: HCPCS | Performed by: NURSE PRACTITIONER

## 2022-09-21 PROCEDURE — 1036F TOBACCO NON-USER: CPT | Performed by: NURSE PRACTITIONER

## 2022-09-21 PROCEDURE — 3017F COLORECTAL CA SCREEN DOC REV: CPT | Performed by: NURSE PRACTITIONER

## 2022-09-21 RX ORDER — SULFAMETHOXAZOLE AND TRIMETHOPRIM 800; 160 MG/1; MG/1
1 TABLET ORAL 2 TIMES DAILY
Qty: 20 TABLET | Refills: 0 | Status: SHIPPED | OUTPATIENT
Start: 2022-09-21 | End: 2022-10-01

## 2022-09-21 SDOH — ECONOMIC STABILITY: FOOD INSECURITY: WITHIN THE PAST 12 MONTHS, THE FOOD YOU BOUGHT JUST DIDN'T LAST AND YOU DIDN'T HAVE MONEY TO GET MORE.: NEVER TRUE

## 2022-09-21 SDOH — ECONOMIC STABILITY: FOOD INSECURITY: WITHIN THE PAST 12 MONTHS, YOU WORRIED THAT YOUR FOOD WOULD RUN OUT BEFORE YOU GOT MONEY TO BUY MORE.: NEVER TRUE

## 2022-09-21 ASSESSMENT — SOCIAL DETERMINANTS OF HEALTH (SDOH): HOW HARD IS IT FOR YOU TO PAY FOR THE VERY BASICS LIKE FOOD, HOUSING, MEDICAL CARE, AND HEATING?: NOT HARD AT ALL

## 2022-09-21 NOTE — PROGRESS NOTES
[address]  [facility]  [address] [place] [address]  Dept: [phone]  Dept Fax: [phone]  Loc: [phone]    Concepcion Cook is a 58 y.o. female who presents today for her medical conditions/complaints as noted below. Concepcion Cook is c/o of Other (Lump on the side of buttocks noticed 5 days ago. Pain off and on)       Subjective:     Chief Complaint   Patient presents with    Other     Lump on the side of buttocks noticed 5 days ago. Pain off and on       HPI  The patient comes in today complaining of a abscess-like area on the left side of her buttocks near her rectum. She states it has been ongoing for 5 days and worsening. She states that it is quite tender to touch the area and to sit. The patient states that she has had no discharge or drainage noted from the area. She has had a similar abscess in the vaginal region previously. She feels like this may be the same thing. She denies any fever, chills, nausea or vomiting today. She has not been previously evaluated for this problem.   She has not been using anything other than Tylenol for the discomfort    Past Medical History:   Diagnosis Date    Bipolar disorder     Borderline osteopenia     Cellulitis of left leg 8/5/2020    Frequency of micturition     GERD (gastroesophageal reflux disease)     Headache(784.0)     HNP (herniated nucleus pulposus), lumbar 6/6/2019    Hyperlipidemia     Hypertension     Intention tremor     due to lithium    Iron deficiency anemia 11/4/2019    Lateral meniscal tear 10/14/2015    Lumbar stenosis with neurogenic claudication 6/6/2019    Medial meniscus tear 10/14/2015    Mixed incontinence     Morbid (severe) obesity due to excess calories (HCC)     Prolonged emergence from general anesthesia     Prolonged emergence from general anesthesia, initial encounter 6/12/2019    Tobacco use     Venous ulcer of left leg ([address] 07/2020         Review of Systems Constitutional: Negative. Negative for appetite change, fatigue and unexpected weight change. HENT: Negative. Eyes: Negative. Respiratory: Negative. Negative for shortness of breath. Cardiovascular: Negative. Negative for chest pain, palpitations and leg swelling. Gastrointestinal: Negative. Negative for abdominal pain, anal bleeding, blood in stool and nausea. Endocrine: Negative. Genitourinary: Negative. Negative for hematuria. Musculoskeletal: Negative. Skin:  Positive for wound. Negative for rash. Allergic/Immunologic: Negative. Neurological: Negative. Negative for dizziness, syncope, light-headedness and numbness. Hematological: Negative. Does not bruise/bleed easily. Psychiatric/Behavioral: Negative. All other systems reviewed and are negative.      Current Outpatient Medications   Medication Sig Dispense Refill    buPROPion (WELLBUTRIN XL) 150 MG extended release tablet Take 150 mg by mouth every morning      benztropine (COGENTIN) 1 MG tablet Take 1 tablet by mouth daily 30 tablet 2    VORTIoxetine HBr (TRINTELLIX) 20 MG TABS tablet TAKE 1 TABLET BY MOUTH  DAILY 90 tablet 1    celecoxib (CELEBREX) 200 MG capsule TAKE 1 CAPSULE BY MOUTH  TWICE DAILY 180 capsule 1    lamoTRIgine (LAMICTAL) 100 MG tablet TAKE 1 TABLET BY MOUTH  TWICE DAILY 180 tablet 3    metoprolol succinate (TOPROL XL) 50 MG extended release tablet TAKE 1 TABLET BY MOUTH  DAILY 90 tablet 3    rosuvastatin (CRESTOR) 5 MG tablet TAKE 1 TABLET BY MOUTH  DAILY 90 tablet 1    methocarbamol (ROBAXIN) 500 MG tablet       ondansetron (ZOFRAN-ODT) 4 MG disintegrating tablet Take 1 tablet by mouth 3 times daily as needed for Nausea or Vomiting 21 tablet 0    Cholecalciferol (VITAMIN D) 50 MCG (2000 UT) CAPS capsule Take 1 capsule by mouth daily      Vitamin D-Vitamin K (K2 PLUS D3) 100-1000 MCG-UNIT TABS Take 1 tablet by mouth daily      budesonide-formoterol (SYMBICORT) 160-4.5 MCG/ACT AERO Inhale 2 puffs into the lungs 2 times daily 3 each 2    Coenzyme Q10 (COQ10) 100 MG CAPS Take by mouth      albuterol sulfate HFA (PROVENTIL HFA) 108 (90 Base) MCG/ACT inhaler Inhale 2 puffs into the lungs every 6 hours as needed for Wheezing or Shortness of Breath 3 Inhaler 3    ferrous sulfate (IRON 325) 325 (65 Fe) MG tablet Take 1 tablet by mouth daily (with breakfast) 90 tablet 3    loratadine (CLARITIN) 10 MG tablet Take 10 mg by mouth daily      multivitamin (THERAGRAN) per tablet Take 1 tablet by mouth daily. Omega-3 Fatty Acids (FISH OIL) 1200 MG CAPS Take  by mouth daily. fluticasone (FLONASE) 50 MCG/ACT nasal spray 1 spray by Nasal route in the morning and 1 spray before bedtime. 3 each 3     No current facility-administered medications for this visit. No changes in past medical history, past surgical history, social history, orfamily history were noted during the patient encounter unless specifically listed above. All updates of past medical history, past surgical history, social history, or family history were reviewed personally by me duringthe office visit. All problems listed in the assessment are stable unless noted otherwise. Medication profile reviewed personally by me during the office visit. Medication side effects and possible impairments frommedications were discussed as applicable. Objective:     Physical Exam  Constitutional:       General: She is not in acute distress. Appearance: Normal appearance. She is well-developed. She is not toxic-appearing. HENT:      Head: Normocephalic and atraumatic. Right Ear: Hearing, tympanic membrane and ear canal normal.      Left Ear: Hearing, tympanic membrane and ear canal normal.      Nose: Nose normal.      Mouth/Throat:      Pharynx: Uvula midline. Eyes:      General: Lids are normal.      Conjunctiva/sclera: Conjunctivae normal.   Cardiovascular:      Rate and Rhythm: Normal rate and regular rhythm. Pulses: Normal pulses. sulfamethoxazole-trimethoprim (BACTRIM DS) 800-160 MG per tablet; Take 1 tablet by mouth 2 times daily for 10 days  Recommend the patient sit in warm water or using warm compresses to the area. If the patient feels that the area is more fluctuant tomorrow she can return to the office for incision and drainage but unfortunately today the area was too firm to perform I&D. The patient can use Tylenol and/or ibuprofen as needed for the discomfort. I will have the patient follow-up in a few days to further evaluate    Patient has been instructed call the office immediately with new symptoms, change in symptoms or worseningof symptoms. If this is not feasible, patient is instructed to report to the emergency room. Medication profile reviewed. Medication side effects and possible impairments from medications were discussed as applicable. Allergies were reviewed. Health maintenance was reviewed and updated as appropriate. (Comment: Please note this report has been produced using a combination of typing and speech recognition software and may contain errors related to that system including errors in grammar, punctuation, and spelling, as well as words and phrases that may be inappropriate.  If there are any questions or concerns please feel free to contact the dictating provider for clarification.)

## 2022-09-26 ENCOUNTER — OFFICE VISIT (OUTPATIENT)
Dept: FAMILY MEDICINE CLINIC | Age: 62
End: 2022-09-26
Payer: MEDICARE

## 2022-09-26 ENCOUNTER — TELEPHONE (OUTPATIENT)
Dept: FAMILY MEDICINE CLINIC | Age: 62
End: 2022-09-26

## 2022-09-26 VITALS
OXYGEN SATURATION: 95 % | HEART RATE: 70 BPM | WEIGHT: 293 LBS | HEIGHT: 61 IN | DIASTOLIC BLOOD PRESSURE: 74 MMHG | SYSTOLIC BLOOD PRESSURE: 130 MMHG | BODY MASS INDEX: 55.32 KG/M2

## 2022-09-26 DIAGNOSIS — K61.1 ABSCESS, PERIRECTAL: Primary | ICD-10-CM

## 2022-09-26 PROCEDURE — 3017F COLORECTAL CA SCREEN DOC REV: CPT | Performed by: NURSE PRACTITIONER

## 2022-09-26 PROCEDURE — 1036F TOBACCO NON-USER: CPT | Performed by: NURSE PRACTITIONER

## 2022-09-26 PROCEDURE — 99213 OFFICE O/P EST LOW 20 MIN: CPT | Performed by: NURSE PRACTITIONER

## 2022-09-26 PROCEDURE — G8417 CALC BMI ABV UP PARAM F/U: HCPCS | Performed by: NURSE PRACTITIONER

## 2022-09-26 PROCEDURE — G8427 DOCREV CUR MEDS BY ELIG CLIN: HCPCS | Performed by: NURSE PRACTITIONER

## 2022-09-26 RX ORDER — HYDROCORTISONE ACETATE 25 MG/1
25 SUPPOSITORY RECTAL 2 TIMES DAILY PRN
Qty: 14 SUPPOSITORY | Refills: 0 | Status: SHIPPED | OUTPATIENT
Start: 2022-09-26 | End: 2022-10-03

## 2022-09-26 RX ORDER — HYDROCORTISONE ACETATE 25 MG/1
25 SUPPOSITORY RECTAL 2 TIMES DAILY PRN
Qty: 14 SUPPOSITORY | Refills: 0 | Status: SHIPPED | OUTPATIENT
Start: 2022-09-26 | End: 2022-09-26 | Stop reason: SDUPTHER

## 2022-09-26 ASSESSMENT — ENCOUNTER SYMPTOMS
ABDOMINAL PAIN: 0
BLOOD IN STOOL: 0
ANAL BLEEDING: 0
EYES NEGATIVE: 1
ALLERGIC/IMMUNOLOGIC NEGATIVE: 1
GASTROINTESTINAL NEGATIVE: 1
NAUSEA: 0
SHORTNESS OF BREATH: 0
RESPIRATORY NEGATIVE: 1

## 2022-09-26 NOTE — TELEPHONE ENCOUNTER
Appears patient will need to purchase Anusol rectal suppositories over-the-counter--- twice daily x7 days  Please let patient know

## 2022-09-26 NOTE — PROGRESS NOTES
[address]  [facility]  [address] [place] [address]  Dept: [phone]  Dept Fax: [phone]  Loc: [phone]    Rena Lauren is a 58 y.o. female who presents today for her medical conditions/complaints as noted below. Rena Lauren is c/o of Other (One week follow up on abscess on buttocks )       Subjective:     Chief Complaint   Patient presents with    Other     One week follow up on abscess on buttocks        HPI  The patient is here today for follow-up on an abscess of the left perineal region. The abscess was not indurated and could not be incised and drained when she was seen last week. The patient was started on Bactrim DS twice a day. She is tolerating it well. Patient states that her abscess did open on Thursday or Friday and started draining large amounts until about Sunday. she states that the pain has significantly improved. She denies any fever, chills, nausea or vomiting. Past Medical History:   Diagnosis Date    Bipolar disorder     Borderline osteopenia     Cellulitis of left leg 8/5/2020    Frequency of micturition     GERD (gastroesophageal reflux disease)     Headache(784.0)     HNP (herniated nucleus pulposus), lumbar 6/6/2019    Hyperlipidemia     Hypertension     Intention tremor     due to lithium    Iron deficiency anemia 11/4/2019    Lateral meniscal tear 10/14/2015    Lumbar stenosis with neurogenic claudication 6/6/2019    Medial meniscus tear 10/14/2015    Mixed incontinence     Morbid (severe) obesity due to excess calories (HCC)     Prolonged emergence from general anesthesia     Prolonged emergence from general anesthesia, initial encounter 6/12/2019    Tobacco use     Venous ulcer of left leg ([address] 07/2020         Review of Systems   Constitutional: Negative. Negative for appetite change, fatigue and unexpected weight change. HENT: Negative. Eyes: Negative. Respiratory: Negative.   Negative for shortness of breath. Cardiovascular: Negative. Negative for chest pain, palpitations and leg swelling. Gastrointestinal: Negative. Negative for abdominal pain, anal bleeding, blood in stool, nausea and rectal pain. Not constipated but having difficulty passing stool   Endocrine: Negative. Genitourinary: Negative. Negative for hematuria. Musculoskeletal: Negative. Skin:  Positive for wound. Negative for rash. Allergic/Immunologic: Negative. Neurological: Negative. Negative for dizziness, syncope, light-headedness and numbness. Hematological: Negative. Does not bruise/bleed easily. Psychiatric/Behavioral: Negative. All other systems reviewed and are negative.      Current Outpatient Medications   Medication Sig Dispense Refill    sulfamethoxazole-trimethoprim (BACTRIM DS) 800-160 MG per tablet Take 1 tablet by mouth 2 times daily for 10 days 20 tablet 0    buPROPion (WELLBUTRIN XL) 150 MG extended release tablet Take 150 mg by mouth every morning      benztropine (COGENTIN) 1 MG tablet Take 1 tablet by mouth daily 30 tablet 2    VORTIoxetine HBr (TRINTELLIX) 20 MG TABS tablet TAKE 1 TABLET BY MOUTH  DAILY 90 tablet 1    celecoxib (CELEBREX) 200 MG capsule TAKE 1 CAPSULE BY MOUTH  TWICE DAILY 180 capsule 1    lamoTRIgine (LAMICTAL) 100 MG tablet TAKE 1 TABLET BY MOUTH  TWICE DAILY 180 tablet 3    metoprolol succinate (TOPROL XL) 50 MG extended release tablet TAKE 1 TABLET BY MOUTH  DAILY 90 tablet 3    rosuvastatin (CRESTOR) 5 MG tablet TAKE 1 TABLET BY MOUTH  DAILY 90 tablet 1    methocarbamol (ROBAXIN) 500 MG tablet       ondansetron (ZOFRAN-ODT) 4 MG disintegrating tablet Take 1 tablet by mouth 3 times daily as needed for Nausea or Vomiting 21 tablet 0    Cholecalciferol (VITAMIN D) 50 MCG (2000 UT) CAPS capsule Take 1 capsule by mouth daily      Vitamin D-Vitamin K (K2 PLUS D3) 100-1000 MCG-UNIT TABS Take 1 tablet by mouth daily      budesonide-formoterol (SYMBICORT) 160-4.5 MCG/ACT AERO Inhale 2 puffs into the lungs 2 times daily 3 each 2    Coenzyme Q10 (COQ10) 100 MG CAPS Take by mouth      albuterol sulfate HFA (PROVENTIL HFA) 108 (90 Base) MCG/ACT inhaler Inhale 2 puffs into the lungs every 6 hours as needed for Wheezing or Shortness of Breath 3 Inhaler 3    ferrous sulfate (IRON 325) 325 (65 Fe) MG tablet Take 1 tablet by mouth daily (with breakfast) 90 tablet 3    loratadine (CLARITIN) 10 MG tablet Take 10 mg by mouth daily      multivitamin (THERAGRAN) per tablet Take 1 tablet by mouth daily. Omega-3 Fatty Acids (FISH OIL) 1200 MG CAPS Take  by mouth daily. fluticasone (FLONASE) 50 MCG/ACT nasal spray 1 spray by Nasal route in the morning and 1 spray before bedtime. 3 each 3     No current facility-administered medications for this visit. No changes in past medical history, past surgical history, social history, orfamily history were noted during the patient encounter unless specifically listed above. All updates of past medical history, past surgical history, social history, or family history were reviewed personally by me duringthe office visit. All problems listed in the assessment are stable unless noted otherwise. Medication profile reviewed personally by me during the office visit. Medication side effects and possible impairments frommedications were discussed as applicable. Objective:     Physical Exam  Constitutional:       General: She is not in acute distress. Appearance: Normal appearance. She is well-developed. She is not toxic-appearing. HENT:      Head: Normocephalic and atraumatic. Right Ear: Hearing, tympanic membrane and ear canal normal.      Left Ear: Hearing, tympanic membrane and ear canal normal.      Nose: Nose normal.      Mouth/Throat:      Pharynx: Uvula midline. Eyes:      General: Lids are normal.      Conjunctiva/sclera: Conjunctivae normal.   Cardiovascular:      Rate and Rhythm: Normal rate and regular rhythm. Pulses: Normal pulses. Heart sounds: Normal heart sounds. Pulmonary:      Effort: Pulmonary effort is normal. No accessory muscle usage or respiratory distress. Breath sounds: Normal breath sounds. Abdominal:      Palpations: Abdomen is soft. Tenderness: There is no abdominal tenderness. Musculoskeletal:      Cervical back: Neck supple. Lymphadenopathy:      Head:      Right side of head: No submental or submandibular adenopathy. Left side of head: No submental or submandibular adenopathy. Cervical: No cervical adenopathy. Skin:     General: Skin is warm and dry. Findings: No lesion or rash. Neurological:      Mental Status: She is alert and oriented to person, place, and time. Psychiatric:         Speech: Speech normal.         Behavior: Behavior normal. Behavior is cooperative. /74 (Site: Left Lower Arm, Position: Sitting, Cuff Size: Medium Adult)   Pulse 70   Ht 5' 1\" (1.549 m)   Wt (!) 322 lb (146.1 kg)   LMP  (LMP Unknown)   SpO2 95%   BMI 60.84 kg/m²   Body mass index is 60.84 kg/m². BP Readings from Last 2 Encounters:   09/26/22 130/74   09/21/22 126/72       Wt Readings from Last 3 Encounters:   09/26/22 (!) 322 lb (146.1 kg)   09/21/22 (!) 323 lb (146.5 kg)   06/27/22 (!) 321 lb (145.6 kg)       Lab Review   No visits with results within 2 Month(s) from this visit. Latest known visit with results is:   Office Visit on 04/25/2022   Component Date Value    HPV Genotype 16 04/25/2022 Not Detected     HPV TYPE 18 04/25/2022 Not Detected     HPVOH (OTHER TYPES) 04/25/2022 Not Detected     HPV Comment 04/25/2022 See below        No results found for this visit on 09/26/22. Assessment:       1. Abscess, perirectal        No results found for this visit on 09/26/22. Plan:       Bryan Penaloza was seen today for other.     Diagnoses and all orders for this visit:    Abscess, perirectal  Patient should continue Bactrim until complete. Patient continues to have some difficult team with passing bowel movements. Overall the  abscess has improved. But it is still extending somewhat into her rectum. At this time we are going to try the [facility] suppositories however she does not have any improvement with passing bowel movements by Friday she is to call the office and I will refer her to GI for a rectal evaluation to ensure that either an abscess has not extended up into the internal rectal region or make sure that there is no rectal mass that is causing the blockage and caused the abscess itself  -     hydrocortisone (ANUSOL-HC) 25 MG suppository; Place 1 suppository rectally 2 times daily as needed for Hemorrhoids    Patient has been instructed call the office immediately with new symptoms, change in symptoms or worseningof symptoms. If this is not feasible, patient is instructed to report to the emergency room. Medication profile reviewed. Medication side effects and possible impairments from medications were discussed as applicable. Allergies were reviewed. Health maintenance was reviewed and updated as appropriate. (Comment: Please note this report has been produced using a combination of typing and speech recognition software and may contain errors related to that system including errors in grammar, punctuation, and spelling, as well as words and phrases that may be inappropriate.  If there are any questions or concerns please feel free to contact the dictating provider for clarification.)

## 2022-09-26 NOTE — TELEPHONE ENCOUNTER
Pt called stating that her insurance isn't going to cover the Rx for the suppository. Pt would like to know if there's something else. Pt uses [address] [place] [address].  Call back [phone]

## 2022-09-26 NOTE — TELEPHONE ENCOUNTER
Please call patient's pharmacy to see what is covered instead.     Please let me know so that I can send in an alternative medication

## 2022-09-26 NOTE — TELEPHONE ENCOUNTER
Pharmacy called to determine what alternative insurance will cover, there is no alternative offered.  Its going to likely have to be purchased OTC

## 2022-09-30 ENCOUNTER — TELEPHONE (OUTPATIENT)
Dept: FAMILY MEDICINE CLINIC | Age: 62
End: 2022-09-30

## 2022-09-30 DIAGNOSIS — K61.2 ABSCESS OF ANAL AND RECTAL REGIONS: Primary | ICD-10-CM

## 2022-09-30 ASSESSMENT — ENCOUNTER SYMPTOMS
SHORTNESS OF BREATH: 0
NAUSEA: 0
EYES NEGATIVE: 1
ANAL BLEEDING: 0
ABDOMINAL PAIN: 0
BLOOD IN STOOL: 0
RECTAL PAIN: 0
RESPIRATORY NEGATIVE: 1
ALLERGIC/IMMUNOLOGIC NEGATIVE: 1
GASTROINTESTINAL NEGATIVE: 1

## 2022-09-30 NOTE — TELEPHONE ENCOUNTER
Patient's abscess is returning. No difficulty having BM  She does state the abscess has returned to being hard, tender and is bleeding but denies any purulent drainage.    Patient referred to general surgery for possible I&D

## 2022-09-30 NOTE — TELEPHONE ENCOUNTER
Pt called stating that she was to call if she wasn't getting any better. States that she had another \"episode\" last night and wanting to know what PCP would like her to do. Pt wouldn't elaborate any more, stated that she'd discussed this with PCP at her appt the other day.  Call back pt [phone]

## 2022-10-14 ENCOUNTER — TELEPHONE (OUTPATIENT)
Dept: PHARMACY | Facility: CLINIC | Age: 62
End: 2022-10-14

## 2022-10-14 DIAGNOSIS — G25.1 DRUG-INDUCED TREMOR: ICD-10-CM

## 2022-10-14 RX ORDER — BENZTROPINE MESYLATE 1 MG/1
1 TABLET ORAL DAILY
Qty: 90 TABLET | Refills: 3 | Status: SHIPPED | OUTPATIENT
Start: 2022-10-14

## 2022-10-14 NOTE — TELEPHONE ENCOUNTER
[facility] CLINICAL PHARMACY: ADHERENCE REVIEW  Identified care gap per [employer]: fills at [facility]: Statin adherence    Last Visit: 09/26/22    Patient also appears to be prescribed: ROSUVASTATIN TAB 5MG     ASSESSMENT    STATIN ADHERENCE    ROSUVASTATIN TAB 5MG last filled on 06/14/22 for 90 day supply. Next refill due: 09/12/22    Per [facility]:   ROSUVASTATIN TAB 5MG last picked up on 06/14/22 for 90 day supply. 3 refills remaining. Billed through [employer] Value Date    CHOL 178 03/30/2022    TRIG 108 03/30/2022    HDL 57 03/30/2022    LDLCALC 99 03/30/2022     ALT   Date Value Ref Range Status   03/30/2022 21 10 - 40 U/L Final     AST   Date Value Ref Range Status   03/30/2022 21 15 - 37 U/L Final     The 10-year ASCVD risk score (Clarita GALLARDO, et al., 2019) is: 3.8%    Values used to calculate the score:      Age: 58 years      Sex: Female      Is Non- : No      Diabetic: No      Tobacco smoker: No      Systolic Blood Pressure: 352 mmHg      Is BP treated: No      HDL Cholesterol: 57 mg/dL      Total Cholesterol: 178 mg/dL     PLAN  The following are interventions that have been identified:   - Patient overdue refilling rosuvastatin. Unsure if patient is still prescribed this medication. Attempting to reach patient to review. Left message asking for return call. Called patient to discuss adherence for rosuvastatin.  LF in June     Future Appointments   Date Time Provider Jeremiah Arzate   10/19/2022  3:00 PM Patti Ashraf MD AND ORTH[facility]       [address] [facility]   [address] // Department, toll free [phone], Option 3

## 2022-10-17 ENCOUNTER — INITIAL CONSULT (OUTPATIENT)
Dept: SURGERY | Age: 62
End: 2022-10-17
Payer: MEDICARE

## 2022-10-17 VITALS
DIASTOLIC BLOOD PRESSURE: 65 MMHG | SYSTOLIC BLOOD PRESSURE: 114 MMHG | WEIGHT: 293 LBS | HEART RATE: 75 BPM | BODY MASS INDEX: 55.32 KG/M2 | HEIGHT: 61 IN

## 2022-10-17 DIAGNOSIS — K61.1 PERIRECTAL ABSCESS: Primary | ICD-10-CM

## 2022-10-17 PROCEDURE — 3074F SYST BP LT 130 MM HG: CPT | Performed by: SURGERY

## 2022-10-17 PROCEDURE — 3078F DIAST BP <80 MM HG: CPT | Performed by: SURGERY

## 2022-10-17 PROCEDURE — G8427 DOCREV CUR MEDS BY ELIG CLIN: HCPCS | Performed by: SURGERY

## 2022-10-17 PROCEDURE — G8417 CALC BMI ABV UP PARAM F/U: HCPCS | Performed by: SURGERY

## 2022-10-17 PROCEDURE — 1036F TOBACCO NON-USER: CPT | Performed by: SURGERY

## 2022-10-17 PROCEDURE — 3017F COLORECTAL CA SCREEN DOC REV: CPT | Performed by: SURGERY

## 2022-10-17 PROCEDURE — 99213 OFFICE O/P EST LOW 20 MIN: CPT | Performed by: SURGERY

## 2022-10-17 PROCEDURE — G8484 FLU IMMUNIZE NO ADMIN: HCPCS | Performed by: SURGERY

## 2022-10-17 RX ORDER — METRONIDAZOLE 500 MG/1
500 TABLET ORAL 3 TIMES DAILY
Qty: 30 TABLET | Refills: 0 | Status: SHIPPED | OUTPATIENT
Start: 2022-10-17 | End: 2022-11-16 | Stop reason: SDUPTHER

## 2022-10-17 RX ORDER — CIPROFLOXACIN 500 MG/1
500 TABLET, FILM COATED ORAL 2 TIMES DAILY
Qty: 20 TABLET | Refills: 0 | Status: SHIPPED | OUTPATIENT
Start: 2022-10-17 | End: 2022-11-16 | Stop reason: SDUPTHER

## 2022-10-18 NOTE — TELEPHONE ENCOUNTER
Patient returned call, she confirms she is taking her medications as directed without issues/concerns. Patient states she has a surplus of her medications due to receiving multiple refills through having changed insurances. Patient states [facility] handles her medications and refills.

## 2022-10-19 ENCOUNTER — OFFICE VISIT (OUTPATIENT)
Dept: ORTHOPEDIC SURGERY | Age: 62
End: 2022-10-19
Payer: MEDICARE

## 2022-10-19 VITALS — HEIGHT: 61 IN | WEIGHT: 293 LBS | BODY MASS INDEX: 55.32 KG/M2

## 2022-10-19 DIAGNOSIS — M47.22 CERVICAL SPONDYLOSIS WITH RADICULOPATHY: Primary | ICD-10-CM

## 2022-10-19 PROCEDURE — 1036F TOBACCO NON-USER: CPT | Performed by: ORTHOPAEDIC SURGERY

## 2022-10-19 PROCEDURE — 3017F COLORECTAL CA SCREEN DOC REV: CPT | Performed by: ORTHOPAEDIC SURGERY

## 2022-10-19 PROCEDURE — G8427 DOCREV CUR MEDS BY ELIG CLIN: HCPCS | Performed by: ORTHOPAEDIC SURGERY

## 2022-10-19 PROCEDURE — G8484 FLU IMMUNIZE NO ADMIN: HCPCS | Performed by: ORTHOPAEDIC SURGERY

## 2022-10-19 PROCEDURE — 99212 OFFICE O/P EST SF 10 MIN: CPT | Performed by: ORTHOPAEDIC SURGERY

## 2022-10-19 PROCEDURE — G8417 CALC BMI ABV UP PARAM F/U: HCPCS | Performed by: ORTHOPAEDIC SURGERY

## 2022-10-19 NOTE — PROGRESS NOTES
New Patient: CERVICAL SPINE    Referring Provider:  No ref. provider found    CHIEF COMPLAINT:    No chief complaint on file. HISTORY OF PRESENT ILLNESS:   Ms. Bro Rivera retunrs today for the evaluation of neck and right greater than left arm pain. She notes her symptoms began insidiously about 8 months ago. Those have increased since that time. She rates the intensity of her neck pain 5/10 in her bilateral shoulder pain 7/10. Her arm pain radiates to her elbows. she notes weakness of her arms and loss of fine motor control. She denies numbness and tingling of her arms and gait abnormality. Recent shoulder injection not help her pain.   She is status post MLD by me a few years ago    Current/Past Treatment:   Physical Therapy: Yes  Chiropractic: None  Injection: None  Medications: None    Past Medical History:   Past Medical History:   Diagnosis Date    Bipolar disorder     Borderline osteopenia     Cellulitis of left leg 2020    Frequency of micturition     GERD (gastroesophageal reflux disease)     Headache(784.0)     HNP (herniated nucleus pulposus), lumbar 2019    Hyperlipidemia     Hypertension     Intention tremor     due to lithium    Iron deficiency anemia 2019    Lateral meniscal tear 10/14/2015    Lumbar stenosis with neurogenic claudication 2019    Medial meniscus tear 10/14/2015    Mixed incontinence     Morbid (severe) obesity due to excess calories (HCC)     Prolonged emergence from general anesthesia     Prolonged emergence from general anesthesia, initial encounter 2019    Tobacco use     Venous ulcer of left leg ([facility] [address] 2020      Past Surgical History:     Past Surgical History:   Procedure Laterality Date    ABDOMINAL EXPLORATION SURGERY      CARPAL TUNNEL RELEASE Bilateral 2005     SECTION      CHOLECYSTECTOMY, LAPAROSCOPIC N/A 2022    LAPAROSCOPIC CHOLECYSTECTOMY performed by Luis Enrique Alfaro MD at [address] [place] [address] normal    COLONOSCOPY N/A 02/02/2021    COLON W/ANES. (13:30) performed by Ed Killian MD at [address] Left     atrhroscopic unispacer    KNEE SURGERY Right 10/28/2015    Right knee video arthroscopy with partial medial and lateral menisectomy with loose body removal    LUMBAR SPINE SURGERY N/A 06/12/2019    MICROLUMBAR DISCECTOMY L4-5 performed by Patti Ashraf MD at [address] [facility]       Current Medications:     Current Outpatient Medications:     ciprofloxacin (CIPRO) 500 MG tablet, Take 1 tablet by mouth 2 times daily for 10 days, Disp: 20 tablet, Rfl: 0    metroNIDAZOLE (FLAGYL) 500 MG tablet, Take 1 tablet by mouth 3 times daily for 10 days, Disp: 30 tablet, Rfl: 0    benztropine (COGENTIN) 1 MG tablet, Take 1 tablet by mouth daily, Disp: 90 tablet, Rfl: 3    buPROPion (WELLBUTRIN XL) 150 MG extended release tablet, Take 150 mg by mouth every morning, Disp: , Rfl:     VORTIoxetine HBr (TRINTELLIX) 20 MG TABS tablet, TAKE 1 TABLET BY MOUTH  DAILY, Disp: 90 tablet, Rfl: 1    celecoxib (CELEBREX) 200 MG capsule, TAKE 1 CAPSULE BY MOUTH  TWICE DAILY, Disp: 180 capsule, Rfl: 1    lamoTRIgine (LAMICTAL) 100 MG tablet, TAKE 1 TABLET BY MOUTH  TWICE DAILY, Disp: 180 tablet, Rfl: 3    metoprolol succinate (TOPROL XL) 50 MG extended release tablet, TAKE 1 TABLET BY MOUTH  DAILY, Disp: 90 tablet, Rfl: 3    rosuvastatin (CRESTOR) 5 MG tablet, TAKE 1 TABLET BY MOUTH  DAILY, Disp: 90 tablet, Rfl: 1    fluticasone (FLONASE) 50 MCG/ACT nasal spray, 1 spray by Nasal route in the morning and 1 spray before bedtime. , Disp: 3 each, Rfl: 3    methocarbamol (ROBAXIN) 500 MG tablet, , Disp: , Rfl:     ondansetron (ZOFRAN-ODT) 4 MG disintegrating tablet, Take 1 tablet by mouth 3 times daily as needed for Nausea or Vomiting, Disp: 21 tablet, Rfl: 0    Cholecalciferol (VITAMIN D) 50 MCG (2000 UT) CAPS capsule, Take 1 capsule by mouth daily, Disp: , Rfl: Vitamin D-Vitamin K (K2 PLUS D3) 100-1000 MCG-UNIT TABS, Take 1 tablet by mouth daily, Disp: , Rfl:     budesonide-formoterol (SYMBICORT) 160-4.5 MCG/ACT AERO, Inhale 2 puffs into the lungs 2 times daily, Disp: 3 each, Rfl: 2    Coenzyme Q10 (COQ10) 100 MG CAPS, Take by mouth, Disp: , Rfl:     albuterol sulfate HFA (PROVENTIL HFA) 108 (90 Base) MCG/ACT inhaler, Inhale 2 puffs into the lungs every 6 hours as needed for Wheezing or Shortness of Breath, Disp: 3 Inhaler, Rfl: 3    ferrous sulfate (IRON 325) 325 (65 Fe) MG tablet, Take 1 tablet by mouth daily (with breakfast), Disp: 90 tablet, Rfl: 3    loratadine (CLARITIN) 10 MG tablet, Take 10 mg by mouth daily, Disp: , Rfl:     multivitamin (THERAGRAN) per tablet, Take 1 tablet by mouth daily. , Disp: , Rfl:     Omega-3 Fatty Acids (FISH OIL) 1200 MG CAPS, Take  by mouth daily. , Disp: , Rfl:   Allergies:  Calamine, Amoxicillin, Amoxicillin-pot clavulanate, Chlorpheniramine maleate, Daypro [oxaprozin], Duravent-da [chlorphen-phenyleph-methscop], Lithium, Methscopolamine, Norvasc [amlodipine], Nsaids, Phenylephrine hcl, Seldane [terfenadine], Suprax [cefixime], and Levofloxacin  Social History:    reports that she quit smoking about 25 years ago. Her smoking use included cigarettes. She has a 31.50 pack-year smoking history. She has never used smokeless tobacco. She reports that she does not drink alcohol and does not use drugs.   Family History:   Family History   Problem Relation Age of Onset    Depression Sister     Mental Illness Sister     Diabetes Mother     Heart Disease Mother     Diabetes Father     Heart Disease Father        REVIEW OF SYSTEMS: Full ROS noted & scanned   CONSTITUTIONAL: Denies unexplained weight loss, fevers, chills or fatigue  NEUROLOGICAL: Denies unsteady gait or progressive weakness  MUSCULOSKELETAL: Denies joint swelling or redness  PSYCHOLOGICAL: Denies anxiety, depression   SKIN: Denies skin changes, delayed healing, rash, itching HEMATOLOGIC: Denies easy bleeding or bruising  ENDOCRINE: Denies excessive thirst, urination, heat/cold  RESPIRATORY: Denies current dyspnea, cough  GI: Denies nausea, vomiting, diarrhea   : Denies bowel or bladder issues       PHYSICAL EXAM:    Vitals: not currently breastfeeding. GENERAL EXAM:  General Apparence: Patient is adequately groomed with no evidence of malnutrition. Orientation: The patient is oriented to time, place and person. Mood & Affect:The patient's mood and affect are appropriate   Vascular: Examination reveals no swelling tenderness in upper or lower extremities. Good capillary refill  Lymphatic: The lymphatic examination bilaterally reveals all areas to be without enlargement or induration  Sensation: Sensation is intact without deficit  Coordination/Balance: Good coordination     CERVICAL EXAMINATION:  Inspection: Local inspection shows no step-off or bruising. Cervical alignment is normal.     Palpation: No evidence of tenderness at the midline, and trapezius. Paraspinal tenderness is present. There is no step-off or paraspinal spasm. Range of Motion: Cervical flexion, extension, and rotation are mildly reduced with pain. Strength: 5/5 bilateral upper extremities   Special Tests:     Garza's negative bilaterally. Cubital and Carpal tunnel Tinel's negative bilaterally. Skin:There are no rashes, ulcerations or lesions in right & left upper extremities. Reflexes: Bilaterally triceps, biceps and brachioradialis are 2+. Clonus absent bilaterally at the feet. Gait & station: normal, patient ambulates without assistance     Additional Examinations:       RIGHT UPPER EXTREMITY:  Inspection/examination of the right upper extremity does not show any tenderness, deformity or injury. Range of motion is unremarkable. There is no gross instability. There are no rashes, ulcerations or lesions.  Strength and tone are normal.  LEFT UPPER EXTREMITY: Inspection/examination of the left upper extremity does not show any tenderness, deformity or injury. Range of motion is unremarkable. There is no gross instability. There are no rashes, ulcerations or lesions. Strength and tone are normal.    Diagnostic Testing:  I reviewed AP and lateral x-rays of her cervical spine were obtained in the office during her last visit. Those show spondylosis. Impression:   Cervical spondylosis with radiculitis    Plan:    Discussed treatment options including observation, physical therapy, medications, epidural injections and additional imaging.  She would like to continue with cervical MRI

## 2022-10-19 NOTE — TELEPHONE ENCOUNTER
For Pharmacy Admin Tracking Only    CPA in place:  No  Intervention Detail: Adherence Monitorin  Gap Closed?: No   Time Spent (min): 10

## 2022-11-09 ENCOUNTER — OFFICE VISIT (OUTPATIENT)
Dept: SURGERY | Age: 62
End: 2022-11-09
Payer: MEDICARE

## 2022-11-09 VITALS
HEIGHT: 61 IN | DIASTOLIC BLOOD PRESSURE: 75 MMHG | HEART RATE: 88 BPM | BODY MASS INDEX: 55.32 KG/M2 | WEIGHT: 293 LBS | SYSTOLIC BLOOD PRESSURE: 120 MMHG

## 2022-11-09 DIAGNOSIS — K61.1 PERIRECTAL ABSCESS: Primary | ICD-10-CM

## 2022-11-09 PROCEDURE — 1036F TOBACCO NON-USER: CPT | Performed by: SURGERY

## 2022-11-09 PROCEDURE — G8484 FLU IMMUNIZE NO ADMIN: HCPCS | Performed by: SURGERY

## 2022-11-09 PROCEDURE — 99213 OFFICE O/P EST LOW 20 MIN: CPT | Performed by: SURGERY

## 2022-11-09 PROCEDURE — G8427 DOCREV CUR MEDS BY ELIG CLIN: HCPCS | Performed by: SURGERY

## 2022-11-09 PROCEDURE — G8417 CALC BMI ABV UP PARAM F/U: HCPCS | Performed by: SURGERY

## 2022-11-09 PROCEDURE — 3074F SYST BP LT 130 MM HG: CPT | Performed by: SURGERY

## 2022-11-09 PROCEDURE — 3017F COLORECTAL CA SCREEN DOC REV: CPT | Performed by: SURGERY

## 2022-11-09 PROCEDURE — 3078F DIAST BP <80 MM HG: CPT | Performed by: SURGERY

## 2022-11-11 ENCOUNTER — TELEPHONE (OUTPATIENT)
Dept: FAMILY MEDICINE CLINIC | Age: 62
End: 2022-11-11

## 2022-11-11 NOTE — TELEPHONE ENCOUNTER
Pt states that the meds that you started her on for her shakes helped at first. They are not helping now states that she can't remember what she is doing. She wants to know if she needs to stop the medication or have you change it to something else, she uses [facility].

## 2022-11-16 ENCOUNTER — TELEPHONE (OUTPATIENT)
Dept: SURGERY | Age: 62
End: 2022-11-16

## 2022-11-16 RX ORDER — CIPROFLOXACIN 500 MG/1
500 TABLET, FILM COATED ORAL 2 TIMES DAILY
Qty: 20 TABLET | Refills: 0 | Status: SHIPPED | OUTPATIENT
Start: 2022-11-16 | End: 2022-11-26

## 2022-11-16 RX ORDER — METRONIDAZOLE 500 MG/1
500 TABLET ORAL 3 TIMES DAILY
Qty: 30 TABLET | Refills: 0 | Status: SHIPPED | OUTPATIENT
Start: 2022-11-16 | End: 2022-11-26

## 2022-11-16 NOTE — TELEPHONE ENCOUNTER
PT called requesting antibiotics, she states the ynes rectal abscess has reoccurred. [address] [facility].

## 2022-11-16 NOTE — TELEPHONE ENCOUNTER
I spoke with patient. She states yesterday she started with symptoms again of her ynes rectal abscess. She feels a bump, has red/watery drainage and has noticeable pain. She is requesting antibiotic. She [facility] in [facility].

## 2022-11-17 NOTE — PROGRESS NOTES
[facility] SURGERY      S:   Patient presents for follow up of perirectal abscess. She reports the area is less tender and less drainage. O:   Comfortable. No distress. Chest CTA   Heart regular  Perirectal area with possible external os. There is some induration. No abscess. A:     Encounter Diagnoses   Name Primary? Perirectal abscess Yes    Body mass index (BMI) 60.0-69.9, adult (HCC)             P:   Continue observation and chance that area will heal without developing overt fistula. Rx antibiotics.

## 2022-11-21 ENCOUNTER — OFFICE VISIT (OUTPATIENT)
Dept: SURGERY | Age: 62
End: 2022-11-21
Payer: MEDICARE

## 2022-11-21 VITALS
DIASTOLIC BLOOD PRESSURE: 74 MMHG | WEIGHT: 293 LBS | SYSTOLIC BLOOD PRESSURE: 128 MMHG | BODY MASS INDEX: 55.32 KG/M2 | HEIGHT: 61 IN

## 2022-11-21 DIAGNOSIS — K60.3 FISTULA-IN-ANO: Primary | ICD-10-CM

## 2022-11-21 PROCEDURE — G8427 DOCREV CUR MEDS BY ELIG CLIN: HCPCS | Performed by: SURGERY

## 2022-11-21 PROCEDURE — 3017F COLORECTAL CA SCREEN DOC REV: CPT | Performed by: SURGERY

## 2022-11-21 PROCEDURE — 3074F SYST BP LT 130 MM HG: CPT | Performed by: SURGERY

## 2022-11-21 PROCEDURE — G8484 FLU IMMUNIZE NO ADMIN: HCPCS | Performed by: SURGERY

## 2022-11-21 PROCEDURE — 1036F TOBACCO NON-USER: CPT | Performed by: SURGERY

## 2022-11-21 PROCEDURE — 3078F DIAST BP <80 MM HG: CPT | Performed by: SURGERY

## 2022-11-21 PROCEDURE — G8417 CALC BMI ABV UP PARAM F/U: HCPCS | Performed by: SURGERY

## 2022-11-21 PROCEDURE — 99213 OFFICE O/P EST LOW 20 MIN: CPT | Performed by: SURGERY

## 2022-12-03 DIAGNOSIS — U07.1 COVID-19: Primary | ICD-10-CM

## 2022-12-03 NOTE — PROGRESS NOTES
Patient called and stating she tested for COVID-19 last night and was positive. She is having sinus pain and pressure and thought she may have hd a sinus infection. She has had symptoms for three days. She is requesting Paxlovid. She had Paxlovid previously when she had COVID-19 in the past and did well with it. Rx sent.

## 2022-12-05 ENCOUNTER — TELEPHONE (OUTPATIENT)
Dept: FAMILY MEDICINE CLINIC | Age: 62
End: 2022-12-05

## 2022-12-05 NOTE — TELEPHONE ENCOUNTER
Pt called stating that she has covid again, but this time she has some pain in the back of right leg muscles, going into buttocks area, and into her pubic bone. Pt states that sitting down, standing up, and movement makes it extremely hard to move. Pt states that she has a rectal abscess and wanting to know if it would be okay to put a heating pad on her backside to try and help with the muscle pain. Pt uses [facility].  Call back pt [phone]  Routing to Narda wiggins to PCP out of office

## 2022-12-09 ENCOUNTER — TELEPHONE (OUTPATIENT)
Dept: FAMILY MEDICINE CLINIC | Age: 62
End: 2022-12-09

## 2022-12-09 RX ORDER — METHYLPREDNISOLONE 4 MG/1
TABLET ORAL
Qty: 1 KIT | Refills: 0 | Status: SHIPPED | OUTPATIENT
Start: 2022-12-09 | End: 2022-12-15

## 2022-12-09 NOTE — TELEPHONE ENCOUNTER
Will try medrol dose fermin use as directed #1 NRF and if no improvement she should follow up--pend rx to pharmacy of choice

## 2022-12-09 NOTE — PROGRESS NOTES
[facility]      S:   Patient presents for follow up of perirectal abscess and suspicion of developing fistula in ano. She reports less drainage and minimal pain. O:   Comfortable. No distress. Chest CTA   Heart regular   Perianal area with external os. I cannot express any drainage. No evident abscess. Minimal tenderness. A:     Encounter Diagnosis   Name Primary? Perirectal abscess Yes            P:   Continue observation as the area has potential to heal without surgery. Follow up as needed.

## 2022-12-09 NOTE — TELEPHONE ENCOUNTER
Pt called stating that for the last couple of weeks she's been having to use her emergency inhaler more often. States that she's starting to have the wheezing again. Pt is concerned that maybe something more needs to be done or a change in her medications. [facility] uses [facility]. No avail appts.  Call back pt [phone]

## 2022-12-21 ENCOUNTER — TELEPHONE (OUTPATIENT)
Dept: SURGERY | Age: 62
End: 2022-12-21

## 2022-12-21 NOTE — TELEPHONE ENCOUNTER
I spoke with patient. She states when she was in last, it was discussed about surgery for her recurring rectal abscess. She states wants to go ahead with surgery. She wants to do this after the beginning of the year. Does she need to return to the office first, or OK to schedule?

## 2022-12-22 ENCOUNTER — TELEPHONE (OUTPATIENT)
Dept: SURGERY | Age: 62
End: 2022-12-22

## 2022-12-22 RX ORDER — CIPROFLOXACIN 500 MG/1
500 TABLET, FILM COATED ORAL 2 TIMES DAILY
Qty: 20 TABLET | Refills: 0 | Status: SHIPPED | OUTPATIENT
Start: 2022-12-22 | End: 2023-01-01

## 2022-12-22 RX ORDER — METRONIDAZOLE 500 MG/1
500 TABLET ORAL 3 TIMES DAILY
Qty: 30 TABLET | Refills: 0 | Status: SHIPPED | OUTPATIENT
Start: 2022-12-22 | End: 2023-01-01

## 2022-12-22 NOTE — PROGRESS NOTES
[facility]      S:   Patient presents for follow up of perianal drainage and swelling. She reports recurrent symptoms. Started antibiotics and area is improved. O:   Comfortable   Perianal area with fistula in ano.                      A:     Encounter Diagnosis   Name Primary? Fistula-in-ano Yes            P:   We are going to continue to observe and see if the area will heal without surgery. Follow up if symptoms recur.

## 2022-12-22 NOTE — TELEPHONE ENCOUNTER
Patient wants to wait until beginning of next year for surgery. She scheduled fistulotomy for 1/18/2023. She is asking for antibiotic due to  increasing symptoms. She states she has increased tenderness, pinkish discharge, and spotting seems to be worsening. She uses [email or web address]  pharmacy in [facility].

## 2023-01-03 DIAGNOSIS — K60.3 FISTULA-IN-ANO: ICD-10-CM

## 2023-01-03 PROBLEM — K60.30 FISTULA-IN-ANO: Status: ACTIVE | Noted: 2023-01-03

## 2023-01-08 DIAGNOSIS — F33.9 RECURRENT MAJOR DEPRESSIVE DISORDER, REMISSION STATUS UNSPECIFIED (HCC): ICD-10-CM

## 2023-01-09 RX ORDER — BUDESONIDE AND FORMOTEROL FUMARATE DIHYDRATE 160; 4.5 UG/1; UG/1
AEROSOL RESPIRATORY (INHALATION)
Qty: 30.6 G | Refills: 3 | Status: SHIPPED | OUTPATIENT
Start: 2023-01-09 | End: 2023-02-07 | Stop reason: ALTCHOICE

## 2023-01-09 RX ORDER — BUPROPION HYDROCHLORIDE 150 MG/1
TABLET ORAL
Qty: 90 TABLET | Refills: 0 | Status: SHIPPED | OUTPATIENT
Start: 2023-01-09 | End: 2023-01-13

## 2023-01-09 RX ORDER — CELECOXIB 200 MG/1
CAPSULE ORAL
Qty: 180 CAPSULE | Refills: 1 | Status: SHIPPED | OUTPATIENT
Start: 2023-01-09

## 2023-01-12 ENCOUNTER — ANESTHESIA EVENT (OUTPATIENT)
Dept: OPERATING ROOM | Age: 63
End: 2023-01-12
Payer: MEDICARE

## 2023-01-13 ENCOUNTER — TELEMEDICINE (OUTPATIENT)
Dept: FAMILY MEDICINE CLINIC | Age: 63
End: 2023-01-13
Payer: MEDICARE

## 2023-01-13 DIAGNOSIS — N39.46 MIXED STRESS AND URGE URINARY INCONTINENCE: ICD-10-CM

## 2023-01-13 DIAGNOSIS — E78.49 OTHER HYPERLIPIDEMIA: ICD-10-CM

## 2023-01-13 DIAGNOSIS — M79.10 MYALGIA: ICD-10-CM

## 2023-01-13 DIAGNOSIS — D64.9 ANEMIA, UNSPECIFIED TYPE: ICD-10-CM

## 2023-01-13 DIAGNOSIS — G47.9 SLEEP DISTURBANCE: ICD-10-CM

## 2023-01-13 DIAGNOSIS — H04.123 DRY EYE SYNDROME OF BOTH EYES: ICD-10-CM

## 2023-01-13 DIAGNOSIS — T44.3X5A: Primary | ICD-10-CM

## 2023-01-13 DIAGNOSIS — T88.7XXA MEDICATION SIDE EFFECTS: ICD-10-CM

## 2023-01-13 PROCEDURE — G8484 FLU IMMUNIZE NO ADMIN: HCPCS | Performed by: NURSE PRACTITIONER

## 2023-01-13 PROCEDURE — 3017F COLORECTAL CA SCREEN DOC REV: CPT | Performed by: NURSE PRACTITIONER

## 2023-01-13 PROCEDURE — 99213 OFFICE O/P EST LOW 20 MIN: CPT | Performed by: NURSE PRACTITIONER

## 2023-01-13 PROCEDURE — G8417 CALC BMI ABV UP PARAM F/U: HCPCS | Performed by: NURSE PRACTITIONER

## 2023-01-13 PROCEDURE — 1036F TOBACCO NON-USER: CPT | Performed by: NURSE PRACTITIONER

## 2023-01-13 PROCEDURE — G8427 DOCREV CUR MEDS BY ELIG CLIN: HCPCS | Performed by: NURSE PRACTITIONER

## 2023-01-13 RX ORDER — OMEGA-3S/DHA/EPA/FISH OIL/D3 300MG-1000
400 CAPSULE ORAL DAILY
COMMUNITY

## 2023-01-13 RX ORDER — CHLORAL HYDRATE 500 MG
CAPSULE ORAL DAILY
COMMUNITY

## 2023-01-13 RX ORDER — MIRABEGRON 25 MG/1
TABLET, FILM COATED, EXTENDED RELEASE ORAL
COMMUNITY
Start: 2023-01-06

## 2023-01-13 ASSESSMENT — PATIENT HEALTH QUESTIONNAIRE - PHQ9
6. FEELING BAD ABOUT YOURSELF - OR THAT YOU ARE A FAILURE OR HAVE LET YOURSELF OR YOUR FAMILY DOWN: 1
9. THOUGHTS THAT YOU WOULD BE BETTER OFF DEAD, OR OF HURTING YOURSELF: 0
5. POOR APPETITE OR OVEREATING: 3
3. TROUBLE FALLING OR STAYING ASLEEP: 0
SUM OF ALL RESPONSES TO PHQ QUESTIONS 1-9: 13
SUM OF ALL RESPONSES TO PHQ9 QUESTIONS 1 & 2: 3
4. FEELING TIRED OR HAVING LITTLE ENERGY: 3
SUM OF ALL RESPONSES TO PHQ QUESTIONS 1-9: 13
10. IF YOU CHECKED OFF ANY PROBLEMS, HOW DIFFICULT HAVE THESE PROBLEMS MADE IT FOR YOU TO DO YOUR WORK, TAKE CARE OF THINGS AT HOME, OR GET ALONG WITH OTHER PEOPLE: 1
1. LITTLE INTEREST OR PLEASURE IN DOING THINGS: 2
2. FEELING DOWN, DEPRESSED OR HOPELESS: 1
7. TROUBLE CONCENTRATING ON THINGS, SUCH AS READING THE NEWSPAPER OR WATCHING TELEVISION: 3
8. MOVING OR SPEAKING SO SLOWLY THAT OTHER PEOPLE COULD HAVE NOTICED. OR THE OPPOSITE, BEING SO FIGETY OR RESTLESS THAT YOU HAVE BEEN MOVING AROUND A LOT MORE THAN USUAL: 0

## 2023-01-13 NOTE — ANESTHESIA PRE PROCEDURE
Department of Anesthesiology  Preprocedure Note       Name:  Olena Chong   Age:  58 y.o.  :  1960                                          MRN:  [id number]         Date:  2023      Surgeon: Urban Esquivel):  Brittani Camara MD    Procedure: Procedure(s):  ANAL FISTULOTOMY    Medications prior to admission:   Prior to Admission medications    Medication Sig Start Date End Date Taking? Authorizing Provider   SYMBICORT 160-4.5 MCG/ACT AERO USE 2 INHALATIONS BY MOUTH  INTO THE LUNGS TWICE DAILY 23   MADINA Wilson CNP   celecoxib (CELEBREX) 200 MG capsule TAKE 1 CAPSULE BY MOUTH  TWICE DAILY 23   MADINA Wilson CNP   buPROPion (WELLBUTRIN XL) 150 MG extended release tablet TAKE 1 TABLET BY MOUTH  DAILY 23   MADINA Wilson CNP   VORTIoxetine HBr (TRINTELLIX) 20 MG TABS tablet TAKE 1 TABLET BY MOUTH  DAILY 23   MADINA Wilson CNP   benztropine (COGENTIN) 1 MG tablet Take 1 tablet by mouth daily 10/14/22   MADINA Wilson CNP   lamoTRIgine (LAMICTAL) 100 MG tablet TAKE 1 TABLET BY MOUTH  TWICE DAILY 8/15/22   MADINA Wilson CNP   metoprolol succinate (TOPROL XL) 50 MG extended release tablet TAKE 1 TABLET BY MOUTH  DAILY 8/15/22   MADINA Wilson CNP   rosuvastatin (CRESTOR) 5 MG tablet TAKE 1 TABLET BY MOUTH  DAILY 8/15/22   MADINA Wilson CNP   fluticasone (FLONASE) 50 MCG/ACT nasal spray 1 spray by Nasal route in the morning and 1 spray before bedtime.  22  MADINA Wilson CNP   methocarbamol (ROBAXIN) 500 MG tablet  22   Historical Provider, MD   ondansetron (ZOFRAN-ODT) 4 MG disintegrating tablet Take 1 tablet by mouth 3 times daily as needed for Nausea or Vomiting 22   MADINA Ku CNP   Cholecalciferol (VITAMIN D) 50 MCG (2000 UT) CAPS capsule Take 1 capsule by mouth daily    Historical Provider, MD   Vitamin D-Vitamin K (K2 PLUS D3) 100-1000 MCG-UNIT TABS Take 1 tablet by mouth daily    Historical Provider, MD   Coenzyme Q10 (COQ10) 100 MG CAPS Take by mouth    Historical Provider, MD   albuterol sulfate HFA (PROVENTIL HFA) 108 (90 Base) MCG/ACT inhaler Inhale 2 puffs into the lungs every 6 hours as needed for Wheezing or Shortness of Breath 5/20/21   Thuan EllendaleMADINA - CNP   ferrous sulfate (IRON 325) 325 (65 Fe) MG tablet Take 1 tablet by mouth daily (with breakfast) 4/28/21   Thuan EllendaleMADINA - CNP   loratadine (CLARITIN) 10 MG tablet Take 10 mg by mouth daily    Historical Provider, MD   multivitamin [facility]) per tablet Take 1 tablet by mouth daily. Historical Provider, MD   Omega-3 Fatty Acids (FISH OIL) 1200 MG CAPS Take  by mouth daily. Historical Provider, MD       Current medications:    No current facility-administered medications for this encounter. Current Outpatient Medications   Medication Sig Dispense Refill    SYMBICORT 160-4.5 MCG/ACT AERO USE 2 INHALATIONS BY MOUTH  INTO THE LUNGS TWICE DAILY 30.6 g 3    celecoxib (CELEBREX) 200 MG capsule TAKE 1 CAPSULE BY MOUTH  TWICE DAILY 180 capsule 1    buPROPion (WELLBUTRIN XL) 150 MG extended release tablet TAKE 1 TABLET BY MOUTH  DAILY 90 tablet 0    VORTIoxetine HBr (TRINTELLIX) 20 MG TABS tablet TAKE 1 TABLET BY MOUTH  DAILY 90 tablet 0    benztropine (COGENTIN) 1 MG tablet Take 1 tablet by mouth daily 90 tablet 3    lamoTRIgine (LAMICTAL) 100 MG tablet TAKE 1 TABLET BY MOUTH  TWICE DAILY 180 tablet 3    metoprolol succinate (TOPROL XL) 50 MG extended release tablet TAKE 1 TABLET BY MOUTH  DAILY 90 tablet 3    rosuvastatin (CRESTOR) 5 MG tablet TAKE 1 TABLET BY MOUTH  DAILY 90 tablet 1    fluticasone (FLONASE) 50 MCG/ACT nasal spray 1 spray by Nasal route in the morning and 1 spray before bedtime.  3 each 3    methocarbamol (ROBAXIN) 500 MG tablet       ondansetron (ZOFRAN-ODT) 4 MG disintegrating tablet Take 1 tablet by mouth 3 times daily as needed for Nausea or Vomiting 21 tablet 0    Cholecalciferol (VITAMIN D) 50 MCG (2000 UT) CAPS capsule Take 1 capsule by mouth daily      Vitamin D-Vitamin K (K2 PLUS D3) 100-1000 MCG-UNIT TABS Take 1 tablet by mouth daily      Coenzyme Q10 (COQ10) 100 MG CAPS Take by mouth      albuterol sulfate HFA (PROVENTIL HFA) 108 (90 Base) MCG/ACT inhaler Inhale 2 puffs into the lungs every 6 hours as needed for Wheezing or Shortness of Breath 3 Inhaler 3    ferrous sulfate (IRON 325) 325 (65 Fe) MG tablet Take 1 tablet by mouth daily (with breakfast) 90 tablet 3    loratadine (CLARITIN) 10 MG tablet Take 10 mg by mouth daily      multivitamin (THERAGRAN) per tablet Take 1 tablet by mouth daily.  Omega-3 Fatty Acids (FISH OIL) 1200 MG CAPS Take  by mouth daily. Allergies:     Allergies   Allergen Reactions    Calamine Other (See Comments)     Leaves skin tender and burning     Amoxicillin Other (See Comments)    Amoxicillin-Pot Clavulanate Hives    Chlorpheniramine Maleate     Daypro [Oxaprozin] Hives    Duravent-Da [Chlorphen-Phenyleph-Methscop] Hives    Lithium      Caused shakes    Methscopolamine     Norvasc [Amlodipine]      LE edema      Nsaids      Avoid d/t gastric bypass    Phenylephrine Hcl     Seldane [Terfenadine] Hives    Suprax [Cefixime] Hives    Levofloxacin Rash       Problem List:    Patient Active Problem List   Diagnosis Code    PCOS (polycystic ovarian syndrome) E28.2    Hyperlipidemia E78.5    Restless legs syndrome (RLS) G25.81    Edema of both legs R60.0    Insomnia G47.00    Vitamin D deficiency E55.9    Morbid obesity with BMI of 50.0-59.9, adult ([facility]) E66.01, Z68.43    Urinary incontinence R32    Primary osteoarthritis of both knees M17.0    Severe episode of recurrent major depressive disorder, without psychotic features ([facility]) F33.2    GERD (gastroesophageal reflux disease) K21.9    RAD (reactive airway disease) J45.909    Hypertension I10    ANTOINETTE on CPAP G47.33, Z99.89    Lumbar stenosis with neurogenic claudication M48.062    HNP (herniated nucleus pulposus), lumbar M51.26    Peripheral venous insufficiency I87.2    Allergic rhinitis L57.3    Dysmetabolic syndrome R37.51    Abdominal bloating R14.0    Diverticulosis of colon K57.30    Polyp of descending colon K63.5    Cyst of eyelid H02.829    Voiding difficulty R39.198    Incomplete bladder emptying R33.9    Chronic calculous cholecystitis K80.10    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       Past Medical History:        Diagnosis Date    Bipolar disorder     Borderline osteopenia     Cellulitis of left leg 2020    Frequency of micturition     GERD (gastroesophageal reflux disease)     Headache(784.0)     HNP (herniated nucleus pulposus), lumbar 2019    Hyperlipidemia     Hypertension     Intention tremor     due to lithium    Iron deficiency anemia 2019    Lateral meniscal tear 10/14/2015    Lumbar stenosis with neurogenic claudication 2019    Medial meniscus tear 10/14/2015    Mixed incontinence     Morbid (severe) obesity due to excess calories (HCC)     Prolonged emergence from general anesthesia     Prolonged emergence from general anesthesia, initial encounter 2019    Tobacco use     Venous ulcer of left leg ([address] 2020       Past Surgical History:        Procedure Laterality Date    ABDOMINAL EXPLORATION SURGERY      CARPAL TUNNEL RELEASE Bilateral 2005     SECTION      CHOLECYSTECTOMY, LAPAROSCOPIC N/A 2022    LAPAROSCOPIC CHOLECYSTECTOMY performed [facility]    normal    COLONOSCOPY N/A 2021    COLON W/ANES. (13:30) performed by Clifford Silvestre MD at [facility] Left     atrhroscopic unispacer    KNEE SURGERY Right 10/28/2015    Right knee video arthroscopy with partial medial and lateral menisectomy with loose body removal    LUMBAR SPINE SURGERY N/A 2019    MICROLUMBAR DISCECTOMY L4-5 performed by Kelly Calix MD at [address] History:    Social History     Tobacco Use    Smoking status: Former     Packs/day: 1.50     Years: 21.00     Pack years: 31.50     Types: Cigarettes     Quit date: 1997     Years since quittin.9    Smokeless tobacco: Never   Substance Use Topics    Alcohol use: No                                Counseling given: Not Answered      Vital Signs (Current): There were no vitals filed for this visit.                                            BP Readings from Last 3 Encounters:   22 128/74   22 120/75   10/17/22 114/65       NPO Status:                                                                                 BMI:   Wt Readings from Last 3 Encounters:   22 (!) 328 lb (148.8 kg)   22 (!) 324 lb (147 kg)   10/19/22 (!) 329 lb (149.2 kg)     There is no height or weight on file to calculate BMI.    CBC:   Lab Results   Component Value Date/Time    WBC 6.8 2022 11:51 AM    RBC 4.62 2022 11:51 AM    HGB 13.2 2022 11:51 AM    HCT 40.4 2022 11:51 AM    MCV 87.5 2022 11:51 AM    RDW 14.5 2022 11:51 AM     2022 11:51 AM       CMP:   Lab Results   Component Value Date/Time     2022 11:51 AM    K 4.1 2022 11:51 AM     2022 11:51 AM    CO2 28 2022 11:51 AM    BUN 19 2022 11:51 AM    CREATININE 0.7 2022 11:51 AM    GFRAA >60 2022 11:51 AM    GFRAA >60 2013 08:00 AM    AGRATIO 1.7 2022 09:10 AM    LABGLOM >60 2022 11:51 AM    GLUCOSE 112 2022 11:51 AM    PROT 6.3 2022 09:10 AM    PROT 6.5 2013 08:00 AM    CALCIUM 9.5 2022 11:51 AM    BILITOT 0.3 2022 09:10 AM    ALKPHOS 113 2022 09:10 AM    AST 21 2022 09:10 AM    ALT 21 2022 09:10 AM       POC Tests: No results for input(s): POCGLU, POCNA, POCK, POCCL, POCBUN, POCHEMO, POCHCT in the last 72 hours. Coags: No results found for: PROTIME, INR, APTT    HCG (If Applicable): No results found for: PREGTESTUR, PREGSERUM, HCG, HCGQUANT     ABGs: No results found for: PHART, PO2ART, CMA1JHT, IJI3CWR, BEART, T3PZQYGK     Type & Screen (If Applicable):  No results found for: LABABO, LABRH    Drug/Infectious Status (If Applicable):  No results found for: HIV, HEPCAB    COVID-19 Screening (If Applicable):   Lab Results   Component Value Date/Time    COVID19 Not Detected 02/17/2022 12:28 PM           Anesthesia Evaluation  Patient summary reviewed and Nursing notes reviewed no history of anesthetic complications:   Airway: Mallampati: II  TM distance: >3 FB   Neck ROM: full  Mouth opening: > = 3 FB   Dental: normal exam         Pulmonary:   (+) sleep apnea: on CPAP,                             Cardiovascular:    (+) hypertension:,                   Neuro/Psych:   (+) headaches:, psychiatric history (bipolar):            GI/Hepatic/Renal:   (+) GERD:, morbid obesity     (-) liver disease and no renal disease       Endo/Other: Negative Endo/Other ROS       (-) diabetes mellitus               Abdominal:             Vascular: negative vascular ROS. Other Findings:           Anesthesia Plan      general     ASA 3     (I discussed with the patient the risks and benefits of PIV, general anesthesia, IV Narcotics, PACU. All questions were answered the patient agrees with the plan)  Induction: intravenous. MIPS: Prophylactic antiemetics administered. Anesthetic plan and risks discussed with patient. Plan discussed with CRNA.                     Karlos Duran MD   1/13/2023

## 2023-01-13 NOTE — PROGRESS NOTES
PRE OP INSTRUCTION SHEET   1. Do not eat or drink anything after 12 midnight  prior to surgery. This includes no water, chewing gum or mints. 2. Take the following pills will a small sip of water (see MAR)                                        3. Aspirin, Ibuprofen, Advil, Naproxen, Vitamin E, fish oil and other Anti-inflammatory products should be stopped for 5 days before surgery or as directed by your physician. 4. Check with your Doctor regarding stopping Plavix, Coumadin, Lovenox, Fragmin or other blood thinners   5. Do not smoke, and do not drink any alcoholic beverages 24 hours prior to surgery. This includes NA Beer. 6. You may brush your teeth and gargle the morning of surgery. DO NOT SWALLOW WATER   7. You MUST make arrangements for a responsible adult to take you home after your surgery. You will not be allowed to leave alone or drive yourself home. It is strongly suggested someone stay with you the first 24 hrs. Your surgery will be cancelled if you do not have a ride home. 8. A parent/legal guardian must accompany a child scheduled for surgery and plan to stay at the hospital until the child is discharged. Please do not bring other children with you. 9. Please wear simple, loose fitting clothing to the hospital.  Gracie Chapin not bring valuables (money, credit cards, checkbooks, etc.) Do not wear any makeup (including no eye makeup) or nail polish on your fingers or toes. 10. DO NOT wear any jewelry or piercings on day of surgery. All body piercing jewelry must be removed. 11. If you have dentures,glasses, or contacts they will be removed before going to the OR; we will provide you a container. 12. Please see your family doctor/and cardiologist for a history & physical and/or concerning medications. Bring any test results/reports from your physician's office. Have history and labs faxed to [phone].  Remember to bring Blood Bank bracelet on the day of surgery. 14. If you have a Living Will and Durable Power of  for Healthcare, please bring in a copy. 13. Notify your Surgeon if you develop any illness between now and surgery  time, cough, cold, fever, sore throat, nausea, vomiting, etc.  Please notify your surgeon if you experience dizziness, shortness of breath or blurred vision between now & the time of your surgery   16. DO NOT shave your operative site 96 hours prior to surgery. For face & neck surgery, men may use an electric razor 48 hours prior to surgery. 17. Shower with _x__Antibacterial soap (x_chlorhexidine for total joint  Pt's) shower two times before surgery.(the morning of and the night before. 18. To provide excellent care visitors will be limited to one in the room at any given time.   Please call pre admission testing if you any further questions [phone] or [phone]

## 2023-01-13 NOTE — PROGRESS NOTES
2023    TELEHEALTH EVALUATION -- Audio/Visual (During [facility]-03 public health emergency)    HPI:    Leia Ruffin (:  1960) has requested an audio/video evaluation for the following concern(s):  Chief Complaint   Patient presents with    Other     Bi-lateral hand pain for the past 3 weeks    Asthma       The patient is being seen today for multiple complaints. The patient states that her medication needs to be reviewed and that a lot of of her medications are the potential cost of multiple symptoms. The patient has chronic back pain but states that she feels it is worsening. She complains of increased muscle pain all over but in particular her bilateral hands have been hurting more for the past 3 weeks. She states that she is sleeping a lot or the opposite occurs and  that that she cannot sleep or stay asleep. She also complained of blurred vision. She had seen the eye doctor who diagnosed her with dry eye syndrome. She states that this is worse at the end of the day. She states she has been using eyedrops for her dry eye syndrome for a few months now. She feels that her dry eyes and blurred vision have been worsening over the past month. She states that she also has dry mouth. She occasionally has some confusion. She previously was on Toviaz prescribed by the urologist   1 week ago she was changed to Myrbetriq due to insurance denial of her Audrea Meehan. She does not have follow-up for 2 months with urology. She states that her psychiatrist at the [facility] has put her on [address]. Psychiatry discontinued her wellbutrin. She feels that vraylar is also causing some of her symptoms. She would like to do trial off some of her medications to see if symptoms improve                            Review of Systems   Constitutional:  Positive for fatigue. Negative for appetite change and unexpected weight change. HENT: Negative. Dry eyes and dry mouth   Eyes: Negative.   Respiratory: Negative.  Negative for shortness of breath.    Cardiovascular: Negative.  Negative for chest pain, palpitations and leg swelling.   Gastrointestinal: Negative.  Negative for abdominal pain, anal bleeding, blood in stool and nausea.   Endocrine: Negative.    Genitourinary: Negative.  Negative for hematuria.   Musculoskeletal:  Positive for back pain and myalgias.   Skin: Negative.  Negative for rash.   Allergic/Immunologic: Negative.    Neurological: Negative.  Negative for dizziness, syncope, light-headedness and numbness.   Hematological: Negative.  Does not bruise/bleed easily.   Psychiatric/Behavioral:  Positive for sleep disturbance.    All other systems reviewed and are negative.    Prior to Visit Medications    Medication Sig Taking? Authorizing Provider   cariprazine hcl (VRAYLAR) 1.5 MG capsule Take 1.5 mg by mouth daily Yes Historical Provider, MD   MYRBETRIQ 25 MG TB24 TAKE ONE TABLET BY MOUTH EVERY DAY Yes Historical Provider, MD   SYMBICORT 160-4.5 MCG/ACT AERO USE 2 INHALATIONS BY MOUTH  INTO THE LUNGS TWICE DAILY Yes MADINA Hernandez CNP   celecoxib (CELEBREX) 200 MG capsule TAKE 1 CAPSULE BY MOUTH  TWICE DAILY Yes MADINA Hernandez CNP   buPROPion (WELLBUTRIN XL) 150 MG extended release tablet TAKE 1 TABLET BY MOUTH  DAILY Yes MADINA Hernandez CNP   VORTIoxetine HBr (TRINTELLIX) 20 MG TABS tablet TAKE 1 TABLET BY MOUTH  DAILY Yes MADINA Hernandez CNP   benztropine (COGENTIN) 1 MG tablet Take 1 tablet by mouth daily Yes MADINA Hernandez CNP   lamoTRIgine (LAMICTAL) 100 MG tablet TAKE 1 TABLET BY MOUTH  TWICE DAILY Yes MADINA Hernandez CNP   metoprolol succinate (TOPROL XL) 50 MG extended release tablet TAKE 1 TABLET BY MOUTH  DAILY Yes MADINA Hernandez CNP   rosuvastatin (CRESTOR) 5 MG tablet TAKE 1 TABLET BY MOUTH  DAILY Yes MADINA Hernandez CNP   fluticasone (FLONASE) 50 MCG/ACT nasal spray 1 spray  by Nasal route in the morning and 1 spray before bedtime. Yes MADINA Camilo CNP   methocarbamol (ROBAXIN) 500 MG tablet  Yes Historical Provider, MD   ondansetron (ZOFRAN-ODT) 4 MG disintegrating tablet Take 1 tablet by mouth 3 times daily as needed for Nausea or Vomiting Yes MADINA Mckeon CNP   Cholecalciferol (VITAMIN D) 50 MCG (2000 UT) CAPS capsule Take 1 capsule by mouth daily Yes Historical Provider, MD   Vitamin D-Vitamin K (K2 PLUS D3) 100-1000 MCG-UNIT TABS Take 1 tablet by mouth daily Yes Historical Provider, MD   Coenzyme Q10 (COQ10) 100 MG CAPS Take by mouth Yes Historical Provider, MD   albuterol sulfate HFA (PROVENTIL HFA) 108 (90 Base) MCG/ACT inhaler Inhale 2 puffs into the lungs every 6 hours as needed for Wheezing or Shortness of Breath Yes MADINA Peters CNP   ferrous sulfate (IRON 325) 325 (65 Fe) MG tablet Take 1 tablet by mouth daily (with breakfast) Yes MADINA Camilo CNP   loratadine (CLARITIN) 10 MG tablet Take 10 mg by mouth daily Yes Historical Provider, MD   multivitamin [facility]) per tablet Take 1 tablet by mouth daily. Yes Historical Provider, MD   Omega-3 Fatty Acids (FISH OIL) 1200 MG CAPS Take  by mouth daily.  Yes Historical Provider, MD       Allergies   Allergen Reactions    Calamine Other (See Comments)     Leaves skin tender and burning     Amoxicillin Other (See Comments)    Amoxicillin-Pot Clavulanate Hives    Chlorpheniramine Maleate     Daypro [Oxaprozin] Hives    Duravent-Da [Chlorphen-Phenyleph-Methscop] Hives    Lithium      Caused shakes    Methscopolamine     Norvasc [Amlodipine]      LE edema      Nsaids      Avoid d/t gastric bypass    Phenylephrine Hcl     Seldane [Terfenadine] Hives    Suprax [Cefixime] Hives    Levofloxacin Rash       PHYSICAL EXAMINATION:  [ INSTRUCTIONS:  \"[x]\" Indicates a positive item  \"[]\" Indicates a negative item  -- DELETE ALL ITEMS NOT EXAMINED]  Vital Signs: (As obtained by patient/caregiver or practitioner observation)     Blood pressure-          Heart rate-         Respiratory rate-         Temperature-            Pulse oximetry-      Constitutional: [x] Appears well-developed and well-nourished [x] No apparent distress                            [] Abnormal-   Mental status  [x] Alert and awake  [x] Oriented to person/place/time [x]Able to follow commands       Eyes:  EOM    [x]  Normal  [] Abnormal-  Sclera  [x]  Normal  [] Abnormal -         Discharge [x]  None visible  [] Abnormal -     HENT:   [x] Normocephalic, atraumatic. [] Abnormal   [x] Mouth/Throat: Mucous membranes are moist.      External Ears [x] Normal  [] Abnormal-      Neck: [x] No visualized mass      Pulmonary/Chest: [x] Respiratory effort normal.  [x] No visualized signs of difficulty breathing or respiratory distress        [] Abnormal-      Musculoskeletal:   [x] Normal gait with no signs of ataxia         [x] Normal range of motion of neck        [] Abnormal-         Neurological:        [x] No Facial Asymmetry (Cranial nerve 7 motor function) (limited exam to video visit)                       [x] No gaze palsy        [] Abnormal-         Skin:                     [x] No significant exanthematous lesions or discoloration noted on facial skin         [] Abnormal-                                  Psychiatric:           [x] Normal Affect [x] No Hallucinations        [] Abnormal     Other pertinent observable physical exam findings-      ASSESSMENT/PLAN:  1. Adverse effect of anticholinergic  2. Dry eye syndrome of both eyes  3. Mixed stress and urge urinary incontinence  4. Sleep disturbance  5. Myalgia  6. Anemia, unspecified type  7. Medication side effects  Patient would like trial off some of her medications to see if symptoms improve. States she can not go off celebrex due to pain.   Will hold crestor x 2 weeks to see if myalgias improve and then she will notify the office if this helped and alternative therapy will be considered at that time. Psychiatry stopped wellbutrin  F/u with psych regarding concerns with vraylar  Medications Discontinued During This Encounter   Medication Reason    buPROPion (WELLBUTRIN XL) 150 MG extended release tablet LIST CLEANUP    loratadine (CLARITIN) 10 MG tablet Therapy completed    ferrous sulfate (IRON 325) 325 (65 Fe) MG tablet Therapy completed    Omega-3 Fatty Acids (FISH OIL) 1200 MG CAPS Therapy completed    Vitamin D-Vitamin K (K2 PLUS D3) 100-1000 MCG-UNIT TABS Therapy completed     Will consider using the oxytrol patch twice a week instead of using the Myrbetriq that was prescribed by urology. She will also call urology and follow up as scheduled in 2 months    Will check iron studies and cholesterol in 3 months    Patient has been instructed call the office immediately with new symptoms, change in symptoms or worseningof symptoms. If this is not feasible, patient is instructed to report to the emergency room. Medication profile reviewed. Medication side effects and possible impairments from medications were discussed as applicable. Allergies were reviewed. Health maintenance was reviewed and updated as appropriate. Michael Pro, was evaluated through a synchronous (real-time) audio-video encounter. The patient (or guardian if applicable) is aware that this is a billable service, which includes applicable co-pays. This Virtual Visit was conducted with patient's (and/or legal guardian's) consent. The visit was conducted pursuant to the emergency declaration under the [facility], [address] [place] [address] [facility] authority and the [employer] and [employer] General Act. Patient identification was verified, and a caregiver was present when appropriate. The patient was located at Home: [address] [place] [address]. Provider was located at Other: [place], [place] .        Total time spent on this encounter:  34 minutes    --MADINA Odom - CNP on 1/13/2023 at 11:02 AM    An electronic signature was used to authenticate this note.

## 2023-01-18 ENCOUNTER — HOSPITAL ENCOUNTER (OUTPATIENT)
Age: 63
Setting detail: OUTPATIENT SURGERY
Discharge: HOME | End: 2023-01-18
Attending: SURGERY | Admitting: SURGERY
Payer: MEDICARE

## 2023-01-18 ENCOUNTER — ANESTHESIA (OUTPATIENT)
Dept: OPERATING ROOM | Age: 63
End: 2023-01-18
Payer: MEDICARE

## 2023-01-18 VITALS
BODY MASS INDEX: 55.32 KG/M2 | WEIGHT: 293 LBS | OXYGEN SATURATION: 94 % | TEMPERATURE: 98.6 F | HEIGHT: 61 IN | SYSTOLIC BLOOD PRESSURE: 126 MMHG | DIASTOLIC BLOOD PRESSURE: 65 MMHG | RESPIRATION RATE: 19 BRPM | HEART RATE: 88 BPM

## 2023-01-18 DIAGNOSIS — K60.3 FISTULA-IN-ANO: Primary | ICD-10-CM

## 2023-01-18 LAB
ANION GAP SERPL CALCULATED.3IONS-SCNC: 10 MMOL/L (ref 3–16)
BUN BLDV-MCNC: 22 MG/DL (ref 7–20)
CALCIUM SERPL-MCNC: 8.7 MG/DL (ref 8.3–10.6)
CHLORIDE BLD-SCNC: 100 MMOL/L (ref 99–110)
CO2: 28 MMOL/L (ref 21–32)
CREAT SERPL-MCNC: 0.7 MG/DL (ref 0.6–1.2)
EKG ATRIAL RATE: 77 BPM
EKG DIAGNOSIS: NORMAL
EKG P AXIS: 17 DEGREES
EKG P-R INTERVAL: 154 MS
EKG Q-T INTERVAL: 416 MS
EKG QRS DURATION: 92 MS
EKG QTC CALCULATION (BAZETT): 470 MS
EKG R AXIS: -49 DEGREES
EKG T AXIS: 65 DEGREES
EKG VENTRICULAR RATE: 77 BPM
GFR SERPL CREATININE-BSD FRML MDRD: >60 ML/MIN/{1.73_M2}
GLUCOSE BLD-MCNC: 113 MG/DL (ref 70–99)
HCT VFR BLD CALC: 41.8 % (ref 36–48)
HEMOGLOBIN: 13.7 G/DL (ref 12–16)
MCH RBC QN AUTO: 28.8 PG (ref 26–34)
MCHC RBC AUTO-ENTMCNC: 32.9 G/DL (ref 31–36)
MCV RBC AUTO: 87.7 FL (ref 80–100)
PDW BLD-RTO: 15.2 % (ref 12.4–15.4)
PLATELET # BLD: 326 K/UL (ref 135–450)
PMV BLD AUTO: 7.3 FL (ref 5–10.5)
POTASSIUM REFLEX MAGNESIUM: 3.9 MMOL/L (ref 3.5–5.1)
RBC # BLD: 4.77 M/UL (ref 4–5.2)
SODIUM BLD-SCNC: 138 MMOL/L (ref 136–145)
WBC # BLD: 7.7 K/UL (ref 4–11)

## 2023-01-18 PROCEDURE — 3600000012 HC SURGERY LEVEL 2 ADDTL 15MIN: Performed by: SURGERY

## 2023-01-18 PROCEDURE — 80048 BASIC METABOLIC PNL TOTAL CA: CPT

## 2023-01-18 PROCEDURE — 7100000000 HC PACU RECOVERY - FIRST 15 MIN: Performed by: SURGERY

## 2023-01-18 PROCEDURE — 2580000003 HC RX 258: Performed by: NURSE ANESTHETIST, CERTIFIED REGISTERED

## 2023-01-18 PROCEDURE — 93005 ELECTROCARDIOGRAM TRACING: CPT | Performed by: ANESTHESIOLOGY

## 2023-01-18 PROCEDURE — 93010 ELECTROCARDIOGRAM REPORT: CPT | Performed by: INTERNAL MEDICINE

## 2023-01-18 PROCEDURE — 3600000002 HC SURGERY LEVEL 2 BASE: Performed by: SURGERY

## 2023-01-18 PROCEDURE — 7100000011 HC PHASE II RECOVERY - ADDTL 15 MIN: Performed by: SURGERY

## 2023-01-18 PROCEDURE — 36415 COLL VENOUS BLD VENIPUNCTURE: CPT

## 2023-01-18 PROCEDURE — 3700000001 HC ADD 15 MINUTES (ANESTHESIA): Performed by: SURGERY

## 2023-01-18 PROCEDURE — 6360000002 HC RX W HCPCS: Performed by: NURSE ANESTHETIST, CERTIFIED REGISTERED

## 2023-01-18 PROCEDURE — 6370000000 HC RX 637 (ALT 250 FOR IP): Performed by: SURGERY

## 2023-01-18 PROCEDURE — 85027 COMPLETE CBC AUTOMATED: CPT

## 2023-01-18 PROCEDURE — 46270 REMOVE ANAL FIST SUBQ: CPT | Performed by: SURGERY

## 2023-01-18 PROCEDURE — 2500000003 HC RX 250 WO HCPCS: Performed by: SURGERY

## 2023-01-18 PROCEDURE — 2580000003 HC RX 258: Performed by: ANESTHESIOLOGY

## 2023-01-18 PROCEDURE — 3700000000 HC ANESTHESIA ATTENDED CARE: Performed by: SURGERY

## 2023-01-18 PROCEDURE — 2500000003 HC RX 250 WO HCPCS: Performed by: ANESTHESIOLOGY

## 2023-01-18 PROCEDURE — 2709999900 HC NON-CHARGEABLE SUPPLY: Performed by: SURGERY

## 2023-01-18 PROCEDURE — 2500000003 HC RX 250 WO HCPCS: Performed by: NURSE ANESTHETIST, CERTIFIED REGISTERED

## 2023-01-18 PROCEDURE — 7100000001 HC PACU RECOVERY - ADDTL 15 MIN: Performed by: SURGERY

## 2023-01-18 PROCEDURE — 7100000010 HC PHASE II RECOVERY - FIRST 15 MIN: Performed by: SURGERY

## 2023-01-18 RX ORDER — SODIUM CHLORIDE 9 MG/ML
25 INJECTION, SOLUTION INTRAVENOUS PRN
Status: DISCONTINUED | OUTPATIENT
Start: 2023-01-18 | End: 2023-01-18 | Stop reason: SDUPTHER

## 2023-01-18 RX ORDER — MEPERIDINE HYDROCHLORIDE 25 MG/ML
12.5 INJECTION INTRAMUSCULAR; INTRAVENOUS; SUBCUTANEOUS EVERY 5 MIN PRN
Status: DISCONTINUED | OUTPATIENT
Start: 2023-01-18 | End: 2023-01-18 | Stop reason: HOSPADM

## 2023-01-18 RX ORDER — DIBUCAINE 1 G/100G
OINTMENT TOPICAL PRN
Status: DISCONTINUED | OUTPATIENT
Start: 2023-01-18 | End: 2023-01-18 | Stop reason: ALTCHOICE

## 2023-01-18 RX ORDER — BUPIVACAINE HYDROCHLORIDE 5 MG/ML
INJECTION, SOLUTION PERINEURAL PRN
Status: DISCONTINUED | OUTPATIENT
Start: 2023-01-18 | End: 2023-01-18 | Stop reason: ALTCHOICE

## 2023-01-18 RX ORDER — DIPHENHYDRAMINE HYDROCHLORIDE 50 MG/ML
12.5 INJECTION INTRAMUSCULAR; INTRAVENOUS
Status: DISCONTINUED | OUTPATIENT
Start: 2023-01-18 | End: 2023-01-18 | Stop reason: HOSPADM

## 2023-01-18 RX ORDER — SODIUM CHLORIDE 0.9 % (FLUSH) 0.9 %
5-40 SYRINGE (ML) INJECTION PRN
Status: DISCONTINUED | OUTPATIENT
Start: 2023-01-18 | End: 2023-01-18 | Stop reason: SDUPTHER

## 2023-01-18 RX ORDER — FENTANYL CITRATE 50 UG/ML
INJECTION, SOLUTION INTRAMUSCULAR; INTRAVENOUS PRN
Status: DISCONTINUED | OUTPATIENT
Start: 2023-01-18 | End: 2023-01-18 | Stop reason: SDUPTHER

## 2023-01-18 RX ORDER — SODIUM CHLORIDE, SODIUM LACTATE, POTASSIUM CHLORIDE, CALCIUM CHLORIDE 600; 310; 30; 20 MG/100ML; MG/100ML; MG/100ML; MG/100ML
INJECTION, SOLUTION INTRAVENOUS CONTINUOUS PRN
Status: DISCONTINUED | OUTPATIENT
Start: 2023-01-18 | End: 2023-01-18 | Stop reason: SDUPTHER

## 2023-01-18 RX ORDER — CIPROFLOXACIN 500 MG/1
500 TABLET, FILM COATED ORAL 2 TIMES DAILY
Qty: 10 TABLET | Refills: 0 | Status: SHIPPED | OUTPATIENT
Start: 2023-01-18 | End: 2023-01-28

## 2023-01-18 RX ORDER — MIDAZOLAM HYDROCHLORIDE 1 MG/ML
1 INJECTION INTRAMUSCULAR; INTRAVENOUS EVERY 5 MIN PRN
Status: DISCONTINUED | OUTPATIENT
Start: 2023-01-18 | End: 2023-01-18 | Stop reason: HOSPADM

## 2023-01-18 RX ORDER — ONDANSETRON 2 MG/ML
4 INJECTION INTRAMUSCULAR; INTRAVENOUS EVERY 10 MIN PRN
Status: DISCONTINUED | OUTPATIENT
Start: 2023-01-18 | End: 2023-01-18 | Stop reason: HOSPADM

## 2023-01-18 RX ORDER — DIBUCAINE 0.28 G/28G
OINTMENT TOPICAL ONCE
Status: DISCONTINUED | OUTPATIENT
Start: 2023-01-18 | End: 2023-01-18 | Stop reason: HOSPADM

## 2023-01-18 RX ORDER — ROCURONIUM BROMIDE 10 MG/ML
INJECTION, SOLUTION INTRAVENOUS PRN
Status: DISCONTINUED | OUTPATIENT
Start: 2023-01-18 | End: 2023-01-18 | Stop reason: SDUPTHER

## 2023-01-18 RX ORDER — METRONIDAZOLE 500 MG/100ML
500 INJECTION, SOLUTION INTRAVENOUS ONCE
Status: COMPLETED | OUTPATIENT
Start: 2023-01-18 | End: 2023-01-18

## 2023-01-18 RX ORDER — SODIUM CHLORIDE 9 MG/ML
INJECTION, SOLUTION INTRAVENOUS PRN
Status: DISCONTINUED | OUTPATIENT
Start: 2023-01-18 | End: 2023-01-18 | Stop reason: HOSPADM

## 2023-01-18 RX ORDER — DEXAMETHASONE SODIUM PHOSPHATE 10 MG/ML
INJECTION, SOLUTION INTRAMUSCULAR; INTRAVENOUS PRN
Status: DISCONTINUED | OUTPATIENT
Start: 2023-01-18 | End: 2023-01-18 | Stop reason: SDUPTHER

## 2023-01-18 RX ORDER — PROPOFOL 10 MG/ML
INJECTION, EMULSION INTRAVENOUS PRN
Status: DISCONTINUED | OUTPATIENT
Start: 2023-01-18 | End: 2023-01-18 | Stop reason: SDUPTHER

## 2023-01-18 RX ORDER — SODIUM CHLORIDE, SODIUM LACTATE, POTASSIUM CHLORIDE, CALCIUM CHLORIDE 600; 310; 30; 20 MG/100ML; MG/100ML; MG/100ML; MG/100ML
INJECTION, SOLUTION INTRAVENOUS CONTINUOUS
Status: DISCONTINUED | OUTPATIENT
Start: 2023-01-18 | End: 2023-01-18 | Stop reason: HOSPADM

## 2023-01-18 RX ORDER — SODIUM CHLORIDE 0.9 % (FLUSH) 0.9 %
5-40 SYRINGE (ML) INJECTION EVERY 12 HOURS SCHEDULED
Status: DISCONTINUED | OUTPATIENT
Start: 2023-01-18 | End: 2023-01-18 | Stop reason: SDUPTHER

## 2023-01-18 RX ORDER — HYDRALAZINE HYDROCHLORIDE 20 MG/ML
5 INJECTION INTRAMUSCULAR; INTRAVENOUS
Status: DISCONTINUED | OUTPATIENT
Start: 2023-01-18 | End: 2023-01-18 | Stop reason: HOSPADM

## 2023-01-18 RX ORDER — SODIUM CHLORIDE 0.9 % (FLUSH) 0.9 %
5-40 SYRINGE (ML) INJECTION PRN
Status: DISCONTINUED | OUTPATIENT
Start: 2023-01-18 | End: 2023-01-18 | Stop reason: HOSPADM

## 2023-01-18 RX ORDER — FAMOTIDINE 10 MG/ML
20 INJECTION, SOLUTION INTRAVENOUS ONCE
Status: COMPLETED | OUTPATIENT
Start: 2023-01-18 | End: 2023-01-18

## 2023-01-18 RX ORDER — METRONIDAZOLE 500 MG/1
500 TABLET ORAL 3 TIMES DAILY
Qty: 30 TABLET | Refills: 0 | Status: SHIPPED | OUTPATIENT
Start: 2023-01-18 | End: 2023-01-28

## 2023-01-18 RX ORDER — OXYCODONE HYDROCHLORIDE 5 MG/1
5 TABLET ORAL EVERY 6 HOURS PRN
Qty: 20 TABLET | Refills: 0 | Status: SHIPPED | OUTPATIENT
Start: 2023-01-18 | End: 2023-01-25

## 2023-01-18 RX ORDER — LIDOCAINE 50 MG/G
OINTMENT TOPICAL
Qty: 50 G | Refills: 1 | Status: SHIPPED | OUTPATIENT
Start: 2023-01-18

## 2023-01-18 RX ORDER — SODIUM CHLORIDE 0.9 % (FLUSH) 0.9 %
5-40 SYRINGE (ML) INJECTION EVERY 12 HOURS SCHEDULED
Status: DISCONTINUED | OUTPATIENT
Start: 2023-01-18 | End: 2023-01-18 | Stop reason: HOSPADM

## 2023-01-18 RX ORDER — ONDANSETRON 2 MG/ML
INJECTION INTRAMUSCULAR; INTRAVENOUS PRN
Status: DISCONTINUED | OUTPATIENT
Start: 2023-01-18 | End: 2023-01-18 | Stop reason: SDUPTHER

## 2023-01-18 RX ADMIN — FENTANYL CITRATE 50 MCG: 50 INJECTION INTRAMUSCULAR; INTRAVENOUS at 07:57

## 2023-01-18 RX ADMIN — SUGAMMADEX 100 MG: 100 INJECTION, SOLUTION INTRAVENOUS at 08:16

## 2023-01-18 RX ADMIN — FENTANYL CITRATE 50 MCG: 50 INJECTION INTRAMUSCULAR; INTRAVENOUS at 07:40

## 2023-01-18 RX ADMIN — DEXAMETHASONE SODIUM PHOSPHATE 8 MG: 10 INJECTION, SOLUTION INTRAMUSCULAR; INTRAVENOUS at 07:50

## 2023-01-18 RX ADMIN — SUGAMMADEX 200 MG: 100 INJECTION, SOLUTION INTRAVENOUS at 08:03

## 2023-01-18 RX ADMIN — ONDANSETRON HYDROCHLORIDE 4 MG: 2 INJECTION, SOLUTION INTRAMUSCULAR; INTRAVENOUS at 07:50

## 2023-01-18 RX ADMIN — FAMOTIDINE 20 MG: 10 INJECTION, SOLUTION INTRAVENOUS at 07:09

## 2023-01-18 RX ADMIN — SODIUM CHLORIDE, POTASSIUM CHLORIDE, SODIUM LACTATE AND CALCIUM CHLORIDE: 600; 310; 30; 20 INJECTION, SOLUTION INTRAVENOUS at 07:10

## 2023-01-18 RX ADMIN — SUGAMMADEX 100 MG: 100 INJECTION, SOLUTION INTRAVENOUS at 08:10

## 2023-01-18 RX ADMIN — METRONIDAZOLE 500 MG: 500 INJECTION, SOLUTION INTRAVENOUS at 07:51

## 2023-01-18 RX ADMIN — PROPOFOL 200 MG: 10 INJECTION, EMULSION INTRAVENOUS at 07:40

## 2023-01-18 RX ADMIN — ROCURONIUM BROMIDE 50 MG: 10 INJECTION, SOLUTION INTRAVENOUS at 07:40

## 2023-01-18 RX ADMIN — SODIUM CHLORIDE, POTASSIUM CHLORIDE, SODIUM LACTATE AND CALCIUM CHLORIDE: 600; 310; 30; 20 INJECTION, SOLUTION INTRAVENOUS at 07:32

## 2023-01-18 ASSESSMENT — PAIN SCALES - GENERAL
PAINLEVEL_OUTOF10: 0
PAINLEVEL_OUTOF10: 0

## 2023-01-18 ASSESSMENT — PAIN - FUNCTIONAL ASSESSMENT: PAIN_FUNCTIONAL_ASSESSMENT: 0-10

## 2023-01-18 ASSESSMENT — PAIN DESCRIPTION - FREQUENCY: FREQUENCY: CONTINUOUS

## 2023-01-18 ASSESSMENT — PAIN DESCRIPTION - LOCATION: LOCATION: OTHER (COMMENT)

## 2023-01-18 ASSESSMENT — PAIN DESCRIPTION - DESCRIPTORS: DESCRIPTORS: ACHING;SORE

## 2023-01-18 ASSESSMENT — PAIN DESCRIPTION - ONSET: ONSET: ON-GOING

## 2023-01-18 NOTE — BRIEF OP NOTE
Brief Postoperative Note      Patient: Jose A Olivares  YOB: 1960  MRN: [id number]    Date of Procedure: 1/18/2023    Pre-Op Diagnosis: Fistula-in-ano [K60.3]    Post-Op Diagnosis: Same       Procedure(s):  ANAL FISTULOTOMY    Surgeon(s):  Isabella Womack MD    Assistant:  Surgical Assistant: Claudia Gamboa    Anesthesia: General    Estimated Blood Loss (mL): Minimal    Complications: None    Specimens:   * No specimens in log *    Implants:  * No implants in log *      Drains: * No LDAs found *    Findings: As above    Electronically signed by Dez Bettencourt MD on 1/18/2023 at 8:06 AM

## 2023-01-18 NOTE — ANESTHESIA POSTPROCEDURE EVALUATION
Department of Anesthesiology  Postprocedure Note    Patient: Ivelisse Mcpherson  MRN: [id number]  YOB: 1960  Date of evaluation: 1/18/2023      Procedure Summary     Date: 01/18/23 Room / Location: [address]    Anesthesia Start: 0732 Anesthesia Stop: 0830    Procedure: ANAL FISTULOTOMY (Anus) Diagnosis:       Fistula-in-ano      (Fistula-in-ano [K60.3])    Surgeons: Gregg Ray MD Responsible Provider: David Anand MD    Anesthesia Type: general ASA Status: 3          Anesthesia Type: No value filed.     Adamaris Phase I: Adamaris Score: 10    Adamaris Phase II: Adamaris Score: 10      Anesthesia Post Evaluation    Patient location during evaluation: PACU  Level of consciousness: awake  Airway patency: patent  Nausea & Vomiting: no nausea  Complications: no  Cardiovascular status: blood pressure returned to baseline  Respiratory status: acceptable  Hydration status: euvolemic

## 2023-01-18 NOTE — H&P
Department of General Surgery - Adult  Surgical Service   Attending History and Physical        CHIEF COMPLAINT:  Anal fistula      History Obtained From:  patient    HISTORY OF PRESENT ILLNESS:    This patient is a 58 y.o. female with significant past medical history of perirectal abscess who presents with need for treatment of persistent anal fistula.     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    Iron deficiency anemia 2019    Lateral meniscal tear 10/14/2015    Lumbar stenosis with neurogenic claudication 2019    Medial meniscus tear 10/14/2015    Mixed incontinence     Morbid (severe) obesity due to excess calories ([address]     Prolonged emergence from general anesthesia     Prolonged emergence from general anesthesia, initial encounter 2019    Tobacco use     Venous ulcer of left leg ([address] 2020     Past Surgical History:        Procedure Laterality Date    ABDOMINAL EXPLORATION SURGERY      CARPAL TUNNEL RELEASE Bilateral 2005     SECTION      CHOLECYSTECTOMY, LAPAROSCOPIC N/A 2022    LAPAROSCOPIC CHOLECYSTECTOMY performed by Endy Hernández MD at [address] [facility]    normal    COLONOSCOPY N/A 2021    COLON W/ANES. (13:30) performed by Pj Ham MD at [address] [facility] Left     atrhroscopic unispacer    KNEE SURGERY Right 10/28/2015    Right knee video arthroscopy with partial medial and lateral menisectomy with loose body removal    LUMBAR SPINE SURGERY N/A 2019    MICROLUMBAR DISCECTOMY L4-5 performed by Hansa Sparks MD at [address]       Current Medications:  Current Facility-Administered Medications   Medication Dose Route Frequency Provider Last Rate Last Admin    lactated ringers infusion   IntraVENous Continuous Yary Amos  mL/hr at 01/18/23 0710 New Bag at 01/18/23 0710    sodium chloride flush 0.9 % injection 5-40 mL  5-40 mL IntraVENous 2 times per day Yary Amos MD        sodium chloride flush 0.9 % injection 5-40 mL  5-40 mL IntraVENous PRN Yary Amos MD        0.9 % sodium chloride infusion   IntraVENous PRN Yary Amos MD        metronidazole (FLAGYL) 500 mg in 0.9% NaCl 100 mL IVPB premix  500 mg IntraVENous Once Wander Henderson MD         Home Medications:  Prior to Admission medications    Medication Sig Start Date End Date Taking?  Authorizing Provider   vitamin D3 (CHOLECALCIFEROL) 10 MCG (400 UNIT) TABS tablet Take 400 Units by mouth daily   Yes Historical Provider, MD   Omega-3 Fatty Acids (FISH OIL) 1000 MG capsule Take by mouth daily   Yes Historical Provider, MD   Turmeric 1053 MG TABS Take 1 tablet by mouth daily  Patient not taking: Reported on 1/18/2023   Yes Historical Provider, MD   cariprazine hcl (VRAYLAR) 1.5 MG capsule Take 1.5 mg by mouth daily 10/30/22   Historical Provider, MD   MYRBETRIQ 25 MG TB24 TAKE ONE TABLET BY MOUTH EVERY DAY 1/6/23   Historical Provider, MD   SYMBICORT 160-4.5 MCG/ACT AERO USE 2 INHALATIONS BY MOUTH  INTO THE LUNGS TWICE DAILY 1/9/23   MADINA Gasca CNP   celecoxib (CELEBREX) 200 MG capsule TAKE 1 CAPSULE BY MOUTH  TWICE DAILY 1/9/23   MADINA Gasca CNP   VORTIoxetine HBr (TRINTELLIX) 20 MG TABS tablet TAKE 1 TABLET BY MOUTH  DAILY 1/9/23   MADINA Gasca CNP   benztropine (COGENTIN) 1 MG tablet Take 1 tablet by mouth daily 10/14/22   MADINA Gasca CNP   lamoTRIgine (LAMICTAL) 100 MG tablet TAKE 1 TABLET BY MOUTH  TWICE DAILY 8/15/22   MADINA Gasca CNP   metoprolol succinate (TOPROL XL) 50 MG extended release tablet TAKE 1 TABLET BY MOUTH  DAILY 8/15/22   MADINA Junior CNP   rosuvastatin (CRESTOR) 5 MG tablet TAKE 1 TABLET BY MOUTH  DAILY  Patient not taking: Reported on 1/18/2023 8/15/22   MADINA Junior CNP   fluticasone (FLONASE) 50 MCG/ACT nasal spray 1 spray by Nasal route in the morning and 1 spray before bedtime. 7/22/22 1/18/23  MADINA Junior CNP   methocarbamol (ROBAXIN) 500 MG tablet  4/29/22   Historical Provider, MD   Coenzyme Q10 (COQ10) 100 MG CAPS Take by mouth    Historical Provider, MD   albuterol sulfate HFA (PROVENTIL HFA) 108 (90 Base) MCG/ACT inhaler Inhale 2 puffs into the lungs every 6 hours as needed for Wheezing or Shortness of Breath 5/20/21   MADINA Junior CNP   multivitamin [facility]) per tablet Take 1 tablet by mouth daily. Historical Provider, MD     Allergies:  Calamine, Amoxicillin, Amoxicillin-pot clavulanate, Chlorpheniramine maleate, Daypro [oxaprozin], Duravent-da [chlorphen-phenyleph-methscop], Lithium, Methscopolamine, Norvasc [amlodipine], Nsaids, Phenylephrine hcl, Seldane [terfenadine], Suprax [cefixime], and Levofloxacin      Social History:   TOBACCO:   reports that she quit smoking about 25 years ago. Her smoking use included cigarettes. She has a 31.50 pack-year smoking history. She has never used smokeless tobacco.  ETOH:   reports no history of alcohol use. Family History:       Problem Relation Age of Onset    Depression Sister     Mental Illness Sister     Diabetes Mother     Heart Disease Mother     Diabetes Father     Heart Disease Father      REVIEW OF SYSTEMS:    Patient reports no complaints related to eyes, ears, nose , throat or mouth. No chest pain or SOB. No urinary complaints or musculoskeletal complaints. No skin rashes, bleeding tendencies. Current GI complaints perianal drainage.     PHYSICAL EXAM:    VITALS:  BP (!) 111/50   Pulse 79   Temp 98 °F (36.7 °C) (Temporal) Resp 16   Ht 5' 1\" (1.549 m)   Wt (!) 324 lb (147 kg)   LMP  (LMP Unknown)   SpO2 92%   BMI 61.22 kg/m²   Comfortable  Eyes:  No scleral icterus  Mouth:  Mucus membranes moist  Skin:  No rashes  Chest:  CTA  Heart:  Regular  Abdomen:Soft  External os in perianal area. Extremities:  No edema  Neurologic:  No focal deficits  Psychiatric : AAA O x 3        ASSESSMENT:   Fistula in ano    Plan:    The diagnosis and recommended procedure were explained. Questions answered. Prepare for surgery.

## 2023-01-18 NOTE — DISCHARGE INSTRUCTIONS
Diet and activity as tolerated. May shower or sit in tub. Call [phone], Dr. Aiden Monzon, for questions and follow up appointment in 3 weeks. ANESTHESIA DISCHARGE INSTRUCTIONS    You are under the influence of drugs- do not drink alcohol, drive a car, operate machinery(such as power tools, kitchen appliances, etc), sign legal documents, or make any important decisions for 24 hours (or while on pain medications). Children should not ride bikes or [place] or play on gym sets  for 24 hours after surgery. A responsible adult should be with you for 24 hours. Rest at home today- increase activity as tolerated. Progress slowly to a regular diet unless your physician has instructed you otherwise. Drink plenty of water. CALL YOUR DOCTOR IF YOU:  Have moderate to severe nausea or vomiting AND are unable to hold down fluids or prescribed medications. Have bright red bloody drainage from your dressing that won't stop oozing. Do not get relief with your pain medication    NORMAL (POSSIBLE) SIDE EFFECTS FROM ANESTHESIA:     Confusion, temporary memory loss, delayed reaction times in the first 24 hours  Lightheadedness, dizziness, difficulty focusing, blurred vision  Nausea/vomiting can happen  Shivering, feeling cold, sore throat, cough and muscle aches should stop within 24-48 hours  Trouble urinating - call your surgeon if it has been more than 8 hrs  Bruising or soreness at the IV site - call if it remains red, firm or there is drainage             FEMALES OF CHILDBEARING AGE WHO ARE TAKING BIRTH CONTROL PILLS:  You may have received a medication during your procedure that interferes with the   actions of birth control pills (Bridion or Emend). Use some other kind of birth control in addition to your pills, like a condom, for 1 month after your procedure to prevent unwanted pregnancy. The following instructions are to be followed if you have a known history or diagnosis of sleep apnea:   For all sleep apnea patients:  ? Sleep on your side or sitting up in a chair whenever possible, especially the first 24 hours after surgery. ? Use only medicines prescribed by your doctor. ? Do not drink alcohol. ? If you have a dental device to assist you while at rest, use it at all times for the first 24 hours. For patients using CPAP machines:  ? Use your CPAP machine during all periods of sleep as usual.  ? Use your CPAP machine during all periods of daytime rest while on pain medicines. ** Follow up with your primary care doctor for continued care. IF YOU DO NOT TAKE ALL OF YOUR NARCOTIC PAIN MEDICATION, please dispose of them responsibly. There are drop off boxes in the Emergency Departments 24/7 at both [facility] and [facility]. If these locations are not convenient, other options for discarding them can be found at:  [email or web address]    Hospital or office staff may NOT accept any medications to drop off in the cabinet for you.

## 2023-01-18 NOTE — PROGRESS NOTES
Called discharge instructions to daughter Verdie Racer and also gave to patient, both expressed understanding. Rx sent to pharmacy. Patient a/o x4, vitals stable, O2 level at 92-94% on room air. Patient says discomfort level tolerable.

## 2023-01-20 ASSESSMENT — ENCOUNTER SYMPTOMS
ALLERGIC/IMMUNOLOGIC NEGATIVE: 1
GASTROINTESTINAL NEGATIVE: 1
ABDOMINAL PAIN: 0
RESPIRATORY NEGATIVE: 1
ANAL BLEEDING: 0
SHORTNESS OF BREATH: 0
EYES NEGATIVE: 1
NAUSEA: 0
BACK PAIN: 1
BLOOD IN STOOL: 0

## 2023-01-23 ENCOUNTER — TELEPHONE (OUTPATIENT)
Dept: SURGERY | Age: 63
End: 2023-01-23

## 2023-01-23 NOTE — OP NOTE
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    OPERATION PERFORMED:  Anal fistulotomy. SURGEON:  Cody Velez MD    ANESTHESIA:  General.    COMPLICATIONS:  None. ESTIMATED BLOOD LOSS:  Less than 50 mL. INDICATIONS FOR THE OPERATION:  A 55-year-old female, who has been  treated for fistula in anal.  We have been giving her antibiotics. The  area would quiet down. When she would go off the antibiotics, then the  area would swell and drain. It was causing pain. Because of the  persistent, recurring, and festering nature of the problem, it was  recommended that she undergo operative intervention. The risks and  benefits were explained. The patient understood them, accepted them,  and elected to proceed. DESCRIPTION OF OPERATION:  The patient was brought to the operating  room. General anesthesia was induced. She was prepped and draped. She  was placed in lithotomy position. The external anal exam revealed an  external os. I probed this area when the subcutaneous tissues over to  the anus. I used cautery to unroof this and opened this tract up  completely. I debrided the tract itself. I cauterized the tract. I  left it open to granulate. Antibiotic ointment and dressings were  placed. DISPOSITION:  The patient tolerated the procedure without any acute  complication.         Sigrid Guido MD    D: 2023 1:02:13       T: 2023 8:55:34     [email or web address]  Job#: [id number]     Doc#: [id number]    CC:

## 2023-01-23 NOTE — TELEPHONE ENCOUNTER
PT called stating she was feeling fine until Saturday and now she is having some bleeding when wiping.

## 2023-01-23 NOTE — TELEPHONE ENCOUNTER
Patient called back. She had anal fistulotomy on 1/18/23. She states up until Saturday 1/21, she had thin yellowish/pink discharge. She now has bright red blood discharge when she wipes, about 1 inch area of blood on tissue paper. She denies pain, but has discomfort. Denies fever or any other symptoms. She states she is not using stool softener, but her stools are soft. She is worried about possibly a suture that may have come loose. Please advise. I offered appt today, but she does not really want the area to be touched.

## 2023-01-25 ENCOUNTER — OFFICE VISIT (OUTPATIENT)
Dept: ORTHOPEDIC SURGERY | Age: 63
End: 2023-01-25
Payer: MEDICARE

## 2023-01-25 ENCOUNTER — OFFICE VISIT (OUTPATIENT)
Dept: FAMILY MEDICINE CLINIC | Age: 63
End: 2023-01-25
Payer: MEDICARE

## 2023-01-25 VITALS
WEIGHT: 293 LBS | OXYGEN SATURATION: 96 % | HEART RATE: 90 BPM | SYSTOLIC BLOOD PRESSURE: 130 MMHG | BODY MASS INDEX: 53.92 KG/M2 | HEIGHT: 62 IN | DIASTOLIC BLOOD PRESSURE: 82 MMHG

## 2023-01-25 VITALS — BODY MASS INDEX: 55.32 KG/M2 | HEIGHT: 61 IN | WEIGHT: 293 LBS

## 2023-01-25 DIAGNOSIS — M47.22 CERVICAL SPONDYLOSIS WITH MYELOPATHY AND RADICULOPATHY: Primary | ICD-10-CM

## 2023-01-25 DIAGNOSIS — M75.01 ADHESIVE CAPSULITIS OF BOTH SHOULDERS: ICD-10-CM

## 2023-01-25 DIAGNOSIS — M48.062 LUMBAR STENOSIS WITH NEUROGENIC CLAUDICATION: ICD-10-CM

## 2023-01-25 DIAGNOSIS — R60.0 EDEMA OF BOTH LEGS: ICD-10-CM

## 2023-01-25 DIAGNOSIS — M75.02 ADHESIVE CAPSULITIS OF BOTH SHOULDERS: ICD-10-CM

## 2023-01-25 DIAGNOSIS — M47.12 CERVICAL SPONDYLOSIS WITH MYELOPATHY AND RADICULOPATHY: Primary | ICD-10-CM

## 2023-01-25 DIAGNOSIS — M17.0 PRIMARY OSTEOARTHRITIS OF BOTH KNEES: Primary | ICD-10-CM

## 2023-01-25 DIAGNOSIS — E66.01 MORBID OBESITY WITH BMI OF 50.0-59.9, ADULT (HCC): ICD-10-CM

## 2023-01-25 PROCEDURE — 3075F SYST BP GE 130 - 139MM HG: CPT | Performed by: NURSE PRACTITIONER

## 2023-01-25 PROCEDURE — G8417 CALC BMI ABV UP PARAM F/U: HCPCS | Performed by: ORTHOPAEDIC SURGERY

## 2023-01-25 PROCEDURE — 3017F COLORECTAL CA SCREEN DOC REV: CPT | Performed by: ORTHOPAEDIC SURGERY

## 2023-01-25 PROCEDURE — 99213 OFFICE O/P EST LOW 20 MIN: CPT | Performed by: ORTHOPAEDIC SURGERY

## 2023-01-25 PROCEDURE — 3079F DIAST BP 80-89 MM HG: CPT | Performed by: NURSE PRACTITIONER

## 2023-01-25 PROCEDURE — 99213 OFFICE O/P EST LOW 20 MIN: CPT | Performed by: NURSE PRACTITIONER

## 2023-01-25 PROCEDURE — 1036F TOBACCO NON-USER: CPT | Performed by: ORTHOPAEDIC SURGERY

## 2023-01-25 PROCEDURE — G8484 FLU IMMUNIZE NO ADMIN: HCPCS | Performed by: ORTHOPAEDIC SURGERY

## 2023-01-25 PROCEDURE — G8427 DOCREV CUR MEDS BY ELIG CLIN: HCPCS | Performed by: NURSE PRACTITIONER

## 2023-01-25 PROCEDURE — G8427 DOCREV CUR MEDS BY ELIG CLIN: HCPCS | Performed by: ORTHOPAEDIC SURGERY

## 2023-01-25 PROCEDURE — 3017F COLORECTAL CA SCREEN DOC REV: CPT | Performed by: NURSE PRACTITIONER

## 2023-01-25 PROCEDURE — 1036F TOBACCO NON-USER: CPT | Performed by: NURSE PRACTITIONER

## 2023-01-25 PROCEDURE — G8417 CALC BMI ABV UP PARAM F/U: HCPCS | Performed by: NURSE PRACTITIONER

## 2023-01-25 PROCEDURE — G8482 FLU IMMUNIZE ORDER/ADMIN: HCPCS | Performed by: NURSE PRACTITIONER

## 2023-01-25 NOTE — PROGRESS NOTES
[address] [facility]  [address] [place] [id number]  Dept: [phone]  Dept Fax: [phone]  Loc: [phone]    Ember Monterroso is a 58 y.o. female who presents today for her medical conditions/complaints as noted below. Ember Monterroso is c/o of Other (Handicap placard)       Subjective:     Chief Complaint   Patient presents with    Other     Handicap placard       HPI  The patient was seen on 1/13/2023 for issues regarding being chronic pain, fatigue and anticholinergic effects from medication. Stable medications were changed  She is still holding Crestor and is unsure if it is made a difference yet it  She is following up with psychiatry for [facility] [place]  The patient did go off the Myrbetriq and is currently using over-the-counter Oxytrol patch twice a week  He states that her anticholinergic symptoms as well as fatigue have significantly improved so far  Patient is also here for handicap placard.   She does have lumbar stenosis with neurogenic claudication, morbid obesity, chronic edema in the bilateral lower extremity and reactive airway disease and primary osteoarthritis of the bilateral knees    Past Medical History:   Diagnosis Date    Bipolar disorder     Borderline osteopenia     Cellulitis of left leg 8/5/2020    Frequency of micturition     GERD (gastroesophageal reflux disease)     Headache(784.0)     HNP (herniated nucleus pulposus), lumbar 6/6/2019    Hyperlipidemia     Hypertension     Intention tremor     due to lithium    Iron deficiency anemia 11/4/2019    Lateral meniscal tear 10/14/2015    Lumbar stenosis with neurogenic claudication 6/6/2019    Medial meniscus tear 10/14/2015    Mixed incontinence     Morbid (severe) obesity due to excess calories (HCC)     Prolonged emergence from general anesthesia     Prolonged emergence from general anesthesia, initial encounter 6/12/2019    Tobacco use     Venous ulcer of left leg ([facility] [address] 07/2020 Review of Systems   Constitutional:  Positive for fatigue. Negative for appetite change and unexpected weight change. HENT: Negative. Eyes: Negative. Respiratory: Negative. Negative for shortness of breath. Cardiovascular: Negative. Negative for chest pain, palpitations and leg swelling. Gastrointestinal: Negative. Negative for abdominal pain, anal bleeding, blood in stool and nausea. Endocrine: Negative. Genitourinary: Negative. Negative for hematuria. Musculoskeletal:  Positive for arthralgias, back pain and myalgias. Skin: Negative. Negative for rash. Allergic/Immunologic: Negative. Neurological: Negative. Negative for dizziness, syncope, light-headedness and numbness. Hematological: Negative. Does not bruise/bleed easily. Psychiatric/Behavioral: Negative. All other systems reviewed and are negative. Current Outpatient Medications   Medication Sig Dispense Refill    oxyCODONE (ROXICODONE) 5 MG immediate release tablet Take 1 tablet by mouth every 6 hours as needed for Pain for up to 7 days. Intended supply: 7 days. Take lowest dose possible to manage pain Max Daily Amount: 20 mg 20 tablet 0    ciprofloxacin (CIPRO) 500 MG tablet Take 1 tablet by mouth 2 times daily for 10 days 10 tablet 0    metroNIDAZOLE (FLAGYL) 500 MG tablet Take 1 tablet by mouth 3 times daily for 10 days 30 tablet 0    lidocaine (XYLOCAINE) 5 % ointment Apply topically as needed.  50 g 1    cariprazine hcl (VRAYLAR) 1.5 MG capsule Take 1.5 mg by mouth daily      vitamin D3 (CHOLECALCIFEROL) 10 MCG (400 UNIT) TABS tablet Take 400 Units by mouth daily      SYMBICORT 160-4.5 MCG/ACT AERO USE 2 INHALATIONS BY MOUTH  INTO THE LUNGS TWICE DAILY 30.6 g 3    celecoxib (CELEBREX) 200 MG capsule TAKE 1 CAPSULE BY MOUTH  TWICE DAILY 180 capsule 1    VORTIoxetine HBr (TRINTELLIX) 20 MG TABS tablet TAKE 1 TABLET BY MOUTH  DAILY 90 tablet 0    benztropine (COGENTIN) 1 MG tablet Take 1 tablet by mouth daily 90 tablet 3    lamoTRIgine (LAMICTAL) 100 MG tablet TAKE 1 TABLET BY MOUTH  TWICE DAILY 180 tablet 3    metoprolol succinate (TOPROL XL) 50 MG extended release tablet TAKE 1 TABLET BY MOUTH  DAILY 90 tablet 3    methocarbamol (ROBAXIN) 500 MG tablet       Coenzyme Q10 (COQ10) 100 MG CAPS Take by mouth      albuterol sulfate HFA (PROVENTIL HFA) 108 (90 Base) MCG/ACT inhaler Inhale 2 puffs into the lungs every 6 hours as needed for Wheezing or Shortness of Breath 3 Inhaler 3    multivitamin (THERAGRAN) per tablet Take 1 tablet by mouth daily. MYRBETRIQ 25 MG TB24 TAKE ONE TABLET BY MOUTH EVERY DAY (Patient not taking: Reported on 1/25/2023)      Omega-3 Fatty Acids (FISH OIL) 1000 MG capsule Take by mouth daily (Patient not taking: Reported on 1/25/2023)      fluticasone (FLONASE) 50 MCG/ACT nasal spray 1 spray by Nasal route in the morning and 1 spray before bedtime. 3 each 3     No current facility-administered medications for this visit. No changes in past medical history, past surgical history, social history, orfamily history were noted during the patient encounter unless specifically listed above. All updates of past medical history, past surgical history, social history, or family history were reviewed personally by me duringthe office visit. All problems listed in the assessment are stable unless noted otherwise. Medication profile reviewed personally by me during the office visit. Medication side effects and possible impairments frommedications were discussed as applicable. Objective:     Physical Exam  Vitals and nursing note reviewed. Constitutional:       General: She is not in acute distress. Appearance: Normal appearance. She is well-developed. She is morbidly obese. HENT:      Head: Normocephalic and atraumatic.       Right Ear: Tympanic membrane, ear canal and external ear normal.      Left Ear: Tympanic membrane, ear canal and external ear normal.      Nose: Nose normal.      Mouth/Throat:      Pharynx: Uvula midline. No oropharyngeal exudate. Eyes:      General: Lids are normal.      Conjunctiva/sclera: Conjunctivae normal.      Pupils: Pupils are equal, round, and reactive to light. Neck:      Thyroid: No thyromegaly. Vascular: No carotid bruit or JVD. Cardiovascular:      Rate and Rhythm: Normal rate and regular rhythm. Pulses: Normal pulses. Radial pulses are 2+ on the right side and 2+ on the left side. Dorsalis pedis pulses are 2+ on the right side and 2+ on the left side. Posterior tibial pulses are 2+ on the right side and 2+ on the left side. Heart sounds: Normal heart sounds. No murmur heard. No friction rub. No gallop. Pulmonary:      Effort: Pulmonary effort is normal.      Breath sounds: Normal breath sounds. Abdominal:      General: Bowel sounds are normal.      Palpations: Abdomen is soft. There is no mass. Tenderness: There is no abdominal tenderness. Musculoskeletal:      Cervical back: Normal range of motion and neck supple. Lumbar back: Decreased range of motion. Right lower le+ Edema present. Left lower le+ Edema present. Lymphadenopathy:      Head:      Right side of head: No submandibular adenopathy. Left side of head: No submandibular adenopathy. Cervical: No cervical adenopathy. Skin:     General: Skin is warm and dry. Findings: No lesion or rash. Neurological:      Mental Status: She is alert and oriented to person, place, and time. Gait: Gait normal.   Psychiatric:         Speech: Speech normal.         Behavior: Behavior normal.         Thought Content:  Thought content normal.         Judgment: Judgment normal.       /82 (Site: Left Lower Arm, Position: Sitting, Cuff Size: Large Adult)   Pulse 90   Ht 5' 2\" (1.575 m)   Wt (!) 319 lb (144.7 kg)   LMP  (LMP Unknown)   SpO2 96%   BMI 58.35 kg/m²   Body mass index is 58.35 kg/m².    BP Readings from Last 2 Encounters:   01/25/23 130/82   01/18/23 126/65       Wt Readings from Last 3 Encounters:   01/25/23 (!) 319 lb (144.7 kg)   01/25/23 (!) 324 lb (147 kg)   01/18/23 (!) 324 lb (147 kg)       Lab Review   Admission on 01/18/2023, Discharged on 01/18/2023   Component Date Value    WBC 01/18/2023 7.7     RBC 01/18/2023 4.77     Hemoglobin 01/18/2023 13.7     Hematocrit 01/18/2023 41.8     MCV 01/18/2023 87.7     MCH 01/18/2023 28.8     MCHC 01/18/2023 32.9     RDW 01/18/2023 15.2     Platelets 72/71/0350 326     MPV 01/18/2023 7.3     Sodium 01/18/2023 138     Potassium reflex Magnesi* 01/18/2023 3.9     Chloride 01/18/2023 100     CO2 01/18/2023 28     Anion Gap 01/18/2023 10     Glucose 01/18/2023 113 (A)     BUN 01/18/2023 22 (A)     Creatinine 01/18/2023 0.7     Est, Glom Filt Rate 01/18/2023 >60     Calcium 01/18/2023 8.7     Ventricular Rate 01/18/2023 77     Atrial Rate 01/18/2023 77     P-R Interval 01/18/2023 154     QRS Duration 01/18/2023 92     Q-T Interval 01/18/2023 416     QTc Calculation (Bazett) 01/18/2023 470     P Axis 01/18/2023 17     R Axis 01/18/2023 -52     T Axis 01/18/2023 65     Diagnosis 01/18/2023 Normal sinus rhythmLeft anterior fascicular blockModerate voltage criteria for LVH, may be normal variantAbnormal ECGWhen compared with ECG of 22-FEB-2022 07:27,No significant change was foundConfirmed by Sang Mares MD, [email or web address] ([id number]) on 1/18/2023 11:27:34 AM        No results found for this visit on 01/25/23. Assessment:       1. Primary osteoarthritis of both knees    2. Morbid obesity with BMI of 50.0-59.9, adult ([address]    3. Lumbar stenosis with neurogenic claudication    4. Edema of both legs        No results found for this visit on 01/25/23. Plan:       Stanton House was seen today for other.     Diagnoses and all orders for this visit:    Primary osteoarthritis of both knees  -     Handicap Placard MISC; by Does not apply route    Morbid obesity with BMI of 50. 0-59.9, adult ([facility] [address]  -     Handicap Placard MISC; by Does not apply route    Lumbar stenosis with neurogenic claudication  -     Handicap Placard MISC; by Does not apply route    Edema of both legs  -     Handicap Placard MISC; by Does not apply route      Patient has been instructed call the office immediately with new symptoms, change in symptoms or worseningof symptoms. If this is not feasible, patient is instructed to report to the emergency room. Medication profile reviewed. Medication side effects and possible impairments from medications were discussed as applicable. Allergies were reviewed. Health maintenance was reviewed and updated as appropriate. (Comment: Please note this report has been produced using a combination of typing and speech recognition software and may contain errors related to that system including errors in grammar, punctuation, and spelling, as well as words and phrases that may be inappropriate.  If there are any questions or concerns please feel free to contact the dictating provider for clarification.)

## 2023-01-25 NOTE — PROGRESS NOTES
New Patient: CERVICAL SPINE    Referring Provider:  No ref. provider found    CHIEF COMPLAINT:    Chief Complaint   Patient presents with    Neck Pain     MRI RESULTS         HISTORY OF PRESENT ILLNESS:   Ms. Carolyn Huff retunrs today for the evaluation of neck and right greater than left arm pain. She notes her symptoms began insidiously about 11 months ago. Those have increased since that time. She rates the intensity of her neck pain 5/10 in her bilateral shoulder pain 7/10. Her arm pain radiates to her elbows. she notes weakness of her arms and loss of fine motor control. She denies numbness and tingling of her arms and gait abnormality. Recent shoulder injection not help her pain. She is status post MLD by me a few years ago    She also complains of low back right buttock and leg pain that extends to her ankle.     Current/Past Treatment:   Physical Therapy: Yes  Chiropractic: None  Injection: None  Medications: None    Past Medical History:   Past Medical History:   Diagnosis Date    Bipolar disorder     Borderline osteopenia     Cellulitis of left leg 8/5/2020    Frequency of micturition     GERD (gastroesophageal reflux disease)     Headache(784.0)     HNP (herniated nucleus pulposus), lumbar 6/6/2019    Hyperlipidemia     Hypertension     Intention tremor     due to lithium    Iron deficiency anemia 11/4/2019    Lateral meniscal tear 10/14/2015    Lumbar stenosis with neurogenic claudication 6/6/2019    Medial meniscus tear 10/14/2015    Mixed incontinence     Morbid (severe) obesity due to excess calories (HCC)     Prolonged emergence from general anesthesia     Prolonged emergence from general anesthesia, initial encounter 6/12/2019    Tobacco use     Venous ulcer of left leg ([address] 07/2020      Past Surgical History:     Past Surgical History:   Procedure Laterality Date    ABDOMINAL EXPLORATION SURGERY      ANUS SURGERY N/A 1/18/2023    ANAL FISTULOTOMY performed by Flaquita Elmore MD at [address] Bilateral 2005     SECTION      CHOLECYSTECTOMY, LAPAROSCOPIC N/A 2022    LAPAROSCOPIC CHOLECYSTECTOMY performed by Sirena Hoskins MD at [address]    normal    COLONOSCOPY N/A 2021    COLON W/ANES. (13:30) performed by Kalina Clark MD at [address] [facility] Left     atrhroscopic unispacer    KNEE SURGERY Right 10/28/2015    Right knee video arthroscopy with partial medial and lateral menisectomy with loose body removal    LUMBAR SPINE SURGERY N/A 2019    MICROLUMBAR DISCECTOMY L4-5 performed by Mary Tiwari MD at [address]       Current Medications:     Current Outpatient Medications:     oxyCODONE (ROXICODONE) 5 MG immediate release tablet, Take 1 tablet by mouth every 6 hours as needed for Pain for up to 7 days. Intended supply: 7 days. Take lowest dose possible to manage pain Max Daily Amount: 20 mg, Disp: 20 tablet, Rfl: 0    ciprofloxacin (CIPRO) 500 MG tablet, Take 1 tablet by mouth 2 times daily for 10 days, Disp: 10 tablet, Rfl: 0    metroNIDAZOLE (FLAGYL) 500 MG tablet, Take 1 tablet by mouth 3 times daily for 10 days, Disp: 30 tablet, Rfl: 0    lidocaine (XYLOCAINE) 5 % ointment, Apply topically as needed. , Disp: 50 g, Rfl: 1    cariprazine hcl (VRAYLAR) 1.5 MG capsule, Take 1.5 mg by mouth daily, Disp: , Rfl:     MYRBETRIQ 25 MG TB24, TAKE ONE TABLET BY MOUTH EVERY DAY, Disp: , Rfl:     vitamin D3 (CHOLECALCIFEROL) 10 MCG (400 UNIT) TABS tablet, Take 400 Units by mouth daily, Disp: , Rfl:     Omega-3 Fatty Acids (FISH OIL) 1000 MG capsule, Take by mouth daily, Disp: , Rfl:     SYMBICORT 160-4.5 MCG/ACT AERO, USE 2 INHALATIONS BY MOUTH  INTO THE LUNGS TWICE DAILY, Disp: 30.6 g, Rfl: 3    celecoxib (CELEBREX) 200 MG capsule, TAKE 1 CAPSULE BY MOUTH  TWICE DAILY, Disp: 180 capsule, Rfl: 1    VORTIoxetine HBr (TRINTELLIX) 20 MG TABS tablet, TAKE 1 TABLET BY MOUTH  DAILY, Disp: 90 tablet, Rfl: 0    benztropine (COGENTIN) 1 MG tablet, Take 1 tablet by mouth daily, Disp: 90 tablet, Rfl: 3    lamoTRIgine (LAMICTAL) 100 MG tablet, TAKE 1 TABLET BY MOUTH  TWICE DAILY, Disp: 180 tablet, Rfl: 3    metoprolol succinate (TOPROL XL) 50 MG extended release tablet, TAKE 1 TABLET BY MOUTH  DAILY, Disp: 90 tablet, Rfl: 3    fluticasone (FLONASE) 50 MCG/ACT nasal spray, 1 spray by Nasal route in the morning and 1 spray before bedtime. , Disp: 3 each, Rfl: 3    methocarbamol (ROBAXIN) 500 MG tablet, , Disp: , Rfl:     Coenzyme Q10 (COQ10) 100 MG CAPS, Take by mouth, Disp: , Rfl:     albuterol sulfate HFA (PROVENTIL HFA) 108 (90 Base) MCG/ACT inhaler, Inhale 2 puffs into the lungs every 6 hours as needed for Wheezing or Shortness of Breath, Disp: 3 Inhaler, Rfl: 3    multivitamin (THERAGRAN) per tablet, Take 1 tablet by mouth daily. , Disp: , Rfl:   Allergies:  Calamine, Amoxicillin, Amoxicillin-pot clavulanate, Chlorpheniramine maleate, Daypro [oxaprozin], Duravent-da [chlorphen-phenyleph-methscop], Lithium, Methscopolamine, Norvasc [amlodipine], Nsaids, Phenylephrine hcl, Seldane [terfenadine], Suprax [cefixime], and Levofloxacin  Social History:    reports that she quit smoking about 25 years ago. Her smoking use included cigarettes. She has a 31.50 pack-year smoking history. She has never used smokeless tobacco. She reports that she does not drink alcohol and does not use drugs.   Family History:   Family History   Problem Relation Age of Onset    Depression Sister     Mental Illness Sister     Diabetes Mother     Heart Disease Mother     Diabetes Father     Heart Disease Father        REVIEW OF SYSTEMS: Full ROS noted & scanned   CONSTITUTIONAL: Denies unexplained weight loss, fevers, chills or fatigue  NEUROLOGICAL: Denies unsteady gait or progressive weakness  MUSCULOSKELETAL: Denies joint swelling or redness  PSYCHOLOGICAL: Denies anxiety, depression   SKIN: Denies skin changes, delayed healing, rash, itching   HEMATOLOGIC: Denies easy bleeding or bruising  ENDOCRINE: Denies excessive thirst, urination, heat/cold  RESPIRATORY: Denies current dyspnea, cough  GI: Denies nausea, vomiting, diarrhea   : Denies bowel or bladder issues       PHYSICAL EXAM:    Vitals: Height 5' 1\" (1.549 m), weight (!) 324 lb (147 kg), not currently breastfeeding. GENERAL EXAM:  General Apparence: Patient is adequately groomed with no evidence of malnutrition. Orientation: The patient is oriented to time, place and person. Mood & Affect:The patient's mood and affect are appropriate   Vascular: Examination reveals no swelling tenderness in upper or lower extremities. Good capillary refill  Lymphatic: The lymphatic examination bilaterally reveals all areas to be without enlargement or induration  Sensation: Sensation is intact without deficit  Coordination/Balance: Good coordination     CERVICAL EXAMINATION:  Inspection: Local inspection shows no step-off or bruising. Cervical alignment is normal.     Palpation: No evidence of tenderness at the midline, and trapezius. Paraspinal tenderness is present. There is no step-off or paraspinal spasm. Range of Motion: Cervical flexion, extension, and rotation are mildly reduced with pain. Strength: 5/5 bilateral upper extremities   Special Tests:     Garza's negative bilaterally. Cubital and Carpal tunnel Tinel's negative bilaterally. Skin:There are no rashes, ulcerations or lesions in right & left upper extremities. Reflexes: Bilaterally triceps, biceps and brachioradialis are 2+. Clonus absent bilaterally at the feet. Gait & station: normal, patient ambulates without assistance     Additional Examinations:       RIGHT UPPER EXTREMITY:  Inspection/examination of the right upper extremity does not show any tenderness, deformity or injury. Decreased painful range of motion of shoulder. there is no gross instability.   There are no rashes, ulcerations or lesions. Strength and tone are normal.  LEFT UPPER EXTREMITY: Inspection/examination of the left upper extremity does not show any tenderness, deformity or injury. Decreased painful range of motion of shoulder. There is no gross instability. There are no rashes, ulcerations or lesions. Strength and tone are normal.    Diagnostic Testing:  I reviewed AP and lateral x-rays of her cervical spine were obtained in the office during her last visit. Those show spondylosis. Reviewed MRI images of her cervical spine from 10/31/2022 in the office today. Those show spondylosis with mild to moderate central stenosis worse at C5-C6    Impression:   Cervical spondylosis with radiculitis    Plan:    Discussed treatment options including observation, physical therapy, medications, epidural injections and additional imaging. She would like to physical therapy for her lumbar spine and shoulders, see one of our shoulder specialists and referral for cervical epidural injection.   She Will call to schedule lumbar MRI with and without deidra if her symptoms persist after that

## 2023-02-01 ENCOUNTER — OFFICE VISIT (OUTPATIENT)
Dept: FAMILY MEDICINE CLINIC | Age: 63
End: 2023-02-01
Payer: MEDICARE

## 2023-02-01 VITALS
HEIGHT: 61 IN | WEIGHT: 293 LBS | DIASTOLIC BLOOD PRESSURE: 82 MMHG | OXYGEN SATURATION: 96 % | SYSTOLIC BLOOD PRESSURE: 134 MMHG | BODY MASS INDEX: 55.32 KG/M2 | HEART RATE: 88 BPM

## 2023-02-01 DIAGNOSIS — J45.40 MODERATE PERSISTENT REACTIVE AIRWAY DISEASE WITHOUT COMPLICATION: ICD-10-CM

## 2023-02-01 DIAGNOSIS — G89.29 CHRONIC PAIN OF BOTH KNEES: ICD-10-CM

## 2023-02-01 DIAGNOSIS — Z87.891 HISTORY OF TOBACCO USE: ICD-10-CM

## 2023-02-01 DIAGNOSIS — E78.49 OTHER HYPERLIPIDEMIA: ICD-10-CM

## 2023-02-01 DIAGNOSIS — Z12.31 OTHER SCREENING MAMMOGRAM: ICD-10-CM

## 2023-02-01 DIAGNOSIS — I10 ESSENTIAL HYPERTENSION: ICD-10-CM

## 2023-02-01 DIAGNOSIS — E55.9 VITAMIN D DEFICIENCY: ICD-10-CM

## 2023-02-01 DIAGNOSIS — M25.562 CHRONIC PAIN OF BOTH KNEES: ICD-10-CM

## 2023-02-01 DIAGNOSIS — F33.9 RECURRENT MAJOR DEPRESSIVE DISORDER, REMISSION STATUS UNSPECIFIED (HCC): Primary | ICD-10-CM

## 2023-02-01 DIAGNOSIS — Z23 FLU VACCINE NEED: ICD-10-CM

## 2023-02-01 DIAGNOSIS — M25.561 CHRONIC PAIN OF BOTH KNEES: ICD-10-CM

## 2023-02-01 PROCEDURE — G8417 CALC BMI ABV UP PARAM F/U: HCPCS | Performed by: NURSE PRACTITIONER

## 2023-02-01 PROCEDURE — 3078F DIAST BP <80 MM HG: CPT | Performed by: NURSE PRACTITIONER

## 2023-02-01 PROCEDURE — 3017F COLORECTAL CA SCREEN DOC REV: CPT | Performed by: NURSE PRACTITIONER

## 2023-02-01 PROCEDURE — G8427 DOCREV CUR MEDS BY ELIG CLIN: HCPCS | Performed by: NURSE PRACTITIONER

## 2023-02-01 PROCEDURE — 3074F SYST BP LT 130 MM HG: CPT | Performed by: NURSE PRACTITIONER

## 2023-02-01 PROCEDURE — 99214 OFFICE O/P EST MOD 30 MIN: CPT | Performed by: NURSE PRACTITIONER

## 2023-02-01 PROCEDURE — 1036F TOBACCO NON-USER: CPT | Performed by: NURSE PRACTITIONER

## 2023-02-01 PROCEDURE — 90674 CCIIV4 VAC NO PRSV 0.5 ML IM: CPT | Performed by: NURSE PRACTITIONER

## 2023-02-01 PROCEDURE — G0008 ADMIN INFLUENZA VIRUS VAC: HCPCS | Performed by: NURSE PRACTITIONER

## 2023-02-01 PROCEDURE — G8482 FLU IMMUNIZE ORDER/ADMIN: HCPCS | Performed by: NURSE PRACTITIONER

## 2023-02-01 RX ORDER — FLUTICASONE PROPIONATE AND SALMETEROL 500; 50 UG/1; UG/1
1 POWDER RESPIRATORY (INHALATION) EVERY 12 HOURS
Qty: 60 EACH | Refills: 3 | Status: SHIPPED | OUTPATIENT
Start: 2023-02-01

## 2023-02-01 SDOH — ECONOMIC STABILITY: FOOD INSECURITY: WITHIN THE PAST 12 MONTHS, THE FOOD YOU BOUGHT JUST DIDN'T LAST AND YOU DIDN'T HAVE MONEY TO GET MORE.: NEVER TRUE

## 2023-02-01 SDOH — ECONOMIC STABILITY: HOUSING INSECURITY
IN THE LAST 12 MONTHS, WAS THERE A TIME WHEN YOU DID NOT HAVE A STEADY PLACE TO SLEEP OR SLEPT IN A SHELTER (INCLUDING NOW)?: NO

## 2023-02-01 SDOH — ECONOMIC STABILITY: FOOD INSECURITY: WITHIN THE PAST 12 MONTHS, YOU WORRIED THAT YOUR FOOD WOULD RUN OUT BEFORE YOU GOT MONEY TO BUY MORE.: NEVER TRUE

## 2023-02-01 SDOH — ECONOMIC STABILITY: INCOME INSECURITY: HOW HARD IS IT FOR YOU TO PAY FOR THE VERY BASICS LIKE FOOD, HOUSING, MEDICAL CARE, AND HEATING?: NOT VERY HARD

## 2023-02-01 ASSESSMENT — ENCOUNTER SYMPTOMS
NAUSEA: 0
ALLERGIC/IMMUNOLOGIC NEGATIVE: 1
BACK PAIN: 1
SHORTNESS OF BREATH: 0
RESPIRATORY NEGATIVE: 1
BLOOD IN STOOL: 0
EYES NEGATIVE: 1
ANAL BLEEDING: 0
ABDOMINAL PAIN: 0
GASTROINTESTINAL NEGATIVE: 1

## 2023-02-01 NOTE — PROGRESS NOTES
Subjective:     Patient Name: Zainab Hernandez is a 58 y.o. female. Chief Complaint   Patient presents with    Hypertension     BP at home at been running 140's over 80's. Left leg at times swells, Denies chest pain    Asthma    Other     Vitamin D deficiency    Cholesterol Problem     Patient is not fasting today    Depression       HPI  Hypertension:  Home blood pressure monitoring: Yes - 130-140/80's. She is adherent to a low sodium diet. Patient denies chest pain, headache, lightheadedness, blurred vision, palpitations, and dry cough. Antihypertensive medication side effects: no medication side effects noted. Sodium (mmol/L)   Date Value   01/18/2023 138    BUN (mg/dL)   Date Value   01/18/2023 22 (H)    Glucose (mg/dL)   Date Value   01/18/2023 113 (H)      Potassium reflex Magnesium (mmol/L)   Date Value   01/18/2023 3.9    Creatinine (mg/dL)   Date Value   01/18/2023 0.7         BP Readings from Last 3 Encounters:   02/01/23 134/82   01/25/23 130/82   01/18/23 126/65       Hyperlipidemia:  patient quit fish oil 2 weeks ago. Patient is not following a low fat, low cholesterol diet. She is not exercising regularly. Lab Results   Component Value Date    CHOL 178 03/30/2022    TRIG 108 03/30/2022    HDL 57 03/30/2022    LDLCALC 99 03/30/2022     Lab Results   Component Value Date    ALT 21 03/30/2022    AST 21 03/30/2022      The 10-year ASCVD risk score (Clarita GALLARDO, et al., 2019) is: 4%    Values used to calculate the score:      Age: 58 years      Sex: Female      Is Non- : No      Diabetic: No      Tobacco smoker: No      Systolic Blood Pressure: 426 mmHg      Is BP treated: No      HDL Cholesterol: 57 mg/dL      Total Cholesterol: 178 mg/dL    Vitamin D Deficiency  Patient with vitamin d deficiency and currently on supplement without adverse reactions.    Lab Results   Component Value Date    VITD25 31.3 03/30/2022     Asthma: Current treatment includes beta agonist inhalers, combination beta agonists/steroid inhalers. Using preventive medication(s) consistently: yes. Residual symptoms: none. Patient denies any other symptoms. She requires her rescue inhaler 2 time(s) per month. History smoker x 20 years about 1.5-2 PPD  Quit 1997    Mood Disorder:  Patient presents for follow-up of depression and anxiety disorder. Current complaints include: See PHQ9 below . She denies tearfulness, decreased libido, irritability, excessive worry, panic attacks, obsessive thoughts, compulsive behaviors, increased use of drugs or alcohol, suicidal thoughts or behavior, and impaired memory. Symptoms/signs of chapito: none. External stressors: family issues. Current treatment includes: individual therapy- and medications are being managed by psychiatry at this time. Medication side effects: none. PHQ-9  1/13/2023 7/22/2022 3/30/2022 11/19/2021 5/19/2021 4/28/2021 11/20/2020   Little interest or pleasure in doing things 2 0 1 0 0 0 3   Feeling down, depressed, or hopeless 1 0 0 0 0 1 2   Trouble falling or staying asleep, or sleeping too much 0 2 1 2 - 1 3   Feeling tired or having little energy 3 3 3 2 - 3 3   Poor appetite or overeating 3 2 1 0 - 0 3   Feeling bad about yourself - or that you are a failure or have let yourself or your family down 1 0 1 0 - 0 0   Trouble concentrating on things, such as reading the newspaper or watching television 3 0 2 1 - 0 2   Moving or speaking so slowly that other people could have noticed.  Or the opposite - being so fidgety or restless that you have been moving around a lot more than usual 0 0 0 0 - 0 0   Thoughts that you would be better off dead, or of hurting yourself in some way 0 0 0 0 - 0 0   PHQ-2 Score 3 0 1 0 0 1 5   PHQ-9 Total Score 13 7 9 5 0 5 16   If you checked off any problems, how difficult have these problems made it for you to do your work, take care of things at home, or get along with other people? 1 1 1 1 - 1 1     Interpretation of Total Score Total Score Depression Severity: 1-4 = Minimal depression, 5-9 = Mild depression, 10-14 = Moderate depression, 15-19 = Moderately severe depression, 20-27 = Severe depression    Review of Systems   Constitutional: Negative. Negative for appetite change, fatigue and unexpected weight change. HENT: Negative. Eyes: Negative. Respiratory: Negative. Negative for shortness of breath. Cardiovascular: Negative. Negative for chest pain, palpitations and leg swelling. Gastrointestinal: Negative. Negative for abdominal pain, anal bleeding, blood in stool and nausea. Endocrine: Negative. Genitourinary: Negative. Negative for hematuria. Musculoskeletal: Negative. Skin: Negative. Negative for rash. Allergic/Immunologic: Negative. Neurological: Negative. Negative for dizziness, syncope, light-headedness and numbness. Hematological: Negative. Does not bruise/bleed easily. Psychiatric/Behavioral: Negative. All other systems reviewed and are negative.      Past Medical History:   Diagnosis Date    Bipolar disorder     Borderline osteopenia     Cellulitis of left leg 8/5/2020    Frequency of micturition     GERD (gastroesophageal reflux disease)     Headache(784.0)     HNP (herniated nucleus pulposus), lumbar 6/6/2019    Hyperlipidemia     Hypertension     Intention tremor     due to lithium    Iron deficiency anemia 11/4/2019    Lateral meniscal tear 10/14/2015    Lumbar stenosis with neurogenic claudication 6/6/2019    Medial meniscus tear 10/14/2015    Mixed incontinence     Morbid (severe) obesity due to excess calories (HCC)     Prolonged emergence from general anesthesia     Prolonged emergence from general anesthesia, initial encounter 6/12/2019    Tobacco use     Venous ulcer of left leg ([facility] [address] 07/2020     Family History   Problem Relation Age of Onset    Depression Sister     Mental Illness Sister     Diabetes Mother Heart Disease Mother     Diabetes Father     Heart Disease Father      Past Surgical History:   Procedure Laterality Date    ABDOMINAL EXPLORATION SURGERY      ANUS SURGERY N/A 2023    ANAL FISTULOTOMY performed by Lizzy Charles MD at [address] Bilateral     [address], LAPAROSCOPIC N/A 2022    LAPAROSCOPIC CHOLECYSTECTOMY performed by Lizzy Charles MD at [address]    normal    COLONOSCOPY N/A 2021    COLON W/ANES. (13:30) performed by Jennyfer Spain MD at [address] Left     atrhroscopic unispacer    KNEE SURGERY Right 10/28/2015    Right knee video arthroscopy with partial medial and lateral menisectomy with loose body removal    LUMBAR SPINE SURGERY N/A 2019    MICROLUMBAR DISCECTOMY L4-5 performed by Sharif Lancaster MD at [address] [place] [address] Drive History     Socioeconomic History    Marital status:       Spouse name: Not on file    Number of children: Not on file    Years of education: Not on file    Highest education level: Not on file   Occupational History    Not on file   Tobacco Use    Smoking status: Former     Packs/day: 1.50     Years: 21.00     Pack years: 31.50     Types: Cigarettes     Quit date: 1997     Years since quittin.9    Smokeless tobacco: Never   Vaping Use    Vaping Use: Never used   Substance and Sexual Activity    Alcohol use: No    Drug use: No    Sexual activity: Not Currently   Other Topics Concern    Not on file   Social History Narrative    Not on file     Social Determinants of Health     Financial Resource Strain: Low Risk     Difficulty of Paying Living Expenses: Not very hard   Food Insecurity: No Food Insecurity    Worried About Running Out of Food in the Last Year: Never true    [address] in the Last Year: Never true   Transportation Needs: Unknown    Lack of Transportation (Medical): Not on file    Lack of Transportation (Non-Medical): No   Physical Activity: Inactive    Days of Exercise per Week: 0 days    Minutes of Exercise per Session: 0 min   Stress: Not on file   Social Connections: Not on file   Intimate Partner Violence: Not on file   Housing Stability: Unknown    Unable to Pay for Housing in the Last Year: Not on file    Number of Places Lived in the Last Year: Not on file    Unstable Housing in the Last Year: No     Current Outpatient Medications   Medication Sig Dispense Refill    UNABLE TO FIND Oxytrol Patch      lidocaine (XYLOCAINE) 5 % ointment Apply topically as needed. 50 g 1    cariprazine hcl (VRAYLAR) 1.5 MG capsule Take 1.5 mg by mouth daily      vitamin D3 (CHOLECALCIFEROL) 10 MCG (400 UNIT) TABS tablet Take 400 Units by mouth daily      SYMBICORT 160-4.5 MCG/ACT AERO USE 2 INHALATIONS BY MOUTH  INTO THE LUNGS TWICE DAILY 30.6 g 3    celecoxib (CELEBREX) 200 MG capsule TAKE 1 CAPSULE BY MOUTH  TWICE DAILY 180 capsule 1    VORTIoxetine HBr (TRINTELLIX) 20 MG TABS tablet TAKE 1 TABLET BY MOUTH  DAILY 90 tablet 0    benztropine (COGENTIN) 1 MG tablet Take 1 tablet by mouth daily 90 tablet 3    lamoTRIgine (LAMICTAL) 100 MG tablet TAKE 1 TABLET BY MOUTH  TWICE DAILY 180 tablet 3    metoprolol succinate (TOPROL XL) 50 MG extended release tablet TAKE 1 TABLET BY MOUTH  DAILY 90 tablet 3    methocarbamol (ROBAXIN) 500 MG tablet       Coenzyme Q10 (COQ10) 100 MG CAPS Take by mouth      albuterol sulfate HFA (PROVENTIL HFA) 108 (90 Base) MCG/ACT inhaler Inhale 2 puffs into the lungs every 6 hours as needed for Wheezing or Shortness of Breath 3 Inhaler 3    multivitamin (THERAGRAN) per tablet Take 1 tablet by mouth daily.       MYRBETRIQ 25 MG TB24 TAKE ONE TABLET BY MOUTH EVERY DAY (Patient not taking: No sig reported)      Omega-3 Fatty Acids (FISH OIL) 1000 MG capsule Take by mouth daily (Patient not taking: No sig reported)      fluticasone (FLONASE) 50 MCG/ACT nasal spray 1 spray by Nasal route in the morning and 1 spray before bedtime. 3 each 3     No current facility-administered medications for this visit. No changes in past medical history, past surgical history, social history, orfamily history were noted during the patient encounter unless specifically listed above. All updates of past medical history, past surgical history, social history, or family history were reviewed personally by me duringthe office visit. All problems listed in the assessment are stable unless noted otherwise. Medication profile reviewed personally by me during the office visit. Medication side effects and possible impairments frommedications were discussed as applicable. Objective:     /82 (Site: Left Lower Arm, Position: Sitting, Cuff Size: Medium Adult)   Pulse 88   Ht 5' 1\" (1.549 m)   Wt (!) 320 lb (145.2 kg)   LMP  (LMP Unknown)   SpO2 96%   BMI 60.46 kg/m²   Body mass index is 60.46 kg/m². Wt Readings from Last 3 Encounters:   02/01/23 (!) 320 lb (145.2 kg)   01/25/23 (!) 319 lb (144.7 kg)   01/25/23 (!) 324 lb (147 kg)       Physical Exam  Vitals and nursing note reviewed. Constitutional:       General: She is not in acute distress. Appearance: Normal appearance. She is well-developed. She is morbidly obese. HENT:      Head: Normocephalic and atraumatic. Right Ear: Tympanic membrane, ear canal and external ear normal.      Left Ear: Tympanic membrane, ear canal and external ear normal.      Nose: Nose normal.      Mouth/Throat:      Pharynx: Uvula midline. No oropharyngeal exudate. Eyes:      General: Lids are normal.      Conjunctiva/sclera: Conjunctivae normal.      Pupils: Pupils are equal, round, and reactive to light. Neck:      Thyroid: No thyromegaly. Vascular: No carotid bruit or JVD. Cardiovascular:      Rate and Rhythm: Normal rate and regular rhythm. Pulses: Normal pulses.            Radial pulses are 2+ on the right side and 2+ on the left side. Dorsalis pedis pulses are 2+ on the right side and 2+ on the left side. Posterior tibial pulses are 2+ on the right side and 2+ on the left side. Heart sounds: Normal heart sounds. No murmur heard. No friction rub. No gallop. Pulmonary:      Effort: Pulmonary effort is normal.      Breath sounds: Normal breath sounds. Abdominal:      General: Bowel sounds are normal.      Palpations: Abdomen is soft. There is no mass. Tenderness: There is no abdominal tenderness. Musculoskeletal:      Cervical back: Normal range of motion and neck supple. Lumbar back: Decreased range of motion. Right lower le+ Edema present. Left lower le+ Edema present. Lymphadenopathy:      Head:      Right side of head: No submandibular adenopathy. Left side of head: No submandibular adenopathy. Cervical: No cervical adenopathy. Skin:     General: Skin is warm and dry. Findings: No lesion or rash. Neurological:      Mental Status: She is alert and oriented to person, place, and time. Gait: Gait normal.   Psychiatric:         Speech: Speech normal.         Behavior: Behavior normal.         Thought Content:  Thought content normal.         Judgment: Judgment normal.       Lab Review   Admission on 2023, Discharged on 2023   Component Date Value    WBC 2023 7.7     RBC 2023 4.77     Hemoglobin 2023 13.7     Hematocrit 2023 41.8     MCV 2023 87.7     MCH 2023 28.8     MCHC 2023 32.9     RDW 2023 15.2     Platelets  326     MPV 2023 7.3     Sodium 2023 138     Potassium reflex Magnesi* 2023 3.9     Chloride 2023 100     CO2 2023 28     Anion Gap 2023 10     Glucose 2023 113 (A)     BUN 2023 22 (A)     Creatinine 2023 0.7     Est, Glom Filt Rate 2023 >60     Calcium 2023 8.7     Ventricular Rate 2023 77 Atrial Rate 01/18/2023 77     P-R Interval 01/18/2023 154     QRS Duration 01/18/2023 92     Q-T Interval 01/18/2023 416     QTc Calculation (Bazett) 01/18/2023 470     P Axis 01/18/2023 17     R Axis 01/18/2023 -52     T Axis 01/18/2023 65     Diagnosis 01/18/2023 Normal sinus rhythmLeft anterior fascicular blockModerate voltage criteria for LVH, may be normal variantAbnormal ECGWhen compared with ECG of 22-FEB-2022 07:27,No significant change was foundConfirmed by Valma Nyhan MD, [address] ([id number]) on 1/18/2023 11:27:34 AM        No results found for this visit on 02/01/23. Assessment:       1. Recurrent major depressive disorder, remission status unspecified ([facility] [address]    2. Body mass index (BMI) 60.0-69.9, adult ([address]    3. Chronic pain of both knees    4. Moderate persistent reactive airway disease without complication    5. Essential hypertension    6. Other hyperlipidemia    7. Vitamin D deficiency    8. History of tobacco use    9. Other screening mammogram    10. Flu vaccine need        No results found for this visit on 02/01/23. Plan:       Monik Prince was seen today for hypertension, asthma, other, cholesterol problem and depression. Diagnoses and all orders for this visit:    Recurrent major depressive disorder, remission status unspecified ([facility] [address]  Current treatment includes: individual therapy- and medications are being managed by psychiatry at this time. Body mass index (BMI) 60.0-69.9, adult [facility])  The patient is asked to make an attempt to improve diet and exercise patterns to aid in medical management of this problem. Chronic pain of both knees  -     Anna Castillo MD, Orthopedic Surgery (Shoulder; Hip; Knee), [place]    Moderate persistent reactive airway disease without complication  Patient feels like symbicort not working as well  Will d/c symbicort and do trial of advair  -     fluticasone-salmeterol (ADVAIR DISKUS) 500-50 MCG/ACT AEPB diskus inhaler;  Inhale 1 puff into the lungs in the morning and 1 puff in the evening. Patient feels dyspnea on exertion is the issue. Wants to try medication change before proceeding with further testing  If no improvement with changing to advair will consider echo d/t dyspnea on exertion and pulmo consult    Essential hypertension  Hypertension, Blood pressure is  well controlled on current medication regimen. Medication: no change. Dietary sodium restriction. Regular aerobic exercise. Check blood pressures monthly and record. Other hyperlipidemia  Continue fish oil    Vitamin D deficiency  Discussed with patient that we make vitamin D from the sun and get it from some food sources, but it is very common to be deficient. Discussed the need for vitamin D replacement because low vitamin D can cause fatigue, joint aches and has been implicated in heart disease, bone disease like osteoporosis, and some other chronic illnesses. Patient will start/continue vitamin D supplement as per order. Recommend vitamin D to be rechecked in 6 months. History of tobacco use  Not eligible for low dose lung ct scan     Other screening mammogram  -     JULIAN DIGITAL SCREEN W OR WO CAD BILATERAL; Future    Flu vaccine need  -     Influenza, FLUCELVAX, (age 10 mo+), IM, Preservative Free, 0.5 mL    Patient has been instructed call the office immediately with new symptoms, change in symptoms or worseningof symptoms. If this is not feasible, patient is instructed to report to the emergency room. Medication profile reviewed. Medication side effects and possible impairments from medications were discussed as applicable. Allergies were reviewed. Health maintenance was reviewed and updated as appropriate. Return in about 6 months (around 8/1/2023), or if symptoms worsen or fail to improve, for [facility].

## 2023-02-03 ENCOUNTER — OFFICE VISIT (OUTPATIENT)
Dept: ORTHOPEDIC SURGERY | Age: 63
End: 2023-02-03

## 2023-02-03 VITALS — HEIGHT: 61 IN | BODY MASS INDEX: 55.32 KG/M2 | WEIGHT: 293 LBS

## 2023-02-03 DIAGNOSIS — M75.101 ROTATOR CUFF SYNDROME OF BOTH SHOULDERS: ICD-10-CM

## 2023-02-03 DIAGNOSIS — M25.511 BILATERAL SHOULDER PAIN, UNSPECIFIED CHRONICITY: Primary | ICD-10-CM

## 2023-02-03 DIAGNOSIS — M75.102 ROTATOR CUFF SYNDROME OF BOTH SHOULDERS: ICD-10-CM

## 2023-02-03 DIAGNOSIS — M25.512 BILATERAL SHOULDER PAIN, UNSPECIFIED CHRONICITY: Primary | ICD-10-CM

## 2023-02-03 RX ORDER — METHYLPREDNISOLONE ACETATE 40 MG/ML
80 INJECTION, SUSPENSION INTRA-ARTICULAR; INTRALESIONAL; INTRAMUSCULAR; SOFT TISSUE ONCE
Status: COMPLETED | OUTPATIENT
Start: 2023-02-03 | End: 2023-02-03

## 2023-02-03 RX ORDER — BUPIVACAINE HYDROCHLORIDE 2.5 MG/ML
4 INJECTION, SOLUTION INFILTRATION; PERINEURAL ONCE
Status: COMPLETED | OUTPATIENT
Start: 2023-02-03 | End: 2023-02-03

## 2023-02-03 RX ADMIN — BUPIVACAINE HYDROCHLORIDE 10 MG: 2.5 INJECTION, SOLUTION INFILTRATION; PERINEURAL at 12:43

## 2023-02-03 RX ADMIN — METHYLPREDNISOLONE ACETATE 80 MG: 40 INJECTION, SUSPENSION INTRA-ARTICULAR; INTRALESIONAL; INTRAMUSCULAR; SOFT TISSUE at 12:43

## 2023-02-03 SDOH — HEALTH STABILITY: PHYSICAL HEALTH: ON AVERAGE, HOW MANY MINUTES DO YOU ENGAGE IN EXERCISE AT THIS LEVEL?: 0 MIN

## 2023-02-03 SDOH — HEALTH STABILITY: PHYSICAL HEALTH: ON AVERAGE, HOW MANY DAYS PER WEEK DO YOU ENGAGE IN MODERATE TO STRENUOUS EXERCISE (LIKE A BRISK WALK)?: 0 DAYS

## 2023-02-03 ASSESSMENT — SOCIAL DETERMINANTS OF HEALTH (SDOH)
WITHIN THE LAST YEAR, HAVE YOU BEEN KICKED, HIT, SLAPPED, OR OTHERWISE PHYSICALLY HURT BY YOUR PARTNER OR EX-PARTNER?: NO
WITHIN THE LAST YEAR, HAVE YOU BEEN AFRAID OF YOUR PARTNER OR EX-PARTNER?: NO
WITHIN THE LAST YEAR, HAVE TO BEEN RAPED OR FORCED TO HAVE ANY KIND OF SEXUAL ACTIVITY BY YOUR PARTNER OR EX-PARTNER?: NO
WITHIN THE LAST YEAR, HAVE YOU BEEN HUMILIATED OR EMOTIONALLY ABUSED IN OTHER WAYS BY YOUR PARTNER OR EX-PARTNER?: NO

## 2023-02-03 NOTE — PROGRESS NOTES
Chief Complaint  Shoulder Pain (NP DEMARCUS shoulder)      History of Present Illness: The patient is here today for established patient evaluation of her right shoulder again as well as new evaluation of her left shoulder. The patient has been seeing our spine team and she has cervical injections coming up next week. She is reporting bilateral shoulder pain with symptoms that go down around her biceps on both shoulders as well is occasionally into her forearms. She is reporting a tightness feeling. She denies any numbness and tingling sensation from them. She reports that her left shoulder is bothering her more secondary to a heavy feeling. I did order PT for her last time she was in my office but she was not able to go secondary to her social situation. Fortunately her family member who had cancer is currently in remission. Prior HPI 12/28/21:  Jayden Long is a pleasant 58 y.o. female here today for follow-up regarding her right shoulder. She reports that the injection in the subacromial space a month ago did help her pain in the shoulder. She is still having some pain to the shoulder as well as in the neck and sometimes into the middle aspect of her elbow and forearm. She denies any issues with her fingers. Unfortunately she is taking care of her sister who has cancer and has not been able to go to formal physical therapy    Prior HPI 12/2/21:  The patient is right-hand dominant. She reports worsening right shoulder pain for the past 2 months. Sometimes the pain goes down into her elbow. Sometimes also radiates up to the bottom part of her neck. She denies any numbness and tingling down the arm. She cannot recall specific injury. She has some difficulty reaching behind her back and putting on her bra. She takes Celebrex for her knees which helps her knee pain but does not help her shoulder much.      The patient has been seen by multiple orthopedic providers over the last few years for her knees and lower back, including my partners Dr. Caprice Pabon and Dr. Apple Castillo. She has severe end stage OA of her bilateral knees, with a medial compartment metallic spacer in the right knee. Due to her weight she unfortunately is not a good surgical candidate for her knees. Pain Assessment  Location of Pain: Shoulder  Location Modifiers: Left, Right  Severity of Pain: 6  Quality of Pain: Sharp  Duration of Pain: Persistent  Frequency of Pain: Constant  Aggravating Factors: Bending, Exercise  Limiting Behavior: Yes  Relieving Factors: Nsaids  Result of Injury: No  Work-Related Injury: No  Are there other pain locations you wish to document?: No    Medical History:  Patient's medications, allergies, past medical, surgical, social and family histories were reviewed and updated as appropriate. Pertinent items are noted in HPI  Review of systems reviewed from Patient History Form dated on 2/3/23 and available in the patient's chart under the Media tab. Vital Signs: There were no vitals filed for this visit. Constitutional: In no apparent distress. Normal affect. Alert and oriented X3 and is cooperative. Bilateral shoulder exam in the office today demonstrates similar findings. Very tender to palpation over the [facility] joints of both shoulders, greater tuberosities and anterior bicipital grooves. Full range of motion noted. Internal rotation equal to both sides to the L5 area. 5 out of 5 infraspinatus supraspinatus and subscapularis strength both on belly press and bearhug. Positive Speed sign bilaterally. Radiology:       3 views of the bilateral shoulders taken in the office today demonstrate no acute osseous abnormalities. Mild to moderate AC joint arthritis of the bilateral shoulder joints. Appropriate acromiohumeral interval to both shoulders. No obvious findings of glenohumeral osteoarthritis to either shoulder.            Assessment: 58-year-old female with bilateral shoulder rotator cuff syndrome/rotator cuff tendinitis, symptomatic AC joint arthritis bilaterally     Comorbidities: Morbid obesity BMI 60    Impression:  Encounter Diagnoses   Name Primary? Bilateral shoulder pain, unspecified chronicity Yes    Rotator cuff syndrome of both shoulders        Office Procedures:  Orders Placed This Encounter   Procedures    XR SHOULDER LEFT (MIN 2 VIEWS)     Standing Status:   Future     Number of Occurrences:   1     Standing Expiration Date:   2/3/2024    XR SHOULDER RIGHT (MIN 2 VIEWS)     Standing Status:   Future     Number of Occurrences:   1     Standing Expiration Date:   2/3/2024    IL ARTHROCENTESIS ASPIR&/INJ MAJOR JT/BURSA W/O US         Plan:     Based on the patient's findings she does have good range of motion and strength. I have a low concern for a large rotator cuff tear to either shoulder. I do believe a lot of her issues are still coming from her neck and I want to see how she responds to her cervical injections next week. However because her left shoulder is more tender, I will offer a steroid injection into the subacromial space today. I will also give her home program to start working on rotator cuff strengthening on her own as she still does not think she can make it to formal PT at this time. Follow-up with me in 4 to 6 weeks to check in on her progress. If the patient would like an injection into her right shoulder would like to wait a couple weeks after her cervical injections as I do not want to overload her body with too much steroid. The indications and risks of steroid injection as well as treatment alternatives were discussed with the patient who consented to the procedure. Under sterile conditions and after informed consent was obtained, using posterior approach the patient was given an injection into the left shoulder subacromial space. 2mL 40 mg of Depo-Medrol and 4 mL of 1% bupivacaine were placed in the shoulder after it was prepped with chlorhexidine. This resulted in good relief of symptoms. There were no complications. The patient was advised to ice the shoulder this evening and to avoid vigorous activities for the next 2 days. They were advised to call us if there was any erythema, enduration, swelling or increasing pain. Karen Vega is in agreement with this plan. All questions were answered to patient's satisfaction and was encouraged to call with any further questions. 30 minutes the patient today discussing her bilateral shoulders, injecting her left shoulder, and evaluating her x-rays    Lysle Reasons, DO  Orthopedic Surgery and Sports Medicine  2/3/2023      This dictation was performed with a verbal recognition program [facility]) and it was checked for errors. It is possible that there are still dictated errors within this office note. If so, please bring any errors to my attention for an addendum. All efforts were made to ensure that this office note is accurate.

## 2023-02-07 ASSESSMENT — ENCOUNTER SYMPTOMS
NAUSEA: 0
GASTROINTESTINAL NEGATIVE: 1
BLOOD IN STOOL: 0
ANAL BLEEDING: 0
SHORTNESS OF BREATH: 0
ABDOMINAL PAIN: 0
EYES NEGATIVE: 1
RESPIRATORY NEGATIVE: 1
ALLERGIC/IMMUNOLOGIC NEGATIVE: 1

## 2023-02-20 ENCOUNTER — NURSE ONLY (OUTPATIENT)
Dept: FAMILY MEDICINE CLINIC | Age: 63
End: 2023-02-20

## 2023-02-20 DIAGNOSIS — D64.9 ANEMIA, UNSPECIFIED TYPE: ICD-10-CM

## 2023-02-20 DIAGNOSIS — E78.49 OTHER HYPERLIPIDEMIA: ICD-10-CM

## 2023-02-20 LAB
CHOLESTEROL, TOTAL: 210 MG/DL (ref 0–199)
FERRITIN: 136.6 NG/ML (ref 15–150)
HDLC SERPL-MCNC: 57 MG/DL (ref 40–60)
IRON SATURATION: 27 % (ref 15–50)
IRON: 87 UG/DL (ref 37–145)
LDL CHOLESTEROL CALCULATED: 135 MG/DL
TOTAL IRON BINDING CAPACITY: 321 UG/DL (ref 260–445)
TRIGL SERPL-MCNC: 90 MG/DL (ref 0–150)
VLDLC SERPL CALC-MCNC: 18 MG/DL

## 2023-02-24 ENCOUNTER — OFFICE VISIT (OUTPATIENT)
Dept: ORTHOPEDIC SURGERY | Age: 63
End: 2023-02-24

## 2023-02-24 VITALS — BODY MASS INDEX: 55.32 KG/M2 | WEIGHT: 293 LBS | HEIGHT: 61 IN

## 2023-02-24 DIAGNOSIS — M17.11 PRIMARY OSTEOARTHRITIS OF RIGHT KNEE: Primary | ICD-10-CM

## 2023-02-24 NOTE — LETTER
[facility]  Surgery Scheduling Form:    23     DEMOGRAPHICS    Patient Name:  Shiela Smith  Patient :  1960   Patient SS#:  [id number]    Patient Phone:  [phone] (home)  Alt. Patient Phone:    Patient Address:  [place] [address]     PCP:  MADINA Valadez CNP  Insurance:  Payor: [facility] / Plan: [place] / Product Type: *No Product type* /   Insurance ID Number:  Payer/Plan Subscr  Sex Relation Sub. Ins. ID Effective Group Num   1. [facility] MEDICARE * Teresa Sahni* 1960 Female Self [id number]                                   [address] [address]       DIAGNOSIS & PROCEDURE    Diagnosis:   Right Knee Osteoarthritis     Operation: RIGHT TKA with application of 00PU Prevena wound vac    Provider:  Kirby River MD      Location: [facility]      SCHEDULING INFORMATION    Requested Date: per patient  Requested Time:  any       OR Time Required:  120 Minutes  Admission:  []Outpatient   []23 hour  [x]Same Day Admit:   1-2  days  []Inpatient    Anesthesia:  [x]General  []Spinal  []MAC/Sedation  Regional Anesthesia:  []None  []Lumbar Plexus Block  []Fascia Iliaca  []Femoral  [x]Adductor canal  []Interscalene Block  []Insert Catheter  []OrthoMix []Exparel   [x] iPAC       EQUIPMENT    Position:  [x]Supine  []Lateral  []Beach-chair  []Prone    OR Bed:  [x]Regular  [x]Ramos boot  []Albin    Radiology:  []Large C-arm  []Small C-arm  []Portable X-ray    Implants:  [employer] Knee:  [x]Primary Set  []Revision Set      SUTURE: Standard TJA  DRESSING:  [x]Prineo dermabond  []4x4 gauze  [x]ABDs  []Tegaderm                         []Staples  []Pravena incisional vac  BRACE: []Pelvic Binder  []Hip X-ACT  []Knee TROM  []Knee immobilizer                 []Shoulder Immob. (w/abd. pillow)  []Sling  [x]Ice Unit  [x]Stockinette      [x][employer]:  Efrain Sole [phone], Nataliia Hernandez. [email or web address]  []Medacta: Shwetha Barton [phone], [email or web address]  []Urban Shoulder: Parker Officer [phone], Mirna Chapman. [email or web address]@"[email or web address]. com  []Nicole: Alexis Jarrell [phone], [email or web address]    Comments: none    Vitaliy Gordillo MD

## 2023-02-26 NOTE — PROGRESS NOTES
Dr Moreno Began      Date /Time 2/24/2023       10:07 AM EST  Name Tita Hansen             1960   Location  [facility] SURG  MRN [id number]                Chief Complaint   Patient presents with    New Patient     Right knee-  No recent imaging  Previous tx Kenny/camilla 2022        History of Present Illness  Tita Hansen is a 58 y.o. female who presents with chronic disabling right knee pain. She has had severe pain and activity limitations for the last several years. This is despite a history of activity modification, steroid and gel injections and chronic Celebrex therapy. She has completed multiple courses of physical therapy in the past. She continues to have significant limitations in her daily life secondary to R > L knee pain. She has sought surgical intervention for the last several years however many surgeons have refused due to her elevated BMI. Regarding weight loss measures, she has had a Charlie-en-Y gastric bypass and has been working on dietary modification however she is unable to maintain an active lifestyle due to this disabling knee pain and has plateaued with her weight loss which she attributes to lack of mobility from her knees. Sent in consultation by MADNIA Cisneros - CNP,.       Past History  Past Medical History:   Diagnosis Date    Bipolar disorder     Borderline osteopenia     Cellulitis of left leg 8/5/2020    Frequency of micturition     GERD (gastroesophageal reflux disease)     Headache(784.0)     HNP (herniated nucleus pulposus), lumbar 6/6/2019    Hyperlipidemia     Hypertension     Intention tremor     due to lithium    Iron deficiency anemia 11/4/2019    Lateral meniscal tear 10/14/2015    Lumbar stenosis with neurogenic claudication 6/6/2019    Medial meniscus tear 10/14/2015    Mixed incontinence     Morbid (severe) obesity due to excess calories (HCC)     Prolonged emergence from general anesthesia     Prolonged emergence from general anesthesia, initial encounter 2019    Tobacco use     Venous ulcer of left leg ([address] 2020     Past Surgical History:   Procedure Laterality Date    ABDOMINAL EXPLORATION SURGERY      ANUS SURGERY N/A 2023    ANAL FISTULOTOMY performed by Negrita Ramirez MD at [address] Bilateral 2005    [address], LAPAROSCOPIC N/A 2022    LAPAROSCOPIC CHOLECYSTECTOMY performed by Negrita Ramirez MD at [facility]    normal    COLONOSCOPY N/A 2021    COLON W/ANES. (13:30) performed by Samantha Villar MD at [address] Left     atrhroscopic unispacer    KNEE SURGERY Right 10/28/2015    Right knee video arthroscopy with partial medial and lateral menisectomy with loose body removal    LUMBAR SPINE SURGERY N/A 2019    MICROLUMBAR DISCECTOMY L4-5 performed by Emil Fregoso MD at [address] History     Tobacco Use    Smoking status: Former     Packs/day: 1.50     Years: 21.00     Pack years: 31.50     Types: Cigarettes     Start date: 1971     Quit date: 1997     Years since quittin.0    Smokeless tobacco: Never   Substance Use Topics    Alcohol use: No      Current Outpatient Medications on File Prior to Visit   Medication Sig Dispense Refill    UNABLE TO FIND Oxytrol Patch      fluticasone-salmeterol (ADVAIR DISKUS) 500-50 MCG/ACT AEPB diskus inhaler Inhale 1 puff into the lungs in the morning and 1 puff in the evening. 60 each 3    Handicap Placard MISC by Does not apply route 1 each 0    lidocaine (XYLOCAINE) 5 % ointment Apply topically as needed.  50 g 1    cariprazine hcl (VRAYLAR) 1.5 MG capsule Take 1.5 mg by mouth daily      vitamin D3 (CHOLECALCIFEROL) 10 MCG (400 UNIT) TABS tablet Take 400 Units by mouth daily      celecoxib (CELEBREX) 200 MG capsule TAKE 1 CAPSULE BY MOUTH  TWICE DAILY 180 capsule 1 VORTIoxetine HBr (TRINTELLIX) 20 MG TABS tablet TAKE 1 TABLET BY MOUTH  DAILY 90 tablet 0    benztropine (COGENTIN) 1 MG tablet Take 1 tablet by mouth daily 90 tablet 3    lamoTRIgine (LAMICTAL) 100 MG tablet TAKE 1 TABLET BY MOUTH  TWICE DAILY 180 tablet 3    metoprolol succinate (TOPROL XL) 50 MG extended release tablet TAKE 1 TABLET BY MOUTH  DAILY 90 tablet 3    methocarbamol (ROBAXIN) 500 MG tablet       Coenzyme Q10 (COQ10) 100 MG CAPS Take by mouth      albuterol sulfate HFA (PROVENTIL HFA) 108 (90 Base) MCG/ACT inhaler Inhale 2 puffs into the lungs every 6 hours as needed for Wheezing or Shortness of Breath 3 Inhaler 3    multivitamin (THERAGRAN) per tablet Take 1 tablet by mouth daily. MYRBETRIQ 25 MG TB24 TAKE ONE TABLET BY MOUTH EVERY DAY (Patient not taking: No sig reported)      Omega-3 Fatty Acids (FISH OIL) 1000 MG capsule Take by mouth daily (Patient not taking: No sig reported)      fluticasone (FLONASE) 50 MCG/ACT nasal spray 1 spray by Nasal route in the morning and 1 spray before bedtime. 3 each 3     No current facility-administered medications on file prior to visit. ASCVD 10-YEAR RISK SCORE  The 10-year ASCVD risk score (Clarita DK, et al., 2019) is: 4.5%    Values used to calculate the score:      Age: 58 years      Sex: Female      Is Non- : No      Diabetic: No      Tobacco smoker: No      Systolic Blood Pressure: 403 mmHg      Is BP treated: No      HDL Cholesterol: 57 mg/dL      Total Cholesterol: 210 mg/dL     Review of Systems  10-point ROS is negative other than HPI. Physical Exam  Ht 5' 1\" (1.549 m)   Wt (!) 320 lb (145.2 kg)   LMP  (LMP Unknown)   BMI 60.46 kg/m²      Constitutional:       General: He is not in acute distress. Appearance: Normal appearance. Cardiovascular:      Rate and Rhythm: Normal rate and regular rhythm. Pulses: Normal pulses. Pulmonary:      Effort: Pulmonary effort is normal. No respiratory distress. Neurological:      Mental Status: He is alert and oriented to person, place, and time. Mental status is at baseline. Skin: Mean, dry, intact. No open sores  Lymphatics: No palpable lymph nodes    Musculoskeletal:  Gait:  with walker    R Knee: Skin in tact without ulcerations or previous scars  Minimal Effusion  Tender to palpation at medial joint line  ROM: +5 deg flexion contracture - 90 with pain and crepitus  Grossly stable to varus / valgus stress and posterior drawer   No evidence of neurovascular compromise distally  Special tests: None        Imaging  4 views of the right knee ordered obtained reviewed and interpreted, show Kellgren Kory grade 4 changes with tricompartmental varus OA           Orders Placed This Encounter   Procedures    XR KNEE RIGHT (MIN 4 VIEWS)     Standing Status:   Future     Number of Occurrences:   1     Standing Expiration Date:   2/21/2024     Order Specific Question:   Reason for exam:     Answer:   PAIN       Assessment and Plan  Edward Hudson was seen today for new patient. Diagnoses and all orders for this visit:    Primary osteoarthritis of right knee  -     XR KNEE RIGHT (MIN 4 VIEWS); Future    This is a very pleasant 60-year-old female with end-stage right knee osteoarthritis which is causing significant pain and disability despite extensive conservative treatment as outlined in the HPI. While she does have significant obesity she has already undergone gastric bypass surgery and has been several years trying to lose weight through dietary modifications however she has plateaued in these endeavors. The bottom line is that her mobility and overall function will continue to decline without knee replacement and all possible conservative measures have been pursued. I recommended right total knee replacement with measures such as extra tibial fixation and incisional wound VAC to prevent complications.   I discussed risk benefits limitations and alternatives with the patient in detail who does wish to proceed. We will request preoperative exam from her PCP along with routine labs and schedule this at our earliest mutual convenience. Electronically signed by Vitaliy Gordillo MD on 2/43/8727 at 10:07 AM  This dictation was generated by voice recognition computer software. Although all attempts are made to edit the dictation for accuracy, there may be errors in the transcription that are not intended. Total time spent reviewing past notes, imaging, labs, clinical exam, and treatment plan was > 45 min.

## 2023-03-09 ENCOUNTER — TELEPHONE (OUTPATIENT)
Dept: ORTHOPEDIC SURGERY | Age: 63
End: 2023-03-09

## 2023-03-09 NOTE — TELEPHONE ENCOUNTER
Prescription Refill     Medication Name: GABAPENTIN  REQUESTING A CALL BACK.   [facility]  Patient Contact Number:  [phone]

## 2023-03-13 DIAGNOSIS — E78.49 OTHER HYPERLIPIDEMIA: ICD-10-CM

## 2023-03-13 RX ORDER — ROSUVASTATIN CALCIUM 5 MG/1
TABLET, COATED ORAL
Qty: 90 TABLET | Refills: 3 | OUTPATIENT
Start: 2023-03-13

## 2023-03-15 ENCOUNTER — TELEPHONE (OUTPATIENT)
Dept: ORTHOPEDIC SURGERY | Age: 63
End: 2023-03-15

## 2023-03-15 NOTE — TELEPHONE ENCOUNTER
Called patient to schedule surgery, she is unable to schedule at this time , she will call when she is ready.  JHON

## 2023-03-21 ENCOUNTER — TELEPHONE (OUTPATIENT)
Dept: ORTHOPEDIC SURGERY | Age: 63
End: 2023-03-21

## 2023-03-22 RX ORDER — GABAPENTIN 300 MG/1
300 CAPSULE ORAL 4 TIMES DAILY
Qty: 120 CAPSULE | Refills: 0 | Status: SHIPPED | OUTPATIENT
Start: 2023-03-22 | End: 2023-04-21

## 2023-03-27 ENCOUNTER — OFFICE VISIT (OUTPATIENT)
Dept: ORTHOPEDIC SURGERY | Age: 63
End: 2023-03-27
Payer: MEDICARE

## 2023-03-27 VITALS — BODY MASS INDEX: 55.32 KG/M2 | HEIGHT: 61 IN | WEIGHT: 293 LBS

## 2023-03-27 DIAGNOSIS — M54.16 LUMBAR RADICULOPATHY: Primary | ICD-10-CM

## 2023-03-27 PROCEDURE — G8427 DOCREV CUR MEDS BY ELIG CLIN: HCPCS | Performed by: ORTHOPAEDIC SURGERY

## 2023-03-27 PROCEDURE — G8482 FLU IMMUNIZE ORDER/ADMIN: HCPCS | Performed by: ORTHOPAEDIC SURGERY

## 2023-03-27 PROCEDURE — G8417 CALC BMI ABV UP PARAM F/U: HCPCS | Performed by: ORTHOPAEDIC SURGERY

## 2023-03-27 PROCEDURE — 1036F TOBACCO NON-USER: CPT | Performed by: ORTHOPAEDIC SURGERY

## 2023-03-27 PROCEDURE — 99213 OFFICE O/P EST LOW 20 MIN: CPT | Performed by: ORTHOPAEDIC SURGERY

## 2023-03-27 PROCEDURE — 3017F COLORECTAL CA SCREEN DOC REV: CPT | Performed by: ORTHOPAEDIC SURGERY

## 2023-03-27 RX ORDER — OXYBUTYNIN CHLORIDE 5 MG/1
5 TABLET ORAL 2 TIMES DAILY
COMMUNITY
Start: 2023-02-27

## 2023-03-27 NOTE — PROGRESS NOTES
painful range of motion of shoulder. There is no gross instability. There are no rashes, ulcerations or lesions. Strength and tone are normal.    Diagnostic Testing:  I reviewed AP and lateral x-rays of her cervical spine were obtained in the office during her last visit. Those show spondylosis. I reviewed MRI images of her cervical spine from 10/31/2022 in the office today. Those show spondylosis with mild to moderate central stenosis worse at C5-C6. I reviewed AP and lateral x-rays of the lumbar spine that were obtained in the office today. Those show severe degenerative disc disease L2-S1    Impression:   lumbar stenosis    Plan:    Discussed treatment options including observation, physical therapy, medications, epidural injections and additional imaging. She would like to seed with lumbar MRI.   She is hopeful she is a candidate for lumbar epidural injection

## 2023-03-29 ENCOUNTER — TELEPHONE (OUTPATIENT)
Dept: ORTHOPEDIC SURGERY | Age: 63
End: 2023-03-29

## 2023-04-17 ENCOUNTER — OFFICE VISIT (OUTPATIENT)
Dept: FAMILY MEDICINE CLINIC | Age: 63
End: 2023-04-17
Payer: MEDICARE

## 2023-04-17 VITALS
DIASTOLIC BLOOD PRESSURE: 80 MMHG | WEIGHT: 293 LBS | OXYGEN SATURATION: 97 % | HEIGHT: 61 IN | SYSTOLIC BLOOD PRESSURE: 134 MMHG | BODY MASS INDEX: 55.32 KG/M2 | HEART RATE: 84 BPM

## 2023-04-17 DIAGNOSIS — F33.1 MODERATE EPISODE OF RECURRENT MAJOR DEPRESSIVE DISORDER (HCC): Primary | ICD-10-CM

## 2023-04-17 DIAGNOSIS — Z13.31 POSITIVE DEPRESSION SCREENING: ICD-10-CM

## 2023-04-17 DIAGNOSIS — F41.9 ANXIETY: ICD-10-CM

## 2023-04-17 PROCEDURE — G8417 CALC BMI ABV UP PARAM F/U: HCPCS | Performed by: NURSE PRACTITIONER

## 2023-04-17 PROCEDURE — G8427 DOCREV CUR MEDS BY ELIG CLIN: HCPCS | Performed by: NURSE PRACTITIONER

## 2023-04-17 PROCEDURE — 3074F SYST BP LT 130 MM HG: CPT | Performed by: NURSE PRACTITIONER

## 2023-04-17 PROCEDURE — 3017F COLORECTAL CA SCREEN DOC REV: CPT | Performed by: NURSE PRACTITIONER

## 2023-04-17 PROCEDURE — 1036F TOBACCO NON-USER: CPT | Performed by: NURSE PRACTITIONER

## 2023-04-17 PROCEDURE — 99213 OFFICE O/P EST LOW 20 MIN: CPT | Performed by: NURSE PRACTITIONER

## 2023-04-17 PROCEDURE — 3078F DIAST BP <80 MM HG: CPT | Performed by: NURSE PRACTITIONER

## 2023-04-17 RX ORDER — BUSPIRONE HYDROCHLORIDE 10 MG/1
10 TABLET ORAL 3 TIMES DAILY
Qty: 90 TABLET | Refills: 0 | Status: SHIPPED | OUTPATIENT
Start: 2023-04-17 | End: 2023-05-17

## 2023-04-17 ASSESSMENT — PATIENT HEALTH QUESTIONNAIRE - PHQ9
SUM OF ALL RESPONSES TO PHQ QUESTIONS 1-9: 14
1. LITTLE INTEREST OR PLEASURE IN DOING THINGS: 2
7. TROUBLE CONCENTRATING ON THINGS, SUCH AS READING THE NEWSPAPER OR WATCHING TELEVISION: 2
3. TROUBLE FALLING OR STAYING ASLEEP: 2
2. FEELING DOWN, DEPRESSED OR HOPELESS: 2
10. IF YOU CHECKED OFF ANY PROBLEMS, HOW DIFFICULT HAVE THESE PROBLEMS MADE IT FOR YOU TO DO YOUR WORK, TAKE CARE OF THINGS AT HOME, OR GET ALONG WITH OTHER PEOPLE: 1
9. THOUGHTS THAT YOU WOULD BE BETTER OFF DEAD, OR OF HURTING YOURSELF: 0
5. POOR APPETITE OR OVEREATING: 2
SUM OF ALL RESPONSES TO PHQ QUESTIONS 1-9: 14
SUM OF ALL RESPONSES TO PHQ QUESTIONS 1-9: 14
SUM OF ALL RESPONSES TO PHQ9 QUESTIONS 1 & 2: 4
6. FEELING BAD ABOUT YOURSELF - OR THAT YOU ARE A FAILURE OR HAVE LET YOURSELF OR YOUR FAMILY DOWN: 2
4. FEELING TIRED OR HAVING LITTLE ENERGY: 2
SUM OF ALL RESPONSES TO PHQ QUESTIONS 1-9: 14
8. MOVING OR SPEAKING SO SLOWLY THAT OTHER PEOPLE COULD HAVE NOTICED. OR THE OPPOSITE, BEING SO FIGETY OR RESTLESS THAT YOU HAVE BEEN MOVING AROUND A LOT MORE THAN USUAL: 0

## 2023-04-17 ASSESSMENT — ANXIETY QUESTIONNAIRES
IF YOU CHECKED OFF ANY PROBLEMS ON THIS QUESTIONNAIRE, HOW DIFFICULT HAVE THESE PROBLEMS MADE IT FOR YOU TO DO YOUR WORK, TAKE CARE OF THINGS AT HOME, OR GET ALONG WITH OTHER PEOPLE: SOMEWHAT DIFFICULT
2. NOT BEING ABLE TO STOP OR CONTROL WORRYING: 2
GAD7 TOTAL SCORE: 11
5. BEING SO RESTLESS THAT IT IS HARD TO SIT STILL: 0
7. FEELING AFRAID AS IF SOMETHING AWFUL MIGHT HAPPEN: 1
6. BECOMING EASILY ANNOYED OR IRRITABLE: 1
3. WORRYING TOO MUCH ABOUT DIFFERENT THINGS: 2
1. FEELING NERVOUS, ANXIOUS, OR ON EDGE: 3
4. TROUBLE RELAXING: 2

## 2023-04-17 NOTE — PROGRESS NOTES
appearance. She is well-developed. She is not toxic-appearing. HENT:      Head: Normocephalic and atraumatic. Right Ear: Hearing, tympanic membrane and ear canal normal.      Left Ear: Hearing, tympanic membrane and ear canal normal.      Nose: Nose normal.      Mouth/Throat:      Pharynx: Uvula midline. Eyes:      General: Lids are normal.      Conjunctiva/sclera: Conjunctivae normal.   Cardiovascular:      Rate and Rhythm: Normal rate and regular rhythm. Pulses: Normal pulses. Heart sounds: Normal heart sounds. Pulmonary:      Effort: Pulmonary effort is normal. No accessory muscle usage or respiratory distress. Breath sounds: Normal breath sounds. Abdominal:      Palpations: Abdomen is soft. Tenderness: There is no abdominal tenderness. Musculoskeletal:      Cervical back: Neck supple. Lymphadenopathy:      Head:      Right side of head: No submental or submandibular adenopathy. Left side of head: No submental or submandibular adenopathy. Cervical: No cervical adenopathy. Skin:     General: Skin is warm and dry. Findings: No lesion or rash. Neurological:      Mental Status: She is alert and oriented to person, place, and time. Psychiatric:         Mood and Affect: Mood is anxious. Speech: Speech normal.         Behavior: Behavior normal. Behavior is cooperative. Thought Content: Thought content is not paranoid or delusional. Thought content does not include homicidal or suicidal ideation. Thought content does not include homicidal or suicidal plan. Cognition and Memory: Cognition normal.         Judgment: Judgment normal.       /80 (Site: Left Lower Arm, Position: Sitting, Cuff Size: Medium Adult)   Pulse 84   Ht 5' 1\" (1.549 m)   Wt (!) 312 lb (141.5 kg)   LMP  (LMP Unknown)   SpO2 97%   BMI 58.95 kg/m²   Body mass index is 58.95 kg/m².     BP Readings from Last 2 Encounters:   04/17/23 134/80   02/01/23 134/82       Wt

## 2023-04-18 ASSESSMENT — ENCOUNTER SYMPTOMS
GASTROINTESTINAL NEGATIVE: 1
EYES NEGATIVE: 1
RESPIRATORY NEGATIVE: 1
SHORTNESS OF BREATH: 0
ALLERGIC/IMMUNOLOGIC NEGATIVE: 1
BLOOD IN STOOL: 0
ABDOMINAL PAIN: 0
ANAL BLEEDING: 0
NAUSEA: 0

## 2023-05-10 DIAGNOSIS — J45.40 MODERATE PERSISTENT REACTIVE AIRWAY DISEASE WITHOUT COMPLICATION: ICD-10-CM

## 2023-05-10 RX ORDER — FLUTICASONE PROPIONATE AND SALMETEROL 500; 50 UG/1; UG/1
1 POWDER RESPIRATORY (INHALATION) EVERY 12 HOURS
Qty: 60 EACH | Refills: 3 | Status: SHIPPED | OUTPATIENT
Start: 2023-05-10

## 2023-05-19 ENCOUNTER — TELEMEDICINE (OUTPATIENT)
Dept: FAMILY MEDICINE CLINIC | Age: 63
End: 2023-05-19

## 2023-05-19 DIAGNOSIS — F41.9 ANXIETY DISORDER, UNSPECIFIED TYPE: ICD-10-CM

## 2023-05-19 DIAGNOSIS — R10.12 LUQ PAIN: Primary | ICD-10-CM

## 2023-05-19 DIAGNOSIS — E16.2 MULTIPLE EPISODES OF HYPOGLYCEMIA: ICD-10-CM

## 2023-05-19 DIAGNOSIS — R43.2 ALTERED TASTE: ICD-10-CM

## 2023-05-19 DIAGNOSIS — Z79.899 HIGH RISK MEDICATION USE: ICD-10-CM

## 2023-05-19 RX ORDER — FLUTICASONE PROPIONATE AND SALMETEROL 500; 50 UG/1; UG/1
POWDER RESPIRATORY (INHALATION)
COMMUNITY
Start: 2023-02-01 | End: 2023-05-19 | Stop reason: SDUPTHER

## 2023-05-19 RX ORDER — BUSPIRONE HYDROCHLORIDE 10 MG/1
TABLET ORAL
COMMUNITY
Start: 2023-05-18

## 2023-05-19 ASSESSMENT — ENCOUNTER SYMPTOMS
ABDOMINAL PAIN: 1
SHORTNESS OF BREATH: 0
ALLERGIC/IMMUNOLOGIC NEGATIVE: 1
ANAL BLEEDING: 0
VOMITING: 0
EYES NEGATIVE: 1
NAUSEA: 0
BLOOD IN STOOL: 0
RESPIRATORY NEGATIVE: 1

## 2023-05-19 NOTE — PROGRESS NOTES
2023    TELEHEALTH EVALUATION -- Audio/Visual (During RALQP-20 public health emergency)    HPI:    Naun Diamond (:  1960) has requested an audio/video evaluation for the following concern(s):  Chief Complaint   Patient presents with    Other     Pt feels meat of any kind disgust her starting about one week ago. Pt feels she is craving sugar more. Pt feels her sugars are dropping. Patient is concerned with her diet. Patient can not eat meat all the sudden. Chicken, shrimp and beef. She rarely eats pork. He taste seems to have suddenly changed and things she liked before all the sudden she does not want to eat or feels like it doesn't taste the same. Patient feels like she is having hypoglycemia with glucose of 70,  then ate some peanut butter on toast and came up to 88 then 130 then dropped back down to 79 in about 30 minutes. She admits to feeling shaky, sweaty and nauseous during these episodes. Living with daughter and baby right now while house being remolded. She feels like she is under a lot of stress and is having higher anxiety than normal  Also having intermittent LUQ pain x 1 week x few minutes each time and does not radiate. On lamictal and psych has not checked lamictal level in awhile        Review of Systems   Constitutional:  Positive for appetite change. Negative for fatigue and unexpected weight change. HENT: Negative. Eyes: Negative. Respiratory: Negative. Negative for shortness of breath. Cardiovascular: Negative. Negative for chest pain, palpitations and leg swelling. Gastrointestinal:  Positive for abdominal pain. Negative for anal bleeding, blood in stool, nausea and vomiting. Endocrine: Negative. Genitourinary: Negative. Negative for hematuria. Musculoskeletal: Negative. Skin: Negative. Negative for rash. Allergic/Immunologic: Negative. Neurological: Negative. Negative for dizziness, syncope, light-headedness and numbness.

## 2023-05-25 ENCOUNTER — HOSPITAL ENCOUNTER (OUTPATIENT)
Age: 63
Discharge: HOME | End: 2023-05-25
Payer: MEDICARE

## 2023-05-25 DIAGNOSIS — I10 ESSENTIAL HYPERTENSION: ICD-10-CM

## 2023-05-25 DIAGNOSIS — E16.2 MULTIPLE EPISODES OF HYPOGLYCEMIA: ICD-10-CM

## 2023-05-25 DIAGNOSIS — F41.9 ANXIETY DISORDER, UNSPECIFIED TYPE: ICD-10-CM

## 2023-05-25 DIAGNOSIS — R43.2 ALTERED TASTE: ICD-10-CM

## 2023-05-25 DIAGNOSIS — R10.12 LUQ PAIN: ICD-10-CM

## 2023-05-25 DIAGNOSIS — Z79.899 HIGH RISK MEDICATION USE: ICD-10-CM

## 2023-05-25 LAB
ALBUMIN SERPL-MCNC: 4 G/DL (ref 3.4–5)
ALBUMIN/GLOB SERPL: 1.3 {RATIO} (ref 1.1–2.2)
ALP SERPL-CCNC: 132 U/L (ref 40–129)
ALT SERPL-CCNC: 15 U/L (ref 10–40)
ANION GAP SERPL CALCULATED.3IONS-SCNC: 14 MMOL/L (ref 3–16)
AST SERPL-CCNC: 12 U/L (ref 15–37)
BASOPHILS # BLD: 0.1 K/UL (ref 0–0.2)
BASOPHILS NFR BLD: 0.9 %
BILIRUB SERPL-MCNC: 0.3 MG/DL (ref 0–1)
BUN SERPL-MCNC: 27 MG/DL (ref 7–20)
CALCIUM SERPL-MCNC: 9.4 MG/DL (ref 8.3–10.6)
CHLORIDE SERPL-SCNC: 105 MMOL/L (ref 99–110)
CO2 SERPL-SCNC: 24 MMOL/L (ref 21–32)
CREAT SERPL-MCNC: 0.7 MG/DL (ref 0.6–1.2)
DEPRECATED RDW RBC AUTO: 15 % (ref 12.4–15.4)
EOSINOPHIL # BLD: 0.2 K/UL (ref 0–0.6)
EOSINOPHIL NFR BLD: 1.4 %
GFR SERPLBLD CREATININE-BSD FMLA CKD-EPI: >60 ML/MIN/{1.73_M2}
GLUCOSE SERPL-MCNC: 93 MG/DL (ref 70–99)
HCT VFR BLD AUTO: 43.7 % (ref 36–48)
HGB BLD-MCNC: 13.9 G/DL (ref 12–16)
LIPASE SERPL-CCNC: 41 U/L (ref 13–60)
LYMPHOCYTES # BLD: 3.2 K/UL (ref 1–5.1)
LYMPHOCYTES NFR BLD: 19.7 %
MCH RBC QN AUTO: 27.6 PG (ref 26–34)
MCHC RBC AUTO-ENTMCNC: 31.9 G/DL (ref 31–36)
MCV RBC AUTO: 86.6 FL (ref 80–100)
MONOCYTES # BLD: 1.2 K/UL (ref 0–1.3)
MONOCYTES NFR BLD: 7.6 %
NEUTROPHILS # BLD: 11.5 K/UL (ref 1.7–7.7)
NEUTROPHILS NFR BLD: 70.4 %
PLATELET # BLD AUTO: 394 K/UL (ref 135–450)
PMV BLD AUTO: 6.8 FL (ref 5–10.5)
POTASSIUM SERPL-SCNC: 4 MMOL/L (ref 3.5–5.1)
PROT SERPL-MCNC: 7.1 G/DL (ref 6.4–8.2)
RBC # BLD AUTO: 5.04 M/UL (ref 4–5.2)
SODIUM SERPL-SCNC: 143 MMOL/L (ref 136–145)
WBC # BLD AUTO: 16.3 K/UL (ref 4–11)

## 2023-05-25 PROCEDURE — 83036 HEMOGLOBIN GLYCOSYLATED A1C: CPT

## 2023-05-25 PROCEDURE — 80175 DRUG SCREEN QUAN LAMOTRIGINE: CPT

## 2023-05-25 PROCEDURE — 84443 ASSAY THYROID STIM HORMONE: CPT

## 2023-05-25 PROCEDURE — 83690 ASSAY OF LIPASE: CPT

## 2023-05-25 PROCEDURE — 85025 COMPLETE CBC W/AUTO DIFF WBC: CPT

## 2023-05-25 PROCEDURE — 80053 COMPREHEN METABOLIC PANEL: CPT

## 2023-05-25 PROCEDURE — 36415 COLL VENOUS BLD VENIPUNCTURE: CPT

## 2023-05-26 DIAGNOSIS — I10 PRIMARY HYPERTENSION: Primary | ICD-10-CM

## 2023-05-26 LAB
EST. AVERAGE GLUCOSE BLD GHB EST-MCNC: 116.9 MG/DL
HBA1C MFR BLD: 5.7 %
TSH SERPL DL<=0.005 MIU/L-ACNC: 2.57 UIU/ML (ref 0.27–4.2)

## 2023-05-26 RX ORDER — METOPROLOL SUCCINATE 50 MG/1
TABLET, EXTENDED RELEASE ORAL
Qty: 90 TABLET | Refills: 2 | Status: SHIPPED | OUTPATIENT
Start: 2023-05-26

## 2023-05-28 LAB — LAMOTRIGINE SERPL-MCNC: 2 UG/ML (ref 3–15)

## 2023-05-30 ENCOUNTER — NURSE ONLY (OUTPATIENT)
Dept: FAMILY MEDICINE CLINIC | Age: 63
End: 2023-05-30

## 2023-05-30 DIAGNOSIS — D72.829 LEUKOCYTOSIS, UNSPECIFIED TYPE: Primary | ICD-10-CM

## 2023-05-30 DIAGNOSIS — I10 PRIMARY HYPERTENSION: ICD-10-CM

## 2023-05-30 DIAGNOSIS — R82.998 LEUKOCYTES IN URINE: ICD-10-CM

## 2023-05-30 LAB
BASOPHILS # BLD: 0.1 K/UL (ref 0–0.2)
BASOPHILS NFR BLD: 0.7 %
DEPRECATED RDW RBC AUTO: 16.1 % (ref 12.4–15.4)
EOSINOPHIL # BLD: 0.1 K/UL (ref 0–0.6)
EOSINOPHIL NFR BLD: 1.2 %
HCT VFR BLD AUTO: 43.6 % (ref 36–48)
HGB BLD-MCNC: 14.1 G/DL (ref 12–16)
LYMPHOCYTES # BLD: 2.1 K/UL (ref 1–5.1)
LYMPHOCYTES NFR BLD: 19.9 %
MCH RBC QN AUTO: 29.2 PG (ref 26–34)
MCHC RBC AUTO-ENTMCNC: 32.4 G/DL (ref 31–36)
MCV RBC AUTO: 90.2 FL (ref 80–100)
MONOCYTES # BLD: 0.7 K/UL (ref 0–1.3)
MONOCYTES NFR BLD: 6.4 %
NEUTROPHILS # BLD: 7.7 K/UL (ref 1.7–7.7)
NEUTROPHILS NFR BLD: 71.8 %
PLATELET # BLD AUTO: 370 K/UL (ref 135–450)
PMV BLD AUTO: 7.3 FL (ref 5–10.5)
RBC # BLD AUTO: 4.83 M/UL (ref 4–5.2)
WBC # BLD AUTO: 10.7 K/UL (ref 4–11)

## 2023-06-01 LAB — BACTERIA UR CULT: NORMAL

## 2023-06-05 ENCOUNTER — TELEPHONE (OUTPATIENT)
Dept: FAMILY MEDICINE CLINIC | Age: 63
End: 2023-06-05

## 2023-06-05 DIAGNOSIS — I10 ESSENTIAL HYPERTENSION: ICD-10-CM

## 2023-06-05 RX ORDER — METOPROLOL SUCCINATE 50 MG/1
50 TABLET, EXTENDED RELEASE ORAL DAILY
Qty: 90 TABLET | Refills: 2 | Status: SHIPPED | OUTPATIENT
Start: 2023-06-05

## 2023-06-05 NOTE — TELEPHONE ENCOUNTER
Pt called and she stated that she called [facility] and they told her they never received a refill for her metoprolol. Looks like we sent it on 5/26 but they said no. Pt was wanting to see if we could resend it.

## 2023-06-16 RX ORDER — CELECOXIB 200 MG/1
CAPSULE ORAL
Qty: 180 CAPSULE | Refills: 3 | OUTPATIENT
Start: 2023-06-16

## 2023-07-12 ENCOUNTER — HOSPITAL ENCOUNTER (EMERGENCY)
Age: 63
Discharge: HOME | End: 2023-07-12
Payer: MEDICARE

## 2023-07-12 ENCOUNTER — APPOINTMENT (OUTPATIENT)
Dept: CT IMAGING | Age: 63
End: 2023-07-12
Payer: MEDICARE

## 2023-07-12 VITALS
HEIGHT: 61 IN | RESPIRATION RATE: 18 BRPM | DIASTOLIC BLOOD PRESSURE: 92 MMHG | WEIGHT: 293 LBS | TEMPERATURE: 98.3 F | HEART RATE: 75 BPM | OXYGEN SATURATION: 97 % | BODY MASS INDEX: 55.32 KG/M2 | SYSTOLIC BLOOD PRESSURE: 123 MMHG

## 2023-07-12 DIAGNOSIS — R51.9 ACUTE NONINTRACTABLE HEADACHE, UNSPECIFIED HEADACHE TYPE: Primary | ICD-10-CM

## 2023-07-12 DIAGNOSIS — E86.0 DEHYDRATION: ICD-10-CM

## 2023-07-12 LAB
ALBUMIN SERPL-MCNC: 4.3 G/DL (ref 3.4–5)
ALBUMIN/GLOB SERPL: 1.4 {RATIO} (ref 1.1–2.2)
ALP SERPL-CCNC: 137 U/L (ref 40–129)
ALT SERPL-CCNC: 33 U/L (ref 10–40)
ANION GAP SERPL CALCULATED.3IONS-SCNC: 11 MMOL/L (ref 3–16)
AST SERPL-CCNC: 18 U/L (ref 15–37)
BASOPHILS # BLD: 0.1 K/UL (ref 0–0.2)
BASOPHILS NFR BLD: 1 %
BILIRUB SERPL-MCNC: <0.2 MG/DL (ref 0–1)
BUN SERPL-MCNC: 21 MG/DL (ref 7–20)
CALCIUM SERPL-MCNC: 9.7 MG/DL (ref 8.3–10.6)
CHLORIDE SERPL-SCNC: 102 MMOL/L (ref 99–110)
CO2 SERPL-SCNC: 28 MMOL/L (ref 21–32)
CREAT SERPL-MCNC: 0.7 MG/DL (ref 0.6–1.2)
DEPRECATED RDW RBC AUTO: 15.8 % (ref 12.4–15.4)
EOSINOPHIL # BLD: 0.2 K/UL (ref 0–0.6)
EOSINOPHIL NFR BLD: 1.9 %
GFR SERPLBLD CREATININE-BSD FMLA CKD-EPI: >60 ML/MIN/{1.73_M2}
GLUCOSE SERPL-MCNC: 87 MG/DL (ref 70–99)
HCT VFR BLD AUTO: 43.2 % (ref 36–48)
HGB BLD-MCNC: 13.9 G/DL (ref 12–16)
LYMPHOCYTES # BLD: 2.4 K/UL (ref 1–5.1)
LYMPHOCYTES NFR BLD: 19.5 %
MCH RBC QN AUTO: 28.5 PG (ref 26–34)
MCHC RBC AUTO-ENTMCNC: 32.2 G/DL (ref 31–36)
MCV RBC AUTO: 88.7 FL (ref 80–100)
MONOCYTES # BLD: 0.9 K/UL (ref 0–1.3)
MONOCYTES NFR BLD: 7.6 %
NEUTROPHILS # BLD: 8.5 K/UL (ref 1.7–7.7)
NEUTROPHILS NFR BLD: 70 %
PLATELET # BLD AUTO: 376 K/UL (ref 135–450)
PMV BLD AUTO: 6.8 FL (ref 5–10.5)
POTASSIUM SERPL-SCNC: 4.3 MMOL/L (ref 3.5–5.1)
PROT SERPL-MCNC: 7.3 G/DL (ref 6.4–8.2)
RBC # BLD AUTO: 4.87 M/UL (ref 4–5.2)
SODIUM SERPL-SCNC: 141 MMOL/L (ref 136–145)
WBC # BLD AUTO: 12.2 K/UL (ref 4–11)

## 2023-07-12 PROCEDURE — 2580000003 HC RX 258: Performed by: NURSE PRACTITIONER

## 2023-07-12 PROCEDURE — 80053 COMPREHEN METABOLIC PANEL: CPT

## 2023-07-12 PROCEDURE — 99284 EMERGENCY DEPT VISIT MOD MDM: CPT

## 2023-07-12 PROCEDURE — 96360 HYDRATION IV INFUSION INIT: CPT

## 2023-07-12 PROCEDURE — 70450 CT HEAD/BRAIN W/O DYE: CPT

## 2023-07-12 PROCEDURE — 96361 HYDRATE IV INFUSION ADD-ON: CPT

## 2023-07-12 PROCEDURE — 85025 COMPLETE CBC W/AUTO DIFF WBC: CPT

## 2023-07-12 RX ORDER — 0.9 % SODIUM CHLORIDE 0.9 %
1000 INTRAVENOUS SOLUTION INTRAVENOUS ONCE
Status: COMPLETED | OUTPATIENT
Start: 2023-07-12 | End: 2023-07-12

## 2023-07-12 RX ADMIN — SODIUM CHLORIDE 1000 ML: 9 INJECTION, SOLUTION INTRAVENOUS at 13:30

## 2023-07-12 ASSESSMENT — PAIN - FUNCTIONAL ASSESSMENT: PAIN_FUNCTIONAL_ASSESSMENT: NONE - DENIES PAIN

## 2023-07-12 NOTE — DISCHARGE INSTRUCTIONS
Excessive caffeine intake can cause headaches. However, as you are weaning yourself from caffeine, you can experience rebound headaches.

## 2023-07-14 ENCOUNTER — CARE COORDINATION (OUTPATIENT)
Dept: CARE COORDINATION | Age: 63
End: 2023-07-14

## 2023-07-14 NOTE — CARE COORDINATION
Ambulatory Care Coordination  ED Follow up Call    Reason for ED visit: headache and dehydration   Status:     significantly improved    Did you call your PCP prior to going to the ED? No      Did you receive a discharge instructions from the Emergency Room? Yes  Review of Instructions:     Understands what to report/when to return?:  Yes   Understands discharge instructions?:  Yes   Following discharge instructions?:  Yes   If not why? na    Are there any new complaints of pain? No  New Pain Meds? No    Constipation prophylaxis needed? N/A    If you have a wound is the dressing clean, dry, and intact? N/A  Understands wound care regimen? N/A    Are there any other complaints/concerns that you wish to tell your provider? None at this time     FU appts/Provider:    Future Appointments   Date Time Provider [address]   7/24/2023  2:30 PM MD ANTONY CarvalhoRMONT [facility]           New Medications?:   No      Medication Reconciliation by phone - No  Understands Medications?  na  Taking Medications? Yes  Can you swallow your pills? Yes    Any further needs in the home i.e. Equipment? Not Applicable    Link to services in community?:  N/A   Which services:  denies any need for care coordination. Independent in ADLs and has supports. She is decreasing her caffeine and trying to increase fluid intake of non caffeine beverages and water .

## 2023-07-24 ENCOUNTER — INITIAL CONSULT (OUTPATIENT)
Dept: SURGERY | Age: 63
End: 2023-07-24
Payer: MEDICARE

## 2023-07-24 VITALS
HEIGHT: 61 IN | BODY MASS INDEX: 55.32 KG/M2 | HEART RATE: 76 BPM | SYSTOLIC BLOOD PRESSURE: 132 MMHG | DIASTOLIC BLOOD PRESSURE: 72 MMHG | WEIGHT: 293 LBS

## 2023-07-24 DIAGNOSIS — L72.9 SCALP CYST: Primary | ICD-10-CM

## 2023-07-24 PROCEDURE — 3078F DIAST BP <80 MM HG: CPT | Performed by: SURGERY

## 2023-07-24 PROCEDURE — G8427 DOCREV CUR MEDS BY ELIG CLIN: HCPCS | Performed by: SURGERY

## 2023-07-24 PROCEDURE — 3074F SYST BP LT 130 MM HG: CPT | Performed by: SURGERY

## 2023-07-24 PROCEDURE — 99213 OFFICE O/P EST LOW 20 MIN: CPT | Performed by: SURGERY

## 2023-07-24 PROCEDURE — 3017F COLORECTAL CA SCREEN DOC REV: CPT | Performed by: SURGERY

## 2023-07-24 PROCEDURE — G8417 CALC BMI ABV UP PARAM F/U: HCPCS | Performed by: SURGERY

## 2023-07-24 PROCEDURE — 1036F TOBACCO NON-USER: CPT | Performed by: SURGERY

## 2023-07-27 DIAGNOSIS — L72.9 SCALP CYST: ICD-10-CM

## 2023-07-28 ENCOUNTER — TELEMEDICINE (OUTPATIENT)
Dept: FAMILY MEDICINE CLINIC | Age: 63
End: 2023-07-28

## 2023-07-28 DIAGNOSIS — R19.7 DIARRHEA, UNSPECIFIED TYPE: ICD-10-CM

## 2023-07-28 DIAGNOSIS — R25.1 TREMOR: ICD-10-CM

## 2023-07-28 DIAGNOSIS — R63.4 WEIGHT LOSS: Primary | ICD-10-CM

## 2023-07-28 DIAGNOSIS — L65.9 HAIR LOSS: ICD-10-CM

## 2023-07-28 DIAGNOSIS — R68.89 HEAT INTOLERANCE: ICD-10-CM

## 2023-07-28 DIAGNOSIS — R53.83 OTHER FATIGUE: ICD-10-CM

## 2023-07-28 ASSESSMENT — ENCOUNTER SYMPTOMS
RESPIRATORY NEGATIVE: 1
BLOOD IN STOOL: 0
SHORTNESS OF BREATH: 0
ANAL BLEEDING: 0
ALLERGIC/IMMUNOLOGIC NEGATIVE: 1
DIARRHEA: 1
ABDOMINAL PAIN: 0
NAUSEA: 0
EYES NEGATIVE: 1

## 2023-07-28 NOTE — PROGRESS NOTES
2023    TELEHEALTH EVALUATION -- Audio/Visual (During XDJQU-10 public health emergency)    HPI:    Jhoana Guzman (:  1960) has requested an audio/video evaluation for the following concern(s):  Chief Complaint   Patient presents with    Other     Want to discuss taking thyroid hormone supplement        Hypothyroidism: Recent symptoms: fatigue, weight loss, heat intolerance, hair loss, diarrhea, tremor, and feet are always cold and itch at night. She denies weight gain, cold intolerance, dry skin, constipation, edema, palpitations, and dysphagia. Patient is not on medication   Lab Results   Component Value Date/Time    TSHREFLEX 2.57 2023 12:09 PM    TSHREFLEX 1.14 2019 05:50 PM    TSHREFLEX 3.94 2013 11:22 AM     Lab Results   Component Value Date    TSH 2.10 2022    TSH 1.55 2021    TSH 1.88 2019       Review of Systems   Constitutional:  Positive for fatigue and unexpected weight change. Negative for appetite change and fever. HENT: Negative. Eyes: Negative. Respiratory: Negative. Negative for shortness of breath. Cardiovascular: Negative. Negative for chest pain, palpitations and leg swelling. Gastrointestinal:  Positive for diarrhea. Negative for abdominal pain, anal bleeding, blood in stool and nausea. Endocrine: Positive for heat intolerance. Hair thinning/loss   Genitourinary: Negative. Negative for hematuria. Musculoskeletal: Negative. Skin: Negative. Negative for rash. Allergic/Immunologic: Negative. Neurological:  Positive for tremors. Negative for dizziness, syncope, light-headedness and numbness. Hematological: Negative. Does not bruise/bleed easily. Psychiatric/Behavioral: Negative. All other systems reviewed and are negative. Prior to Visit Medications    Medication Sig Taking?  Authorizing Provider   VRAYLAR 3 MG CAPS capsule Take 1 capsule by mouth daily Yes Historical Provider, MD   celecoxib

## 2023-07-31 NOTE — PROGRESS NOTES
PRE OP INSTRUCTION SHEET                                         3. Aspirin, Ibuprofen, Advil, Naproxen, Vitamin E, fish oil and other Anti-inflammatory products should be stopped for 5 days before surgery or as directed by your physician. 4. Check with your Doctor regarding stopping Plavix, Coumadin, Lovenox, Fragmin or other blood thinners   5. Do not smoke, and do not drink any alcoholic beverages 24 hours prior to surgery. This includes NA Beer. 6. You may brush your teeth and gargle the morning of surgery. DO NOT SWALLOW WATER   7. You MUST make arrangements for a responsible adult to take you home after your surgery. You will not be allowed to leave alone or drive yourself home. It is strongly suggested someone stay with you the first 24 hrs. Your surgery will be cancelled if you do not have a ride home. 8. A parent/legal guardian must accompany a child scheduled for surgery and plan to stay at the hospital until the child is discharged. Please do not bring other children with you. 9. Please wear simple, loose fitting clothing to the hospital.  [place] not bring valuables (money, credit cards, checkbooks, etc.) Do not wear any makeup (including no eye makeup) or nail polish on your fingers or toes. 10. DO NOT wear any jewelry or piercings on day of surgery. All body piercing jewelry must be removed. 11. If you have dentures,glasses, or contacts they will be removed before going to the OR; we will provide you a container. 12. Please see your family doctor/and cardiologist for a history & physical and/or concerning medications. Bring any test results/reports from your physician's office. Have history and labs faxed to [phone]. Remember to bring Blood Bank bracelet on the day of surgery. 14. If you have a Living Will and Durable Power of  for Healthcare, please bring in a copy.    13. Notify your Surgeon if you develop any illness between now and surgery

## 2023-07-31 NOTE — PROGRESS NOTES
(Pressors)     Seldane [Terfenadine] Hives    Suprax [Cefixime] Hives    Levofloxacin Rash       Social History     Socioeconomic History    Marital status:      Spouse name: Not on file    Number of children: Not on file    Years of education: Not on file    Highest education level: Not on file   Occupational History    Not on file   Tobacco Use    Smoking status: Former     Packs/day: 1.50     Years: 21.00     Pack years: 31.50     Types: Cigarettes     Start date: 1971     Quit date: 1997     Years since quittin.4    Smokeless tobacco: Never   Vaping Use    Vaping Use: Never used   Substance and Sexual Activity    Alcohol use: No    Drug use: No    Sexual activity: Not Currently   Other Topics Concern    Not on file   Social History Narrative    Not on file     Social Determinants of Health     Financial Resource Strain: Low Risk     Difficulty of Paying Living Expenses: Not very hard   Food Insecurity: No Food Insecurity    Worried About Running Out of Food in the Last Year: Never true    [address] in the Last Year: Never true   Transportation Needs: Unknown    Lack of Transportation (Medical): Not on file    Lack of Transportation (Non-Medical):  No   Physical Activity: Inactive    Days of Exercise per Week: 0 days    Minutes of Exercise per Session: 0 min   Stress: Not on file   Social Connections: Not on file   Intimate Partner Violence: Not At Risk    Fear of Current or Ex-Partner: No    Emotionally Abused: No    Physically Abused: No    Sexually Abused: No   Housing Stability: Unknown    Unable to Pay for Housing in the Last Year: Not on file    Number of Places Lived in the Last Year: Not on file    Unstable Housing in the Last Year: No       Family History   Problem Relation Age of Onset    Depression Sister     Mental Illness Sister     Diabetes Mother     Heart Disease Mother     Diabetes Father     Heart Disease Father        ROS:  She reports no complaints related to the

## 2023-08-01 ENCOUNTER — HOSPITAL ENCOUNTER (OUTPATIENT)
Age: 63
Setting detail: OUTPATIENT SURGERY
Discharge: HOME | End: 2023-08-02
Attending: SURGERY | Admitting: SURGERY
Payer: MEDICARE

## 2023-08-01 DIAGNOSIS — L72.9 SCALP CYST: ICD-10-CM

## 2023-08-02 VITALS
BODY MASS INDEX: 55.32 KG/M2 | HEART RATE: 76 BPM | RESPIRATION RATE: 18 BRPM | TEMPERATURE: 98 F | HEIGHT: 61 IN | WEIGHT: 293 LBS | SYSTOLIC BLOOD PRESSURE: 122 MMHG | DIASTOLIC BLOOD PRESSURE: 86 MMHG | OXYGEN SATURATION: 92 %

## 2023-08-02 PROCEDURE — 2709999900 HC NON-CHARGEABLE SUPPLY: Performed by: SURGERY

## 2023-08-02 PROCEDURE — 3600000012 HC SURGERY LEVEL 2 ADDTL 15MIN: Performed by: SURGERY

## 2023-08-02 PROCEDURE — 3600000002 HC SURGERY LEVEL 2 BASE: Performed by: SURGERY

## 2023-08-02 PROCEDURE — 7100000010 HC PHASE II RECOVERY - FIRST 15 MIN: Performed by: SURGERY

## 2023-08-02 PROCEDURE — 88304 TISSUE EXAM BY PATHOLOGIST: CPT

## 2023-08-02 PROCEDURE — 2500000003 HC RX 250 WO HCPCS: Performed by: SURGERY

## 2023-08-02 PROCEDURE — 11421 EXC H-F-NK-SP B9+MARG 0.6-1: CPT | Performed by: SURGERY

## 2023-08-02 PROCEDURE — 6360000002 HC RX W HCPCS: Performed by: SURGERY

## 2023-08-02 RX ORDER — BUPIVACAINE HYDROCHLORIDE 5 MG/ML
INJECTION, SOLUTION EPIDURAL; INTRACAUDAL PRN
Status: DISCONTINUED | OUTPATIENT
Start: 2023-08-02 | End: 2023-08-02 | Stop reason: ALTCHOICE

## 2023-08-02 RX ORDER — LIDOCAINE HYDROCHLORIDE 10 MG/ML
INJECTION, SOLUTION EPIDURAL; INFILTRATION; INTRACAUDAL; PERINEURAL PRN
Status: DISCONTINUED | OUTPATIENT
Start: 2023-08-02 | End: 2023-08-02 | Stop reason: ALTCHOICE

## 2023-08-02 ASSESSMENT — PAIN - FUNCTIONAL ASSESSMENT: PAIN_FUNCTIONAL_ASSESSMENT: 0-10

## 2023-08-02 ASSESSMENT — PAIN SCALES - GENERAL: PAINLEVEL_OUTOF10: 0

## 2023-08-02 NOTE — BRIEF OP NOTE
Brief Postoperative Note      Patient: Eliseo Liu  YOB: 1960  MRN: [id number]    Date of Procedure: 8/2/2023    Pre-Op Diagnosis Codes:     * Scalp cyst [L72.9]    Post-Op Diagnosis: Same       Procedure(s):  EXCISION SCALP CYST    Surgeon(s):  Karina Pabon MD    Assistant:  Surgical Assistant: Ulises Barragan    Anesthesia: Local    Estimated Blood Loss (mL): Minimal    Complications: None    Specimens:   * No specimens in log *    Implants:  * No implants in log *      Drains: * No LDAs found *    Findings: As above  This procedure was not performed to treat primary cutaneous melanoma through wide local excision      Electronically signed by Karina Pabon MD on 8/2/2023 at 9:27 AM

## 2023-08-02 NOTE — PROGRESS NOTES
Went over discharge instructions with patient she verbalized and understand discharge instructions. Patient left surgery floor in stable condition to home with belongings. Patient had a local procedure.

## 2023-08-02 NOTE — H&P
[facility] GENERAL SURGERY      The H&P was reviewed, the patient was examined, and no change has occurred in the patient's condition since the H&P was completed. The indications for the procedure were reviewed, and any questions were answered.      I updated the progress note from 7/24/2023 which is the H&P.

## 2023-08-03 ENCOUNTER — TELEPHONE (OUTPATIENT)
Dept: SURGERY | Age: 63
End: 2023-08-03

## 2023-08-03 NOTE — TELEPHONE ENCOUNTER
----- Message from Marilin Hunt MD sent at 8/3/2023  1:50 PM EDT -----  Tell the patient that the lump removed was a benign cyst.  Follow up for suture removal. .

## 2023-08-05 NOTE — OP NOTE
[facility] [address], [address]                                OPERATIVE REPORT    PATIENT NAME: Ossie Gowers              :        1960  MED REC NO:   [id number]                          ROOM:  ACCOUNT NO:   [de-identified]                           ADMIT DATE: 2023  PROVIDER:     Darren Matthew MD    DATE OF PROCEDURE:  2023    LOCATION:  [facility]. PREOPERATIVE DIAGNOSIS:  Scalp cyst.    POSTOPERATIVE DIAGNOSIS:  Scalp cyst.    OPERATION PERFORMED:  Excision of scalp cyst.    SURGEON:  Darren Matthew MD    ANESTHESIA:  Local.    COMPLICATION:  None. ESTIMATED BLOOD LOSS:  Less than 50 mL. INDICATION FOR THE OPERATION:  A 12-year-old female with an enlarging  mass on the right side of her scalp. It was causing acute pain and skin  sensation disturbance. I recommended operative excision. The risks and  benefits were explained. The patient understood them, accepted them,  and elected to proceed. DESCRIPTION OF OPERATION:  The patient was brought to the operating  room. She was prepped and draped in usual surgical sterile fashion. Local anesthetic was infiltrated. Incision was made overlying the mass  on the right side of the scalp. The excised diameter was 0.75 cm. Cyst  was removed in its entirety. Hemostasis was obtained. Simple closure  was performed by reapproximating the skin with 3-0 nylon suture. DISPOSITION:  The patient tolerated the procedure without any acute  complication.         Darren Matthew MD    D: 2023 22:00:27       T: 2023 22:04:20     [email or web address]  Job#: [id number]     Doc#: [id number]    CC:  Nathan Ramirez NP

## 2023-08-07 DIAGNOSIS — J30.9 ALLERGIC RHINITIS, UNSPECIFIED SEASONALITY, UNSPECIFIED TRIGGER: ICD-10-CM

## 2023-08-07 RX ORDER — FLUTICASONE PROPIONATE 50 MCG
1 SPRAY, SUSPENSION (ML) NASAL 2 TIMES DAILY
Qty: 3 EACH | Refills: 3 | Status: SHIPPED | OUTPATIENT
Start: 2023-08-07 | End: 2023-09-06

## 2023-08-07 NOTE — TELEPHONE ENCOUNTER
Patient has an appt today with Narda.   Patient will schedule a follow up for [facility] when she comes into office today

## 2023-08-14 ENCOUNTER — OFFICE VISIT (OUTPATIENT)
Dept: SURGERY | Age: 63
End: 2023-08-14

## 2023-08-14 VITALS
DIASTOLIC BLOOD PRESSURE: 86 MMHG | HEIGHT: 61 IN | WEIGHT: 293 LBS | BODY MASS INDEX: 55.32 KG/M2 | SYSTOLIC BLOOD PRESSURE: 130 MMHG

## 2023-08-14 DIAGNOSIS — Z09 SURGICAL FOLLOW-UP CARE: Primary | ICD-10-CM

## 2023-08-14 PROCEDURE — 99024 POSTOP FOLLOW-UP VISIT: CPT | Performed by: SURGERY

## 2023-08-14 RX ORDER — POLYMYXIN B SULFATE AND TRIMETHOPRIM 1; 10000 MG/ML; [USP'U]/ML
SOLUTION OPHTHALMIC
COMMUNITY
Start: 2023-07-28

## 2023-08-14 RX ORDER — TOBRAMYCIN 3 MG/ML
SOLUTION/ DROPS OPHTHALMIC
COMMUNITY
Start: 2023-08-03

## 2023-08-14 RX ORDER — BROMFENAC SODIUM 0.7 MG/ML
SOLUTION/ DROPS OPHTHALMIC
COMMUNITY
Start: 2023-07-28

## 2023-08-14 RX ORDER — PREDNISOLONE ACETATE 10 MG/ML
SUSPENSION/ DROPS OPHTHALMIC
COMMUNITY
Start: 2023-07-28

## 2023-08-21 NOTE — PROGRESS NOTES
[facility] SURGERY      S:   Patient presents s/p scalp cyst excision 2 weeks  ago. She reports no complaints. O:   Comfortable         Incision site healing well. FINAL DIAGNOSIS:     Scalp cyst, excision:   - Trichilemmal (pilar) cyst.   - Negative for malignancy. BUCCA/BUCCA                 A:   S/P above    P:   Follow up as needed.

## 2023-09-21 DIAGNOSIS — J45.40 MODERATE PERSISTENT REACTIVE AIRWAY DISEASE WITHOUT COMPLICATION: ICD-10-CM

## 2023-09-21 DIAGNOSIS — I10 ESSENTIAL HYPERTENSION: ICD-10-CM

## 2023-09-21 RX ORDER — FLUTICASONE PROPIONATE AND SALMETEROL 500; 50 UG/1; UG/1
1 POWDER RESPIRATORY (INHALATION) EVERY 12 HOURS
Qty: 60 EACH | Refills: 3 | Status: SHIPPED | OUTPATIENT
Start: 2023-09-21

## 2023-09-21 RX ORDER — CELECOXIB 200 MG/1
200 CAPSULE ORAL 2 TIMES DAILY
Qty: 180 CAPSULE | Refills: 0 | Status: SHIPPED | OUTPATIENT
Start: 2023-09-21

## 2023-09-21 RX ORDER — METOPROLOL SUCCINATE 50 MG/1
50 TABLET, EXTENDED RELEASE ORAL DAILY
Qty: 90 TABLET | Refills: 0 | Status: SHIPPED | OUTPATIENT
Start: 2023-09-21

## 2023-09-21 NOTE — TELEPHONE ENCOUNTER
Pt switching to [facility]. When is pt due to f/u? Refill Request     CONFIRM preferrred pharmacy with the patient. If Mail Order Rx - Pend for 90 day refill. Last Seen: Last Seen Department: 7/28/2023  Last Seen by PCP: 7/28/2023    Last Written: 06/05/2023, 06/15/2023    If no future appointment scheduled, route STAFF MESSAGE with patient name to the [facility] for scheduling. Next Appointment:   No future appointments. Message sent to [employer] to schedule appt with patient?   N/A      Requested Prescriptions     Pending Prescriptions Disp Refills    metoprolol succinate (TOPROL XL) 50 MG extended release tablet 90 tablet 2     Sig: Take 1 tablet by mouth daily    celecoxib (CELEBREX) 200 MG capsule 180 capsule 0     Sig: Take 1 capsule by mouth 2 times daily

## 2023-10-13 ENCOUNTER — TELEMEDICINE (OUTPATIENT)
Dept: FAMILY MEDICINE CLINIC | Age: 63
End: 2023-10-13

## 2023-10-13 DIAGNOSIS — M25.562 CHRONIC PAIN OF BOTH KNEES: ICD-10-CM

## 2023-10-13 DIAGNOSIS — I10 ESSENTIAL HYPERTENSION: ICD-10-CM

## 2023-10-13 DIAGNOSIS — E66.01 MORBID OBESITY WITH BMI OF 60.0-69.9, ADULT (HCC): Primary | ICD-10-CM

## 2023-10-13 DIAGNOSIS — M25.561 CHRONIC PAIN OF BOTH KNEES: ICD-10-CM

## 2023-10-13 DIAGNOSIS — G89.29 CHRONIC PAIN OF BOTH KNEES: ICD-10-CM

## 2023-10-13 RX ORDER — CARIPRAZINE 4.5 MG/1
4.5 CAPSULE, GELATIN COATED ORAL EVERY MORNING
COMMUNITY
Start: 2023-10-02

## 2023-10-13 ASSESSMENT — ENCOUNTER SYMPTOMS
NAUSEA: 0
RESPIRATORY NEGATIVE: 1
BLOOD IN STOOL: 0
ALLERGIC/IMMUNOLOGIC NEGATIVE: 1
SHORTNESS OF BREATH: 0
GASTROINTESTINAL NEGATIVE: 1
ABDOMINAL PAIN: 0
EYES NEGATIVE: 1
ANAL BLEEDING: 0

## 2023-10-13 NOTE — PROGRESS NOTES
10/13/2023    TELEHEALTH EVALUATION -- Audio/Visual (During RVXPS-52 public health emergency)    HPI:    Pito Cooper (:  1960) has requested an audio/video evaluation for the following concern(s):  Chief Complaint   Patient presents with    Other     Wanting to discuss Ozempic for weight loss       Patient scheduled an appointment today to discuss her morbid obesity. Her last BMI on record is 60. 46. She does have hypertension as well as depression and chronic joint pain. She would like to know if Ozempic may be appropriate to help her with weight loss. The patient does not have diabetes. He did have a hemoglobin A1c on 2023 that did show prediabetes at 5.7 and a hemoglobin A1c on 10/7/2021 that showed also prediabetes at 5.8. She does have a significant family history of diabetes. He is unsure if her insurance pays for obesity related medications  Patient denies any exertional chest pain, dyspnea, palpitations, syncope, orthopnea, edema or paroxysmal nocturnal dyspnea. Review of Systems   Constitutional: Negative. Negative for appetite change, fatigue and unexpected weight change. HENT: Negative. Eyes: Negative. Respiratory: Negative. Negative for shortness of breath. Cardiovascular: Negative. Negative for chest pain, palpitations and leg swelling. Gastrointestinal: Negative. Negative for abdominal pain, anal bleeding, blood in stool and nausea. Endocrine: Negative. Genitourinary: Negative. Negative for hematuria. Musculoskeletal: Negative. Skin: Negative. Negative for rash. Allergic/Immunologic: Negative. Neurological: Negative. Negative for dizziness, syncope, light-headedness and numbness. Hematological: Negative. Does not bruise/bleed easily. Psychiatric/Behavioral: Negative. All other systems reviewed and are negative. Prior to Visit Medications    Medication Sig Taking?  Authorizing Provider   VRAYLAR 4.5 MG CAPS capsule Take 1

## 2023-10-20 ENCOUNTER — TELEMEDICINE (OUTPATIENT)
Dept: FAMILY MEDICINE CLINIC | Age: 63
End: 2023-10-20

## 2023-10-20 ENCOUNTER — TELEPHONE (OUTPATIENT)
Dept: FAMILY MEDICINE CLINIC | Age: 63
End: 2023-10-20

## 2023-10-20 DIAGNOSIS — J06.9 ACUTE URI: Primary | ICD-10-CM

## 2023-10-20 DIAGNOSIS — M79.10 MYALGIA: ICD-10-CM

## 2023-10-20 DIAGNOSIS — J02.9 SORE THROAT: ICD-10-CM

## 2023-10-20 RX ORDER — BUSPIRONE HYDROCHLORIDE 15 MG/1
15 TABLET ORAL 2 TIMES DAILY
COMMUNITY
Start: 2023-10-05

## 2023-10-20 RX ORDER — HYDROXYZINE HYDROCHLORIDE 25 MG/1
TABLET, FILM COATED ORAL
COMMUNITY
Start: 2023-10-02

## 2023-10-20 ASSESSMENT — ENCOUNTER SYMPTOMS
VOMITING: 0
ANAL BLEEDING: 0
STRIDOR: 0
ABDOMINAL DISTENTION: 0
COUGH: 1
BLOOD IN STOOL: 0
RHINORRHEA: 0
VOICE CHANGE: 0
SINUS PAIN: 1
NAUSEA: 1
SWOLLEN GLANDS: 0
WHEEZING: 1
CONSTIPATION: 1
TROUBLE SWALLOWING: 0
SHORTNESS OF BREATH: 0
DIARRHEA: 1
ABDOMINAL PAIN: 0
FACIAL SWELLING: 0
CHEST TIGHTNESS: 0
SINUS PRESSURE: 1
RECTAL PAIN: 0
SORE THROAT: 1
BACK PAIN: 0
APNEA: 0
CHOKING: 0

## 2023-10-20 NOTE — TELEPHONE ENCOUNTER
Pt did not shoe up for her POCT testing for Covid, Flu and strep. Callled to see if patient was on her way and st stated   she tried to drive herself and wasnt able to drive bc she wasnt comfortable she felt too weak. i asked her to try and get someone to get her here in the next 5 hours she said everyone who she would call is working right now but would try Per Provider I ask patient if she would be ok with having [facility] come out to assess and she is not interested right now she just wants to rest and said she will go to the ER if it gets bad enough.

## 2023-10-20 NOTE — PROGRESS NOTES
10/20/2023    TELEHEALTH EVALUATION -- Audio/Visual (During XLCTD-44 public health emergency)    HPI:    Maura Chong (:  1960) has requested an audio/video evaluation for the following concern(s):    URI   This is a new problem. The current episode started in the past 7 days (2 days ago). The problem has been gradually worsening. The maximum temperature recorded prior to her arrival was 100.4 - 100.9 F (100.1 highest). The fever has been present for 1 to 2 days. Associated symptoms include coughing (Productive at times), diarrhea (Soft stool, not watery), headaches, nausea (Mild), a plugged ear sensation (Bilateral ear pressure), sinus pain (Chronic (at baseline)), a sore throat and wheezing. Pertinent negatives include no abdominal pain, chest pain, congestion, dysuria, ear pain, joint pain, joint swelling, neck pain, rash, rhinorrhea, sneezing, swollen glands or vomiting. Associated symptoms comments: Myalgias . Review of Systems   Constitutional:  Positive for activity change (R/t fatigue), appetite change (Lack of appetite), chills and fatigue. Negative for diaphoresis, fever and unexpected weight change. HENT:  Positive for sinus pressure (Chronic - at baseline), sinus pain (Chronic (at baseline)) and sore throat. Negative for congestion, dental problem, drooling, ear discharge, ear pain, facial swelling, hearing loss, mouth sores, nosebleeds, postnasal drip, rhinorrhea, sneezing, tinnitus, trouble swallowing and voice change. Respiratory:  Positive for cough (Productive at times) and wheezing. Negative for apnea, choking, chest tightness, shortness of breath and stridor. Cardiovascular: Negative. Negative for chest pain, palpitations and leg swelling. Gastrointestinal:  Positive for constipation, diarrhea (Soft stool, not watery) and nausea (Mild). Negative for abdominal distention, abdominal pain, anal bleeding, blood in stool, rectal pain and vomiting.    Genitourinary:

## 2023-10-31 ENCOUNTER — OFFICE VISIT (OUTPATIENT)
Dept: FAMILY MEDICINE CLINIC | Age: 63
End: 2023-10-31

## 2023-10-31 VITALS
BODY MASS INDEX: 55.32 KG/M2 | HEIGHT: 61 IN | OXYGEN SATURATION: 97 % | SYSTOLIC BLOOD PRESSURE: 125 MMHG | WEIGHT: 293 LBS | HEART RATE: 72 BPM | DIASTOLIC BLOOD PRESSURE: 64 MMHG

## 2023-10-31 DIAGNOSIS — Z01.818 PRE-OP EXAM: Primary | ICD-10-CM

## 2023-10-31 DIAGNOSIS — J45.40 MODERATE PERSISTENT REACTIVE AIRWAY DISEASE WITHOUT COMPLICATION: ICD-10-CM

## 2023-10-31 DIAGNOSIS — I87.2 PERIPHERAL VENOUS INSUFFICIENCY: ICD-10-CM

## 2023-10-31 DIAGNOSIS — G47.33 OSA ON CPAP: ICD-10-CM

## 2023-10-31 DIAGNOSIS — I10 PRIMARY HYPERTENSION: ICD-10-CM

## 2023-10-31 NOTE — PROGRESS NOTES
[facility] SITE                             Preoperative Evaluation        Sim Jain  YOB: 1960    Date of Service:  10/31/2023    Vitals:    10/31/23 1355   BP: 125/64   Site: Right Upper Arm   Position: Sitting   Cuff Size: Large Adult   Pulse: 72   SpO2: 97%   Weight: (!) 140.2 kg (309 lb)   Height: 1.549 m (5' 1\")      Wt Readings from Last 2 Encounters:   10/31/23 (!) 140.2 kg (309 lb)   08/14/23 (!) 145.2 kg (320 lb)     BP Readings from Last 3 Encounters:   10/31/23 125/64   08/14/23 130/86   08/02/23 122/86        Chief Complaint   Patient presents with    Pre-op Exam     Pt is here for pre op for cataract surgery stasrting on the right eye 11/7/23 then the left 11/16/23 with Dr Duarte Damian at [address] in [place].       Allergies   Allergen Reactions    Calamine Other (See Comments)     Leaves skin tender and burning     Amoxicillin Other (See Comments)    Amoxicillin-Pot Clavulanate Hives    Chlorpheniramine Maleate     Codeine Other (See Comments)    Daypro [Oxaprozin] Hives    Duravent-Da [Chlorphen-Phenyleph-Methscop] Hives    Lithium      Caused shakes    Methscopolamine     Norvasc [Amlodipine]      LE edema      Nsaids      Avoid d/t gastric bypass    Phenylephrine Hcl (Pressors)     Seldane [Terfenadine] Hives    Suprax [Cefixime] Hives    Levofloxacin Rash     Outpatient Medications Marked as Taking for the 10/31/23 encounter (Office Visit) with MADINA Suresh CNP   Medication Sig Dispense Refill    busPIRone (BUSPAR) 15 MG tablet Take 15 mg by mouth 2 times daily      hydrOXYzine HCl (ATARAX) 25 MG tablet TAKE 1 OR 2 TABLETS BY MOUTH TWICE DAILY AS NEEDED FOR ANXIETY      VRAYLAR 4.5 MG CAPS capsule Take 1 capsule by mouth every morning      metoprolol succinate (TOPROL XL) 50 MG extended release tablet Take 1 tablet by mouth daily 90 tablet 0    celecoxib (CELEBREX) 200 MG capsule Take

## 2023-11-17 ENCOUNTER — APPOINTMENT (OUTPATIENT)
Dept: CT IMAGING | Age: 63
End: 2023-11-17
Payer: MEDICARE

## 2023-11-17 ENCOUNTER — HOSPITAL ENCOUNTER (EMERGENCY)
Age: 63
Discharge: HOME | End: 2023-11-17
Attending: EMERGENCY MEDICINE
Payer: MEDICARE

## 2023-11-17 VITALS
DIASTOLIC BLOOD PRESSURE: 60 MMHG | HEIGHT: 61 IN | OXYGEN SATURATION: 96 % | WEIGHT: 293 LBS | SYSTOLIC BLOOD PRESSURE: 157 MMHG | BODY MASS INDEX: 55.32 KG/M2 | TEMPERATURE: 97.6 F | RESPIRATION RATE: 19 BRPM | HEART RATE: 78 BPM

## 2023-11-17 DIAGNOSIS — R10.9 FLANK PAIN: Primary | ICD-10-CM

## 2023-11-17 DIAGNOSIS — N20.1 URETEROLITHIASIS: ICD-10-CM

## 2023-11-17 LAB
ALBUMIN SERPL-MCNC: 4.5 G/DL (ref 3.4–5)
ALBUMIN/GLOB SERPL: 1.5 {RATIO} (ref 1.1–2.2)
ALP SERPL-CCNC: 136 U/L (ref 40–129)
ALT SERPL-CCNC: 21 U/L (ref 10–40)
ANION GAP SERPL CALCULATED.3IONS-SCNC: 13 MMOL/L (ref 3–16)
AST SERPL-CCNC: 17 U/L (ref 15–37)
BACTERIA URNS QL MICRO: ABNORMAL /HPF
BASOPHILS # BLD: 0.1 K/UL (ref 0–0.2)
BASOPHILS NFR BLD: 0.5 %
BILIRUB SERPL-MCNC: 0.3 MG/DL (ref 0–1)
BILIRUB UR QL STRIP.AUTO: ABNORMAL
BUN SERPL-MCNC: 27 MG/DL (ref 7–20)
CALCIUM SERPL-MCNC: 9.7 MG/DL (ref 8.3–10.6)
CHLORIDE SERPL-SCNC: 100 MMOL/L (ref 99–110)
CLARITY UR: CLEAR
CO2 SERPL-SCNC: 26 MMOL/L (ref 21–32)
COLOR UR: YELLOW
CREAT SERPL-MCNC: 1 MG/DL (ref 0.6–1.2)
CRYSTALS URNS MICRO: ABNORMAL /HPF
DEPRECATED RDW RBC AUTO: 15.3 % (ref 12.4–15.4)
EOSINOPHIL # BLD: 0 K/UL (ref 0–0.6)
EOSINOPHIL NFR BLD: 0 %
EPI CELLS #/AREA URNS HPF: ABNORMAL /HPF (ref 0–5)
GFR SERPLBLD CREATININE-BSD FMLA CKD-EPI: >60 ML/MIN/{1.73_M2}
GLUCOSE SERPL-MCNC: 193 MG/DL (ref 70–99)
GLUCOSE UR STRIP.AUTO-MCNC: NEGATIVE MG/DL
HCT VFR BLD AUTO: 45.4 % (ref 36–48)
HGB BLD-MCNC: 14.8 G/DL (ref 12–16)
HGB UR QL STRIP.AUTO: ABNORMAL
KETONES UR STRIP.AUTO-MCNC: NEGATIVE MG/DL
LEUKOCYTE ESTERASE UR QL STRIP.AUTO: ABNORMAL
LIPASE SERPL-CCNC: 21 U/L (ref 13–60)
LYMPHOCYTES # BLD: 1 K/UL (ref 1–5.1)
LYMPHOCYTES NFR BLD: 7.9 %
MCH RBC QN AUTO: 27.6 PG (ref 26–34)
MCHC RBC AUTO-ENTMCNC: 32.6 G/DL (ref 31–36)
MCV RBC AUTO: 84.9 FL (ref 80–100)
MONOCYTES # BLD: 0.5 K/UL (ref 0–1.3)
MONOCYTES NFR BLD: 3.8 %
NEUTROPHILS # BLD: 11.5 K/UL (ref 1.7–7.7)
NEUTROPHILS NFR BLD: 87.8 %
NITRITE UR QL STRIP.AUTO: NEGATIVE
PH UR STRIP.AUTO: 6 [PH] (ref 5–8)
PLATELET # BLD AUTO: 327 K/UL (ref 135–450)
PMV BLD AUTO: 7.4 FL (ref 5–10.5)
POTASSIUM SERPL-SCNC: 4.1 MMOL/L (ref 3.5–5.1)
PROT SERPL-MCNC: 7.6 G/DL (ref 6.4–8.2)
PROT UR STRIP.AUTO-MCNC: ABNORMAL MG/DL
RBC # BLD AUTO: 5.35 M/UL (ref 4–5.2)
RBC #/AREA URNS HPF: ABNORMAL /HPF (ref 0–4)
SODIUM SERPL-SCNC: 139 MMOL/L (ref 136–145)
SP GR UR STRIP.AUTO: 1.02 (ref 1–1.03)
UA COMPLETE W REFLEX CULTURE PNL UR: ABNORMAL
UA DIPSTICK W REFLEX MICRO PNL UR: YES
URN SPEC COLLECT METH UR: ABNORMAL
UROBILINOGEN UR STRIP-ACNC: 0.2 E.U./DL
WBC # BLD AUTO: 13.1 K/UL (ref 4–11)
WBC #/AREA URNS HPF: ABNORMAL /HPF (ref 0–5)

## 2023-11-17 PROCEDURE — 85025 COMPLETE CBC W/AUTO DIFF WBC: CPT

## 2023-11-17 PROCEDURE — 74176 CT ABD & PELVIS W/O CONTRAST: CPT

## 2023-11-17 PROCEDURE — 36415 COLL VENOUS BLD VENIPUNCTURE: CPT

## 2023-11-17 PROCEDURE — 99284 EMERGENCY DEPT VISIT MOD MDM: CPT

## 2023-11-17 PROCEDURE — 96375 TX/PRO/DX INJ NEW DRUG ADDON: CPT

## 2023-11-17 PROCEDURE — 81001 URINALYSIS AUTO W/SCOPE: CPT

## 2023-11-17 PROCEDURE — 80053 COMPREHEN METABOLIC PANEL: CPT

## 2023-11-17 PROCEDURE — 6360000002 HC RX W HCPCS: Performed by: EMERGENCY MEDICINE

## 2023-11-17 PROCEDURE — 83690 ASSAY OF LIPASE: CPT

## 2023-11-17 PROCEDURE — 96374 THER/PROPH/DIAG INJ IV PUSH: CPT

## 2023-11-17 PROCEDURE — 6370000000 HC RX 637 (ALT 250 FOR IP): Performed by: EMERGENCY MEDICINE

## 2023-11-17 RX ORDER — ONDANSETRON 4 MG/1
4 TABLET, FILM COATED ORAL 3 TIMES DAILY PRN
Qty: 15 TABLET | Refills: 0 | Status: SHIPPED | OUTPATIENT
Start: 2023-11-17

## 2023-11-17 RX ORDER — HYDROCODONE BITARTRATE AND ACETAMINOPHEN 5; 325 MG/1; MG/1
1 TABLET ORAL EVERY 6 HOURS PRN
Qty: 12 TABLET | Refills: 0 | Status: SHIPPED | OUTPATIENT
Start: 2023-11-17 | End: 2023-11-20

## 2023-11-17 RX ORDER — SULFAMETHOXAZOLE AND TRIMETHOPRIM 800; 160 MG/1; MG/1
1 TABLET ORAL ONCE
Status: COMPLETED | OUTPATIENT
Start: 2023-11-17 | End: 2023-11-17

## 2023-11-17 RX ORDER — TAMSULOSIN HYDROCHLORIDE 0.4 MG/1
0.4 CAPSULE ORAL DAILY
Qty: 30 CAPSULE | Refills: 0 | Status: SHIPPED | OUTPATIENT
Start: 2023-11-17

## 2023-11-17 RX ORDER — SULFAMETHOXAZOLE AND TRIMETHOPRIM 800; 160 MG/1; MG/1
1 TABLET ORAL 2 TIMES DAILY
Qty: 14 TABLET | Refills: 0 | Status: SHIPPED | OUTPATIENT
Start: 2023-11-17 | End: 2023-11-24

## 2023-11-17 RX ORDER — ONDANSETRON 2 MG/ML
4 INJECTION INTRAMUSCULAR; INTRAVENOUS ONCE
Status: COMPLETED | OUTPATIENT
Start: 2023-11-17 | End: 2023-11-17

## 2023-11-17 RX ORDER — MORPHINE SULFATE 4 MG/ML
4 INJECTION, SOLUTION INTRAMUSCULAR; INTRAVENOUS ONCE
Status: COMPLETED | OUTPATIENT
Start: 2023-11-17 | End: 2023-11-17

## 2023-11-17 RX ADMIN — MORPHINE SULFATE 4 MG: 4 INJECTION, SOLUTION INTRAMUSCULAR; INTRAVENOUS at 03:40

## 2023-11-17 RX ADMIN — ONDANSETRON 4 MG: 2 INJECTION INTRAMUSCULAR; INTRAVENOUS at 03:39

## 2023-11-17 RX ADMIN — SULFAMETHOXAZOLE AND TRIMETHOPRIM 1 TABLET: 800; 160 TABLET ORAL at 05:44

## 2023-11-17 ASSESSMENT — PAIN DESCRIPTION - LOCATION
LOCATION: FLANK
LOCATION: FLANK

## 2023-11-17 ASSESSMENT — PAIN DESCRIPTION - ORIENTATION: ORIENTATION: RIGHT

## 2023-11-17 ASSESSMENT — PAIN - FUNCTIONAL ASSESSMENT: PAIN_FUNCTIONAL_ASSESSMENT: 0-10

## 2023-11-17 ASSESSMENT — PAIN DESCRIPTION - DESCRIPTORS: DESCRIPTORS: ACHING

## 2023-11-17 ASSESSMENT — PAIN SCALES - GENERAL
PAINLEVEL_OUTOF10: 5
PAINLEVEL_OUTOF10: 7

## 2023-11-17 NOTE — DISCHARGE INSTRUCTIONS
as discussed, you are found to have a 3 mm kidney stone as well as a urinary tract infection. Please take the antibiotics and the pain medicine, nausea medicine as directed.   Please schedule follow-up with the urologist.  Return for worsening symptoms

## 2023-11-17 NOTE — ED PROVIDER NOTES
Lab ordered need. Blood work was dropped off at [facility]. Please place lab orders. Thank you!  -[facility] staff  
75-year-old female presenting for evaluation of right flank pain. Patient arrives with stable vital signs. On exam, patient does have reproducible flank tenderness. She appears uncomfortable secondary to flank pain. Ordered morphine and Zofran for symptomatic management. We will obtain laboratory evaluation, CT abdomen pelvis for further evaluation. CT abdomen pelvis reveals 3 mm right-sided stone at the UVJ. There is mild hydronephrosis. No additional acute intra-abdominal abnormality to explain her symptoms. Urinalysis with hematuria as well as small amount of white blood cells as well as bacteria. As patient had some mild dysuria, will treat presumptively for urinary tract infection. Patient has many antibiotic allergies. She has allergies to beta-lactam's, cephalosporin, Levaquin which precludes use of many antibiotics. She will be started on Bactrim for coverage of pyelonephritis. Review of renal function shows that is normal and normal potassium level. Patient will be provided with a course of Flomax, Bactrim, Zofran and advised on urology referral for definitive management. Patient was noted to have mild hypoxia after morphine administration. Patient has had intact respirations. Patient does state that she has obstructive sleep apnea supposed to use CPAP at home.     The differential diagnosis associated with the patient's presentation includes, but is not limited to: UTI, kidney stone, electrolyte abnormality, pyelonephritis, colitis, MSK pain    CONSULTS: (Who and What was discussed)  None    Discussion with Other Professionals : None        Social Determinants : None    Patient's care impacted by chronic condition(s):   Past Medical History:   Diagnosis Date    Bipolar disorder     Borderline osteopenia     Cellulitis of left leg 8/5/2020    Frequency of micturition     GERD (gastroesophageal reflux disease)     Headache(784.0)     HNP (herniated nucleus pulposus), lumbar 6/6/2019

## 2023-12-29 ENCOUNTER — E-VISIT (OUTPATIENT)
Dept: FAMILY MEDICINE CLINIC | Age: 63
End: 2023-12-29

## 2023-12-29 ENCOUNTER — TELEPHONE (OUTPATIENT)
Dept: FAMILY MEDICINE CLINIC | Age: 63
End: 2023-12-29

## 2023-12-29 DIAGNOSIS — J06.9 ACUTE URI: Primary | ICD-10-CM

## 2023-12-29 RX ORDER — AZITHROMYCIN 250 MG/1
250 TABLET, FILM COATED ORAL SEE ADMIN INSTRUCTIONS
Qty: 6 TABLET | Refills: 0 | Status: SHIPPED | OUTPATIENT
Start: 2023-12-29 | End: 2024-01-03

## 2023-12-29 RX ORDER — METHYLPREDNISOLONE 4 MG/1
TABLET ORAL
Qty: 1 KIT | Refills: 0 | Status: SHIPPED | OUTPATIENT
Start: 2023-12-29

## 2023-12-29 RX ORDER — DOXYCYCLINE HYCLATE 100 MG
100 TABLET ORAL 2 TIMES DAILY
Qty: 14 TABLET | Refills: 0 | Status: SHIPPED | OUTPATIENT
Start: 2023-12-29 | End: 2023-12-29 | Stop reason: CLARIF

## 2023-12-29 NOTE — PROGRESS NOTES
HPI: as per patient provided history  Exam: N/A (electronic visit)  ASSESSMENT/PLAN:  Diagnoses and all orders for this visit:    Acute URI  -     methylPREDNISolone (MEDROL DOSEPACK) 4 MG tablet; Use as directed per package instructions  -     azithromycin (ZITHROMAX) 250 MG tablet; Take 1 tablet by mouth See Admin Instructions for 5 days 500mg on day 1 followed by 250mg on days 2 - 5  - Patient called and has had symptoms since 12/20/2023. She tested for COVID 2-3 days after and was negative. She has been on Doxycycline without improvement in her symptoms. Patient instructed to call the office if worsens, or fails to improve as anticipated. 11-20 minutes were spent on the digital evaluation and management of this patient.

## 2023-12-29 NOTE — TELEPHONE ENCOUNTER
Spoke with patient. She has cough, chest congestion, started getting HA's today, nasal congestion, sore throat. No fever. Tested for COVID 12/22/2023 was negative. Concern it may be RSV.   Symptoms started 12/20/2023-Started Doxycycline 12/22/2023 and only completed 3 days of steroids

## 2024-01-30 DIAGNOSIS — I10 ESSENTIAL HYPERTENSION: ICD-10-CM

## 2024-01-30 RX ORDER — METOPROLOL SUCCINATE 50 MG/1
50 TABLET, EXTENDED RELEASE ORAL DAILY
Qty: 90 TABLET | Refills: 0 | Status: SHIPPED | OUTPATIENT
Start: 2024-01-30

## 2024-01-30 NOTE — TELEPHONE ENCOUNTER
Refill Request     CONFIRM preferrred pharmacy with the patient.    If Mail Order Rx - Pend for 90 day refill.      Last Seen: Last Seen Department: 12/29/2023  Last Seen by PCP: 10/13/2023    Last Written: 9/21/23    If no future appointment scheduled, route STAFF MESSAGE with patient name to the  Pool for scheduling.      Next Appointment:   Future Appointments   Date Time Provider Department Center   2/9/2024  9:20 AM Thu Yoder APRN - CNP [facility] [place] - [facility]   3/6/2024  8:20 AM Thu Yoder APRN - CNP [facility] [place] - [facility]       Message sent to  to schedule appt with patient?  N/A      Requested Prescriptions     Pending Prescriptions Disp Refills    metoprolol succinate (TOPROL XL) 50 MG extended release tablet 90 tablet 0     Sig: Take 1 tablet by mouth daily

## 2024-02-09 ENCOUNTER — TELEMEDICINE (OUTPATIENT)
Dept: FAMILY MEDICINE CLINIC | Age: 64
End: 2024-02-09

## 2024-02-09 DIAGNOSIS — J45.40 MODERATE PERSISTENT REACTIVE AIRWAY DISEASE WITHOUT COMPLICATION: Primary | ICD-10-CM

## 2024-02-09 DIAGNOSIS — R73.03 PRE-DIABETES: ICD-10-CM

## 2024-02-09 RX ORDER — FLUTICASONE PROPIONATE AND SALMETEROL 50; 500 UG/1; UG/1
1 POWDER RESPIRATORY (INHALATION) EVERY 12 HOURS
Qty: 60 EACH | Refills: 3 | Status: SHIPPED | OUTPATIENT
Start: 2024-02-09

## 2024-02-09 ASSESSMENT — ENCOUNTER SYMPTOMS
ABDOMINAL PAIN: 0
RESPIRATORY NEGATIVE: 1
EYES NEGATIVE: 1
ALLERGIC/IMMUNOLOGIC NEGATIVE: 1
ANAL BLEEDING: 0
BLOOD IN STOOL: 0
GASTROINTESTINAL NEGATIVE: 1
SHORTNESS OF BREATH: 0
NAUSEA: 0

## 2024-02-09 NOTE — PROGRESS NOTES
2024    TELEHEALTH EVALUATION -- Audio/Visual (During COVID-19 public health emergency)    HPI:    Arthur Townsend (:  1960) has requested an audio/video evaluation for the following concern(s):  Chief Complaint   Patient presents with    Other     To discuss medication due to insurance change.  Cost issues       The patient is calling because she is unable to afford her Advair 500/50.  With her insurance changing it is now around $45 a month.  The patient is wanting to know if there are any cheaper alternatives.  She has not checked with her insurance company.    The patient also states that she would like to go on Ozempic.  She states that she is considering knee surgery in the near future and would like to lose weight at a quicker rate than she currently is.  The patient is prediabetic and not currently on any diabetic medications.  She has not checked with her insurance company to see if Ozempic is covered.  Her last hemoglobin A1c was in may  2023  Hemoglobin A1C   Date Value Ref Range Status   2023 5.7 See comment % Final     Comment:     Comment:  Diagnosis of Diabetes: > or = 6.5%  Increased risk of diabetes (Prediabetes): 5.7-6.4%  Glycemic Control: Nonpregnant Adults: <7.0%                    Pregnant: <6.0%             Review of Systems   Constitutional: Negative.  Negative for appetite change, fatigue and unexpected weight change.   HENT: Negative.     Eyes: Negative.    Respiratory: Negative.  Negative for shortness of breath.    Cardiovascular: Negative.  Negative for chest pain, palpitations and leg swelling.   Gastrointestinal: Negative.  Negative for abdominal pain, anal bleeding, blood in stool and nausea.   Endocrine: Negative.    Genitourinary: Negative.  Negative for hematuria.   Musculoskeletal: Negative.    Skin: Negative.  Negative for rash.   Allergic/Immunologic: Negative.    Neurological: Negative.  Negative for dizziness, syncope, light-headedness and numbness.

## 2024-03-05 SDOH — HEALTH STABILITY: PHYSICAL HEALTH: ON AVERAGE, HOW MANY DAYS PER WEEK DO YOU ENGAGE IN MODERATE TO STRENUOUS EXERCISE (LIKE A BRISK WALK)?: 0 DAYS

## 2024-03-05 SDOH — HEALTH STABILITY: PHYSICAL HEALTH: ON AVERAGE, HOW MANY MINUTES DO YOU ENGAGE IN EXERCISE AT THIS LEVEL?: 0 MIN

## 2024-03-05 ASSESSMENT — PATIENT HEALTH QUESTIONNAIRE - PHQ9
SUM OF ALL RESPONSES TO PHQ QUESTIONS 1-9: 1
SUM OF ALL RESPONSES TO PHQ QUESTIONS 1-9: 1
1. LITTLE INTEREST OR PLEASURE IN DOING THINGS: 0
SUM OF ALL RESPONSES TO PHQ QUESTIONS 1-9: 1
SUM OF ALL RESPONSES TO PHQ QUESTIONS 1-9: 1
2. FEELING DOWN, DEPRESSED OR HOPELESS: 1
SUM OF ALL RESPONSES TO PHQ9 QUESTIONS 1 & 2: 1

## 2024-03-05 ASSESSMENT — LIFESTYLE VARIABLES
HOW MANY STANDARD DRINKS CONTAINING ALCOHOL DO YOU HAVE ON A TYPICAL DAY: PATIENT DOES NOT DRINK
HOW MANY STANDARD DRINKS CONTAINING ALCOHOL DO YOU HAVE ON A TYPICAL DAY: 0
HOW OFTEN DO YOU HAVE A DRINK CONTAINING ALCOHOL: NEVER
HOW OFTEN DO YOU HAVE SIX OR MORE DRINKS ON ONE OCCASION: 1
HOW OFTEN DO YOU HAVE A DRINK CONTAINING ALCOHOL: 1

## 2024-03-06 ENCOUNTER — OFFICE VISIT (OUTPATIENT)
Dept: FAMILY MEDICINE CLINIC | Age: 64
End: 2024-03-06

## 2024-03-06 VITALS
HEART RATE: 84 BPM | OXYGEN SATURATION: 97 % | WEIGHT: 293 LBS | BODY MASS INDEX: 55.32 KG/M2 | DIASTOLIC BLOOD PRESSURE: 72 MMHG | SYSTOLIC BLOOD PRESSURE: 136 MMHG | HEIGHT: 61 IN

## 2024-03-06 DIAGNOSIS — E78.49 OTHER HYPERLIPIDEMIA: ICD-10-CM

## 2024-03-06 DIAGNOSIS — H93.13 TINNITUS OF BOTH EARS: ICD-10-CM

## 2024-03-06 DIAGNOSIS — Z12.11 SCREENING FOR COLON CANCER: ICD-10-CM

## 2024-03-06 DIAGNOSIS — Z91.89 HAS POORLY BALANCED DIET: ICD-10-CM

## 2024-03-06 DIAGNOSIS — Z00.00 MEDICARE ANNUAL WELLNESS VISIT, SUBSEQUENT: Primary | ICD-10-CM

## 2024-03-06 DIAGNOSIS — Z91.81 AT RISK FOR FALLING: ICD-10-CM

## 2024-03-06 DIAGNOSIS — I10 ESSENTIAL HYPERTENSION: ICD-10-CM

## 2024-03-06 DIAGNOSIS — E55.9 VITAMIN D DEFICIENCY: ICD-10-CM

## 2024-03-06 DIAGNOSIS — H91.93 BILATERAL HEARING LOSS, UNSPECIFIED HEARING LOSS TYPE: ICD-10-CM

## 2024-03-06 DIAGNOSIS — Z12.31 ENCOUNTER FOR SCREENING MAMMOGRAM FOR BREAST CANCER: ICD-10-CM

## 2024-03-06 DIAGNOSIS — R73.03 PRE-DIABETES: ICD-10-CM

## 2024-03-06 DIAGNOSIS — Z79.899 HIGH RISK MEDICATION USE: ICD-10-CM

## 2024-03-06 LAB
25(OH)D3 SERPL-MCNC: 30.6 NG/ML
ALBUMIN SERPL-MCNC: 4.3 G/DL (ref 3.4–5)
ALBUMIN/GLOB SERPL: 2 {RATIO} (ref 1.1–2.2)
ALP SERPL-CCNC: 125 U/L (ref 40–129)
ALT SERPL-CCNC: 16 U/L (ref 10–40)
ANION GAP SERPL CALCULATED.3IONS-SCNC: 12 MMOL/L (ref 3–16)
AST SERPL-CCNC: 15 U/L (ref 15–37)
BASOPHILS # BLD: 0.1 K/UL (ref 0–0.2)
BASOPHILS NFR BLD: 0.9 %
BILIRUB SERPL-MCNC: 0.3 MG/DL (ref 0–1)
BUN SERPL-MCNC: 29 MG/DL (ref 7–20)
CALCIUM SERPL-MCNC: 9.1 MG/DL (ref 8.3–10.6)
CHLORIDE SERPL-SCNC: 107 MMOL/L (ref 99–110)
CHOLEST SERPL-MCNC: 230 MG/DL (ref 0–199)
CO2 SERPL-SCNC: 25 MMOL/L (ref 21–32)
CREAT SERPL-MCNC: 0.8 MG/DL (ref 0.6–1.2)
DEPRECATED RDW RBC AUTO: 14.8 % (ref 12.4–15.4)
EOSINOPHIL # BLD: 0.3 K/UL (ref 0–0.6)
EOSINOPHIL NFR BLD: 3.9 %
GFR SERPLBLD CREATININE-BSD FMLA CKD-EPI: >60 ML/MIN/{1.73_M2}
GLUCOSE SERPL-MCNC: 104 MG/DL (ref 70–99)
HCT VFR BLD AUTO: 43.6 % (ref 36–48)
HDLC SERPL-MCNC: 46 MG/DL (ref 40–60)
HGB BLD-MCNC: 14.3 G/DL (ref 12–16)
LDLC SERPL CALC-MCNC: 157 MG/DL
LYMPHOCYTES # BLD: 1.6 K/UL (ref 1–5.1)
LYMPHOCYTES NFR BLD: 23.9 %
MCH RBC QN AUTO: 28.4 PG (ref 26–34)
MCHC RBC AUTO-ENTMCNC: 32.9 G/DL (ref 31–36)
MCV RBC AUTO: 86.6 FL (ref 80–100)
MONOCYTES # BLD: 0.6 K/UL (ref 0–1.3)
MONOCYTES NFR BLD: 8.2 %
NEUTROPHILS # BLD: 4.3 K/UL (ref 1.7–7.7)
NEUTROPHILS NFR BLD: 63.1 %
PLATELET # BLD AUTO: 297 K/UL (ref 135–450)
PMV BLD AUTO: 7.9 FL (ref 5–10.5)
POTASSIUM SERPL-SCNC: 4.6 MMOL/L (ref 3.5–5.1)
PROT SERPL-MCNC: 6.5 G/DL (ref 6.4–8.2)
RBC # BLD AUTO: 5.04 M/UL (ref 4–5.2)
SODIUM SERPL-SCNC: 144 MMOL/L (ref 136–145)
TRIGL SERPL-MCNC: 135 MG/DL (ref 0–150)
TSH SERPL DL<=0.005 MIU/L-ACNC: 1.98 UIU/ML (ref 0.27–4.2)
VLDLC SERPL CALC-MCNC: 27 MG/DL
WBC # BLD AUTO: 6.9 K/UL (ref 4–11)

## 2024-03-06 RX ORDER — ARIPIPRAZOLE 5 MG/1
5 TABLET ORAL DAILY
COMMUNITY
Start: 2024-02-14

## 2024-03-06 RX ORDER — BUPROPION HYDROCHLORIDE 150 MG/1
150 TABLET ORAL DAILY
COMMUNITY
Start: 2024-03-05

## 2024-03-06 SDOH — ECONOMIC STABILITY: FOOD INSECURITY: WITHIN THE PAST 12 MONTHS, YOU WORRIED THAT YOUR FOOD WOULD RUN OUT BEFORE YOU GOT MONEY TO BUY MORE.: NEVER TRUE

## 2024-03-06 SDOH — ECONOMIC STABILITY: INCOME INSECURITY: HOW HARD IS IT FOR YOU TO PAY FOR THE VERY BASICS LIKE FOOD, HOUSING, MEDICAL CARE, AND HEATING?: NOT HARD AT ALL

## 2024-03-06 SDOH — ECONOMIC STABILITY: FOOD INSECURITY: WITHIN THE PAST 12 MONTHS, THE FOOD YOU BOUGHT JUST DIDN'T LAST AND YOU DIDN'T HAVE MONEY TO GET MORE.: NEVER TRUE

## 2024-03-06 NOTE — PROGRESS NOTES
Chlorpheniramine Maleate     Codeine Other (See Comments)    Daypro [Oxaprozin] Hives    Duravent-Da [Chlorphen-Phenyleph-Methscop] Hives    Lithium      Caused shakes    Methscopolamine     Norvasc [Amlodipine]      LE edema      Nsaids      Avoid d/t gastric bypass    Phenylephrine Hcl (Pressors)     Seldane [Terfenadine] Hives    Suprax [Cefixime] Hives    Levofloxacin Rash     Prior to Visit Medications    Medication Sig Taking? Authorizing Provider   buPROPion (WELLBUTRIN XL) 150 MG extended release tablet Take 1 tablet by mouth daily Yes ProviderWaldemar MD   ARIPiprazole (ABILIFY) 5 MG tablet Take 1 tablet by mouth daily Yes ProviderWaldemar MD   ADVAIR DISKUS 500-50 MCG/ACT AEPB diskus inhaler Inhale 1 puff into the lungs in the morning and 1 puff in the evening. Yes Thu Yoder APRN - CNP   metoprolol succinate (TOPROL XL) 50 MG extended release tablet Take 1 tablet by mouth daily Yes Thu Yoder APRN - CNP   busPIRone (BUSPAR) 15 MG tablet Take 15 mg by mouth 2 times daily Yes Provider, MD Waldemar   hydrOXYzine HCl (ATARAX) 25 MG tablet Take 1 tablet by mouth as needed Yes Provider, MD Waldemar   celecoxib (CELEBREX) 200 MG capsule Take 1 capsule by mouth 2 times daily Yes Thu Yoder APRN - CNP   oxybutynin (DITROPAN) 5 MG tablet Take 1 tablet by mouth 2 times daily Yes ProviderWaldemar MD   Handicap Placard MISC by Does not apply route Yes Thu Yoder APRN - CNP   vitamin D3 (CHOLECALCIFEROL) 10 MCG (400 UNIT) TABS tablet Take 1 tablet by mouth daily Yes ProviderWaldemar MD   VORTIoxetine HBr (TRINTELLIX) 20 MG TABS tablet TAKE 1 TABLET BY MOUTH  DAILY Yes Thu Yoder APRN - CNP   lamoTRIgine (LAMICTAL) 100 MG tablet TAKE 1 TABLET BY MOUTH  TWICE DAILY Yes Thu Yoder APRN - CNP   methocarbamol (ROBAXIN) 500 MG tablet Take 1 tablet by mouth as needed Yes ProviderWaldemar MD   Coenzyme Q10 (COQ10) 100 MG CAPS

## 2024-03-06 NOTE — PATIENT INSTRUCTIONS
loss surgery have:  Board-certified doctors and surgeons   A team of support staff (dietitians, counselors, nurses)   Long-term follow-up after surgery   Hospital staff experienced with the care of weight loss patients. This includes nurses who are trained in the care of patients immediately after surgery and anesthesiologists who are experienced in caring for the morbidly obese.  EFFECTIVENESS OF WEIGHT LOSS SURGERY -- The goal of weight loss surgery is to reduce the risk of illness or death associated with obesity. Weight loss surgery can also help you to feel and look better, reduce the amount of money you spend on medicines, and cut down on sick days.   As an example, weight loss surgery can improve health problems related to obesity (diabetes, high blood pressure, high cholesterol, sleep apnea) to the point that you need less or no medicine.  Finally, weight loss surgery might reduce your risk of developing heart disease, cancer, and certain infections.  AFTER WEIGHT LOSS SURGERY -- You will need to stay in the hospital until your team feels that it is safe for you to leave (on average, one to three days). Do not drive if you are taking prescription pain medicine. Begin exercising as soon as possible once you have healed; most weight loss centers will design an exercise program for you.  Once you are home, it is important to eat and drink exactly what your doctor and dietitian recommend. You will see your doctor, nurse, and dietitian on a regular basis after surgery to monitor your health, diet, and weight loss.   You will be able to slowly increase how much you eat over time, although it will always be important to:  Eat small, frequent meals and not skip meals   Chew your food slowly and completely   Avoid eating while \"distracted\" (such as eating while watching TV)   Stop eating when you feel full   Drink liquids at least 30 minutes before or after eating   Avoid foods high in fat or sugar   Take vitamin

## 2024-03-07 LAB
EST. AVERAGE GLUCOSE BLD GHB EST-MCNC: 108.3 MG/DL
HBA1C MFR BLD: 5.4 %

## 2024-04-01 DIAGNOSIS — I10 ESSENTIAL HYPERTENSION: ICD-10-CM

## 2024-04-01 RX ORDER — METOPROLOL SUCCINATE 50 MG/1
50 TABLET, EXTENDED RELEASE ORAL DAILY
Qty: 90 TABLET | Refills: 0 | Status: SHIPPED | OUTPATIENT
Start: 2024-04-01

## 2024-04-01 NOTE — TELEPHONE ENCOUNTER
Refill Request     CONFIRM preferrred pharmacy with the patient.    If Mail Order Rx - Pend for 90 day refill.      Last Seen: Last Seen Department: 3/6/2024  Last Seen by PCP: 3/6/2024    Last Written: 1.30.24    If no future appointment scheduled, route STAFF MESSAGE with patient name to the  Pool for scheduling.      Next Appointment:   Future Appointments   Date Time Provider Department Center   4/18/2024  3:00 PM Thu Yoder, APRN - CNP [facility]  [place] [facility]       Message sent to  to schedule appt with patient?  NO      metoprolol succinate (TOPROL XL) 50 MG extended release tablet [[id number]]

## 2024-04-08 RX ORDER — CELECOXIB 200 MG/1
200 CAPSULE ORAL 2 TIMES DAILY
Qty: 180 CAPSULE | Refills: 0 | Status: SHIPPED | OUTPATIENT
Start: 2024-04-08

## 2024-04-08 NOTE — TELEPHONE ENCOUNTER
Refill Request     CONFIRM preferrred pharmacy with the patient.    If Mail Order Rx - Pend for 90 day refill.      Last Seen: Last Seen Department: 3/6/2024  Last Seen by PCP: 3/6/2024    Last Written: 9.21.23    If no future appointment scheduled, route STAFF MESSAGE with patient name to the  Pool for scheduling.      Next Appointment:   Future Appointments   Date Time Provider Department Center   4/18/2024  3:00 PM Thu Yoder, APRN - CNP [facility]  [place] [facility]       Message sent to  to schedule appt with patient?  NO        celecoxib (CELEBREX) 200 MG capsule [[id number]]

## 2024-04-11 ENCOUNTER — OFFICE VISIT (OUTPATIENT)
Dept: FAMILY MEDICINE CLINIC | Age: 64
End: 2024-04-11
Payer: MEDICARE

## 2024-04-11 VITALS
BODY MASS INDEX: 55.32 KG/M2 | DIASTOLIC BLOOD PRESSURE: 78 MMHG | WEIGHT: 293 LBS | OXYGEN SATURATION: 96 % | HEART RATE: 90 BPM | HEIGHT: 61 IN | SYSTOLIC BLOOD PRESSURE: 130 MMHG | TEMPERATURE: 98.5 F

## 2024-04-11 DIAGNOSIS — H66.002 NON-RECURRENT ACUTE SUPPURATIVE OTITIS MEDIA OF LEFT EAR WITHOUT SPONTANEOUS RUPTURE OF TYMPANIC MEMBRANE: Primary | ICD-10-CM

## 2024-04-11 PROCEDURE — 3078F DIAST BP <80 MM HG: CPT | Performed by: NURSE PRACTITIONER

## 2024-04-11 PROCEDURE — 99213 OFFICE O/P EST LOW 20 MIN: CPT | Performed by: NURSE PRACTITIONER

## 2024-04-11 PROCEDURE — 3017F COLORECTAL CA SCREEN DOC REV: CPT | Performed by: NURSE PRACTITIONER

## 2024-04-11 PROCEDURE — 3075F SYST BP GE 130 - 139MM HG: CPT | Performed by: NURSE PRACTITIONER

## 2024-04-11 PROCEDURE — G8417 CALC BMI ABV UP PARAM F/U: HCPCS | Performed by: NURSE PRACTITIONER

## 2024-04-11 PROCEDURE — 1036F TOBACCO NON-USER: CPT | Performed by: NURSE PRACTITIONER

## 2024-04-11 PROCEDURE — G8427 DOCREV CUR MEDS BY ELIG CLIN: HCPCS | Performed by: NURSE PRACTITIONER

## 2024-04-11 RX ORDER — AZITHROMYCIN 250 MG/1
TABLET, FILM COATED ORAL
Qty: 6 TABLET | Refills: 0 | Status: SHIPPED | OUTPATIENT
Start: 2024-04-11 | End: 2024-04-21

## 2024-04-11 RX ORDER — METHYLPREDNISOLONE 4 MG/1
TABLET ORAL
Qty: 1 KIT | Refills: 0 | Status: SHIPPED | OUTPATIENT
Start: 2024-04-11 | End: 2024-04-17

## 2024-04-11 ASSESSMENT — ENCOUNTER SYMPTOMS
VOMITING: 0
TROUBLE SWALLOWING: 0
ABDOMINAL PAIN: 0
EYES NEGATIVE: 1
SINUS PRESSURE: 0
RHINORRHEA: 0
RESPIRATORY NEGATIVE: 1
BLOOD IN STOOL: 0
SHORTNESS OF BREATH: 0
ALLERGIC/IMMUNOLOGIC NEGATIVE: 1
COUGH: 0
FACIAL SWELLING: 0
NAUSEA: 0
SINUS PAIN: 0
ANAL BLEEDING: 0
GASTROINTESTINAL NEGATIVE: 1
SORE THROAT: 0
DIARRHEA: 0

## 2024-04-11 NOTE — PROGRESS NOTES
[facility]  [facility]  [address] [place] [id number]  Dept: [phone]  Dept Fax: [phone]  Loc: [phone]    Arthur Townsend is a 63 y.o. female who presents today for her medical conditions/complaints as noted below.  Arthur Townsend is c/o of Otalgia (Pt has had left ear pain for the past month. )       Subjective:     Chief Complaint   Patient presents with    Otalgia     Pt has had left ear pain for the past month.        Otalgia   There is pain in the left ear. This is a new problem. The current episode started more than 1 month ago. Episode frequency: daily. The problem has been gradually worsening. There has been no fever. The pain is mild. Associated symptoms include hearing loss. Pertinent negatives include no abdominal pain, coughing, diarrhea, ear discharge, headaches, neck pain, rash, rhinorrhea, sore throat or vomiting. She has tried nothing for the symptoms. The treatment provided no relief. There is no history of a chronic ear infection or a tympanostomy tube.     Stopped claritin 2-3 months ago due to it is making dry eyes worse      Past Medical History:   Diagnosis Date    Bipolar disorder     Borderline osteopenia     Cellulitis of left leg 8/5/2020    Frequency of micturition     GERD (gastroesophageal reflux disease)     Headache(784.0)     HNP (herniated nucleus pulposus), lumbar 6/6/2019    Hyperlipidemia     Hypertension     Intention tremor     due to lithium    Iron deficiency anemia 11/4/2019    Lateral meniscal tear 10/14/2015    Lumbar stenosis with neurogenic claudication 6/6/2019    Medial meniscus tear 10/14/2015    Mixed incontinence     Morbid (severe) obesity due to excess calories (HCC)     Prolonged emergence from general anesthesia     Prolonged emergence from general anesthesia, initial encounter 6/12/2019    Tobacco use     Venous ulcer of left leg (HCC) 07/2020         Review of Systems   Constitutional:

## 2024-04-18 ENCOUNTER — OFFICE VISIT (OUTPATIENT)
Dept: FAMILY MEDICINE CLINIC | Age: 64
End: 2024-04-18

## 2024-04-18 VITALS
BODY MASS INDEX: 55.32 KG/M2 | SYSTOLIC BLOOD PRESSURE: 130 MMHG | WEIGHT: 293 LBS | HEART RATE: 96 BPM | OXYGEN SATURATION: 98 % | HEIGHT: 61 IN | DIASTOLIC BLOOD PRESSURE: 76 MMHG

## 2024-04-18 DIAGNOSIS — G25.81 RESTLESS LEGS SYNDROME (RLS): ICD-10-CM

## 2024-04-18 DIAGNOSIS — E78.49 OTHER HYPERLIPIDEMIA: ICD-10-CM

## 2024-04-18 DIAGNOSIS — F51.01 PRIMARY INSOMNIA: ICD-10-CM

## 2024-04-18 DIAGNOSIS — F33.2 SEVERE EPISODE OF RECURRENT MAJOR DEPRESSIVE DISORDER, WITHOUT PSYCHOTIC FEATURES (HCC): Primary | ICD-10-CM

## 2024-04-18 DIAGNOSIS — E55.9 VITAMIN D DEFICIENCY: ICD-10-CM

## 2024-04-18 DIAGNOSIS — E66.01 MORBID OBESITY WITH BMI OF 50.0-59.9, ADULT (HCC): ICD-10-CM

## 2024-04-18 DIAGNOSIS — I10 PRIMARY HYPERTENSION: ICD-10-CM

## 2024-04-18 DIAGNOSIS — J45.40 MODERATE PERSISTENT REACTIVE AIRWAY DISEASE WITHOUT COMPLICATION: ICD-10-CM

## 2024-04-18 RX ORDER — FLUTICASONE PROPIONATE 50 MCG
2 SPRAY, SUSPENSION (ML) NASAL DAILY
Qty: 16 G | Refills: 0 | Status: SHIPPED | OUTPATIENT
Start: 2024-04-18

## 2024-04-18 ASSESSMENT — ENCOUNTER SYMPTOMS
GASTROINTESTINAL NEGATIVE: 1
SHORTNESS OF BREATH: 0
SINUS PAIN: 0
BLOOD IN STOOL: 0
ALLERGIC/IMMUNOLOGIC NEGATIVE: 1
RHINORRHEA: 0
FACIAL SWELLING: 0
TROUBLE SWALLOWING: 0
ABDOMINAL PAIN: 0
NAUSEA: 0
EYES NEGATIVE: 1
VOMITING: 0
ANAL BLEEDING: 0
DIARRHEA: 0
COUGH: 0
SORE THROAT: 0
RESPIRATORY NEGATIVE: 1
SINUS PRESSURE: 0

## 2024-04-18 ASSESSMENT — PATIENT HEALTH QUESTIONNAIRE - PHQ9
SUM OF ALL RESPONSES TO PHQ QUESTIONS 1-9: 0
9. THOUGHTS THAT YOU WOULD BE BETTER OFF DEAD, OR OF HURTING YOURSELF: NOT AT ALL
SUM OF ALL RESPONSES TO PHQ9 QUESTIONS 1 & 2: 0
3. TROUBLE FALLING OR STAYING ASLEEP: NOT AT ALL
6. FEELING BAD ABOUT YOURSELF - OR THAT YOU ARE A FAILURE OR HAVE LET YOURSELF OR YOUR FAMILY DOWN: NOT AT ALL
SUM OF ALL RESPONSES TO PHQ QUESTIONS 1-9: 0
7. TROUBLE CONCENTRATING ON THINGS, SUCH AS READING THE NEWSPAPER OR WATCHING TELEVISION: NOT AT ALL
SUM OF ALL RESPONSES TO PHQ QUESTIONS 1-9: 0
10. IF YOU CHECKED OFF ANY PROBLEMS, HOW DIFFICULT HAVE THESE PROBLEMS MADE IT FOR YOU TO DO YOUR WORK, TAKE CARE OF THINGS AT HOME, OR GET ALONG WITH OTHER PEOPLE: NOT DIFFICULT AT ALL
1. LITTLE INTEREST OR PLEASURE IN DOING THINGS: NOT AT ALL
5. POOR APPETITE OR OVEREATING: NOT AT ALL
SUM OF ALL RESPONSES TO PHQ QUESTIONS 1-9: 0
4. FEELING TIRED OR HAVING LITTLE ENERGY: NOT AT ALL
2. FEELING DOWN, DEPRESSED OR HOPELESS: NOT AT ALL
8. MOVING OR SPEAKING SO SLOWLY THAT OTHER PEOPLE COULD HAVE NOTICED. OR THE OPPOSITE, BEING SO FIGETY OR RESTLESS THAT YOU HAVE BEEN MOVING AROUND A LOT MORE THAN USUAL: NOT AT ALL

## 2024-04-18 NOTE — PROGRESS NOTES
Intention tremor     due to lithium    Iron deficiency anemia 2019    Lateral meniscal tear 10/14/2015    Lumbar stenosis with neurogenic claudication 2019    Medial meniscus tear 10/14/2015    Mixed incontinence     Morbid (severe) obesity due to excess calories (HCC)     Prolonged emergence from general anesthesia     Prolonged emergence from general anesthesia, initial encounter 2019    Tobacco use     Venous ulcer of left leg (HCC) 2020     Family History   Problem Relation Age of Onset    Depression Sister     Mental Illness Sister     Diabetes Mother     Heart Disease Mother     Diabetes Father     Heart Disease Father      Past Surgical History:   Procedure Laterality Date    ABDOMINAL EXPLORATION SURGERY      ANUS SURGERY N/A 2023    ANAL FISTULOTOMY performed by Roney Crum MD at [facility] OR    CARPAL TUNNEL RELEASE Bilateral 2005     SECTION      CHOLECYSTECTOMY, LAPAROSCOPIC N/A 2022    LAPAROSCOPIC CHOLECYSTECTOMY performed by Roney Crum MD at [facility] OR    COLONOSCOPY      normal    COLONOSCOPY N/A 2021    COLON W/ANES. (13:30) performed by Bobo Calderón MD at [facility] ENDOSCOPY    FACIAL SURGERY N/A 2023    EXCISION SCALP CYST performed by Roney Crum MD at [facility] OR    GASTRIC BYPASS SURGERY      KNEE SURGERY Left     atrhroscopic unispacer    KNEE SURGERY Right 10/28/2015    Right knee video arthroscopy with partial medial and lateral menisectomy with loose body removal    LUMBAR SPINE SURGERY N/A 2019    MICROLUMBAR DISCECTOMY L4-5 performed by Colten Amato MD at [facility] OR    OTHER SURGICAL HISTORY  2023    EXCISION SCALP CYST    TONSILLECTOMY       Social History     Socioeconomic History    Marital status:      Spouse name: Not on file    Number of children: Not on file    Years of education: Not on file    Highest education level: Not on file   Occupational History    Not on file   Tobacco Use

## 2024-04-22 ENCOUNTER — TELEPHONE (OUTPATIENT)
Dept: FAMILY MEDICINE CLINIC | Age: 64
End: 2024-04-22

## 2024-04-22 NOTE — TELEPHONE ENCOUNTER
Pt states that she went to the ER for her ear and she stated that they gave her some meds and it is starting to feel better. But was wanting to see if you wanted her to follow up with you.

## 2024-04-23 ENCOUNTER — TELEPHONE (OUTPATIENT)
Dept: FAMILY MEDICINE CLINIC | Age: 64
End: 2024-04-23

## 2024-04-23 DIAGNOSIS — R22.0 LEFT FACIAL SWELLING: ICD-10-CM

## 2024-04-23 DIAGNOSIS — H92.02 LEFT EAR PAIN: Primary | ICD-10-CM

## 2024-04-23 NOTE — TELEPHONE ENCOUNTER
Patient called stating the pain is back in her L ear and is hurting.  Patient asking if Thu wants to see her or should she go to Urgent Care?  Please call patient and advise.

## 2024-04-23 NOTE — TELEPHONE ENCOUNTER
Patient states that she already saw urgent care on Sunday and she was on an antibiotic and steroid but she the pain is back and patient is asking for an ENT referral. Referral placed and provided with scheduling information

## 2024-04-26 ENCOUNTER — TELEPHONE (OUTPATIENT)
Dept: FAMILY MEDICINE CLINIC | Age: 64
End: 2024-04-26

## 2024-04-26 NOTE — TELEPHONE ENCOUNTER
Patient called stating the antibiotic she is taking for her ear that Urgent Care gave her is giving her diarrhea and wants to know if she can take imodium with it. Please call patient and advise.

## 2024-05-08 ENCOUNTER — OFFICE VISIT (OUTPATIENT)
Dept: ENT CLINIC | Age: 64
End: 2024-05-08
Payer: MEDICARE

## 2024-05-08 ENCOUNTER — PROCEDURE VISIT (OUTPATIENT)
Dept: AUDIOLOGY | Age: 64
End: 2024-05-08
Payer: MEDICARE

## 2024-05-08 VITALS
BODY MASS INDEX: 55.32 KG/M2 | HEART RATE: 75 BPM | DIASTOLIC BLOOD PRESSURE: 83 MMHG | SYSTOLIC BLOOD PRESSURE: 171 MMHG | WEIGHT: 293 LBS | HEIGHT: 61 IN

## 2024-05-08 DIAGNOSIS — H90.3 SENSORINEURAL HEARING LOSS (SNHL) OF BOTH EARS: Primary | ICD-10-CM

## 2024-05-08 DIAGNOSIS — H93.13 TINNITUS OF BOTH EARS: ICD-10-CM

## 2024-05-08 DIAGNOSIS — H93.8X2 PRESSURE SENSATION IN LEFT EAR: ICD-10-CM

## 2024-05-08 DIAGNOSIS — H90.A32 MIXED CONDUCTIVE AND SENSORINEURAL HEARING LOSS OF LEFT EAR WITH RESTRICTED HEARING OF RIGHT EAR: Primary | ICD-10-CM

## 2024-05-08 DIAGNOSIS — M17.0 PRIMARY OSTEOARTHRITIS OF BOTH KNEES: Primary | ICD-10-CM

## 2024-05-08 DIAGNOSIS — H93.8X2 EAR PRESSURE, LEFT: ICD-10-CM

## 2024-05-08 DIAGNOSIS — H90.A21 SENSORINEURAL HEARING LOSS (SNHL) OF RIGHT EAR WITH RESTRICTED HEARING OF LEFT EAR: ICD-10-CM

## 2024-05-08 PROCEDURE — 92557 COMPREHENSIVE HEARING TEST: CPT | Performed by: AUDIOLOGIST

## 2024-05-08 PROCEDURE — 3079F DIAST BP 80-89 MM HG: CPT | Performed by: OTOLARYNGOLOGY

## 2024-05-08 PROCEDURE — 92567 TYMPANOMETRY: CPT | Performed by: AUDIOLOGIST

## 2024-05-08 PROCEDURE — 3077F SYST BP >= 140 MM HG: CPT | Performed by: OTOLARYNGOLOGY

## 2024-05-08 PROCEDURE — 3017F COLORECTAL CA SCREEN DOC REV: CPT | Performed by: OTOLARYNGOLOGY

## 2024-05-08 PROCEDURE — G8427 DOCREV CUR MEDS BY ELIG CLIN: HCPCS | Performed by: OTOLARYNGOLOGY

## 2024-05-08 PROCEDURE — 99214 OFFICE O/P EST MOD 30 MIN: CPT | Performed by: OTOLARYNGOLOGY

## 2024-05-08 PROCEDURE — G8417 CALC BMI ABV UP PARAM F/U: HCPCS | Performed by: OTOLARYNGOLOGY

## 2024-05-08 PROCEDURE — 1036F TOBACCO NON-USER: CPT | Performed by: OTOLARYNGOLOGY

## 2024-05-08 ASSESSMENT — ENCOUNTER SYMPTOMS
TROUBLE SWALLOWING: 0
APNEA: 0
COUGH: 0
VOICE CHANGE: 0
FACIAL SWELLING: 0
SINUS PRESSURE: 0
SORE THROAT: 0
EYE ITCHING: 0
SHORTNESS OF BREATH: 0

## 2024-05-08 NOTE — PROGRESS NOTES
[facility]  [address], [address]  [place] [address]  P: [phone]       Patient     Arthur Townsend  1960    ChiefComplaint     Chief Complaint   Patient presents with    New Patient    Cerumen Impaction    Hearing Problem    Ear Problem     L Ear infection that is not going away.  X 1 mo.  Given antibiotic and steroids x 2.          History of Present Illness     Arthur is a 63-year-old female here today for evaluation of left ear.  Reports she recently developed a left acute otitis media was treated with 2 rounds of antibiotics symptoms have now improved after 1 month.  Reports chronic left ear pressure sensation that never resolves.    Past Medical History     Past Medical History:   Diagnosis Date    Bipolar disorder     Borderline osteopenia     Cellulitis of left leg 2020    Frequency of micturition     GERD (gastroesophageal reflux disease)     Headache(784.0)     HNP (herniated nucleus pulposus), lumbar 2019    Hyperlipidemia     Hypertension     Intention tremor     due to lithium    Iron deficiency anemia 2019    Lateral meniscal tear 10/14/2015    Lumbar stenosis with neurogenic claudication 2019    Medial meniscus tear 10/14/2015    Mixed incontinence     Morbid (severe) obesity due to excess calories (HCC)     Prolonged emergence from general anesthesia     Prolonged emergence from general anesthesia, initial encounter 2019    Tobacco use     Venous ulcer of left leg (HCC) 2020       Past Surgical History     Past Surgical History:   Procedure Laterality Date    ABDOMINAL EXPLORATION SURGERY      ANUS SURGERY N/A 2023    ANAL FISTULOTOMY performed by Roney Crum MD at [facility] OR    CARPAL TUNNEL RELEASE Bilateral 2005     SECTION      CHOLECYSTECTOMY, LAPAROSCOPIC N/A 2022    LAPAROSCOPIC CHOLECYSTECTOMY performed by Roney Crum MD at [facility] OR    COLONOSCOPY      normal    COLONOSCOPY N/A 2021

## 2024-05-08 NOTE — TELEPHONE ENCOUNTER
Patient called in stating that she has been using the diclofenac gel that was provided by pcp during her last appointment and states she has been having great benefit. Patient is asking if a script for the medication can be sent to her [facility] pharmacy.  Medication pended below to be sent if approved.

## 2024-05-08 NOTE — PROGRESS NOTES
Arthur Townsend   1960, 63 y.o. female   [id number]       Referring Provider: Lawanda Sanchez DO  Referral Type: In an order in Epic    Reason for Visit: Evaluation of the cause of disorders of hearing, tinnitus, or balance.    ADULT AUDIOLOGIC EVALUATION      Arthur Townsend is a 63 y.o. female seen today, 5/8/2024 , for an initial audiologic evaluation.  Patient was seen by Lawanda Sanchez DO following today's evaluation. Patient was alone.    AUDIOLOGIC AND OTHER PERTINENT MEDICAL HISTORY:      Arthur Townsend noted aural fullness, tinnitus, and family history of hearing loss.  Patient reports constant left ear aural fullness.  She mentioned having bilateral constant tinnitus.  Patient has a family history of hearing loss, father, related to noise exposure.      Arthur Townsend denied otalgia, dizziness, history of occupational/recreational noise exposure, history of head trauma, and history of ear surgery.    Date: 5/8/2024     IMPRESSIONS:      Normal middle ear pressure and compliance, bilaterally.  Abnormal hearing sensitivity, bilaterally, which can affect every day listening needs.  Word understanding was excellent presented at elevated sensation levels, bilaterally.  Hearing aids are recommended at this time.  Follow medical recommendations of Lawanda Sanchez DO.     ASSESSMENT AND FINDINGS:     Otoscopy revealed: Clear ear canals bilaterally    RIGHT EAR:  Hearing Sensitivity: Normal hearing sensitivity to a moderately severe sensorineural hearing loss from 250-8000 Hz  Speech Recognition Threshold: 30 dB HL  Word Recognition:Excellent (96%), based on NU-6 25-word list at 65 dBHL using recorded speech stimuli.    Tympanometry: Normal peak pressure and compliance, Type A tympanogram, consistent with normal middle ear function.      LEFT EAR:  Hearing Sensitivity: Mild to a severe mixed hearing loss from 250-8000 Hz  Speech Recognition Threshold: 40 dB HL  Word Recognition: Excellent

## 2024-05-23 RX ORDER — FLUTICASONE PROPIONATE 50 MCG
2 SPRAY, SUSPENSION (ML) NASAL DAILY
Qty: 16 G | Refills: 2 | Status: SHIPPED | OUTPATIENT
Start: 2024-05-23

## 2024-05-23 NOTE — TELEPHONE ENCOUNTER
Refill Request     CONFIRM preferrred pharmacy with the patient.    If Mail Order Rx - Pend for 90 day refill.      Last Seen: Last Seen Department: 2024  Last Seen by PCP: 2024    Last Written: 24    If no future appointment scheduled, route STAFF MESSAGE with patient name to the  Pool for scheduling.      Next Appointment:   Future Appointments   Date Time Provider Department Center   10/17/2024  1:00 PM Thu Yoder, APRN - CNP [facility]  [place] [facility]       Message sent to  to schedule appt with patient?  N/A      Requested Prescriptions     Pending Prescriptions Disp Refills    fluticasone (FLONASE) 50 MCG/ACT nasal spray 16 g 2     Si sprays by Each Nostril route daily

## 2024-06-05 NOTE — PROGRESS NOTES
Subjective:     Patient Name: Sima Mix is a 61 y.o. female. Chief Complaint   Patient presents with    Medication Problem     Pt states that her Gabapentin is causing sores on tongue. Onset x 2 months. HPI   Hypertension:  Home blood pressure monitoring: No.  She is adherent to a low sodium diet. Patient denies chest pain, shortness of breath, headache, lightheadedness, blurred vision, peripheral edema, palpitations, dry cough and fatigue. Antihypertensive medication side effects: no medication side effects noted. Use of agents associated with hypertension: NSAIDS. Initially the patient had no diagnosis of hypertension but was on nadolol due to tremors ever her insurance company would no longer pay for it so she had to go off the medication. Once the patient had been off the medication it was noted that she was consistently having high blood pressure readings. At the last office visit the patient had been placed back on a beta blocker/metoprolol and has been on it for a few weeks and her blood pressure had improved                                     Sodium (mmol/L)   Date Value   10/17/2019 144    BUN (mg/dL)   Date Value   10/17/2019 18    Glucose (mg/dL)   Date Value   10/17/2019 102 (H)      Potassium (mmol/L)   Date Value   10/17/2019 4.7    CREATININE (mg/dL)   Date Value   10/17/2019 0.7             Hyperglycemia  Lab Results   Component Value Date    LABA1C 5.7 11/04/2019     Lab Results   Component Value Date    .9 11/04/2019       Insomnia:  Current treatment: avoiding heavy meal before bedtime, avoiding long naps during the day, avoiding use of electronic devices before bedtime, decreasing caffeine consumption, going to sleep at the same time each night and prescription sleep aid- trazodone- 100 mg nightly but uses rarely, which has been effective. Medication side effects: none. Mood Disorder:  Patient presents for follow-up of depression and anxiety disorder. Increased trazodone 11/4/19 office visit from 100 mg to 150 mg nightly but was then decreased again on 1/15/20 d/t side effects. She states she rarely uses trazodone    States that overall her depression is doing very well however she is concerned with some side effects from medications that she is having. The patient has recently started metoprolol, Wellbutrin and gabapentin within the past few months. Bilateral hand tremor   Patient also was started on Cogentin for bilateral arm  And leg twitching. Patient was having tremors. She stopped the cogentin last month and states tremors in   Right hand has almost stopped and left hand has significantly improved    Feels neurontin causes bumps on tongue and are sore. Ongoing for 2-6 months and keep coming and going. Has not tried anything for the problem   Taking neurontin 300 mg bid and read that these bumps on the tongue could be a side effect of the neurontin. Patient states her depression has never been better controlled since being on wellbutrin and does not want to stop medication    Patient states she is on magnesium OTC x 6-8 months for leg pains. She feels it is helping. She has no actual diagnosis of hypomagnesium. Lab Results   Component Value Date    MG 2.10 07/23/2019       Review of Systems   Constitutional: Negative. HENT: Positive for mouth sores. Eyes: Negative. Respiratory: Negative. Cardiovascular: Negative. Gastrointestinal: Negative. Genitourinary: Negative. Musculoskeletal: Negative. BLE leg cramps   Skin: Negative. Neurological: Positive for tremors. Psychiatric/Behavioral: Positive for sleep disturbance. All other systems reviewed and are negative.        Past Medical History:   Diagnosis Date    ADHD (attention deficit hyperactivity disorder)     Allergic rhinitis     Bipolar disorder     Borderline osteopenia     Depression     Dysmetabolic syndrome     GERD (gastroesophageal reflux disease)     Headache(784.0)     HNP (herniated nucleus pulposus), lumbar 2019    Hyperlipidemia 2014    Hypertension     Intention tremor     due to lithium    Lumbar stenosis with neurogenic claudication 2019    Obesity     ANTOINETTE on CPAP     Osteoarthritis     PCOS (polycystic ovarian syndrome)     RAD (reactive airway disease)     Restless legs syndrome (RLS) 2014     Family History   Problem Relation Age of Onset    Depression Sister     Mental Illness Sister     Diabetes Mother     Heart Disease Mother     Diabetes Father     Heart Disease Father      Past Surgical History:   Procedure Laterality Date    ABDOMINAL EXPLORATION SURGERY       SECTION      GASTRIC BYPASS SURGERY      HAND SURGERY      bilat CTS    KNEE SURGERY Left     atrhroscopic unispacer    KNEE SURGERY Right 10/28/2015    Right knee video arthroscopy with partial medial and lateral menisectomy with loose body removal    LUMBAR SPINE SURGERY N/A 2019    MICROLUMBAR DISCECTOMY L4-5 performed by Christ Duenas MD at [address] [place] [address] History     Socioeconomic History    Marital status: Legally      Spouse name: Not on file    Number of children: Not on file    Years of education: Not on file    Highest education level: Not on file   Occupational History    Not on file   Social Needs    Financial resource strain: Not on file    Food insecurity:     Worry: Not on file     Inability: Not on file    Transportation needs:     Medical: Not on file     Non-medical: Not on file   Tobacco Use    Smoking status: Former Smoker     Packs/day: 1.50     Years: 21.00     Pack years: 31.50     Types: Cigarettes     Last attempt to quit: 1997     Years since quittin.9    Smokeless tobacco: Never Used   Substance and Sexual Activity    Alcohol use: No    Drug use: No    Sexual activity: Not Currently   Lifestyle    Physical activity:     Days per murmur. No friction rub. No gallop. Pulmonary:      Effort: Pulmonary effort is normal.      Breath sounds: Normal breath sounds. Abdominal:      General: Bowel sounds are normal.      Palpations: Abdomen is soft. There is no mass. Tenderness: There is no abdominal tenderness. Musculoskeletal: Normal range of motion. Lymphadenopathy:      Head:      Right side of head: No submandibular adenopathy. Left side of head: No submandibular adenopathy. Cervical: No cervical adenopathy. Skin:     General: Skin is warm and dry. Findings: No lesion or rash. Neurological:      Mental Status: She is alert and oriented to person, place, and time. Gait: Gait normal.   Psychiatric:         Speech: Speech normal.         Behavior: Behavior normal.         Thought Content: Thought content normal.         Judgment: Judgment normal.         /86 (Site: Left Lower Arm, Position: Sitting, Cuff Size: Large Adult)   Pulse 83   Resp 16   Ht 5' 7.01\" (1.702 m)   Wt (!) 330 lb 6.4 oz (149.9 kg)   LMP  (LMP Unknown)   SpO2 97%   BMI 51.74 kg/m²   Body mass index is 51.74 kg/m². BP Readings from Last 2 Encounters:   02/05/20 132/86   01/15/20 130/83       Wt Readings from Last 3 Encounters:   02/05/20 (!) 330 lb 6.4 oz (149.9 kg)   01/15/20 (!) 326 lb (147.9 kg)   12/17/19 (!) 320 lb 15.8 oz (145.6 kg)       Lab Review   No visits with results within 2 Month(s) from this visit. Latest known visit with results is:   Office Visit on 11/04/2019   Component Date Value    Iron 11/04/2019 90     TIBC 11/04/2019 374     Iron Saturation 11/04/2019 24     Ferritin 11/04/2019 49.0     Hemoglobin A1C 11/04/2019 5.7     eAG 11/04/2019 116.9        No results found for this visit on 02/05/20. Assessment:       1. Mood disorder ([address]    2. Mouth sores    3. Tremor of both hands    4. Leg cramps    5. Essential hypertension    6. Hyperglycemia    7.  Primary insomnia        No results found for no

## 2024-06-10 ENCOUNTER — TELEPHONE (OUTPATIENT)
Dept: FAMILY MEDICINE CLINIC | Age: 64
End: 2024-06-10

## 2024-06-10 RX ORDER — FLUTICASONE PROPIONATE AND SALMETEROL 500; 50 UG/1; UG/1
1 POWDER RESPIRATORY (INHALATION) EVERY 12 HOURS
Qty: 60 EACH | Refills: 5 | Status: SHIPPED | OUTPATIENT
Start: 2024-06-10

## 2024-06-10 NOTE — TELEPHONE ENCOUNTER
[facility] called and stated that the Advair has NELSON on it. But they was wanting to see if they could change it from NELSON. They do not have the Advair brand name, it is the one in [place] that she gets it from.

## 2024-06-27 RX ORDER — CELECOXIB 200 MG/1
200 CAPSULE ORAL 2 TIMES DAILY
Qty: 180 CAPSULE | Refills: 2 | Status: SHIPPED | OUTPATIENT
Start: 2024-06-27

## 2024-07-01 DIAGNOSIS — I10 ESSENTIAL HYPERTENSION: ICD-10-CM

## 2024-07-01 RX ORDER — METOPROLOL SUCCINATE 50 MG/1
50 TABLET, EXTENDED RELEASE ORAL DAILY
Qty: 90 TABLET | Refills: 0 | Status: SHIPPED | OUTPATIENT
Start: 2024-07-01

## 2024-07-01 NOTE — TELEPHONE ENCOUNTER
Refill Request     CONFIRM preferrred pharmacy with the patient.    If Mail Order Rx - Pend for 90 day refill.      Last Seen: Last Seen Department: 4/18/2024  Last Seen by PCP: 4/18/2024    Last Written: 4/1/24    If no future appointment scheduled, route STAFF MESSAGE with patient name to the  Pool for scheduling.      Next Appointment:   Future Appointments   Date Time Provider Department Center   10/17/2024  1:00 PM Thu Yoder, APRN - CNP [facility]  [place] [facility]       Message sent to  to schedule appt with patient?  N/A      Requested Prescriptions     Pending Prescriptions Disp Refills    metoprolol succinate (TOPROL XL) 50 MG extended release tablet 90 tablet 0     Sig: Take 1 tablet by mouth daily

## 2024-07-08 DIAGNOSIS — I10 ESSENTIAL HYPERTENSION: ICD-10-CM

## 2024-07-08 RX ORDER — METOPROLOL SUCCINATE 50 MG/1
50 TABLET, EXTENDED RELEASE ORAL DAILY
Qty: 90 TABLET | Refills: 0 | Status: SHIPPED | OUTPATIENT
Start: 2024-07-08

## 2024-07-08 RX ORDER — CELECOXIB 200 MG/1
200 CAPSULE ORAL 2 TIMES DAILY
Qty: 180 CAPSULE | Refills: 0 | Status: SHIPPED | OUTPATIENT
Start: 2024-07-08

## 2024-07-08 NOTE — TELEPHONE ENCOUNTER
Refill Request     CONFIRM preferrred pharmacy with the patient.    If Mail Order Rx - Pend for 90 day refill.      Last Seen: Last Seen Department: 4/18/2024  Last Seen by PCP: 4/18/2024    Last Written: metoprolol 7/1/24, celebrex 6/27/24    If no future appointment scheduled, route STAFF MESSAGE with patient name to the  Pool for scheduling.      Next Appointment:   Future Appointments   Date Time Provider Department Center   10/17/2024  1:00 PM Thu Yoder, [facility]ADINA - [facility] [place] [facility]       Message sent to  to schedule appt with patient?  N/A      Requested Prescriptions     Pending Prescriptions Disp Refills    metoprolol succinate (TOPROL XL) 50 MG extended release tablet [Pharmacy Med Name: metoprolol succinate ER 50 mg tablet,extended release 24 hr] 90 tablet 0     Sig: TAKE ONE TABLET BY MOUTH EVERY DAY    celecoxib (CELEBREX) 200 MG capsule [Pharmacy Med Name: celecoxib 200 mg capsule] 180 capsule 0     Sig: TAKE ONE CAPSULE BY MOUTH TWICE DAILY

## 2024-07-10 ENCOUNTER — COMMUNITY OUTREACH (OUTPATIENT)
Dept: FAMILY MEDICINE CLINIC | Age: 64
End: 2024-07-10

## 2024-07-10 NOTE — PROGRESS NOTES
Patient's HM shows they are overdue for Mammogram Screening.  Care Everywhere and  files searched.  No results to attach to order nor HM updated.        [Joint Pain] : joint pain [FreeTextEntry9] : l-spine

## 2024-07-19 ENCOUNTER — OFFICE VISIT (OUTPATIENT)
Dept: ORTHOPEDIC SURGERY | Age: 64
End: 2024-07-19

## 2024-07-19 DIAGNOSIS — M17.0 PRIMARY OSTEOARTHRITIS OF BOTH KNEES: Primary | ICD-10-CM

## 2024-07-19 NOTE — PROGRESS NOTES
Constitutional:       General: He is not in acute distress.     Appearance: Normal appearance.   Cardiovascular:      Rate and Rhythm: Normal rate and regular rhythm.      Pulses: Normal pulses.   Pulmonary:      Effort: Pulmonary effort is normal. No respiratory distress.   Neurological:      Mental Status: He is alert and oriented to person, place, and time. Mental status is at baseline.   Skin: Mean, dry, intact.  No open sores  Lymphatics: No palpable lymph nodes    Musculoskeletal:  Gait:  with walker    R Knee: Skin in tact without ulcerations or previous scars  Minimal Effusion  Tender to palpation at medial joint line  ROM: 5-90 with pain and crpeitus  Grossly stable to varus / valgus stress and posterior drawer   No evidence of neurovascular compromise distally  Special tests: None          Imaging    4 views of bilateral knees ordered obtained interpreted reviewed.  The right knee shows end-stage tricompartmental arthritis with bone-on-bone changes in the medial compartment KL grade 4.  The left knee shows also tricompartmental arthritis with the presence of a older generation medial implant.      Orders Placed This Encounter   Procedures    XR KNEE RIGHT (MIN 4 VIEWS)     Standing Status:   Future     Number of Occurrences:   1     Standing Expiration Date:   7/10/2025     Order Specific Question:   Reason for exam:     Answer:   pain    XR KNEE LEFT (MIN 4 VIEWS)     Standing Status:   Future     Number of Occurrences:   1     Standing Expiration Date:   7/10/2025     Order Specific Question:   Reason for exam:     Answer:   pain       Assessment and Plan  Arthur was seen today for follow-up.    Diagnoses and all orders for this visit:    Primary osteoarthritis of both knees  -     XR KNEE RIGHT (MIN 4 VIEWS); Future  -     XR KNEE LEFT (MIN 4 VIEWS); Future      Arthur has end-stage osteoarthritis of the R knee which is significantly impacting activities of daily living and mobility despite

## 2024-07-22 ENCOUNTER — PREP FOR PROCEDURE (OUTPATIENT)
Dept: ORTHOPEDIC SURGERY | Age: 64
End: 2024-07-22

## 2024-07-22 DIAGNOSIS — R82.3 HEMOGLOBINURIA: ICD-10-CM

## 2024-07-22 DIAGNOSIS — R79.1 ABNORMAL COAGULATION PROFILE: ICD-10-CM

## 2024-07-22 DIAGNOSIS — R78.71 ABNORMAL LEAD LEVEL IN BLOOD: ICD-10-CM

## 2024-07-22 DIAGNOSIS — M17.0 PRIMARY OSTEOARTHRITIS OF BOTH KNEES: Primary | ICD-10-CM

## 2024-07-22 DIAGNOSIS — R73.03 PREDIABETES: ICD-10-CM

## 2024-07-22 PROBLEM — M17.11 OSTEOARTHRITIS OF RIGHT KNEE: Status: ACTIVE | Noted: 2024-07-22

## 2024-07-23 ENCOUNTER — TELEPHONE (OUTPATIENT)
Dept: FAMILY MEDICINE CLINIC | Age: 64
End: 2024-07-23

## 2024-07-23 NOTE — TELEPHONE ENCOUNTER
----- Message from Sagar Rome sent at 7/22/2024 10:44 AM EDT -----  Regarding: [facility] Appointment Request  E[facility] Appointment Request    Patient needs appointment for E[facility] Appointment Type: Pre-Op Visit.  Patient needs a PreOp Visit ,  Surgery date August 8 2024 .   Knee Surgery     Patient Requested Dates(s): August 1 , 2024  Patient Requested Time: afternoon   Provider Name:Thu Yoder     Reason for Appointment Request: Established Patient - Available appointments did not meet patient need  --------------------------------------------------------------------------------------------------------------------------    Relationship to Patient: Self     Call Back Information: OK to leave message on voicemail  Preferred Call Back Number: Phone [phone] (home)

## 2024-07-23 NOTE — TELEPHONE ENCOUNTER
Pt called and scheduled an appointment, no 40 minute openings for a preop with pcp so pt placed on juana np schedule for the requested date  Future Appointments   Date Time Provider Department Center   8/1/2024  8:20 AM Juana Hines APRN - CNP [facility]  [place] [facility]   8/30/2024  1:15 PM Alex Leslie MD [facility]   10/17/2024  1:00 PM [facility]

## 2024-07-24 RX ORDER — SODIUM CHLORIDE 9 MG/ML
INJECTION, SOLUTION INTRAVENOUS PRN
Status: CANCELLED | OUTPATIENT
Start: 2024-07-24

## 2024-07-24 RX ORDER — ACETAMINOPHEN 325 MG/1
1000 TABLET ORAL ONCE
Status: CANCELLED | OUTPATIENT
Start: 2024-07-24 | End: 2024-07-24

## 2024-07-24 RX ORDER — CELECOXIB 200 MG/1
200 CAPSULE ORAL ONCE
Status: CANCELLED | OUTPATIENT
Start: 2024-07-24 | End: 2024-07-24

## 2024-07-24 RX ORDER — SODIUM CHLORIDE 0.9 % (FLUSH) 0.9 %
5-40 SYRINGE (ML) INJECTION PRN
Status: CANCELLED | OUTPATIENT
Start: 2024-07-24

## 2024-07-24 RX ORDER — SODIUM CHLORIDE 0.9 % (FLUSH) 0.9 %
5-40 SYRINGE (ML) INJECTION EVERY 12 HOURS SCHEDULED
Status: CANCELLED | OUTPATIENT
Start: 2024-07-24

## 2024-07-24 NOTE — PROGRESS NOTES
Arthur Townsend    Age 64 y.o.    female    1960    MRN [id number]    8/8/2024  Arrival Time_____________  OR Time____________126 Min     Procedure(s):  RIGHT TOTAL KNEE ARTHROPLASTY WITH 75MM STEM                            JOSE NOTE:  ADDUCTOR CANAL BLOCK                      General   Surgeon(s):  Alex Leslie, MD      DAY ADMIT ___  SDS/OP ___  OUTPT IN BED ___        Phone [phone] (home)                  PCP _____________________ Phone_________________ Epic ( ) Epic CE ( ) Appt ________    NOTES: _________________________________________ Consult/Cardio _______________    ____________________________________________________________________________    ____________________________________________________________________________  PAT APPT DATE:________ TIME: ________  FAXED QAD: _______  (__) H&P w/ Hospitalist    (__) PAT orders in EPIC    (__) Meet with PAT nurse  __________________________________________________________________________  Preop Nurse phone screen complete: _____________  (__) CBC     (__) W/ DIFF ___________  (__) CT CHEST  __________   (__) Hgb A1C    ___________  (__) CHEST X RAY   __________  (__) LIPID PROFILE  ___________  (__) EKG   __________  (__) PT-INR / APTT  ___________  (__) PFT's   __________  (__) BMP   ___________  (__) CAROTIDS  __________  (__) CMP   ___________  (__) VEIN MAPPING  __________  (__) U/A   ___________  ( X ) HISTORY & PHYSICAL __________  (__) URINE C & S  ___________  (__) CARDIAC CLEARANCE __________  (__) U/A W/ FLEX  ___________  (__) PULM. CLEARANCE __________  (__) SERUM PREGNANCY ___________  (__) Preop Orders in EPIC __________  (__) TYPE & SCREEN __________repeat ( ) (__)  __________________ __________  (__) Albumin   ___________  (__)  __________________ __________  (__) TRANSFERRIN  ___________  (__)  __________________ __________  (__) LIVER PROFILE  ___________  (__) URINE PREG DOS __________  (__) MRSA NASAL

## 2024-07-30 ENCOUNTER — TELEPHONE (OUTPATIENT)
Dept: ORTHOPEDIC SURGERY | Age: 64
End: 2024-07-30

## 2024-07-30 NOTE — TELEPHONE ENCOUNTER
General Question     Subject: MEDICATION QUESTIONS   Patient and /or Facility Request: Arthur Orozco \"Arthur Townsend\"   Contact Number: [phone]     PATIENT CALLING REQUESTING A CALL BACK FROM SOMEONE IN DR MOON'S OFFICE REGARDING MEDICATION SHE WANTS TO KNOW IF SHE SHOULD STOP TAKING BEFORE HER SURGERY       PLEASE CALL THE PATIENT BACK AT THE ABOVE NUMBER

## 2024-07-31 ENCOUNTER — TELEPHONE (OUTPATIENT)
Dept: ORTHOPEDIC SURGERY | Age: 64
End: 2024-07-31

## 2024-07-31 NOTE — TELEPHONE ENCOUNTER
ORTHOPAEDIC NURSE NAVIGATOR SUMMARY NOTE      Anticipated Date of Surgery: 8/8/24    Recieved Pre-Op Education: yes   In person class:no  Pt used educational link:yes   Pt completed pre and post test to measure learning:yes    If pt did not complete either, why not?  N/A      PCP: Thu Yoder APRN - CNP   Phone #: [phone]    Date of PCP Visit for H&P: 8/1/24    Any Noted Concerns from PCP prior to surgery:  No   If Yes, what concerns?:    IS THE PATIENT IN A PAIN MANAGEMENT PROGRAM?:   No     Review of Past Medical History Reveals History of:      Critical Lab Values:   Hgb/Hct:   Hemoglobin (g/dL)   Date Value   03/06/2024 14.3   /  Hematocrit (%)   Date Value   03/06/2024 43.6      HgbA1C:    Lab Results   Component Value Date    LABA1C 5.4 03/06/2024    LABA1C 5.7 05/25/2023    LABA1C 5.8 10/07/2021      Albumin:  No results found for: \"LABALBU\"   BUN/Cr:   BUN (mg/dL)   Date Value   03/06/2024 29 (H)   /  Creatinine (mg/dL)   Date Value   03/06/2024 0.8      BMI:    BMI Readings from Last 1 Encounters:   05/08/24 56.12 kg/m²        Coronary Artery Disease/HTN/CHF History: Yes- HTN       -On any anticoagulation-none       Diabetes History: No     Pulmonary: COPD/Emphysema/ Use of home oxygen: none     Alcohol use:        DVT Risk Stratification:  Low       -Smoking history or use of estrogen-         BMI Greater than 40 at time of scheduling?: Yes    Has Surgeon been notified of BMI concern? Yes    Weight Loss Clinic Consult Ordered: No    Date of Wt Loss Clinic Appt:     BMI at time of surgery (if went through Wt Mgmt):      Additional Medical Concerns:         Discharge Disposition Information:     Attended Pre-Hab Program: no     Anticipated Discharge Disposition:  SNF pt would like to go to [facility].  Had long discussion with patient about rehab facility and how the process works after surgery.  Pt will have Physical therapy and they will give recommendations.  If PT does not

## 2024-08-01 ENCOUNTER — OFFICE VISIT (OUTPATIENT)
Dept: FAMILY MEDICINE CLINIC | Age: 64
End: 2024-08-01

## 2024-08-01 ENCOUNTER — HOSPITAL ENCOUNTER (OUTPATIENT)
Age: 64
Discharge: HOME | End: 2024-08-01
Payer: MEDICARE

## 2024-08-01 ENCOUNTER — TELEPHONE (OUTPATIENT)
Dept: ORTHOPEDIC SURGERY | Age: 64
End: 2024-08-01

## 2024-08-01 VITALS
DIASTOLIC BLOOD PRESSURE: 70 MMHG | SYSTOLIC BLOOD PRESSURE: 128 MMHG | OXYGEN SATURATION: 97 % | WEIGHT: 293 LBS | BODY MASS INDEX: 55.32 KG/M2 | HEIGHT: 61 IN | HEART RATE: 76 BPM

## 2024-08-01 DIAGNOSIS — Z01.818 PREOP EXAMINATION: Primary | ICD-10-CM

## 2024-08-01 DIAGNOSIS — Z96.652 S/P REVISION OF TOTAL KNEE, LEFT: ICD-10-CM

## 2024-08-01 DIAGNOSIS — M17.0 PRIMARY OSTEOARTHRITIS OF BOTH KNEES: ICD-10-CM

## 2024-08-01 DIAGNOSIS — F33.2 SEVERE EPISODE OF RECURRENT MAJOR DEPRESSIVE DISORDER, WITHOUT PSYCHOTIC FEATURES (HCC): ICD-10-CM

## 2024-08-01 DIAGNOSIS — R82.3 HEMOGLOBINURIA: ICD-10-CM

## 2024-08-01 DIAGNOSIS — E78.49 OTHER HYPERLIPIDEMIA: ICD-10-CM

## 2024-08-01 DIAGNOSIS — T84.84XS PAINFUL TOTAL KNEE REPLACEMENT, SEQUELA: Primary | ICD-10-CM

## 2024-08-01 DIAGNOSIS — R79.1 ABNORMAL COAGULATION PROFILE: ICD-10-CM

## 2024-08-01 DIAGNOSIS — E55.9 VITAMIN D DEFICIENCY: ICD-10-CM

## 2024-08-01 DIAGNOSIS — F51.01 PRIMARY INSOMNIA: ICD-10-CM

## 2024-08-01 DIAGNOSIS — I10 PRIMARY HYPERTENSION: ICD-10-CM

## 2024-08-01 DIAGNOSIS — Z96.659 PAINFUL TOTAL KNEE REPLACEMENT, SEQUELA: Primary | ICD-10-CM

## 2024-08-01 DIAGNOSIS — G47.33 OSA ON CPAP: ICD-10-CM

## 2024-08-01 DIAGNOSIS — I87.2 PERIPHERAL VENOUS INSUFFICIENCY: ICD-10-CM

## 2024-08-01 DIAGNOSIS — E66.01 MORBID OBESITY WITH BMI OF 50.0-59.9, ADULT (HCC): ICD-10-CM

## 2024-08-01 DIAGNOSIS — R78.71 ABNORMAL LEAD LEVEL IN BLOOD: ICD-10-CM

## 2024-08-01 DIAGNOSIS — J45.40 MODERATE PERSISTENT REACTIVE AIRWAY DISEASE WITHOUT COMPLICATION: ICD-10-CM

## 2024-08-01 DIAGNOSIS — R73.03 PREDIABETES: ICD-10-CM

## 2024-08-01 DIAGNOSIS — G25.81 RESTLESS LEGS SYNDROME (RLS): ICD-10-CM

## 2024-08-01 LAB
ALBUMIN SERPL-MCNC: 4.3 G/DL (ref 3.4–5)
ALBUMIN/GLOB SERPL: 1.4 {RATIO} (ref 1.1–2.2)
ALP SERPL-CCNC: 126 U/L (ref 40–129)
ALT SERPL-CCNC: 19 U/L (ref 10–40)
ANION GAP SERPL CALCULATED.3IONS-SCNC: 14 MMOL/L (ref 3–16)
APTT BLD: 30.6 SEC (ref 22.1–36.4)
AST SERPL-CCNC: 20 U/L (ref 15–37)
BACTERIA URNS QL MICRO: ABNORMAL /HPF
BASOPHILS # BLD: 0.1 K/UL (ref 0–0.2)
BASOPHILS NFR BLD: 0.8 %
BILIRUB SERPL-MCNC: 0.4 MG/DL (ref 0–1)
BILIRUB UR QL STRIP.AUTO: NEGATIVE
BUN SERPL-MCNC: 23 MG/DL (ref 7–20)
CALCIUM SERPL-MCNC: 9.7 MG/DL (ref 8.3–10.6)
CHLORIDE SERPL-SCNC: 106 MMOL/L (ref 99–110)
CLARITY UR: CLEAR
CO2 SERPL-SCNC: 26 MMOL/L (ref 21–32)
COLOR UR: YELLOW
CREAT SERPL-MCNC: 0.7 MG/DL (ref 0.6–1.2)
DEPRECATED RDW RBC AUTO: 14.3 % (ref 12.4–15.4)
EOSINOPHIL # BLD: 0.1 K/UL (ref 0–0.6)
EOSINOPHIL NFR BLD: 1.7 %
EPI CELLS #/AREA URNS HPF: ABNORMAL /HPF (ref 0–5)
EST. AVERAGE GLUCOSE BLD GHB EST-MCNC: 111.2 MG/DL
GFR SERPLBLD CREATININE-BSD FMLA CKD-EPI: >90 ML/MIN/{1.73_M2}
GLUCOSE SERPL-MCNC: 108 MG/DL (ref 70–99)
GLUCOSE UR STRIP.AUTO-MCNC: NEGATIVE MG/DL
HBA1C MFR BLD: 5.5 %
HCT VFR BLD AUTO: 43.9 % (ref 36–48)
HGB BLD-MCNC: 14 G/DL (ref 12–16)
HGB UR QL STRIP.AUTO: NEGATIVE
INR PPP: 0.97 (ref 0.85–1.15)
KETONES UR STRIP.AUTO-MCNC: NEGATIVE MG/DL
LEUKOCYTE ESTERASE UR QL STRIP.AUTO: ABNORMAL
LYMPHOCYTES # BLD: 1.7 K/UL (ref 1–5.1)
LYMPHOCYTES NFR BLD: 21.5 %
MCH RBC QN AUTO: 28.3 PG (ref 26–34)
MCHC RBC AUTO-ENTMCNC: 32 G/DL (ref 31–36)
MCV RBC AUTO: 88.5 FL (ref 80–100)
MONOCYTES # BLD: 0.7 K/UL (ref 0–1.3)
MONOCYTES NFR BLD: 9.1 %
MUCOUS THREADS #/AREA URNS LPF: ABNORMAL /LPF
NEUTROPHILS # BLD: 5.2 K/UL (ref 1.7–7.7)
NEUTROPHILS NFR BLD: 66.9 %
NITRITE UR QL STRIP.AUTO: NEGATIVE
PH UR STRIP.AUTO: 6 [PH] (ref 5–8)
PLATELET # BLD AUTO: 320 K/UL (ref 135–450)
PMV BLD AUTO: 6.7 FL (ref 5–10.5)
POTASSIUM SERPL-SCNC: 4.1 MMOL/L (ref 3.5–5.1)
PROT SERPL-MCNC: 7.3 G/DL (ref 6.4–8.2)
PROT UR STRIP.AUTO-MCNC: NEGATIVE MG/DL
PROTHROMBIN TIME: 13.1 SEC (ref 11.9–14.9)
RBC # BLD AUTO: 4.96 M/UL (ref 4–5.2)
RBC #/AREA URNS HPF: ABNORMAL /HPF (ref 0–4)
SODIUM SERPL-SCNC: 146 MMOL/L (ref 136–145)
SP GR UR STRIP.AUTO: 1.02 (ref 1–1.03)
UA COMPLETE W REFLEX CULTURE PNL UR: YES
UA DIPSTICK W REFLEX MICRO PNL UR: YES
URN SPEC COLLECT METH UR: ABNORMAL
UROBILINOGEN UR STRIP-ACNC: 1 E.U./DL
WBC # BLD AUTO: 7.7 K/UL (ref 4–11)
WBC #/AREA URNS HPF: ABNORMAL /HPF (ref 0–5)

## 2024-08-01 PROCEDURE — 83036 HEMOGLOBIN GLYCOSYLATED A1C: CPT

## 2024-08-01 PROCEDURE — 80053 COMPREHEN METABOLIC PANEL: CPT

## 2024-08-01 PROCEDURE — 36415 COLL VENOUS BLD VENIPUNCTURE: CPT

## 2024-08-01 PROCEDURE — 85610 PROTHROMBIN TIME: CPT

## 2024-08-01 PROCEDURE — 84466 ASSAY OF TRANSFERRIN: CPT

## 2024-08-01 PROCEDURE — 87081 CULTURE SCREEN ONLY: CPT

## 2024-08-01 PROCEDURE — 81001 URINALYSIS AUTO W/SCOPE: CPT

## 2024-08-01 PROCEDURE — 85730 THROMBOPLASTIN TIME PARTIAL: CPT

## 2024-08-01 PROCEDURE — 87086 URINE CULTURE/COLONY COUNT: CPT

## 2024-08-01 PROCEDURE — 85025 COMPLETE CBC W/AUTO DIFF WBC: CPT

## 2024-08-01 PROCEDURE — 82306 VITAMIN D 25 HYDROXY: CPT

## 2024-08-01 RX ORDER — MUPIROCIN 20 MG/G
OINTMENT TOPICAL
Qty: 22 G | Refills: 0 | Status: ON HOLD | OUTPATIENT
Start: 2024-08-01 | End: 2024-08-09 | Stop reason: HOSPADM

## 2024-08-01 RX ORDER — BENZTROPINE MESYLATE 0.5 MG/1
0.5 TABLET ORAL 2 TIMES DAILY PRN
COMMUNITY

## 2024-08-01 NOTE — PATIENT INSTRUCTIONS
Change in medication regimen before surgery: Take metoprolol, advair and lamictal on morning of surgery with sip of water, and hold all other medications until after surgery, Discontinue NSAIDs (Celebrex) 7 days before surgery, Discontinue multivitamin and vitamin D 7 days before surgery

## 2024-08-01 NOTE — TELEPHONE ENCOUNTER
Surgery and/or Procedure Scheduling     Contact Name: PLEASE CALL TO ADVISE ON PRE SX MEDS   Surgical/Procedure Request: Arthur Orozco \"Arthur Townsend\"   Patient Contact Number: [phone]      PLEASE CALL TO GO OVER THE SPECIAL PRE OP SOAP PROCEDURE AND NASAL ANTIBIOTIC. THE PAPERWORK JUST STATES 2-5 DAYS. PLEASE CALL TO VERIFY THE DETAILS WITH THE Pt

## 2024-08-01 NOTE — TELEPHONE ENCOUNTER
Mrsa protocol- abx ointment called and sent to pharmacy    Also went over protocol for hibi-cleanse can purchase otc. tha

## 2024-08-01 NOTE — PROGRESS NOTES
Surgery Date and Time: 8/8/24 0900 am    Arrival Time: 0700 am    The instructions given when and if a patient needs to stop oral intake prior to surgery varies. Follow the instructions you were given by your    Surgeon or RN during the Pre-op call.       __X__Nothing to eat or to drink after Midnight the night before the surgery. NO gum, mints, candy or ice chips day of surgery.                   __X___ Carbo-loading or ENHANCED RECOVERY instructions will be given to select patients.  Please do the following:     The evening before your surgery after dinner before midnight drink 40 ounces (2 - 20 ounce bottles) of Gatorade. If you are diabetic use sugar free.                     Only take the following medications with a small sip of water the morning of surgery: lamictal, buspirone, and advair                 Hold the following medications (per anesthesia or surgeon request):  turmeric and multivitamin        Last Dose: 8/1/24      Aspirin, Ibuprofen, Advil, Naproxen, Vitamin E and other Anti-inflammatory products and supplements should be stopped for 5 -7days before surgery      or as directed by your physician.      Check with your Surgeon/PCP/Cardiologist regarding stopping Plavix, Coumadin, Eliquis, Lovenox, Effient, Pradaxa, Xarelto, Fragmin or other blood thinners     and follow their instructions.  Medication:  N/A               Last dose:                - If you take a long acting-insulin, please ask your Primary Care Physician if you should decrease your dose the night before surgery.    - Do not smoke or vape, and do not drink any alcoholic beverages 24 hours prior to surgery, this includes NA Beer. Refrain from using any recreational drugs,     including non-prescribed prescription drugs.     -You may brush your teeth and gargle the morning of surgery.  DO NOT SWALLOW WATER.    -You MUST plan for a responsible adult to stay on site while you are here and take you home after your surgery. You will not  your sheets the night before surgery. Also, shower the night before & morning of surgery using an antibacterial     soap (Dial or Safeguard) or Hibiclens soap as instructed by your surgeon. Do not apply lotion after shower or day of surgery.              -To provide excellent care visitors will be limited to two per room at any given time.              -Please bring your picture ID and insurance card for registration prior to arriving to second floor surgery department.              -If you use a CPAP/BiPAP please bring with you on the day of the surgery. If you use oxygen, please bring portable tank with you.              -For your convenience [facility] has an outpatient pharmacy on site to fill your prescriptions prior to 5 pm.              -Bring a complete list of all your medications with name and dose including any supplements.              Visitor policy: May have 2-3 visitors in Surgery Waiting Room. Visiting hours are 8a-8p. Overnight visitors will be at the discretion of the unit nurse.    No visitors under the age of 14 permitted.     *Please call [facility] Preadmission Testing Department for any further questions  [phone]    [facility] - MAIN ENTRANCE   [address] [place] [address]   [id number]        Email sent: 8/1/24

## 2024-08-01 NOTE — PROGRESS NOTES
[facility]                                                                      Thu Yoder CNP                                                                     Preoperative Evaluation        Arthur Townsend  YOB: 1960    Date of Service:  8/1/2024    Vitals:    08/01/24 0844   BP: 128/70   Site: Left Upper Arm   Position: Sitting   Cuff Size: Medium Adult   Pulse: 76   SpO2: 97%   Weight: 134.7 kg (297 lb)   Height: 1.549 m (5' 1\")      Wt Readings from Last 2 Encounters:   05/08/24 134.7 kg (297 lb)   04/18/24 134.7 kg (297 lb)     BP Readings from Last 3 Encounters:   08/01/24 128/70   05/08/24 (!) 171/83   04/18/24 130/76        Chief Complaint   Patient presents with    Pre-op Exam     RIGHT TOTAL KNEE ARTHROPLASTY WITH 75MM STEM - Scheduled 8/8/2024 with Dr. Alex Leslie at the [facility]      Allergies   Allergen Reactions    Calamine Other (See Comments)     Leaves skin tender and burning     Amoxicillin Other (See Comments)    Amoxicillin-Pot Clavulanate Hives    Chlorpheniramine Maleate     Codeine Other (See Comments)    Daypro [Oxaprozin] Hives    Duravent-Da [Chlorphen-Phenyleph-Methscop] Hives    Lithium      Caused shakes    Methscopolamine     Norvasc [Amlodipine]      LE edema      Nsaids      Avoid d/t gastric bypass    Phenylephrine Hcl (Pressors)     Seldane [Terfenadine] Hives    Suprax [Cefixime] Hives    Levofloxacin Rash     No outpatient medications have been marked as taking for the 8/1/24 encounter (Appointment) with Thu Yoder APRN - CNP.       This patient presents to the office today for a preoperative consultation at the request of surgeon, Alex Soria MD , who plans on performing RIGHT TOTAL KNEE ARTHROPLASTY  on August 8 at [facility].  The current problem began several months ago, and symptoms have been

## 2024-08-02 ENCOUNTER — ANESTHESIA EVENT (OUTPATIENT)
Dept: OPERATING ROOM | Age: 64
End: 2024-08-02
Payer: MEDICARE

## 2024-08-02 LAB
25(OH)D3 SERPL-MCNC: 39 NG/ML
BACTERIA UR CULT: NORMAL
MRSA SPEC QL CULT: NORMAL
TRANSFERRIN SERPL-MCNC: 291 MG/DL (ref 200–360)

## 2024-08-08 ENCOUNTER — ANESTHESIA (OUTPATIENT)
Dept: OPERATING ROOM | Age: 64
End: 2024-08-08
Payer: MEDICARE

## 2024-08-08 ENCOUNTER — HOSPITAL ENCOUNTER (INPATIENT)
Age: 64
LOS: 2 days | Discharge: HOME HEALTH | DRG: 982 | End: 2024-08-12
Attending: STUDENT IN AN ORGANIZED HEALTH CARE EDUCATION/TRAINING PROGRAM | Admitting: STUDENT IN AN ORGANIZED HEALTH CARE EDUCATION/TRAINING PROGRAM
Payer: MEDICARE

## 2024-08-08 ENCOUNTER — APPOINTMENT (OUTPATIENT)
Dept: GENERAL RADIOLOGY | Age: 64
DRG: 982 | End: 2024-08-08
Attending: STUDENT IN AN ORGANIZED HEALTH CARE EDUCATION/TRAINING PROGRAM
Payer: MEDICARE

## 2024-08-08 DIAGNOSIS — Z96.651 S/P TOTAL KNEE ARTHROPLASTY, RIGHT: Primary | ICD-10-CM

## 2024-08-08 DIAGNOSIS — M17.0 PRIMARY OSTEOARTHRITIS OF BOTH KNEES: ICD-10-CM

## 2024-08-08 PROBLEM — M17.11 PRIMARY OSTEOARTHRITIS OF RIGHT KNEE: Status: ACTIVE | Noted: 2024-08-08

## 2024-08-08 LAB
GLUCOSE BLD-MCNC: 107 MG/DL (ref 70–99)
PERFORMED ON: ABNORMAL
SPECIMEN STATUS: NORMAL

## 2024-08-08 PROCEDURE — 6370000000 HC RX 637 (ALT 250 FOR IP): Performed by: ANESTHESIOLOGY

## 2024-08-08 PROCEDURE — G0378 HOSPITAL OBSERVATION PER HR: HCPCS

## 2024-08-08 PROCEDURE — 3600000005 HC SURGERY LEVEL 5 BASE: Performed by: STUDENT IN AN ORGANIZED HEALTH CARE EDUCATION/TRAINING PROGRAM

## 2024-08-08 PROCEDURE — 97110 THERAPEUTIC EXERCISES: CPT

## 2024-08-08 PROCEDURE — 0SRC0J9 REPLACEMENT OF RIGHT KNEE JOINT WITH SYNTHETIC SUBSTITUTE, CEMENTED, OPEN APPROACH: ICD-10-PCS | Performed by: STUDENT IN AN ORGANIZED HEALTH CARE EDUCATION/TRAINING PROGRAM

## 2024-08-08 PROCEDURE — 6360000002 HC RX W HCPCS: Performed by: STUDENT IN AN ORGANIZED HEALTH CARE EDUCATION/TRAINING PROGRAM

## 2024-08-08 PROCEDURE — 3600000015 HC SURGERY LEVEL 5 ADDTL 15MIN: Performed by: STUDENT IN AN ORGANIZED HEALTH CARE EDUCATION/TRAINING PROGRAM

## 2024-08-08 PROCEDURE — 2700000000 HC OXYGEN THERAPY PER DAY

## 2024-08-08 PROCEDURE — 6360000002 HC RX W HCPCS: Performed by: ANESTHESIOLOGY

## 2024-08-08 PROCEDURE — 2500000003 HC RX 250 WO HCPCS: Performed by: NURSE ANESTHETIST, CERTIFIED REGISTERED

## 2024-08-08 PROCEDURE — 7100000001 HC PACU RECOVERY - ADDTL 15 MIN: Performed by: STUDENT IN AN ORGANIZED HEALTH CARE EDUCATION/TRAINING PROGRAM

## 2024-08-08 PROCEDURE — 2500000003 HC RX 250 WO HCPCS: Performed by: STUDENT IN AN ORGANIZED HEALTH CARE EDUCATION/TRAINING PROGRAM

## 2024-08-08 PROCEDURE — 97162 PT EVAL MOD COMPLEX 30 MIN: CPT

## 2024-08-08 PROCEDURE — 7100000000 HC PACU RECOVERY - FIRST 15 MIN: Performed by: STUDENT IN AN ORGANIZED HEALTH CARE EDUCATION/TRAINING PROGRAM

## 2024-08-08 PROCEDURE — 6360000002 HC RX W HCPCS: Performed by: NURSE ANESTHETIST, CERTIFIED REGISTERED

## 2024-08-08 PROCEDURE — 73560 X-RAY EXAM OF KNEE 1 OR 2: CPT

## 2024-08-08 PROCEDURE — 97116 GAIT TRAINING THERAPY: CPT

## 2024-08-08 PROCEDURE — 27447 TOTAL KNEE ARTHROPLASTY: CPT | Performed by: STUDENT IN AN ORGANIZED HEALTH CARE EDUCATION/TRAINING PROGRAM

## 2024-08-08 PROCEDURE — 94761 N-INVAS EAR/PLS OXIMETRY MLT: CPT

## 2024-08-08 PROCEDURE — C1713 ANCHOR/SCREW BN/BN,TIS/BN: HCPCS | Performed by: STUDENT IN AN ORGANIZED HEALTH CARE EDUCATION/TRAINING PROGRAM

## 2024-08-08 PROCEDURE — 2720000010 HC SURG SUPPLY STERILE: Performed by: STUDENT IN AN ORGANIZED HEALTH CARE EDUCATION/TRAINING PROGRAM

## 2024-08-08 PROCEDURE — C1776 JOINT DEVICE (IMPLANTABLE): HCPCS | Performed by: STUDENT IN AN ORGANIZED HEALTH CARE EDUCATION/TRAINING PROGRAM

## 2024-08-08 PROCEDURE — 3700000001 HC ADD 15 MINUTES (ANESTHESIA): Performed by: STUDENT IN AN ORGANIZED HEALTH CARE EDUCATION/TRAINING PROGRAM

## 2024-08-08 PROCEDURE — 3700000000 HC ANESTHESIA ATTENDED CARE: Performed by: STUDENT IN AN ORGANIZED HEALTH CARE EDUCATION/TRAINING PROGRAM

## 2024-08-08 PROCEDURE — 2580000003 HC RX 258: Performed by: STUDENT IN AN ORGANIZED HEALTH CARE EDUCATION/TRAINING PROGRAM

## 2024-08-08 PROCEDURE — 6370000000 HC RX 637 (ALT 250 FOR IP): Performed by: STUDENT IN AN ORGANIZED HEALTH CARE EDUCATION/TRAINING PROGRAM

## 2024-08-08 PROCEDURE — A4217 STERILE WATER/SALINE, 500 ML: HCPCS | Performed by: STUDENT IN AN ORGANIZED HEALTH CARE EDUCATION/TRAINING PROGRAM

## 2024-08-08 PROCEDURE — 2580000003 HC RX 258: Performed by: NURSE ANESTHETIST, CERTIFIED REGISTERED

## 2024-08-08 PROCEDURE — 94640 AIRWAY INHALATION TREATMENT: CPT

## 2024-08-08 PROCEDURE — 2709999900 HC NON-CHARGEABLE SUPPLY: Performed by: STUDENT IN AN ORGANIZED HEALTH CARE EDUCATION/TRAINING PROGRAM

## 2024-08-08 DEVICE — IMPLANTABLE DEVICE
Type: IMPLANTABLE DEVICE | Site: KNEE | Status: FUNCTIONAL
Brand: PERSONA® VIVACIT-E®

## 2024-08-08 DEVICE — PALACOS® R IS A FAST-CURING, RADIOPAQUE, POLY(METHYL METHACRYLATE)-BASED BONE CEMENT.PALACOS ® R CONTAINS THE X-RAY CONTRAST MEDIUM ZIRCONIUM DIOXIDE. TO IMPROVE VISIBILITY IN THE SURGICAL FIELD PALACOS ® R HAS BEEN COLOURED WITH CHLOROPHYLL (E141). THE BONE CEMENT IS PREPARED DIRECTLY BEFORE USE BY MIXING A POLYMER POWDER COMPONENT WITH A LIQUID MONOMER COMPONENT. A DUCTILE DOUGH FORMS WHICH CURES WITHIN A FEW MINUTES.
Type: IMPLANTABLE DEVICE | Site: KNEE | Status: FUNCTIONAL
Brand: PALACOS®

## 2024-08-08 DEVICE — IMPLANTABLE DEVICE
Type: IMPLANTABLE DEVICE | Site: KNEE | Status: FUNCTIONAL
Brand: PERSONA® NATURAL TIBIA®

## 2024-08-08 DEVICE — IMPLANTABLE DEVICE
Type: IMPLANTABLE DEVICE | Site: KNEE | Status: FUNCTIONAL
Brand: PERSONA®

## 2024-08-08 RX ORDER — DEXAMETHASONE SODIUM PHOSPHATE 4 MG/ML
INJECTION, SOLUTION INTRA-ARTICULAR; INTRALESIONAL; INTRAMUSCULAR; INTRAVENOUS; SOFT TISSUE PRN
Status: DISCONTINUED | OUTPATIENT
Start: 2024-08-08 | End: 2024-08-08 | Stop reason: SDUPTHER

## 2024-08-08 RX ORDER — CELECOXIB 100 MG/1
200 CAPSULE ORAL ONCE
Status: COMPLETED | OUTPATIENT
Start: 2024-08-08 | End: 2024-08-08

## 2024-08-08 RX ORDER — METOPROLOL SUCCINATE 50 MG/1
50 TABLET, EXTENDED RELEASE ORAL DAILY
Status: DISCONTINUED | OUTPATIENT
Start: 2024-08-08 | End: 2024-08-12 | Stop reason: HOSPADM

## 2024-08-08 RX ORDER — ACETAMINOPHEN 325 MG/1
650 TABLET ORAL EVERY 6 HOURS
Status: DISCONTINUED | OUTPATIENT
Start: 2024-08-08 | End: 2024-08-12 | Stop reason: HOSPADM

## 2024-08-08 RX ORDER — LAMOTRIGINE 100 MG/1
100 TABLET ORAL 2 TIMES DAILY
Status: DISCONTINUED | OUTPATIENT
Start: 2024-08-08 | End: 2024-08-12 | Stop reason: HOSPADM

## 2024-08-08 RX ORDER — OXYCODONE HYDROCHLORIDE 5 MG/1
10 TABLET ORAL EVERY 4 HOURS PRN
Status: DISCONTINUED | OUTPATIENT
Start: 2024-08-08 | End: 2024-08-12 | Stop reason: HOSPADM

## 2024-08-08 RX ORDER — OXYCODONE HYDROCHLORIDE 5 MG/1
10 TABLET ORAL PRN
Status: DISCONTINUED | OUTPATIENT
Start: 2024-08-08 | End: 2024-08-08 | Stop reason: HOSPADM

## 2024-08-08 RX ORDER — MORPHINE SULFATE 4 MG/ML
4 INJECTION, SOLUTION INTRAMUSCULAR; INTRAVENOUS
Status: DISCONTINUED | OUTPATIENT
Start: 2024-08-08 | End: 2024-08-09

## 2024-08-08 RX ORDER — SODIUM CHLORIDE 0.9 % (FLUSH) 0.9 %
5-40 SYRINGE (ML) INJECTION EVERY 12 HOURS SCHEDULED
Status: DISCONTINUED | OUTPATIENT
Start: 2024-08-08 | End: 2024-08-08 | Stop reason: HOSPADM

## 2024-08-08 RX ORDER — BUDESONIDE AND FORMOTEROL FUMARATE DIHYDRATE 160; 4.5 UG/1; UG/1
2 AEROSOL RESPIRATORY (INHALATION)
Status: DISCONTINUED | OUTPATIENT
Start: 2024-08-08 | End: 2024-08-12 | Stop reason: HOSPADM

## 2024-08-08 RX ORDER — SODIUM CHLORIDE 9 MG/ML
INJECTION, SOLUTION INTRAVENOUS PRN
Status: DISCONTINUED | OUTPATIENT
Start: 2024-08-08 | End: 2024-08-08 | Stop reason: HOSPADM

## 2024-08-08 RX ORDER — LIDOCAINE HYDROCHLORIDE 20 MG/ML
INJECTION, SOLUTION INFILTRATION; PERINEURAL PRN
Status: DISCONTINUED | OUTPATIENT
Start: 2024-08-08 | End: 2024-08-08 | Stop reason: SDUPTHER

## 2024-08-08 RX ORDER — SODIUM CHLORIDE 0.9 % (FLUSH) 0.9 %
5-40 SYRINGE (ML) INJECTION PRN
Status: DISCONTINUED | OUTPATIENT
Start: 2024-08-08 | End: 2024-08-12 | Stop reason: HOSPADM

## 2024-08-08 RX ORDER — HYDROXYZINE HYDROCHLORIDE 25 MG/1
25 TABLET, FILM COATED ORAL DAILY PRN
Status: DISCONTINUED | OUTPATIENT
Start: 2024-08-08 | End: 2024-08-12 | Stop reason: HOSPADM

## 2024-08-08 RX ORDER — TRANEXAMIC ACID 100 MG/ML
INJECTION, SOLUTION INTRAVENOUS PRN
Status: DISCONTINUED | OUTPATIENT
Start: 2024-08-08 | End: 2024-08-08 | Stop reason: SDUPTHER

## 2024-08-08 RX ORDER — BUSPIRONE HYDROCHLORIDE 15 MG/1
15 TABLET ORAL 2 TIMES DAILY
Status: DISCONTINUED | OUTPATIENT
Start: 2024-08-08 | End: 2024-08-12 | Stop reason: HOSPADM

## 2024-08-08 RX ORDER — SODIUM CHLORIDE 9 MG/ML
INJECTION, SOLUTION INTRAVENOUS PRN
Status: DISCONTINUED | OUTPATIENT
Start: 2024-08-08 | End: 2024-08-12 | Stop reason: HOSPADM

## 2024-08-08 RX ORDER — ALBUTEROL SULFATE 90 UG/1
2 AEROSOL, METERED RESPIRATORY (INHALATION) EVERY 6 HOURS PRN
Status: DISCONTINUED | OUTPATIENT
Start: 2024-08-08 | End: 2024-08-12 | Stop reason: HOSPADM

## 2024-08-08 RX ORDER — ONDANSETRON 2 MG/ML
4 INJECTION INTRAMUSCULAR; INTRAVENOUS ONCE
Status: DISCONTINUED | OUTPATIENT
Start: 2024-08-08 | End: 2024-08-08 | Stop reason: HOSPADM

## 2024-08-08 RX ORDER — ARIPIPRAZOLE 10 MG/1
5 TABLET ORAL DAILY
Status: DISCONTINUED | OUTPATIENT
Start: 2024-08-08 | End: 2024-08-12 | Stop reason: HOSPADM

## 2024-08-08 RX ORDER — ENOXAPARIN SODIUM 100 MG/ML
40 INJECTION SUBCUTANEOUS 2 TIMES DAILY
Status: DISCONTINUED | OUTPATIENT
Start: 2024-08-08 | End: 2024-08-09

## 2024-08-08 RX ORDER — PROPOFOL 10 MG/ML
INJECTION, EMULSION INTRAVENOUS PRN
Status: DISCONTINUED | OUTPATIENT
Start: 2024-08-08 | End: 2024-08-08 | Stop reason: SDUPTHER

## 2024-08-08 RX ORDER — ACETAMINOPHEN 500 MG
1000 TABLET ORAL ONCE
Status: COMPLETED | OUTPATIENT
Start: 2024-08-08 | End: 2024-08-08

## 2024-08-08 RX ORDER — LIDOCAINE HYDROCHLORIDE 10 MG/ML
1 INJECTION, SOLUTION EPIDURAL; INFILTRATION; INTRACAUDAL; PERINEURAL
Status: DISCONTINUED | OUTPATIENT
Start: 2024-08-08 | End: 2024-08-08 | Stop reason: HOSPADM

## 2024-08-08 RX ORDER — VANCOMYCIN HYDROCHLORIDE 1 G/20ML
INJECTION, POWDER, LYOPHILIZED, FOR SOLUTION INTRAVENOUS PRN
Status: DISCONTINUED | OUTPATIENT
Start: 2024-08-08 | End: 2024-08-08 | Stop reason: ALTCHOICE

## 2024-08-08 RX ORDER — MAGNESIUM SULFATE IN WATER 40 MG/ML
2000 INJECTION, SOLUTION INTRAVENOUS ONCE
Status: COMPLETED | OUTPATIENT
Start: 2024-08-08 | End: 2024-08-08

## 2024-08-08 RX ORDER — BUPROPION HYDROCHLORIDE 150 MG/1
150 TABLET ORAL DAILY
Status: DISCONTINUED | OUTPATIENT
Start: 2024-08-08 | End: 2024-08-12 | Stop reason: HOSPADM

## 2024-08-08 RX ORDER — MIDAZOLAM HYDROCHLORIDE 1 MG/ML
2 INJECTION INTRAMUSCULAR; INTRAVENOUS
Status: DISCONTINUED | OUTPATIENT
Start: 2024-08-08 | End: 2024-08-08 | Stop reason: HOSPADM

## 2024-08-08 RX ORDER — OXYBUTYNIN CHLORIDE 5 MG/1
5 TABLET ORAL DAILY
Status: DISCONTINUED | OUTPATIENT
Start: 2024-08-08 | End: 2024-08-12 | Stop reason: HOSPADM

## 2024-08-08 RX ORDER — DIPHENHYDRAMINE HYDROCHLORIDE 50 MG/ML
6.25 INJECTION INTRAMUSCULAR; INTRAVENOUS
Status: DISCONTINUED | OUTPATIENT
Start: 2024-08-08 | End: 2024-08-08 | Stop reason: HOSPADM

## 2024-08-08 RX ORDER — DEXAMETHASONE SODIUM PHOSPHATE 4 MG/ML
8 INJECTION, SOLUTION INTRA-ARTICULAR; INTRALESIONAL; INTRAMUSCULAR; INTRAVENOUS; SOFT TISSUE ONCE
Status: DISCONTINUED | OUTPATIENT
Start: 2024-08-08 | End: 2024-08-08 | Stop reason: HOSPADM

## 2024-08-08 RX ORDER — NALOXONE HYDROCHLORIDE 0.4 MG/ML
INJECTION, SOLUTION INTRAMUSCULAR; INTRAVENOUS; SUBCUTANEOUS PRN
Status: DISCONTINUED | OUTPATIENT
Start: 2024-08-08 | End: 2024-08-08 | Stop reason: HOSPADM

## 2024-08-08 RX ORDER — ONDANSETRON 2 MG/ML
INJECTION INTRAMUSCULAR; INTRAVENOUS PRN
Status: DISCONTINUED | OUTPATIENT
Start: 2024-08-08 | End: 2024-08-08 | Stop reason: SDUPTHER

## 2024-08-08 RX ORDER — SODIUM CHLORIDE 0.9 % (FLUSH) 0.9 %
5-40 SYRINGE (ML) INJECTION EVERY 12 HOURS SCHEDULED
Status: DISCONTINUED | OUTPATIENT
Start: 2024-08-08 | End: 2024-08-12 | Stop reason: HOSPADM

## 2024-08-08 RX ORDER — POLYETHYLENE GLYCOL 3350 17 G/17G
17 POWDER, FOR SOLUTION ORAL DAILY
Status: DISCONTINUED | OUTPATIENT
Start: 2024-08-08 | End: 2024-08-12 | Stop reason: HOSPADM

## 2024-08-08 RX ORDER — METHOCARBAMOL 750 MG/1
750 TABLET, FILM COATED ORAL EVERY 8 HOURS PRN
Status: ACTIVE | OUTPATIENT
Start: 2024-08-08 | End: 2024-08-09

## 2024-08-08 RX ORDER — SODIUM CHLORIDE 0.9 % (FLUSH) 0.9 %
5-40 SYRINGE (ML) INJECTION PRN
Status: DISCONTINUED | OUTPATIENT
Start: 2024-08-08 | End: 2024-08-08 | Stop reason: HOSPADM

## 2024-08-08 RX ORDER — MEPERIDINE HYDROCHLORIDE 50 MG/ML
12.5 INJECTION INTRAMUSCULAR; INTRAVENOUS; SUBCUTANEOUS EVERY 5 MIN PRN
Status: DISCONTINUED | OUTPATIENT
Start: 2024-08-08 | End: 2024-08-08 | Stop reason: HOSPADM

## 2024-08-08 RX ORDER — SODIUM CHLORIDE, SODIUM LACTATE, POTASSIUM CHLORIDE, CALCIUM CHLORIDE 600; 310; 30; 20 MG/100ML; MG/100ML; MG/100ML; MG/100ML
INJECTION, SOLUTION INTRAVENOUS CONTINUOUS
Status: DISCONTINUED | OUTPATIENT
Start: 2024-08-08 | End: 2024-08-09

## 2024-08-08 RX ORDER — ACETAMINOPHEN 500 MG
1000 TABLET ORAL ONCE
Status: DISCONTINUED | OUTPATIENT
Start: 2024-08-08 | End: 2024-08-08 | Stop reason: HOSPADM

## 2024-08-08 RX ORDER — MAGNESIUM HYDROXIDE 1200 MG/15ML
LIQUID ORAL CONTINUOUS PRN
Status: COMPLETED | OUTPATIENT
Start: 2024-08-08 | End: 2024-08-08

## 2024-08-08 RX ORDER — CELECOXIB 100 MG/1
200 CAPSULE ORAL 2 TIMES DAILY
Status: DISCONTINUED | OUTPATIENT
Start: 2024-08-08 | End: 2024-08-12 | Stop reason: HOSPADM

## 2024-08-08 RX ORDER — SENNOSIDES A AND B 8.6 MG/1
1 TABLET, FILM COATED ORAL DAILY PRN
Status: DISCONTINUED | OUTPATIENT
Start: 2024-08-08 | End: 2024-08-12 | Stop reason: HOSPADM

## 2024-08-08 RX ORDER — SODIUM CHLORIDE, SODIUM LACTATE, POTASSIUM CHLORIDE, CALCIUM CHLORIDE 600; 310; 30; 20 MG/100ML; MG/100ML; MG/100ML; MG/100ML
INJECTION, SOLUTION INTRAVENOUS CONTINUOUS PRN
Status: DISCONTINUED | OUTPATIENT
Start: 2024-08-08 | End: 2024-08-08 | Stop reason: SDUPTHER

## 2024-08-08 RX ORDER — ROCURONIUM BROMIDE 10 MG/ML
INJECTION, SOLUTION INTRAVENOUS PRN
Status: DISCONTINUED | OUTPATIENT
Start: 2024-08-08 | End: 2024-08-08 | Stop reason: SDUPTHER

## 2024-08-08 RX ORDER — DEXAMETHASONE SODIUM PHOSPHATE 10 MG/ML
8 INJECTION INTRAMUSCULAR; INTRAVENOUS EVERY 8 HOURS
Status: COMPLETED | OUTPATIENT
Start: 2024-08-08 | End: 2024-08-09

## 2024-08-08 RX ORDER — MORPHINE SULFATE 2 MG/ML
2 INJECTION, SOLUTION INTRAMUSCULAR; INTRAVENOUS
Status: DISCONTINUED | OUTPATIENT
Start: 2024-08-08 | End: 2024-08-09

## 2024-08-08 RX ORDER — OXYCODONE HYDROCHLORIDE 5 MG/1
5 TABLET ORAL EVERY 4 HOURS PRN
Status: DISCONTINUED | OUTPATIENT
Start: 2024-08-08 | End: 2024-08-12 | Stop reason: HOSPADM

## 2024-08-08 RX ORDER — FLUTICASONE PROPIONATE 50 MCG
2 SPRAY, SUSPENSION (ML) NASAL DAILY
Status: DISCONTINUED | OUTPATIENT
Start: 2024-08-08 | End: 2024-08-12 | Stop reason: HOSPADM

## 2024-08-08 RX ORDER — OXYCODONE HYDROCHLORIDE 5 MG/1
5 TABLET ORAL PRN
Status: DISCONTINUED | OUTPATIENT
Start: 2024-08-08 | End: 2024-08-08 | Stop reason: HOSPADM

## 2024-08-08 RX ORDER — BENZTROPINE MESYLATE 1 MG/1
0.5 TABLET ORAL 2 TIMES DAILY PRN
Status: DISCONTINUED | OUTPATIENT
Start: 2024-08-08 | End: 2024-08-12 | Stop reason: HOSPADM

## 2024-08-08 RX ORDER — SODIUM CHLORIDE, SODIUM LACTATE, POTASSIUM CHLORIDE, CALCIUM CHLORIDE 600; 310; 30; 20 MG/100ML; MG/100ML; MG/100ML; MG/100ML
INJECTION, SOLUTION INTRAVENOUS CONTINUOUS
Status: DISCONTINUED | OUTPATIENT
Start: 2024-08-08 | End: 2024-08-08 | Stop reason: HOSPADM

## 2024-08-08 RX ORDER — FENTANYL CITRATE 50 UG/ML
INJECTION, SOLUTION INTRAMUSCULAR; INTRAVENOUS PRN
Status: DISCONTINUED | OUTPATIENT
Start: 2024-08-08 | End: 2024-08-08 | Stop reason: SDUPTHER

## 2024-08-08 RX ORDER — TRANEXAMIC ACID 10 MG/ML
1000 INJECTION, SOLUTION INTRAVENOUS
Status: DISCONTINUED | OUTPATIENT
Start: 2024-08-08 | End: 2024-08-08 | Stop reason: HOSPADM

## 2024-08-08 RX ADMIN — ARIPIPRAZOLE 5 MG: 10 TABLET ORAL at 13:39

## 2024-08-08 RX ADMIN — LIDOCAINE HYDROCHLORIDE 60 MG: 20 INJECTION, SOLUTION INFILTRATION; PERINEURAL at 08:22

## 2024-08-08 RX ADMIN — FENTANYL CITRATE 50 MCG: 50 INJECTION, SOLUTION INTRAMUSCULAR; INTRAVENOUS at 08:53

## 2024-08-08 RX ADMIN — MAGNESIUM SULFATE IN WATER 2000 MG: 40 INJECTION, SOLUTION INTRAVENOUS at 08:42

## 2024-08-08 RX ADMIN — FENTANYL CITRATE 50 MCG: 50 INJECTION, SOLUTION INTRAMUSCULAR; INTRAVENOUS at 08:38

## 2024-08-08 RX ADMIN — CELECOXIB 200 MG: 100 CAPSULE ORAL at 19:37

## 2024-08-08 RX ADMIN — ACETAMINOPHEN 650 MG: 325 TABLET, FILM COATED ORAL at 13:38

## 2024-08-08 RX ADMIN — ACETAMINOPHEN 1000 MG: 500 TABLET ORAL at 07:16

## 2024-08-08 RX ADMIN — ROCURONIUM BROMIDE 20 MG: 50 INJECTION, SOLUTION INTRAVENOUS at 09:23

## 2024-08-08 RX ADMIN — ACETAMINOPHEN 650 MG: 325 TABLET, FILM COATED ORAL at 19:37

## 2024-08-08 RX ADMIN — HYDROMORPHONE HYDROCHLORIDE 0.5 MG: 1 INJECTION, SOLUTION INTRAMUSCULAR; INTRAVENOUS; SUBCUTANEOUS at 10:23

## 2024-08-08 RX ADMIN — FENTANYL CITRATE 50 MCG: 50 INJECTION, SOLUTION INTRAMUSCULAR; INTRAVENOUS at 08:22

## 2024-08-08 RX ADMIN — BUSPIRONE HYDROCHLORIDE 15 MG: 15 TABLET ORAL at 19:37

## 2024-08-08 RX ADMIN — DEXAMETHASONE SODIUM PHOSPHATE 8 MG: 10 INJECTION INTRAMUSCULAR; INTRAVENOUS at 23:50

## 2024-08-08 RX ADMIN — DEXAMETHASONE SODIUM PHOSPHATE 8 MG: 10 INJECTION INTRAMUSCULAR; INTRAVENOUS at 16:14

## 2024-08-08 RX ADMIN — CEFAZOLIN 3000 MG: 10 INJECTION, POWDER, FOR SOLUTION INTRAVENOUS at 16:13

## 2024-08-08 RX ADMIN — Medication 3000 MG: at 08:27

## 2024-08-08 RX ADMIN — CELECOXIB 200 MG: 100 CAPSULE ORAL at 07:16

## 2024-08-08 RX ADMIN — HYDROMORPHONE HYDROCHLORIDE 0.5 MG: 1 INJECTION, SOLUTION INTRAMUSCULAR; INTRAVENOUS; SUBCUTANEOUS at 10:30

## 2024-08-08 RX ADMIN — SODIUM CHLORIDE, SODIUM LACTATE, POTASSIUM CHLORIDE, AND CALCIUM CHLORIDE: .6; .31; .03; .02 INJECTION, SOLUTION INTRAVENOUS at 08:16

## 2024-08-08 RX ADMIN — FENTANYL CITRATE 50 MCG: 50 INJECTION, SOLUTION INTRAMUSCULAR; INTRAVENOUS at 08:47

## 2024-08-08 RX ADMIN — SUGAMMADEX 200 MG: 100 INJECTION, SOLUTION INTRAVENOUS at 10:02

## 2024-08-08 RX ADMIN — Medication 2 AMPULE: at 07:16

## 2024-08-08 RX ADMIN — OXYCODONE HYDROCHLORIDE 10 MG: 5 TABLET ORAL at 11:43

## 2024-08-08 RX ADMIN — SODIUM CHLORIDE, SODIUM LACTATE, POTASSIUM CHLORIDE, AND CALCIUM CHLORIDE: .6; .31; .03; .02 INJECTION, SOLUTION INTRAVENOUS at 09:23

## 2024-08-08 RX ADMIN — HYDROMORPHONE HYDROCHLORIDE 0.5 MG: 1 INJECTION, SOLUTION INTRAMUSCULAR; INTRAVENOUS; SUBCUTANEOUS at 10:51

## 2024-08-08 RX ADMIN — PROPOFOL 140 MG: 10 INJECTION, EMULSION INTRAVENOUS at 08:22

## 2024-08-08 RX ADMIN — ROCURONIUM BROMIDE 50 MG: 50 INJECTION, SOLUTION INTRAVENOUS at 08:22

## 2024-08-08 RX ADMIN — DEXMEDETOMIDINE HYDROCHLORIDE 10 MCG: 100 INJECTION, SOLUTION INTRAVENOUS at 08:56

## 2024-08-08 RX ADMIN — METOPROLOL SUCCINATE 50 MG: 50 TABLET, EXTENDED RELEASE ORAL at 13:39

## 2024-08-08 RX ADMIN — ONDANSETRON 4 MG: 2 INJECTION INTRAMUSCULAR; INTRAVENOUS at 09:42

## 2024-08-08 RX ADMIN — DEXAMETHASONE SODIUM PHOSPHATE 8 MG: 4 INJECTION, SOLUTION INTRAMUSCULAR; INTRAVENOUS at 08:30

## 2024-08-08 RX ADMIN — CEFAZOLIN 3000 MG: 10 INJECTION, POWDER, FOR SOLUTION INTRAVENOUS at 23:54

## 2024-08-08 RX ADMIN — PROPOFOL 40 MG: 10 INJECTION, EMULSION INTRAVENOUS at 08:52

## 2024-08-08 RX ADMIN — OXYCODONE HYDROCHLORIDE 10 MG: 5 TABLET ORAL at 16:14

## 2024-08-08 RX ADMIN — Medication 2 PUFF: at 19:42

## 2024-08-08 RX ADMIN — SODIUM CHLORIDE, POTASSIUM CHLORIDE, SODIUM LACTATE AND CALCIUM CHLORIDE: 600; 310; 30; 20 INJECTION, SOLUTION INTRAVENOUS at 13:37

## 2024-08-08 RX ADMIN — OXYBUTYNIN CHLORIDE 5 MG: 5 TABLET ORAL at 13:39

## 2024-08-08 RX ADMIN — TRANEXAMIC ACID 1000 MG: 100 INJECTION, SOLUTION INTRAVENOUS at 08:27

## 2024-08-08 RX ADMIN — LAMOTRIGINE 100 MG: 100 TABLET ORAL at 19:37

## 2024-08-08 ASSESSMENT — PAIN SCALES - GENERAL
PAINLEVEL_OUTOF10: 3
PAINLEVEL_OUTOF10: 7
PAINLEVEL_OUTOF10: 0
PAINLEVEL_OUTOF10: 10
PAINLEVEL_OUTOF10: 10
PAINLEVEL_OUTOF10: 6
PAINLEVEL_OUTOF10: 7
PAINLEVEL_OUTOF10: 7

## 2024-08-08 ASSESSMENT — PAIN DESCRIPTION - LOCATION
LOCATION: KNEE

## 2024-08-08 ASSESSMENT — PAIN - FUNCTIONAL ASSESSMENT
PAIN_FUNCTIONAL_ASSESSMENT: 0-10
PAIN_FUNCTIONAL_ASSESSMENT: PREVENTS OR INTERFERES WITH ALL ACTIVE AND SOME PASSIVE ACTIVITIES
PAIN_FUNCTIONAL_ASSESSMENT: ACTIVITIES ARE NOT PREVENTED

## 2024-08-08 ASSESSMENT — PAIN DESCRIPTION - DESCRIPTORS
DESCRIPTORS: ACHING;DISCOMFORT
DESCRIPTORS: ACHING;DISCOMFORT
DESCRIPTORS: ACHING
DESCRIPTORS: ACHING

## 2024-08-08 ASSESSMENT — PAIN DESCRIPTION - PAIN TYPE: TYPE: SURGICAL PAIN

## 2024-08-08 ASSESSMENT — PAIN DESCRIPTION - ORIENTATION
ORIENTATION: RIGHT
ORIENTATION: RIGHT
ORIENTATION: LEFT

## 2024-08-08 ASSESSMENT — PAIN DESCRIPTION - FREQUENCY: FREQUENCY: CONTINUOUS

## 2024-08-08 NOTE — ANESTHESIA PRE PROCEDURE
Department of Anesthesiology  Preprocedure Note       Name:  Arthur Townsend   Age:  64 y.o.  :  1960                                          MRN:  [id number]         Date:  2024      Surgeon: Surgeon(s):  Alex Leslie MD    Procedure: Procedure(s):  RIGHT TOTAL KNEE ARTHROPLASTY WITH 75MM STEM                            JOSE NOTE:  ADDUCTOR CANAL BLOCK    Medications prior to admission:   Prior to Admission medications    Medication Sig Start Date End Date Taking? Authorizing Provider   TURMERIC PO Take 800 mg by mouth daily Takes 2 tabs daily   Yes Waldemar Rosas MD   benztropine (COGENTIN) 0.5 MG tablet Take 1 tablet by mouth 2 times daily as needed    Waldemar Rosas MD   mupirocin (BACTROBAN) 2 % ointment Apply 1/2 inch to each nasal nare with a q-tip, swab entire nasal nare 2 x daily 5 days prior to surgery 24   Alex Leslie MD   metoprolol succinate (TOPROL XL) 50 MG extended release tablet TAKE ONE TABLET BY MOUTH EVERY DAY  Patient taking differently: Take 1 tablet by mouth daily Takes nightly 24   Thu Yoder APRN - CNP   celecoxib (CELEBREX) 200 MG capsule TAKE ONE CAPSULE BY MOUTH TWICE DAILY 24   Thu Yoder APRN - CNP   fluticasone-salmeterol (ADVAIR) 500-50 MCG/ACT AEPB diskus inhaler Inhale 1 puff into the lungs in the morning and 1 puff in the evening. 6/10/24   Thu Yoder APRN - CNP   fluticasone (FLONASE) 50 MCG/ACT nasal spray 2 sprays by Each Nostril route daily 24   Thu Yoder APRN - CNP   diclofenac sodium (VOLTAREN ARTHRITIS PAIN) 1 % GEL Apply topically up to twice daily on affected joints  Patient taking differently: Apply topically up to twice daily on affected joints PRN 24   Thu Yoder APRN - CNP   Menaquinone-7 (K2 PO) Take by mouth daily  Patient not taking: Reported on 2024    Waldemar Rosas MD   buPROPion (WELLBUTRIN XL) 150 MG extended release tablet

## 2024-08-08 NOTE — CONSULTS
History    Marital status:      Spouse name: None    Number of children: None    Years of education: None    Highest education level: None   Tobacco Use    Smoking status: Former     Current packs/day: 0.00     Average packs/day: 1.5 packs/day for 26.1 years (39.2 ttl pk-yrs)     Types: Cigarettes     Start date: 1971     Quit date: 1997     Years since quittin.5    Smokeless tobacco: Never   Vaping Use    Vaping Use: Never used   Substance and Sexual Activity    Alcohol use: No    Drug use: No    Sexual activity: Not Currently     Social Determinants of Health     Financial Resource Strain: Low Risk  (3/6/2024)    Overall Financial Resource Strain (CARDIA)     Difficulty of Paying Living Expenses: Not hard at all   Food Insecurity: No Food Insecurity (2024)    Hunger Vital Sign     Worried About Running Out of Food in the Last Year: Never true     Ran Out of Food in the Last Year: Never true   Transportation Needs: No Transportation Needs (2024)    PRAPARE - Transportation     Lack of Transportation (Medical): No     Lack of Transportation (Non-Medical): No   Physical Activity: Inactive (3/5/2024)    Exercise Vital Sign     Days of Exercise per Week: 0 days     Minutes of Exercise per Session: 0 min   Intimate Partner Violence: Not At Risk (2/3/2023)    Humiliation, Afraid, Rape, and Kick questionnaire     Fear of Current or Ex-Partner: No     Emotionally Abused: No     Physically Abused: No     Sexually Abused: No   Housing Stability: Low Risk  (2024)    Housing Stability Vital Sign     Unable to Pay for Housing in the Last Year: No     Number of Places Lived in the Last Year: 1     Unstable Housing in the Last Year: No       Medications:   Medications:    lamoTRIgine  100 mg Oral BID    oxyBUTYnin  5 mg Oral Daily    busPIRone  15 mg Oral BID    buPROPion  150 mg Oral Daily    ARIPiprazole  5 mg Oral Daily    fluticasone  2 spray Each Nostril Daily    metoprolol succinate  50 mg  Oral Daily    celecoxib  200 mg Oral BID    budesonide-formoterol  2 puff Inhalation BID RT    sodium chloride flush  5-40 mL IntraVENous 2 times per day    acetaminophen  650 mg Oral Q6H    ceFAZolin (ANCEF) IVPB  3,000 mg IntraVENous Q8H    polyethylene glycol  17 g Oral Daily    enoxaparin  40 mg SubCUTAneous BID    dexAMETHasone  8 mg IntraVENous Q8H      Infusions:    lactated ringers IV soln 125 mL/hr at 08/08/24 1337    sodium chloride       PRN Meds: albuterol sulfate HFA, 2 puff, Q6H PRN  hydrOXYzine HCl, 25 mg, Daily PRN  benztropine, 0.5 mg, BID PRN  sodium chloride flush, 5-40 mL, PRN  sodium chloride, , PRN  oxyCODONE, 5 mg, Q4H PRN   Or  oxyCODONE, 10 mg, Q4H PRN  morphine, 2 mg, Q2H PRN   Or  morphine, 4 mg, Q2H PRN  methocarbamol, 750 mg, Q8H PRN   Or  methocarbamol (ROBAXIN) 1,000 mg in sodium chloride 0.9 % 100 mL IVPB, 1,000 mg, Q8H PRN  senna, 1 tablet, Daily PRN        Labs      CBC: No results for input(s): \"WBC\", \"HGB\", \"PLT\" in the last 72 hours.  BMP:  No results for input(s): \"NA\", \"K\", \"CL\", \"CO2\", \"BUN\", \"CREATININE\", \"GLUCOSE\" in the last 72 hours.  Hepatic: No results for input(s): \"AST\", \"ALT\", \"BILITOT\", \"ALKPHOS\" in the last 72 hours.    Invalid input(s): \"ALB\"  Lipids:   Lab Results   Component Value Date/Time    CHOL 230 03/06/2024 09:17 AM    HDL 46 03/06/2024 09:17 AM    HDL 84 05/01/2010 08:40 AM    TRIG 135 03/06/2024 09:17 AM     Hemoglobin A1C:   Lab Results   Component Value Date/Time    LABA1C 5.5 08/01/2024 09:54 AM     TSH:   Lab Results   Component Value Date/Time    TSH 1.98 03/06/2024 09:17 AM     Troponin: No results found for: \"TROPONINT\"  Lactic Acid: No results for input(s): \"LACTA\" in the last 72 hours.  BNP: No results for input(s): \"PROBNP\" in the last 72 hours.  UA:  Lab Results   Component Value Date/Time    NITRU Negative 08/01/2024 09:55 AM    COLORU Yellow 08/01/2024 09:55 AM    PHUR 6.0 08/01/2024 09:55 AM    PHUR 6.0 11/17/2023 03:20 AM    WBCUA 10-20

## 2024-08-08 NOTE — PROGRESS NOTES
Physical Therapy Evaluation and Treatment    Name: Arthur Townsend YOB: 1960  MRN: [id number]  Date of Evaluation: 2024     Patient Diagnosis(es): There were no encounter diagnoses.  Past Medical History:  has a past medical history of Arthritis, Asthma, Bipolar disorder, Borderline osteopenia, Cellulitis of left leg, Frequency of micturition, GERD (gastroesophageal reflux disease), Headache(784.0), HNP (herniated nucleus pulposus), lumbar, Hyperlipidemia, Hypertension, Intention tremor, Iron deficiency anemia, Lateral meniscal tear, Lumbar stenosis with neurogenic claudication, Medial meniscus tear, Mixed incontinence, Morbid (severe) obesity due to excess calories (HCC), ANTOINETTE (obstructive sleep apnea), Prolonged emergence from general anesthesia, Prolonged emergence from general anesthesia, initial encounter, Tobacco use, and Venous ulcer of left leg (HCC).  Past Surgical History:  has a past surgical history that includes knee surgery (Left);  section; Abdominal exploration surgery; Gastric bypass surgery; Tonsillectomy; knee surgery (Right, 10/28/2015); Lumbar spine surgery (N/A, 2019); Carpal tunnel release (Bilateral, ); Colonoscopy (); Colonoscopy (N/A, 2021); Cholecystectomy, laparoscopic (N/A, 2022); Anus surgery (N/A, 2023); other surgical history (2023); Facial Surgery (N/A, 2023); sinus surgery; and Total knee arthroplasty (Right, 2024).    AM-PAC score  [facility] 6 Clicks Inpatient Mobility:  AM-PAC Basic Mobility - Inpatient   How much help is needed turning from your back to your side while in a flat bed without using bedrails?: A Little  How much help is needed moving from lying on your back to sitting on the side of a flat bed without using bedrails?: A Little  How much help is needed moving to and from a bed to a chair?: A Little  How much help is needed standing up from a chair using your arms?: A Little  How much help is  needed walking in hospital room?: A Little  How much help is needed climbing 3-5 steps with a railing?: A Lot  AM-PAC Inpatient Mobility Raw Score : 17  AM-PAC Inpatient T-Scale Score : 42.13  Mobility Inpatient CMS 0-100% Score: 50.57  Mobility Inpatient CMS G-Code Modifier : CK      Referring Provider:    Surgery: Right, Total Knee Arthroplasty    Restrictions: Full Weight Bearing as Tolerated, No ROM Restrictions, IV    Assessment:   Pt seen for PT evaluation POD#0 s/p Right, Total Knee Arthroplasty. At baseline, pt lives with her daughter, her daughter boyfriend and their baby and performs functional mobility Independently with no AD. Pt currently requires SBA for bed mobility, CGA for transfers, and CGA for ambulation. Pt demos overall good mobility and quad control, but does requires initial cues for sequencing and RW management. Pt will continue to benefit form skilled PT services to address current deficits. SNF vs Home with 24/7 pending progress and assist available at home.     Impairments noted: Impaired functional mobility, Pain limiting function, Impaired strength, and Impaired ROM    Discharge Recommendations: Home with 24-hr supervision/assist vs SNF pending progress and assist available at home   DME Recommendations: Rolling Walker if going home otherwise defer to SNF   Barriers to discharge: 2 CURTIS and limited assist available     PT Diagnosis: Impaired mobility post-op R TKA  Complexity: MEDIUM     Subjective:   Pt in bed upon arrival, RN cleared for therapy.     Pain     Pain: Yes  Ratin/10  Location: R knee   throbbing and sharp  Mobilization and Repositioned for comfort  Pt receptive to pain intervention    Discharge Environment:    Pt. Lives with family (Daughter, dtr boyfriend and their baby)   Home environment:  mobile home/trailer  Steps to enter first floor:  2 steps to enter and hand rail unilateral  Steps to second floor: N/A  Bathroom: tub/shower unit and comfort height

## 2024-08-08 NOTE — PROGRESS NOTES
Pt brought to PACU. Report obtained from OR RN and anesthesia. Pt placed on monitor NSR and O2 at 97% on 15L NRB Mask.

## 2024-08-08 NOTE — H&P
The patient's History and Physical of  8/1/24  was reviewed with the patient, and I examined the patient. There were no changes - see below for exam of affected area.  7/19/24 is my note with exam    Both the patient and I confirmed the surgical site.    Plan: The risks, benefits, expected outcome, and alternative to the recommended procedure have been discussed with the patient / family. Patient understands and wants to proceed with the procedure.

## 2024-08-08 NOTE — DISCHARGE INSTRUCTIONS
*** [facility] with any questions or concerns after your discharge.*** Mon- Fri 9am- 5pm [phone]. If you have any issues or concerns after 5pm or on the weekend please call your surgeon's office. I will be contacting you in a few days to follow up.    If you need a pain medication refill please contact your surgeon's office.      Please contact [facility] Orthopaedic Nurse Navigator with any questions or concerns after your discharge. Mon- Fri 9am- 5pm [phone]. If you have any issues or concerns after 5pm or on the weekend please call your surgeon's office. I will be contacting you in a few days to follow up.    If you need a pain medication refill please contact your surgeon's office.        Dr. Leslie Total Knee Replacement  Discharge Instructions      Activity: The first week after surgery is all about Ice, Elevation and Rest!        [place] way to elevate knee above heart, while keeping the knee straight.     Pillows should be placed under the FOOT and leg, such that the leg is held straight.   You should feel stretching in the back of the knee. If there is too much pain add pillows under the knee, but the leg should be as straight as possible  Placing pillows under the knee only and keeping the knee bent will make it very difficult to get it straight with physical therapy.  Use a walker when ambulating.    Physical therapy.   Typically starts 10-14 days after surgery unless otherwise instructed  Referral placed to outpatient location discussed with surgeon or for home PT if you do not have transportation for outpatient PT    To prevent Clot formation, you have been placed on the following medication:  Eliquis 2.5 twice daily by mouth for 30 days postoperatively    Surgical Site Care:  Keep dressing in place until follow up visit, Call the office if drainage  Showering is permitted with waterproof Mepilex dressing   Will remove dressing at first

## 2024-08-08 NOTE — CONSULTS
Consult Call     S/P TKR only if patient admitted    Who:MIKE Hospitalist  Date:8/8/2024,  Time:1:23 PM    Electronically signed by Rena Han on 8/8/24 at 1:23 PM EDT

## 2024-08-08 NOTE — PROGRESS NOTES
Time out performed with Dr. Valiente for right adductor canal nerve block. Patient placed on telemetry, continuous pulse oximetry, and 2L NC for the procedure. BP cycled every five minutes. Cardiac rhythm is NSR. Dr. Valiente unable to visualize patient's artery, unable to complete block. VSS stable throughout. Will continue to monitor VS every 15 minutes. Side rails in place, call light within reach, once alert patient instructed to press call button if assistance is need, verbalized understanding.

## 2024-08-08 NOTE — PROGRESS NOTES
Patient admitted to [facility] bay 4. Consents verified. Patient NPO since 2100. Patient belongings to remain on PACU cart for procedure.

## 2024-08-08 NOTE — PLAN OF CARE
Problem: Discharge Planning  Goal: Discharge to home or other facility with appropriate resources  Outcome: Progressing     Problem: Pain  Goal: Verbalizes/displays adequate comfort level or baseline comfort level  Outcome: Progressing  Flowsheets (Taken 8/8/2024 8263)  Verbalizes/displays adequate comfort level or baseline comfort level: Encourage patient to monitor pain and request assistance     Problem: Safety - Adult  Goal: Free from fall injury  Outcome: Progressing

## 2024-08-08 NOTE — ANESTHESIA POSTPROCEDURE EVALUATION
src:Temporal, Pulse:83, Resp:25, SpO2:97 %  08/08/24 0805, BP:(!) 155/78, Pulse:61, Resp:16, SpO2:93 %  08/08/24 0800, BP:(!) 160/86, Pulse:70, Resp:18, SpO2:93 %  08/08/24 0755, Pulse:72, Resp:18, SpO2:93 %  08/08/24 0657, BP:137/79, Temp:97.8 °F (36.6 °C), Temp src:Oral, Pulse:77, Resp:16, SpO2:93 %     LABS:    CBC  Lab Results       Component                Value               Date/Time                  WBC                      7.7                 08/01/2024 09:54 AM        HGB                      14.0                08/01/2024 09:54 AM        HCT                      43.9                08/01/2024 09:54 AM        PLT                      320                 08/01/2024 09:54 AM   RENAL  Lab Results       Component                Value               Date/Time                  NA                       146 (H)             08/01/2024 09:54 AM        K                        4.1                 08/01/2024 09:54 AM        K                        4.1                 11/17/2023 03:07 AM        CL                       106                 08/01/2024 09:54 AM        CO2                      26                  08/01/2024 09:54 AM        BUN                      23 (H)              08/01/2024 09:54 AM        CREATININE               0.7                 08/01/2024 09:54 AM        GLUCOSE                  108 (H)             08/01/2024 09:54 AM   COAGS  Lab Results       Component                Value               Date/Time                  PROTIME                  13.1                08/01/2024 09:54 AM        INR                      0.97                08/01/2024 09:54 AM        APTT                     30.6                08/01/2024 09:54 AM     Intake & Output:  In: 1500 [I.V.:1500]  Out: 100     Nausea & Vomiting:  No    Level of Consciousness:  Awake    Pain Assessment:  Adequate analgesia    Anesthesia Complications:  No apparent anesthetic complications    SUMMARY      Vital signs stable  OK to discharge from

## 2024-08-08 NOTE — PROGRESS NOTES
Patient admitted to [facility] 4 in preparation for surgery, VSS. Consent confirmed. IV inserted into LFA. ERAS protocol initiated. Warming blanket applied, sacral foam in place, warm fluids infusing. IS education completed. Belongings on cart to PACU. NPO since midnight.  Family at bedside, phone number in system for text updates, call light within reach.

## 2024-08-08 NOTE — CARE COORDINATION
Case Management Assessment  Initial Evaluation    Date/Time of Evaluation: 2024 4:00 PM  Assessment Completed by: ABEL ALEJANDRA RN    If patient is discharged prior to next notation, then this note serves as note for discharge by case management.    Patient Name: Arthur Townsend                   YOB: 1960  Diagnosis: Osteoarthritis of right knee [M17.11]  Primary osteoarthritis of right knee [M17.11]  S/P total knee arthroplasty, right [Z96.651]                   Date / Time: 2024  6:33 AM    Patient Admission Status: Observation   Readmission Risk (Low < 19, Mod (19-27), High > 27): No data recorded  Current PCP: Thu Yoder APRN - CNP  PCP verified by CM? Yes    Chart Reviewed: Yes      History Provided by: Patient  Patient Orientation: Alert and Oriented    Patient Cognition: Alert    Hospitalization in the last 30 days (Readmission):  No    If yes, Readmission Assessment in CM Navigator will be completed.    Advance Directives:      Code Status: Full Code   Patient's Primary Decision Maker is: Named in Scanned ACP Document    Primary Decision Maker: NoahUbaldoViviane - Child - [phone]    Primary Decision Maker: Adrian Zamora/ - Parent - [phone]    Secondary Decision Maker: Beatriz Sanchez - Brother/Sister - [phone]    Supplemental (Other) Decision Maker: NoahAngelinaKristianNivia - Child - [phone]    Discharge Planning:    Patient lives with: Family Members Type of Home: Trailer/Mobile Home  Primary Care Giver: Self  Patient Support Systems include: Children, Family Members   Current Financial resources: Medicare  Current community resources: None  Current services prior to admission: Durable Medical Equipment            Current DME: Cane            Type of Home Care services:  OT, PT, Nursing Services    ADLS  Prior functional level: Independent in ADLs/IADLs  Current functional level: Assistance with the following:, Bathing, Dressing, Toileting,

## 2024-08-08 NOTE — OP NOTE
Operative Note      Patient: Arthur Townsend  YOB: 1960  MRN: [id number]    Date of Procedure: 8/8/2024    Pre-Op Diagnosis Codes:     * Osteoarthritis of right knee [M17.11]    Post-Op Diagnosis: Same       Procedure(s):  RIGHT TOTAL KNEE ARTHROPLASTY WITH 75MM STEM                            JOSE    Surgeon(s):  Alex Leslie MD    Assistant:   Surgical Assistant: Jae Zhang RN; Alfredo Rojas    Anesthesia: General    Estimated Blood Loss (mL): less than 100     Complications: None    Specimens:   * No specimens in log *    Implants:  Implant Name Type Inv. Item Serial No.  Lot No. LRB No. Used Action   CEMENT BONE 40GM HI VISC PALACOS R - [id number]  CEMENT BONE 40GM HI VISC PALACOS R  [employer]- [id number] Right 2 Implanted   EXTENSION STEM REV 14X75 MM KNEE PERSONA - [id number]  EXTENSION STEM REV 14X75 MM KNEE PERSONA  [employer]- [id number] Right 1 Implanted   PSN MC VE ASF R 10MM 8-11 EF - [id number]  PSN MC VE ASF R 10MM 8-11 EF  [employer]- [id number] Right 1 Implanted   PSN TIB STM 5 DEG SZ F R - [id number]  PSN TIB STM 5 DEG SZ F R  [employer]- [id number] Right 1 Implanted   COMPONENT FEM SZ 8 R KNEE CO CHROM SOLA CRUCE RET ANA LUISA - [id number]  COMPONENT FEM SZ 8 R KNEE CO CHROM SOLA CRUCE RET ANA LUISA  [employer]- [id number] Right 1 Implanted   COMPONENT PAT YLY01HI THK8.5MM STD KNEE VIVACIT-E ANA LUISA - [id number]  COMPONENT PAT FAP44VS THK8.5MM STD KNEE VIVACIT-E ANA LUISA  [employer]-WD [id number] Right 1 Implanted         Drains: * No LDAs found *    Findings:  Infection Present At Time Of Surgery (PATOS) (choose all levels that have infection present):  No infection present  Other Findings: severe OA    Detailed Description of Procedure:   Clinical Indications: This is a 65 y/o F that was evaluated for R knee OA by myself, noted to have failed extensive conservative measures and desired  meniscus resection, posterior osteophyte resection and posterior capsular release as needed both medially and laterally. Once this was achieved I assessed our flexion and extension gaps. Additional tibia resection was performed as necessary until gaps were balanced appropriately. Patella was resurfaced. Lateral release and circumferential denervation were performed. Trial components were then placed. Satisfactory ligamentous balance, range of motion and patellar tracking were confirmed. The femoral drill pegs were drilled, tibia was prepared for the keel.     The knee was then prepared for implantation of final components. Trials were removed. Sclerotic bone was perforated with a drill bit for cement interdigitation. The wound was thoroughly irrigated with saline and dilute betadine, prophylactic hemostasis in the posterolateral and posteromedial corners was performed.  The knee was then exposed and dried for cementing. The tibia and poly were assembled on the back table as a monoblock component. The tibia was cemented in place making sure to sufficiently fill the canal with cement and pre coating the bone and tibial component. The femur was also cemented in place with cement on the posterior condyles of the implant and pre coated on the femoral bone.  Local anesthesia administered with 90cc orthomix. Tourniquet was released at 41 minutes at 350. Adequate range of motion, stability and patellar tracking again confirmed. Hemostasis was ensured. Closure then ensued in standard fashion with #2 stratafix for the capsule, 2-0 stratafix for the subQ and 3-0 stratafix for the skin. Perneo glue and Occlusive, compressive dressings were applied. The patient was then awoken from anesthesia and taken to PACU in stable condition.     Post operatively the patient will have no activity or weight bearing restrictions. Pain control will be multimodal and minimize narcotics. They will take Lovenox inpatient and eliquis 2.5 BID x 1

## 2024-08-09 LAB
ANION GAP SERPL CALCULATED.3IONS-SCNC: 12 MMOL/L (ref 3–16)
BASOPHILS # BLD: 0.1 K/UL (ref 0–0.2)
BASOPHILS NFR BLD: 0.6 %
BUN SERPL-MCNC: 20 MG/DL (ref 7–20)
CALCIUM SERPL-MCNC: 8.6 MG/DL (ref 8.3–10.6)
CHLORIDE SERPL-SCNC: 105 MMOL/L (ref 99–110)
CO2 SERPL-SCNC: 18 MMOL/L (ref 21–32)
CREAT SERPL-MCNC: 0.6 MG/DL (ref 0.6–1.2)
DEPRECATED RDW RBC AUTO: 14.9 % (ref 12.4–15.4)
EOSINOPHIL # BLD: 0 K/UL (ref 0–0.6)
EOSINOPHIL NFR BLD: 0.3 %
GFR SERPLBLD CREATININE-BSD FMLA CKD-EPI: >90 ML/MIN/{1.73_M2}
GLUCOSE SERPL-MCNC: 159 MG/DL (ref 70–99)
HCT VFR BLD AUTO: 39.3 % (ref 36–48)
HGB BLD-MCNC: 12.8 G/DL (ref 12–16)
LYMPHOCYTES # BLD: 1.1 K/UL (ref 1–5.1)
LYMPHOCYTES NFR BLD: 6.8 %
MCH RBC QN AUTO: 29.4 PG (ref 26–34)
MCHC RBC AUTO-ENTMCNC: 32.7 G/DL (ref 31–36)
MCV RBC AUTO: 89.9 FL (ref 80–100)
MONOCYTES # BLD: 0.6 K/UL (ref 0–1.3)
MONOCYTES NFR BLD: 3.8 %
NEUTROPHILS # BLD: 14.8 K/UL (ref 1.7–7.7)
NEUTROPHILS NFR BLD: 88.5 %
PLATELET # BLD AUTO: 201 K/UL (ref 135–450)
PLATELET BLD QL SMEAR: ADEQUATE
PMV BLD AUTO: 8.5 FL (ref 5–10.5)
POTASSIUM SERPL-SCNC: 5.2 MMOL/L (ref 3.5–5.1)
RBC # BLD AUTO: 4.37 M/UL (ref 4–5.2)
SLIDE REVIEW: ABNORMAL
SODIUM SERPL-SCNC: 135 MMOL/L (ref 136–145)
WBC # BLD AUTO: 16.7 K/UL (ref 4–11)

## 2024-08-09 PROCEDURE — 6360000002 HC RX W HCPCS: Performed by: STUDENT IN AN ORGANIZED HEALTH CARE EDUCATION/TRAINING PROGRAM

## 2024-08-09 PROCEDURE — 36415 COLL VENOUS BLD VENIPUNCTURE: CPT

## 2024-08-09 PROCEDURE — 99024 POSTOP FOLLOW-UP VISIT: CPT | Performed by: SPECIALIST/TECHNOLOGIST

## 2024-08-09 PROCEDURE — 80048 BASIC METABOLIC PNL TOTAL CA: CPT

## 2024-08-09 PROCEDURE — 96374 THER/PROPH/DIAG INJ IV PUSH: CPT

## 2024-08-09 PROCEDURE — 97116 GAIT TRAINING THERAPY: CPT

## 2024-08-09 PROCEDURE — 97530 THERAPEUTIC ACTIVITIES: CPT

## 2024-08-09 PROCEDURE — 97110 THERAPEUTIC EXERCISES: CPT

## 2024-08-09 PROCEDURE — 2580000003 HC RX 258: Performed by: STUDENT IN AN ORGANIZED HEALTH CARE EDUCATION/TRAINING PROGRAM

## 2024-08-09 PROCEDURE — 85025 COMPLETE CBC W/AUTO DIFF WBC: CPT

## 2024-08-09 PROCEDURE — 94640 AIRWAY INHALATION TREATMENT: CPT

## 2024-08-09 PROCEDURE — 97535 SELF CARE MNGMENT TRAINING: CPT

## 2024-08-09 PROCEDURE — 94761 N-INVAS EAR/PLS OXIMETRY MLT: CPT

## 2024-08-09 PROCEDURE — 2700000000 HC OXYGEN THERAPY PER DAY

## 2024-08-09 PROCEDURE — APPNB45 APP NON BILLABLE 31-45 MINUTES: Performed by: SPECIALIST/TECHNOLOGIST

## 2024-08-09 PROCEDURE — 6370000000 HC RX 637 (ALT 250 FOR IP): Performed by: STUDENT IN AN ORGANIZED HEALTH CARE EDUCATION/TRAINING PROGRAM

## 2024-08-09 PROCEDURE — G0378 HOSPITAL OBSERVATION PER HR: HCPCS

## 2024-08-09 PROCEDURE — 97165 OT EVAL LOW COMPLEX 30 MIN: CPT

## 2024-08-09 PROCEDURE — 96372 THER/PROPH/DIAG INJ SC/IM: CPT

## 2024-08-09 RX ORDER — ENOXAPARIN SODIUM 100 MG/ML
30 INJECTION SUBCUTANEOUS 2 TIMES DAILY
Qty: 18 ML | Refills: 0 | Status: SHIPPED | OUTPATIENT
Start: 2024-08-09 | End: 2024-08-12 | Stop reason: HOSPADM

## 2024-08-09 RX ORDER — OXYCODONE HYDROCHLORIDE 5 MG/1
5-10 TABLET ORAL
Qty: 30 TABLET | Refills: 0 | Status: SHIPPED | OUTPATIENT
Start: 2024-08-09 | End: 2024-08-12

## 2024-08-09 RX ORDER — ACETAMINOPHEN 325 MG/1
650 TABLET ORAL EVERY 6 HOURS
Qty: 240 TABLET | Refills: 0 | Status: SHIPPED | OUTPATIENT
Start: 2024-08-09 | End: 2024-08-12

## 2024-08-09 RX ORDER — METHOCARBAMOL 750 MG/1
750 TABLET, FILM COATED ORAL EVERY 8 HOURS PRN
Qty: 30 TABLET | Refills: 0 | Status: SHIPPED | OUTPATIENT
Start: 2024-08-09 | End: 2024-08-12

## 2024-08-09 RX ORDER — ENOXAPARIN SODIUM 100 MG/ML
30 INJECTION SUBCUTANEOUS 2 TIMES DAILY
Status: DISCONTINUED | OUTPATIENT
Start: 2024-08-09 | End: 2024-08-12 | Stop reason: HOSPADM

## 2024-08-09 RX ORDER — POLYETHYLENE GLYCOL 3350 17 G/17G
17 POWDER, FOR SOLUTION ORAL DAILY
Qty: 30 PACKET | Refills: 0 | Status: SHIPPED | OUTPATIENT
Start: 2024-08-10 | End: 2024-08-12

## 2024-08-09 RX ADMIN — ACETAMINOPHEN 650 MG: 325 TABLET, FILM COATED ORAL at 17:33

## 2024-08-09 RX ADMIN — DEXAMETHASONE SODIUM PHOSPHATE 8 MG: 10 INJECTION INTRAMUSCULAR; INTRAVENOUS at 08:32

## 2024-08-09 RX ADMIN — POLYETHYLENE GLYCOL 3350 17 G: 17 POWDER, FOR SOLUTION ORAL at 08:33

## 2024-08-09 RX ADMIN — BENZTROPINE MESYLATE 0.5 MG: 1 TABLET ORAL at 00:03

## 2024-08-09 RX ADMIN — OXYCODONE HYDROCHLORIDE 10 MG: 5 TABLET ORAL at 17:34

## 2024-08-09 RX ADMIN — OXYCODONE HYDROCHLORIDE 10 MG: 5 TABLET ORAL at 12:31

## 2024-08-09 RX ADMIN — SODIUM CHLORIDE, PRESERVATIVE FREE 10 ML: 5 INJECTION INTRAVENOUS at 08:33

## 2024-08-09 RX ADMIN — BUSPIRONE HYDROCHLORIDE 15 MG: 15 TABLET ORAL at 19:31

## 2024-08-09 RX ADMIN — CELECOXIB 200 MG: 100 CAPSULE ORAL at 19:29

## 2024-08-09 RX ADMIN — BUSPIRONE HYDROCHLORIDE 15 MG: 15 TABLET ORAL at 08:31

## 2024-08-09 RX ADMIN — ARIPIPRAZOLE 5 MG: 10 TABLET ORAL at 19:29

## 2024-08-09 RX ADMIN — CELECOXIB 200 MG: 100 CAPSULE ORAL at 08:31

## 2024-08-09 RX ADMIN — BUPROPION HYDROCHLORIDE 150 MG: 150 TABLET, FILM COATED, EXTENDED RELEASE ORAL at 00:03

## 2024-08-09 RX ADMIN — LAMOTRIGINE 100 MG: 100 TABLET ORAL at 19:30

## 2024-08-09 RX ADMIN — ENOXAPARIN SODIUM 40 MG: 100 INJECTION SUBCUTANEOUS at 08:32

## 2024-08-09 RX ADMIN — FLUTICASONE PROPIONATE 2 SPRAY: 50 SPRAY, METERED NASAL at 08:34

## 2024-08-09 RX ADMIN — Medication 2 PUFF: at 19:13

## 2024-08-09 RX ADMIN — ACETAMINOPHEN 650 MG: 325 TABLET, FILM COATED ORAL at 12:31

## 2024-08-09 RX ADMIN — ACETAMINOPHEN 650 MG: 325 TABLET, FILM COATED ORAL at 00:07

## 2024-08-09 RX ADMIN — METOPROLOL SUCCINATE 50 MG: 50 TABLET, EXTENDED RELEASE ORAL at 19:29

## 2024-08-09 RX ADMIN — OXYBUTYNIN CHLORIDE 5 MG: 5 TABLET ORAL at 19:31

## 2024-08-09 RX ADMIN — OXYCODONE HYDROCHLORIDE 5 MG: 5 TABLET ORAL at 05:27

## 2024-08-09 RX ADMIN — Medication 2 PUFF: at 08:16

## 2024-08-09 RX ADMIN — ACETAMINOPHEN 650 MG: 325 TABLET, FILM COATED ORAL at 08:32

## 2024-08-09 RX ADMIN — OXYCODONE HYDROCHLORIDE 5 MG: 5 TABLET ORAL at 00:03

## 2024-08-09 RX ADMIN — ENOXAPARIN SODIUM 30 MG: 100 INJECTION SUBCUTANEOUS at 19:31

## 2024-08-09 RX ADMIN — BUPROPION HYDROCHLORIDE 150 MG: 150 TABLET, FILM COATED, EXTENDED RELEASE ORAL at 21:23

## 2024-08-09 RX ADMIN — LAMOTRIGINE 100 MG: 100 TABLET ORAL at 08:32

## 2024-08-09 ASSESSMENT — PAIN DESCRIPTION - ORIENTATION
ORIENTATION: RIGHT

## 2024-08-09 ASSESSMENT — PAIN DESCRIPTION - LOCATION
LOCATION: KNEE
LOCATION: KNEE
LOCATION: LEG;KNEE
LOCATION: KNEE
LOCATION: KNEE

## 2024-08-09 ASSESSMENT — PAIN - FUNCTIONAL ASSESSMENT
PAIN_FUNCTIONAL_ASSESSMENT: ACTIVITIES ARE NOT PREVENTED
PAIN_FUNCTIONAL_ASSESSMENT: ACTIVITIES ARE NOT PREVENTED

## 2024-08-09 ASSESSMENT — PAIN DESCRIPTION - DESCRIPTORS
DESCRIPTORS: THROBBING
DESCRIPTORS: ACHING
DESCRIPTORS: ACHING
DESCRIPTORS: ACHING;STABBING
DESCRIPTORS: ACHING

## 2024-08-09 ASSESSMENT — PAIN SCALES - GENERAL
PAINLEVEL_OUTOF10: 6
PAINLEVEL_OUTOF10: 7
PAINLEVEL_OUTOF10: 4
PAINLEVEL_OUTOF10: 7
PAINLEVEL_OUTOF10: 6
PAINLEVEL_OUTOF10: 4

## 2024-08-09 NOTE — PLAN OF CARE
Problem: Discharge Planning  Goal: Discharge to home or other facility with appropriate resources  8/9/2024 0951 by Fariha Brooke, RN  Outcome: Progressing  Flowsheets (Taken 8/9/2024 0816)  Discharge to home or other facility with appropriate resources: Identify barriers to discharge with patient and caregiver    Problem: Pain  Goal: Verbalizes/displays adequate comfort level or baseline comfort level  8/9/2024 0951 by Fariha Brooke, RN  Outcome: Progressing  Flowsheets (Taken 8/9/2024 0816)  Verbalizes/displays adequate comfort level or baseline comfort level: Encourage patient to monitor pain and request assistance

## 2024-08-09 NOTE — PROGRESS NOTES
Ortho Care Plan    Patient had a total knee replacement on 8/8/2024.  .    Comorbidities Reviewed Yes   Review completed by Jaylan Malik RN      Patient has a past medical history of Arthritis, Asthma, Bipolar disorder, Borderline osteopenia, Cellulitis of left leg, Frequency of micturition, GERD (gastroesophageal reflux disease), Headache(784.0), HNP (herniated nucleus pulposus), lumbar, Hyperlipidemia, Hypertension, Intention tremor, Iron deficiency anemia, Lateral meniscal tear, Lumbar stenosis with neurogenic claudication, Medial meniscus tear, Mixed incontinence, Morbid (severe) obesity due to excess calories (HCC), ANTOINETTE (obstructive sleep apnea), Prolonged emergence from general anesthesia, Prolonged emergence from general anesthesia, initial encounter, Tobacco use, and Venous ulcer of left leg (HCC).       Commodities addressed via home medications ordered, PRN medications ordered, education provided, and interdisciplinary team members involved      Patient and/or Family's stated Goal of Care this Admission: reduce pain, increase activity tolerance, improve mobility, Understand the effects of joint replacement on commodities, understand that blood glucose levels may fluctuate and need closer monitoring, and understand use and effect of cough and deep breath/incentive spirometer prior to discharge.     >>For Ortho and Comorbidity documentation on Education, please see Education Tab.

## 2024-08-09 NOTE — PROGRESS NOTES
Hospitalist Progress Note    CC: Osteoarthritis of right knee    Name:  Arthur Townsend /Age/Sex: 1960  (64 y.o. female)   MRN & CSN:  [id number] & [id number] Encounter Date/Time: 2024 2:01 PM EDT   Location:  2525 PCP: Thu Yoder, APRN - CNP     Attending:Alex Leslie MD         Hospital course:  65yo WM with PMHX sig for super obesity with BMI of 56, suspected ANTOINETTE, osteoarthritis, hx of gastric bypass, HTN, COPD/asthma, depression, HLD who is s/p R total knee replacement who we are following for acute resp failure.    Admit date: 2024  Days in hospital:  Hospital Day: 2  Status:  Observation       24 Hour Events: pt still requiring oxygen, but improving    Subjective: pt breathing better, weaning oxygen, can likely go home tomorrow if we are able to wean oxygen - suspect atelectasis in setting of morbid obesity after surgery    ROS:   A comprehensive review of systems was negative except for: some shortness of breath with activity, leg pain       Objective:    BP (!) 152/83   Pulse 76   Temp 97.5 °F (36.4 °C) (Oral)   Resp 16   Ht 1.549 m (5' 0.98\")   Wt 134.7 kg (297 lb)   LMP  (LMP Unknown)   SpO2 93% Comment: 88% after ambulation on 3 L spO2  BMI 56.15 kg/m²     Gen: alert, cooperative  HEENT: NC/AT, moist mucous membranes, no oropharyngeal erythema or exudate  Neck: supple, trachea midline, no anterior cervical or SC LAD  Heart:  reg rate and rhythm, no murmurs, no gallops, no rubs. some leg edema  Lungs: diminished bilaterally  Abd: soft, non-tender, non-distended, normal bowel sounds, no masses or organomegaly  Extrem:  no ulcers, no Fantasma's sign, normal pulses, equal and bilateral 2+, and some edema bilaterally  Skin: Pale  Psych: A & O x3  Neuro: grossly intact, moves all 4 extremities    Assessment:    Principal Problem:    Osteoarthritis of right knee  Active Problems:    Primary osteoarthritis of

## 2024-08-09 NOTE — DISCHARGE SUMMARY
Department of Orthopedic Surgery  Physician Assistant   Discharge Summary    The Arthur Townsend is a 64 y.o. female underwent total knee replacement procedure without complication.  Arthur Townsend was admitted to the floor following Her recovery in the PACU.     Discharge Diagnosis  right Knee Arthroplasty    Current Inpatient Medications    Current Facility-Administered Medications: enoxaparin Sodium (LOVENOX) injection 30 mg, 30 mg, SubCUTAneous, BID  albuterol sulfate HFA (PROVENTIL;VENTOLIN;PROAIR) 108 (90 Base) MCG/ACT inhaler 2 puff, 2 puff, Inhalation, Q6H PRN  lamoTRIgine (LAMICTAL) tablet 100 mg, 100 mg, Oral, BID  oxyBUTYnin (DITROPAN) tablet 5 mg, 5 mg, Oral, Daily  busPIRone (BUSPAR) tablet 15 mg, 15 mg, Oral, BID  hydrOXYzine HCl (ATARAX) tablet 25 mg, 25 mg, Oral, Daily PRN  buPROPion (WELLBUTRIN XL) extended release tablet 150 mg, 150 mg, Oral, Daily  ARIPiprazole (ABILIFY) tablet 5 mg, 5 mg, Oral, Daily  fluticasone (FLONASE) 50 MCG/ACT nasal spray 2 spray, 2 spray, Each Nostril, Daily  metoprolol succinate (TOPROL XL) extended release tablet 50 mg, 50 mg, Oral, Daily  celecoxib (CELEBREX) capsule 200 mg, 200 mg, Oral, BID  benztropine (COGENTIN) tablet 0.5 mg, 0.5 mg, Oral, BID PRN  budesonide-formoterol (SYMBICORT) 160-4.5 MCG/ACT inhaler 2 puff, 2 puff, Inhalation, BID RT  sodium chloride flush 0.9 % injection 5-40 mL, 5-40 mL, IntraVENous, 2 times per day  sodium chloride flush 0.9 % injection 5-40 mL, 5-40 mL, IntraVENous, PRN  0.9 % sodium chloride infusion, , IntraVENous, PRN  acetaminophen (TYLENOL) tablet 650 mg, 650 mg, Oral, Q6H  oxyCODONE (ROXICODONE) immediate release tablet 5 mg, 5 mg, Oral, Q4H PRN **OR** oxyCODONE (ROXICODONE) immediate release tablet 10 mg, 10 mg, Oral, Q4H PRN  polyethylene glycol (GLYCOLAX) packet 17 g, 17 g, Oral, Daily  senna (SENOKOT) tablet 8.6 mg, 1 tablet, Oral, Daily PRN    Post-operatively the patient’s diet was advanced as tolerated and  undergone major orthopaedic surgery as outlined in rule 4731-11-13.  Dosages and further duration of the pain medication will be re-evaluated at her post op visit in 2 weeks.  Patient was educated on dosing expectations and limits of prescribing as a result of the new [facility] Medical Board rules enacted August 31, 2017.  Please also note that this is not the initial opoid prescription issued to this patient but a continuation of medication utilized during the hospital admission as noted in the medical record. [place] report has also been utilized to screen for any abuse history or suspicious activity as outlined in [place] State Law.  All efforts have been taken to prevent abuse potential and misuse of opioid medications including education, screening, and close clinical follow up.

## 2024-08-09 NOTE — PROGRESS NOTES
Occupational Therapy  Facility/Department: [place] [address] - MED SURG/ORTHO  Occupational Therapy Initial Assessment & treatment & Discharge summary    Name: Arthur Townsend  : 1960  MRN: [id number]  Date of Service: 2024    Discharge Recommendations:  24 hour supervision or assist  OT Equipment Recommendations  Equipment Needed: Yes  Mobility Devices: ADL Assistive Devices  ADL Assistive Devices: Reacher;Sock-Aid Hard;Long-handled Shoe Horn;Long-handled Sponge       Patient Diagnosis(es): The primary encounter diagnosis was S/P total knee arthroplasty, right. A diagnosis of Primary osteoarthritis of both knees was also pertinent to this visit.  Past Medical History:  has a past medical history of Arthritis, Asthma, Bipolar disorder, Borderline osteopenia, Cellulitis of left leg, Frequency of micturition, GERD (gastroesophageal reflux disease), Headache(784.0), HNP (herniated nucleus pulposus), lumbar, Hyperlipidemia, Hypertension, Intention tremor, Iron deficiency anemia, Lateral meniscal tear, Lumbar stenosis with neurogenic claudication, Medial meniscus tear, Mixed incontinence, Morbid (severe) obesity due to excess calories (HCC), ANTOINETTE (obstructive sleep apnea), Prolonged emergence from general anesthesia, Prolonged emergence from general anesthesia, initial encounter, Tobacco use, and Venous ulcer of left leg (HCC).  Past Surgical History:  has a past surgical history that includes knee surgery (Left);  section; Abdominal exploration surgery; Gastric bypass surgery; Tonsillectomy; knee surgery (Right, 10/28/2015); Lumbar spine surgery (N/A, 2019); Carpal tunnel release (Bilateral, ); Colonoscopy (); Colonoscopy (N/A, 2021); Cholecystectomy, laparoscopic (N/A, 2022); Anus surgery (N/A, 2023); other surgical history (2023); Facial Surgery (N/A, 2023); sinus surgery; and Total knee arthroplasty (Right, 2024).           Assessment   Performance

## 2024-08-09 NOTE — PLAN OF CARE
Problem: Discharge Planning  Goal: Discharge to home or other facility with appropriate resources  8/8/2024 2213 by Jaylan Malik RN  Outcome: Progressing  8/8/2024 1701 by Fariha Brooke RN  Outcome: Progressing     Problem: Pain  Goal: Verbalizes/displays adequate comfort level or baseline comfort level  8/8/2024 2213 by Jaylan Malik RN  Outcome: Progressing  8/8/2024 1701 by Fariha Brooke RN  Outcome: Progressing  Flowsheets (Taken 8/8/2024 8145)  Verbalizes/displays adequate comfort level or baseline comfort level: Encourage patient to monitor pain and request assistance     Problem: Safety - Adult  Goal: Free from fall injury  8/8/2024 2213 by Jaylan Malik RN  Outcome: Progressing  8/8/2024 1701 by Fariha Brooke RN  Outcome: Progressing     Problem: ABCDS Injury Assessment  Goal: Absence of physical injury  Outcome: Progressing

## 2024-08-09 NOTE — PROGRESS NOTES
Department of Orthopedic Surgery  Physician Assistant   Progress Note    Subjective:       Systemic or Specific Complaints:Pain Control and still having some shortness of breath with out of bed activity. Tolerating PO, voiding appropriately No family at bedside    Objective:     Patient Vitals for the past 24 hrs:   BP Temp Temp src Pulse Resp SpO2   08/09/24 1301 -- -- -- -- 16 --   08/09/24 1231 -- -- -- -- 16 --   08/09/24 1114 (!) 152/83 -- -- 76 -- 93 %   08/09/24 0944 139/75 -- -- 75 -- 94 %   08/09/24 0816 123/63 97.5 °F (36.4 °C) Oral 72 16 94 %   08/09/24 0321 119/75 97.9 °F (36.6 °C) Oral -- 18 --   08/08/24 2330 108/64 98.6 °F (37 °C) Oral 71 18 93 %   08/08/24 1942 -- -- -- 73 18 92 %   08/08/24 1915 114/69 98.2 °F (36.8 °C) Axillary 77 16 92 %   08/08/24 1545 132/74 98.3 °F (36.8 °C) Oral 76 16 92 %       General: alert, appears stated age, cooperative, and no distress   Wound: Wound clean and dry no evidence of infection., Positive for Edema, and no shadow drainage to post op dressing   Motion: Painful range of Motion in affected extremity   DVT Exam: No evidence of DVT seen on physical exam.  No cords or calf tenderness.  No significant calf/ankle edema.     Additional exam: Patient seen sitting up in bed at time of interview  Leg lengths equal, rotational alignment neutral  Thigh and knee moderate swelling as expected, compartments compressible  EHL, FHL, gastroc, and anterior tibialis motor intact  Sensation intact to light touch  DP and PT pulses 2+      Data Review  CBC:   Lab Results   Component Value Date/Time    WBC 16.7 08/09/2024 05:19 AM    RBC 4.37 08/09/2024 05:19 AM    HGB 12.8 08/09/2024 05:19 AM    HCT 39.3 08/09/2024 05:19 AM     08/09/2024 05:19 AM       Renal:   Lab Results   Component Value Date/Time     08/09/2024 05:19 AM    K 5.2 08/09/2024 05:19 AM     08/09/2024 05:19 AM    CO2 18 08/09/2024 05:19 AM    BUN 20 08/09/2024 05:19 AM    CREATININE 0.6

## 2024-08-09 NOTE — PROGRESS NOTES
4 Eyes Skin Assessment     The patient is being assess for  Admission    I agree that 2 RN's have performed a thorough Head to Toe Skin Assessment on the patient. ALL assessment sites listed below have been assessed.       Areas assessed by both nurses:   [x]   Head, Face, and Ears   [x]   Shoulders, Back, and Chest  [x]   Arms, Elbows, and Hands   [x]   Coccyx, Sacrum, and IschIum  [x]   Legs, Feet, and Heels--surgical R leg ace wrapped        Does the Patient have Skin Breakdown?  No         Dale Prevention initiated:  No   Wound Care Orders initiated:  No      C nurse consulted for Pressure Injury (Stage 3,4, Unstageable, DTI, NWPT, and Complex wounds), New and Established Ostomies:  No      Nurse 1 eSignature: Electronically signed by Fariha Brooke RN on 8/9/24 at 3:00 PM EDT    **SHARE this note so that the co-signing nurse is able to place an eSignature**    Nurse 2 eSignature: Electronically signed by Elisabeth Ley RN on 8/9/24 at 3:02 PM EDT

## 2024-08-09 NOTE — PROGRESS NOTES
Physical Therapy  Facility/Department: [facility] C5 - MED SURG/ORTHO  Daily Treatment Note  NAME: Arthur Townsend  : 1960  MRN: [id number]    Date of Service: 2024    Discharge Recommendations:  24 hour supervision or assist, Home with Home health PT   PT Equipment Recommendations  Equipment Needed: Yes  Mobility Devices: Walker  Walker: Rolling    Patient Diagnosis(es): The primary encounter diagnosis was S/P total knee arthroplasty, right. A diagnosis of Primary osteoarthritis of both knees was also pertinent to this visit.    Assessment  See PM note for assessment.    Activity Tolerance: Patient tolerated treatment well     Plan    Physical Therapy Plan  General Plan: 2 times a day 7 days a week  Current Treatment Recommendations: Strengthening;ROM;Balance training;Functional mobility training;Transfer training;Endurance training;Stair training;Gait training;Neuromuscular re-education;Pain management;Safety education & training;Equipment evaluation, education, & procurement;Patient/Caregiver education & training;Therapeutic activities     Restrictions  Restrictions/Precautions  Restrictions/Precautions: Up as Tolerated, Weight Bearing  Lower Extremity Weight Bearing Restrictions  Right Lower Extremity Weight Bearing: Weight Bearing As Tolerated  Position Activity Restriction  Other position/activity restrictions: O2, IV     Subjective    Subjective  Subjective: Pt agrees to PT session; is up in the chair  Pain: 6/10 in R knee  Orientation  Overall Orientation Status: Within Normal Limits  Orientation Level: Oriented X4  Cognition  Overall Cognitive Status: WFL     Objective   Vitals  Pulse: 76  BP: (!) 152/83  BP Location: Right lower arm  MAP (Calculated): 106  SpO2: 93 % (88% after ambulation on 3 L spO2)  O2 Device: Nasal cannula (3L)  Bed Mobility Training  Bed Mobility Training: Yes (Pt states she has an elevated bed height at home and will require assist transfers)  Supine to Sit:

## 2024-08-09 NOTE — DISCHARGE INSTR - COC
Continuity of Care Form    Patient Name: Arthur Townsend   :  1960  MRN:  [id number]    Admit date:  2024  Discharge date:  24    Code Status Order: Full Code   Advance Directives:     Admitting Physician:  Alex Leslie MD  PCP: Thu Yoder, APRN - CNP    Discharging Nurse: Samra Raines [facility] Unit/Room#: 0525/0525-01  Discharging Unit Phone Number: [phone]    Emergency Contact:   Extended Emergency Contact Information  Primary Emergency Contact: Viviane Zimmer  Home Phone: [phone]  Relation: Child  Secondary Emergency Contact: Nivia Zimmer  Address: [address] [place] [address]            [place] [id number] [facility]  Home Phone: [phone]  Work Phone: [phone]  Mobile Phone: [phone]  Relation: Child    Past Surgical History:  Past Surgical History:   Procedure Laterality Date    ABDOMINAL EXPLORATION SURGERY      ANUS SURGERY N/A 2023    ANAL FISTULOTOMY performed by Roney Crum MD at [facility] OR    CARPAL TUNNEL RELEASE Bilateral 2005     SECTION      CHOLECYSTECTOMY, LAPAROSCOPIC N/A 2022    LAPAROSCOPIC CHOLECYSTECTOMY performed by Roney Crum MD at [facility] OR    COLONOSCOPY      normal    COLONOSCOPY N/A 2021    COLON W/ANES. (13:30) performed by Bobo Calderón MD at [facility] ENDOSCOPY    FACIAL SURGERY N/A 2023    EXCISION SCALP CYST performed by Roney Crum MD at [facility] OR    GASTRIC BYPASS SURGERY      KNEE SURGERY Left     atrhroscopic unispacer    KNEE SURGERY Right 10/28/2015    Right knee video arthroscopy with partial medial and lateral menisectomy with loose body removal    LUMBAR SPINE SURGERY N/A 2019    MICROLUMBAR DISCECTOMY L4-5 performed by Colten Amato MD at [facility] OR    OTHER SURGICAL HISTORY  2023    EXCISION SCALP CYST    SINUS SURGERY      TONSILLECTOMY      TOTAL KNEE ARTHROPLASTY Right 2024    RIGHT TOTAL KNEE ARTHROPLASTY WITH 75MM

## 2024-08-10 ENCOUNTER — APPOINTMENT (OUTPATIENT)
Dept: GENERAL RADIOLOGY | Age: 64
DRG: 982 | End: 2024-08-10
Attending: STUDENT IN AN ORGANIZED HEALTH CARE EDUCATION/TRAINING PROGRAM
Payer: MEDICARE

## 2024-08-10 PROBLEM — J96.01 ACUTE RESPIRATORY FAILURE WITH HYPOXIA (HCC): Status: ACTIVE | Noted: 2024-08-10

## 2024-08-10 LAB
BASOPHILS # BLD: 0 K/UL (ref 0–0.2)
BASOPHILS NFR BLD: 0.3 %
DEPRECATED RDW RBC AUTO: 14.7 % (ref 12.4–15.4)
EOSINOPHIL # BLD: 0 K/UL (ref 0–0.6)
EOSINOPHIL NFR BLD: 0.2 %
HCT VFR BLD AUTO: 34.7 % (ref 36–48)
HGB BLD-MCNC: 11.3 G/DL (ref 12–16)
LYMPHOCYTES # BLD: 2 K/UL (ref 1–5.1)
LYMPHOCYTES NFR BLD: 14.8 %
MCH RBC QN AUTO: 28.9 PG (ref 26–34)
MCHC RBC AUTO-ENTMCNC: 32.7 G/DL (ref 31–36)
MCV RBC AUTO: 88.3 FL (ref 80–100)
MONOCYTES # BLD: 1.2 K/UL (ref 0–1.3)
MONOCYTES NFR BLD: 8.9 %
NEUTROPHILS # BLD: 10.3 K/UL (ref 1.7–7.7)
NEUTROPHILS NFR BLD: 75.8 %
PLATELET # BLD AUTO: 300 K/UL (ref 135–450)
PMV BLD AUTO: 7.3 FL (ref 5–10.5)
RBC # BLD AUTO: 3.93 M/UL (ref 4–5.2)
WBC # BLD AUTO: 13.5 K/UL (ref 4–11)

## 2024-08-10 PROCEDURE — 99024 POSTOP FOLLOW-UP VISIT: CPT | Performed by: PHYSICIAN ASSISTANT

## 2024-08-10 PROCEDURE — 6370000000 HC RX 637 (ALT 250 FOR IP): Performed by: STUDENT IN AN ORGANIZED HEALTH CARE EDUCATION/TRAINING PROGRAM

## 2024-08-10 PROCEDURE — 1200000000 HC SEMI PRIVATE

## 2024-08-10 PROCEDURE — 96375 TX/PRO/DX INJ NEW DRUG ADDON: CPT

## 2024-08-10 PROCEDURE — 36415 COLL VENOUS BLD VENIPUNCTURE: CPT

## 2024-08-10 PROCEDURE — 94640 AIRWAY INHALATION TREATMENT: CPT

## 2024-08-10 PROCEDURE — G0378 HOSPITAL OBSERVATION PER HR: HCPCS

## 2024-08-10 PROCEDURE — 2580000003 HC RX 258: Performed by: STUDENT IN AN ORGANIZED HEALTH CARE EDUCATION/TRAINING PROGRAM

## 2024-08-10 PROCEDURE — 97530 THERAPEUTIC ACTIVITIES: CPT

## 2024-08-10 PROCEDURE — 94761 N-INVAS EAR/PLS OXIMETRY MLT: CPT

## 2024-08-10 PROCEDURE — 6360000002 HC RX W HCPCS: Performed by: STUDENT IN AN ORGANIZED HEALTH CARE EDUCATION/TRAINING PROGRAM

## 2024-08-10 PROCEDURE — 97116 GAIT TRAINING THERAPY: CPT

## 2024-08-10 PROCEDURE — 97110 THERAPEUTIC EXERCISES: CPT

## 2024-08-10 PROCEDURE — 2700000000 HC OXYGEN THERAPY PER DAY

## 2024-08-10 PROCEDURE — 85025 COMPLETE CBC W/AUTO DIFF WBC: CPT

## 2024-08-10 PROCEDURE — 6360000002 HC RX W HCPCS: Performed by: INTERNAL MEDICINE

## 2024-08-10 PROCEDURE — 71045 X-RAY EXAM CHEST 1 VIEW: CPT

## 2024-08-10 PROCEDURE — 96372 THER/PROPH/DIAG INJ SC/IM: CPT

## 2024-08-10 RX ORDER — FUROSEMIDE 10 MG/ML
20 INJECTION INTRAMUSCULAR; INTRAVENOUS ONCE
Status: COMPLETED | OUTPATIENT
Start: 2024-08-10 | End: 2024-08-10

## 2024-08-10 RX ADMIN — OXYCODONE HYDROCHLORIDE 10 MG: 5 TABLET ORAL at 23:56

## 2024-08-10 RX ADMIN — CELECOXIB 200 MG: 100 CAPSULE ORAL at 19:44

## 2024-08-10 RX ADMIN — Medication 2 PUFF: at 20:20

## 2024-08-10 RX ADMIN — LAMOTRIGINE 100 MG: 100 TABLET ORAL at 19:44

## 2024-08-10 RX ADMIN — ENOXAPARIN SODIUM 30 MG: 100 INJECTION SUBCUTANEOUS at 19:45

## 2024-08-10 RX ADMIN — OXYCODONE HYDROCHLORIDE 10 MG: 5 TABLET ORAL at 09:05

## 2024-08-10 RX ADMIN — METOPROLOL SUCCINATE 50 MG: 50 TABLET, EXTENDED RELEASE ORAL at 19:44

## 2024-08-10 RX ADMIN — SODIUM CHLORIDE, PRESERVATIVE FREE 10 ML: 5 INJECTION INTRAVENOUS at 20:41

## 2024-08-10 RX ADMIN — FLUTICASONE PROPIONATE 2 SPRAY: 50 SPRAY, METERED NASAL at 09:06

## 2024-08-10 RX ADMIN — OXYCODONE HYDROCHLORIDE 10 MG: 5 TABLET ORAL at 03:36

## 2024-08-10 RX ADMIN — FUROSEMIDE 20 MG: 10 INJECTION, SOLUTION INTRAMUSCULAR; INTRAVENOUS at 16:18

## 2024-08-10 RX ADMIN — ACETAMINOPHEN 650 MG: 325 TABLET, FILM COATED ORAL at 13:34

## 2024-08-10 RX ADMIN — ENOXAPARIN SODIUM 30 MG: 100 INJECTION SUBCUTANEOUS at 09:06

## 2024-08-10 RX ADMIN — ARIPIPRAZOLE 5 MG: 10 TABLET ORAL at 19:44

## 2024-08-10 RX ADMIN — Medication 2 PUFF: at 07:33

## 2024-08-10 RX ADMIN — BUSPIRONE HYDROCHLORIDE 15 MG: 15 TABLET ORAL at 09:05

## 2024-08-10 RX ADMIN — CELECOXIB 200 MG: 100 CAPSULE ORAL at 09:05

## 2024-08-10 RX ADMIN — ACETAMINOPHEN 650 MG: 325 TABLET, FILM COATED ORAL at 23:57

## 2024-08-10 RX ADMIN — OXYBUTYNIN CHLORIDE 5 MG: 5 TABLET ORAL at 19:44

## 2024-08-10 RX ADMIN — BUPROPION HYDROCHLORIDE 150 MG: 150 TABLET, FILM COATED, EXTENDED RELEASE ORAL at 20:39

## 2024-08-10 RX ADMIN — LAMOTRIGINE 100 MG: 100 TABLET ORAL at 09:05

## 2024-08-10 RX ADMIN — SODIUM CHLORIDE, PRESERVATIVE FREE 10 ML: 5 INJECTION INTRAVENOUS at 09:06

## 2024-08-10 RX ADMIN — ACETAMINOPHEN 650 MG: 325 TABLET, FILM COATED ORAL at 19:44

## 2024-08-10 RX ADMIN — BUSPIRONE HYDROCHLORIDE 15 MG: 15 TABLET ORAL at 19:45

## 2024-08-10 RX ADMIN — ACETAMINOPHEN 650 MG: 325 TABLET, FILM COATED ORAL at 09:05

## 2024-08-10 RX ADMIN — POLYETHYLENE GLYCOL 3350 17 G: 17 POWDER, FOR SOLUTION ORAL at 09:06

## 2024-08-10 ASSESSMENT — PAIN DESCRIPTION - ORIENTATION
ORIENTATION: RIGHT

## 2024-08-10 ASSESSMENT — PAIN DESCRIPTION - LOCATION
LOCATION: KNEE

## 2024-08-10 ASSESSMENT — PAIN DESCRIPTION - FREQUENCY: FREQUENCY: INTERMITTENT

## 2024-08-10 ASSESSMENT — PAIN DESCRIPTION - DESCRIPTORS
DESCRIPTORS: ACHING
DESCRIPTORS: ACHING
DESCRIPTORS: STABBING;ACHING
DESCRIPTORS: ACHING;SHOOTING
DESCRIPTORS: ACHING;DISCOMFORT

## 2024-08-10 ASSESSMENT — PAIN - FUNCTIONAL ASSESSMENT
PAIN_FUNCTIONAL_ASSESSMENT: ACTIVITIES ARE NOT PREVENTED
PAIN_FUNCTIONAL_ASSESSMENT: PREVENTS OR INTERFERES SOME ACTIVE ACTIVITIES AND ADLS
PAIN_FUNCTIONAL_ASSESSMENT: ACTIVITIES ARE NOT PREVENTED

## 2024-08-10 ASSESSMENT — PAIN SCALES - GENERAL
PAINLEVEL_OUTOF10: 1
PAINLEVEL_OUTOF10: 4
PAINLEVEL_OUTOF10: 8
PAINLEVEL_OUTOF10: 6
PAINLEVEL_OUTOF10: 7
PAINLEVEL_OUTOF10: 7

## 2024-08-10 ASSESSMENT — PAIN DESCRIPTION - PAIN TYPE: TYPE: SURGICAL PAIN

## 2024-08-10 NOTE — PLAN OF CARE
Problem: Discharge Planning  Goal: Discharge to home or other facility with appropriate resources  8/9/2024 2027 by Jaylan Malik RN  Outcome: Progressing  8/9/2024 0951 by Fariha Brooke RN  Outcome: Progressing  Flowsheets (Taken 8/9/2024 0816)  Discharge to home or other facility with appropriate resources: Identify barriers to discharge with patient and caregiver     Problem: Pain  Goal: Verbalizes/displays adequate comfort level or baseline comfort level  8/9/2024 2027 by Jaylan Malik RN  Outcome: Progressing  8/9/2024 0951 by Fariha Brooke RN  Outcome: Progressing  Flowsheets (Taken 8/9/2024 0816)  Verbalizes/displays adequate comfort level or baseline comfort level: Encourage patient to monitor pain and request assistance     Problem: Safety - Adult  Goal: Free from fall injury  Outcome: Progressing     Problem: ABCDS Injury Assessment  Goal: Absence of physical injury  Outcome: Progressing

## 2024-08-10 NOTE — PLAN OF CARE
Problem: Discharge Planning  Goal: Discharge to home or other facility with appropriate resources  8/10/2024 0941 by Fariha Brooke, RN  Outcome: Progressing  Flowsheets (Taken 8/10/2024 0900)  Discharge to home or other facility with appropriate resources: Identify barriers to discharge with patient and caregiver

## 2024-08-10 NOTE — PROGRESS NOTES
Physical Therapy  Facility/Department: [facility] C5 - MED SURG/ORTHO  Daily Treatment Note  NAME: Arthur Townsend  : 1960  MRN: [id number]    Date of Service: 8/10/2024    Discharge Recommendations:  Subacute/Skilled Nursing Facility   PT Equipment Recommendations  Equipment Needed: No  Other: defer to next level of care.    Patient Diagnosis(es): The primary encounter diagnosis was S/P total knee arthroplasty, right. A diagnosis of Primary osteoarthritis of both knees was also pertinent to this visit.    Assessment   Assessment: pt found finishing up on toilet, agreeable to PT treatment, cleared for treatment by RN.  pt demonstrates CGA fading to SBA for functional transfers with RW, CGA for ambulation up to 40' with RW.  pt agreeable to stair trainign on today's date, but after begining ambulation to go to stairs, pt reports significantly increased pain and faituge, requests seated rest break, returns to room and requries seated rest break to continue participating in PT treatment.  pt declines stair training on today's date secondary to increased fatigue.  pt requires more assist on today's date than previous treatment.  pt demonstrates significant faituge during treamtent on today's date.  pt requests DC to SNF on today's date, states she does not believe she will be able to care for herself in her home.  pt continues to demonstrate decreased strength, endurance, mobility, activity tolerance, functional capacity, and mobility and would continue to benefit from skilled PT to address the above stated deficits during her stay in the acute care setting.  pt seen X1 visit today instead of X2 secondary to increased case volume.  recommend DC to SNF when medically stable.  Activity Tolerance: Patient tolerated treatment well;Patient limited by fatigue;Patient limited by endurance  Equipment Needed: No  Other: defer to next level of care.     Plan    Physical Therapy Plan  General Plan: 2 times a day 7 days a  water on her first one and it was ruined.)  Barriers to Learning: None  Education Outcome: Verbalized understanding;Demonstrated understanding    AM-PAC - Mobility    AM-PAC Basic Mobility - Inpatient   How much help is needed turning from your back to your side while in a flat bed without using bedrails?: A Little  How much help is needed moving from lying on your back to sitting on the side of a flat bed without using bedrails?: A Little  How much help is needed moving to and from a bed to a chair?: A Little  How much help is needed standing up from a chair using your arms?: A Little  How much help is needed walking in hospital room?: A Little  How much help is needed climbing 3-5 steps with a railing?: A Little  AM-PAC Inpatient Mobility Raw Score : 18  AM-PAC Inpatient T-Scale Score : 43.63  Mobility Inpatient CMS 0-100% Score: 46.58  Mobility Inpatient CMS G-Code Modifier : CK         Therapy Time   Individual Concurrent Group Co-treatment   Time In 0824         Time Out 0904         Minutes 40         Timed Code Treatment Minutes: 40 Minutes       Federico Cortés, PT, DPT  If pt is unable to be seen after this session, please let this note serve as discharge summary.  Please see case management note for discharge disposition.  Thank you.

## 2024-08-10 NOTE — PLAN OF CARE
Ortho Care Plan    Patient had a total knee replacement on 8/8/2024.  .    Comorbidities Reviewed Yes   Review completed by Fariha Brooke RN      Patient has a past medical history of Arthritis, Asthma, Bipolar disorder, Borderline osteopenia, Cellulitis of left leg, Frequency of micturition, GERD (gastroesophageal reflux disease), Headache(784.0), HNP (herniated nucleus pulposus), lumbar, Hyperlipidemia, Hypertension, Intention tremor, Iron deficiency anemia, Lateral meniscal tear, Lumbar stenosis with neurogenic claudication, Medial meniscus tear, Mixed incontinence, Morbid (severe) obesity due to excess calories (HCC), ANTOINETTE (obstructive sleep apnea), Prolonged emergence from general anesthesia, Prolonged emergence from general anesthesia, initial encounter, Tobacco use, and Venous ulcer of left leg (HCC).       Commodities addressed via home medications ordered, PRN medications ordered, education provided, and interdisciplinary team members involved      Patient and/or Family's stated Goal of Care this Admission: reduce pain, increase activity tolerance, improve mobility, Understand the effects of joint replacement on commodities, understand that blood glucose levels may fluctuate and need closer monitoring, and understand use and effect of cough and deep breath/incentive spirometer prior to discharge.     >>For Ortho and Comorbidity documentation on Education, please see Education Tab.

## 2024-08-10 NOTE — PROGRESS NOTES
Department of Orthopedic Surgery  Physician Assistant   Progress Note    Subjective:       Systemic or Specific Complaints:Pain Control about the same.    Objective:     Patient Vitals for the past 24 hrs:   BP Temp Temp src Pulse Resp SpO2   08/10/24 0935 -- -- -- -- 16 --   08/10/24 0905 -- -- -- -- 20 --   08/10/24 0845 -- -- -- -- -- 93 %   08/10/24 0830 135/74 98.2 °F (36.8 °C) Oral 91 20 96 %   08/10/24 0735 -- -- -- -- -- 97 %   08/09/24 1918 114/64 97.7 °F (36.5 °C) Oral 81 20 97 %   08/09/24 1733 -- -- -- -- 16 --   08/09/24 1301 -- -- -- -- 16 --   08/09/24 1231 -- -- -- -- 16 --   08/09/24 1114 (!) 152/83 -- -- 76 -- 93 %       General: alert, appears stated age, cooperative, and no distress   Wound: Wound clean and dry no evidence of infection., Positive for Edema, and no shadow drainage to post op dressing   Motion: Painful range of Motion in affected extremity   DVT Exam: No evidence of DVT seen on physical exam.  No cords or calf tenderness.  No significant calf/ankle edema.     Additional exam: Patient seen sitting up in bed at time of interview  Leg lengths equal, rotational alignment neutral  Thigh and knee moderate swelling as expected, compartments compressible  EHL, FHL, gastroc, and anterior tibialis motor intact  Sensation intact to light touch  DP and PT pulses 2+      Data Review  CBC:   Lab Results   Component Value Date/Time    WBC 13.5 08/10/2024 06:38 AM    RBC 3.93 08/10/2024 06:38 AM    HGB 11.3 08/10/2024 06:38 AM    HCT 34.7 08/10/2024 06:38 AM     08/10/2024 06:38 AM       Renal:   Lab Results   Component Value Date/Time     08/09/2024 05:19 AM    K 5.2 08/09/2024 05:19 AM     08/09/2024 05:19 AM    CO2 18 08/09/2024 05:19 AM    BUN 20 08/09/2024 05:19 AM    CREATININE 0.6 08/09/2024 05:19 AM    GLUCOSE 159 08/09/2024 05:19 AM    CALCIUM 8.6 08/09/2024 05:19 AM          Assessment:      right total knee arthroplasty with 75mm stem, POD 2  DOS 8/8/24 by   Anuj.     Post op hypoxia, on supplemental O2, wean as tolerated, encouraged incentive spirometry    Plan:      1:  WBAT and activity as tolerated to the RLE with assistive device, encourage flexion and extension  2:  Continue Deep venous thrombosis prophylaxis- lovenox 30mg BID while inpatient, eliquis 2.5 BID at DC for 30 days   3:  Continue Pain Control  4:  PT/OT  5:  Two doses IV ancef completed post op   6:  Discharge pending oxygen, pain control.  DCP updated, follow-up with surgeon as scheduled    SURY Epstein

## 2024-08-10 NOTE — PROGRESS NOTES
Ortho Care Plan    Patient had a total knee replacement on 8/82024.  .    Comorbidities Reviewed Yes   Review completed by Jaylan Malik RN      Patient has a past medical history of Arthritis, Asthma, Bipolar disorder, Borderline osteopenia, Cellulitis of left leg, Frequency of micturition, GERD (gastroesophageal reflux disease), Headache(784.0), HNP (herniated nucleus pulposus), lumbar, Hyperlipidemia, Hypertension, Intention tremor, Iron deficiency anemia, Lateral meniscal tear, Lumbar stenosis with neurogenic claudication, Medial meniscus tear, Mixed incontinence, Morbid (severe) obesity due to excess calories (HCC), ANTOINETTE (obstructive sleep apnea), Prolonged emergence from general anesthesia, Prolonged emergence from general anesthesia, initial encounter, Tobacco use, and Venous ulcer of left leg (HCC).       Commodities addressed via education provided      Patient and/or Family's stated Goal of Care this Admission:   , reduce pain, increase activity tolerance, improve mobility, Understand the effects of joint replacement on commodities, understand that blood glucose levels may fluctuate and need closer monitoring, understand use and effect of cough and deep breath/incentive spirometer, and Other:   prior to discharge.     >>For Ortho and Comorbidity documentation on Education, please see Education Tab.

## 2024-08-10 NOTE — PROGRESS NOTES
Hospitalist Progress Note    CC: Osteoarthritis of right knee    Name:  Arthur Townsend /Age/Sex: 1960  (64 y.o. female)   MRN & CSN:  [id number] & [id number] Encounter Date/Time: 8/10/2024 2:01 PM EDT   Location:   PCP: Thu Yoder, APRN - CNP     Attending:Alex Leslie MD         Hospital course:  65yo WM with PMHX sig for super obesity with BMI of 56, suspected ANTOINETTE, osteoarthritis, hx of gastric bypass, HTN, COPD/asthma, depression, HLD who is s/p R total knee replacement who we are following for acute resp failure.    Admit date: 2024  Days in hospital:  Hospital Day: 3  Status:  Inpatient       24 Hour Events: pt still requiring oxygen, but improving -     Subjective: may be a bit fluid overloaded - will give a dose of lasix - suspect obesity and mild fluid overload contributing to slowly coming off of oxygen - some pain in leg, but improved once leg wrap removed    CXR:  Diffuse increased interstitial markings throughout the lungs. This may   represent a chronic interstitial process. Recommend follow-up.  Mild cardiac   enlargement with vascular prominent and increased interstitial markings.   This may reflect developing failure or acute interstitial process in the   correct clinical setting.  Recommend correlation and follow-up.     ROS:   A comprehensive review of systems was negative except for: some shortness of breath with activity, leg pain       Objective:    /74   Pulse 91   Temp 98.2 °F (36.8 °C) (Oral)   Resp 16   Ht 1.549 m (5' 0.98\")   Wt 134.7 kg (297 lb)   LMP  (LMP Unknown)   SpO2 93%   BMI 56.15 kg/m²     Gen: alert, cooperative  HEENT: NC/AT, moist mucous membranes, no oropharyngeal erythema or exudate  Neck: supple, trachea midline, no anterior cervical or SC LAD  Heart:  reg rate and rhythm, no murmurs, no gallops, no rubs. some leg edema  Lungs: diminished bilaterally  Abd: soft,  non-tender, non-distended, normal bowel sounds, no masses or organomegaly  Extrem:  no ulcers, no Fantasma's sign, normal pulses, equal and bilateral 2+, and some edema bilaterally  Skin: Pale  Psych: A & O x3  Neuro: grossly intact, moves all 4 extremities    Assessment:    Principal Problem:    Osteoarthritis of right knee  Active Problems:    Primary osteoarthritis of right knee    S/P total knee arthroplasty, right  Resolved Problems:    * No resolved hospital problems. *      Plan:   Acute resp failure with hypoxia - wean oxygen as tolerated, encrouage incentive spirometry, CXR shows possible mild fluid overload - will give one dose of lasix and purewick catheter  Leukocytosis -suspect due to atelectasis  - decreasing  COPD - continue advair  HTN - continue toprol XL      5.  Super Obesity, Body mass index is Body mass index is 56.15 kg/m². .  [] Class I - 30.0 to 34.9 kg/m2  [] Class II - 35.0 to 39.9 kg/m2  [] Class III - ?40 kg/m2 (also referred to as severe, extreme, morbid or massive obesity)   [x] Super Obesity  - > 50% kg/m2  [] Super Super Obesity  - > 60% kg/m2  Complicating assessment and treatment.    Obesity Places the patient at risk for multiple co-morbidities as well as early death and may be contributing to the patient's presentation.   Supportive care.  Encourage therapeutic lifestyle changes.  Continue current regimen/therapies. Monitor. Adjust medical regimen as appropriate.      Prognosis:  Good    Code status:  Full code    DVT prophylaxis: Lovenox  GI prophylaxis: none  Antibiotic prophylaxis indicated:  No     PT/OT Eval Status:   []NOT yet ordered  []Ordered and Pending   []Seen with Recommendations for:  []Home independently  []Home w/ assist  [x]HHC  []SNF  []Acute Rehab    Disposition:  Home with home health in 1-2 day(s).   Patient requires continued admission due to hypoxia.  Discussed with patient, ortho and nursing.          Diet:  ADULT DIET; Regular    Medications:  Personally

## 2024-08-11 LAB
BASOPHILS # BLD: 0.1 K/UL (ref 0–0.2)
BASOPHILS NFR BLD: 1.1 %
DEPRECATED RDW RBC AUTO: 15 % (ref 12.4–15.4)
EOSINOPHIL # BLD: 0.4 K/UL (ref 0–0.6)
EOSINOPHIL NFR BLD: 4.2 %
HCT VFR BLD AUTO: 34 % (ref 36–48)
HGB BLD-MCNC: 11.2 G/DL (ref 12–16)
LYMPHOCYTES # BLD: 2.3 K/UL (ref 1–5.1)
LYMPHOCYTES NFR BLD: 25.3 %
MCH RBC QN AUTO: 29 PG (ref 26–34)
MCHC RBC AUTO-ENTMCNC: 33 G/DL (ref 31–36)
MCV RBC AUTO: 88 FL (ref 80–100)
MONOCYTES # BLD: 0.8 K/UL (ref 0–1.3)
MONOCYTES NFR BLD: 8.5 %
NEUTROPHILS # BLD: 5.6 K/UL (ref 1.7–7.7)
NEUTROPHILS NFR BLD: 60.9 %
PLATELET # BLD AUTO: 287 K/UL (ref 135–450)
PMV BLD AUTO: 7 FL (ref 5–10.5)
RBC # BLD AUTO: 3.86 M/UL (ref 4–5.2)
WBC # BLD AUTO: 9.1 K/UL (ref 4–11)

## 2024-08-11 PROCEDURE — G0378 HOSPITAL OBSERVATION PER HR: HCPCS

## 2024-08-11 PROCEDURE — 94640 AIRWAY INHALATION TREATMENT: CPT

## 2024-08-11 PROCEDURE — 97116 GAIT TRAINING THERAPY: CPT

## 2024-08-11 PROCEDURE — 6370000000 HC RX 637 (ALT 250 FOR IP): Performed by: STUDENT IN AN ORGANIZED HEALTH CARE EDUCATION/TRAINING PROGRAM

## 2024-08-11 PROCEDURE — 85025 COMPLETE CBC W/AUTO DIFF WBC: CPT

## 2024-08-11 PROCEDURE — 2580000003 HC RX 258: Performed by: STUDENT IN AN ORGANIZED HEALTH CARE EDUCATION/TRAINING PROGRAM

## 2024-08-11 PROCEDURE — 94761 N-INVAS EAR/PLS OXIMETRY MLT: CPT

## 2024-08-11 PROCEDURE — 6360000002 HC RX W HCPCS: Performed by: STUDENT IN AN ORGANIZED HEALTH CARE EDUCATION/TRAINING PROGRAM

## 2024-08-11 PROCEDURE — 2700000000 HC OXYGEN THERAPY PER DAY

## 2024-08-11 PROCEDURE — 6370000000 HC RX 637 (ALT 250 FOR IP): Performed by: INTERNAL MEDICINE

## 2024-08-11 PROCEDURE — 97530 THERAPEUTIC ACTIVITIES: CPT

## 2024-08-11 PROCEDURE — 96372 THER/PROPH/DIAG INJ SC/IM: CPT

## 2024-08-11 PROCEDURE — 97110 THERAPEUTIC EXERCISES: CPT

## 2024-08-11 PROCEDURE — 99024 POSTOP FOLLOW-UP VISIT: CPT | Performed by: PHYSICIAN ASSISTANT

## 2024-08-11 PROCEDURE — 6360000002 HC RX W HCPCS: Performed by: INTERNAL MEDICINE

## 2024-08-11 PROCEDURE — 96376 TX/PRO/DX INJ SAME DRUG ADON: CPT

## 2024-08-11 PROCEDURE — 1200000000 HC SEMI PRIVATE

## 2024-08-11 RX ORDER — FUROSEMIDE 10 MG/ML
20 INJECTION INTRAMUSCULAR; INTRAVENOUS ONCE
Status: COMPLETED | OUTPATIENT
Start: 2024-08-11 | End: 2024-08-11

## 2024-08-11 RX ORDER — POTASSIUM CHLORIDE 20 MEQ/1
20 TABLET, EXTENDED RELEASE ORAL ONCE
Status: COMPLETED | OUTPATIENT
Start: 2024-08-11 | End: 2024-08-11

## 2024-08-11 RX ADMIN — METOPROLOL SUCCINATE 50 MG: 50 TABLET, EXTENDED RELEASE ORAL at 20:22

## 2024-08-11 RX ADMIN — SODIUM CHLORIDE, PRESERVATIVE FREE 10 ML: 5 INJECTION INTRAVENOUS at 20:17

## 2024-08-11 RX ADMIN — ACETAMINOPHEN 650 MG: 325 TABLET, FILM COATED ORAL at 13:42

## 2024-08-11 RX ADMIN — FUROSEMIDE 20 MG: 10 INJECTION, SOLUTION INTRAMUSCULAR; INTRAVENOUS at 18:14

## 2024-08-11 RX ADMIN — CELECOXIB 200 MG: 100 CAPSULE ORAL at 20:21

## 2024-08-11 RX ADMIN — Medication 2 PUFF: at 19:30

## 2024-08-11 RX ADMIN — BUSPIRONE HYDROCHLORIDE 15 MG: 15 TABLET ORAL at 08:07

## 2024-08-11 RX ADMIN — ACETAMINOPHEN 650 MG: 325 TABLET, FILM COATED ORAL at 08:08

## 2024-08-11 RX ADMIN — ACETAMINOPHEN 650 MG: 325 TABLET, FILM COATED ORAL at 20:22

## 2024-08-11 RX ADMIN — Medication 2 PUFF: at 07:45

## 2024-08-11 RX ADMIN — ENOXAPARIN SODIUM 30 MG: 100 INJECTION SUBCUTANEOUS at 21:51

## 2024-08-11 RX ADMIN — CELECOXIB 200 MG: 100 CAPSULE ORAL at 08:07

## 2024-08-11 RX ADMIN — SODIUM CHLORIDE, PRESERVATIVE FREE 10 ML: 5 INJECTION INTRAVENOUS at 08:09

## 2024-08-11 RX ADMIN — LAMOTRIGINE 100 MG: 100 TABLET ORAL at 20:22

## 2024-08-11 RX ADMIN — BUSPIRONE HYDROCHLORIDE 15 MG: 15 TABLET ORAL at 20:22

## 2024-08-11 RX ADMIN — LAMOTRIGINE 100 MG: 100 TABLET ORAL at 08:07

## 2024-08-11 RX ADMIN — OXYBUTYNIN CHLORIDE 5 MG: 5 TABLET ORAL at 20:22

## 2024-08-11 RX ADMIN — POTASSIUM CHLORIDE 20 MEQ: 1500 TABLET, EXTENDED RELEASE ORAL at 18:14

## 2024-08-11 RX ADMIN — ENOXAPARIN SODIUM 30 MG: 100 INJECTION SUBCUTANEOUS at 08:07

## 2024-08-11 RX ADMIN — ARIPIPRAZOLE 5 MG: 10 TABLET ORAL at 20:22

## 2024-08-11 RX ADMIN — BUPROPION HYDROCHLORIDE 150 MG: 150 TABLET, FILM COATED, EXTENDED RELEASE ORAL at 20:22

## 2024-08-11 ASSESSMENT — PAIN DESCRIPTION - ORIENTATION
ORIENTATION: RIGHT
ORIENTATION: RIGHT

## 2024-08-11 ASSESSMENT — PAIN SCALES - GENERAL
PAINLEVEL_OUTOF10: 0
PAINLEVEL_OUTOF10: 3
PAINLEVEL_OUTOF10: 4
PAINLEVEL_OUTOF10: 0
PAINLEVEL_OUTOF10: 0

## 2024-08-11 ASSESSMENT — PAIN DESCRIPTION - PAIN TYPE
TYPE: SURGICAL PAIN
TYPE: SURGICAL PAIN

## 2024-08-11 ASSESSMENT — PAIN DESCRIPTION - DESCRIPTORS
DESCRIPTORS: ACHING
DESCRIPTORS: ACHING

## 2024-08-11 ASSESSMENT — PAIN DESCRIPTION - LOCATION
LOCATION: KNEE
LOCATION: KNEE

## 2024-08-11 NOTE — PROGRESS NOTES
Physical Therapy  Facility/Department: [facility] C5 - MED SURG/ORTHO  Daily Treatment Note  NAME: Arthur Townsend  : 1960  MRN: [id number]    Date of Service: 2024    Discharge Recommendations:  Subacute/Skilled Nursing Facility   PT Equipment Recommendations  Equipment Needed: No  Other: defer to next level of care.    Therapy discharge recommendations take into account each patient's current medical complexities and are subject to input/oversight from the patient's healthcare team.   Barriers to Home Discharge:   [x] Steps to access home entry or bed/bath: 2   [x] Unable to transfer, ambulate, or propel wheelchair household distances without assist   [x] Unable to perform ADLs without assist   [] Limited available assist at home upon discharge    [x] Patient or family requests d/c to post-acute facility    [] Poor cognition/safety awareness for d/c to home alone    [] Unable to maintain ordered weight bearing status    [] Patient with salient signs of long-standing immobility   [] Other:    Patient Diagnosis(es): The primary encounter diagnosis was S/P total knee arthroplasty, right. A diagnosis of Primary osteoarthritis of both knees was also pertinent to this visit.    Assessment   Assessment: pt found in chair, agreeable to PT treatment, cleared for treatment by RN.  pt demonstrates CGA fading to SBA for functional transfers with RW, CGA for ambulation up to 30' with RW.  pt requires stnading rest break during ambulation seocndary to CARBALLO, pt had supplementary O2 decreased by RN this AM, reports noticing some increase in her CARBALLO since then, O2 sats remain in the 90's during PT treatment.  pt declines stair training on today's date secondary to increased fatigue.  pt demonstrates significant faituge during treamtent on today's date.  pt continues to demonstrate decreased strength, endurance, mobility, activity tolerance, functional capacity, and mobility and would continue to benefit from skilled PT

## 2024-08-11 NOTE — PROGRESS NOTES
Hospitalist Progress Note    CC: Osteoarthritis of right knee    Name:  Arthur Townsend /Age/Sex: 1960  (64 y.o. female)   MRN & CSN:  [id number] & [id number] Encounter Date/Time: 2024 2:01 PM EDT   Location:   PCP: Thu Yoder, APRN - CNP     Attending:Alex Leslie MD         Hospital course:  63yo WM with PMHX sig for super obesity with BMI of 56, suspected ANTOINETTE, osteoarthritis, hx of gastric bypass, HTN, COPD/asthma, depression, HLD who is s/p R total knee replacement who we are following for acute resp failure.    Admit date: 2024  Days in hospital:  Hospital Day: 4  Status:  Inpatient       24 Hour Events: pt still requiring oxygen, but improving - down to 1L after IV lasix    Subjective: leg pain better, pt with some cough but no sputum production - responded with IV lasix    CXR:  Diffuse increased interstitial markings throughout the lungs. This may   represent a chronic interstitial process. Recommend follow-up.  Mild cardiac   enlargement with vascular prominent and increased interstitial markings.   This may reflect developing failure or acute interstitial process in the   correct clinical setting.  Recommend correlation and follow-up.     ROS:   A comprehensive review of systems was negative except for: some shortness of breath with activity, leg pain improved       Objective:    BP (!) 141/83   Pulse 85   Temp 97.7 °F (36.5 °C) (Oral)   Resp 20   Ht 1.549 m (5' 0.98\")   Wt 134.7 kg (297 lb)   LMP  (LMP Unknown)   SpO2 95%   BMI 56.15 kg/m²     Gen: alert, cooperative  HEENT: NC/AT, moist mucous membranes, no oropharyngeal erythema or exudate  Neck: supple, trachea midline, no anterior cervical or SC LAD  Heart:  reg rate and rhythm, no murmurs, no gallops, no rubs. some leg edema  Lungs: diminished bilaterally  Abd: soft, non-tender, non-distended, normal bowel sounds, no masses or  hypoxia.  Discussed with patient, ortho and nursing.          Diet:  ADULT DIET; Regular    Medications:  Personally reviewed in detail in conjunction w/ labs as documented for evidence of drug toxicity.     Infusion Medications    sodium chloride       Scheduled Medications    enoxaparin  30 mg SubCUTAneous BID    lamoTRIgine  100 mg Oral BID    oxyBUTYnin  5 mg Oral Daily    busPIRone  15 mg Oral BID    buPROPion  150 mg Oral Daily    ARIPiprazole  5 mg Oral Daily    fluticasone  2 spray Each Nostril Daily    metoprolol succinate  50 mg Oral Daily    celecoxib  200 mg Oral BID    budesonide-formoterol  2 puff Inhalation BID RT    sodium chloride flush  5-40 mL IntraVENous 2 times per day    acetaminophen  650 mg Oral Q6H    polyethylene glycol  17 g Oral Daily     PRN Meds: albuterol sulfate HFA, hydrOXYzine HCl, benztropine, sodium chloride flush, sodium chloride, oxyCODONE **OR** oxyCODONE, senna     Labs:  Personally reviewed and interpreted for clinical significance.     CBC:   Recent Labs     08/09/24  0519 08/10/24  0638 08/11/24  0555   WBC 16.7* 13.5* 9.1   HGB 12.8 11.3* 11.2*   HCT 39.3 34.7* 34.0*   MCV 89.9 88.3 88.0    300 287     BMP, Mag, Phos:    Recent Labs     08/09/24  0519   *   K 5.2*      CO2 18*   BUN 20   CREATININE 0.6   CALCIUM 8.6     Pro-BNP, Trop:  No results for input(s): \"PROBNP\", \"TROPHS\" in the last 72 hours.  LFTs:  No results for input(s): \"AST\", \"ALT\", \"LIPASE\", \"AMYLASE\", \"BILIDIR\", \"BILITOT\", \"ALKPHOS\" in the last 72 hours.    Invalid input(s): \"ALB\"  PT/INR, Lactic acid, TSH:  No results for input(s): \"INR\", \"LACTA\", \"TSH\" in the last 72 hours.  HgbA1c:  No results for input(s): \"LABA1C\" in the last 72 hours.  Glucose trend:  No results found for: \"GLU\"    UA:No results for input(s): \"NITRITE\", \"COLORU\", \"PHUR\", \"LABCAST\", \"WBCUA\", \"RBCUA\", \"MUCUS\", \"TRICHOMONAS\", \"YEAST\", \"BACTERIA\", \"CLARITYU\", \"SPECGRAV\", \"LEUKOCYTESUR\", \"UROBILINOGEN\", \"BILIRUBINUR\",

## 2024-08-11 NOTE — CARE COORDINATION
Received notification from [facility] ([facility]) that they are offering P2P before final determination. Please call to speak with medical director at 1 [phone] option 5 before  1200 EST. Have name,  and subscriber ID ([id number] ) for call.   SURY Alberts will pass on to team for tomorrow.

## 2024-08-11 NOTE — PROGRESS NOTES
Department of Orthopedic Surgery  Physician Assistant   Progress Note    Subjective:       Systemic or Specific Complaints:Pain Control about the same.  Up in a chair today and still on O2    Objective:     Patient Vitals for the past 24 hrs:   BP Temp Temp src Pulse Resp SpO2   08/11/24 0800 108/62 97.7 °F (36.5 °C) Oral 69 -- 95 %   08/11/24 0747 -- -- -- 70 20 96 %   08/11/24 0345 119/62 98 °F (36.7 °C) Oral 65 18 96 %   08/11/24 0026 -- -- -- -- 18 --   08/10/24 2020 -- -- -- 74 18 96 %   08/10/24 1930 (!) 149/76 97.9 °F (36.6 °C) Oral 79 16 98 %   08/10/24 1618 128/62 -- -- -- -- --       General: alert, appears stated age, cooperative, and no distress   Wound: Wound clean and dry no evidence of infection., Positive for Edema, and no shadow drainage to post op dressing   Motion: Painful range of Motion in affected extremity   DVT Exam: No evidence of DVT seen on physical exam.  No cords or calf tenderness.  No significant calf/ankle edema.     Additional exam: Patient seen sitting up in bed at time of interview  Leg lengths equal, rotational alignment neutral  Thigh and knee moderate swelling as expected, compartments compressible  EHL, FHL, gastroc, and anterior tibialis motor intact  Sensation intact to light touch  DP and PT pulses 2+      Data Review  CBC:   Lab Results   Component Value Date/Time    WBC 9.1 08/11/2024 05:55 AM    RBC 3.86 08/11/2024 05:55 AM    HGB 11.2 08/11/2024 05:55 AM    HCT 34.0 08/11/2024 05:55 AM     08/11/2024 05:55 AM       Renal:   Lab Results   Component Value Date/Time     08/09/2024 05:19 AM    K 5.2 08/09/2024 05:19 AM     08/09/2024 05:19 AM    CO2 18 08/09/2024 05:19 AM    BUN 20 08/09/2024 05:19 AM    CREATININE 0.6 08/09/2024 05:19 AM    GLUCOSE 159 08/09/2024 05:19 AM    CALCIUM 8.6 08/09/2024 05:19 AM          Assessment:      right total knee arthroplasty with 75mm stem, POD 3  DOS 8/8/24 by Dr Leslie.     Post op hypoxia, on supplemental O2,

## 2024-08-12 VITALS
DIASTOLIC BLOOD PRESSURE: 93 MMHG | BODY MASS INDEX: 55.32 KG/M2 | SYSTOLIC BLOOD PRESSURE: 148 MMHG | OXYGEN SATURATION: 89 % | WEIGHT: 293 LBS | HEART RATE: 95 BPM | HEIGHT: 61 IN | RESPIRATION RATE: 16 BRPM | TEMPERATURE: 97.6 F

## 2024-08-12 LAB
ANION GAP SERPL CALCULATED.3IONS-SCNC: 11 MMOL/L (ref 3–16)
BUN SERPL-MCNC: 22 MG/DL (ref 7–20)
CALCIUM SERPL-MCNC: 9.2 MG/DL (ref 8.3–10.6)
CHLORIDE SERPL-SCNC: 100 MMOL/L (ref 99–110)
CO2 SERPL-SCNC: 28 MMOL/L (ref 21–32)
CREAT SERPL-MCNC: 0.7 MG/DL (ref 0.6–1.2)
GFR SERPLBLD CREATININE-BSD FMLA CKD-EPI: >90 ML/MIN/{1.73_M2}
GLUCOSE SERPL-MCNC: 130 MG/DL (ref 70–99)
POTASSIUM SERPL-SCNC: 3.9 MMOL/L (ref 3.5–5.1)
SODIUM SERPL-SCNC: 139 MMOL/L (ref 136–145)

## 2024-08-12 PROCEDURE — 97530 THERAPEUTIC ACTIVITIES: CPT

## 2024-08-12 PROCEDURE — 2700000000 HC OXYGEN THERAPY PER DAY

## 2024-08-12 PROCEDURE — 94640 AIRWAY INHALATION TREATMENT: CPT

## 2024-08-12 PROCEDURE — 6370000000 HC RX 637 (ALT 250 FOR IP): Performed by: STUDENT IN AN ORGANIZED HEALTH CARE EDUCATION/TRAINING PROGRAM

## 2024-08-12 PROCEDURE — 94761 N-INVAS EAR/PLS OXIMETRY MLT: CPT

## 2024-08-12 PROCEDURE — 99024 POSTOP FOLLOW-UP VISIT: CPT | Performed by: SPECIALIST/TECHNOLOGIST

## 2024-08-12 PROCEDURE — G0378 HOSPITAL OBSERVATION PER HR: HCPCS

## 2024-08-12 PROCEDURE — 6360000002 HC RX W HCPCS: Performed by: STUDENT IN AN ORGANIZED HEALTH CARE EDUCATION/TRAINING PROGRAM

## 2024-08-12 PROCEDURE — 96372 THER/PROPH/DIAG INJ SC/IM: CPT

## 2024-08-12 PROCEDURE — 97116 GAIT TRAINING THERAPY: CPT

## 2024-08-12 PROCEDURE — 80048 BASIC METABOLIC PNL TOTAL CA: CPT

## 2024-08-12 PROCEDURE — 36415 COLL VENOUS BLD VENIPUNCTURE: CPT

## 2024-08-12 PROCEDURE — 2580000003 HC RX 258: Performed by: STUDENT IN AN ORGANIZED HEALTH CARE EDUCATION/TRAINING PROGRAM

## 2024-08-12 PROCEDURE — APPNB45 APP NON BILLABLE 31-45 MINUTES: Performed by: SPECIALIST/TECHNOLOGIST

## 2024-08-12 RX ORDER — METHOCARBAMOL 750 MG/1
750 TABLET, FILM COATED ORAL EVERY 8 HOURS PRN
Qty: 30 TABLET | Refills: 0 | Status: SHIPPED | OUTPATIENT
Start: 2024-08-12 | End: 2024-08-22

## 2024-08-12 RX ORDER — ACETAMINOPHEN 325 MG/1
650 TABLET ORAL EVERY 6 HOURS
Qty: 240 TABLET | Refills: 0 | Status: SHIPPED | OUTPATIENT
Start: 2024-08-12 | End: 2024-09-11

## 2024-08-12 RX ORDER — POLYETHYLENE GLYCOL 3350 17 G/17G
17 POWDER, FOR SOLUTION ORAL DAILY
Qty: 30 PACKET | Refills: 0 | Status: SHIPPED | OUTPATIENT
Start: 2024-08-12 | End: 2024-09-11

## 2024-08-12 RX ORDER — OXYCODONE HYDROCHLORIDE 5 MG/1
5-10 TABLET ORAL
Qty: 30 TABLET | Refills: 0 | Status: SHIPPED | OUTPATIENT
Start: 2024-08-12 | End: 2024-08-19

## 2024-08-12 RX ADMIN — ENOXAPARIN SODIUM 30 MG: 100 INJECTION SUBCUTANEOUS at 09:18

## 2024-08-12 RX ADMIN — LAMOTRIGINE 100 MG: 100 TABLET ORAL at 09:18

## 2024-08-12 RX ADMIN — SODIUM CHLORIDE, PRESERVATIVE FREE 10 ML: 5 INJECTION INTRAVENOUS at 10:36

## 2024-08-12 RX ADMIN — Medication 2 PUFF: at 07:29

## 2024-08-12 RX ADMIN — OXYCODONE HYDROCHLORIDE 10 MG: 5 TABLET ORAL at 00:18

## 2024-08-12 RX ADMIN — CELECOXIB 200 MG: 100 CAPSULE ORAL at 09:18

## 2024-08-12 RX ADMIN — BUSPIRONE HYDROCHLORIDE 15 MG: 15 TABLET ORAL at 09:18

## 2024-08-12 RX ADMIN — ACETAMINOPHEN 650 MG: 325 TABLET, FILM COATED ORAL at 00:18

## 2024-08-12 RX ADMIN — FLUTICASONE PROPIONATE 2 SPRAY: 50 SPRAY, METERED NASAL at 09:19

## 2024-08-12 ASSESSMENT — PAIN SCALES - GENERAL
PAINLEVEL_OUTOF10: 8
PAINLEVEL_OUTOF10: 3
PAINLEVEL_OUTOF10: 0

## 2024-08-12 ASSESSMENT — PAIN DESCRIPTION - DESCRIPTORS: DESCRIPTORS: STABBING

## 2024-08-12 ASSESSMENT — PAIN DESCRIPTION - ORIENTATION: ORIENTATION: RIGHT

## 2024-08-12 ASSESSMENT — PAIN - FUNCTIONAL ASSESSMENT: PAIN_FUNCTIONAL_ASSESSMENT: PREVENTS OR INTERFERES SOME ACTIVE ACTIVITIES AND ADLS

## 2024-08-12 ASSESSMENT — PAIN DESCRIPTION - LOCATION: LOCATION: KNEE

## 2024-08-12 NOTE — PROGRESS NOTES
curb step)  Stairs - Level of Assistance: Stand-by assistance (Initial cues for sequencing)  Number of Stairs Trained: 2           Safety Devices  Type of Devices: Call light within reach;Gait belt;Nurse notified;Left in chair;All fall risk precautions in place  Restraints  Restraints Initially in Place: No       Goals  Short Term Goals  Time Frame for Short Term Goals: 8/10/24  -continud 8/09.  continue goals to 8/16 secondary to longer than expected stay in acute care setting. -- 8/12 Coninue all unmet goals  Short Term Goal 1: Pt will perform bed mobility with Independent/Modified Independent.  -- 8/12 MET  Short Term Goal 2: Pt will perform transfers with Supervision and use of Rolling Walker  - met; upgrade to mod indep 8/09.  Short Term Goal 3: Pt will ambulate 120 ft with Supervision and use of Rolling Walker.  Short Term Goal 4: Pt will perform 2 steps with SBA and use of Rolling Walker.-- MET 8/12; progress to 2 steps and mod I  Short Term Goal 5: Pt will perform 10 reps of BLE exercises -- MET 8/08. -8/10 GOAL MET, continue to progress.  Patient Goals   Patient Goals : \"I want to get up and walk to the bathroom\" -- MET 8/08    Education  Patient Education  Education Given To: Patient  Education Provided: Role of Therapy;Plan of Care;Home Exercise Program;Precautions;Transfer Training;Equipment  Education Method: Verbal;Demonstration  Barriers to Learning: None  Education Outcome: Verbalized understanding;Demonstrated understanding    AM-PAC - Mobility    AM-PAC Basic Mobility - Inpatient   How much help is needed turning from your back to your side while in a flat bed without using bedrails?: None  How much help is needed moving from lying on your back to sitting on the side of a flat bed without using bedrails?: None  How much help is needed moving to and from a bed to a chair?: A Little  How much help is needed standing up from a chair using your arms?: None  How much help is needed walking in hospital

## 2024-08-12 NOTE — PROGRESS NOTES
Department of Orthopedic Surgery  Physician Assistant   Progress Note    Subjective:       Systemic or Specific Complaints:no complaints, pain is improving. Oxygen dropping a little but pt feels she recovers with time and rest. No hx of COPD, has PCP will follow up with. Tolerating PO, voiding. No family at bedside     Objective:     Patient Vitals for the past 24 hrs:   BP Temp Temp src Pulse Resp SpO2 Weight   08/12/24 0834 (!) 148/93 -- -- 95 -- 94 % --   08/12/24 0738 -- 97.6 °F (36.4 °C) Oral 72 16 93 % --   08/12/24 0729 -- -- -- -- -- 94 % --   08/12/24 0306 105/65 98.3 °F (36.8 °C) Oral 82 16 91 % (!) 140.6 kg (309 lb 15.5 oz)   08/12/24 0048 -- -- -- -- 16 -- --   08/12/24 0018 -- -- -- -- 18 -- --   08/11/24 2012 (!) 138/56 98.5 °F (36.9 °C) Oral 92 18 92 % --   08/11/24 1932 -- -- -- 72 18 99 % --   08/11/24 1520 (!) 141/83 -- -- 85 -- 95 % --   08/11/24 1215 -- -- -- 53 -- 94 % --       General: alert, appears stated age, cooperative, and no distress   Wound: Wound clean and dry no evidence of infection., Positive for Edema, and no drainage to mepilex post op dressing, no erythema, mild ecchymosis   Motion: Painful range of Motion in affected extremity   DVT Exam: No evidence of DVT seen on physical exam.  No cords or calf tenderness.  No significant calf/ankle edema.     Additional exam: Patient seen sitting up edge of bed at time of interview  Leg lengths equal, rotational alignment neutral  Thigh and knee moderate swelling as expected, compartments compressible  EHL, FHL, gastroc, and anterior tibialis motor intact  Sensation intact to light touch  DP and PT pulses 2+      Data Review  CBC:   Lab Results   Component Value Date/Time    WBC 9.1 08/11/2024 05:55 AM    RBC 3.86 08/11/2024 05:55 AM    HGB 11.2 08/11/2024 05:55 AM    HCT 34.0 08/11/2024 05:55 AM     08/11/2024 05:55 AM       Renal:   Lab Results   Component Value Date/Time     08/12/2024 06:31 AM    K 3.9 08/12/2024 06:31 AM

## 2024-08-12 NOTE — CARE COORDINATION
CASE MANAGEMENT DISCHARGE SUMMARY      Discharge to: Home w/[facility] PT/OT.    IMM given: 8/12/24    New Durable Medical Equipment ordered/agency: TERESA Deng w/[employer] delivered at [facility]     Transportation:    Family/car:private    Confirmed discharge plan with:     Patient: yes     Family:  pt contacted family      Facility/Agency, name:  [facility] faxed. Confirmed w/Leslie at [facility].      RN, name: Kelly     Note: Discharging nurse to complete [facility], reconcile AVS, and place final copy with patient's discharge packet.

## 2024-08-12 NOTE — PLAN OF CARE
Problem: Discharge Planning  Goal: Discharge to home or other facility with appropriate resources  8/12/2024 0935 by Kelly Carlson RN  Outcome: Progressing  Flowsheets (Taken 8/12/2024 0738)  Discharge to home or other facility with appropriate resources:   Identify barriers to discharge with patient and caregiver   Arrange for needed discharge resources and transportation as appropriate  8/11/2024 2226 by Ayanna Lemon RN  Outcome: Progressing     Problem: Pain  Goal: Verbalizes/displays adequate comfort level or baseline comfort level  8/12/2024 0935 by Kelly Carlson RN  Outcome: Progressing  8/11/2024 2226 by Ayanna Lemon RN  Outcome: Progressing     Problem: Safety - Adult  Goal: Free from fall injury  8/11/2024 2226 by Ayanna Lemon RN  Outcome: Progressing     Problem: ABCDS Injury Assessment  Goal: Absence of physical injury  8/11/2024 2226 by Ayanna Lemon RN  Outcome: Progressing

## 2024-08-13 ENCOUNTER — CARE COORDINATION (OUTPATIENT)
Dept: CASE MANAGEMENT | Age: 64
End: 2024-08-13

## 2024-08-13 ENCOUNTER — TELEPHONE (OUTPATIENT)
Dept: ORTHOPEDIC SURGERY | Age: 64
End: 2024-08-13

## 2024-08-13 ENCOUNTER — TELEPHONE (OUTPATIENT)
Dept: FAMILY MEDICINE CLINIC | Age: 64
End: 2024-08-13

## 2024-08-13 DIAGNOSIS — Z96.651 S/P TOTAL KNEE ARTHROPLASTY, RIGHT: Primary | ICD-10-CM

## 2024-08-13 PROCEDURE — 1111F DSCHRG MED/CURRENT MED MERGE: CPT | Performed by: NURSE PRACTITIONER

## 2024-08-13 NOTE — CARE COORDINATION
Care Transitions Note    Initial Call - Call within 2 business days of discharge: Yes    Patient Current Location:  Home: [address] [place], [place] [address] [facility] [address]    Care Transition Nurse contacted the patient by telephone to perform post hospital discharge assessment, verified name and  as identifiers. Provided introduction to self, and explanation of the Care Transition Nurse role.     Patient: Arthur Townsend    Patient : 1960   MRN: [id number]    Reason for Admission: s/p total knee arthroplasty, right  Discharge Date: 24  RURS: Readmission Risk Score: 8.8      Last Discharge Facility       Date Complaint Diagnosis Description Type Department Provider    24  S/P total knee arthroplasty, right ... Admission (Discharged) [place] [facility] Alex Leslie MD            Was this an external facility discharge? No    Additional needs identified to be addressed with provider   High priority: please assist with HFU scheduling. CTN attempted to schedule but first available appt was          Method of communication with provider: chart routing.    Patients top risk factors for readmission: functional physical ability    Interventions to address risk factors:   Education: post op instructions    Care Summary Note:   Left message to Leslie at [facility] in regards to home care referral    Patient answered call and verified . Patient pleasant and agreeable to transition call. Patient stated that physical therapy was scheduled today and getting ready for visit. Confirmed she has AVS and medications reviewed and taking as directed. Patient stated dressing is dry and intact. Pain is \"manageable with pain medication\".   CTN attempted to schedule HFU with PCP (Patient requesting virtual visit) but first available virtual appt was . Message routed to provider's office  Denied any acute needs at present time.  Agreeable to f/u calls.  Educated on the use of urgent care or physician’s 24 hr

## 2024-08-13 NOTE — TELEPHONE ENCOUNTER
[facility] Transitions Initial Follow Up Call    Outreach made within 2 business days of discharge: Yes    Patient: Arthur Townsend Patient : 1960   MRN: [id number]  Reason for Admission: TKR  Discharge Date: 24       Spoke with: Arthur    Discharge department/facility: [place]    Non-face-to-face services provided:  Obtained and reviewed discharge summary and/or continuity of care documents    TCM Interactive Patient Contact:  Was patient able to fill all prescriptions: Yes  Was patient instructed to bring all medications to the follow-up visit: Yes  Is patient taking all medications as directed in the discharge summary? Yes  Does patient understand their discharge instructions: Yes  Does patient have questions or concerns that need addressed prior to 7-14 day follow up office visit: no    Currently declined to Schedule appointment with PCP within 7-14 days    Follow Up  Future Appointments   Date Time Provider Department Center   2024  9:15 AM Alex Leslie MD AND ORTHO [facility]   10/17/2024  1:00 PM Thu Yoder, APRN - CNP [place] [facility] DEP       [facility]AILEE TOWNSEND [facility] Trans[facility] Initial Follow Up Call

## 2024-08-13 NOTE — PROGRESS NOTES
[]SNF  []Acute Rehab    Anticipated Discharge Day/Date:  no medical barriers to discharge    Anticipated Discharge Location: []Home  [x]HHC  []SNF  []Acute Rehab  []ECF  []LTAC  []Hospice  []Other -      Consults:      IP CONSULT TO HOSPITALIST  IP CONSULT TO HOME CARE NEEDS      ------------------------------------------------------------------------------------------------------------------------------------------------------------------------    MDM      [x] High (any 2)    A. Problems (any 1)  [x] Acute/Chronic Illness/injury posing threat to life or bodily function:    [] Severe exacerbation of chronic illness:    ---------------------------------------------------------------------  B. Risk of Treatment (any 1)   [] Drugs/treatments that require intensive monitoring for toxicity include:    [] IV ABX requiring serial renal monitoring for nephrotoxicity:     [] IV Narcotic analgesia for adverse drug reaction  [] IV diuresis requiring serial monitoring for renal impairment and electrolyte derangements  [] Critical electrolyte abnormalities requiring IV replacement and close serial monitoring  [] Insulin - monitoring serial FSBS for Hypoglycemic adverse drug reaction  [] Anticoagulation requiring serial monitoring of coagulation factors  [] Other -   [] Change in code status:    [] Decision to escalate care:    [] Major surgery/procedure with associated risk factors:    ----------------------------------------------------------------------  C. Data (any 2)  [x] Discussed current management and discharge planning options with Case Management.  [x] Discussed management of the case with:  ortho  [] Telemetry personally reviewed and interpreted as documented above    [] Imaging personally reviewed and interpreted, includes:    [x] Data Review (any 3)  [x] All available Consultant notes from yesterday/today were reviewed  [x] All current labs were reviewed and interpreted for clinical significance   [x] Appropriate  follow-up labs were ordered  [] Collateral history obtained from:        Medications:  Personally reviewed in detail in conjunction w/ labs as documented for evidence of drug toxicity.     Infusion Medications   Scheduled Medications   PRN Meds:      Labs:  Personally reviewed and interpreted for clinical significance.     Recent Labs     08/10/24  0638 08/11/24  0555   WBC 13.5* 9.1   HGB 11.3* 11.2*   HCT 34.7* 34.0*    287     Recent Labs     08/12/24  0631      K 3.9      CO2 28   BUN 22*   CREATININE 0.7   CALCIUM 9.2     No results for input(s): \"PROBNP\", \"TROPHS\" in the last 72 hours.  No results for input(s): \"LABA1C\" in the last 72 hours.  No results for input(s): \"AST\", \"ALT\", \"BILIDIR\", \"BILITOT\", \"ALKPHOS\" in the last 72 hours.  No results for input(s): \"INR\", \"LACTA\", \"TSH\" in the last 72 hours.    Urine Cultures:   Lab Results   Component Value Date/Time    LABURIN  08/01/2024 09:55 AM     <50,000 CFU/ml mixed skin/urogenital christopher. No further workup     Blood Cultures: No results found for: \"BC\"  No results found for: \"BLOODCULT2\"  Organism:   Lab Results   Component Value Date/Time    ORG Staph aureus MRSA 09/02/2020 05:59 PM         Devin Ackerman MD

## 2024-08-13 NOTE — TELEPHONE ENCOUNTER
[facility] Transitions Initial Follow Up Call    Outreach made within 2 business days of discharge: Yes    Patient: Arthur Townsend Patient : 1960   MRN: [id number]  Reason for Admission:  TKR R knee  Discharge Date: 24       Spoke with: JOHANNE  Discharge department/facility: [place]  Non-face-to-face services provided:  Obtained and reviewed discharge summary and/or continuity of care documents    Follow Up  Future Appointments   Date Time Provider Department Center   2024  9:15 AM Alex Leslie MD AND ORTH[facility]   10/17/2024  1:00 PM Thu Yoder, APRN - CNP [place]  [facility] DEP       KAILEE TOWNSEND RN

## 2024-08-13 NOTE — TELEPHONE ENCOUNTER
Attempted to contact pt, left voicemail with purpose and call back number.    Alida Héctor  Orthopedic Nurse Navigator  Phone number: [phone]    Follow up appointments:    Future Appointments   Date Time Provider Department Center   8/30/2024  1:15 PM Alex Leslie MD AND ORTHO [facility]   10/17/2024  1:00 PM Thu Yoder, APRN - CNP [place] [facility] ECC DEP

## 2024-08-14 ENCOUNTER — TELEPHONE (OUTPATIENT)
Dept: ORTHOPEDIC SURGERY | Age: 64
End: 2024-08-14

## 2024-08-14 DIAGNOSIS — M79.89 CALF SWELLING: Primary | ICD-10-CM

## 2024-08-14 DIAGNOSIS — Z96.651 S/P TOTAL KNEE ARTHROPLASTY, RIGHT: ICD-10-CM

## 2024-08-15 ENCOUNTER — HOSPITAL ENCOUNTER (OUTPATIENT)
Dept: VASCULAR LAB | Age: 64
Discharge: HOME | End: 2024-08-17
Attending: STUDENT IN AN ORGANIZED HEALTH CARE EDUCATION/TRAINING PROGRAM
Payer: MEDICARE

## 2024-08-15 DIAGNOSIS — Z96.651 S/P TOTAL KNEE ARTHROPLASTY, RIGHT: ICD-10-CM

## 2024-08-15 DIAGNOSIS — M79.89 CALF SWELLING: ICD-10-CM

## 2024-08-15 PROCEDURE — 93971 EXTREMITY STUDY: CPT

## 2024-08-15 PROCEDURE — 93971 EXTREMITY STUDY: CPT | Performed by: SURGERY

## 2024-08-20 ENCOUNTER — HOSPITAL ENCOUNTER (EMERGENCY)
Age: 64
Discharge: HOME | End: 2024-08-20
Payer: MEDICARE

## 2024-08-20 ENCOUNTER — CARE COORDINATION (OUTPATIENT)
Dept: CASE MANAGEMENT | Age: 64
End: 2024-08-20

## 2024-08-20 VITALS
TEMPERATURE: 98.1 F | SYSTOLIC BLOOD PRESSURE: 150 MMHG | OXYGEN SATURATION: 95 % | HEART RATE: 83 BPM | HEIGHT: 61 IN | RESPIRATION RATE: 15 BRPM | BODY MASS INDEX: 55.32 KG/M2 | DIASTOLIC BLOOD PRESSURE: 70 MMHG | WEIGHT: 293 LBS

## 2024-08-20 DIAGNOSIS — R23.8 REDNESS AND SWELLING OF LOWER LEG: Primary | ICD-10-CM

## 2024-08-20 DIAGNOSIS — M79.89 REDNESS AND SWELLING OF LOWER LEG: Primary | ICD-10-CM

## 2024-08-20 PROCEDURE — 6370000000 HC RX 637 (ALT 250 FOR IP): Performed by: PHYSICIAN ASSISTANT

## 2024-08-20 PROCEDURE — 99283 EMERGENCY DEPT VISIT LOW MDM: CPT

## 2024-08-20 RX ORDER — CLINDAMYCIN HYDROCHLORIDE 300 MG/1
300 CAPSULE ORAL 3 TIMES DAILY
Qty: 30 CAPSULE | Refills: 0 | Status: SHIPPED | OUTPATIENT
Start: 2024-08-20 | End: 2024-08-30

## 2024-08-20 RX ORDER — CLINDAMYCIN HYDROCHLORIDE 150 MG/1
300 CAPSULE ORAL ONCE
Status: COMPLETED | OUTPATIENT
Start: 2024-08-20 | End: 2024-08-20

## 2024-08-20 RX ADMIN — CLINDAMYCIN HYDROCHLORIDE 300 MG: 150 CAPSULE ORAL at 18:41

## 2024-08-20 ASSESSMENT — PAIN DESCRIPTION - ORIENTATION: ORIENTATION: RIGHT

## 2024-08-20 ASSESSMENT — PAIN DESCRIPTION - LOCATION: LOCATION: ANKLE

## 2024-08-20 ASSESSMENT — PAIN SCALES - GENERAL: PAINLEVEL_OUTOF10: 5

## 2024-08-20 ASSESSMENT — PAIN - FUNCTIONAL ASSESSMENT: PAIN_FUNCTIONAL_ASSESSMENT: 0-10

## 2024-08-20 ASSESSMENT — PAIN DESCRIPTION - DESCRIPTORS: DESCRIPTORS: ACHING

## 2024-08-20 NOTE — CARE COORDINATION
Care Transitions Note    Follow Up Call     Patient Current Location:  Home: [address]  [facility] [address]    Care Transition Nurse contacted the patient by telephone. Verified name and  as identifiers.    Additional needs identified to be addressed with provider   No needs identified         Method of communication with provider: none.    Care Summary Note: Patient answered call and verified . Patient pleasant and agreeable to transition call. Doing well since last contact, but frustrated that she isn't further along with healing. Stated she continues to have pain and taking medication with good relief. Notes reviewed and patient had US completed d/t lower extremity swelling. Patient stated results were \"good\". Patient has noticed decrease in swelling of calf and lower leg.  Patient active with home care physical therapy. Denied any acute needs at present time.  Agreeable to f/u calls.  Educated on the use of urgent care or physician’s 24 hr access line if assistance is needed after hours.    Plan of care updates since last contact:  Education:  s/p total knee arthroplasty, right     Advance Care Planning:   Does patient have an Advance Directive: reviewed during previous call, see note. .    Medication Review:  Full medication reconciliation completed during previous call.    Remote Patient Monitoring:  Offered patient enrollment in the Remote Patient Monitoring (RPM) program for in-home monitoring: Yes, but did not enroll at this time: already monitoring with home equipment.    Assessments:  Care Transitions Subsequent and Final Call    Subsequent and Final Calls  Care Transitions Interventions  Other Interventions:              Follow Up Appointment:   Reviewed upcoming appointment(s).  Future Appointments         Provider Specialty Dept Phone    2024 9:15 AM Alex Leslie MD Orthopedic Surgery [phone]    10/17/2024 1:00 PM Thu Yoder, APRN - CNP Family Medicine

## 2024-08-20 NOTE — ED PROVIDER NOTES
with neurogenic claudication, morbid obesity, ANTOINETTE, tobacco use.    has a past medical history of Arthritis, Asthma, Bipolar disorder, Borderline osteopenia, Cellulitis of left leg (08/05/2020), Frequency of micturition, GERD (gastroesophageal reflux disease), Headache(784.0), HNP (herniated nucleus pulposus), lumbar (06/06/2019), Hyperlipidemia, Hypertension, Intention tremor, Iron deficiency anemia (11/04/2019), Lateral meniscal tear (10/14/2015), Lumbar stenosis with neurogenic claudication (06/06/2019), Medial meniscus tear (10/14/2015), Mixed incontinence, Morbid (severe) obesity due to excess calories (HCC), ANTOINETTE (obstructive sleep apnea), Prolonged emergence from general anesthesia, Prolonged emergence from general anesthesia, initial encounter (06/12/2019), Tobacco use, and Venous ulcer of left leg (HCC) (07/2020).    Social Determinants:   None identified at this time     Consults:   None needed at this time     Reassessment:    Patient without new/worsening sx at this time.     MDM/Disposition Considerations:   Briefly Arthur Townsend presents for a 1d hx of right ankle pain radiating prison up the lateral lower leg. Patient had a right total knee replacement on 8/8/24, and she had a subsequent negative venous ultrasound on 8/15/24. Upon physical exam pattern of pain most likely originating from a 5mm open wound on the right ankle. Patient reports she scratches her legs with her toenails and sustained a small wound. Symptoms and physical exam without suspicion for DVT at this time. Patient sent home with clindamycin to cover possible skin infection. She was advised to keep her scheduled ortho appointment for 8/23/24.  We have discussed return precautions as well and patient is in agreement and comfortable with discharge.    Critical Care Time:   0 Minutes of critical care time spent not including separately billable procedures.    DDx:  I estimate there is LOW risk for COMPARTMENT SYNDROME, DEEP

## 2024-08-21 ENCOUNTER — CARE COORDINATION (OUTPATIENT)
Dept: CASE MANAGEMENT | Age: 64
End: 2024-08-21

## 2024-08-21 ENCOUNTER — TELEPHONE (OUTPATIENT)
Dept: ORTHOPEDIC SURGERY | Age: 64
End: 2024-08-21

## 2024-08-21 NOTE — TELEPHONE ENCOUNTER
Attempted to contact pt, left voicemail with purpose and call back number.    Alida Anaya  Orthopedic Nurse Navigator  Phone number: [phone]    Follow up appointments:    Future Appointments   Date Time Provider Department Center   8/23/2024  9:15 AM Alex Leslie MD AND ORTHO [facility]   10/17/2024  1:00 PM Thu Yoder, APRN - CNP [facility] DE[facility]     Signed off from pt.  Instructed pt to call RN or Surgeon's office with any issues or concerns.

## 2024-08-21 NOTE — CARE COORDINATION
Care Transitions Note    Follow Up Call     Patient Current Location:  Home: [address]  [facility] [address]    Care Transition Nurse contacted the patient by telephone. Verified name and  as identifiers.    Additional needs identified to be addressed with provider   No needs identified         Method of communication with provider: none.    Care Summary Note: patient seen by ER yesterday for evaluation of right ankle pain.   Patient stated she had developed redness and diagnosed infection. Patient reviewed AVS and confirmed she has antibiotic and taking as directed. Continues with all other prescriptions. Denied any acute needs at present time.  Agreeable to f/u calls.  Educated on the use of urgent care or physician’s 24 hr access line if assistance is needed after hours.    Plan of care updates since last contact:  Education: follow up ER visit       Advance Care Planning:   Does patient have an Advance Directive: reviewed during previous call, see note. .    Medication Review:  Medications changed since last call, reviewed today.     Remote Patient Monitoring:  Offered patient enrollment in the Remote Patient Monitoring (RPM) program for in-home monitoring: Yes, but did not enroll at this time: already monitoring with home equipment.    Assessments:  Care Transitions ED Follow Up    Care Transitions Interventions  Do you have all of your prescriptions and are they filled?: Yes                   Follow Up Appointment:   Reviewed upcoming appointment(s).  Future Appointments         Provider Specialty Dept Phone    2024 9:15 AM Alex Leslie MD Orthopedic Surgery [phone]    10/17/2024 1:00 PM Thu Yoder, MADINA - CNP Family Medicine [phone]            Care Transition Nurse provided contact information.  Plan for follow-up call in 6-10 days based on severity of symptoms and risk factors.  Plan for next call: self management-       Natacha Nettles RN

## 2024-08-23 ENCOUNTER — OFFICE VISIT (OUTPATIENT)
Dept: ORTHOPEDIC SURGERY | Age: 64
End: 2024-08-23

## 2024-08-23 VITALS — BODY MASS INDEX: 55.32 KG/M2 | WEIGHT: 293 LBS | HEIGHT: 61 IN

## 2024-08-23 DIAGNOSIS — Z96.651 S/P TKR (TOTAL KNEE REPLACEMENT) USING CEMENT, RIGHT: ICD-10-CM

## 2024-08-23 DIAGNOSIS — Z09 POSTOP CHECK: Primary | ICD-10-CM

## 2024-08-23 PROCEDURE — 99024 POSTOP FOLLOW-UP VISIT: CPT | Performed by: PHYSICIAN ASSISTANT

## 2024-08-23 RX ORDER — OXYCODONE HYDROCHLORIDE 5 MG/1
5 TABLET ORAL EVERY 4 HOURS PRN
Qty: 42 TABLET | Refills: 0 | Status: SHIPPED | OUTPATIENT
Start: 2024-08-23 | End: 2024-08-30

## 2024-08-23 RX ORDER — CELECOXIB 200 MG/1
200 CAPSULE ORAL 2 TIMES DAILY
Qty: 180 CAPSULE | Refills: 0 | Status: SHIPPED | OUTPATIENT
Start: 2024-08-23

## 2024-08-27 ENCOUNTER — CARE COORDINATION (OUTPATIENT)
Dept: CASE MANAGEMENT | Age: 64
End: 2024-08-27

## 2024-08-27 NOTE — CARE COORDINATION
Care Transitions Note    Follow Up Call     Attempted to reach patient for transitions of care follow up.  Unable to reach patient.      Outreach Attempts:   HIPAA compliant voicemail left for patient.         Follow Up Appointment:   Future Appointments         Provider Specialty Dept Phone    9/20/2024 8:45 AM Alex Leslie MD Orthopedic Surgery [phone]    10/17/2024 1:00 PM Thu Yoder, APRN - CNP Family Medicine [phone]            Natacha Nettles RN

## 2024-08-28 ENCOUNTER — TELEPHONE (OUTPATIENT)
Dept: ORTHOPEDIC SURGERY | Age: 64
End: 2024-08-28

## 2024-08-28 NOTE — TELEPHONE ENCOUNTER
General Question     Subject: POST OP QUESTION  Patient and /or Facility Request: Arthur Orozco \"Arthur Townsend\"   Contact Number: [phone]     PATIENT CALLED TO ASK IF SHE IS ABLE TO TAKE A SHOWER NOW     PLEASE CALL TO ADVISE PATIENT

## 2024-08-30 ENCOUNTER — CARE COORDINATION (OUTPATIENT)
Dept: CASE MANAGEMENT | Age: 64
End: 2024-08-30

## 2024-08-30 NOTE — CARE COORDINATION
Care Transitions Note    Follow Up Call     Patient Current Location:  Home: [address]  [facility] [address]    Care Transition Nurse contacted the patient by telephone. Verified name and  as identifiers.    Additional needs identified to be addressed with provider   No needs identified         Method of communication with provider: none.    Care Summary Note: Patient answered call and verified . Patient pleasant and agreeable to transition call. Doing well, but having sore knee today. Stated that she  started outpatient therapy this week. Scheduled for 2 times next week. Continues with pain medication with good pain relief. Appetite is good and denied any acute needs at present time.  Agreeable to f/u calls.  Educated on the use of urgent care or physician’s 24 hr access line if assistance is needed after hours.      Plan of care updates since last contact:  Review of patient management of conditions/medications: s/p total knee arthroplasty, right       Advance Care Planning:   Does patient have an Advance Directive: reviewed during previous call, see note. .    Medication Review:  Full medication reconciliation completed during previous call.    Remote Patient Monitoring:  Offered patient enrollment in the Remote Patient Monitoring (RPM) program for in-home monitoring: Yes, but did not enroll at this time: already monitoring with home equipment.    Assessments:  Care Transitions Subsequent and Final Call    Subsequent and Final Calls  Care Transitions Interventions  Other Interventions:              Follow Up Appointment:   Reviewed upcoming appointment(s).  Future Appointments         Provider Specialty Dept Phone    2024 8:45 AM Alex Leslie MD Orthopedic Surgery [phone]    10/17/2024 1:00 PM Thu Yoder, APRN - CNP Family Medicine [phone]            Care Transition Nurse provided contact information.  Plan for follow-up call in 6-10 days based on severity of

## 2024-09-03 ENCOUNTER — TELEPHONE (OUTPATIENT)
Dept: ORTHOPEDIC SURGERY | Age: 64
End: 2024-09-03

## 2024-09-03 NOTE — TELEPHONE ENCOUNTER
Medical Facility Question     Facility Name: [facility]  Contact Name: MURGUIA  Contact Number: [phone]  Request or Information: INQUIRING IF YOU RECEIVED A FAX REGARDING PLAN OF CARE.

## 2024-09-05 ENCOUNTER — CARE COORDINATION (OUTPATIENT)
Dept: CASE MANAGEMENT | Age: 64
End: 2024-09-05

## 2024-09-05 NOTE — CARE COORDINATION
Care Transitions Note    Follow Up Call     Attempted to reach patient for transitions of care follow up.  Unable to reach patient.      Outreach Attempts:   HIPAA compliant voicemail left for patient.       Follow Up Appointment:   Future Appointments         Provider Specialty Dept Phone    9/20/2024 8:45 AM Alex Leslie MD Orthopedic Surgery [phone]    10/17/2024 1:00 PM Thu Yoder, APRN - CNP Family Medicine [phone]            Natacha Nettles RN

## 2024-09-07 NOTE — PROGRESS NOTES
Date:  2024    Name:  Arthur Townsend  Address:  [address] [place] [address]    :  1960      Age:   64 y.o.        Chief Complaint    Follow-up (#1 PO rt knee/ sx 2024 tka)      History of Present Illness:  Arthur Townsend is a 64 y.o. female who presents for a post-operative check.  This patient is approximately 2 weeks status post a right total knee arthroplasty by Dr. Alex Leslie MD on 2024.  Patient has been adhering to our postoperative protocol and conducting a home exercise program. Over treatment includes; rest, ice, elevation, physical therapy, home exercises, and activity modification. Pain is well-controlled on p.o. pain medication.  On average she rates her pain 5/10 and describes it as dull and achy.  Physical therapy has been conducted at home.  Still ambulating with an assistive device.  They deny any signs or symptoms of infection. The patient denies any new injury.  The patient denies any catching, giving way, and joint locking..        Pain Assessment  Location of Pain: Knee  Location Modifiers: Right  Severity of Pain: 5  Quality of Pain: Aching  Duration of Pain: Persistent  Frequency of Pain: Constant  Aggravating Factors: Walking, Standing        Vital Signs:   Ht 1.549 m (5' 1\")   Wt 134.7 kg (297 lb)   LMP  (LMP Unknown)   BMI 56.12 kg/m²     General Exam:    General: Arthur Townsend is a  64 y.o. year old female who is sitting comfortably in our office in no acute distress.   Neuro: Alert & Oriented x 3,  normal,  no focal deficits noted. Normal mood & affect.  Eyes: Sclera clear  Ears: Normal external ear  Pulm: Respirations unlabored and regular   Skin: Warm, well perfused      Knee Examination: Right    Inspection: Well-healing surgical incision.  Anterior knee swelling noted.  No effusion, ecchymosis, discoloration, erythema, excessive warmth. no gross deformities, no signs of infection.     Palpation: There is no

## 2024-09-12 ENCOUNTER — CARE COORDINATION (OUTPATIENT)
Dept: CASE MANAGEMENT | Age: 64
End: 2024-09-12

## 2024-09-20 ENCOUNTER — OFFICE VISIT (OUTPATIENT)
Dept: ORTHOPEDIC SURGERY | Age: 64
End: 2024-09-20

## 2024-09-20 VITALS — HEIGHT: 61 IN | WEIGHT: 293 LBS | BODY MASS INDEX: 55.32 KG/M2

## 2024-09-20 DIAGNOSIS — Z96.651 S/P TKR (TOTAL KNEE REPLACEMENT) USING CEMENT, RIGHT: Primary | ICD-10-CM

## 2024-09-20 PROCEDURE — 99024 POSTOP FOLLOW-UP VISIT: CPT | Performed by: STUDENT IN AN ORGANIZED HEALTH CARE EDUCATION/TRAINING PROGRAM

## 2024-09-27 ENCOUNTER — OFFICE VISIT (OUTPATIENT)
Dept: FAMILY MEDICINE CLINIC | Age: 64
End: 2024-09-27

## 2024-09-27 VITALS
DIASTOLIC BLOOD PRESSURE: 72 MMHG | TEMPERATURE: 98 F | HEIGHT: 61 IN | BODY MASS INDEX: 54.56 KG/M2 | OXYGEN SATURATION: 97 % | HEART RATE: 72 BPM | SYSTOLIC BLOOD PRESSURE: 124 MMHG | WEIGHT: 289 LBS

## 2024-09-27 DIAGNOSIS — R05.1 ACUTE COUGH: Primary | ICD-10-CM

## 2024-09-27 RX ORDER — GUAIFENESIN 600 MG/1
600 TABLET, EXTENDED RELEASE ORAL 2 TIMES DAILY PRN
Qty: 14 TABLET | Refills: 0 | Status: SHIPPED | OUTPATIENT
Start: 2024-09-27 | End: 2024-10-04

## 2024-10-02 NOTE — TELEPHONE ENCOUNTER
Pt states that he has a cath and needs it removed prior to going back to work.  He states he can't make it to urology in time to have to go to work.   He could go to the ED to remove, but that is very expensive.    Pt needs this done Friday.  I advised I don't believe Dr. Beverly does that?    Please call to let him know.     This is for UTI and not urinary retention.    Rx sent

## 2024-10-17 ENCOUNTER — OFFICE VISIT (OUTPATIENT)
Dept: FAMILY MEDICINE CLINIC | Age: 64
End: 2024-10-17

## 2024-10-17 VITALS
HEIGHT: 62 IN | HEART RATE: 78 BPM | BODY MASS INDEX: 53.37 KG/M2 | DIASTOLIC BLOOD PRESSURE: 84 MMHG | SYSTOLIC BLOOD PRESSURE: 136 MMHG | OXYGEN SATURATION: 98 % | WEIGHT: 290 LBS

## 2024-10-17 DIAGNOSIS — N32.81 OVERACTIVE BLADDER: ICD-10-CM

## 2024-10-17 DIAGNOSIS — Z12.31 ENCOUNTER FOR SCREENING MAMMOGRAM FOR BREAST CANCER: ICD-10-CM

## 2024-10-17 DIAGNOSIS — E78.49 OTHER HYPERLIPIDEMIA: ICD-10-CM

## 2024-10-17 DIAGNOSIS — J45.40 MODERATE PERSISTENT REACTIVE AIRWAY DISEASE WITHOUT COMPLICATION: Primary | ICD-10-CM

## 2024-10-17 DIAGNOSIS — Z12.11 SCREENING FOR COLON CANCER: ICD-10-CM

## 2024-10-17 DIAGNOSIS — I10 PRIMARY HYPERTENSION: ICD-10-CM

## 2024-10-17 DIAGNOSIS — E66.01 MORBID OBESITY WITH BMI OF 50.0-59.9, ADULT: ICD-10-CM

## 2024-10-17 DIAGNOSIS — F33.2 SEVERE EPISODE OF RECURRENT MAJOR DEPRESSIVE DISORDER, WITHOUT PSYCHOTIC FEATURES (HCC): ICD-10-CM

## 2024-10-17 DIAGNOSIS — M17.0 PRIMARY OSTEOARTHRITIS OF BOTH KNEES: ICD-10-CM

## 2024-10-17 DIAGNOSIS — E55.9 VITAMIN D DEFICIENCY: ICD-10-CM

## 2024-10-17 DIAGNOSIS — N39.46 MIXED STRESS AND URGE URINARY INCONTINENCE: ICD-10-CM

## 2024-10-17 PROBLEM — J96.01 ACUTE RESPIRATORY FAILURE WITH HYPOXIA: Status: RESOLVED | Noted: 2024-08-10 | Resolved: 2024-10-17

## 2024-10-17 RX ORDER — OXYBUTYNIN CHLORIDE 5 MG/1
5 TABLET ORAL DAILY
Qty: 90 TABLET | Refills: 3 | Status: SHIPPED | OUTPATIENT
Start: 2024-10-17

## 2024-10-17 ASSESSMENT — ENCOUNTER SYMPTOMS
RESPIRATORY NEGATIVE: 1
SHORTNESS OF BREATH: 0
NAUSEA: 0
EYES NEGATIVE: 1
BLOOD IN STOOL: 0
ALLERGIC/IMMUNOLOGIC NEGATIVE: 1
ABDOMINAL PAIN: 0
ANAL BLEEDING: 0
GASTROINTESTINAL NEGATIVE: 1

## 2024-10-17 ASSESSMENT — ANXIETY QUESTIONNAIRES
5. BEING SO RESTLESS THAT IT IS HARD TO SIT STILL: SEVERAL DAYS
1. FEELING NERVOUS, ANXIOUS, OR ON EDGE: SEVERAL DAYS
2. NOT BEING ABLE TO STOP OR CONTROL WORRYING: SEVERAL DAYS
3. WORRYING TOO MUCH ABOUT DIFFERENT THINGS: SEVERAL DAYS
6. BECOMING EASILY ANNOYED OR IRRITABLE: NOT AT ALL
7. FEELING AFRAID AS IF SOMETHING AWFUL MIGHT HAPPEN: NOT AT ALL
GAD7 TOTAL SCORE: 5
4. TROUBLE RELAXING: SEVERAL DAYS
IF YOU CHECKED OFF ANY PROBLEMS ON THIS QUESTIONNAIRE, HOW DIFFICULT HAVE THESE PROBLEMS MADE IT FOR YOU TO DO YOUR WORK, TAKE CARE OF THINGS AT HOME, OR GET ALONG WITH OTHER PEOPLE: SOMEWHAT DIFFICULT

## 2024-10-17 ASSESSMENT — PATIENT HEALTH QUESTIONNAIRE - PHQ9
10. IF YOU CHECKED OFF ANY PROBLEMS, HOW DIFFICULT HAVE THESE PROBLEMS MADE IT FOR YOU TO DO YOUR WORK, TAKE CARE OF THINGS AT HOME, OR GET ALONG WITH OTHER PEOPLE: SOMEWHAT DIFFICULT
SUM OF ALL RESPONSES TO PHQ QUESTIONS 1-9: 3
6. FEELING BAD ABOUT YOURSELF - OR THAT YOU ARE A FAILURE OR HAVE LET YOURSELF OR YOUR FAMILY DOWN: NOT AT ALL
SUM OF ALL RESPONSES TO PHQ QUESTIONS 1-9: 3
SUM OF ALL RESPONSES TO PHQ QUESTIONS 1-9: 3
7. TROUBLE CONCENTRATING ON THINGS, SUCH AS READING THE NEWSPAPER OR WATCHING TELEVISION: MORE THAN HALF THE DAYS
5. POOR APPETITE OR OVEREATING: NOT AT ALL
2. FEELING DOWN, DEPRESSED OR HOPELESS: NOT AT ALL
SUM OF ALL RESPONSES TO PHQ QUESTIONS 1-9: 3
4. FEELING TIRED OR HAVING LITTLE ENERGY: NOT AT ALL
8. MOVING OR SPEAKING SO SLOWLY THAT OTHER PEOPLE COULD HAVE NOTICED. OR THE OPPOSITE, BEING SO FIGETY OR RESTLESS THAT YOU HAVE BEEN MOVING AROUND A LOT MORE THAN USUAL: NOT AT ALL
9. THOUGHTS THAT YOU WOULD BE BETTER OFF DEAD, OR OF HURTING YOURSELF: NOT AT ALL
1. LITTLE INTEREST OR PLEASURE IN DOING THINGS: NOT AT ALL
SUM OF ALL RESPONSES TO PHQ9 QUESTIONS 1 & 2: 0
3. TROUBLE FALLING OR STAYING ASLEEP: SEVERAL DAYS

## 2024-10-17 NOTE — PROGRESS NOTES
normal.      Palpations: Abdomen is soft. There is no mass.      Tenderness: There is no abdominal tenderness.   Musculoskeletal:         General: Normal range of motion.      Cervical back: Normal range of motion and neck supple.   Lymphadenopathy:      Head:      Right side of head: No submandibular adenopathy.      Left side of head: No submandibular adenopathy.      Cervical: No cervical adenopathy.   Skin:     General: Skin is warm and dry.      Findings: No lesion or rash.   Neurological:      Mental Status: She is alert and oriented to person, place, and time.      Gait: Gait normal.   Psychiatric:         Speech: Speech normal.         Behavior: Behavior normal.         Thought Content: Thought content normal.         Judgment: Judgment normal.         Lab Review   No visits with results within 2 Month(s) from this visit.   Latest known visit with results is:   Admission on 08/08/2024, Discharged on 08/12/2024   Component Date Value    POC Glucose 08/08/2024 107 (H)     Performed on 08/08/2024 ACCU-CHEK     Specimen Status 08/08/2024 JENNY     Sodium 08/09/2024 135 (L)     Potassium reflex Magnesi* 08/09/2024 5.2 (H)     Chloride 08/09/2024 105     CO2 08/09/2024 18 (L)     Anion Gap 08/09/2024 12     Glucose 08/09/2024 159 (H)     BUN 08/09/2024 20     Creatinine 08/09/2024 0.6     Est, Glom Filt Rate 08/09/2024 >90     Calcium 08/09/2024 8.6     WBC 08/09/2024 16.7 (H)     RBC 08/09/2024 4.37     Hemoglobin 08/09/2024 12.8     Hematocrit 08/09/2024 39.3     MCV 08/09/2024 89.9     MCH 08/09/2024 29.4     MCHC 08/09/2024 32.7     RDW 08/09/2024 14.9     Platelets 08/09/2024 201     MPV 08/09/2024 8.5     PLATELET SLIDE REVIEW 08/09/2024 Adequate     SLIDE REVIEW 08/09/2024 see below     Neutrophils % 08/09/2024 88.5     Lymphocytes % 08/09/2024 6.8     Monocytes % 08/09/2024 3.8     Eosinophils % 08/09/2024 0.3     Basophils % 08/09/2024 0.6     Neutrophils Absolute 08/09/2024 14.8 (H)     Lymphocytes

## 2024-10-28 DIAGNOSIS — I10 ESSENTIAL HYPERTENSION: ICD-10-CM

## 2024-10-28 RX ORDER — FLUTICASONE PROPIONATE 50 MCG
SPRAY, SUSPENSION (ML) NASAL
Qty: 1 G | Refills: 0 | Status: SHIPPED | OUTPATIENT
Start: 2024-10-28

## 2024-10-28 RX ORDER — METOPROLOL SUCCINATE 50 MG/1
50 TABLET, EXTENDED RELEASE ORAL DAILY
Qty: 30 TABLET | Refills: 0 | Status: SHIPPED | OUTPATIENT
Start: 2024-10-28

## 2024-10-28 NOTE — TELEPHONE ENCOUNTER
Refill Request     CONFIRM preferrred pharmacy with the patient.    If Mail Order Rx - Pend for 90 day refill.      Last Seen: Last Seen Department: 10/17/2024  Last Seen by PCP: 10/17/2024    Last Written: 7/8/24    If no future appointment scheduled, route STAFF MESSAGE with patient name to the  Pool for scheduling.      Next Appointment:   Future Appointments   Date Time Provider Department Center   4/17/2025  1:00 PM Thu Yoder, [facility]       Message sent to  to schedule appt with patient?  NO      Requested Prescriptions     Pending Prescriptions Disp Refills    metoprolol succinate (TOPROL XL) 50 MG extended release tablet [Pharmacy Med Name: METOPROLOL SUC 50MG T(R)] 30 tablet 0     Sig: TAKE 1 TABLET BY MOUTH EVERY DAY

## 2024-10-28 NOTE — TELEPHONE ENCOUNTER
Refill Request     CONFIRM preferrred pharmacy with the patient.    If Mail Order Rx - Pend for 90 day refill.      Last Seen: Last Seen Department: 10/17/2024  Last Seen by PCP: 10/17/2024    Last Written: 5/23/24    If no future appointment scheduled, route STAFF MESSAGE with patient name to the  Pool for scheduling.      Next Appointment:   Future Appointments   Date Time Provider Department Center   4/17/2025  1:00 PM Thu Yoder, [facility]       Message sent to  to schedule appt with patient?  N/A.      Requested Prescriptions     Pending Prescriptions Disp Refills    fluticasone (FLONASE) 50 MCG/ACT nasal spray [Pharmacy Med Name: FLUTICASONE 50MCG NS(120)(D)] 1 g 0     Sig: SPRAY 2 SPRAYS INTO EACH NOSTRIL DAILY

## 2024-11-12 DIAGNOSIS — J45.20 MILD INTERMITTENT REACTIVE AIRWAY DISEASE WITHOUT COMPLICATION: ICD-10-CM

## 2024-11-12 RX ORDER — ALBUTEROL SULFATE 90 UG/1
2 INHALANT RESPIRATORY (INHALATION) EVERY 6 HOURS PRN
Qty: 3 EACH | Refills: 3 | Status: SHIPPED | OUTPATIENT
Start: 2024-11-12

## 2024-11-12 RX ORDER — FLUTICASONE PROPIONATE 50 MCG
SPRAY, SUSPENSION (ML) NASAL
Qty: 1 EACH | Refills: 0 | Status: SHIPPED | OUTPATIENT
Start: 2024-11-12

## 2024-12-03 ENCOUNTER — OFFICE VISIT (OUTPATIENT)
Dept: ORTHOPEDIC SURGERY | Age: 64
End: 2024-12-03
Payer: MEDICARE

## 2024-12-03 VITALS — WEIGHT: 290 LBS | BODY MASS INDEX: 53.37 KG/M2 | HEIGHT: 62 IN

## 2024-12-03 DIAGNOSIS — M19.012 PRIMARY OSTEOARTHRITIS OF LEFT SHOULDER: ICD-10-CM

## 2024-12-03 DIAGNOSIS — M25.512 LEFT SHOULDER PAIN, UNSPECIFIED CHRONICITY: Primary | ICD-10-CM

## 2024-12-03 PROCEDURE — 20610 DRAIN/INJ JOINT/BURSA W/O US: CPT | Performed by: STUDENT IN AN ORGANIZED HEALTH CARE EDUCATION/TRAINING PROGRAM

## 2024-12-03 PROCEDURE — 99213 OFFICE O/P EST LOW 20 MIN: CPT | Performed by: STUDENT IN AN ORGANIZED HEALTH CARE EDUCATION/TRAINING PROGRAM

## 2024-12-03 RX ORDER — METHYLPREDNISOLONE ACETATE 40 MG/ML
80 INJECTION, SUSPENSION INTRA-ARTICULAR; INTRALESIONAL; INTRAMUSCULAR; SOFT TISSUE ONCE
Status: COMPLETED | OUTPATIENT
Start: 2024-12-03 | End: 2024-12-03

## 2024-12-03 RX ORDER — LIDOCAINE HYDROCHLORIDE 10 MG/ML
4 INJECTION, SOLUTION INFILTRATION; PERINEURAL ONCE
Status: COMPLETED | OUTPATIENT
Start: 2024-12-03 | End: 2024-12-03

## 2024-12-03 RX ADMIN — LIDOCAINE HYDROCHLORIDE 4 ML: 10 INJECTION, SOLUTION INFILTRATION; PERINEURAL at 14:31

## 2024-12-03 RX ADMIN — METHYLPREDNISOLONE ACETATE 80 MG: 40 INJECTION, SUSPENSION INTRA-ARTICULAR; INTRALESIONAL; INTRAMUSCULAR; SOFT TISSUE at 14:32

## 2024-12-03 NOTE — PROGRESS NOTES
the procedure. Under sterile conditions and after informed consent was obtained, using posterior approach the patient was given an injection into the left shoulder glenohumeral joint. 2mL 40 mg of Depo-Medrol and 4 mL of 1% lidocaine were placed in the shoulder after it was prepped with chlorhexidine.  This resulted in good relief of symptoms.  There were no complications. The patient was advised to ice the shoulder this evening and to avoid vigorous activities for the next 2 days.  They were advised to call us if there was any erythema, enduration, swelling or increasing pain.      Eric Celeste, DO  Orthopedic Surgery and Sports Medicine  12/3/2024      This dictation was performed with a verbal recognition program (DRAGON) and it was checked for errors.  It is possible that there are still dictated errors within this office note.  If so, please bring any errors to my attention for an addendum.  All efforts were made to ensure that this office note is accurate.

## 2024-12-11 ENCOUNTER — TELEMEDICINE (OUTPATIENT)
Dept: FAMILY MEDICINE CLINIC | Age: 64
End: 2024-12-11

## 2024-12-11 DIAGNOSIS — D64.9 ANEMIA, UNSPECIFIED TYPE: ICD-10-CM

## 2024-12-11 DIAGNOSIS — M79.10 MYALGIA: Primary | ICD-10-CM

## 2024-12-11 ASSESSMENT — ENCOUNTER SYMPTOMS
NAUSEA: 0
ANAL BLEEDING: 0
BLOOD IN STOOL: 0
RESPIRATORY NEGATIVE: 1
EYES NEGATIVE: 1
GASTROINTESTINAL NEGATIVE: 1
ALLERGIC/IMMUNOLOGIC NEGATIVE: 1
ABDOMINAL PAIN: 0
SHORTNESS OF BREATH: 0

## 2024-12-11 NOTE — PROGRESS NOTES
2024    TELEHEALTH EVALUATION -- Audio/Visual (During COVID-19 public health emergency)    HPI:    Arthur Townsend (:  1960) has requested an audio/video evaluation for the following concern(s):  Chief Complaint   Patient presents with    Other     Bi-lateral leg cramps.  Pain is more in the left leg-Pain have intensified        HPI  Joint/Muscle Pain  Patient complains of myalgias, which have/has been present for 1 month. Pain is located in  BLE/mostly calf region . The pain is described as moderate, aching, tight band, and squeezing. Associated symptoms include: none. The patient has tried nothing for pain relief. Related to injury: no.   had 8 episodes of \"adriana horses\".  Has been having them frequently---on and off for about 1 month.  At least 2 times per week.   No new medications  Steroid shot last Tuesday 1 week ago   Now on K2 OTC supplement to help her vitamin d absorb better  Was on magnesium but quit taking it 2 years ago.         Review of Systems   Constitutional: Negative.  Negative for appetite change, fatigue and unexpected weight change.   HENT: Negative.     Eyes: Negative.    Respiratory: Negative.  Negative for shortness of breath.    Cardiovascular: Negative.  Negative for chest pain, palpitations and leg swelling.   Gastrointestinal: Negative.  Negative for abdominal pain, anal bleeding, blood in stool and nausea.   Endocrine: Negative.    Genitourinary: Negative.  Negative for hematuria.   Musculoskeletal:  Positive for myalgias.   Skin: Negative.  Negative for rash.   Allergic/Immunologic: Negative.    Neurological: Negative.  Negative for dizziness, syncope, light-headedness and numbness.   Hematological: Negative.  Does not bruise/bleed easily.   Psychiatric/Behavioral: Negative.     All other systems reviewed and are negative.      Prior to Visit Medications    Medication Sig Taking? Authorizing Provider   albuterol sulfate HFA (PROVENTIL HFA) 108 (90 Base)

## 2024-12-12 ENCOUNTER — HOSPITAL ENCOUNTER (OUTPATIENT)
Age: 64
Discharge: HOME | End: 2024-12-12
Payer: MEDICARE

## 2024-12-12 DIAGNOSIS — D64.9 ANEMIA, UNSPECIFIED TYPE: ICD-10-CM

## 2024-12-12 DIAGNOSIS — M79.10 MYALGIA: ICD-10-CM

## 2024-12-12 LAB
ALBUMIN SERPL-MCNC: 3.9 G/DL (ref 3.4–5)
ALBUMIN/GLOB SERPL: 1.3 {RATIO} (ref 1.1–2.2)
ALP SERPL-CCNC: 152 U/L (ref 40–129)
ALT SERPL-CCNC: 15 U/L (ref 10–40)
ANION GAP SERPL CALCULATED.3IONS-SCNC: 10 MMOL/L (ref 3–16)
AST SERPL-CCNC: 15 U/L (ref 15–37)
BASOPHILS # BLD: 0.1 K/UL (ref 0–0.2)
BASOPHILS NFR BLD: 1 %
BILIRUB SERPL-MCNC: 0.3 MG/DL (ref 0–1)
BUN SERPL-MCNC: 24 MG/DL (ref 7–20)
CALCIUM SERPL-MCNC: 9.2 MG/DL (ref 8.3–10.6)
CHLORIDE SERPL-SCNC: 104 MMOL/L (ref 99–110)
CO2 SERPL-SCNC: 28 MMOL/L (ref 21–32)
CREAT SERPL-MCNC: 0.6 MG/DL (ref 0.6–1.2)
DEPRECATED RDW RBC AUTO: 15.5 % (ref 12.4–15.4)
EOSINOPHIL # BLD: 0.2 K/UL (ref 0–0.6)
EOSINOPHIL NFR BLD: 1.6 %
FOLATE SERPL-MCNC: 27.3 NG/ML (ref 4.78–24.2)
GFR SERPLBLD CREATININE-BSD FMLA CKD-EPI: >90 ML/MIN/{1.73_M2}
GLUCOSE SERPL-MCNC: 90 MG/DL (ref 70–99)
HCT VFR BLD AUTO: 44.4 % (ref 36–48)
HGB BLD-MCNC: 13.8 G/DL (ref 12–16)
LYMPHOCYTES # BLD: 2.7 K/UL (ref 1–5.1)
LYMPHOCYTES NFR BLD: 23 %
MAGNESIUM SERPL-MCNC: 2.1 MG/DL (ref 1.8–2.4)
MCH RBC QN AUTO: 26.9 PG (ref 26–34)
MCHC RBC AUTO-ENTMCNC: 31.1 G/DL (ref 31–36)
MCV RBC AUTO: 86.5 FL (ref 80–100)
MONOCYTES # BLD: 1.1 K/UL (ref 0–1.3)
MONOCYTES NFR BLD: 9.1 %
NEUTROPHILS # BLD: 7.7 K/UL (ref 1.7–7.7)
NEUTROPHILS NFR BLD: 65.3 %
PLATELET # BLD AUTO: 365 K/UL (ref 135–450)
PMV BLD AUTO: 6.7 FL (ref 5–10.5)
POTASSIUM SERPL-SCNC: 4.3 MMOL/L (ref 3.5–5.1)
PROT SERPL-MCNC: 7 G/DL (ref 6.4–8.2)
RBC # BLD AUTO: 5.13 M/UL (ref 4–5.2)
SODIUM SERPL-SCNC: 142 MMOL/L (ref 136–145)
VIT B12 SERPL-MCNC: 594 PG/ML (ref 211–911)
WBC # BLD AUTO: 11.8 K/UL (ref 4–11)

## 2024-12-12 PROCEDURE — 80053 COMPREHEN METABOLIC PANEL: CPT

## 2024-12-12 PROCEDURE — 83550 IRON BINDING TEST: CPT

## 2024-12-12 PROCEDURE — 82746 ASSAY OF FOLIC ACID SERUM: CPT

## 2024-12-12 PROCEDURE — 82728 ASSAY OF FERRITIN: CPT

## 2024-12-12 PROCEDURE — 83540 ASSAY OF IRON: CPT

## 2024-12-12 PROCEDURE — 82607 VITAMIN B-12: CPT

## 2024-12-12 PROCEDURE — 36415 COLL VENOUS BLD VENIPUNCTURE: CPT

## 2024-12-12 PROCEDURE — 83735 ASSAY OF MAGNESIUM: CPT

## 2024-12-12 PROCEDURE — 85025 COMPLETE CBC W/AUTO DIFF WBC: CPT

## 2024-12-13 ENCOUNTER — TELEPHONE (OUTPATIENT)
Dept: ORTHOPEDIC SURGERY | Age: 64
End: 2024-12-13

## 2024-12-13 LAB
FERRITIN SERPL IA-MCNC: 116 NG/ML (ref 15–150)
IRON SATN MFR SERPL: 21 % (ref 15–50)
IRON SERPL-MCNC: 82 UG/DL (ref 37–145)
TIBC SERPL-MCNC: 384 UG/DL (ref 260–445)

## 2024-12-13 NOTE — TELEPHONE ENCOUNTER
Appointment Request     Patient requesting earlier appointment: Yes  Appointment offered to patient: YES  Patient Contact Number: [phone]     PATIENT REQUESTING APPOINTMENT. 1ST AVAILABLE APPOINTMENT IS 12/26.    PATIENT WILL BE WILLING TO TRAVEL TO EITHER  LOCATION

## 2024-12-18 ENCOUNTER — OFFICE VISIT (OUTPATIENT)
Dept: ORTHOPEDIC SURGERY | Age: 64
End: 2024-12-18
Payer: MEDICARE

## 2024-12-18 VITALS — HEIGHT: 62 IN | BODY MASS INDEX: 53.37 KG/M2 | WEIGHT: 290 LBS

## 2024-12-18 DIAGNOSIS — M19.012 PRIMARY OSTEOARTHRITIS OF LEFT SHOULDER: ICD-10-CM

## 2024-12-18 DIAGNOSIS — M75.22 BICEPS TENDINITIS OF LEFT UPPER EXTREMITY: Primary | ICD-10-CM

## 2024-12-18 PROCEDURE — 99214 OFFICE O/P EST MOD 30 MIN: CPT | Performed by: STUDENT IN AN ORGANIZED HEALTH CARE EDUCATION/TRAINING PROGRAM

## 2024-12-18 RX ORDER — CYCLOBENZAPRINE HCL 5 MG
5 TABLET ORAL NIGHTLY PRN
Qty: 30 TABLET | Refills: 0 | Status: SHIPPED | OUTPATIENT
Start: 2024-12-18 | End: 2025-01-17

## 2024-12-18 NOTE — PROGRESS NOTES
Chief Complaint  Shoulder Pain (Ck left shoulder)      History of Present Illness:  The patient is here for repeat evaluation regarding her left shoulder.  She reports that the shot 2 weeks ago did not help her pain.  She reports that she was holding a cup of water earlier this week and felt a lot of pain in her biceps and her shoulder locked up on her.  She is having a good amount of pain  at night.    Prior HPI 12/3/2024:  Arthur Townsend is a pleasant 64 y.o. female today for established patient evaluation of a continuing problem with her left shoulder.  The patient reports that her shoulder pain feels a little bit different this year.  I did see her nearly 2 years ago for her bilateral shoulders and treated her for rotator cuff tendinitis.  She reports a lot of her pain in her left shoulder today courses down into the middle of her arm.  She denies any motion loss.  She denies any weakness.  She has a good amount of pain with sleeping.      Pain Assessment  Location of Pain: Shoulder  Location Modifiers: Left  Severity of Pain: 4  Quality of Pain: Aching  Duration of Pain: Persistent  Frequency of Pain: Constant  Aggravating Factors: Stretching, Straightening, Bending  Limiting Behavior: Some  Relieving Factors: Rest  Result of Injury: No  Work-Related Injury: No  Are there other pain locations you wish to document?: No    Medical History:  Patient's medications, allergies, past medical, surgical, social and family histories were reviewed and updated as appropriate.    Pertinent items are noted in HPI  Review of systems reviewed from Patient History Form available in the patient's chart under the Media tab.       Vital Signs:  There were no vitals filed for this visit.      Constitutional: In no apparent distress. Normal affect. Alert and oriented X3 and is cooperative.       Left shoulder exam    Inspection:  Held in a normal posture. Normal contour at the acromioclavicular joint. No swelling,

## 2024-12-19 DIAGNOSIS — I73.9 BILATERAL CLAUDICATION OF LOWER LIMB (HCC): Primary | ICD-10-CM

## 2024-12-30 DIAGNOSIS — Z96.651 S/P TKR (TOTAL KNEE REPLACEMENT) USING CEMENT, RIGHT: ICD-10-CM

## 2024-12-30 DIAGNOSIS — I10 ESSENTIAL HYPERTENSION: ICD-10-CM

## 2024-12-30 RX ORDER — CELECOXIB 200 MG/1
200 CAPSULE ORAL 2 TIMES DAILY
Qty: 60 CAPSULE | Refills: 3 | Status: SHIPPED | OUTPATIENT
Start: 2024-12-30

## 2024-12-30 RX ORDER — METOPROLOL SUCCINATE 50 MG/1
50 TABLET, EXTENDED RELEASE ORAL DAILY
Qty: 30 TABLET | Refills: 3 | Status: SHIPPED | OUTPATIENT
Start: 2024-12-30

## 2024-12-30 RX ORDER — FLUTICASONE PROPIONATE AND SALMETEROL 500; 50 UG/1; UG/1
POWDER RESPIRATORY (INHALATION)
Qty: 1 EACH | Refills: 3 | Status: SHIPPED | OUTPATIENT
Start: 2024-12-30

## 2024-12-30 RX ORDER — FLUTICASONE PROPIONATE 50 MCG
SPRAY, SUSPENSION (ML) NASAL
Qty: 1 G | Refills: 3 | Status: SHIPPED | OUTPATIENT
Start: 2024-12-30

## 2024-12-30 NOTE — TELEPHONE ENCOUNTER
Refill Request     CONFIRM preferrred pharmacy with the patient.    If Mail Order Rx - Pend for 90 day refill.      Last Seen: Last Seen Department: 12/11/2024  Last Seen by PCP: 12/11/2024    Last Written: 10-    If no future appointment scheduled, route STAFF MESSAGE with patient name to the  Pool for scheduling.      Next Appointment:   Future Appointments   Date Time Provider Department Center   4/17/2025  1:00 PM Thu Yoder, [facility]       Message sent to  to schedule appt with patient?  N/A      Requested Prescriptions     Pending Prescriptions Disp Refills    metoprolol succinate (TOPROL XL) 50 MG extended release tablet [Pharmacy Med Name: METOPROLOL SUC 50MG T(R)] 30 tablet 0     Sig: TAKE 1 TABLET BY MOUTH EVERY DAY    fluticasone-salmeterol (ADVAIR) 500-50 MCG/ACT AEPB diskus inhaler [Pharmacy Med Name: FLUTICAS/MJXA615/50(60)(D)]  0     Sig: INHALE 1 PUFF INTO LUNGS IN THE MORNING AND 1 PUFF IN THE EVENING    celecoxib (CELEBREX) 200 MG capsule [Pharmacy Med Name: CELECOXIB 200MG C(D)] 60 capsule 0     Sig: TAKE 1 CAPSULE BY MOUTH 2 TIMES DAILY    fluticasone (FLONASE) 50 MCG/ACT nasal spray [Pharmacy Med Name: FLUTICASONE 50MCG NS(120)(D)] 1 g 0     Sig: SPRAY 2 SPRAYS INTO EACH NOSTRIL EVERY DAY

## 2025-01-15 RX ORDER — METHOCARBAMOL 750 MG/1
750 TABLET, FILM COATED ORAL 4 TIMES DAILY
COMMUNITY
End: 2025-01-15 | Stop reason: SDUPTHER

## 2025-01-15 NOTE — TELEPHONE ENCOUNTER
Pt calling stating her back pain is getting bad again and she states that the methocarbamol robaxin 750 mg helped tremendously. She states that she would like another refill. She is asking if you cannot call anything in without a visit (I explained office policy to her) If she is able to do a VV instead of coming into the office as she is having pain currently. Please advise.

## 2025-01-16 RX ORDER — METHOCARBAMOL 750 MG/1
750 TABLET, FILM COATED ORAL 4 TIMES DAILY
Qty: 45 TABLET | Refills: 0 | Status: SHIPPED | OUTPATIENT
Start: 2025-01-16

## 2025-02-03 SDOH — HEALTH STABILITY: PHYSICAL HEALTH: ON AVERAGE, HOW MANY DAYS PER WEEK DO YOU ENGAGE IN MODERATE TO STRENUOUS EXERCISE (LIKE A BRISK WALK)?: 0 DAYS

## 2025-02-03 SDOH — HEALTH STABILITY: PHYSICAL HEALTH: ON AVERAGE, HOW MANY MINUTES DO YOU ENGAGE IN EXERCISE AT THIS LEVEL?: 0 MIN

## 2025-02-06 ENCOUNTER — OFFICE VISIT (OUTPATIENT)
Dept: ORTHOPEDIC SURGERY | Age: 65
End: 2025-02-06
Payer: MEDICARE

## 2025-02-06 ENCOUNTER — TELEPHONE (OUTPATIENT)
Dept: ORTHOPEDIC SURGERY | Age: 65
End: 2025-02-06

## 2025-02-06 VITALS — HEIGHT: 62 IN | BODY MASS INDEX: 53.37 KG/M2 | WEIGHT: 290 LBS

## 2025-02-06 DIAGNOSIS — M54.16 LUMBAR RADICULOPATHY: Primary | ICD-10-CM

## 2025-02-06 PROCEDURE — 99214 OFFICE O/P EST MOD 30 MIN: CPT | Performed by: PHYSICIAN ASSISTANT

## 2025-02-06 RX ORDER — METHYLPREDNISOLONE 4 MG/1
TABLET ORAL
Qty: 1 KIT | Refills: 0 | Status: SHIPPED | OUTPATIENT
Start: 2025-02-06

## 2025-02-06 RX ORDER — PREDNISONE 10 MG/1
TABLET ORAL
Qty: 35 TABLET | Refills: 0 | Status: SHIPPED | OUTPATIENT
Start: 2025-02-06

## 2025-02-06 NOTE — TELEPHONE ENCOUNTER
Prescription Refill     Medication Name:  PRESCRIPTIONS TO GO TO  Pharmacy: [facility]  Patient Contact Number:  [phone]

## 2025-02-06 NOTE — PROGRESS NOTES
New Patient: SPINE    2/6/2025     CHIEF COMPLAINT:    Chief Complaint   Patient presents with    Back Pain     Np Lumbar       HISTORY OF PRESENT ILLNESS:              The patient is a 64 y.o. female patient presents the office today for a new problem to me.  Patient here with a chief complaint of lumbar pain.  Patient is status post MLD by Dr. Anthony Amato.  Patient has had a reoccurrence of her lumbar pain.  She has had pain for approximately 3 weeks.  She does have radicular symptoms in the left lateral thigh and knee.  No new injury or trauma.  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     Current/Past Treatment:   Physical Therapy:   Chiropractic:     Injection:     Medications:            NSAIDS:             Muscle relaxer:              Steriods:              Neuropathic medications:              Opioids:            Other:   Surgery/Consult:    Work Status/Functionality:     Past Medical History: Medical history

## 2025-02-10 ENCOUNTER — TELEPHONE (OUTPATIENT)
Dept: FAMILY MEDICINE CLINIC | Age: 65
End: 2025-02-10

## 2025-02-10 NOTE — TELEPHONE ENCOUNTER
Central scheduling calling stating that they do not do the vascular ankle brachial index WITH the exercise. She states that if you want it done with excise we will have to refer the pt out.

## 2025-02-21 ENCOUNTER — TELEMEDICINE (OUTPATIENT)
Dept: FAMILY MEDICINE CLINIC | Age: 65
End: 2025-02-21

## 2025-02-21 DIAGNOSIS — M51.369 DEGENERATION OF INTERVERTEBRAL DISC OF LUMBAR REGION, UNSPECIFIED WHETHER PAIN PRESENT: Primary | ICD-10-CM

## 2025-02-21 DIAGNOSIS — M47.816 LUMBAR SPONDYLOSIS: ICD-10-CM

## 2025-02-21 ASSESSMENT — ENCOUNTER SYMPTOMS
BLOOD IN STOOL: 0
BACK PAIN: 1
ALLERGIC/IMMUNOLOGIC NEGATIVE: 1
GASTROINTESTINAL NEGATIVE: 1
ABDOMINAL PAIN: 0
EYES NEGATIVE: 1
SHORTNESS OF BREATH: 0
RESPIRATORY NEGATIVE: 1
ANAL BLEEDING: 0
NAUSEA: 0

## 2025-02-21 NOTE — PROGRESS NOTES
2025    TELEHEALTH EVALUATION -- Audio/Visual (During COVID-19 public health emergency)    HPI:    Arthur Townsend (:  1960) has requested an audio/video evaluation for the following concern(s):  Chief Complaint   Patient presents with    Back Pain     Patient seen Dr. Boothe, Ortho in [place], for her back pain, however, he never explained anything to her. He did prescribe steroids.  She is on her last week.        The patient is being seen today for low back pain.  The patient had prior lumbar laminectomy with Dr. Amato in 2019.  The patient has had intermittent \"flareups\" of her low back pain since.  Her most reoccurrence of back pain was approximately 1 month ago.  The patient did go see an orthopedist on 2025.  The patient states that 3 weeks prior to this orthopedic appointment she woke up and \"could not walk\" she did use some muscle relaxers that she had been prescribed previously.  She states that the muscle relaxers did help make her more mobile but did not resolve the pain.  The patient did have radicular symptoms in the left lateral thigh and knee.  She denied any new injury and no recent trauma.  She denied red flag symptoms.  Per the patient the orthopedist did perform lumbar x-rays.  Her 2025 Ortho no to the x-ray demonstrated multiple level DDD and spondylosis  Per patient she was placed on 3 weeks of steroids.  The patient admits the Steroids have helped.  She states that she can walk now and he pain is almost completely gone.   Per patient they recommended that she attend physical therapy however the patient states that she cannot afford physical therapy visits.  She is asking for exercises to perform at home  She is also asking for better explanation on the diagnosis of DDD and spondylosis    Review of Systems   Constitutional: Negative.  Negative for appetite change, fatigue and unexpected weight change.   HENT: Negative.     Eyes: Negative.    Respiratory: Negative.

## 2025-02-21 NOTE — PATIENT INSTRUCTIONS
Patient Education   You were diagnosed with lumbar degenerative disc disease (DDD) and lumbar spondylosis    As your body ages, it's normal for your spine to change.  You can get osteoarthritis in the vertebrae.  The discs between the bones  can dry out, crack, or tear.  When this happens in the lower back, it's called lumbar spondylosis.     Patient Education        Learning About Degenerative Disc Disease  What is degenerative disc disease?     Degenerative disc disease isn't really a disease. It's a term used to describe the normal changes in your spinal discs as you age. Spinal discs are small, spongy discs that separate the bones (vertebrae) that make up the spine. The discs act as shock absorbers for the spine. They let your spine flex, bend, and twist.  Degenerative disc disease can take place in one or more places along the spine. It most often occurs in the discs in the lower back and the neck.  The changes in the discs can cause back and neck pain. They can also lead to osteoarthritis, a herniated disc, or spinal stenosis.  What causes it?  As we age, our spinal discs break down, or degenerate. This breakdown causes the symptoms of degenerative disc disease in some people.  When the discs break down, they can lose fluid and dry out, and their outer layers can have tiny cracks or tears. This leads to less padding and less space between the bones in the spine. The body reacts to this by making bony growths on the spine called bone spurs. These spurs can press on the spinal nerve roots or spinal cord. This can cause pain and can affect how well the nerves work.  These changes in the discs are more likely to occur if you smoke, do heavy physical work (such as repeated heavy lifting), or are very overweight. A sudden injury may also cause changes to occur.  What are the symptoms?  Many people with degenerative disc disease have no pain. But others have severe pain or other symptoms that limit their activities.

## 2025-03-04 ENCOUNTER — TELEPHONE (OUTPATIENT)
Dept: FAMILY MEDICINE CLINIC | Age: 65
End: 2025-03-04

## 2025-03-04 DIAGNOSIS — F51.01 PRIMARY INSOMNIA: Primary | ICD-10-CM

## 2025-03-17 ENCOUNTER — OFFICE VISIT (OUTPATIENT)
Dept: FAMILY MEDICINE CLINIC | Age: 65
End: 2025-03-17
Payer: MEDICARE

## 2025-03-17 VITALS
HEART RATE: 84 BPM | WEIGHT: 293 LBS | HEIGHT: 61 IN | SYSTOLIC BLOOD PRESSURE: 134 MMHG | OXYGEN SATURATION: 96 % | BODY MASS INDEX: 55.32 KG/M2 | DIASTOLIC BLOOD PRESSURE: 76 MMHG

## 2025-03-17 DIAGNOSIS — Z79.51 LONG TERM CURRENT USE OF INHALED STEROID: ICD-10-CM

## 2025-03-17 DIAGNOSIS — B37.0 THRUSH: Primary | ICD-10-CM

## 2025-03-17 PROCEDURE — 99213 OFFICE O/P EST LOW 20 MIN: CPT | Performed by: NURSE PRACTITIONER

## 2025-03-17 PROCEDURE — 3075F SYST BP GE 130 - 139MM HG: CPT | Performed by: NURSE PRACTITIONER

## 2025-03-17 PROCEDURE — 3078F DIAST BP <80 MM HG: CPT | Performed by: NURSE PRACTITIONER

## 2025-03-17 RX ORDER — ASCORBIC ACID 125 MG
TABLET,CHEWABLE ORAL DAILY
COMMUNITY

## 2025-03-17 ASSESSMENT — ENCOUNTER SYMPTOMS
SHORTNESS OF BREATH: 0
EYES NEGATIVE: 1
GASTROINTESTINAL NEGATIVE: 1
RESPIRATORY NEGATIVE: 1
ABDOMINAL PAIN: 0
BLOOD IN STOOL: 0
NAUSEA: 0
ANAL BLEEDING: 0
ALLERGIC/IMMUNOLOGIC NEGATIVE: 1

## 2025-03-17 NOTE — PROGRESS NOTES
computer software.  Please disregard these errors.  Please excuse any errors that have escaped final proofreading.  Thank you.     This document was prepared by a combination of typing and transcription through a voice recognition software.     Thu Yoder, CNP

## 2025-04-14 ASSESSMENT — PATIENT HEALTH QUESTIONNAIRE - PHQ9
2. FEELING DOWN, DEPRESSED OR HOPELESS: SEVERAL DAYS
2. FEELING DOWN, DEPRESSED OR HOPELESS: SEVERAL DAYS
SUM OF ALL RESPONSES TO PHQ QUESTIONS 1-9: 6
8. MOVING OR SPEAKING SO SLOWLY THAT OTHER PEOPLE COULD HAVE NOTICED. OR THE OPPOSITE, BEING SO FIGETY OR RESTLESS THAT YOU HAVE BEEN MOVING AROUND A LOT MORE THAN USUAL: NOT AT ALL
5. POOR APPETITE OR OVEREATING: NOT AT ALL
7. TROUBLE CONCENTRATING ON THINGS, SUCH AS READING THE NEWSPAPER OR WATCHING TELEVISION: SEVERAL DAYS
1. LITTLE INTEREST OR PLEASURE IN DOING THINGS: SEVERAL DAYS
8. MOVING OR SPEAKING SO SLOWLY THAT OTHER PEOPLE COULD HAVE NOTICED. OR THE OPPOSITE - BEING SO FIDGETY OR RESTLESS THAT YOU HAVE BEEN MOVING AROUND A LOT MORE THAN USUAL: NOT AT ALL
10. IF YOU CHECKED OFF ANY PROBLEMS, HOW DIFFICULT HAVE THESE PROBLEMS MADE IT FOR YOU TO DO YOUR WORK, TAKE CARE OF THINGS AT HOME, OR GET ALONG WITH OTHER PEOPLE: SOMEWHAT DIFFICULT
SUM OF ALL RESPONSES TO PHQ QUESTIONS 1-9: 6
6. FEELING BAD ABOUT YOURSELF - OR THAT YOU ARE A FAILURE OR HAVE LET YOURSELF OR YOUR FAMILY DOWN: SEVERAL DAYS
4. FEELING TIRED OR HAVING LITTLE ENERGY: SEVERAL DAYS
SUM OF ALL RESPONSES TO PHQ QUESTIONS 1-9: 6
9. THOUGHTS THAT YOU WOULD BE BETTER OFF DEAD, OR OF HURTING YOURSELF: NOT AT ALL
1. LITTLE INTEREST OR PLEASURE IN DOING THINGS: SEVERAL DAYS
9. THOUGHTS THAT YOU WOULD BE BETTER OFF DEAD, OR OF HURTING YOURSELF: NOT AT ALL
5. POOR APPETITE OR OVEREATING: NOT AT ALL
10. IF YOU CHECKED OFF ANY PROBLEMS, HOW DIFFICULT HAVE THESE PROBLEMS MADE IT FOR YOU TO DO YOUR WORK, TAKE CARE OF THINGS AT HOME, OR GET ALONG WITH OTHER PEOPLE: SOMEWHAT DIFFICULT
SUM OF ALL RESPONSES TO PHQ QUESTIONS 1-9: 6
SUM OF ALL RESPONSES TO PHQ QUESTIONS 1-9: 6
7. TROUBLE CONCENTRATING ON THINGS, SUCH AS READING THE NEWSPAPER OR WATCHING TELEVISION: SEVERAL DAYS
4. FEELING TIRED OR HAVING LITTLE ENERGY: SEVERAL DAYS
3. TROUBLE FALLING OR STAYING ASLEEP: SEVERAL DAYS
6. FEELING BAD ABOUT YOURSELF - OR THAT YOU ARE A FAILURE OR HAVE LET YOURSELF OR YOUR FAMILY DOWN: SEVERAL DAYS
3. TROUBLE FALLING OR STAYING ASLEEP: SEVERAL DAYS

## 2025-04-17 ENCOUNTER — OFFICE VISIT (OUTPATIENT)
Dept: FAMILY MEDICINE CLINIC | Age: 65
End: 2025-04-17

## 2025-04-17 VITALS
WEIGHT: 293 LBS | BODY MASS INDEX: 55.32 KG/M2 | HEART RATE: 81 BPM | SYSTOLIC BLOOD PRESSURE: 134 MMHG | OXYGEN SATURATION: 95 % | DIASTOLIC BLOOD PRESSURE: 74 MMHG | HEIGHT: 61 IN

## 2025-04-17 DIAGNOSIS — J45.40 MODERATE PERSISTENT REACTIVE AIRWAY DISEASE WITHOUT COMPLICATION: ICD-10-CM

## 2025-04-17 DIAGNOSIS — Z12.31 BREAST CANCER SCREENING BY MAMMOGRAM: ICD-10-CM

## 2025-04-17 DIAGNOSIS — N39.46 MIXED STRESS AND URGE URINARY INCONTINENCE: ICD-10-CM

## 2025-04-17 DIAGNOSIS — I10 PRIMARY HYPERTENSION: ICD-10-CM

## 2025-04-17 DIAGNOSIS — R42 DIZZINESS: ICD-10-CM

## 2025-04-17 DIAGNOSIS — E78.49 OTHER HYPERLIPIDEMIA: ICD-10-CM

## 2025-04-17 DIAGNOSIS — F33.2 SEVERE EPISODE OF RECURRENT MAJOR DEPRESSIVE DISORDER, WITHOUT PSYCHOTIC FEATURES (HCC): Primary | ICD-10-CM

## 2025-04-17 DIAGNOSIS — Z12.11 COLON CANCER SCREENING: ICD-10-CM

## 2025-04-17 DIAGNOSIS — R06.02 SHORTNESS OF BREATH: ICD-10-CM

## 2025-04-17 DIAGNOSIS — N32.81 OVERACTIVE BLADDER: ICD-10-CM

## 2025-04-17 DIAGNOSIS — I10 SUPINE HYPERTENSION: ICD-10-CM

## 2025-04-17 DIAGNOSIS — E55.9 VITAMIN D DEFICIENCY: ICD-10-CM

## 2025-04-17 RX ORDER — LANOLIN ALCOHOL/MO/W.PET/CERES
1000 CREAM (GRAM) TOPICAL DAILY
COMMUNITY

## 2025-04-17 RX ORDER — OXYBUTYNIN CHLORIDE 5 MG/1
5 TABLET ORAL 2 TIMES DAILY
Qty: 60 TABLET | Refills: 2 | Status: SHIPPED | OUTPATIENT
Start: 2025-04-17

## 2025-04-17 RX ORDER — METOPROLOL SUCCINATE 50 MG/1
75 TABLET, EXTENDED RELEASE ORAL DAILY
Qty: 45 TABLET | Refills: 0 | Status: SHIPPED | OUTPATIENT
Start: 2025-04-17

## 2025-04-17 RX ORDER — METOPROLOL SUCCINATE 50 MG/1
75 TABLET, EXTENDED RELEASE ORAL DAILY
Qty: 45 TABLET | Refills: 0 | Status: SHIPPED | OUTPATIENT
Start: 2025-04-17 | End: 2025-04-17 | Stop reason: SDUPTHER

## 2025-04-17 ASSESSMENT — ENCOUNTER SYMPTOMS
NAUSEA: 0
BLOOD IN STOOL: 0
GASTROINTESTINAL NEGATIVE: 1
ABDOMINAL PAIN: 0
EYES NEGATIVE: 1
ALLERGIC/IMMUNOLOGIC NEGATIVE: 1
SHORTNESS OF BREATH: 1
ANAL BLEEDING: 0

## 2025-04-17 ASSESSMENT — ANXIETY QUESTIONNAIRES
GAD7 TOTAL SCORE: 6
6. BECOMING EASILY ANNOYED OR IRRITABLE: SEVERAL DAYS
5. BEING SO RESTLESS THAT IT IS HARD TO SIT STILL: NOT AT ALL
3. WORRYING TOO MUCH ABOUT DIFFERENT THINGS: SEVERAL DAYS
2. NOT BEING ABLE TO STOP OR CONTROL WORRYING: SEVERAL DAYS
IF YOU CHECKED OFF ANY PROBLEMS ON THIS QUESTIONNAIRE, HOW DIFFICULT HAVE THESE PROBLEMS MADE IT FOR YOU TO DO YOUR WORK, TAKE CARE OF THINGS AT HOME, OR GET ALONG WITH OTHER PEOPLE: NOT DIFFICULT AT ALL
7. FEELING AFRAID AS IF SOMETHING AWFUL MIGHT HAPPEN: SEVERAL DAYS
4. TROUBLE RELAXING: SEVERAL DAYS
1. FEELING NERVOUS, ANXIOUS, OR ON EDGE: SEVERAL DAYS

## 2025-04-17 NOTE — PROGRESS NOTES
including one this morning. These episodes have occurred both while standing and walking, but blood pressure has not been checked during these episodes. No loss of consciousness or chest pain is reported. Mild palpitations have been experienced without associated dizziness or lightheadedness. No leg swelling is reported, but an unusual shape to the left leg was noticed a few days ago. No tenderness or back pain is reported. A fall approximately three weeks ago resulted in landing on the right knee, but no specific injury to the leg is recalled. No calf pain or foot swelling is reported. Walking is well managed following knee surgery, and surgery on the other knee is planned. No current appointments with orthopedics are scheduled. No abdominal pain, blood in stool, or blood in urine is reported.    Asthma is managed with albuterol as needed, used infrequently, approximately once every couple of months. Attempts to use Advair daily have been inconsistent due to a recent episode of thrush. No chest tightness or wheezing is reported, but shortness of breath has been experienced for the past six to eight weeks.    Omega-3 fish oil is taken twice daily, and turmeric is used for inflammation. No current medication for cholesterol management is taken. The last cholesterol check indicated borderline levels.    Vitamin D3 20,000 international units daily has been taken for the past 1.5 weeks, along with K2 and magnesium supplements. The patient is not fasting today and has not yet had blood work done.    Issues with incontinence necessitate frequent urination once or twice in the afternoon and once or twice at night. Extended-release Ditropan was previously tried but caused side effects, leading to a switch back to oxybutynin 5 mg once daily. A higher dose of 10 mg twice daily was previously taken but was reduced due to dry mouth. The 5 mg dose was effective for a period but has recently become less so. Increasing the dose

## 2025-04-18 ENCOUNTER — RESULTS FOLLOW-UP (OUTPATIENT)
Dept: FAMILY MEDICINE CLINIC | Age: 65
End: 2025-04-18

## 2025-04-18 ENCOUNTER — HOSPITAL ENCOUNTER (OUTPATIENT)
Age: 65
Discharge: HOME | End: 2025-04-18
Payer: MEDICARE

## 2025-04-18 DIAGNOSIS — E78.49 OTHER HYPERLIPIDEMIA: ICD-10-CM

## 2025-04-18 DIAGNOSIS — I10 PRIMARY HYPERTENSION: ICD-10-CM

## 2025-04-18 DIAGNOSIS — E55.9 VITAMIN D DEFICIENCY: ICD-10-CM

## 2025-04-18 LAB
25(OH)D3 SERPL-MCNC: 54.2 NG/ML
ALBUMIN SERPL-MCNC: 4.2 G/DL (ref 3.4–5)
ALBUMIN/GLOB SERPL: 1.4 {RATIO} (ref 1.1–2.2)
ALP SERPL-CCNC: 137 U/L (ref 40–129)
ALT SERPL-CCNC: 19 U/L (ref 10–40)
ANION GAP SERPL CALCULATED.3IONS-SCNC: 10 MMOL/L (ref 3–16)
AST SERPL-CCNC: 19 U/L (ref 15–37)
BILIRUB SERPL-MCNC: 0.3 MG/DL (ref 0–1)
BUN SERPL-MCNC: 24 MG/DL (ref 7–20)
CALCIUM SERPL-MCNC: 9.7 MG/DL (ref 8.3–10.6)
CHLORIDE SERPL-SCNC: 105 MMOL/L (ref 99–110)
CHOLEST SERPL-MCNC: 195 MG/DL (ref 0–199)
CO2 SERPL-SCNC: 27 MMOL/L (ref 21–32)
CREAT SERPL-MCNC: 0.7 MG/DL (ref 0.6–1.2)
GFR SERPLBLD CREATININE-BSD FMLA CKD-EPI: >90 ML/MIN/{1.73_M2}
GLUCOSE SERPL-MCNC: 93 MG/DL (ref 70–99)
HDLC SERPL-MCNC: 48 MG/DL (ref 40–60)
LDLC SERPL CALC-MCNC: 120 MG/DL
POTASSIUM SERPL-SCNC: 4.2 MMOL/L (ref 3.5–5.1)
PROT SERPL-MCNC: 7.3 G/DL (ref 6.4–8.2)
SODIUM SERPL-SCNC: 142 MMOL/L (ref 136–145)
TRIGL SERPL-MCNC: 133 MG/DL (ref 0–150)
VLDLC SERPL CALC-MCNC: 27 MG/DL

## 2025-04-18 PROCEDURE — 36415 COLL VENOUS BLD VENIPUNCTURE: CPT

## 2025-04-18 PROCEDURE — 82306 VITAMIN D 25 HYDROXY: CPT

## 2025-04-18 PROCEDURE — 80053 COMPREHEN METABOLIC PANEL: CPT

## 2025-04-18 PROCEDURE — 80061 LIPID PANEL: CPT

## 2025-04-24 ENCOUNTER — TELEMEDICINE (OUTPATIENT)
Dept: FAMILY MEDICINE CLINIC | Age: 65
End: 2025-04-24

## 2025-04-24 DIAGNOSIS — J01.00 ACUTE NON-RECURRENT MAXILLARY SINUSITIS: ICD-10-CM

## 2025-04-24 DIAGNOSIS — J45.21 MILD INTERMITTENT ASTHMA WITH ACUTE EXACERBATION: Primary | ICD-10-CM

## 2025-04-24 DIAGNOSIS — J20.9 ACUTE BRONCHITIS DUE TO INFECTION: ICD-10-CM

## 2025-04-24 DIAGNOSIS — R05.1 ACUTE COUGH: ICD-10-CM

## 2025-04-24 RX ORDER — BENZONATATE 100 MG/1
100-200 CAPSULE ORAL 3 TIMES DAILY PRN
Qty: 42 CAPSULE | Refills: 0 | Status: SHIPPED | OUTPATIENT
Start: 2025-04-24 | End: 2025-05-01

## 2025-04-24 RX ORDER — DOXYCYCLINE HYCLATE 100 MG
100 TABLET ORAL 2 TIMES DAILY
Qty: 14 TABLET | Refills: 0 | Status: SHIPPED | OUTPATIENT
Start: 2025-04-24 | End: 2025-05-01

## 2025-04-24 RX ORDER — METHYLPREDNISOLONE 4 MG/1
TABLET ORAL
Qty: 1 KIT | Refills: 0 | Status: SHIPPED | OUTPATIENT
Start: 2025-04-24

## 2025-04-24 ASSESSMENT — ENCOUNTER SYMPTOMS
SINUS PRESSURE: 1
GASTROINTESTINAL NEGATIVE: 1
RHINORRHEA: 0
COUGH: 1
SORE THROAT: 1
SINUS PAIN: 1
APNEA: 0
STRIDOR: 0
FACIAL SWELLING: 0
CHEST TIGHTNESS: 1
SHORTNESS OF BREATH: 1
WHEEZING: 1
TROUBLE SWALLOWING: 0
VOICE CHANGE: 0
CHOKING: 0

## 2025-04-24 NOTE — PROGRESS NOTES
2025    TELEHEALTH EVALUATION -- Audio/Visual (During COVID-19 public health emergency)    HPI:    Arthur Townsend (:  1960) has requested an audio/video evaluation for the following concern(s):    Cold Symptoms   This is a new problem. The current episode started 1 to 4 weeks ago (7-8 days and worse over last 5 days ago). The problem has been gradually worsening. Maximum temperature: Low-grade 99. Associated symptoms include congestion (Sinus and chest), coughing (Dry, nonproductive), ear pain (Bilateral ear pressure), headaches, sinus pain, a sore throat (Mild) and wheezing. Pertinent negatives include no rhinorrhea or sneezing. Associated symptoms comments: Dyspnea on exertion  .           Review of Systems   Constitutional:  Positive for activity change, appetite change, chills, fatigue and fever. Negative for diaphoresis and unexpected weight change.   HENT:  Positive for congestion (Sinus and chest), ear pain (Bilateral ear pressure), postnasal drip, sinus pressure, sinus pain and sore throat (Mild). Negative for dental problem, drooling, ear discharge, facial swelling, hearing loss, mouth sores, nosebleeds, rhinorrhea, sneezing, tinnitus, trouble swallowing and voice change.    Respiratory:  Positive for cough (Dry, nonproductive), chest tightness, shortness of breath and wheezing. Negative for apnea, choking and stridor.    Cardiovascular: Negative.    Gastrointestinal: Negative.    Musculoskeletal: Negative.    Skin: Negative.    Neurological:  Positive for dizziness (With coughing fits), weakness (Fatigue and malaise) and headaches. Negative for tremors, seizures, syncope, facial asymmetry, speech difficulty, light-headedness and numbness.       Prior to Visit Medications    Medication Sig Taking? Authorizing Provider   doxycycline hyclate (VIBRA-TABS) 100 MG tablet Take 1 tablet by mouth 2 times daily for 7 days Yes Juana Hines APRN - CNP   methylPREDNISolone (MEDROL

## 2025-05-01 DIAGNOSIS — Z96.651 S/P TKR (TOTAL KNEE REPLACEMENT) USING CEMENT, RIGHT: ICD-10-CM

## 2025-05-01 RX ORDER — CELECOXIB 200 MG/1
200 CAPSULE ORAL 2 TIMES DAILY
Qty: 60 CAPSULE | Refills: 4 | Status: SHIPPED | OUTPATIENT
Start: 2025-05-01

## 2025-05-01 NOTE — TELEPHONE ENCOUNTER
Refill Request     CONFIRM preferrred pharmacy with the patient.    If Mail Order Rx - Pend for 90 day refill.      Last Seen: Last Seen Department: 4/24/2025  Last Seen by PCP: 4/17/2025    Last Written: 12-    If no future appointment scheduled, route STAFF MESSAGE with patient name to the  Pool for scheduling.      Next Appointment:   Future Appointments   Date Time Provider Department Center   5/6/2025 10:00 AM [facility] Kobi Rad   5/23/2025  3:40 PM Thu Yoder, APRN - CNP [facility] DEP   6/20/2025 11:30 AM Rahat Dennis MD [facility]       Message sent to  to schedule appt with patient?  N/A      Requested Prescriptions     Pending Prescriptions Disp Refills    celecoxib (CELEBREX) 200 MG capsule [Pharmacy Med Name: CELECOXIB 200MG C(D)] 60 capsule 4     Sig: TAKE 1 CAPSULE BY MOUTH 2 TIMES DAILY

## 2025-05-04 LAB
COLOGUARD TEST, EXTERNAL: NEGATIVE
NONINV COLON CA DNA+OCC BLD SCRN STL QL: NEGATIVE

## 2025-05-05 ENCOUNTER — RESULTS FOLLOW-UP (OUTPATIENT)
Dept: FAMILY MEDICINE CLINIC | Age: 65
End: 2025-05-05

## 2025-05-05 RX ORDER — METOPROLOL SUCCINATE 50 MG/1
75 TABLET, EXTENDED RELEASE ORAL DAILY
Qty: 45 TABLET | Refills: 2 | OUTPATIENT
Start: 2025-05-05

## 2025-05-12 ENCOUNTER — TELEPHONE (OUTPATIENT)
Dept: FAMILY MEDICINE CLINIC | Age: 65
End: 2025-05-12

## 2025-05-15 ENCOUNTER — HOSPITAL ENCOUNTER (OUTPATIENT)
Age: 65
Discharge: HOME | End: 2025-05-17
Payer: MEDICARE

## 2025-05-15 VITALS
HEIGHT: 61 IN | BODY MASS INDEX: 55.32 KG/M2 | DIASTOLIC BLOOD PRESSURE: 74 MMHG | SYSTOLIC BLOOD PRESSURE: 134 MMHG | WEIGHT: 293 LBS

## 2025-05-15 DIAGNOSIS — R06.02 SHORTNESS OF BREATH: ICD-10-CM

## 2025-05-15 DIAGNOSIS — R42 DIZZINESS: ICD-10-CM

## 2025-05-15 DIAGNOSIS — I10 SUPINE HYPERTENSION: ICD-10-CM

## 2025-05-15 LAB
ECHO AO ASC DIAM: 3.9 CM
ECHO AO ASCENDING AORTA INDEX: 1.72 CM/M2
ECHO AO ROOT DIAM: 3.8 CM
ECHO AO ROOT INDEX: 1.67 CM/M2
ECHO AV MEAN GRADIENT: 9 MMHG
ECHO AV MEAN GRADIENT: 9 MMHG
ECHO AV MEAN VELOCITY: 1.4 M/S
ECHO AV PEAK GRADIENT: 15 MMHG
ECHO AV PEAK VELOCITY: 2 M/S
ECHO AV VELOCITY RATIO: 0.65
ECHO AV VTI: 44.9 CM
ECHO BSA: 2.46 M2
ECHO IVC EXP: 1.8 CM
ECHO LA AREA 2C: 19.5 CM2
ECHO LA AREA 4C: 21.1 CM2
ECHO LA MAJOR AXIS: 5.8 CM
ECHO LA MINOR AXIS: 6 CM
ECHO LA VOL BP: 58 ML (ref 22–52)
ECHO LA VOL MOD A2C: 53 ML (ref 22–52)
ECHO LA VOL MOD A4C: 63 ML (ref 22–52)
ECHO LA VOL/BSA BIPLANE: 26 ML/M2 (ref 16–34)
ECHO LA VOLUME INDEX MOD A2C: 23 ML/M2 (ref 16–34)
ECHO LA VOLUME INDEX MOD A4C: 28 ML/M2 (ref 16–34)
ECHO LV E' LATERAL VELOCITY: 6.96 CM/S
ECHO LV E' SEPTAL VELOCITY: 5.87 CM/S
ECHO LV EDV A2C: 87 ML
ECHO LV EDV A4C: 104 ML
ECHO LV EDV INDEX A4C: 46 ML/M2
ECHO LV EDV NDEX A2C: 38 ML/M2
ECHO LV EJECTION FRACTION 3D: 50 %
ECHO LV EJECTION FRACTION A2C: 49 %
ECHO LV EJECTION FRACTION A4C: 48 %
ECHO LV EJECTION FRACTION BIPLANE: 50 % (ref 55–100)
ECHO LV ESV A2C: 44 ML
ECHO LV ESV A4C: 54 ML
ECHO LV ESV INDEX A2C: 19 ML/M2
ECHO LV ESV INDEX A4C: 24 ML/M2
ECHO LV FRACTIONAL SHORTENING: 29 % (ref 28–44)
ECHO LV INTERNAL DIMENSION DIASTOLE INDEX: 2.42 CM/M2
ECHO LV INTERNAL DIMENSION DIASTOLIC: 5.5 CM (ref 3.9–5.3)
ECHO LV INTERNAL DIMENSION SYSTOLIC INDEX: 1.72 CM/M2
ECHO LV INTERNAL DIMENSION SYSTOLIC: 3.9 CM
ECHO LV ISOVOLUMETRIC RELAXATION TIME (IVRT): 74 MS
ECHO LV IVSD: 1.4 CM (ref 0.6–0.9)
ECHO LV MASS 2D: 320.9 G (ref 67–162)
ECHO LV MASS INDEX 2D: 141.4 G/M2 (ref 43–95)
ECHO LV POSTERIOR WALL DIASTOLIC: 1.3 CM (ref 0.6–0.9)
ECHO LV RELATIVE WALL THICKNESS RATIO: 0.47
ECHO LVOT AV VTI INDEX: 0.64
ECHO LVOT MEAN GRADIENT: 4 MMHG
ECHO LVOT PEAK GRADIENT: 6 MMHG
ECHO LVOT PEAK VELOCITY: 1.3 M/S
ECHO LVOT VTI: 28.7 CM
ECHO MV A VELOCITY: 1.14 M/S
ECHO MV E DECELERATION TIME (DT): 180 MS
ECHO MV E VELOCITY: 1.09 M/S
ECHO MV E/A RATIO: 0.96
ECHO MV E/E' LATERAL: 15.66
ECHO MV E/E' RATIO (AVERAGED): 17.11
ECHO MV E/E' SEPTAL: 18.57
ECHO MV LVOT VTI INDEX: 0.99
ECHO MV MAX VELOCITY: 1.1 M/S
ECHO MV MEAN GRADIENT: 3 MMHG
ECHO MV MEAN VELOCITY: 0.9 M/S
ECHO MV PEAK GRADIENT: 5 MMHG
ECHO MV VTI: 28.3 CM
ECHO PV MAX VELOCITY: 1.2 M/S
ECHO PV MEAN GRADIENT: 3 MMHG
ECHO PV MEAN VELOCITY: 0.8 M/S
ECHO PV PEAK GRADIENT: 6 MMHG
ECHO PV VTI: 23.5 CM
ECHO RA AREA 4C: 21.8 CM2
ECHO RA END SYSTOLIC VOLUME APICAL 4 CHAMBER INDEX BSA: 27 ML/M2
ECHO RA VOLUME: 62 ML
ECHO RV BASAL DIMENSION: 4.7 CM
ECHO RV FREE WALL PEAK S': 13.6 CM/S
ECHO RV LONGITUDINAL DIMENSION: 8.1 CM
ECHO RV MID DIMENSION: 4 CM
ECHO RV TAPSE: 2.4 CM (ref 1.7–?)

## 2025-05-15 PROCEDURE — 93306 TTE W/DOPPLER COMPLETE: CPT

## 2025-05-16 DIAGNOSIS — R93.1 ABNORMAL ECHOCARDIOGRAM: Primary | ICD-10-CM

## 2025-05-21 SDOH — HEALTH STABILITY: PHYSICAL HEALTH: ON AVERAGE, HOW MANY MINUTES DO YOU ENGAGE IN EXERCISE AT THIS LEVEL?: 0 MIN

## 2025-05-21 SDOH — HEALTH STABILITY: PHYSICAL HEALTH: ON AVERAGE, HOW MANY DAYS PER WEEK DO YOU ENGAGE IN MODERATE TO STRENUOUS EXERCISE (LIKE A BRISK WALK)?: 0 DAYS

## 2025-05-21 ASSESSMENT — LIFESTYLE VARIABLES
HOW OFTEN DO YOU HAVE A DRINK CONTAINING ALCOHOL: 1
HOW MANY STANDARD DRINKS CONTAINING ALCOHOL DO YOU HAVE ON A TYPICAL DAY: 0
HOW OFTEN DO YOU HAVE A DRINK CONTAINING ALCOHOL: NEVER
HOW OFTEN DO YOU HAVE SIX OR MORE DRINKS ON ONE OCCASION: 1
HOW MANY STANDARD DRINKS CONTAINING ALCOHOL DO YOU HAVE ON A TYPICAL DAY: PATIENT DOES NOT DRINK

## 2025-05-21 ASSESSMENT — PATIENT HEALTH QUESTIONNAIRE - PHQ9
SUM OF ALL RESPONSES TO PHQ QUESTIONS 1-9: 2
2. FEELING DOWN, DEPRESSED OR HOPELESS: SEVERAL DAYS
SUM OF ALL RESPONSES TO PHQ QUESTIONS 1-9: 2
1. LITTLE INTEREST OR PLEASURE IN DOING THINGS: SEVERAL DAYS
SUM OF ALL RESPONSES TO PHQ QUESTIONS 1-9: 2
SUM OF ALL RESPONSES TO PHQ QUESTIONS 1-9: 2

## 2025-05-23 ENCOUNTER — HOSPITAL ENCOUNTER (OUTPATIENT)
Dept: MAMMOGRAPHY | Age: 65
Discharge: HOME | End: 2025-05-28
Payer: MEDICARE

## 2025-05-23 ENCOUNTER — TELEMEDICINE (OUTPATIENT)
Dept: FAMILY MEDICINE CLINIC | Age: 65
End: 2025-05-23

## 2025-05-23 VITALS — WEIGHT: 293 LBS | BODY MASS INDEX: 55.32 KG/M2 | HEIGHT: 61 IN

## 2025-05-23 DIAGNOSIS — Z00.00 MEDICARE ANNUAL WELLNESS VISIT, SUBSEQUENT: Primary | ICD-10-CM

## 2025-05-23 DIAGNOSIS — Z12.31 VISIT FOR SCREENING MAMMOGRAM: ICD-10-CM

## 2025-05-23 DIAGNOSIS — Z23 NEED FOR PROPHYLACTIC VACCINATION AND INOCULATION AGAINST VARICELLA: ICD-10-CM

## 2025-05-23 PROCEDURE — 77063 BREAST TOMOSYNTHESIS BI: CPT

## 2025-05-23 RX ORDER — VILAZODONE HYDROCHLORIDE 20 MG/1
20 TABLET ORAL DAILY
COMMUNITY
Start: 2025-05-05

## 2025-05-23 NOTE — PATIENT INSTRUCTIONS
replaced?   Are all small rugs and runners slip resistant? If not, you can secure them to the floor with a special double-sided carpet tape.   Are smoke detectors properly locatedone on every floor of your home and one outside of every sleeping area? Are they in good working order? Are batteries replaced at least once a year?   Do you have a well-maintained carbon monoxide detector outside every sleeping are in your home?   Does your furniture layout leave plenty of space to maneuver between and around chairs, tables, beds, and sofas?   Are hallways, stairs and passages between rooms well lit? Can you reach a lamp without getting out of bed?   Are floor surfaces well maintained? Shag rugs, high-pile carpeting, tile floors, and polished wood floors can be particularly slippery. Stairs should always have handrails and be carpeted or fitted with a non-skid tread.   Is your telephone easily reachable. Is the cord safely tucked away?   Room by Room   According to the [employer]ging, bathrooms and daniela are the two most potentially hazardous rooms in your home.   In the Kitchen    Be sure your stove is in proper working order and always make sure burners and the oven are off before you go out or go to sleep.    Keep pots on the back burners, turn handles away from the front of the stove, and keep stove clean and free of grease build-up.    Kitchen ventilation systems and range exhausts should be working properly.    Keep flammable objects such as towels and pot holders away from the cooking area except when in use. Make sure kitchen curtains are tied back.    Move cords and appliances away from the sink and hot surfaces. If extension cords are needed, install wiring guides so they do not hang over the sink, range, or working areas.    Look for coffee pots, kettles and toaster ovens with automatic shut-offs.    Keep a mop handy in the kitchen so you can wipe up spills instantly. You should also have a small fire

## 2025-05-23 NOTE — PROGRESS NOTES
Medicare Annual Wellness Visit    Arthur Townsend is here for Medicare AWV    Assessment & Plan   Medicare annual wellness visit, subsequent  Need for prophylactic vaccination and inoculation against varicella  -     zoster recombinant adjuvanted vaccine (SHINGRIX) 50 MCG/0.5ML SUSR injection; Inject 0.5 mLs into the muscle once for 1 dose, Disp-0.5 mL, R-0Normal       Return in 1 year (on 5/23/2026) for Medicare AWV.     Subjective       Patient's complete Health Risk Assessment and screening values have been reviewed and are found in Flowsheets. The following problems were reviewed today and where indicated follow up appointments were made and/or referrals ordered.    Positive Risk Factor Screenings with Interventions:    Fall Risk:  Do you feel unsteady or are you worried about falling? : (!) yes  2 or more falls in past year?: no  Fall with injury in past year?: no     Interventions:    Reviewed medications, home hazards, visual acuity, and co-morbidities that can increase risk for falls  See AVS for additional education material             Inactivity:  On average, how many days per week do you engage in moderate to strenuous exercise (like a brisk walk)?: (Patient-Rptd) 0 days (!) Abnormal  On average, how many minutes do you engage in exercise at this level?: (Patient-Rptd) 0 min    Interventions:  Recommendations: patient agrees to exercise for at least 150 minutes/week  See AVS for additional education material     Abnormal BMI (obese):  There is no height or weight on file to calculate BMI. (!) Abnormal    Interventions:  low carbohydrate diet, exercise for at least 150 minutes/week  See AVS for additional education material         Hearing Screen:  Do you or your family notice any trouble with your hearing that hasn't been managed with hearing aids?: (!) (Patient-Rptd) Yes    Interventions:  Saw ENT and was referred for hearing aides  See AVS for additional education material    Vision Screen:  Do

## 2025-05-28 ENCOUNTER — RESULTS FOLLOW-UP (OUTPATIENT)
Dept: FAMILY MEDICINE CLINIC | Age: 65
End: 2025-05-28

## 2025-06-02 RX ORDER — METOPROLOL SUCCINATE 50 MG/1
75 TABLET, EXTENDED RELEASE ORAL DAILY
Qty: 45 TABLET | Refills: 2 | Status: SHIPPED | OUTPATIENT
Start: 2025-06-02

## 2025-06-02 NOTE — TELEPHONE ENCOUNTER
Refill Request     CONFIRM preferrred pharmacy with the patient.    If Mail Order Rx - Pend for 90 day refill.      Last Seen: Last Seen Department: 5/23/2025  Last Seen by PCP: 5/23/2025    Last Written: 4/17/25    If no future appointment scheduled, route STAFF MESSAGE with patient name to the  Pool for scheduling.      Next Appointment:   Future Appointments   Date Time Provider Department Center   6/4/2025  1:00 PM Eric Celeste DO [facility]   6/20/2025 11:30 AM Rahat Dennis MD [facility] SLEEP [facility]   8/25/2025  2:00 PM Alex Woodson MD [facility]       Message sent to  to schedule appt with patient?  N/A      Requested Prescriptions     Pending Prescriptions Disp Refills    metoprolol succinate (TOPROL XL) 50 MG extended release tablet [Pharmacy Med Name: METOPROLOL SUC 50MG T(D)] 45 tablet 2     Sig: TAKE 1 AND 1/2 TABLETS BY MOUTH EVERY DAY

## 2025-06-02 NOTE — TELEPHONE ENCOUNTER
Refill Request     CONFIRM preferrred pharmacy with the patient.    If Mail Order Rx - Pend for 90 day refill.      Last Seen: Last Seen Department: 5/23/2025  Last Seen by PCP: 5/23/2025    Last Written: 4/17/25    If no future appointment scheduled, route STAFF MESSAGE with patient name to the  Pool for scheduling.      Next Appointment:   Future Appointments   Date Time Provider Department Center   6/4/2025  1:00 PM Eric Celeste DO [facility]   6/20/2025 11:30 AM Rahat Dennis MD [facility] SLEEP [facility]   8/25/2025  2:00 PM Alex Woodson MD [facility]       Message sent to  to schedule appt with patient?  NO      Requested Prescriptions     Pending Prescriptions Disp Refills    metoprolol succinate (TOPROL XL) 50 MG extended release tablet [Pharmacy Med Name: METOPROLOL SUC 50MG T(D)] 45 tablet 2     Sig: TAKE 1 AND 1/2 TABLETS BY MOUTH EVERY DAY

## 2025-06-04 ENCOUNTER — OFFICE VISIT (OUTPATIENT)
Dept: ORTHOPEDIC SURGERY | Age: 65
End: 2025-06-04

## 2025-06-04 DIAGNOSIS — M19.012 PRIMARY OSTEOARTHRITIS OF LEFT SHOULDER: ICD-10-CM

## 2025-06-04 DIAGNOSIS — M25.511 RIGHT SHOULDER PAIN, UNSPECIFIED CHRONICITY: Primary | ICD-10-CM

## 2025-06-04 DIAGNOSIS — M19.011 PRIMARY OSTEOARTHRITIS OF RIGHT SHOULDER: ICD-10-CM

## 2025-06-04 RX ORDER — METHYLPREDNISOLONE ACETATE 40 MG/ML
160 INJECTION, SUSPENSION INTRA-ARTICULAR; INTRALESIONAL; INTRAMUSCULAR; SOFT TISSUE ONCE
Status: COMPLETED | OUTPATIENT
Start: 2025-06-04 | End: 2025-06-04

## 2025-06-04 RX ORDER — LIDOCAINE HYDROCHLORIDE 10 MG/ML
8 INJECTION, SOLUTION INFILTRATION; PERINEURAL ONCE
Status: COMPLETED | OUTPATIENT
Start: 2025-06-04 | End: 2025-06-04

## 2025-06-04 RX ADMIN — METHYLPREDNISOLONE ACETATE 160 MG: 40 INJECTION, SUSPENSION INTRA-ARTICULAR; INTRALESIONAL; INTRAMUSCULAR; SOFT TISSUE at 13:38

## 2025-06-04 RX ADMIN — LIDOCAINE HYDROCHLORIDE 8 ML: 10 INJECTION, SOLUTION INFILTRATION; PERINEURAL at 13:37

## 2025-06-04 NOTE — PROGRESS NOTES
Chief Complaint  Shoulder Pain (Fu eli shoulder )      History of Present Illness  The patient is a 64-year-old female here today for repeat evaluation regarding her bilateral shoulder pain. She reports an increase in the sensation of grinding in her right shoulder, which she first noticed approximately a month ago. This discomfort is not constant but occurs during specific activities such as carrying water to the coffee pot or drinking coffee. She also experiences pain when moving her arm behind her back, particularly along the posterior aspect of her arms and closer to her shoulders.    Additionally, she mentions that the severity of her shoulder pain appears to be directly proportional to the intensity of her neck pain. She has not undergone any radiographic examinations of her neck.    SOCIAL HISTORY  Coffee/Tea/Caffeine-containing Drinks: Drinks coffee    Prior HPI 12/18/24:  The patient is here for repeat evaluation regarding her left shoulder.  She reports that the shot 2 weeks ago did not help her pain.  She reports that she was holding a cup of water earlier this week and felt a lot of pain in her biceps and her shoulder locked up on her.  She is having a good amount of pain  at night.     Prior HPI 12/3/2024:  Arthur Townsend is a pleasant 64 y.o. female today for established patient evaluation of a continuing problem with her left shoulder.  The patient reports that her shoulder pain feels a little bit different this year.  I did see her nearly 2 years ago for her bilateral shoulders and treated her for rotator cuff tendinitis.  She reports a lot of her pain in her left shoulder today courses down into the middle of her arm.  She denies any motion loss.  She denies any weakness.  She has a good amount of pain with sleeping.         Medical History:  Patient's medications, allergies, past medical, surgical, social and family histories were reviewed and updated as appropriate.    Pertinent items are

## 2025-06-05 RX ORDER — FLUTICASONE PROPIONATE AND SALMETEROL 500; 50 UG/1; UG/1
POWDER RESPIRATORY (INHALATION)
Qty: 60 EACH | Refills: 2 | Status: SHIPPED | OUTPATIENT
Start: 2025-06-05

## 2025-06-05 RX ORDER — FLUTICASONE PROPIONATE 50 MCG
2 SPRAY, SUSPENSION (ML) NASAL DAILY
Qty: 16 G | Refills: 2 | Status: SHIPPED | OUTPATIENT
Start: 2025-06-05

## 2025-06-05 NOTE — TELEPHONE ENCOUNTER
Refill Request     CONFIRM preferrred pharmacy with the patient.    If Mail Order Rx - Pend for 90 day refill.      Last Seen: Last Seen Department: 5/23/2025  Last Seen by PCP: 5/23/2025    Last Written: 12-    If no future appointment scheduled, route STAFF MESSAGE with patient name to the  Pool for scheduling.      Next Appointment:   Future Appointments   Date Time Provider Department Center   6/20/2025 11:30 AM Rahat Dennis MD [facility]   [facility]25/2025  [facility]       Message sent to  to schedule appt with patient?  N/A      Requested Prescriptions     Pending Prescriptions Disp Refills    fluticasone (FLONASE) 50 MCG/ACT nasal spray [Pharmacy Med Name: FLUTICASONE 50MCG NS(16G)(D)] 16 g 2     Sig: SPRAY 2 SPRAYS INTO EACH NOSTRIL EVERY DAY    fluticasone-salmeterol (ADVAIR) 500-50 MCG/ACT AEPB diskus inhaler [Pharmacy Med Name: FLUTICAS/QBGT762/50(60PUF)(D)]  2     Sig: INHALE 1 PUFF INTO LUNGS IN THE MORNING AND 1 PUFF IN THE EVENING

## 2025-06-11 NOTE — TELEPHONE ENCOUNTER
Patient advised on message. Please send Rx to [facility] in [place].  [facility] Time spent coordinating the patient's care. This includes reviewing documentation pertinent to this admission, results and imaging. Further tests, medications, and procedures have been ordered as indicated. Laboratory results and the plan of care were communicated to the patient and family.  Discussed care plan with nursing and will discuss plan and care with appropriate consultant(s). Supporting documentation was completed and added to the patient's chart direct patient care including but not limited to reviewing chart, medications ,laboratory data, imaging reports, discussion of plan of care with consultants on the case, coordination of care with multidisciplinary team involved in the case and discussion of plan with, family ,  family agreeable to plan of care and verbalized understanding the anticipated hospital course and treatment plan. direct patient care including but not limited to reviewing chart, medications ,laboratory data, imaging reports, discussion of plan of care with consultants on the case, coordination of care with multidisciplinary team involved in the case and discussion of plan with, family ,  family agreeable to plan of care and verbalized understanding the anticipated hospital course and treatment plan. direct patient care including but not limited to reviewing chart, medications ,laboratory data, imaging reports, discussion of plan of care with consultants on the case, coordination of care with multidisciplinary team involved in the case and discussion of plan with, family ,  family agreeable to plan of care and verbalized understanding the anticipated hospital course and treatment plan. direct patient care including but not limited to reviewing chart, medications ,laboratory data, imaging reports, discussion of plan of care with consultants on the case, coordination of care with multidisciplinary team involved in the case and discussion of plan with, family ,  family agreeable to plan of care and verbalized understanding the anticipated hospital course and treatment plan.

## 2025-06-16 ENCOUNTER — OFFICE VISIT (OUTPATIENT)
Dept: FAMILY MEDICINE CLINIC | Age: 65
End: 2025-06-16
Payer: MEDICARE

## 2025-06-16 VITALS
TEMPERATURE: 97.7 F | SYSTOLIC BLOOD PRESSURE: 130 MMHG | DIASTOLIC BLOOD PRESSURE: 72 MMHG | BODY MASS INDEX: 55.32 KG/M2 | WEIGHT: 293 LBS | HEART RATE: 75 BPM | OXYGEN SATURATION: 98 % | HEIGHT: 61 IN

## 2025-06-16 DIAGNOSIS — G47.62 NOCTURNAL LEG CRAMPS: Primary | ICD-10-CM

## 2025-06-16 DIAGNOSIS — R09.89 DIMINISHED PULSE: ICD-10-CM

## 2025-06-16 DIAGNOSIS — D64.9 ANEMIA, UNSPECIFIED TYPE: ICD-10-CM

## 2025-06-16 PROCEDURE — 36415 COLL VENOUS BLD VENIPUNCTURE: CPT | Performed by: NURSE PRACTITIONER

## 2025-06-16 PROCEDURE — 99213 OFFICE O/P EST LOW 20 MIN: CPT | Performed by: NURSE PRACTITIONER

## 2025-06-16 PROCEDURE — 3078F DIAST BP <80 MM HG: CPT | Performed by: NURSE PRACTITIONER

## 2025-06-16 PROCEDURE — 3075F SYST BP GE 130 - 139MM HG: CPT | Performed by: NURSE PRACTITIONER

## 2025-06-16 NOTE — PROGRESS NOTES
This document was prepared by a combination of typing and transcription through a voice recognition software.     Thu Yoder, CNP

## 2025-06-17 ENCOUNTER — RESULTS FOLLOW-UP (OUTPATIENT)
Dept: FAMILY MEDICINE CLINIC | Age: 65
End: 2025-06-17

## 2025-06-17 DIAGNOSIS — G47.62 NOCTURNAL LEG CRAMPS: ICD-10-CM

## 2025-06-17 LAB
ALBUMIN SERPL-MCNC: 4.2 G/DL (ref 3.4–5)
ALBUMIN/GLOB SERPL: 1.9 {RATIO} (ref 1.1–2.2)
ALP SERPL-CCNC: 134 U/L (ref 40–129)
ALT SERPL-CCNC: 23 U/L (ref 10–40)
ANION GAP SERPL CALCULATED.3IONS-SCNC: 17 MMOL/L (ref 3–16)
AST SERPL-CCNC: 22 U/L (ref 15–37)
BASOPHILS # BLD: 0.1 K/UL (ref 0–0.2)
BASOPHILS NFR BLD: 1 %
BILIRUB SERPL-MCNC: <0.2 MG/DL (ref 0–1)
BUN SERPL-MCNC: 28 MG/DL (ref 7–20)
CALCIUM SERPL-MCNC: 9.3 MG/DL (ref 8.3–10.6)
CHLORIDE SERPL-SCNC: 101 MMOL/L (ref 99–110)
CO2 SERPL-SCNC: 23 MMOL/L (ref 21–32)
CREAT SERPL-MCNC: 0.9 MG/DL (ref 0.6–1.2)
DEPRECATED RDW RBC AUTO: 16.1 % (ref 12.4–15.4)
EOSINOPHIL # BLD: 0.2 K/UL (ref 0–0.6)
EOSINOPHIL NFR BLD: 1.6 %
FERRITIN SERPL IA-MCNC: 121 NG/ML (ref 15–150)
FOLATE SERPL-MCNC: 21.2 NG/ML (ref 4.78–24.2)
GFR SERPLBLD CREATININE-BSD FMLA CKD-EPI: 71 ML/MIN/{1.73_M2}
GLUCOSE SERPL-MCNC: 80 MG/DL (ref 70–99)
HCT VFR BLD AUTO: 43.9 % (ref 36–48)
HGB BLD-MCNC: 14.3 G/DL (ref 12–16)
IRON SATN MFR SERPL: 22 % (ref 15–50)
IRON SERPL-MCNC: 82 UG/DL (ref 37–145)
LYMPHOCYTES # BLD: 2.2 K/UL (ref 1–5.1)
LYMPHOCYTES NFR BLD: 21.6 %
MAGNESIUM SERPL-MCNC: 2.09 MG/DL (ref 1.8–2.4)
MCH RBC QN AUTO: 27.6 PG (ref 26–34)
MCHC RBC AUTO-ENTMCNC: 32.6 G/DL (ref 31–36)
MCV RBC AUTO: 84.6 FL (ref 80–100)
MONOCYTES # BLD: 0.8 K/UL (ref 0–1.3)
MONOCYTES NFR BLD: 7.9 %
NEUTROPHILS # BLD: 6.8 K/UL (ref 1.7–7.7)
NEUTROPHILS NFR BLD: 67.9 %
PLATELET # BLD AUTO: 342 K/UL (ref 135–450)
PMV BLD AUTO: 7.8 FL (ref 5–10.5)
POTASSIUM SERPL-SCNC: 4.4 MMOL/L (ref 3.5–5.1)
PROT SERPL-MCNC: 6.4 G/DL (ref 6.4–8.2)
RBC # BLD AUTO: 5.19 M/UL (ref 4–5.2)
SODIUM SERPL-SCNC: 141 MMOL/L (ref 136–145)
TIBC SERPL-MCNC: 374 UG/DL (ref 260–445)
TSH SERPL DL<=0.005 MIU/L-ACNC: 2.42 UIU/ML (ref 0.27–4.2)
VIT B12 SERPL-MCNC: 3812 PG/ML (ref 211–911)
WBC # BLD AUTO: 10 K/UL (ref 4–11)

## 2025-06-17 ASSESSMENT — ENCOUNTER SYMPTOMS
RESPIRATORY NEGATIVE: 1
SHORTNESS OF BREATH: 0
ALLERGIC/IMMUNOLOGIC NEGATIVE: 1
NAUSEA: 0
GASTROINTESTINAL NEGATIVE: 1
ABDOMINAL PAIN: 0
EYES NEGATIVE: 1
BLOOD IN STOOL: 0
ANAL BLEEDING: 0

## 2025-06-30 NOTE — TELEPHONE ENCOUNTER
8/8/2024   RIGHT TOTAL KNEE ARTHROPLASTY WITH 75MM STEM         Call sent to triage     Patient calling with complaints of pain up the the back of her leg/calf    [phone]   
Doppler scheduled for Aug 15th at 9:30am at [facility]     Patient is aware   
Other    PATIENT CALLING REGARDING HAVING SWELLING IN HER CALF     PATIENT HAD MIRNA ON 8/8/24 RT TKR    SENT TO TRIAGE TEAM   
Spoke with patient regarding her calf, she states it feels very tight and very mild tenderness but has no warmth and she personally doesn't think it is a blood clot.     I told her I will talk with  and see what he recommends and will give her a call back.   
No

## 2025-07-01 ENCOUNTER — TELEPHONE (OUTPATIENT)
Dept: FAMILY MEDICINE CLINIC | Age: 65
End: 2025-07-01

## 2025-07-01 NOTE — TELEPHONE ENCOUNTER
Pt called and said that she was to record her BP #'s and let you know. She said that she took it this AM 7/1/25 at 11:00am and it was 140/106, then she just took it again at 4:15pm today and it was 162/89. Per Samra, she told pt to go ahead and take her BP medication and to check it in an hour. Samra also informed pt that if it were still high and is she was having any symptoms to go to the ED. Pt understood and agreed. Thank you!

## 2025-07-02 ENCOUNTER — TELEPHONE (OUTPATIENT)
Dept: FAMILY MEDICINE CLINIC | Age: 65
End: 2025-07-02

## 2025-07-02 NOTE — TELEPHONE ENCOUNTER
Please call patient on Thursday to get additional blood pressure readings.  She also can come into the office on Thursday and bring her blood pressure cuff so that we can compare her blood pressure cuff and readings to ours

## 2025-07-02 NOTE — TELEPHONE ENCOUNTER
RN called patient for follow up    BP today  9 am  153/82  12 noon  140/66  3:20 pm  150/78    Patient is taking Toprol 75 mg daily    Appt tomorrow for labs and BP check.  Patient instructed to bring in her BP cuff for comparison.     Patient denies SOB, CP.  Patient states she has slight edema to one ankle.  RN instructed patient to elevate legs, and perform ankle pumps and ankle circles.  RN instructed patient to avoid / limit sodium intake and caffeine intake.     Mg and K+ ordered from previous lab results on 6/17/25  \"Stop otc magnesium and otc potassium and recheck serum potassium and magnesium in 2 weeks. \"

## 2025-07-03 ENCOUNTER — CLINICAL SUPPORT (OUTPATIENT)
Dept: FAMILY MEDICINE CLINIC | Age: 65
End: 2025-07-03
Payer: MEDICARE

## 2025-07-03 VITALS — SYSTOLIC BLOOD PRESSURE: 108 MMHG | DIASTOLIC BLOOD PRESSURE: 59 MMHG

## 2025-07-03 DIAGNOSIS — I10 PRIMARY HYPERTENSION: Primary | ICD-10-CM

## 2025-07-03 LAB
MAGNESIUM SERPL-MCNC: 2.07 MG/DL (ref 1.8–2.4)
POTASSIUM SERPL-SCNC: 4.2 MMOL/L (ref 3.5–5.1)

## 2025-07-03 PROCEDURE — 36415 COLL VENOUS BLD VENIPUNCTURE: CPT | Performed by: NURSE PRACTITIONER

## 2025-07-03 NOTE — TELEPHONE ENCOUNTER
Patient was to have a blood pressure check at her last visit today.  She was supposed to bring in her blood pressure cuff as well for comparison  Was was done?

## 2025-07-06 ENCOUNTER — RESULTS FOLLOW-UP (OUTPATIENT)
Dept: FAMILY MEDICINE CLINIC | Age: 65
End: 2025-07-06

## 2025-07-16 RX ORDER — OXYBUTYNIN CHLORIDE 5 MG/1
5 TABLET ORAL 2 TIMES DAILY
Qty: 60 TABLET | Refills: 5 | Status: SHIPPED | OUTPATIENT
Start: 2025-07-16

## 2025-07-16 NOTE — TELEPHONE ENCOUNTER
Refill Request     CONFIRM preferrred pharmacy with the patient.    If Mail Order Rx - Pend for 90 day refill.      Last Seen: Last Seen Department: 7/3/2025  Last Seen by PCP: 6/16/2025    Last Written: 4/17/25    If no future appointment scheduled, route STAFF MESSAGE with patient name to the  Pool for scheduling.      Next Appointment:   Future Appointments   Date Time Provider Department Center   7/17/2025  2:00 PM [facility]   8/13/2025  1:30 PM Rahat Dennis MD [facility] P[facility]   8/25/2025  2:00 PM Alex Woodson MD [facility]   10/29/2025  9:20 AM Thu Yoder, APRN - [facility]       Message sent to  to schedule appt with patient?  NO      Requested Prescriptions     Pending Prescriptions Disp Refills    oxyBUTYnin (DITROPAN) 5 MG tablet [Pharmacy Med Name: OXYBUTYNIN IR 5MG T(R)] 60 tablet 0     Sig: TAKE 1 TABLET BY MOUTH TWICE DAILY

## 2025-08-20 ENCOUNTER — HOSPITAL ENCOUNTER (EMERGENCY)
Age: 65
Discharge: HOME | End: 2025-08-20
Attending: EMERGENCY MEDICINE
Payer: MEDICARE

## 2025-08-20 ENCOUNTER — APPOINTMENT (OUTPATIENT)
Dept: CT IMAGING | Age: 65
End: 2025-08-20
Payer: MEDICARE

## 2025-08-20 VITALS
WEIGHT: 293 LBS | HEART RATE: 74 BPM | OXYGEN SATURATION: 93 % | TEMPERATURE: 97.7 F | RESPIRATION RATE: 20 BRPM | SYSTOLIC BLOOD PRESSURE: 162 MMHG | DIASTOLIC BLOOD PRESSURE: 72 MMHG | BODY MASS INDEX: 55.32 KG/M2 | HEIGHT: 61 IN

## 2025-08-20 DIAGNOSIS — R42 LIGHTHEADEDNESS: Primary | ICD-10-CM

## 2025-08-20 DIAGNOSIS — I10 HYPERTENSION, UNSPECIFIED TYPE: ICD-10-CM

## 2025-08-20 LAB
ALBUMIN SERPL-MCNC: 3.8 G/DL (ref 3.4–5)
ALBUMIN/GLOB SERPL: 1.5 {RATIO} (ref 1.1–2.2)
ALP SERPL-CCNC: 113 U/L (ref 40–129)
ALT SERPL-CCNC: 21 U/L (ref 10–40)
ANION GAP SERPL CALCULATED.3IONS-SCNC: 13 MMOL/L (ref 3–16)
AST SERPL-CCNC: 23 U/L (ref 15–37)
BASOPHILS # BLD: 0.1 K/UL (ref 0–0.2)
BASOPHILS NFR BLD: 1.3 %
BILIRUB SERPL-MCNC: <0.2 MG/DL (ref 0–1)
BILIRUB UR QL STRIP.AUTO: NEGATIVE
BUN SERPL-MCNC: 21 MG/DL (ref 7–20)
CALCIUM SERPL-MCNC: 9.6 MG/DL (ref 8.3–10.6)
CHLORIDE SERPL-SCNC: 104 MMOL/L (ref 99–110)
CLARITY UR: CLEAR
CO2 SERPL-SCNC: 27 MMOL/L (ref 21–32)
COLOR UR: YELLOW
CREAT SERPL-MCNC: 0.8 MG/DL (ref 0.6–1.2)
DEPRECATED RDW RBC AUTO: 16 % (ref 12.4–15.4)
EKG ATRIAL RATE: 93 BPM
EKG DIAGNOSIS: NORMAL
EKG P AXIS: 62 DEGREES
EKG P-R INTERVAL: 136 MS
EKG Q-T INTERVAL: 360 MS
EKG QRS DURATION: 96 MS
EKG QTC CALCULATION (BAZETT): 447 MS
EKG R AXIS: -46 DEGREES
EKG T AXIS: 71 DEGREES
EKG VENTRICULAR RATE: 93 BPM
EOSINOPHIL # BLD: 0.2 K/UL (ref 0–0.6)
EOSINOPHIL NFR BLD: 1.8 %
GFR SERPLBLD CREATININE-BSD FMLA CKD-EPI: 82 ML/MIN/{1.73_M2}
GLUCOSE BLD-MCNC: 104 MG/DL (ref 70–99)
GLUCOSE SERPL-MCNC: 114 MG/DL (ref 70–99)
GLUCOSE UR STRIP.AUTO-MCNC: NEGATIVE MG/DL
HCT VFR BLD AUTO: 41.7 % (ref 36–48)
HGB BLD-MCNC: 13.7 G/DL (ref 12–16)
HGB UR QL STRIP.AUTO: NEGATIVE
KETONES UR STRIP.AUTO-MCNC: NEGATIVE MG/DL
LEUKOCYTE ESTERASE UR QL STRIP.AUTO: NEGATIVE
LYMPHOCYTES # BLD: 2.9 K/UL (ref 1–5.1)
LYMPHOCYTES NFR BLD: 30.9 %
MCH RBC QN AUTO: 28.3 PG (ref 26–34)
MCHC RBC AUTO-ENTMCNC: 33 G/DL (ref 31–36)
MCV RBC AUTO: 85.9 FL (ref 80–100)
MONOCYTES # BLD: 0.7 K/UL (ref 0–1.3)
MONOCYTES NFR BLD: 8.1 %
NEUTROPHILS # BLD: 5.3 K/UL (ref 1.7–7.7)
NEUTROPHILS NFR BLD: 57.9 %
NITRITE UR QL STRIP.AUTO: NEGATIVE
NT-PROBNP SERPL-MCNC: 133 PG/ML (ref 0–124)
PERFORMED ON: ABNORMAL
PH UR STRIP.AUTO: 6 [PH] (ref 5–8)
PLATELET # BLD AUTO: 339 K/UL (ref 135–450)
PMV BLD AUTO: 7.1 FL (ref 5–10.5)
POTASSIUM SERPL-SCNC: 4.3 MMOL/L (ref 3.5–5.1)
PROT SERPL-MCNC: 6.3 G/DL (ref 6.4–8.2)
PROT UR STRIP.AUTO-MCNC: NEGATIVE MG/DL
RBC # BLD AUTO: 4.85 M/UL (ref 4–5.2)
SODIUM SERPL-SCNC: 144 MMOL/L (ref 136–145)
SP GR UR STRIP.AUTO: 1.02 (ref 1–1.03)
TROPONIN, HIGH SENSITIVITY: 11 NG/L (ref 0–14)
TROPONIN, HIGH SENSITIVITY: 11 NG/L (ref 0–14)
UA COMPLETE W REFLEX CULTURE PNL UR: NORMAL
UA DIPSTICK W REFLEX MICRO PNL UR: NORMAL
URN SPEC COLLECT METH UR: NORMAL
UROBILINOGEN UR STRIP-ACNC: 0.2 E.U./DL
WBC # BLD AUTO: 9.2 K/UL (ref 4–11)

## 2025-08-20 PROCEDURE — 80053 COMPREHEN METABOLIC PANEL: CPT

## 2025-08-20 PROCEDURE — 93010 ELECTROCARDIOGRAM REPORT: CPT | Performed by: INTERNAL MEDICINE

## 2025-08-20 PROCEDURE — 70450 CT HEAD/BRAIN W/O DYE: CPT

## 2025-08-20 PROCEDURE — 83880 ASSAY OF NATRIURETIC PEPTIDE: CPT

## 2025-08-20 PROCEDURE — 85025 COMPLETE CBC W/AUTO DIFF WBC: CPT

## 2025-08-20 PROCEDURE — 81003 URINALYSIS AUTO W/O SCOPE: CPT

## 2025-08-20 PROCEDURE — 93005 ELECTROCARDIOGRAM TRACING: CPT | Performed by: EMERGENCY MEDICINE

## 2025-08-20 PROCEDURE — 99284 EMERGENCY DEPT VISIT MOD MDM: CPT

## 2025-08-20 PROCEDURE — 84484 ASSAY OF TROPONIN QUANT: CPT

## 2025-08-20 ASSESSMENT — PAIN - FUNCTIONAL ASSESSMENT: PAIN_FUNCTIONAL_ASSESSMENT: 0-10

## 2025-08-20 ASSESSMENT — LIFESTYLE VARIABLES
HOW MANY STANDARD DRINKS CONTAINING ALCOHOL DO YOU HAVE ON A TYPICAL DAY: PATIENT DOES NOT DRINK
HOW OFTEN DO YOU HAVE A DRINK CONTAINING ALCOHOL: NEVER

## 2025-08-20 ASSESSMENT — PAIN SCALES - GENERAL: PAINLEVEL_OUTOF10: 1

## 2025-08-20 ASSESSMENT — PAIN DESCRIPTION - DESCRIPTORS: DESCRIPTORS: DISCOMFORT

## 2025-08-20 ASSESSMENT — PAIN DESCRIPTION - LOCATION: LOCATION: HEAD

## 2025-08-21 ENCOUNTER — CARE COORDINATION (OUTPATIENT)
Dept: CARE COORDINATION | Age: 65
End: 2025-08-21

## 2025-08-22 ENCOUNTER — CARE COORDINATION (OUTPATIENT)
Dept: CARE COORDINATION | Age: 65
End: 2025-08-22

## 2025-08-27 ENCOUNTER — CARE COORDINATION (OUTPATIENT)
Dept: CARE COORDINATION | Age: 65
End: 2025-08-27

## 2025-08-27 DIAGNOSIS — I10 HYPERTENSION, UNSPECIFIED TYPE: Primary | ICD-10-CM

## 2025-08-28 ENCOUNTER — CARE COORDINATION (OUTPATIENT)
Dept: CARE COORDINATION | Age: 65
End: 2025-08-28

## 2025-08-28 ENCOUNTER — CARE COORDINATION (OUTPATIENT)
Dept: PRIMARY CARE CLINIC | Age: 65
End: 2025-08-28

## 2025-08-29 ENCOUNTER — CARE COORDINATION (OUTPATIENT)
Dept: CASE MANAGEMENT | Age: 65
End: 2025-08-29

## 2025-09-02 ENCOUNTER — CARE COORDINATION (OUTPATIENT)
Dept: CASE MANAGEMENT | Age: 65
End: 2025-09-02

## 2025-09-02 ENCOUNTER — CARE COORDINATION (OUTPATIENT)
Dept: CARE COORDINATION | Age: 65
End: 2025-09-02

## 2025-09-02 SDOH — SOCIAL STABILITY: SOCIAL NETWORK: HOW OFTEN DO YOU ATTEND CHURCH OR RELIGIOUS SERVICES?: MORE THAN 4 TIMES PER YEAR

## 2025-09-02 SDOH — ECONOMIC STABILITY: FOOD INSECURITY: HOW HARD IS IT FOR YOU TO PAY FOR THE VERY BASICS LIKE FOOD, HOUSING, MEDICAL CARE, AND HEATING?: NOT HARD AT ALL

## 2025-09-02 SDOH — HEALTH STABILITY: MENTAL HEALTH
DO YOU FEEL STRESS - TENSE, RESTLESS, NERVOUS, OR ANXIOUS, OR UNABLE TO SLEEP AT NIGHT BECAUSE YOUR MIND IS TROUBLED ALL THE TIME - THESE DAYS?: RATHER MUCH

## 2025-09-02 SDOH — SOCIAL STABILITY: SOCIAL NETWORK: HOW OFTEN DO YOU GET TOGETHER WITH FRIENDS OR RELATIVES?: MORE THAN THREE TIMES A WEEK

## 2025-09-02 SDOH — ECONOMIC STABILITY: HOUSING INSECURITY: IN THE LAST 12 MONTHS, WAS THERE A TIME WHEN YOU WERE NOT ABLE TO PAY THE MORTGAGE OR RENT ON TIME?: NO

## 2025-09-02 SDOH — SOCIAL STABILITY: SOCIAL INSECURITY: ARE YOU MARRIED, WIDOWED, DIVORCED, SEPARATED, NEVER MARRIED, OR LIVING WITH A PARTNER?: WIDOWED

## 2025-09-02 SDOH — SOCIAL STABILITY: SOCIAL NETWORK: HOW OFTEN DO YOU ATTEND MEETINGS OF THE CLUBS OR ORGANIZATIONS YOU BELONG TO?: 1 TO 4 TIMES PER YEAR

## 2025-09-02 SDOH — SOCIAL STABILITY: SOCIAL NETWORK
DO YOU BELONG TO ANY CLUBS OR ORGANIZATIONS SUCH AS CHURCH GROUPS, UNIONS, FRATERNAL OR ATHLETIC GROUPS, OR SCHOOL GROUPS?: NO

## 2025-09-02 SDOH — HEALTH STABILITY: MENTAL HEALTH: HOW OFTEN DO YOU HAVE A DRINK CONTAINING ALCOHOL?: NEVER

## 2025-09-02 SDOH — ECONOMIC STABILITY: FOOD INSECURITY: WITHIN THE PAST 12 MONTHS, YOU WORRIED THAT YOUR FOOD WOULD RUN OUT BEFORE YOU GOT THE MONEY TO BUY MORE.: NEVER TRUE

## 2025-09-02 SDOH — HEALTH STABILITY: PHYSICAL HEALTH: ON AVERAGE, HOW MANY DAYS PER WEEK DO YOU ENGAGE IN MODERATE TO STRENUOUS EXERCISE (LIKE A BRISK WALK)?: 0 DAYS

## 2025-09-02 SDOH — HEALTH STABILITY: PHYSICAL HEALTH: ON AVERAGE, HOW MANY MINUTES DO YOU ENGAGE IN EXERCISE AT THIS LEVEL?: 0 MIN

## 2025-09-02 SDOH — SOCIAL STABILITY: SOCIAL NETWORK
IN A TYPICAL WEEK, HOW MANY TIMES DO YOU TALK ON THE PHONE WITH FAMILY, FRIENDS, OR NEIGHBORS?: MORE THAN THREE TIMES A WEEK

## 2025-09-02 SDOH — HEALTH STABILITY: MENTAL HEALTH: HOW MANY DRINKS CONTAINING ALCOHOL DO YOU HAVE ON A TYPICAL DAY WHEN YOU ARE DRINKING?: PATIENT DOES NOT DRINK

## 2025-09-02 SDOH — ECONOMIC STABILITY: TRANSPORTATION INSECURITY: IN THE PAST 12 MONTHS, HAS LACK OF TRANSPORTATION KEPT YOU FROM MEDICAL APPOINTMENTS OR FROM GETTING MEDICATIONS?: NO

## 2025-09-02 ASSESSMENT — ACTIVITIES OF DAILY LIVING (ADL): LACK_OF_TRANSPORTATION: NO

## 2025-09-04 ENCOUNTER — HOSPITAL ENCOUNTER (EMERGENCY)
Age: 65
Discharge: HOME | End: 2025-09-04
Attending: EMERGENCY MEDICINE
Payer: MEDICARE

## 2025-09-04 ENCOUNTER — APPOINTMENT (OUTPATIENT)
Dept: GENERAL RADIOLOGY | Age: 65
End: 2025-09-04
Payer: MEDICARE

## 2025-09-04 ENCOUNTER — CARE COORDINATION (OUTPATIENT)
Dept: CASE MANAGEMENT | Age: 65
End: 2025-09-04

## 2025-09-04 VITALS
BODY MASS INDEX: 55.32 KG/M2 | SYSTOLIC BLOOD PRESSURE: 152 MMHG | DIASTOLIC BLOOD PRESSURE: 70 MMHG | RESPIRATION RATE: 22 BRPM | TEMPERATURE: 98.7 F | OXYGEN SATURATION: 93 % | HEART RATE: 90 BPM | WEIGHT: 293 LBS | HEIGHT: 61 IN

## 2025-09-04 DIAGNOSIS — U07.1 COVID-19: Primary | ICD-10-CM

## 2025-09-04 LAB
ALBUMIN SERPL-MCNC: 3.8 G/DL (ref 3.4–5)
ALBUMIN/GLOB SERPL: 1.3 {RATIO} (ref 1.1–2.2)
ALP SERPL-CCNC: 182 U/L (ref 40–129)
ALT SERPL-CCNC: 110 U/L (ref 10–40)
ANION GAP SERPL CALCULATED.3IONS-SCNC: 11 MMOL/L (ref 3–16)
AST SERPL-CCNC: 156 U/L (ref 15–37)
BASE EXCESS BLDV CALC-SCNC: 0.7 MMOL/L (ref -3–3)
BASOPHILS # BLD: 0 K/UL (ref 0–0.2)
BASOPHILS NFR BLD: 0.5 %
BILIRUB SERPL-MCNC: 0.4 MG/DL (ref 0–1)
BUN SERPL-MCNC: 16 MG/DL (ref 7–20)
CALCIUM SERPL-MCNC: 9.1 MG/DL (ref 8.3–10.6)
CHLORIDE SERPL-SCNC: 100 MMOL/L (ref 99–110)
CO2 BLDV-SCNC: 27 MMOL/L
CO2 SERPL-SCNC: 25 MMOL/L (ref 21–32)
COHGB MFR BLDV: 2.5 % (ref 0–1.5)
CREAT SERPL-MCNC: 0.8 MG/DL (ref 0.6–1.2)
D-DIMER QUANTITATIVE: 0.67 UG/ML FEU (ref 0–0.6)
DEPRECATED RDW RBC AUTO: 15.5 % (ref 12.4–15.4)
EOSINOPHIL # BLD: 0 K/UL (ref 0–0.6)
EOSINOPHIL NFR BLD: 0.5 %
FLUAV RNA RESP QL NAA+PROBE: NOT DETECTED
FLUBV RNA RESP QL NAA+PROBE: NOT DETECTED
GFR SERPLBLD CREATININE-BSD FMLA CKD-EPI: 82 ML/MIN/{1.73_M2}
GLUCOSE SERPL-MCNC: 119 MG/DL (ref 70–99)
HCO3 BLDV-SCNC: 25.4 MMOL/L (ref 23–29)
HCT VFR BLD AUTO: 41.4 % (ref 36–48)
HGB BLD-MCNC: 13.4 G/DL (ref 12–16)
LACTATE BLDV-SCNC: 1.4 MMOL/L (ref 0.4–1.9)
LYMPHOCYTES # BLD: 0.5 K/UL (ref 1–5.1)
LYMPHOCYTES NFR BLD: 6.8 %
MCH RBC QN AUTO: 27.7 PG (ref 26–34)
MCHC RBC AUTO-ENTMCNC: 32.4 G/DL (ref 31–36)
MCV RBC AUTO: 85.5 FL (ref 80–100)
METHGB MFR BLDV: 0.3 %
MONOCYTES # BLD: 0.9 K/UL (ref 0–1.3)
MONOCYTES NFR BLD: 11.1 %
NEUTROPHILS # BLD: 6.3 K/UL (ref 1.7–7.7)
NEUTROPHILS NFR BLD: 81.1 %
NT-PROBNP SERPL-MCNC: 187 PG/ML (ref 0–124)
O2 CT VFR BLDV CALC: 17 VOL %
O2 THERAPY: ABNORMAL
PCO2 BLDV: 40.8 MMHG (ref 40–50)
PH BLDV: 7.41 [PH] (ref 7.35–7.45)
PLATELET # BLD AUTO: 268 K/UL (ref 135–450)
PMV BLD AUTO: 7.3 FL (ref 5–10.5)
PO2 BLDV: 46.9 MMHG (ref 25–40)
POTASSIUM SERPL-SCNC: 4 MMOL/L (ref 3.5–5.1)
PROT SERPL-MCNC: 6.8 G/DL (ref 6.4–8.2)
RBC # BLD AUTO: 4.84 M/UL (ref 4–5.2)
SAO2 % BLDV: 83 %
SARS-COV-2 RNA RESP QL NAA+PROBE: DETECTED
SODIUM SERPL-SCNC: 136 MMOL/L (ref 136–145)
TROPONIN, HIGH SENSITIVITY: 9 NG/L (ref 0–14)
WBC # BLD AUTO: 7.8 K/UL (ref 4–11)

## 2025-09-04 PROCEDURE — 82803 BLOOD GASES ANY COMBINATION: CPT

## 2025-09-04 PROCEDURE — 6370000000 HC RX 637 (ALT 250 FOR IP): Performed by: EMERGENCY MEDICINE

## 2025-09-04 PROCEDURE — 85025 COMPLETE CBC W/AUTO DIFF WBC: CPT

## 2025-09-04 PROCEDURE — 71046 X-RAY EXAM CHEST 2 VIEWS: CPT

## 2025-09-04 PROCEDURE — 93005 ELECTROCARDIOGRAM TRACING: CPT | Performed by: EMERGENCY MEDICINE

## 2025-09-04 PROCEDURE — 80053 COMPREHEN METABOLIC PANEL: CPT

## 2025-09-04 PROCEDURE — 99285 EMERGENCY DEPT VISIT HI MDM: CPT

## 2025-09-04 PROCEDURE — 85379 FIBRIN DEGRADATION QUANT: CPT

## 2025-09-04 PROCEDURE — 87636 SARSCOV2 & INF A&B AMP PRB: CPT

## 2025-09-04 PROCEDURE — 87040 BLOOD CULTURE FOR BACTERIA: CPT

## 2025-09-04 PROCEDURE — 83605 ASSAY OF LACTIC ACID: CPT

## 2025-09-04 PROCEDURE — 84484 ASSAY OF TROPONIN QUANT: CPT

## 2025-09-04 PROCEDURE — 83880 ASSAY OF NATRIURETIC PEPTIDE: CPT

## 2025-09-04 PROCEDURE — 2580000003 HC RX 258: Performed by: EMERGENCY MEDICINE

## 2025-09-04 RX ORDER — 0.9 % SODIUM CHLORIDE 0.9 %
1000 INTRAVENOUS SOLUTION INTRAVENOUS ONCE
Status: COMPLETED | OUTPATIENT
Start: 2025-09-04 | End: 2025-09-04

## 2025-09-04 RX ORDER — ACETAMINOPHEN 500 MG
1000 TABLET ORAL
Status: COMPLETED | OUTPATIENT
Start: 2025-09-04 | End: 2025-09-04

## 2025-09-04 RX ADMIN — ACETAMINOPHEN 1000 MG: 500 TABLET ORAL at 20:35

## 2025-09-04 RX ADMIN — SODIUM CHLORIDE 1000 ML: 0.9 INJECTION, SOLUTION INTRAVENOUS at 20:39

## 2025-09-04 ASSESSMENT — PAIN SCALES - GENERAL
PAINLEVEL_OUTOF10: 5
PAINLEVEL_OUTOF10: 0
PAINLEVEL_OUTOF10: 0

## 2025-09-04 ASSESSMENT — PAIN - FUNCTIONAL ASSESSMENT
PAIN_FUNCTIONAL_ASSESSMENT: 0-10
PAIN_FUNCTIONAL_ASSESSMENT: 0-10

## 2025-09-04 ASSESSMENT — PAIN DESCRIPTION - ORIENTATION: ORIENTATION: PROXIMAL;ANTERIOR

## 2025-09-04 ASSESSMENT — PAIN DESCRIPTION - DESCRIPTORS: DESCRIPTORS: ACHING;DULL

## 2025-09-04 ASSESSMENT — PAIN DESCRIPTION - LOCATION: LOCATION: BACK;NECK

## 2025-09-05 VITALS — SYSTOLIC BLOOD PRESSURE: 132 MMHG | HEART RATE: 68 BPM | DIASTOLIC BLOOD PRESSURE: 63 MMHG | OXYGEN SATURATION: 92 %

## 2025-09-05 LAB
BACTERIA BLD CULT ORG #2: NORMAL
EKG ATRIAL RATE: 102 BPM
EKG DIAGNOSIS: NORMAL
EKG P AXIS: 43 DEGREES
EKG P-R INTERVAL: 134 MS
EKG Q-T INTERVAL: 344 MS
EKG QRS DURATION: 92 MS
EKG QTC CALCULATION (BAZETT): 448 MS
EKG R AXIS: -42 DEGREES
EKG T AXIS: 87 DEGREES
EKG VENTRICULAR RATE: 102 BPM

## 2025-09-06 LAB — BACTERIA BLD CULT: NORMAL

## (undated) DEVICE — YANKAUER,BULB TIP,W/O VENT,RIGID,STERILE: Brand: MEDLINE

## (undated) DEVICE — GOWN,AURORA,NONREINF,RAGLAN,XXL,STERILE: Brand: MEDLINE

## (undated) DEVICE — TUBING, SUCTION, 3/16" X 10', STRAIGHT: Brand: MEDLINE

## (undated) DEVICE — TIP SUCT DIA12FR W STYL CTRL VENT DISPOSABLE FRAZ

## (undated) DEVICE — KIT JACK TBL PT CARE

## (undated) DEVICE — STERILE PATIENT PROTECTIVE PAD FOR IMP® KNEE POSITIONERS & COHESIVE WRAP (10 / CASE): Brand: DE MAYO KNEE POSITIONER®

## (undated) DEVICE — SUTURE ABSORBABLE MONOFILAMENT 1 CTX 36 CM 48 MM VIO PDS +

## (undated) DEVICE — Z INACTIVE USE 2855096 SPONGE GZ W4XL4IN 8 PLY 100% COT

## (undated) DEVICE — SCREW BNE HD 3.5X48 MM HEX PERSONA (NOT IMPLANTED)

## (undated) DEVICE — SUTURE VCRL + SZ 3-0 L18IN ABSRB UD SH 1/2 CIR TAPERCUT NDL VCP864D

## (undated) DEVICE — SCREW BNE L25MM DIA2.5MM KNEE FULL THRD HEX FEM PERSONA (NOT IMPLANTED)

## (undated) DEVICE — ENDO CARRY-ON PROCEDURE KIT INCLUDES SUCTION TUBING, LUBRICANT, GAUZE, BIOHAZARD STICKER, TRANSPORT PAD AND INTERCEPT BEDSIDE KIT.: Brand: ENDO CARRY-ON PROCEDURE KIT

## (undated) DEVICE — ELECTRODE,ECG,STRESS,FOAM,3PK: Brand: MEDLINE

## (undated) DEVICE — CIRCUIT ANES L72IN 3L BACT AND VIR FLTR EL CONN SGL LIMB

## (undated) DEVICE — DRAPE,ABDOMINAL,MAJOR,STERILE: Brand: MEDLINE

## (undated) DEVICE — HANDPIECE SET WITH HIGH FLOW TIP AND SUCTION TUBE: Brand: INTERPULSE

## (undated) DEVICE — SOLUTION IRRIG 2000ML 0.9% SOD CHL USP UROMATIC PLAS CONT

## (undated) DEVICE — SOLUTION WND IRRIGATION 450 ML 0.5 PVP-I 0.9 NACL

## (undated) DEVICE — TOOL 14MH30 LEGEND 14CM 3MM: Brand: MIDAS REX ™

## (undated) DEVICE — SOLUTION IV IRRIG 500ML 0.9% SODIUM CHL 2F7123

## (undated) DEVICE — OPTIFOAM GENTLE SA, POSTOP, 4X10: Brand: MEDLINE

## (undated) DEVICE — SOLUTION IV IRRIG POUR BRL 0.9% SODIUM CHL 2F7124

## (undated) DEVICE — 3M™ TEGADERM™ TRANSPARENT FILM DRESSING FRAME STYLE, 1624W, 2-3/8 IN X 2-3/4 IN (6 CM X 7 CM), 100/CT 4CT/CASE: Brand: 3M™ TEGADERM™

## (undated) DEVICE — STERILE POLYISOPRENE POWDER-FREE SURGICAL GLOVES: Brand: PROTEXIS

## (undated) DEVICE — ELECTRODE PT RET AD L9FT HI MOIST COND ADH HYDRGEL CORDED

## (undated) DEVICE — AGENT HEMSTAT W4XL8IN OXIDIZED REGENERATED CELOS ABSRB

## (undated) DEVICE — KIT,ANTI FOG,W/SPONGE & FLUID,SOFT PACK: Brand: MEDLINE

## (undated) DEVICE — ARM CRADLE: Brand: DEVON

## (undated) DEVICE — TUBING INSUF ISO CONN DISP

## (undated) DEVICE — GAUZE SPONGES,8 PLY: Brand: CURITY

## (undated) DEVICE — NEEDLE HYPO 25GA L1.5IN BLU POLYPR HUB S STL REG BVL STR

## (undated) DEVICE — GLOVE SURG SZ 75 L12IN FNGR THK79MIL GRN LTX FREE

## (undated) DEVICE — CANNULA NSL 13FT TUBE AD ETCO2 DIV SAMP M

## (undated) DEVICE — 3M™ STERI-STRIP™ COMPOUND BENZOIN TINCTURE 40 BAGS/CARTON 4 CARTONS/CASE C1544: Brand: 3M™ STERI-STRIP™

## (undated) DEVICE — HOOD: Brand: FLYTE

## (undated) DEVICE — ELECTRODE BLDE L4IN NONINSULATED EDGE

## (undated) DEVICE — Device

## (undated) DEVICE — SAFEDGE 2108 SERIES SAGITTAL BLADE STERNUM REVISION (40.3 X 0.64 X 48.4MM): Brand: SAFEDGE

## (undated) DEVICE — MAJOR SET UP PK

## (undated) DEVICE — GLOVE ORTHO 7 1/2   MSG9475

## (undated) DEVICE — STANDARD HYPODERMIC NEEDLE,POLYPROPYLENE HUB: Brand: MONOJECT

## (undated) DEVICE — 3 BONE CEMENT MIXER: Brand: MIXEVAC

## (undated) DEVICE — DISPOSABLE LAPAROSCOPIC CLIP APPLIER WITH 20 CLIPS.: Brand: EPIX® UNIVERSAL CLIP APPLIER

## (undated) DEVICE — SOAP BAR DIAL ANTIBACTERIAL 3.5 OZ

## (undated) DEVICE — TROCAR ENDOSCP L100MM DIA5MM BLDELSS STBL SL THRD OPT VW

## (undated) DEVICE — GOWN,REINF,POLY,AURORA,XLNG/XXL,STRL: Brand: MEDLINE

## (undated) DEVICE — SYRINGE MED 10ML LUERLOCK TIP W/O SFTY DISP

## (undated) DEVICE — GLOVE ORANGE PI 8 1/2   MSG9085

## (undated) DEVICE — ADHESIVE SKIN CLOSURE WND 8.661X1.5 IN 22 CM LIQUIBAND SECUR

## (undated) DEVICE — CORD ES L10FT MPLR LAP

## (undated) DEVICE — SNARE ENDOSCP L240CM SHTH DIA24MM LOOP W10MM POLYP RND REINF

## (undated) DEVICE — SUTURE VCRL SZ 4-0 L18IN ABSRB UD L19MM PS-2 3/8 CIR PRIM J496H

## (undated) DEVICE — TISSUE RETRIEVAL SYSTEM: Brand: INZII RETRIEVAL SYSTEM

## (undated) DEVICE — SUTURE MCRYL + SZ 4-0 L18IN ABSRB UD L19MM PS-2 3/8 CIR MCP496G

## (undated) DEVICE — DUAL CUT SAGITTAL BLADE

## (undated) DEVICE — SUTURE SZ 0 27IN 5/8 CIR UR-6  TAPER PT VIOLET ABSRB VICRYL J603H

## (undated) DEVICE — BONE PREPARATION KIT
Type: IMPLANTABLE DEVICE | Site: KNEE | Status: NON-FUNCTIONAL
Brand: BIOPREP
Removed: 2024-08-08

## (undated) DEVICE — SUTURE CHROMIC GUT SZ 3-0 L27IN ABSRB BRN L26MM SH 1/2 CIR G122H

## (undated) DEVICE — TROCAR ENDOSCP L100MM DIA5MM BLDELSS STBL SL OBT RADLUC

## (undated) DEVICE — TRAP POLYP ETRAP

## (undated) DEVICE — PACK,UNIVERSAL,SPLIT,II,AURORA: Brand: MEDLINE

## (undated) DEVICE — SUTURE MONOCRYL STRATAFIX SPRL SZ 3-0 L12IN ABSRB UD FS-1 L30X30CM SXMP2B410

## (undated) DEVICE — NEEDLE HYPO 20GA L1.5IN YEL POLYPR HUB S STL REG BVL STR

## (undated) DEVICE — TROCAR ENDOSCP L100MM DIA11MM STBL SL BLDELSS DISP ENDOPATH

## (undated) DEVICE — SUTURE VCRL SZ 0 L27IN ABSRB UD L26MM CT-2 1/2 CIR J270H

## (undated) DEVICE — ZIMMER® A.T.S. CYCLINDRICAL TOURNIQUET CUFF, SINGLE PORT, SINGLE BLADDER 30 IN. 76 CM)

## (undated) DEVICE — GAUZE,SPONGE,4"X4",8PLY,STRL,LF,10/TRAY: Brand: MEDLINE

## (undated) DEVICE — GOWN SIRUS NONREIN XL W/TWL: Brand: MEDLINE INDUSTRIES, INC.

## (undated) DEVICE — CANNULA,OXY,ADULT,SUPERSOFT,W/7'TUB,SC: Brand: MEDLINE INDUSTRIES, INC.

## (undated) DEVICE — HOOD, PEEL-AWAY: Brand: FLYTE

## (undated) DEVICE — SUTURE STRATAFIX SPRL SZ 2 0 L14IN ABSRB UD MH L36MM 1 2 CIR SXMD2B401

## (undated) DEVICE — PUMP SUC IRR TBNG L10FT W/ HNDPC ASSEMB STRYKEFLOW 2

## (undated) DEVICE — INTENDED FOR TISSUE SEPARATION, AND OTHER PROCEDURES THAT REQUIRE A SHARP SURGICAL BLADE TO PUNCTURE OR CUT.: Brand: BARD-PARKER ® STAINLESS STEEL BLADES

## (undated) DEVICE — APPLICATOR MEDICATED 26 CC SOLUTION HI LT ORNG CHLORAPREP

## (undated) DEVICE — INSUFFLATION NEEDLE TO ESTABLISH PNEUMOPERITONEUM.: Brand: INSUFFLATION NEEDLE

## (undated) DEVICE — SUTURE VICRYL SZ 0 L36IN ABSRB UD CT-1 L36MM 1/2 CIR TAPR PNT VCP946H

## (undated) DEVICE — HEADLESS TROCHAR PIN 75MM
Type: IMPLANTABLE DEVICE | Site: KNEE | Status: NON-FUNCTIONAL
Brand: ZUK
Removed: 2024-08-08